# Patient Record
Sex: FEMALE | Race: WHITE | Employment: OTHER | URBAN - METROPOLITAN AREA
[De-identification: names, ages, dates, MRNs, and addresses within clinical notes are randomized per-mention and may not be internally consistent; named-entity substitution may affect disease eponyms.]

---

## 2018-04-25 ENCOUNTER — OFFICE VISIT (OUTPATIENT)
Dept: ORTHOPEDIC SURGERY | Age: 63
End: 2018-04-25

## 2018-04-25 VITALS
DIASTOLIC BLOOD PRESSURE: 74 MMHG | HEART RATE: 63 BPM | RESPIRATION RATE: 16 BRPM | BODY MASS INDEX: 35.13 KG/M2 | SYSTOLIC BLOOD PRESSURE: 134 MMHG | WEIGHT: 205.8 LBS | HEIGHT: 64 IN

## 2018-04-25 DIAGNOSIS — M47.817 LUMBOSACRAL SPONDYLOSIS WITHOUT MYELOPATHY: Primary | ICD-10-CM

## 2018-04-25 DIAGNOSIS — C50.919 MALIGNANT NEOPLASM OF FEMALE BREAST, UNSPECIFIED ESTROGEN RECEPTOR STATUS, UNSPECIFIED LATERALITY, UNSPECIFIED SITE OF BREAST (HCC): ICD-10-CM

## 2018-04-25 DIAGNOSIS — M51.36 DDD (DEGENERATIVE DISC DISEASE), LUMBAR: ICD-10-CM

## 2018-04-25 RX ORDER — LINAGLIPTIN 5 MG/1
TABLET, FILM COATED ORAL
Refills: 0 | COMMUNITY
Start: 2018-04-19 | End: 2021-09-27

## 2018-04-25 RX ORDER — PEN NEEDLE, DIABETIC 32 GX 1/4"
NEEDLE, DISPOSABLE MISCELLANEOUS
Refills: 0 | COMMUNITY
Start: 2018-01-26 | End: 2021-12-05

## 2018-04-25 RX ORDER — INSULIN LISPRO 100 [IU]/ML
INJECTION, SOLUTION INTRAVENOUS; SUBCUTANEOUS
Refills: 0 | COMMUNITY
Start: 2018-04-20 | End: 2018-08-16

## 2018-04-25 RX ORDER — VENLAFAXINE HYDROCHLORIDE 37.5 MG/1
37.5 CAPSULE, EXTENDED RELEASE ORAL DAILY
Refills: 0 | COMMUNITY
Start: 2018-04-06 | End: 2020-11-17 | Stop reason: ALTCHOICE

## 2018-04-25 RX ORDER — TRAMADOL HYDROCHLORIDE 50 MG/1
TABLET ORAL
Refills: 0 | COMMUNITY
Start: 2018-01-24 | End: 2018-08-16

## 2018-04-25 RX ORDER — GLIPIZIDE 10 MG/1
TABLET ORAL 2 TIMES DAILY
Refills: 0 | COMMUNITY
Start: 2018-04-20 | End: 2018-09-04

## 2018-04-25 RX ORDER — DICLOFENAC SODIUM 10 MG/G
GEL TOPICAL
Refills: 0 | COMMUNITY
Start: 2018-02-14 | End: 2018-08-16

## 2018-04-25 RX ORDER — GABAPENTIN 300 MG/1
CAPSULE ORAL
Refills: 0 | COMMUNITY
Start: 2018-01-23 | End: 2018-07-06 | Stop reason: SDUPTHER

## 2018-04-25 RX ORDER — ROSUVASTATIN CALCIUM 10 MG/1
TABLET, COATED ORAL
Refills: 0 | COMMUNITY
Start: 2018-04-20 | End: 2018-08-16

## 2018-04-25 RX ORDER — AMLODIPINE BESYLATE 10 MG/1
10 TABLET ORAL DAILY
Refills: 0 | COMMUNITY
Start: 2018-04-06

## 2018-04-25 RX ORDER — INSULIN GLARGINE 100 [IU]/ML
INJECTION, SOLUTION SUBCUTANEOUS
Refills: 0 | Status: ON HOLD | COMMUNITY
Start: 2018-04-19 | End: 2018-09-04

## 2018-04-25 RX ORDER — HYDROGEN PEROXIDE 3 %
20 SOLUTION, NON-ORAL MISCELLANEOUS DAILY
Refills: 0 | COMMUNITY
Start: 2018-04-06 | End: 2021-05-13

## 2018-04-25 RX ORDER — HYDROCHLOROTHIAZIDE 12.5 MG/1
12.5 CAPSULE ORAL DAILY
Refills: 0 | COMMUNITY
Start: 2018-04-06 | End: 2020-12-02

## 2018-04-25 RX ORDER — METOPROLOL TARTRATE 50 MG/1
50 TABLET ORAL DAILY
Refills: 0 | COMMUNITY
Start: 2018-04-06 | End: 2020-12-02

## 2018-04-25 RX ORDER — LISINOPRIL 20 MG/1
TABLET ORAL DAILY
Refills: 0 | COMMUNITY
Start: 2018-04-06 | End: 2018-09-04

## 2018-04-25 NOTE — MR AVS SNAPSHOT
[address] [facility] 
 
 
 [address] 200 [facility] 
[phone] Patient: Pooja Russ MRN: [id number] Visit Information Date & Time Provider Department Dept. Phone Encounter #  
 4/25/2018 10:00 AM Ascencion Varner MD [facility] [phone] [id number] Follow-up Instructions Return for following MRI. Upcoming Health Maintenance Date Due Hepatitis C Screening 1955 DTaP/Tdap/Td series (1 - Tdap) 2/19/1978 PAP AKA CERVICAL CYTOLOGY 2/19/1978 BREAST CANCER SCRN MAMMOGRAM 2/19/2007 FOBT Q 1 YEAR AGE 50-75 2/19/2007 ZOSTER VACCINE AGE 60> 12/19/2016 Influenza Age 5 to Adult 8/1/2017 Allergies as of 4/25/2018  Review Complete On: 4/25/2018 By: Ascencion Varner MD  
  
 Severity Noted Reaction Type Reactions Keflex [Cephalexin]  04/25/2018    Nausea and Vomiting Metformin  04/25/2018    Other (comments) GI DISTRESS Soliqua 100/33 [Insulin Glargine-lixisenatide]  04/25/2018    Nausea Only Current Immunizations  Never Reviewed No immunizations on file. Not reviewed this visit You Were Diagnosed With   
  
 Codes Comments Lumbosacral spondylosis without myelopathy    -  Primary ICD-10-CM: Y72.924 ICD-9-CM: 721.3 DDD (degenerative disc disease), lumbar     ICD-10-CM: M51.36 
ICD-9-CM: 722.52 Malignant neoplasm of female breast, unspecified estrogen receptor status, unspecified laterality, unspecified site of breast ([facility]     ICD-10-CM: C50.919 ICD-9-CM: 174.9 Vitals BP Pulse Resp Height(growth percentile) Weight(growth percentile) BMI  
 134/74 63 16 5' 3.5\" (1.613 m) 205 lb 12.8 oz (93.4 kg) 35.88 kg/m2 Smoking Status Never Smoker Vitals History BMI and BSA Data Body Mass Index Body Surface Area  
 35.88 kg/m 2 2.05 m 2 Preferred Pharmacy Pharmacy Name Phone 300 [facility] [address] [facility] [phone] Your Updated Medication List  
  
   
This list is accurate as of 4/25/18 10:37 AM.  Always use your most recent med list. amLODIPine 10 mg tablet Commonly known as:  NORVASC  
  
 diclofenac 1 % Gel Commonly known as:  VOLTAREN  
  
 esomeprazole 20 mg capsule Commonly known as:  NEXIUM  
  
 gabapentin 300 mg capsule Commonly known as:  NEURONTIN  
take 1 capsule by mouth twice a day  
  
 glipiZIDE 10 mg tablet Commonly known as:  Tamy Chu HumaLOG KwikPen Insulin 100 unit/mL kwikpen Generic drug:  insulin lispro  
  
 hydroCHLOROthiazide 12.5 mg capsule Commonly known as:  MICROZIDE  
  
 LANTUS SOLOSTAR U-100 INSULIN 100 unit/mL (3 mL) Inpn Generic drug:  insulin glargine INJECT 18 UNITS INTO SKIN ONCE AT BEDTIME FOR 15 DAYS  
  
 lisinopril 20 mg tablet Commonly known as:  PRINIVIL, ZESTRIL  
  
 metoprolol tartrate 50 mg tablet Commonly known as:  LOPRESSOR  
  
 NOVOFINE 32 32 gauge x 1/4\" Ndle Generic drug:  Insulin Needles (Disposable)  
  
 rosuvastatin 10 mg tablet Commonly known as:  CRESTOR  
  
 TRADJENTA 5 mg tablet Generic drug:  linagliptin TAKE 1 TABLET BY MOUTH ONCE A DAY FOR 15 DAYS  
  
 traMADol 50 mg tablet Commonly known as:  ULTRAM  
take 1 tablet by mouth every 6 hours if needed  
  
 venlafaxine-SR 37.5 mg capsule Commonly known as:  EFFEXOR-XR Follow-up Instructions Return for following MRI. To-Do List   
 05/02/2018 Imaging:  MRI LUMB SPINE WO CONT Introducing [facility]! [facility] introduces Cardoz patient portal. Now you can access parts of your medical record, email your doctor's office, and request medication refills online. 1. In your internet browser, go to [email or web address] 2. Click on the First Time User? Click Here link in the Sign In box.  You will see the New Member Sign Up page. 3. Enter your DataVote Access Code exactly as it appears below. You will not need to use this code after youve completed the sign-up process. If you do not sign up before the expiration date, you must request a new code. · DataVote Access Code: [email or web address] Expires: 7/24/2018  8:54 AM 
 
4. Enter the last four digits of your Social Security Number (xxxx) and Date of Birth (mm/dd/yyyy) as indicated and click Submit. You will be taken to the next sign-up page. 5. Create a ZIIBRAt ID. This will be your DataVote login ID and cannot be changed, so think of one that is secure and easy to remember. 6. Create a DataVote password. You can change your password at any time. 7. Enter your Password Reset Question and Answer. This can be used at a later time if you forget your password. 8. Enter your e-mail address. You will receive e-mail notification when new information is available in [address] 9. Click Sign Up. You can now view and download portions of your medical record. 10. Click the Download Summary menu link to download a portable copy of your medical information. If you have questions, please visit the Frequently Asked Questions section of the DataVote website. Remember, DataVote is NOT to be used for urgent needs. For medical emergencies, dial 911. Now available from your iPhone and Android! Please provide this summary of care documentation to your next provider. If you have any questions after today's visit, please call [phone].

## 2018-04-25 NOTE — PROGRESS NOTES
[facility]  [address], [address]  Phone: [phone]  Fax: [phone]        INITIAL CONSULTATION      HISTORY OF PRESENT ILLNESS:  Zora Barajas is a 66 y.o. female whom is referred from Ellie York NP secondary to low back pain without radicular symptoms ongoing since 12/6/17 after having a ground-level fall. She rates pain 7/10. Standing exacerbates her pain and sitting alleviates her pain. symptoms consistent for stenosis. ER note from Dr. Chani Heaton dated 12/6/18 indicating patient presented for acute midline low back pain with sciatica. At that time, she was treated with Prednisone and Hydrocodone with good relief. Noted, her blood sugars gilberto to 386 with the Prednisone. Note from Ellie York NP dated 4/12/18 indicating patient was seen with acute low back pain. Pt is currently receiving chemotherapy for breast cancer (diagnosed 1/2017) with h/o lumpectomy and radiation therapy. The patient has a history of DM and reports blood sugars are poorly controlled, exceeding 200 (211). She has tried Tylenol with slight relief. Noted, she is currently prescribed Neurontin 300 mg TID. Pt is not a candidate for Cymbalta as she is on Effexor. She received PT and has been completing her HEP daily. Pt denies h/o lumbar spinal surgery/blocks. Pt denies change in bowel or bladder habits. Pt denies fever, weight loss, or skin changes. Lumbar spine XR dated 12/6/2017 reviewed. Per report, no evidence of acute fracture or malalignment. Vertebral body heights are maintained. Degenerative disc changes notably at L5-S1 with loss of disc height and disc osteophyte formation. Although not specifically mentioned in the report, reviewing the films myself, there is a very slight anterolisthesis of L4 on L5 and mild dsic space narrowing at L4-5, L5-S1. The patient is RHD.  reviewed. Body mass index is 35.88 kg/(m^2). PCP: No primary care provider on file.     Past Medical History:   Diagnosis Date    Cancer ([facility]     Diabetes ([facility]     Hypertension    [facility]  Past Surgical History:   Procedure Laterality Date    HX BREAST BIOPSY Left 02/03/2017    BREAST CANCER    HX BREAST LUMPECTOMY Left    ANCER    HX BREAST LUMPECTOMY Left       Substance Use Topics    Smoking status: Never Smoker    Smokeless tobacco: Never Used    Alcohol use No     Work status: Not available. Marital status: The patient is . Allergies   Allergen Reactions    Keflex [Cephalexin] Nausea and Vomiting    Metformin Other (comments)     GI DISTRESS    Soliqua 100/33 [Insulin Glargine-Lixisenatide] Nausea Only          No family history on file. REVIEW OF SYSTEMS  Constitutional symptoms: Negative  Eyes: Negative  Ears, Nose, Throat, and Mouth: Negative  Cardiovascular: Negative  Respiratory: Negative  Genitourinary: Negative  Integumentary (Skin and/or breast): Negative  Musculoskeletal: Positive for low back pain, bilateral knee pain (R>L). Extremities: Negative for edema. Endocrine/Rheumatologic: Negative  Hematologic/Lymphatic: Negative  Allergic/Immunologic: Negative  Psychiatric: Negative       PHYSICAL EXAMINATION    Visit Vitals    /74    Pulse 63    Resp 16    Ht 5' 3.5\" (1.613 m)    Wt 93.4 kg (205 lb 12.8 oz)    BMI 35.88 kg/m2       CONSTITUTIONAL: NAD, A&O x 3  HEART: Regular rate and rhythm  ABDOMEN: Positive bowel sounds, soft, nontender, and nondistended  LUNGS: Clear to auscultation bilaterally. SKIN: Negative for rash. RANGE OF MOTION: The patient has full passive range of motion in all four extremities. SENSATION: Sensation is intact to light touch throughout. MOTOR:   Straight Leg Raise: Negative, bilateral  Mcconnell: Negative, bilateral  Deep tendon reflexes are 1+ at the brachioradialis and biceps, and 0 at the triceps. Deep tendon reflexes are 0 at the knees and ankles bilaterally.      Shoulder AB/Flex Elbow Flex Wrist Ext Elbow Ext Wrist Flex Hand Intrin Tone   Right +4/5 +4/5 +4/5 +4/5 +4/5 +4/5 +4/5   Left +4/5 +4/5 +4/5 +4/5 +4/5 +4/5 +4/5              Hip Flex Knee Ext Knee Flex Ankle DF GTE Ankle PF Tone   Right +4/5 +4/5 +4/5 +4/5 +4/5 +4/5 +4/5   Left +4/5 +4/5 +4/5 +4/5 +4/5 +4/5 +4/5       ASSESSMENT   Diagnoses and all orders for this visit:    1. Lumbosacral spondylosis without myelopathy  -     MRI LUMB SPINE WO CONT; Future    2. DDD (degenerative disc disease), lumbar  -     MRI LUMB SPINE WO CONT; Future    3. Malignant neoplasm of female breast, unspecified estrogen receptor status, unspecified laterality, unspecified site of breast ([facility]  -     MRI LUMB SPINE WO CONT; Future           IMPRESSIONS/RECOMMENDATIONS:  The patient presents for low back pain without radicular symptoms ongoing since 12/6/17 after having a ground-level fall. She is neurologically intact. Multiple treatment options were discussed. Given her history of breast cancer, I will set her up for a lumbar spine MRI to r/o metastatic disease. I advised patient to bring copies of films to next visit. I encourage patient to perform her HEP daily. I will see the patient back following MRI. Written by Justus Galicia, as dictated by Marcy Erwin MD  I examined the patient, reviewed and agree with the note.

## 2018-05-01 ENCOUNTER — DOCUMENTATION ONLY (OUTPATIENT)
Dept: ORTHOPEDIC SURGERY | Age: 63
End: 2018-05-01

## 2018-05-01 NOTE — PROGRESS NOTES
MRI Lumbar without is scheduled at [facility], [id number], on 05/08/18, arrive 3:45PM, test 4:15PM. [employer] pre-authorization [id number], effective 05/01/18-05/30/18

## 2018-05-16 ENCOUNTER — OFFICE VISIT (OUTPATIENT)
Dept: ORTHOPEDIC SURGERY | Age: 63
End: 2018-05-16

## 2018-05-16 VITALS
HEART RATE: 70 BPM | SYSTOLIC BLOOD PRESSURE: 160 MMHG | BODY MASS INDEX: 37.21 KG/M2 | HEIGHT: 63 IN | WEIGHT: 210 LBS | DIASTOLIC BLOOD PRESSURE: 80 MMHG | OXYGEN SATURATION: 96 %

## 2018-05-16 DIAGNOSIS — M51.36 DDD (DEGENERATIVE DISC DISEASE), LUMBAR: ICD-10-CM

## 2018-05-16 DIAGNOSIS — M48.061 SPINAL STENOSIS, LUMBAR REGION WITHOUT NEUROGENIC CLAUDICATION: Primary | ICD-10-CM

## 2018-05-16 DIAGNOSIS — M47.817 LUMBOSACRAL SPONDYLOSIS WITHOUT MYELOPATHY: ICD-10-CM

## 2018-05-16 NOTE — MR AVS SNAPSHOT
[address] [facility] 
 
 
 [address] 200 [facility] 
[phone] Patient: Nahum Alegre MRN: [id number] Visit Information Date & Time Provider Department Dept. Phone Encounter #  
 5/16/2018  1:10 PM Juanito Lockwood MD [facility], [address] [facility] [place] [id number] Follow-up Instructions Return in about 4 weeks (around 6/13/2018). Upcoming Health Maintenance Date Due Hepatitis C Screening 1955 Pneumococcal 19-64 Highest Risk (1 of 3 - PCV13) 2/19/1976 DTaP/Tdap/Td series (1 - Tdap) 2/19/1978 PAP AKA CERVICAL CYTOLOGY 2/19/1978 BREAST CANCER SCRN MAMMOGRAM 2/19/2007 FOBT Q 1 YEAR AGE 50-75 2/19/2007 ZOSTER VACCINE AGE 60> 12/19/2016 Influenza Age 5 to Adult 8/1/2018 Allergies as of 5/16/2018  Review Complete On: 5/16/2018 By: Juanito Lockwood MD  
  
 Severity Noted Reaction Type Reactions Keflex [Cephalexin]  04/25/2018    Nausea and Vomiting Metformin  04/25/2018    Other (comments) GI DISTRESS Soliqua 100/33 [Insulin Glargine-lixisenatide]  04/25/2018    Nausea Only Current Immunizations  Never Reviewed No immunizations on file. Not reviewed this visit You Were Diagnosed With   
  
 Codes Comments Spinal stenosis, lumbar region without neurogenic claudication    -  Primary ICD-10-CM: M48.061 
ICD-9-CM: 724.02 Lumbosacral spondylosis without myelopathy     ICD-10-CM: P78.148 ICD-9-CM: 721.3 DDD (degenerative disc disease), lumbar     ICD-10-CM: M51.36 
ICD-9-CM: 722.52 Vitals BP Pulse Height(growth percentile) Weight(growth percentile) SpO2 BMI  
 160/80 70 5' 3\" (1.6 m) 210 lb (95.3 kg) 96% 37.2 kg/m2 Smoking Status Never Smoker BMI and BSA Data Body Mass Index Body Surface Area  
 37.2 kg/m 2 2.06 m 2 Preferred Pharmacy Pharmacy Name Phone 300 [facility] [address] [facility] [phone] Your Updated Medication List  
  
   
This list is accurate as of 5/16/18  3:13 PM.  Always use your most recent med list. amLODIPine 10 mg tablet Commonly known as:  NORVASC  
  
 diclofenac 1 % Gel Commonly known as:  VOLTAREN  
  
 esomeprazole 20 mg capsule Commonly known as:  NEXIUM  
  
 gabapentin 300 mg capsule Commonly known as:  NEURONTIN  
take 1 capsule by mouth twice a day  
  
 glipiZIDE 10 mg tablet Commonly known as:  [place] HumaLOG KwikPen Insulin 100 unit/mL kwikpen Generic drug:  insulin lispro  
  
 hydroCHLOROthiazide 12.5 mg capsule Commonly known as:  MICROZIDE  
  
 LANTUS SOLOSTAR U-100 INSULIN 100 unit/mL (3 mL) Inpn Generic drug:  insulin glargine INJECT 18 UNITS INTO SKIN ONCE AT BEDTIME FOR 15 DAYS  
  
 lisinopril 20 mg tablet Commonly known as:  PRINIVIL, ZESTRIL  
  
 metoprolol tartrate 50 mg tablet Commonly known as:  LOPRESSOR  
  
 NOVOFINE 32 32 gauge x 1/4\" Ndle Generic drug:  Insulin Needles (Disposable)  
  
 rosuvastatin 10 mg tablet Commonly known as:  CRESTOR  
  
 TRADJENTA 5 mg tablet Generic drug:  linagliptin TAKE 1 TABLET BY MOUTH ONCE A DAY FOR 15 DAYS  
  
 traMADol 50 mg tablet Commonly known as:  ULTRAM  
take 1 tablet by mouth every 6 hours if needed  
  
 venlafaxine-SR 37.5 mg capsule Commonly known as:  EFFEXOR-XR Follow-up Instructions Return in about 4 weeks (around 6/13/2018). Introducing [facility]! [address] [facility] introduces TrueFacet patient portal. Now you can access parts of your medical record, email your doctor's office, and request medication refills online. 1. In your internet browser, go to [email or web address] 2. Click on the First Time User? Click Here link in the Sign In box. You will see the New Member Sign Up page. 3. Enter your Anacomp Access Code exactly as it appears below. You will not need to use this code after youve completed the sign-up process. If you do not sign up before the expiration date, you must request a new code. · Anacomp Access Code: [email or web address] Expires: 7/24/2018  8:54 AM 
 
4. Enter the last four digits of your Social Security Number (xxxx) and Date of Birth (mm/dd/yyyy) as indicated and click Submit. You will be taken to the next sign-up page. 5. Create a Anacomp ID. This will be your Anacomp login ID and cannot be changed, so think of one that is secure and easy to remember. 6. Create a Anacomp password. You can change your password at any time. 7. Enter your Password Reset Question and Answer. This can be used at a later time if you forget your password. 8. Enter your e-mail address. You will receive e-mail notification when new information is available in [address] 9. Click Sign Up. You can now view and download portions of your medical record. 10. Click the Download Summary menu link to download a portable copy of your medical information. If you have questions, please visit the Frequently Asked Questions section of the Anacomp website. Remember, Anacomp is NOT to be used for urgent needs. For medical emergencies, dial 911. Now available from your iPhone and Android! Please provide this summary of care documentation to your next provider. Your primary care clinician is listed as Samara Ramirez. If you have any questions after today's visit, please call [phone].

## 2018-05-16 NOTE — PROGRESS NOTES
[facility]  [address], [address]  Phone: [phone]  Fax: [phone]        PROGRESS NOTE      HISTORY OF PRESENT ILLNESS:  The patient is a 66 y.o. female and was seen today for follow up of low back pain without radicular symptoms ongoing since 12/6/17 after having a ground-level fall. Standing exacerbates her pain and sitting alleviates her pain. Symptoms consistent for stenosis. ER note from Dr. Jose Eduardo Caleor dated 12/6/18 indicating patient presented for acute midline low back pain with sciatica. At that time, she was treated with Prednisone and Hydrocodone with good relief. Noted, her blood sugars gilberto to 386 with the Prednisone. Note from Tain Wallis NP dated 4/12/18 indicating patient was seen with acute low back pain. Pt has received chemotherapy for breast cancer (diagnosed 1/2017) with h/o lumpectomy and radiation therapy. She has tried Tylenol with slight relief. Pt is not a candidate for Cymbalta as she is on Effexor. Pt denies h/o lumbar spinal surgery/blocks. Pt denies change in bowel or bladder habits. Pt denies fever, weight loss, or skin changes. Lumbar spine XR dated 12/6/2017 reviewed. Per report, no evidence of acute fracture or malalignment. Vertebral body heights are maintained. Degenerative disc changes notably at L5-S1 with loss of disc height and disc osteophyte formation. Although not specifically mentioned in the report, reviewing the films myself, there is a very slight anterolisthesis of L4 on L5 and mild disc space narrowing at L4-5, L5-S1. The patient is RHD. At her last clinical appointment, the patient presented for low back pain without radicular symptoms ongoing since 12/6/17 after having a ground-level fall. She was neurologically intact. Multiple treatment options were discussed. Given her history of breast cancer, I set her up for a lumbar spine MRI to r/o metastatic disease.  I encouraged patient to perform her HEP daily. The patient returns today with pain location and distribution remain unchanged. She continues to rate her pain pain 7/10. Noted, she is currently prescribed Neurontin 400 mg qam and 800 mg qhs for neuropathy. The patient has a history of DM and reports blood sugars are poorly controlled, exceeding 200 (300+). Lumbar spine MRI dated 5/8/2018 reviewed. Per report, severe central and foraminal stenosis at L3/4. Moderate to severe central stenosis L4/5. Severe right L5 lateral recess and right foraminal stenosis L4/5 and left S1 lateral recess and left foraminal stenosis L5/S1, deforming the respective nerve roots, and multilevel spondylosis. Likely degenerative marrow edema at L5/S1 deep to the endplates, which can be a pain generator. partially visualized right renal angiomyolipoma as seen on prior CT.  reviewed. Body mass index is 37.2 kg/(m^2). PCP: Silver Anton NP    Past Medical History:   Diagnosis Date    [facility] ([facility]     Diabetes ([facility]     Hypertension         Social History     Social History    Marital status:      Spouse name: N/A    Number of children: N/A    Years of education: N/A     Occupational History    Not on file.      Social History Main Topics    Smoking status: Never Smoker    Smokeless tobacco: Never Used    Alcohol use No    Drug use: No    Sexual activity: Not on file     Other Topics Concern    Not on file     Social History Narrative       Current Outpatient Prescriptions   Medication Sig Dispense Refill    amLODIPine (NORVASC) 10 mg tablet   0    diclofenac (VOLTAREN) 1 % gel   0    esomeprazole (NEXIUM) 20 mg capsule   0    gabapentin (NEURONTIN) 300 mg capsule take 1 capsule by mouth twice a day  0    glipiZIDE (GLUCOTROL) 10 mg tablet   0    hydroCHLOROthiazide (MICROZIDE) 12.5 mg capsule   0    LANTUS SOLOSTAR U-100 INSULIN 100 unit/mL (3 mL) inpn INJECT 18 UNITS INTO SKIN ONCE AT BEDTIME FOR 15 DAYS  0    HUMALOG KWIKPEN INSULIN 100 unit/mL kwikpen   0    TRADJENTA 5 mg tablet TAKE 1 TABLET BY MOUTH ONCE A DAY FOR 15 DAYS  0    lisinopril (PRINIVIL, ZESTRIL) 20 mg tablet   0    metoprolol tartrate (LOPRESSOR) 50 mg tablet   0    NOVOFINE 32 32 gauge x 1/4\" ndle   0    rosuvastatin (CRESTOR) 10 mg tablet   0    traMADol (ULTRAM) 50 mg tablet take 1 tablet by mouth every 6 hours if needed  0    venlafaxine-SR (EFFEXOR-XR) 37.5 mg capsule   0       Allergies   Allergen Reactions    Keflex [Cephalexin] Nausea and Vomiting    Metformin Other (comments)     GI DISTRESS    Soliqua 100/33 [Insulin Glargine-Lixisenatide] Nausea Only          PHYSICAL EXAMINATION    Visit Vitals    /80    Pulse 70    Ht 5' 3\" (1.6 m)    Wt 95.3 kg (210 lb)    SpO2 96%    BMI 37.2 kg/m2       CONSTITUTIONAL: NAD, A&O x 3  SENSATION: Intact to light touch throughout  RANGE OF MOTION: The patient has full passive range of motion in all four extremities. MOTOR:  Straight Leg Raise: Negative, bilateral               Hip Flex Knee Ext Knee Flex Ankle DF GTE Ankle PF Tone   Right +4/5 +4/5 +4/5 +4/5 +4/5 +4/5 +4/5   Left +4/5 +4/5 +4/5 +4/5 +4/5 +4/5 +4/5       ASSESSMENT   Diagnoses and all orders for this visit:    1. Spinal stenosis, lumbar region without neurogenic claudication    2. Lumbosacral spondylosis without myelopathy    3. DDD (degenerative disc disease), lumbar          IMPRESSION AND PLAN:  Her symptoms appear to be stenotic in nature. She is neurologically intact. Patient is not a good candidate for lumbar blocks due to her poorly controlled DM. I recommend she take Neurontin 400 mg TID. She declines physical therapy. I will see the patient back in 1 month's time or earlier if needed. Written by Faviola Manzo, as dictated by Nito Guzman MD  I examined the patient, reviewed and agree with the note.

## 2018-06-14 DIAGNOSIS — C50.919 MALIGNANT NEOPLASM OF FEMALE BREAST, UNSPECIFIED ESTROGEN RECEPTOR STATUS, UNSPECIFIED LATERALITY, UNSPECIFIED SITE OF BREAST (HCC): ICD-10-CM

## 2018-06-14 DIAGNOSIS — M47.817 LUMBOSACRAL SPONDYLOSIS WITHOUT MYELOPATHY: ICD-10-CM

## 2018-06-14 DIAGNOSIS — M51.36 DDD (DEGENERATIVE DISC DISEASE), LUMBAR: ICD-10-CM

## 2018-06-20 ENCOUNTER — OFFICE VISIT (OUTPATIENT)
Dept: ORTHOPEDIC SURGERY | Age: 63
End: 2018-06-20

## 2018-06-20 VITALS
BODY MASS INDEX: 37.03 KG/M2 | HEART RATE: 65 BPM | HEIGHT: 63 IN | SYSTOLIC BLOOD PRESSURE: 162 MMHG | WEIGHT: 209 LBS | DIASTOLIC BLOOD PRESSURE: 83 MMHG

## 2018-06-20 DIAGNOSIS — M48.061 SPINAL STENOSIS, LUMBAR REGION WITHOUT NEUROGENIC CLAUDICATION: Primary | ICD-10-CM

## 2018-06-20 DIAGNOSIS — M47.817 LUMBOSACRAL SPONDYLOSIS WITHOUT MYELOPATHY: ICD-10-CM

## 2018-06-20 DIAGNOSIS — M51.36 DDD (DEGENERATIVE DISC DISEASE), LUMBAR: ICD-10-CM

## 2018-06-20 PROBLEM — E66.01 SEVERE OBESITY (BMI 35.0-39.9): Status: ACTIVE | Noted: 2018-06-20

## 2018-06-20 RX ORDER — HYDROCODONE BITARTRATE AND ACETAMINOPHEN 5; 325 MG/1; MG/1
TABLET ORAL
COMMUNITY
Start: 2018-06-06 | End: 2018-08-16

## 2018-06-20 RX ORDER — FUROSEMIDE 20 MG/1
TABLET ORAL
Refills: 0 | COMMUNITY
Start: 2018-06-08 | End: 2020-12-02

## 2018-06-20 RX ORDER — GABAPENTIN 600 MG/1
600 TABLET ORAL 3 TIMES DAILY
Qty: 90 TAB | Refills: 1 | Status: SHIPPED | OUTPATIENT
Start: 2018-06-20 | End: 2018-08-22 | Stop reason: SDUPTHER

## 2018-06-20 NOTE — MR AVS SNAPSHOT
[address] [facility], operated by [facility] 
 
 
 [address] [facility] 
[phone] Patient: Everardo Gayle MRN: [id number] AFE:[id number] Visit Information Date & Time Provider Department Dept. Phone Encounter #  
 6/20/2018  1:45 PM Clayton Tejeda  [facility], [address] [facility] - SPECIALTY [facility] [phone] Your Appointments 7/6/2018  1:00 PM  
SURGERY CONSULT with Jigar Bhakta MD  
[address] [facility], [address] [facility] [place] [facility] Appt Note: Lumbar-patient aware of date/time/loc/and to bring MRI  
 [address] [facility] [address]  
[phone]  
  
   
 [address] [facility] [address] Upcoming Health Maintenance Date Due Hepatitis C Screening 1955 Pneumococcal 19-64 Highest Risk (1 of 3 - PCV13) 2/19/1976 DTaP/Tdap/Td series (1 - Tdap) 2/19/1978 PAP AKA CERVICAL CYTOLOGY 2/19/1978 BREAST CANCER SCRN MAMMOGRAM 2/19/2007 FOBT Q 1 YEAR AGE 50-75 2/19/2007 ZOSTER VACCINE AGE 60> 12/19/2016 Influenza Age 5 to Adult 8/1/2018 Allergies as of 6/20/2018  Review Complete On: 6/20/2018 By: Clayton Tejeda MD  
  
 Severity Noted Reaction Type Reactions Keflex [Cephalexin]  04/25/2018    Nausea and Vomiting Metformin  04/25/2018    Other (comments) GI DISTRESS Soliqua 100/33 [Insulin Glargine-lixisenatide]  04/25/2018    Nausea Only Current Immunizations  Never Reviewed No immunizations on file. Not reviewed this visit You Were Diagnosed With   
  
 Codes Comments Spinal stenosis, lumbar region without neurogenic claudication    -  Primary ICD-10-CM: M48.061 
ICD-9-CM: 724.02 Lumbosacral spondylosis without myelopathy     ICD-10-CM: M07.454 ICD-9-CM: 721.3 DDD (degenerative disc disease), lumbar     ICD-10-CM: M51.36 
ICD-9-CM: 722.52 Vitals BP Pulse Height(growth percentile) Weight(growth percentile) BMI Smoking Status 162/83 65 5' 3\" (1.6 m) 209 lb (94.8 kg) 37.02 kg/m2 Never Smoker Vitals History BMI and BSA Data Body Mass Index Body Surface Area 37.02 kg/m 2 2.05 m 2 Preferred Pharmacy Pharmacy Name Phone [address] [facility] [phone] Your Updated Medication List  
  
   
This list is accurate as of 6/20/18  2:53 PM.  Always use your most recent med list. amLODIPine 10 mg tablet Commonly known as:  NORVASC  
  
 diclofenac 1 % Gel Commonly known as:  VOLTAREN  
  
 esomeprazole 20 mg capsule Commonly known as:  NEXIUM  
  
 furosemide 20 mg tablet Commonly known as:  LASIX TAKE 1 TABLET BY MOUTH ONCE DAILY AS NEEDED  
  
 * gabapentin 300 mg capsule Commonly known as:  NEURONTIN  
take 1 capsule by mouth twice a day * gabapentin 600 mg tablet Commonly known as:  NEURONTIN Take 1 Tab by mouth three (3) times daily. glipiZIDE 10 mg tablet Commonly known as:  Donzell Schooner HumaLOG KwikPen Insulin 100 unit/mL kwikpen Generic drug:  insulin lispro  
  
 hydroCHLOROthiazide 12.5 mg capsule Commonly known as:  Obinna Dose HYDROcodone-acetaminophen 5-325 mg per tablet Commonly known as:  NORCO  
  
 LANTUS SOLOSTAR U-100 INSULIN 100 unit/mL (3 mL) Inpn Generic drug:  insulin glargine INJECT 18 UNITS INTO SKIN ONCE AT BEDTIME FOR 15 DAYS  
  
 lisinopril 20 mg tablet Commonly known as:  PRINIVIL, ZESTRIL  
  
 metoprolol tartrate 50 mg tablet Commonly known as:  LOPRESSOR  
  
 NOVOFINE 32 32 gauge x 1/4\" Ndle Generic drug:  Insulin Needles (Disposable)  
  
 rosuvastatin 10 mg tablet Commonly known as:  CRESTOR  
  
 TRADJENTA 5 mg tablet Generic drug:  linagliptin TAKE 1 TABLET BY MOUTH ONCE A DAY FOR 15 DAYS  
  
 traMADol 50 mg tablet Commonly known as:  Ximena Pat  
 take 1 tablet by mouth every 6 hours if needed  
  
 venlafaxine-SR 37.5 mg capsule Commonly known as:  EFFEXOR-XR * Notice: This list has 2 medication(s) that are the same as other medications prescribed for you. Read the directions carefully, and ask your doctor or other care provider to review them with you. Prescriptions Sent to Pharmacy Refills  
 gabapentin (NEURONTIN) 600 mg tablet 1 Sig: Take 1 Tab by mouth three (3) times daily. Class: Normal  
 Pharmacy: [id number] [address] [place] [address] #: [phone] Route: Oral  
  
Introducing [facility]! [employer] introduces [employer] patient portal. Now you can access parts of your medical record, email your doctor's office, and request medication refills online. 1. In your internet browser, go to [email or web address] 2. Click on the First Time User? Click Here link in the Sign In box. You will see the New Member Sign Up page. 3. Enter your [employer] Access Code exactly as it appears below. You will not need to use this code after youve completed the sign-up process. If you do not sign up before the expiration date, you must request a new code. · [employer] Access Code: [email or web address] Expires: 7/24/2018  8:54 AM 
 
4. Enter the last four digits of your Social Security Number (xxxx) and Date of Birth (mm/dd/yyyy) as indicated and click Submit. You will be taken to the next sign-up page. 5. Create a PlayFirstt ID. This will be your [employer] Expert login ID and cannot be changed, so think of one that is secure and easy to remember. 6. Create a [employer] password. You can change your password at any time. 7. Enter your Password Reset Question and Answer. This can be used at a later time if you forget your password. 8. Enter your e-mail address. You will receive e-mail notification when new information is available in [address] 9. Click Sign Up. You can now view and download portions of your medical record. 10. Click the Download Summary menu link to download a portable copy of your medical information. If you have questions, please visit the Frequently Asked Questions section of the [employer] website. Remember, [employer] is NOT to be used for urgent needs. For medical emergencies, dial 911. Now available from your iPhone and Android! Please provide this summary of care documentation to your next provider. Your primary care clinician is listed as Kurt Amado. If you have any questions after today's visit, please call [phone].

## 2018-06-20 NOTE — PROGRESS NOTES
[facility]  [address]  [address], [address] [place]   Phone: [phone]  Fax: [phone]        PROGRESS NOTE      HISTORY OF PRESENT ILLNESS:  The patient is a 66 y.o. female and was seen today for follow up of low back pain without radicular symptoms ongoing since 12/6/17 after having a ground-level fall. Standing exacerbates her pain and sitting alleviates her pain. Symptoms consistent for stenosis. ER note from Dr. Wale Castorena dated 12/6/18 indicating patient presented for acute midline low back pain with sciatica. At that time, she was treated with Prednisone and Hydrocodone with good relief. Noted, her blood sugars gilberto to 386 with the Prednisone. Note from Kadeem Eagle NP dated 4/12/18 indicating patient was seen with acute low back pain. Pt has received chemotherapy for breast cancer (diagnosed 1/2017) with h/o lumpectomy and radiation therapy. She has tried Tylenol with slight relief. Pt is not a candidate for Cymbalta as she is on Effexor. The patient has a history of DM and reports blood sugars are poorly controlled, exceeding 200 (300+). The patient has a history of DM and reports blood sugars are poorly controlled, exceeding 200 (300+). Pt denies h/o lumbar spinal surgery/blocks. Pt denies change in bowel or bladder habits. Pt denies fever, weight loss, or skin changes. Lumbar spine XR dated 12/6/2017 reviewed. Per report, no evidence of acute fracture or malalignment. Vertebral body heights are maintained. Degenerative disc changes notably at L5-S1 with loss of disc height and disc osteophyte formation. Although not specifically mentioned in the report, reviewing the films myself, there is a very slight anterolisthesis of L4 on L5 and mild disc space narrowing at L4-5, L5-S1. Lumbar spine MRI dated 5/8/2018 reviewed. Per report, severe central and foraminal stenosis at L3/4. Moderate to severe central stenosis L4/5.  Severe right L5 lateral recess and right foraminal stenosis L4/5 and left S1 lateral recess and left foraminal stenosis L5/S1, deforming the respective nerve roots, and multilevel spondylosis. Likely degenerative marrow edema at L5/S1 deep to the endplates, which can be a pain generator. partially visualized right renal angiomyolipoma as seen on prior CT. The patient is RHD. At her last clinical appointment, her symptoms appeared to be stenotic in nature. She was neurologically intact. Patient is not a good candidate for lumbar blocks due to her poorly controlled DM. I recommended she take Neurontin 400 mg TID. She declined physical therapy. The patient returns today with pain location and distribution remain unchanged. She rates her pain 7/10, consistent with her last visit (7/10). She reports having seen Dr. Alicia Morton, and he prescribed her Hydrocodone. Pt reports her blood sugars are still around 200. She is followed for her DM by Dr. Britni Barrios. She is tolerating Neurontin 400 mg TID without relief.  reviewed. Body mass index is 37.02 kg/(m^2). PCP: Kash Abrams NP      Past Medical History:   Diagnosis Date    Cancer ([facility] [address]     Diabetes ([facility] [address]     Hypertension         Social History     Social History    Marital status:      Spouse name: N/A    Number of children: N/A    Years of education: N/A     Occupational History    Not on file. Social History Main Topics    Smoking status: Never Smoker    Smokeless tobacco: Never Used    Alcohol use No    Drug use: No    Sexual activity: Not on file     Other Topics Concern    Not on file     Social History Narrative       Current Outpatient Prescriptions   Medication Sig Dispense Refill    furosemide (LASIX) 20 mg tablet TAKE 1 TABLET BY MOUTH ONCE DAILY AS NEEDED  0    HYDROcodone-acetaminophen (NORCO) 5-325 mg per tablet       gabapentin (NEURONTIN) 600 mg tablet Take 1 Tab by mouth three (3) times daily.  90 Tab 1    amLODIPine (NORVASC) 10 mg tablet   0    diclofenac (VOLTAREN) 1 % gel   0    esomeprazole (NEXIUM) 20 mg capsule   0    gabapentin (NEURONTIN) 300 mg capsule take 1 capsule by mouth twice a day  0    glipiZIDE (GLUCOTROL) 10 mg tablet   0    hydroCHLOROthiazide (MICROZIDE) 12.5 mg capsule   0    LANTUS SOLOSTAR U-100 INSULIN 100 unit/mL (3 mL) inpn INJECT 18 UNITS INTO SKIN ONCE AT BEDTIME FOR 15 DAYS  0    HUMALOG KWIKPEN INSULIN 100 unit/mL kwikpen   0    TRADJENTA 5 mg tablet TAKE 1 TABLET BY MOUTH ONCE A DAY FOR 15 DAYS  0    lisinopril (PRINIVIL, ZESTRIL) 20 mg tablet   0    metoprolol tartrate (LOPRESSOR) 50 mg tablet   0    NOVOFINE 32 32 gauge x 1/4\" ndle   0    rosuvastatin (CRESTOR) 10 mg tablet   0    venlafaxine-SR (EFFEXOR-XR) 37.5 mg capsule   0    traMADol (ULTRAM) 50 mg tablet take 1 tablet by mouth every 6 hours if needed  0       Allergies   Allergen Reactions    Keflex [Cephalexin] Nausea and Vomiting    Metformin Other (comments)     GI DISTRESS    Soliqua 100/33 [Insulin Glargine-Lixisenatide] Nausea Only        Mobilizes with rollator. PHYSICAL EXAMINATION    Visit Vitals    /83    Pulse 65    Ht 5' 3\" (1.6 m)    Wt 94.8 kg (209 lb)    BMI 37.02 kg/m2       CONSTITUTIONAL: NAD, A&O x 3  SENSATION: Decreased sensation to light touch 5-1 on the RUE. Sensation to light touch otherwise intact. RANGE OF MOTION: The patient has full passive range of motion in all four extremities. MOTOR:  Straight Leg Raise: Negative, bilateral               Hip Flex Knee Ext Knee Flex Ankle DF GTE Ankle PF Tone   Right +4/5 +4/5 +4/5 +4/5 +4/5 +4/5 +4/5   Left +4/5 +4/5 +4/5 +4/5 +4/5 +4/5 +4/5       ASSESSMENT   Diagnoses and all orders for this visit:    1. Spinal stenosis, lumbar region without neurogenic claudication    2. Lumbosacral spondylosis without myelopathy    3. DDD (degenerative disc disease), lumbar    Other orders  -     gabapentin (NEURONTIN) 600 mg tablet;  Take 1 Tab by mouth three (3) times daily.          IMPRESSION AND PLAN:  Patient is not interested in blocks or physical therapy at this time. She would like to proceed with surgical intervention. I will refer her to Dr. Steven Mcmillan for surgical evaluation. I will increase her Neurontin 600 mg TID. Patient advised to call the office if intolerant to higher dose. I will see the patient back prn. Written by Zeynep Castañeda, as dictated by Patsy Hays MD  I examined the patient, reviewed and agree with the note.

## 2018-07-06 ENCOUNTER — OFFICE VISIT (OUTPATIENT)
Dept: ORTHOPEDIC SURGERY | Age: 63
End: 2018-07-06

## 2018-07-06 VITALS
WEIGHT: 207 LBS | HEIGHT: 63 IN | DIASTOLIC BLOOD PRESSURE: 73 MMHG | HEART RATE: 67 BPM | SYSTOLIC BLOOD PRESSURE: 121 MMHG | BODY MASS INDEX: 36.68 KG/M2

## 2018-07-06 DIAGNOSIS — M47.817 LUMBOSACRAL SPONDYLOSIS WITHOUT MYELOPATHY: ICD-10-CM

## 2018-07-06 DIAGNOSIS — M48.061 SPINAL STENOSIS, LUMBAR REGION WITHOUT NEUROGENIC CLAUDICATION: ICD-10-CM

## 2018-07-06 DIAGNOSIS — M48.061 SPINAL STENOSIS, LUMBAR REGION WITHOUT NEUROGENIC CLAUDICATION: Primary | ICD-10-CM

## 2018-07-06 NOTE — PROGRESS NOTES
[address] [facility]   Pre-Surgical Worksheet    Patient: Ruth Jo                         MRN: [id number]     Age:  66 y.o.,      Sex: female    YOB: 1955           JAYNE: July 6, 2018  PCP: Brandon Courtney NP    Allergies   Allergen Reactions    Keflex [Cephalexin] Nausea and Vomiting    Metformin Other (comments)     GI DISTRESS    Soliqua 100/33 [Insulin Glargine-Lixisenatide] Nausea Only         ICD-10-CM ICD-9-CM    1. Spinal stenosis, lumbar region without neurogenic claudication M48.061 724.02 AMB POC XRAY, SPINE, LUMBOSACRAL; 4+      CBC W/O DIFF      EKG, 12 LEAD, INITIAL      METABOLIC PANEL, COMPREHENSIVE      TYPE & SCREEN      SCHEDULE SURGERY      XR CHEST PA LAT   2. Lumbosacral spondylosis without myelopathy M47.817 721.3 AMB POC XRAY, SPINE, LUMBOSACRAL; 4+      CBC W/O DIFF      EKG, 12 LEAD, INITIAL      METABOLIC PANEL, COMPREHENSIVE      TYPE & SCREEN      SCHEDULE SURGERY      XR CHEST PA LAT       Surgery: ALIF L5, XLIF L4/5, L3/4, Posterior fixation L3-S1.     Pain Assessment   Pain Assessment  7/6/2018   Location of Pain Back;Leg   Location Modifiers Left;Right   Severity of Pain 0   Quality of Pain Aching   Quality of Pain Comment numbness   Duration of Pain -   Frequency of Pain Intermittent   Aggravating Factors Standing;Walking   Aggravating Factors Comment -   Limiting Behavior Yes   Relieving Factors Rest   Relieving Factors Comment -   Result of Injury No       Visit Vitals    /73    Pulse 67    Ht 5' 3\" (1.6 m)    Wt 207 lb (93.9 kg)    BMI 36.67 kg/m2       ADL Limits: Bathing, Dressing and Cane, patient utilizes a walker     Spine Surgery?: No:     Spinal Injections?: No:     Physical Therapy?: No:     NSAID's?: No:     Pain Medications?: No:     In Pain Management: NO    Current Outpatient Prescriptions   Medication Sig    furosemide (LASIX) 20 mg tablet TAKE 1 TABLET BY MOUTH ONCE DAILY AS NEEDED    gabapentin (NEURONTIN) 600 mg tablet Take 1 Tab by mouth three (3) times daily.  amLODIPine (NORVASC) 10 mg tablet     esomeprazole (NEXIUM) 20 mg capsule     glipiZIDE (GLUCOTROL) 10 mg tablet     hydroCHLOROthiazide (MICROZIDE) 12.5 mg capsule     LANTUS SOLOSTAR U-100 INSULIN 100 unit/mL (3 mL) inpn INJECT 18 UNITS INTO SKIN ONCE AT BEDTIME FOR 15 DAYS    HUMALOG KWIKPEN INSULIN 100 unit/mL kwikpen     TRADJENTA 5 mg tablet TAKE 1 TABLET BY MOUTH ONCE A DAY FOR 15 DAYS    lisinopril (PRINIVIL, ZESTRIL) 20 mg tablet     metoprolol tartrate (LOPRESSOR) 50 mg tablet     NOVOFINE 32 32 gauge x 1/4\" ndle     rosuvastatin (CRESTOR) 10 mg tablet     venlafaxine-SR (EFFEXOR-XR) 37.5 mg capsule     HYDROcodone-acetaminophen (NORCO) 5-325 mg per tablet     diclofenac (VOLTAREN) 1 % gel     traMADol (ULTRAM) 50 mg tablet take 1 tablet by mouth every 6 hours if needed     No current facility-administered medications for this visit.         Past Medical History:   Diagnosis Date    Cancer ([facility] [address]     Diabetes ([facility] [address]     Hypertension        Past Surgical History:   Procedure Laterality Date    HX BREAST BIOPSY Left 02/03/2017    BREAST CANCER    HX BREAST LUMPECTOMY Left     HX HEART CATHETERIZATION         Social History     Social History    Marital status:      Spouse name: N/A    Number of children: N/A    Years of education: N/A     Social History Main Topics    Smoking status: Never Smoker    Smokeless tobacco: Never Used    Alcohol use No    Drug use: No    Sexual activity: Not Asked     Other Topics Concern    None     Social History Narrative

## 2018-07-06 NOTE — PROGRESS NOTES
[address]  [address], [address]  [place], [address] [place] [address] Street  Phone: [phone]  Fax: [phone]  INITIAL CONSULTATION  Patient: Ruth Jo                MRN: [id number]       SSN: [id number]  YOB: 1955        AGE: 66 y.o. SEX: female  Body mass index is 36.67 kg/(m^2). PCP: Brandon Courtney NP  07/06/18    Chief Complaint   Patient presents with    Back Pain     lumbar BLE pain/numbness terminating at knees. referred by Dr. Teodoro Alpers, RADIOGRAPHS, and PLAN:         HISTORY OF PRESENT ILLNESS:  Ms. Brendan Phalen is seen today at the request of nurse practitioner, Hank Sow, and Dr. Dee Kelsey. The patient is a 51-year-old female with a history of insulin-dependent diabetes, which she is achieving better control over, and hypertension. She is a breast cancer survivor. She has had a year or 2 of progressive back pain, buttock pain and leg pain, stenotic symptoms. Has been unresponsive to conservative treatment. Her diabetes is such that we did not wish to do epidural steroid injections for her. She has occlusive stenosis at L3-L4 and L4-L5, moderate to severe stenosis at L5-S1. She finds she can only walk short distances. 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 She wishes to undergo an intervention to try to improve her functionality. Denies bowel or bladder dysfunction, fever, chills, night sweats, weight loss or weight gain. PHYSICAL EXAMINATION:  Physical exam demonstrates diminished range of motion of the lumbar spine. Lumbosacral back pain. Good strength with EHL, tibialis anterior, hamstrings, and quads. Antalgic gait. She needs to use a rolling walker. She is hunched over. She cannot stand erect. RADIOGRAPHS:  Radiographs demonstrate severe occlusive stenosis at L3-L4 and L4-L5. Disc space collapse at L5-S1.   X-rays demonstrate degenerative scoliosis with disc space collapse, dramatic at L5-S1, asymmetric at L4-L5, and mild at L3-L4. Reasonable sized pedicles with a relatively flat lumbar spine. Overall, poor sagittal balance. ASSESSMENT/PLAN:  I discussed the matter at length with her. I discussed with her 2 different approaches, a classic posterior lumbar decompression and fusion, L3 to the sacrum versus MIS decompression and fusion with an ALIF at L5-S1, XLIF at L3-L4 and L4-L5 and posterior fixation. My concern with the posterior alone approach would be trying to create sagittal balance. I fear her being relatively flat at the end of the procedure. With the MIS approach, especially with an ALIF at L5-S1, I think we would be able to more likely achieve reasonable sagittal balance, and given her diabetes, I think we will have a low risk of infection and be kinder to her muscle envelope. Discussed the risks, benefits, complications and alternatives to surgery and the positives and negatives of each approach. The patient wishes to proceed with the ALIF at L5-S1, the XLIF at L3-L4 and L4-L5 with posterior and percutaneous screws. We will proceed with surgery once the appropriate approvals and clearances take place. cc:  Ozella Koyanagi, NP             Past Medical History:   Diagnosis Date    [facility] ([facility] [facility]     Diabetes ([facility]     Hypertension        History reviewed. No pertinent family history. Current Outpatient Prescriptions   Medication Sig Dispense Refill    furosemide (LASIX) 20 mg tablet TAKE 1 TABLET BY MOUTH ONCE DAILY AS NEEDED  0    gabapentin (NEURONTIN) 600 mg tablet Take 1 Tab by mouth three (3) times daily.  90 Tab 1    amLODIPine (NORVASC) 10 mg tablet   0    esomeprazole (NEXIUM) 20 mg capsule   0    glipiZIDE (GLUCOTROL) 10 mg tablet   0    hydroCHLOROthiazide (MICROZIDE) 12.5 mg capsule   0    LANTUS SOLOSTAR U-100 INSULIN 100 unit/mL (3 mL) inpn INJECT 18 UNITS INTO SKIN ONCE AT BEDTIME FOR 15 DAYS 0    HUMALOG KWIKPEN INSULIN 100 unit/mL kwikpen   0    TRADJENTA 5 mg tablet TAKE 1 TABLET BY MOUTH ONCE A DAY FOR 15 DAYS  0    lisinopril (PRINIVIL, ZESTRIL) 20 mg tablet   0    metoprolol tartrate (LOPRESSOR) 50 mg tablet   0    NOVOFINE 32 32 gauge x 1/4\" ndle   0    rosuvastatin (CRESTOR) 10 mg tablet   0    venlafaxine-SR (EFFEXOR-XR) 37.5 mg capsule   0    HYDROcodone-acetaminophen (NORCO) 5-325 mg per tablet       diclofenac (VOLTAREN) 1 % gel   0    traMADol (ULTRAM) 50 mg tablet take 1 tablet by mouth every 6 hours if needed  0       Allergies   Allergen Reactions    Keflex [Cephalexin] Nausea and Vomiting    Metformin Other (comments)     GI DISTRESS    Soliqua 100/33 [Insulin Glargine-Lixisenatide] Nausea Only       Past Surgical History:   Procedure Laterality Date    HX BREAST BIOPSY Left 02/03/2017    BREAST CANCER    HX BREAST LUMPECTOMY Left     HX HEART CATHETERIZATION         Past Medical History:   Diagnosis Date    [facility]     Diabetes ([facility]     Hypertension        Social History     Social History    Marital status:      Spouse name: N/A    Number of children: N/A    Years of education: N/A     Occupational History    Not on file. Social History Main Topics    Smoking status: Never Smoker    Smokeless tobacco: Never Used    Alcohol use No    Drug use: No    Sexual activity: Not on file     Other Topics Concern    Not on file     Social History Narrative           REVIEW OF SYSTEMS:   CONSTITUTIONAL SYMPTOMS:  Negative. EYES:  Negative. EARS, NOSE, THROAT AND MOUTH:  Negative. CARDIOVASCULAR:  Negative. RESPIRATORY:  Negative. GENITOURINARY: Per HPI. GASTROINTESTINAL:  Per HPI. INTEGUMENTARY (SKIN AND/OR BREAST):  Negative. MUSCULOSKELETAL: Per HPI.   ENDOCRINE/RHEUMATOLOGIC:  Negative. NEUROLOGICAL:  Per HPI. HEMATOLOGIC/LYMPHATIC:  Negative. ALLERGIC/IMMUNOLOGIC:  Negative. PSYCHIATRIC:  Negative.     PHYSICAL EXAMINATION: Visit Vitals    /73    Pulse 67    Ht 5' 3\" (1.6 m)    Wt 207 lb (93.9 kg)    BMI 36.67 kg/m2    PAIN SCALE: 0 - No pain/10    CONSTITUTIONAL: The patient is in no apparent distress and is alert and oriented x 3. HEENT: Normocephalic. Hearing grossly intact. NECK: Supple and symmetric. no tenderness, or masses were felt. RESPIRATORY: No labored breathing. CARDIOVASCULAR: The carotid pulses were normal. Peripheral pulses were 2+. CHEST: Normal AP diameter and normal contour without any kyphoscoliosis. LYMPHATIC: No lymphadenopathy was appreciated in the neck, axillae or groin. SKIN:  Negative for scars, rashes, lesions, or ulcers on the right upper, right lower, left upper, left lower and trunk. NEUROLOGICAL: Alert and oriented x 3. Ambulation with walker. FWB. EXTREMITIES:  See musculoskeletal.  MUSCULOSKELETAL:   Head and Neck:  Negative for misalignment, asymmetry, crepitation, defects, tenderness masses or effusions.  Left Upper Extremity: Inspection, percussion and palpation preformed. Mcconnells sign is negative.  Right Upper Extremity: Inspection, percussion and palpation preformed. Mcconnells sign is negative.  Spine, Ribs and Pelvis: Back pain radiating into hips and BLE. Inspection, percussion and palpation preformed. Negative for misalignment, asymmetry, crepitation, defects, tenderness masses or effusions. + shopping cart sign.  Left Lower Extremity: Pain. Inspection, percussion and palpation preformed. Negative straight leg raise.  Right Lower Extremity: Pain. Inspection, percussion and palpation preformed. Negative straight leg raise. SPINE EXAM:    Lumbar spine: No rash, ecchymosis, or gross obliquity. No focal atrophy is noted. ASSESSMENT    ICD-10-CM ICD-9-CM    1. Spinal stenosis, lumbar region without neurogenic claudication M48.061 724.02 AMB POC XRAY, SPINE, LUMBOSACRAL; 4+   2.  Lumbosacral spondylosis without myelopathy M47.817 721.3 AMB POC XRAY, SPINE, LUMBOSACRAL; 4+       Written by Angelia Mancia, as dictated by Tasia Gutiérrez MD.    I, Dr. Tasia Gutiérrez MD, confirm that all documentation is accurate.

## 2018-08-16 ENCOUNTER — HOSPITAL ENCOUNTER (OUTPATIENT)
Dept: PREADMISSION TESTING | Age: 63
Discharge: HOME | End: 2018-08-16
Payer: COMMERCIAL

## 2018-08-16 LAB
ALBUMIN SERPL-MCNC: 3.7 G/DL (ref 3.4–5)
ALBUMIN/GLOB SERPL: 0.9 {RATIO} (ref 0.8–1.7)
ALP SERPL-CCNC: 66 U/L (ref 45–117)
ALT SERPL-CCNC: 21 U/L (ref 13–56)
ANION GAP SERPL CALC-SCNC: 8 MMOL/L (ref 3–18)
AST SERPL-CCNC: 17 U/L (ref 15–37)
BILIRUB SERPL-MCNC: 0.2 MG/DL (ref 0.2–1)
BUN SERPL-MCNC: 25 MG/DL (ref 7–18)
BUN/CREAT SERPL: 16 (ref 12–20)
CALCIUM SERPL-MCNC: 8.9 MG/DL (ref 8.5–10.1)
CHLORIDE SERPL-SCNC: 102 MMOL/L (ref 100–108)
CO2 SERPL-SCNC: 31 MMOL/L (ref 21–32)
CREAT SERPL-MCNC: 1.58 MG/DL (ref 0.6–1.3)
ERYTHROCYTE [DISTWIDTH] IN BLOOD BY AUTOMATED COUNT: 15 % (ref 11.6–14.5)
GLOBULIN SER CALC-MCNC: 3.9 G/DL (ref 2–4)
GLUCOSE SERPL-MCNC: 281 MG/DL (ref 74–99)
HCT VFR BLD AUTO: 39.5 % (ref 35–45)
HGB BLD-MCNC: 12.6 G/DL (ref 12–16)
MCH RBC QN AUTO: 25.6 PG (ref 24–34)
MCHC RBC AUTO-ENTMCNC: 31.9 G/DL (ref 31–37)
MCV RBC AUTO: 80.3 FL (ref 74–97)
PLATELET # BLD AUTO: 365 K/UL (ref 135–420)
PMV BLD AUTO: 9.6 FL (ref 9.2–11.8)
POTASSIUM SERPL-SCNC: 4.2 MMOL/L (ref 3.5–5.5)
PROT SERPL-MCNC: 7.6 G/DL (ref 6.4–8.2)
RBC # BLD AUTO: 4.92 M/UL (ref 4.2–5.3)
SODIUM SERPL-SCNC: 141 MMOL/L (ref 136–145)
WBC # BLD AUTO: 13 K/UL (ref 4.6–13.2)

## 2018-08-16 PROCEDURE — 36415 COLL VENOUS BLD VENIPUNCTURE: CPT | Performed by: ORTHOPAEDIC SURGERY

## 2018-08-16 PROCEDURE — 80053 COMPREHEN METABOLIC PANEL: CPT | Performed by: ORTHOPAEDIC SURGERY

## 2018-08-16 PROCEDURE — 85027 COMPLETE CBC AUTOMATED: CPT | Performed by: ORTHOPAEDIC SURGERY

## 2018-08-16 RX ORDER — ROSUVASTATIN CALCIUM 10 MG/1
10 TABLET, COATED ORAL DAILY
COMMUNITY
End: 2020-10-04

## 2018-08-16 RX ORDER — INSULIN LISPRO 100 [IU]/ML
INJECTION, SOLUTION INTRAVENOUS; SUBCUTANEOUS
COMMUNITY
End: 2020-12-23

## 2018-08-16 RX ORDER — ATORVASTATIN CALCIUM 40 MG/1
40 TABLET, FILM COATED ORAL
COMMUNITY

## 2018-08-16 NOTE — PERIOP NOTES
Colin Hernandez was here today for her PAT appointment. Health assessment was completed and instructions given regarding NPO status, medications, Hibiclens washes, and removal of all jewelry and/or body piercing. Expressed to  patient the importance to bring list of medications along with correct dosages on morning of procedure. Instructed patient to bring walker to hospital on the morning of surgery. Opportunity was given to ask questions and all questions were answered. Understanding of instructions was verbalized.

## 2018-08-16 NOTE — PROGRESS NOTES
Ms Pooja Quiroz and Friend attended the Cervical/Spine Surgery Mandatory Pre-Operative class on  8/16/2018. Topics discussed included surgery preparation, what to expect the day of surgery, medications, physical and occupational therapy, and discharge planning. It was discussed that this is considered an elective surgery and that prior to the surgery she needs to make decisions such as arranging for help at home once she is discharged. Ms Pooja Quiroz was given a Cervical Spine mandatory patient education notebook to take home. Opportunity was given to ask questions and phone number of the Orthopaedic   was given for any questions or concerns that may arise later. She identified Deepa Wall as their .

## 2018-08-22 RX ORDER — GABAPENTIN 600 MG/1
600 TABLET ORAL 3 TIMES DAILY
Qty: 90 TAB | Refills: 1 | Status: SHIPPED | OUTPATIENT
Start: 2018-08-22 | End: 2018-11-01 | Stop reason: SDUPTHER

## 2018-08-22 NOTE — TELEPHONE ENCOUNTER
Last Visit: 07/06/2018 with MD Franca Garcia  Date of Upcoming Surgery: 08/30/2018 ALIF L-5   Next Appointment: 09/12/2018 with NP Buttery   Previous Refill Encounters: 06/20/2018 per MD Sabrina Viramontes #[phone] with 1 refill     Requested Prescriptions     Pending Prescriptions Disp Refills    gabapentin (NEURONTIN) 600 mg tablet 90 Tab 1     Sig: Take 1 Tab by mouth three (3) times daily.

## 2018-08-23 NOTE — H&P
Pre-Admission History and Physical    Patient: Hoa Killian   MRN: [id number]   SSN: [id number]   YOB: 1955   Age: 66 y.o. Sex: female     Patient scheduled for:  #1ALIF L5  #2 XLIF L4/5 L3/4  #3 posterior fixation L3-S1  Date of surgery: 8/30/18. Location of surgery: [facility]. Surgeon: Ismael King MD    HPI:  Hoa Killian is a 66 y.o. female with a history of insulin-dependent diabetes, which she is achieving better control over, and hypertension. She is a breast cancer survivor. She has had a year or 2 of progressive back pain, buttock pain and leg pain, stenotic symptoms. Has been unresponsive to conservative treatment. Her diabetes is such that we did not wish to do epidural steroid injections for her. She has occlusive stenosis at L3-L4 and L4-L5, moderate to severe stenosis at L5-S1. She finds she can only walk short distances. 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 She wishes to undergo an intervention to try to improve her functionality. She reports a pain level of 7/10. Radiographs demonstrate severe occlusive stenosis at L3-L4 and L4-L5. Disc space collapse at L5-S1. X-rays demonstrate degenerative scoliosis with disc space collapse, dramatic at L5-S1, asymmetric at L4-L5, and mild at L3-L4. Reasonable sized pedicles with a relatively flat lumbar spine. Overall, poor sagittal balance.   This patient has failed the presurgical conservative treatments  including physical therapy, spinal block injections and medications. Pain has impacted the patient's functional ability to bath, dress and perform her daily activity. She is being admitted for surgical intervention.          Past Medical History:   Diagnosis Date    Arthritis     spinal stenosis    Breast [facility] ([facility] [address] [facility] 2017    Left Breast (chemo/radiation)    Diabetes ([facility] [facility]     Type 2    Edema     legs and ankles    GERD (gastroesophageal reflux disease)  High cholesterol     Hypertension     Ill-defined condition     patient states hemorrhage behind Left eye-following Dr. Leonides Momin MI (myocardial infarction) ([facility] 06/2018    per patient    Neuropathy     Restless leg      Social History     Social History    Marital status:      Spouse name: N/A    Number of children: N/A    Years of education: N/A     Social History Main Topics    Smoking status: Never Smoker    Smokeless tobacco: Never Used    Alcohol use No    Drug use: No    Sexual activity: Not Asked     Other Topics Concern    None     Social History Narrative     Past Surgical History:   Procedure Laterality Date    HX BREAST BIOPSY Left 02/03/2017    BREAST CANCER    HX BREAST LUMPECTOMY Left     HX CATARACT REMOVAL Right     HX HEART CATHETERIZATION  07/2018    no stents     History reviewed. No pertinent family history. Allergies   Allergen Reactions    Keflex [Cephalexin] Nausea and Vomiting    Metformin Other (comments)     GI DISTRESS    Soliqua 100/33 [Insulin Glargine-Lixisenatide] Nausea Only     Current Outpatient Prescriptions   Medication Sig Dispense Refill    insulin lispro (HUMALOG) 100 unit/mL kwikpen by SubCUTAneous route.  atorvastatin (LIPITOR) 40 mg tablet Take  by mouth nightly.  rosuvastatin (CRESTOR) 10 mg tablet Take 10 mg by mouth daily.  furosemide (LASIX) 20 mg tablet TAKE 1 TABLET BY MOUTH ONCE DAILY AS NEEDED  0    amLODIPine (NORVASC) 10 mg tablet daily. Indications: hypertension  0    esomeprazole (NEXIUM) 20 mg capsule daily. 0    glipiZIDE (GLUCOTROL) 10 mg tablet two (2) times a day. Indications: type 2 diabetes mellitus  0    hydroCHLOROthiazide (MICROZIDE) 12.5 mg capsule   0    LANTUS SOLOSTAR U-100 INSULIN 100 unit/mL (3 mL) inpn 20 units at bedtime  0    TRADJENTA 5 mg tablet TAKE 1 TABLET BY MOUTH ONCE A DAY  0    lisinopril (PRINIVIL, ZESTRIL) 20 mg tablet daily.   0    metoprolol tartrate (LOPRESSOR) 50 mg tablet daily. 0    NOVOFINE 32 32 gauge x 1/4\" ndle   0    venlafaxine-SR (EFFEXOR-XR) 37.5 mg capsule daily. 0    gabapentin (NEURONTIN) 600 mg tablet Take 1 Tab by mouth three (3) times daily. 90 Tab 1       ROS:  Denies chills, fever,night sweats,  bowel or bladder dysfunction, unexplained weight loss/weight gain, chest pain, sob or anxiety. Physical Examination    Gen: Well developed, well nourished 66 y.o. female   Visit Vitals    /73    Pulse 67    Ht 5' 3\" (1.6 m)    Wt 207 lb (93.9 kg)    BMI 36.67 kg/m2    PAIN SCALE: 0 - No pain/10     CONSTITUTIONAL: The patient is in no apparent distress and is alert and oriented x 3. HEENT: Normocephalic. Hearing grossly intact. NECK: Supple and symmetric. no tenderness, or masses were felt. RESPIRATORY: No labored breathing. CARDIOVASCULAR: The carotid pulses were normal. Peripheral pulses were 2+. CHEST: Normal AP diameter and normal contour without any kyphoscoliosis. LYMPHATIC: No lymphadenopathy was appreciated in the neck, axillae or groin. SKIN:  Negative for scars, rashes, lesions, or ulcers on the right upper, right lower, left upper, left lower and trunk. NEUROLOGICAL: Alert and oriented x 3. Ambulation with walker. FWB. EXTREMITIES:  See musculoskeletal.  MUSCULOSKELETAL:  · Head and Neck:  Negative for misalignment, asymmetry, crepitation, defects, tenderness masses or effusions. · Left Upper Extremity: Inspection, percussion and palpation preformed. Mcconnells sign is negative. · Right Upper Extremity: Inspection, percussion and palpation preformed. Mcconnells sign is negative. · Spine, Ribs and Pelvis: Back pain radiating into hips and BLE. Inspection, percussion and palpation preformed. Negative for misalignment, asymmetry, crepitation, defects, tenderness masses or effusions. + shopping cart sign. · Left Lower Extremity: Pain. Inspection, percussion and palpation preformed. Negative straight leg raise.   · Right Lower Extremity: Pain. Inspection, percussion and palpation preformed. Negative straight leg raise.        SPINE EXAM:     Lumbar spine: No rash, ecchymosis, or gross obliquity. No focal atrophy is noted. Assessment and Plan    Due to the pt's persistent symptoms unrelieved by conservative measure [facility]. The post-operative plan of care consists of physical therapy, home health and a 2 week f/u office visit. We are pending medical clearance by Dr. Tanesha Rivas and Cardiac clearance by Dr. Anabela Salcido. The risks, benefits, complications and alternatives to surgery have been discussed in detail with the patient. The patient understands and agrees to proceed.      Pineda Matson NP-C for Glenn Emmanuel MD

## 2018-08-27 DIAGNOSIS — Z01.818 PRE-OP TESTING: ICD-10-CM

## 2018-08-27 DIAGNOSIS — M48.061 SPINAL STENOSIS, LUMBAR REGION WITHOUT NEUROGENIC CLAUDICATION: Primary | ICD-10-CM

## 2018-08-29 ENCOUNTER — ANESTHESIA EVENT (OUTPATIENT)
Dept: SURGERY | Age: 63
DRG: 454 | End: 2018-08-29
Payer: COMMERCIAL

## 2018-08-30 ENCOUNTER — APPOINTMENT (OUTPATIENT)
Dept: GENERAL RADIOLOGY | Age: 63
DRG: 454 | End: 2018-08-30
Attending: ORTHOPAEDIC SURGERY
Payer: COMMERCIAL

## 2018-08-30 ENCOUNTER — DOCUMENTATION ONLY (OUTPATIENT)
Dept: ORTHOPEDIC SURGERY | Age: 63
End: 2018-08-30

## 2018-08-30 ENCOUNTER — ANESTHESIA (OUTPATIENT)
Dept: SURGERY | Age: 63
DRG: 454 | End: 2018-08-30
Payer: COMMERCIAL

## 2018-08-30 ENCOUNTER — HOSPITAL ENCOUNTER (INPATIENT)
Age: 63
LOS: 5 days | Discharge: HOME HEALTH | DRG: 454 | End: 2018-09-04
Attending: ORTHOPAEDIC SURGERY | Admitting: ORTHOPAEDIC SURGERY
Payer: COMMERCIAL

## 2018-08-30 DIAGNOSIS — M48.061 SPINAL STENOSIS OF LUMBAR REGION AT MULTIPLE LEVELS: Primary | ICD-10-CM

## 2018-08-30 PROBLEM — M41.9 SCOLIOSIS: Status: ACTIVE | Noted: 2018-08-30

## 2018-08-30 PROBLEM — M48.00 SPINAL STENOSIS: Status: ACTIVE | Noted: 2018-08-30

## 2018-08-30 LAB
ABO + RH BLD: NORMAL
BLOOD GROUP ANTIBODIES SERPL: NORMAL
GLUCOSE BLD STRIP.AUTO-MCNC: 185 MG/DL (ref 70–110)
GLUCOSE BLD STRIP.AUTO-MCNC: 200 MG/DL (ref 70–110)
GLUCOSE BLD STRIP.AUTO-MCNC: 251 MG/DL (ref 70–110)
GLUCOSE BLD STRIP.AUTO-MCNC: 286 MG/DL (ref 70–110)
SPECIMEN EXP DATE BLD: NORMAL

## 2018-08-30 PROCEDURE — 77030008467 HC STPLR SKN COVD -B: Performed by: ORTHOPAEDIC SURGERY

## 2018-08-30 PROCEDURE — 77030008683 HC TU ET CUF COVD -A: Performed by: ANESTHESIOLOGY

## 2018-08-30 PROCEDURE — 74011636637 HC RX REV CODE- 636/637: Performed by: NURSE PRACTITIONER

## 2018-08-30 PROCEDURE — 82962 GLUCOSE BLOOD TEST: CPT

## 2018-08-30 PROCEDURE — 74011000250 HC RX REV CODE- 250: Performed by: ORTHOPAEDIC SURGERY

## 2018-08-30 PROCEDURE — 74011250636 HC RX REV CODE- 250/636

## 2018-08-30 PROCEDURE — 0ST20ZZ RESECTION OF LUMBAR VERTEBRAL DISC, OPEN APPROACH: ICD-10-PCS | Performed by: ORTHOPAEDIC SURGERY

## 2018-08-30 PROCEDURE — C1758 CATHETER, URETERAL: HCPCS | Performed by: ORTHOPAEDIC SURGERY

## 2018-08-30 PROCEDURE — 74011250637 HC RX REV CODE- 250/637: Performed by: NURSE ANESTHETIST, CERTIFIED REGISTERED

## 2018-08-30 PROCEDURE — 74011000250 HC RX REV CODE- 250

## 2018-08-30 PROCEDURE — 74011250637 HC RX REV CODE- 250/637: Performed by: ORTHOPAEDIC SURGERY

## 2018-08-30 PROCEDURE — 77030030163 HC BN WAX J&J -A: Performed by: ORTHOPAEDIC SURGERY

## 2018-08-30 PROCEDURE — C1713 ANCHOR/SCREW BN/BN,TIS/BN: HCPCS | Performed by: ORTHOPAEDIC SURGERY

## 2018-08-30 PROCEDURE — 86900 BLOOD TYPING SEROLOGIC ABO: CPT | Performed by: ORTHOPAEDIC SURGERY

## 2018-08-30 PROCEDURE — 74011636637 HC RX REV CODE- 636/637: Performed by: NURSE ANESTHETIST, CERTIFIED REGISTERED

## 2018-08-30 PROCEDURE — 76060000044 HC ANESTHESIA 6.5 TO 7 HR: Performed by: ORTHOPAEDIC SURGERY

## 2018-08-30 PROCEDURE — 77030010512 HC APPL CLP LIG J&J -C: Performed by: ORTHOPAEDIC SURGERY

## 2018-08-30 PROCEDURE — 77030027703 HC MAXCESS 4 KT DISP NUVA -H: Performed by: ORTHOPAEDIC SURGERY

## 2018-08-30 PROCEDURE — 74011000272 HC RX REV CODE- 272: Performed by: ORTHOPAEDIC SURGERY

## 2018-08-30 PROCEDURE — 77030016510 HC BIT DRL3 STRY -B: Performed by: ORTHOPAEDIC SURGERY

## 2018-08-30 PROCEDURE — 74011250636 HC RX REV CODE- 250/636: Performed by: ORTHOPAEDIC SURGERY

## 2018-08-30 PROCEDURE — 77030003195 HC GRFT RECOMB BN MEDT -J: Performed by: ORTHOPAEDIC SURGERY

## 2018-08-30 PROCEDURE — 77030012406 HC DRN WND PENRS BARD -A: Performed by: ORTHOPAEDIC SURGERY

## 2018-08-30 PROCEDURE — 77030039266 HC ADH SKN EXOFIN S2SG -A: Performed by: ORTHOPAEDIC SURGERY

## 2018-08-30 PROCEDURE — 77030012059: Performed by: ORTHOPAEDIC SURGERY

## 2018-08-30 PROCEDURE — 77030013567 HC DRN WND RESERV BARD -A: Performed by: ORTHOPAEDIC SURGERY

## 2018-08-30 PROCEDURE — 77030030409 HC DIL SPN KT M5 DISP NUVA -G: Performed by: ORTHOPAEDIC SURGERY

## 2018-08-30 PROCEDURE — 74011250636 HC RX REV CODE- 250/636: Performed by: NURSE ANESTHETIST, CERTIFIED REGISTERED

## 2018-08-30 PROCEDURE — 65270000029 HC RM PRIVATE

## 2018-08-30 PROCEDURE — 77030035236 HC SUT PDS STRATFX BARB J&J -B: Performed by: ORTHOPAEDIC SURGERY

## 2018-08-30 PROCEDURE — 0WJG0ZZ INSPECTION OF PERITONEAL CAVITY, OPEN APPROACH: ICD-10-PCS | Performed by: SURGERY

## 2018-08-30 PROCEDURE — 77030031898 HC WRE K DISP NUVA -B: Performed by: ORTHOPAEDIC SURGERY

## 2018-08-30 PROCEDURE — 77030002933 HC SUT MCRYL J&J -A: Performed by: ORTHOPAEDIC SURGERY

## 2018-08-30 PROCEDURE — 77030032490 HC SLV COMPR SCD KNE COVD -B: Performed by: ORTHOPAEDIC SURGERY

## 2018-08-30 PROCEDURE — 77030019908 HC STETH ESOPH SIMS -A: Performed by: ANESTHESIOLOGY

## 2018-08-30 PROCEDURE — 77030018836 HC SOL IRR NACL ICUM -A: Performed by: ORTHOPAEDIC SURGERY

## 2018-08-30 PROCEDURE — 77030029372 HC ADH SKN CLSR PRINEO J&J -C: Performed by: ORTHOPAEDIC SURGERY

## 2018-08-30 PROCEDURE — 77030027138 HC INCENT SPIROMETER -A

## 2018-08-30 PROCEDURE — 77030007115 HC CGE SPN PEK CRNT NUVA -I3: Performed by: ORTHOPAEDIC SURGERY

## 2018-08-30 PROCEDURE — 77030010514 HC APPL CLP LIG COVD -B: Performed by: ORTHOPAEDIC SURGERY

## 2018-08-30 PROCEDURE — 0SG10A0 FUSION OF 2 OR MORE LUMBAR VERTEBRAL JOINTS WITH INTERBODY FUSION DEVICE, ANTERIOR APPROACH, ANTERIOR COLUMN, OPEN APPROACH: ICD-10-PCS | Performed by: ORTHOPAEDIC SURGERY

## 2018-08-30 PROCEDURE — 77030020788: Performed by: ORTHOPAEDIC SURGERY

## 2018-08-30 PROCEDURE — 76010000180 HC OR TIME 6.5 TO 7 HR INTENSV-TIER 1: Performed by: ORTHOPAEDIC SURGERY

## 2018-08-30 PROCEDURE — 77030031878 HC NDL SPN ACC SYS I-PAS NUVA -D: Performed by: ORTHOPAEDIC SURGERY

## 2018-08-30 PROCEDURE — 77030003029 HC SUT VCRL J&J -B: Performed by: ORTHOPAEDIC SURGERY

## 2018-08-30 PROCEDURE — 77030002986 HC SUT PROL J&J -A: Performed by: ORTHOPAEDIC SURGERY

## 2018-08-30 PROCEDURE — 77030018673: Performed by: ORTHOPAEDIC SURGERY

## 2018-08-30 PROCEDURE — 77030014112: Performed by: ORTHOPAEDIC SURGERY

## 2018-08-30 PROCEDURE — 0SG10K1 FUSION OF 2 OR MORE LUMBAR VERTEBRAL JOINTS WITH NONAUTOLOGOUS TISSUE SUBSTITUTE, POSTERIOR APPROACH, POSTERIOR COLUMN, OPEN APPROACH: ICD-10-PCS | Performed by: ORTHOPAEDIC SURGERY

## 2018-08-30 PROCEDURE — 77030011265 HC ELECTRD BLD HEX COVD -A: Performed by: ORTHOPAEDIC SURGERY

## 2018-08-30 PROCEDURE — 0SG30K1 FUSION OF LUMBOSACRAL JOINT WITH NONAUTOLOGOUS TISSUE SUBSTITUTE, POSTERIOR APPROACH, POSTERIOR COLUMN, OPEN APPROACH: ICD-10-PCS | Performed by: ORTHOPAEDIC SURGERY

## 2018-08-30 PROCEDURE — 77030012893: Performed by: ORTHOPAEDIC SURGERY

## 2018-08-30 PROCEDURE — 77030020782 HC GWN BAIR PAWS FLX 3M -B: Performed by: ORTHOPAEDIC SURGERY

## 2018-08-30 PROCEDURE — 76210000001 HC OR PH I REC 2.5 TO 3 HR: Performed by: ORTHOPAEDIC SURGERY

## 2018-08-30 PROCEDURE — 0SG30A0 FUSION OF LUMBOSACRAL JOINT WITH INTERBODY FUSION DEVICE, ANTERIOR APPROACH, ANTERIOR COLUMN, OPEN APPROACH: ICD-10-PCS | Performed by: ORTHOPAEDIC SURGERY

## 2018-08-30 PROCEDURE — 77030014005 HC SPNG HEMSTAT GEL CARD -B: Performed by: ORTHOPAEDIC SURGERY

## 2018-08-30 PROCEDURE — 77030034475 HC MISC IMPL SPN: Performed by: ORTHOPAEDIC SURGERY

## 2018-08-30 PROCEDURE — 77030003194 HC GRFT RECOMB BN MEDT -I1: Performed by: ORTHOPAEDIC SURGERY

## 2018-08-30 PROCEDURE — 77030004402 HC BUR NEUR STRY -C: Performed by: ORTHOPAEDIC SURGERY

## 2018-08-30 PROCEDURE — 77030034850: Performed by: ORTHOPAEDIC SURGERY

## 2018-08-30 PROCEDURE — 77030002946 HC SUT NRLN J&J -B: Performed by: ORTHOPAEDIC SURGERY

## 2018-08-30 PROCEDURE — 77030034888 HC SUT PROL 2 J&J -B: Performed by: ORTHOPAEDIC SURGERY

## 2018-08-30 DEVICE — IMPLANTABLE DEVICE: Type: IMPLANTABLE DEVICE | Site: SPINE LUMBAR | Status: FUNCTIONAL

## 2018-08-30 DEVICE — BONE GRAFT KIT 7510400 INFUSE MEDIUM
Type: IMPLANTABLE DEVICE | Site: SPINE LUMBAR | Status: FUNCTIONAL
Brand: INFUSE® BONE GRAFT

## 2018-08-30 DEVICE — BONE GRAFT KIT 7510200 INFUSE SMALL
Type: IMPLANTABLE DEVICE | Site: SPINE LUMBAR | Status: FUNCTIONAL
Brand: INFUSE® BONE GRAFT

## 2018-08-30 DEVICE — ROD SPNL L75MM DIA5.5MM POST THORACOLUMBOSACRAL TI LORD: Type: IMPLANTABLE DEVICE | Site: SPINE LUMBAR | Status: FUNCTIONAL

## 2018-08-30 DEVICE — SCREW SPNL DIA5.5MM OPN TULIP LOK RELINE: Type: IMPLANTABLE DEVICE | Site: SPINE LUMBAR | Status: FUNCTIONAL

## 2018-08-30 RX ORDER — OXYCODONE HYDROCHLORIDE 5 MG/1
5-10 TABLET ORAL
Status: DISCONTINUED | OUTPATIENT
Start: 2018-08-30 | End: 2018-09-01

## 2018-08-30 RX ORDER — LIDOCAINE HYDROCHLORIDE 20 MG/ML
INJECTION, SOLUTION EPIDURAL; INFILTRATION; INTRACAUDAL; PERINEURAL AS NEEDED
Status: DISCONTINUED | OUTPATIENT
Start: 2018-08-30 | End: 2018-08-30 | Stop reason: HOSPADM

## 2018-08-30 RX ORDER — GLIPIZIDE 5 MG/1
10 TABLET ORAL
Status: DISCONTINUED | OUTPATIENT
Start: 2018-08-31 | End: 2018-09-01

## 2018-08-30 RX ORDER — SODIUM CHLORIDE 0.9 % (FLUSH) 0.9 %
5-10 SYRINGE (ML) INJECTION EVERY 8 HOURS
Status: DISCONTINUED | OUTPATIENT
Start: 2018-08-30 | End: 2018-09-04 | Stop reason: SDUPTHER

## 2018-08-30 RX ORDER — LORAZEPAM 0.5 MG/1
0.5 TABLET ORAL AS NEEDED
COMMUNITY
End: 2021-12-05

## 2018-08-30 RX ORDER — ATORVASTATIN CALCIUM 40 MG/1
40 TABLET, FILM COATED ORAL
Status: DISCONTINUED | OUTPATIENT
Start: 2018-08-30 | End: 2018-09-04 | Stop reason: HOSPADM

## 2018-08-30 RX ORDER — BUPIVACAINE HYDROCHLORIDE AND EPINEPHRINE 5; 5 MG/ML; UG/ML
INJECTION, SOLUTION EPIDURAL; INTRACAUDAL; PERINEURAL AS NEEDED
Status: DISCONTINUED | OUTPATIENT
Start: 2018-08-30 | End: 2018-08-30 | Stop reason: HOSPADM

## 2018-08-30 RX ORDER — DEXAMETHASONE SODIUM PHOSPHATE 4 MG/ML
INJECTION, SOLUTION INTRA-ARTICULAR; INTRALESIONAL; INTRAMUSCULAR; INTRAVENOUS; SOFT TISSUE AS NEEDED
Status: DISCONTINUED | OUTPATIENT
Start: 2018-08-30 | End: 2018-08-30 | Stop reason: HOSPADM

## 2018-08-30 RX ORDER — NEOSTIGMINE METHYLSULFATE 5 MG/5 ML
SYRINGE (ML) INTRAVENOUS AS NEEDED
Status: DISCONTINUED | OUTPATIENT
Start: 2018-08-30 | End: 2018-08-30 | Stop reason: HOSPADM

## 2018-08-30 RX ORDER — SUCCINYLCHOLINE CHLORIDE 20 MG/ML
INJECTION INTRAMUSCULAR; INTRAVENOUS AS NEEDED
Status: DISCONTINUED | OUTPATIENT
Start: 2018-08-30 | End: 2018-08-30 | Stop reason: HOSPADM

## 2018-08-30 RX ORDER — GLYCOPYRROLATE 0.2 MG/ML
INJECTION INTRAMUSCULAR; INTRAVENOUS AS NEEDED
Status: DISCONTINUED | OUTPATIENT
Start: 2018-08-30 | End: 2018-08-30 | Stop reason: HOSPADM

## 2018-08-30 RX ORDER — GABAPENTIN 300 MG/1
300 CAPSULE ORAL 3 TIMES DAILY
Status: DISCONTINUED | OUTPATIENT
Start: 2018-08-30 | End: 2018-09-01

## 2018-08-30 RX ORDER — SODIUM CHLORIDE 9 MG/ML
INJECTION, SOLUTION INTRAVENOUS
Status: DISCONTINUED | OUTPATIENT
Start: 2018-08-30 | End: 2018-08-30 | Stop reason: HOSPADM

## 2018-08-30 RX ORDER — BISACODYL 5 MG
10 TABLET, DELAYED RELEASE (ENTERIC COATED) ORAL DAILY
Status: DISCONTINUED | OUTPATIENT
Start: 2018-08-31 | End: 2018-09-04 | Stop reason: HOSPADM

## 2018-08-30 RX ORDER — MAGNESIUM SULFATE 100 %
4 CRYSTALS MISCELLANEOUS AS NEEDED
Status: DISCONTINUED | OUTPATIENT
Start: 2018-08-30 | End: 2018-08-30 | Stop reason: HOSPADM

## 2018-08-30 RX ORDER — SODIUM CHLORIDE 0.9 % (FLUSH) 0.9 %
5-10 SYRINGE (ML) INJECTION AS NEEDED
Status: DISCONTINUED | OUTPATIENT
Start: 2018-08-30 | End: 2018-09-04 | Stop reason: HOSPADM

## 2018-08-30 RX ORDER — FENTANYL CITRATE 50 UG/ML
INJECTION, SOLUTION INTRAMUSCULAR; INTRAVENOUS AS NEEDED
Status: DISCONTINUED | OUTPATIENT
Start: 2018-08-30 | End: 2018-08-30 | Stop reason: HOSPADM

## 2018-08-30 RX ORDER — SODIUM CHLORIDE 9 MG/ML
100 INJECTION, SOLUTION INTRAVENOUS CONTINUOUS
Status: DISCONTINUED | OUTPATIENT
Start: 2018-08-30 | End: 2018-09-02

## 2018-08-30 RX ORDER — SODIUM CHLORIDE 0.9 % (FLUSH) 0.9 %
5-10 SYRINGE (ML) INJECTION EVERY 8 HOURS
Status: DISCONTINUED | OUTPATIENT
Start: 2018-08-30 | End: 2018-09-04 | Stop reason: HOSPADM

## 2018-08-30 RX ORDER — NALOXONE HYDROCHLORIDE 0.4 MG/ML
0.4 INJECTION, SOLUTION INTRAMUSCULAR; INTRAVENOUS; SUBCUTANEOUS AS NEEDED
Status: DISCONTINUED | OUTPATIENT
Start: 2018-08-30 | End: 2018-09-04 | Stop reason: HOSPADM

## 2018-08-30 RX ORDER — SODIUM CHLORIDE 0.9 % (FLUSH) 0.9 %
5-10 SYRINGE (ML) INJECTION EVERY 8 HOURS
Status: DISCONTINUED | OUTPATIENT
Start: 2018-08-30 | End: 2018-08-30 | Stop reason: HOSPADM

## 2018-08-30 RX ORDER — DEXTROSE 50 % IN WATER (D50W) INTRAVENOUS SYRINGE
25-50 AS NEEDED
Status: DISCONTINUED | OUTPATIENT
Start: 2018-08-30 | End: 2018-08-30 | Stop reason: HOSPADM

## 2018-08-30 RX ORDER — VENLAFAXINE HYDROCHLORIDE 37.5 MG/1
37.5 CAPSULE, EXTENDED RELEASE ORAL DAILY
Status: DISCONTINUED | OUTPATIENT
Start: 2018-08-31 | End: 2018-09-04 | Stop reason: HOSPADM

## 2018-08-30 RX ORDER — ONDANSETRON 2 MG/ML
4 INJECTION INTRAMUSCULAR; INTRAVENOUS
Status: DISCONTINUED | OUTPATIENT
Start: 2018-08-30 | End: 2018-09-04 | Stop reason: HOSPADM

## 2018-08-30 RX ORDER — MAGNESIUM SULFATE 100 %
4 CRYSTALS MISCELLANEOUS AS NEEDED
Status: DISCONTINUED | OUTPATIENT
Start: 2018-08-30 | End: 2018-08-30

## 2018-08-30 RX ORDER — ONDANSETRON 2 MG/ML
4 INJECTION INTRAMUSCULAR; INTRAVENOUS AS NEEDED
Status: DISCONTINUED | OUTPATIENT
Start: 2018-08-30 | End: 2018-08-30

## 2018-08-30 RX ORDER — PROPOFOL 10 MG/ML
INJECTION, EMULSION INTRAVENOUS AS NEEDED
Status: DISCONTINUED | OUTPATIENT
Start: 2018-08-30 | End: 2018-08-30 | Stop reason: HOSPADM

## 2018-08-30 RX ORDER — FAMOTIDINE 20 MG/1
20 TABLET, FILM COATED ORAL ONCE
Status: COMPLETED | OUTPATIENT
Start: 2018-08-30 | End: 2018-08-30

## 2018-08-30 RX ORDER — METOPROLOL TARTRATE 50 MG/1
50 TABLET ORAL DAILY
Status: DISCONTINUED | OUTPATIENT
Start: 2018-08-31 | End: 2018-09-04 | Stop reason: HOSPADM

## 2018-08-30 RX ORDER — MAGNESIUM SULFATE 100 %
4 CRYSTALS MISCELLANEOUS AS NEEDED
Status: DISCONTINUED | OUTPATIENT
Start: 2018-08-30 | End: 2018-09-04 | Stop reason: HOSPADM

## 2018-08-30 RX ORDER — SODIUM CHLORIDE 0.9 % (FLUSH) 0.9 %
5-10 SYRINGE (ML) INJECTION AS NEEDED
Status: DISCONTINUED | OUTPATIENT
Start: 2018-08-30 | End: 2018-09-04 | Stop reason: SDUPTHER

## 2018-08-30 RX ORDER — MIDAZOLAM HYDROCHLORIDE 1 MG/ML
INJECTION, SOLUTION INTRAMUSCULAR; INTRAVENOUS AS NEEDED
Status: DISCONTINUED | OUTPATIENT
Start: 2018-08-30 | End: 2018-08-30 | Stop reason: HOSPADM

## 2018-08-30 RX ORDER — SODIUM CHLORIDE 0.9 % (FLUSH) 0.9 %
5-10 SYRINGE (ML) INJECTION EVERY 8 HOURS
Status: DISCONTINUED | OUTPATIENT
Start: 2018-08-30 | End: 2018-08-30 | Stop reason: SDUPTHER

## 2018-08-30 RX ORDER — DIPHENHYDRAMINE HYDROCHLORIDE 50 MG/ML
12.5 INJECTION, SOLUTION INTRAMUSCULAR; INTRAVENOUS
Status: DISCONTINUED | OUTPATIENT
Start: 2018-08-30 | End: 2018-08-31 | Stop reason: SDUPTHER

## 2018-08-30 RX ORDER — ACETAMINOPHEN 500 MG
1000 TABLET ORAL EVERY 6 HOURS
Status: DISCONTINUED | OUTPATIENT
Start: 2018-08-30 | End: 2018-09-01

## 2018-08-30 RX ORDER — ROCURONIUM BROMIDE 10 MG/ML
INJECTION, SOLUTION INTRAVENOUS AS NEEDED
Status: DISCONTINUED | OUTPATIENT
Start: 2018-08-30 | End: 2018-08-30 | Stop reason: HOSPADM

## 2018-08-30 RX ORDER — ROSUVASTATIN CALCIUM 10 MG/1
10 TABLET, COATED ORAL DAILY
Status: DISCONTINUED | OUTPATIENT
Start: 2018-08-31 | End: 2018-08-30 | Stop reason: SDUPTHER

## 2018-08-30 RX ORDER — DEXTROSE 50 % IN WATER (D50W) INTRAVENOUS SYRINGE
25-50 AS NEEDED
Status: DISCONTINUED | OUTPATIENT
Start: 2018-08-30 | End: 2018-09-04 | Stop reason: HOSPADM

## 2018-08-30 RX ORDER — CEFAZOLIN SODIUM 2 G/50ML
2 SOLUTION INTRAVENOUS ONCE
Status: DISCONTINUED | OUTPATIENT
Start: 2018-08-30 | End: 2018-08-30

## 2018-08-30 RX ORDER — SODIUM CHLORIDE 0.9 % (FLUSH) 0.9 %
5-10 SYRINGE (ML) INJECTION AS NEEDED
Status: DISCONTINUED | OUTPATIENT
Start: 2018-08-30 | End: 2018-08-30 | Stop reason: SDUPTHER

## 2018-08-30 RX ORDER — NALOXONE HYDROCHLORIDE 0.4 MG/ML
0.1 INJECTION, SOLUTION INTRAMUSCULAR; INTRAVENOUS; SUBCUTANEOUS AS NEEDED
Status: DISCONTINUED | OUTPATIENT
Start: 2018-08-30 | End: 2018-09-04 | Stop reason: SDUPTHER

## 2018-08-30 RX ORDER — DIPHENHYDRAMINE HYDROCHLORIDE 50 MG/ML
12.5 INJECTION, SOLUTION INTRAMUSCULAR; INTRAVENOUS
Status: DISCONTINUED | OUTPATIENT
Start: 2018-08-30 | End: 2018-09-02

## 2018-08-30 RX ORDER — VANCOMYCIN HYDROCHLORIDE 1 G/20ML
INJECTION, POWDER, LYOPHILIZED, FOR SOLUTION INTRAVENOUS AS NEEDED
Status: DISCONTINUED | OUTPATIENT
Start: 2018-08-30 | End: 2018-08-30 | Stop reason: HOSPADM

## 2018-08-30 RX ORDER — ONDANSETRON 2 MG/ML
INJECTION INTRAMUSCULAR; INTRAVENOUS AS NEEDED
Status: DISCONTINUED | OUTPATIENT
Start: 2018-08-30 | End: 2018-08-30 | Stop reason: HOSPADM

## 2018-08-30 RX ORDER — LORAZEPAM 2 MG/ML
1 INJECTION INTRAMUSCULAR
Status: DISCONTINUED | OUTPATIENT
Start: 2018-08-30 | End: 2018-09-04 | Stop reason: HOSPADM

## 2018-08-30 RX ORDER — DEXTROSE 50 % IN WATER (D50W) INTRAVENOUS SYRINGE
25-50 AS NEEDED
Status: DISCONTINUED | OUTPATIENT
Start: 2018-08-30 | End: 2018-09-04 | Stop reason: SDUPTHER

## 2018-08-30 RX ORDER — SODIUM CHLORIDE 0.9 % (FLUSH) 0.9 %
5-10 SYRINGE (ML) INJECTION AS NEEDED
Status: DISCONTINUED | OUTPATIENT
Start: 2018-08-30 | End: 2018-08-30 | Stop reason: HOSPADM

## 2018-08-30 RX ORDER — SODIUM CHLORIDE, SODIUM LACTATE, POTASSIUM CHLORIDE, CALCIUM CHLORIDE 600; 310; 30; 20 MG/100ML; MG/100ML; MG/100ML; MG/100ML
75 INJECTION, SOLUTION INTRAVENOUS CONTINUOUS
Status: DISCONTINUED | OUTPATIENT
Start: 2018-08-30 | End: 2018-08-30 | Stop reason: HOSPADM

## 2018-08-30 RX ORDER — INSULIN LISPRO 100 [IU]/ML
INJECTION, SOLUTION INTRAVENOUS; SUBCUTANEOUS
Status: DISCONTINUED | OUTPATIENT
Start: 2018-08-30 | End: 2018-09-04

## 2018-08-30 RX ORDER — HYDROMORPHONE HYDROCHLORIDE 2 MG/ML
0.5 INJECTION, SOLUTION INTRAMUSCULAR; INTRAVENOUS; SUBCUTANEOUS
Status: DISCONTINUED | OUTPATIENT
Start: 2018-08-30 | End: 2018-08-30 | Stop reason: HOSPADM

## 2018-08-30 RX ORDER — ONDANSETRON 2 MG/ML
4 INJECTION INTRAMUSCULAR; INTRAVENOUS AS NEEDED
Status: DISCONTINUED | OUTPATIENT
Start: 2018-08-30 | End: 2018-08-30 | Stop reason: HOSPADM

## 2018-08-30 RX ORDER — HYDROMORPHONE HYDROCHLORIDE 2 MG/ML
INJECTION, SOLUTION INTRAMUSCULAR; INTRAVENOUS; SUBCUTANEOUS AS NEEDED
Status: DISCONTINUED | OUTPATIENT
Start: 2018-08-30 | End: 2018-08-30 | Stop reason: HOSPADM

## 2018-08-30 RX ORDER — LISINOPRIL 20 MG/1
20 TABLET ORAL DAILY
Status: DISCONTINUED | OUTPATIENT
Start: 2018-08-31 | End: 2018-09-03

## 2018-08-30 RX ORDER — INSULIN LISPRO 100 [IU]/ML
INJECTION, SOLUTION INTRAVENOUS; SUBCUTANEOUS ONCE
Status: COMPLETED | OUTPATIENT
Start: 2018-08-30 | End: 2018-08-30

## 2018-08-30 RX ORDER — INSULIN LISPRO 100 [IU]/ML
INJECTION, SOLUTION INTRAVENOUS; SUBCUTANEOUS EVERY 6 HOURS
Status: DISCONTINUED | OUTPATIENT
Start: 2018-08-30 | End: 2018-08-30

## 2018-08-30 RX ORDER — DIAZEPAM 5 MG/1
5 TABLET ORAL
Status: DISCONTINUED | OUTPATIENT
Start: 2018-08-30 | End: 2018-09-02

## 2018-08-30 RX ORDER — MORPHINE SULFATE 5 MG/ML
INJECTION, SOLUTION INTRAVENOUS
Status: DISCONTINUED | OUTPATIENT
Start: 2018-08-30 | End: 2018-08-31

## 2018-08-30 RX ORDER — AMLODIPINE BESYLATE 10 MG/1
10 TABLET ORAL DAILY
Status: DISCONTINUED | OUTPATIENT
Start: 2018-08-31 | End: 2018-09-04 | Stop reason: HOSPADM

## 2018-08-30 RX ADMIN — MORPHINE SULFATE: 5 INJECTION, SOLUTION INTRAVENOUS at 15:25

## 2018-08-30 RX ADMIN — ATORVASTATIN CALCIUM 40 MG: 40 TABLET, FILM COATED ORAL at 22:01

## 2018-08-30 RX ADMIN — Medication 10 ML: at 22:02

## 2018-08-30 RX ADMIN — FENTANYL CITRATE 50 MCG: 50 INJECTION, SOLUTION INTRAMUSCULAR; INTRAVENOUS at 11:25

## 2018-08-30 RX ADMIN — SUCCINYLCHOLINE CHLORIDE 160 MG: 20 INJECTION INTRAMUSCULAR; INTRAVENOUS at 08:12

## 2018-08-30 RX ADMIN — MIDAZOLAM HYDROCHLORIDE 2 MG: 1 INJECTION, SOLUTION INTRAMUSCULAR; INTRAVENOUS at 08:00

## 2018-08-30 RX ADMIN — ONDANSETRON 4 MG: 2 INJECTION INTRAMUSCULAR; INTRAVENOUS at 18:53

## 2018-08-30 RX ADMIN — FAMOTIDINE 20 MG: 20 TABLET ORAL at 06:38

## 2018-08-30 RX ADMIN — DEXAMETHASONE SODIUM PHOSPHATE 8 MG: 4 INJECTION, SOLUTION INTRA-ARTICULAR; INTRALESIONAL; INTRAMUSCULAR; INTRAVENOUS; SOFT TISSUE at 08:18

## 2018-08-30 RX ADMIN — ONDANSETRON 4 MG: 2 INJECTION INTRAMUSCULAR; INTRAVENOUS at 14:09

## 2018-08-30 RX ADMIN — FENTANYL CITRATE 100 MCG: 50 INJECTION, SOLUTION INTRAMUSCULAR; INTRAVENOUS at 08:12

## 2018-08-30 RX ADMIN — SODIUM CHLORIDE, SODIUM LACTATE, POTASSIUM CHLORIDE, AND CALCIUM CHLORIDE: 600; 310; 30; 20 INJECTION, SOLUTION INTRAVENOUS at 14:20

## 2018-08-30 RX ADMIN — GABAPENTIN 300 MG: 300 CAPSULE ORAL at 22:01

## 2018-08-30 RX ADMIN — FENTANYL CITRATE 50 MCG: 50 INJECTION, SOLUTION INTRAMUSCULAR; INTRAVENOUS at 10:45

## 2018-08-30 RX ADMIN — SODIUM CHLORIDE, SODIUM LACTATE, POTASSIUM CHLORIDE, AND CALCIUM CHLORIDE 75 ML/HR: 600; 310; 30; 20 INJECTION, SOLUTION INTRAVENOUS at 06:35

## 2018-08-30 RX ADMIN — PROPOFOL 150 MG: 10 INJECTION, EMULSION INTRAVENOUS at 08:12

## 2018-08-30 RX ADMIN — LIDOCAINE HYDROCHLORIDE 60 MG: 20 INJECTION, SOLUTION EPIDURAL; INFILTRATION; INTRACAUDAL; PERINEURAL at 08:12

## 2018-08-30 RX ADMIN — ONDANSETRON 4 MG: 2 INJECTION INTRAMUSCULAR; INTRAVENOUS at 22:15

## 2018-08-30 RX ADMIN — INSULIN LISPRO 9 UNITS: 100 INJECTION, SOLUTION INTRAVENOUS; SUBCUTANEOUS at 16:18

## 2018-08-30 RX ADMIN — SODIUM CHLORIDE 1000 MG: 900 INJECTION, SOLUTION INTRAVENOUS at 19:49

## 2018-08-30 RX ADMIN — INSULIN LISPRO 3 UNITS: 100 INJECTION, SOLUTION INTRAVENOUS; SUBCUTANEOUS at 06:52

## 2018-08-30 RX ADMIN — SODIUM CHLORIDE 1000 MG: 900 INJECTION, SOLUTION INTRAVENOUS at 06:53

## 2018-08-30 RX ADMIN — GLYCOPYRROLATE 0.6 MG: 0.2 INJECTION INTRAMUSCULAR; INTRAVENOUS at 10:15

## 2018-08-30 RX ADMIN — SODIUM CHLORIDE: 9 INJECTION, SOLUTION INTRAVENOUS at 08:15

## 2018-08-30 RX ADMIN — ROCURONIUM BROMIDE 5 MG: 10 INJECTION, SOLUTION INTRAVENOUS at 08:12

## 2018-08-30 RX ADMIN — Medication 3 MG: at 10:15

## 2018-08-30 RX ADMIN — INSULIN LISPRO 6 UNITS: 100 INJECTION, SOLUTION INTRAVENOUS; SUBCUTANEOUS at 22:01

## 2018-08-30 RX ADMIN — FENTANYL CITRATE 50 MCG: 50 INJECTION, SOLUTION INTRAMUSCULAR; INTRAVENOUS at 08:36

## 2018-08-30 RX ADMIN — SODIUM CHLORIDE 100 ML/HR: 900 INJECTION, SOLUTION INTRAVENOUS at 18:33

## 2018-08-30 RX ADMIN — HYDROMORPHONE HYDROCHLORIDE 1 MG: 2 INJECTION, SOLUTION INTRAMUSCULAR; INTRAVENOUS; SUBCUTANEOUS at 12:18

## 2018-08-30 RX ADMIN — ROCURONIUM BROMIDE 25 MG: 10 INJECTION, SOLUTION INTRAVENOUS at 08:30

## 2018-08-30 NOTE — BRIEF OP NOTE
BRIEF OPERATIVE NOTE    Date of Procedure: 8/30/2018   Preoperative Diagnosis: Spinal stenosis, lumbar region, without neurogenic claudication [M48.061]  Postoperative Diagnosis: Spinal stenosis, lumbar region, without neurogenic claudication [M48.061]    Procedure(s):  1) ANTERIOR LUMBAR INTERBODY FUSION (ALIF) L5; 3)POSTERIOR FIXATION L3-S1; C-ARM/DEPUY-SYNTHES  2) EXTREME LATERAL INTERBODY FUSION L3/4 L4/5; NUVASIVE  Surgeon(s) and Role:     * Adrain Burkitt, MD - Primary     * Karlos Bobby MD - Co-Surgeon         Surgical Assistant: 0    Surgical Staff:  Circ-1: Benard Opitz, RN  Circ-2: Mortimer Mylar, RN  Radiology Technician: Pavithra Gifford  Scrub Tech-1: Paramjit Smith  Surg Asst-1: Sheila Rizvi  Event Time In   Incision Start 1840   Incision Close      Anesthesia: General   Estimated Blood Loss: #1-25, #2-25, #3-25  Specimens: * No specimens in log *   Findings: instability   Complications: 0  Implants:   Implant Name Type Inv.  Item Serial No.  Lot No. LRB No. Used Action   GRAFT BNE RECOMB HUM INFUS SM --  - SN/A  GRAFT BNE RECOMB HUM INFUS SM --  N/A [employer] [id number] N/A 1 Implanted   synfix evolution fine tip screw 25mm   N/A DEPUY SPINE [id number] N/A 2 Implanted   synfix evolution spacer small 13.5mm    N/A DEPUY SPINE N/A N/A 1 Implanted   GRAFT BNE RECOMB HUM INFUS MED --  - SN/A  GRAFT BNE RECOMB HUM INFUS MED --  N/A [employer] [facility] [id number] N/A 1 Implanted   SPACER PEEK XL L [id number] -- COROENT - [id number]  SPACER PEEK XL L [id number] -- COROENT  NUVASIVE N/A N/A 1 Implanted

## 2018-08-30 NOTE — INTERVAL H&P NOTE
H&P Update:  Franky Nicole was seen and examined. History and physical has been reviewed. The patient has been examined.  There have been no significant clinical changes since the completion of the originally dated History and Physical.    Signed By: Keon Murguia MD     August 30, 2018 6:57 AM

## 2018-08-30 NOTE — OP NOTES
[facility] REPORT    Name:Jovita PADRON  MR#: [id number]  : 1955  ACCOUNT #: [de-identified]   DATE OF SERVICE: 2018    PREOPERATIVE DIAGNOSIS:  Spondylolisthesis of the spine. POSTOPERATIVE DIAGNOSIS:  Spondylolisthesis of the spine. PROCEDURE PERFORMED:  Anterior approach exposure to the L5-S1 disk space. CO-SURGEON:  DO Merced     CO-SURGEON:  Hernán Ocampo MD     COMPLICATIONS:  Zero. ESTIMATED BLOOD LOSS:  Zero. CONDITION:  The patient is stable. SPECIMEN REMOVED:  Disk. IMPLANTS:  See Dr. Luis Benites dictation. ANESTHESIA:  General.    DETAILS OF PROCEDURE:  The patient is a 68-year-old under the expert management of Dr. Jame Pérez for spondylolisthesis of the spine, and here today for assistance with exposure to the anterior disk space. She had her preop antibiotics. She was transferred back to the room where she had general anesthesia, Herrera catheter, shaved, prepped and draped in the usual standard fashion, followed by [facility] combined guidelines for aseptic technique. I made a low Pfannenstiel incision in left lower quadrant, carried down through the skin and soft tissue, down to the fascia, incised the fascia in a left paramedian fashion. The rectus muscle was visualized and divided. I went through the midline and followed the preperitoneal space down to the retroperitoneum. I placed a self-retaining retractor, identified the L5-S1 disk space, placed a pin to confirm with fluoroscopy. I gently retracted the artery and vein structures off the disk space. There were 2 descending vessels, doubly clipped and divided. This gave excellent exposure of the L5-S1 disk space. Please refer to Dr. Luis Beniets dictation for the diskectomy and implant. At the completion, I reinspected the disk space. There was no bleeding. We placed the antibiotic powder. I reinspected the retroperitoneum.   There was no violation and it was set back in its normal position. I approximated the fascia with interrupted #1 Nurolon sutures. I irrigated, closed the Jeanne with 2-0 interrupted, 3-0 Vicryl subcu, 4-0 Monocryl and Dermabond glue for skin. She was transferred to recovery in stable condition.       Morse Skiff, DO CMM / ARBEN  D: 08/30/2018 12:57     T: 08/30/2018 13:47  JOB #: [id number]

## 2018-08-30 NOTE — OP NOTES
[facility] REPORT    Name:Meg PADRON  MR#: [id number]  : 1955  ACCOUNT #: [de-identified]   DATE OF SERVICE: 2018    PREOPERATIVE DIAGNOSES:  Spinal stenosis, degenerative spondylolisthesis, degenerative scoliosis. POSTOPERATIVE DIAGNOSES:  Spinal stenosis, degenerative spondylolisthesis, degenerative scoliosis. PROCEDURE PERFORMED:  Anterior lumbar interbody. SURGEON:  Senthil Mcneal MD    ASSISTANT:  Dr. Moe George. PROCEDURES PERFORMED:  Anterior lumbar interbody fusion L5-S1 PEEK interbody cage SYNFIX type, anterior plate fixation [id number] with SYNFIX plate and screws, Infuse bone graft. Extreme lateral interbody fusion L3-4 and L4-L5, NuVasive PEEK interbody cage L3-4 and L4-5, Infuse bone graft. NuVasive intraoperative neuro monitoring, NeuroVision type. Posterior segmental fixation L3, L4, L5, S1.  NuVasive pedicle screw system with screws and rods. Segmental spinal instrumentation. FINDINGS:  We were able to restore disk space height at each level, restore segmental lordosis and foraminal height, resolve the spondylolisthesis at L4-5 and degenerative collapse at L5-S1, stabilized the spine throughout. Bone quality was good, it was felt that on  the instrument the pedicle of the sacrum on the right, the patient had continuing irritability of the S1 nerve root when everyone tried to place a transpedicle guidewire, a Jamshidi needle in the S1 pedicle given we had fixation anteriorly on the left at S1, I felt it would be best simply to avoid instrumenting the right S1 pedicle given the situation. Fixation otherwise was rigid and good. The patient has been previously informed of the possibility of needing a posterior decompressive procedure, but most likely the indirect decompression was felt to be adequate here. OPERATION:  Following induction of endotracheal anesthesia, the patient turned and placed in a supine position on radiolucent table.   Dr. Moe George and I performed an anterior lumbar interbody fusion at L5-S1 through a left-sided Pfannenstiel incision. The great vessels were mobilized. The procedure went through the bifurcation. Middle sacral was taken. The great vessel was protected. I performed an annulotomy and a radical diskectomy. I removed extruded nuclear material in the left lateral recess, debrided the uncinate and the annulus. We were able to distract the disk space from preoperative 7 mm to 13.5. Resolved the kyphosis of the segment, lordosis segment which should dramatically improve her overall sagittal balance, debrided the endplates, did various trials. Ultimately, we placed a 13.5 mm lordotic implant and use of the anterior fixation plate. It was filled with Infuse bone graft, tamped firmly into place with excellent bony apposition. Following the decompression and local, mobilization of the disk space, dramatically improving segmental lordosis resolving the left lateral recess stenosis as evident on preoperative studies and providing segmental fixation. Fixation appeared to be good. There was no evidence of any bleeding. The only bleeding encountered was some skin edges. The wound was then closed in layers and the skin closed with subcuticular suture and Dermabond. The patient was stable and blood loss was less than 25 mL throughout the procedure. The patient was identified turned in a lateral decubitus position, left side up with monitoring, NeuroVision attached with clean neural runs. We then addressed the degenerative segments at 3-4 and 4-5 with degenerative spondylolisthesis and severe spinal stenosis evidence. Incisions were made and orthogonal x-ray studies were obtained including neural monitoring. After neuro monitoring, I went transpsoas at each level, first percutaneous access to the retroperitoneum.   I made an open incision, dissected down into retroperitoneal space of the psoas, palpated the psoas, palpated the disk space, placed dilators against the psoas, mapped out the neural structures. I had a clean pathway, placed a guidewire transpsoas into the disk spaces first at 4-5 then at 3-4. I placed the MaXcess retractor at 4-5. Visualized the disk space under direct visualization, dissected and exposed the annulus, docked to the disk space with direct visualization, confirmation fluoroscopically. Used the nerve probe to probe the area to make sure it was free of any neurologic elements. Upon annulotomy radical diskectomy endplate preparation, various fatou and dilators were utilized to dilate at the disk space, resolve the spondylolisthesis, do a traumatic cord decompression at that disk. Various fatou and dilators were utilized taking a disk that was listhesed and somewhat collapsed and debriding it and creating a 12 mm disk space height with an 18 mm implant filled with Infuse bone graft, tamped firmly into place with excellent bony apposition resolving, improving foraminal height, resolving listhesis, felt indirectly improving the canal stenosis and fighting anterior column support and fusion mass. There is no evidence of bleeding with clean neural runs. Procedure was then repeated at 3-4. Again, a separate incision was made. Palpation and direct visualization of the disk space was obtained. MaXcess retractor was placed. Mapping of the neural elements was done, dividing the psoas and visualizing the disk space, confirming no neural elements in the plane was done, an annulotomy was done. Radical diskectomy and endplate preparation, various dilators and fatou used with trials. Another 12 mm implant was felt to best restore normal disk space height and provide posterior distraction as well as restoration of segmental lordosis. It too was filled with Infuse bone graft and tamped firmly into place with lordosing of the segment obtained. At this point, again,   there was only skin edge bleeding.   No evidence of bleeding or other issue took place. We have had clean neural runs other than in our neuro probes we found the neural elements were all posterior to where our psoas musculature had been retracted. The patient was hemodynamically stable having lost essentially no blood. The patient was then turned in prone position for spinal fixation. The patient was prepped and draped. Utilizing a percutaneous technique, incisions were made at the transverse process facet junction. After fluoroscopically determining orthogonal x-rays at each segment and the Jamshidi needles were placed transpedicular into the vertebral body, again, both fluoroscopic and EMG guidance, transpedicularly 3, then 4 then 5 and then the sacrum. The only time we had any issue was on the right in the sacrum where I had multiple planes so the appearance that the Jamshidi needle was in an excellent position radiographically was present. We continued to get relatively high impedances off our Jamshidi needle, which was just markedly unusual.  There was some radiographic anomalies present and I was not certain if we were missing something in terms of the patient's neuro anatomy in that local area. Given we already had 4 screws anteriorly providing fixation at L5-S1 and a screw on the contralateral side providing a tension band, I felt it was probably the better aspect of the patient given that I could not feel totally certain about the neurologic significance of having irritability screw placement in the sacrum, I felt it was best simply to leave this 1 screw out of the construct. I then placed lordosis rods through the screw heads on the left and on the right with final tightening and torquing providing a rigid fixation from the L3 through the sacrum on the left and L3 through L5 on the right. Final tightening and torquing was done and confirmation was done with AP and lateral fluoroscopic images.   Clean neural runs were evident throughout the case. The wound was copiously irrigated. Vancomycin powder instilled as it had been instilled in the other incisions made today. Skin closed in layers and with a subcuticular suture and Dermabond. A stroke dressing placed upon the wound. All counts correct. ESTIMATED BLOOD LOSS:  Less than 25 mL. COMPLICATIONS:  None. SPECIMENS REMOVED:  None. IMPLANTS:  NuVasive pedicle screw system. In summary, overall blood loss was 50-75 mL for the entire case. IMPLANTS:  Kathrine Plmian and Josep SYNFIX interbody cage with SYNFIX anterior plate fixation. In the anterior procedure, there was a NuVasive lateral lumbar cage placements x2. For bone graft, it was Infuse bone graft by [employer] as well as NuVasive pedicle screw system from L3-S1 fixation posteriorly, and again, the [employer] Infuse bone graft throughout.       William Maynard MD       [address] [facility] / [facility]  D: 08/30/2018 14:28     T: 08/30/2018 15:53  JOB #: [id number]

## 2018-08-30 NOTE — IP AVS SNAPSHOT
Aj Bautista 
 
 
 [address] [facility] [address] Patient: Omaira Buckley MRN: [id number] DUE:8/09/[birth year]88 About your hospitalization You were admitted on:  August 30, 2018 You last received care in the:  [address] [place] [address] You were discharged on:  September 4, 2018 Why you were hospitalized Your primary diagnosis was:  Shortness Of Breath Your diagnoses also included:  Spinal Stenosis Of Lumbar Region At Multiple Levels, Scoliosis, Spinal Stenosis, Cap (Community Acquired Pneumonia), Fever And Chills Follow-up Information Follow up With Details Comments Contact Info Selene Olivier NP On 9/12/2018 Appointment at 1:00 pm [address] 200 [facility] 
[phone] Erlin Brandt NP On 9/20/2018 F/u @ 1:50pm [address] [employer] [facility] [address] [place] [address] 
[phone] Your Scheduled Appointments Wednesday September 12, 2018  1:00 PM EDT  
POST OP with Selene Olivier NP  
[facility] ([address] [place] [address]) [address] [facility] [address]  
[phone] Tuesday October 16, 2018  1:45 PM EDT  
POST OP with Melly Torres MD  
914 [facility] [address] [facility] [address]) [address] [facility] [address]  
[phone] Discharge Orders Procedure Order Date Status Priority Quantity Spec Type Associated Dx DIET DIABETIC CONSISTENT CARB Regular; AHA-LOW-CHOL FAT 09/04/18 1007 Normal Routine 1 Questions: Texture:  Regular Cardiac:  AHA-LOW-CHOL FAT  
        
 CBC WITH AUTOMATED DIFF 09/04/18 1007 Future Routine 1 Blood Comments:  CBC on 9/7/18 MORNING Call report to PCP Diagnosis: leukocytosis, atelectasis A check navneet indicates which time of day the medication should be taken. My Medications START taking these medications Instructions Each Dose to Equal  
 Morning Noon Evening Bedtime  
 albuterol 90 mcg/actuation inhaler Commonly known as:  PROVENTIL HFA, VENTOLIN HFA, PROAIR HFA Your last dose was: Your next dose is:    
   
   
 2 puffs three to four times a day for 5 days then every 6 hours as needed for shortness of breath  
     
   
   
   
  
 azithromycin 500 mg Tab Commonly known as:  Con Chant Start taking on:  9/5/2018 Your last dose was: Your next dose is: Take 1 Tab by mouth daily for 3 days. 500 mg  
    
   
   
   
  
 cefpodoxime 200 mg tablet Commonly known as:  Rey Peak Your last dose was: Your next dose is: Take 1 Tab by mouth every twelve (12) hours for 5 days. 200 mg HYDROcodone-acetaminophen  mg tablet Commonly known as:  Lynnetta Briscoe Your last dose was: Your next dose is: Take 1 Tab by mouth every six (6) hours as needed for Pain. Max Daily Amount: 4 Tabs. Indications: Pain, post op pain 1 Tab  
    
   
   
   
  
 naloxone 4 mg/actuation nasal spray Commonly known as:  [employer] Your last dose was: Your next dose is:    
   
   
 Use 1 spray intranasally, then discard. Repeat with new spray every 2 min as needed for opioid overdose symptoms, alternating nostrils. promethazine 25 mg tablet Commonly known as:  PHENERGAN Your last dose was: Your next dose is: Take 0.5 Tabs by mouth every eight (8) hours as needed for Nausea. Indications: POST-OPERATIVE NAUSEA AND VOMITING, PREVENTION OF POST-OPERATIVE NAUSEA AND VOMITING  
 12.5 mg  
    
   
   
   
  
 tamsulosin 0.4 mg capsule Commonly known as:  FLOMAX Your last dose was: Your next dose is: Take 1 Cap by mouth daily. 0.4 mg  
    
   
   
   
  
  
CHANGE how you take these medications  Instructions Each Dose to Equal  
 Morning Noon Evening Bedtime LANTUS SOLOSTAR U-100 INSULIN 100 unit/mL (3 mL) Inpn Generic drug:  insulin glargine What changed:  See the new instructions. Your last dose was: Your next dose is:    
   
   
 30 units subcutaneously in morning. Can take an additional dose of 10 units subcutaneously in the evening if blood sugar is more than 200. CONTINUE taking these medications Instructions Each Dose to Equal  
 Morning Noon Evening Bedtime  
 amLODIPine 10 mg tablet Commonly known as:  Abram Alstrom Your last dose was: Your next dose is:    
   
   
 daily. Indications: hypertension ATIVAN 0.5 mg tablet Generic drug:  LORazepam  
   
Your last dose was: Your next dose is: Take 0.5 mg by mouth as needed for Anxiety. 0.5 mg  
    
   
   
   
  
 atorvastatin 40 mg tablet Commonly known as:  LIPITOR Your last dose was: Your next dose is: Take  by mouth nightly. CRESTOR 10 mg tablet Generic drug:  rosuvastatin Your last dose was: Your next dose is: Take 10 mg by mouth daily. 10 mg  
    
   
   
   
  
 esomeprazole 20 mg capsule Commonly known as:  Arabella Piper Your last dose was: Your next dose is:    
   
   
 daily. furosemide 20 mg tablet Commonly known as:  LASIX Your last dose was: Your next dose is: TAKE 1 TABLET BY MOUTH ONCE DAILY AS NEEDED  
     
   
   
   
  
 gabapentin 600 mg tablet Commonly known as:  NEURONTIN Your last dose was: Your next dose is: Take 1 Tab by mouth three (3) times daily. 600 mg  
    
   
   
   
  
 hydroCHLOROthiazide 12.5 mg capsule Commonly known as:  Kleber López Your last dose was: Your next dose is:    
   
   
      
   
   
   
  
 insulin lispro 100 unit/mL kwikpen Commonly known as:  HUMALOG Your last dose was: Your next dose is:    
   
   
 by SubCUTAneous route. metoprolol tartrate 50 mg tablet Commonly known as:  LOPRESSOR Your last dose was: Your next dose is:    
   
   
 daily. NOVOFINE 32 32 gauge x 1/4\" Ndle Generic drug:  Insulin Needles (Disposable) Your last dose was: Your next dose is:    
   
   
      
   
   
   
  
 TRADJENTA 5 mg tablet Generic drug:  linagliptin Your last dose was: Your next dose is: TAKE 1 TABLET BY MOUTH ONCE A DAY  
     
   
   
   
  
 venlafaxine-SR 37.5 mg capsule Commonly known as:  EFFEXOR-XR Your last dose was: Your next dose is:    
   
   
 daily. STOP taking these medications   
 glipiZIDE 10 mg tablet Commonly known as:  GLUCOTROL  
   
  
 lisinopril 20 mg tablet Commonly known as:  Aakash Shutters Where to Get Your Medications These medications were sent to [address] [place] [address] - [address]  [address] [address] Phone:  [phone]  
  naloxone 4 mg/actuation nasal spray  
 tamsulosin 0.4 mg capsule Information on where to get these meds will be given to you by the nurse or doctor. ! Ask your nurse or doctor about these medications  
  albuterol 90 mcg/actuation inhaler  
 azithromycin 500 mg Tab  
 cefpodoxime 200 mg tablet HYDROcodone-acetaminophen  mg tablet LANTUS SOLOSTAR U-100 INSULIN 100 unit/mL (3 mL) Inpn  
 promethazine 25 mg tablet Opioid Education Prescription Opioids: What You Need to Know: 
 
Prescription opioids can be used to help relieve moderate-to-severe pain and are often prescribed following a surgery or injury, or for certain health conditions.   These medications can be an important part of treatment but also come with serious risks. Opioids are strong pain medicines. Examples include hydrocodone, oxycodone, fentanyl, and morphine. Heroin is an example of an illegal opioid. It is important to work with your health care provider to make sure you are getting the safest, most effective care. WHAT ARE THE RISKS AND SIDE EFFECTS OF OPIOID USE? Prescription opioids carry serious risks of addiction and overdose, especially with prolonged use. An opioid overdose, often marked by slow breathing, can cause sudden death. The use of prescription opioids can have a number of side effects as well, even when taken as directed. · Tolerance-meaning you might need to take more of a medication for the same pain relief · Physical dependence-meaning you have symptoms of withdrawal when the medication is stopped. Withdrawal symptoms can include nausea, sweating, chills, diarrhea, stomach cramps, and muscle aches. Withdrawal can last up to several weeks, depending on which drug you took and how long you took it. · Increased sensitivity to pain · Constipation · Nausea, vomiting, and dry mouth · Sleepiness and dizziness · Confusion · Depression · Low levels of testosterone that can result in lower sex drive, energy, and strength · Itching and sweating RISKS ARE GREATER WITH:      
· History of drug misuse, substance use disorder, or overdose · Mental health conditions (such as depression or anxiety) · Sleep apnea · Older age (72 years or older) · Pregnancy Avoid alcohol while taking prescription opioids. Also, unless specifically advised by your health care provider, medications to avoid include: · Benzodiazepines (such as Xanax or Valium) · Muscle relaxants (such as Soma or Flexeril) · Hypnotics (such as Ambien or Lunesta) · Other prescription opioids KNOW YOUR OPTIONS Talk to your health care provider about ways to manage your pain that don't involve prescription opioids. Some of these options may actually work better and have fewer risks and side effects. Options may include: 
· Pain relievers such as acetaminophen, ibuprofen, and naproxen · Some medications that are also used for depression or seizures · Physical therapy and exercise · Counseling to help patients learn how to cope better with triggers of pain and stress. · Application of heat or cold compress · Massage therapy · Relaxation techniques Be Informed Make sure you know the name of your medication, how much and how often to take it, and its potential risks & side effects. IF YOU ARE PRESCRIBED OPIOIDS FOR PAIN: 
· Never take opioids in greater amounts or more often than prescribed. Remember the goal is not to be pain-free but to manage your pain at a tolerable level. · Follow up with your primary care provider to: · Work together to create a plan on how to manage your pain. · Talk about ways to help manage your pain that don't involve prescription opioids. · Talk about any and all concerns and side effects. · Help prevent misuse and abuse. · Never sell or share prescription opioids · Help prevent misuse and abuse. · Store prescription opioids in a secure place and out of reach of others (this may include visitors, children, friends, and family). · Safely dispose of unused/unwanted prescription opioids: Find your community drug take-back program or your pharmacy mail-back program, or flush them down the toilet, following guidance from the Food and Drug Administration (www.fda.gov/Drugs/ResourcesForYou). · Visit www.cdc.gov/drugoverdose to learn about the risks of opioid abuse and overdose. · If you believe you may be struggling with addiction, tell your health care provider and ask for guidance or call [address] [place], [place] [address] at [phone]. Discharge Instructions Lumbar Spinal Stenosis: Care Instructions Your Care Instructions Stenosis in the spine is a narrowing of the canal that is around the spinal cord and nerve roots in your back. It can happen as part of aging. Sometimes bone and other tissue grow into this canal and press on the nerves that branch out from the spinal cord. This can cause pain, numbness, and weakness. When it happens in the lower part of your back, it is called lumbar spinal stenosis. It can cause problems in the legs, feet, and rear end (buttocks). You may be able to get relief from the symptoms of spinal stenosis by taking pain medicine. Your doctor may suggest physical therapy and exercises to keep your spine strong and flexible. Some people try steroid shots to reduce swelling. If pain and numbness in your legs are still so bad that you cannot do your normal activities, you may need surgery. Follow-up care is a key part of your treatment and safety. Be sure to make and go to all appointments, and call your doctor if you are having problems. It's also a good idea to know your test results and keep a list of the medicines you take. How can you care for yourself at home? · Take an over-the-counter pain medicine. Nonsteroidal anti-inflammatory drugs (NSAIDs) such as ibuprofen or naproxen seem to work best. But if you can't take NSAIDs, you can try acetaminophen. Be safe with medicines. Read and follow all instructions on the label. · Do not take two or more pain medicines at the same time unless the doctor told you to. Many pain medicines have acetaminophen, which is Tylenol. Too much acetaminophen (Tylenol) can be harmful. · Stay at a healthy weight. Being overweight puts extra strain on your spine. · Change positions often when you sit or stand. This can ease pain. It may also reduce pressure on the spinal cord and its nerves. · Avoid doing things that make your symptoms worse.  Walking downhill and standing for a long time may cause pain. · Stretch and strengthen your back muscles as your doctor or physical therapist recommends. If your doctor says it is okay to do them, these exercises may help. ¨ Lie on your back with your knees bent. Gently pull one bent knee to your chest. Put that foot back on the floor, and then pull the other knee to your chest. 
¨ Do pelvic tilts. Lie on your back with your knees bent. Tighten your stomach muscles. Pull your belly button (navel) in and up toward your ribs. You should feel like your back is pressing to the floor and your hips and pelvis are slightly lifting off the floor. Hold for 6 seconds while breathing smoothly. ¨ Stand with your back flat against a wall. Slowly slide down until your knees are slightly bent. Hold for 10 seconds, then slide back up the wall. · Remove or change anything in your house that may cause you to fall. Keep walkways clear of clutter, electrical cords, and throw rugs. When should you call for help? Call 911 anytime you think you may need emergency care. For example, call if: 
  · You are unable to move a leg at all.  
[facility] your doctor now or seek immediate medical care if: 
  · You have new or worse symptoms in your legs, belly, or buttocks. Symptoms may include: ¨ Numbness or tingling. ¨ Weakness. ¨ Pain.  
  · You lose bladder or bowel control.  
 Watch closely for changes in your health, and be sure to contact your doctor if: 
  · You have a fever, lose weight, or don't feel well.  
  · You are not getting better as expected. Where can you learn more? Go to http://abraham-mary anne.info/. Raquel Owens in the search box to learn more about \"Lumbar Spinal Stenosis: Care Instructions. \" Current as of: November 29, 2017 Content Version: 11.7 © 4450-2645 [employer]oyo.  Care instructions adapted under license by [employer] (which disclaims liability or warranty for this information). If you have questions about a medical condition or this instruction, always ask your healthcare professional. [address] any warranty or liability for your use of this information. IndianStage Activation Thank you for requesting access to [employer]Stage. Please follow the instructions below to securely access and download your online medical record. IndianStage allows you to send messages to your doctor, view your test results, renew your prescriptions, schedule appointments, and more. How Do I Sign Up? 1. In your internet browser, go to [email or web address] 
2. Click on the First Time User? Click Here link in the Sign In box. You will be redirect to the New Member Sign Up page. 3. Enter your [employer] Access Code exactly as it appears below. You will not need to use this code after youve completed the sign-up process. If you do not sign up before the expiration date, you must request a new code. [employer] Access Code: -[email or web address] Expires: 2018  6:59 AM (This is the date your [employer]Stage access code will ) 4. Enter the last four digits of your Social Security Number (xxxx) and Date of Birth (mm/dd/yyyy) as indicated and click Submit. You will be taken to the next sign-up page. 5. Create a IndianStage ID. This will be your IndianStage login ID and cannot be changed, so think of one that is secure and easy to remember. 6. Create a [employer]Stage password. You can change your password at any time. 7. Enter your Password Reset Question and Answer. This can be used at a later time if you forget your password. 8. Enter your e-mail address. You will receive e-mail notification when new information is available in [address] 9. Click Sign Up. You can now view and download portions of your medical record. 10. Click the Download Summary menu link to download a portable copy of your medical information. Additional Information If you have questions, please visit the Frequently Asked Questions section of the [employer] website at [email or web address] [employer]/Green[employer]/. Remember, [employer] is NOT to be used for urgent needs. For medical emergencies, dial 911. Patient armband removed and shreddedq PATIENT DISCHARGE INSTRUCTIONS PATIENT DISCHARGE INSTRUCTIONS Olga Swift / [id number] : 1955 Admitted 2018 Discharged: 2018 · It is important that you take the medication exactly as they are prescribed. · Keep your medication in the bottles provided by the pharmacist and keep a list of the medication names, dosages, and times to be taken in your wallet. · Do not take other medications without consulting your doctor. What to do at [facility] Recommended Diet: Diabetic Diet Recommended Activity: No lifting, Driving, or Strenuous exercise for 2 weeks If you experience any of the following symptoms  fever greater than 101.5, increased pain or wound drainage,, please follow up with Spine Center Provider. . 
 
 
 
Signed By: Rosy Francisco NP 2018 [employer] Announcement We are excited to announce that we are making your provider's discharge notes available to you in [employer]. You will see these notes when they are completed and signed by the physician that discharged you from your recent hospital stay. If you have any questions or concerns about any information you see in [employer], please call the Health Information Department where you were seen or reach out to your Primary Care Provider for more information about your plan of care. Introducing [facility]! [employer] introduces [employer] patient portal. Now you can access parts of your medical record, email your doctor's office, and request medication refills online. 1. In your internet browser, go to [email or web address] [email or web address]pt 2. Click on the First Time User? Click Here link in the Sign In box. You will see the New Member Sign Up page. 3. Enter your [employer] Access Code exactly as it appears below. You will not need to use this code after youve completed the sign-up process. If you do not sign up before the expiration date, you must request a new code. · [employer] Access Code: -[email or web address] Expires: 11/14/2018  6:59 AM 
 
4. Enter the last four digits of your Social Security Number (xxxx) and Date of Birth (mm/dd/yyyy) as indicated and click Submit. You will be taken to the next sign-up page. 5. Create a [employer] ID. This will be your [employer] login ID and cannot be changed, so think of one that is secure and easy to remember. 6. Create a [employer] password. You can change your password at any time. 7. Enter your Password Reset Question and Answer. This can be used at a later time if you forget your password. 8. Enter your e-mail address. You will receive e-mail notification when new information is available in [address] 9. Click Sign Up. You can now view and download portions of your medical record. 10. Click the Download Summary menu link to download a portable copy of your medical information. If you have questions, please visit the Frequently Asked Questions section of the [employer] website. Remember, [employer] is NOT to be used for urgent needs. For medical emergencies, dial 911. Now available from your iPhone and Android! Introducing Law [employer]rinivasan As a [facility] patient, I wanted to make you aware of our electronic visit tool called Law Srinivasan. [facility] 24/7 allows you to connect within minutes with a medical provider 24 hours a day, seven days a week via a mobile device or tablet or logging into a secure website from your computer. You can access Law Srinivasan from anywhere in the [place].  
 
A virtual visit might be right for you when you have a simple condition and feel like you just dont want to get out of bed, or cant get away from work for an appointment, when your regular [facility] provider is not available (evenings, weekends or holidays), or when youre out of town and need minor care. Electronic visits cost only $49 and if the [employer] 24/7 provider determines a prescription is needed to treat your condition, one can be electronically transmitted to a nearby pharmacy*. Please take a moment to enroll today if you have not already done so. The enrollment process is free and takes just a few minutes. To enroll, please download the [employer] jeremy to your tablet or phone, or visit [email or web address] to enroll on your computer. And, as an [address] [place] [address] patient with a [employer] account, the results of your visits will be scanned into your electronic medical record and your primary care provider will be able to view the scanned results. We urge you to continue to see your regular [facility] provider for your ongoing medical care. And while your primary care provider may not be the one available when you seek a [employer] virtual visit, the peace of mind you get from getting a real diagnosis real time can be priceless. For more information on [employer], view our Frequently Asked Questions (FAQs) at [email or web address]. Sincerely, 
 
Cee Montilla MD 
Chief Medical Officer [employer] *:  certain medications cannot be prescribed via [employer] Unresulted Labs-Please follow up with your PCP about these lab tests Order Current Status NC XR TECHNOLOGIST SERVICE In process CULTURE, BLOOD Preliminary result CULTURE, BLOOD Preliminary result CULTURE, URINE Preliminary result Providers Seen During Your Hospitalization Provider Specialty Primary office phone Eugene Aldrich MD Orthopedic Surgery [phone] Your Primary Care Physician (PCP) Primary Care Physician Office Phone Office Fax Dain Sunshine [phone] You are allergic to the following Allergen Reactions Keflex (Cephalexin) Nausea and Vomiting Metformin Other (comments) GI DISTRESS Soliqua 100/33 (Insulin Glargine-Lixisenatide) Nausea Only Recent Documentation Height Weight Breastfeeding? BMI OB Status Smoking Status 1.6 m 96.1 kg No 37.54 kg/m2 Postmenopausal Never Smoker Emergency Contacts Name Discharge Info Relation Home Work Mobile [address] CAREGIVER [3] Sister [23] [phone] Patient Belongings The following personal items are in your possession at time of discharge: 
  Dental Appliances: None  Visual Aid: None      Home Medications: None   Jewelry: Ring (5 rings)  Clothing: Undergarments, Footwear, Pants, Shirt    Other Valuables: Eyeglasses Please provide this summary of care documentation to your next provider. Signatures-by signing, you are acknowledging that this After Visit Summary has been reviewed with you and you have received a copy. Patient Signature:  ____________________________________________________________ Date:  ____________________________________________________________  
  
Tiff Guadarramaer Provider Signature:  ____________________________________________________________ Date:  ____________________________________________________________

## 2018-08-30 NOTE — IP AVS SNAPSHOT
[address] Patient: Flavia Vital MRN: [id number] NSW:[id number]/[id number] A check navneet indicates which time of day the medication should be taken. My Medications START taking these medications Instructions Each Dose to Equal  
 Morning Noon Evening Bedtime  
 albuterol 90 mcg/actuation inhaler Commonly known as:  PROVENTIL HFA, VENTOLIN HFA, PROAIR HFA Your last dose was: Your next dose is:    
   
   
 2 puffs three to four times a day for 5 days then every 6 hours as needed for shortness of breath  
     
   
   
   
  
 azithromycin 500 mg Tab Commonly known as:  Fabiola Collar Start taking on:  9/5/2018 Your last dose was: Your next dose is: Take 1 Tab by mouth daily for 3 days. 500 mg  
    
   
   
   
  
 cefpodoxime 200 mg tablet Commonly known as:  Kelly Duane Your last dose was: Your next dose is: Take 1 Tab by mouth every twelve (12) hours for 5 days. 200 mg HYDROcodone-acetaminophen  mg tablet Commonly known as:  Lenon Gauze Your last dose was: Your next dose is: Take 1 Tab by mouth every six (6) hours as needed for Pain. Max Daily Amount: 4 Tabs. Indications: Pain, post op pain 1 Tab  
    
   
   
   
  
 naloxone 4 mg/actuation nasal spray Commonly known as:  [employer] Your last dose was: Your next dose is:    
   
   
 Use 1 spray intranasally, then discard. Repeat with new spray every 2 min as needed for opioid overdose symptoms, alternating nostrils. promethazine 25 mg tablet Commonly known as:  PHENERGAN Your last dose was: Your next dose is: Take 0.5 Tabs by mouth every eight (8) hours as needed for Nausea.  Indications: POST-OPERATIVE NAUSEA AND VOMITING, PREVENTION OF POST-OPERATIVE NAUSEA AND VOMITING  
 12.5 mg  
    
   
   
   
  
 tamsulosin 0.4 mg capsule Commonly known as:  FLOMAX Your last dose was: Your next dose is: Take 1 Cap by mouth daily. 0.4 mg  
    
   
   
   
  
  
CHANGE how you take these medications Instructions Each Dose to Equal  
 Morning Noon Evening Bedtime LANTUS SOLOSTAR U-100 INSULIN 100 unit/mL (3 mL) Inpn Generic drug:  insulin glargine What changed:  See the new instructions. Your last dose was: Your next dose is:    
   
   
 30 units subcutaneously in morning. Can take an additional dose of 10 units subcutaneously in the evening if blood sugar is more than 200. CONTINUE taking these medications Instructions Each Dose to Equal  
 Morning Noon Evening Bedtime  
 amLODIPine 10 mg tablet Commonly known as:  [place] Your last dose was: Your next dose is:    
   
   
 daily. Indications: hypertension ATIVAN 0.5 mg tablet Generic drug:  LORazepam  
   
Your last dose was: Your next dose is: Take 0.5 mg by mouth as needed for Anxiety. 0.5 mg  
    
   
   
   
  
 atorvastatin 40 mg tablet Commonly known as:  LIPITOR Your last dose was: Your next dose is: Take  by mouth nightly. CRESTOR 10 mg tablet Generic drug:  rosuvastatin Your last dose was: Your next dose is: Take 10 mg by mouth daily. 10 mg  
    
   
   
   
  
 esomeprazole 20 mg capsule Commonly known as:  Jordrojas Villareal Your last dose was: Your next dose is:    
   
   
 daily. furosemide 20 mg tablet Commonly known as:  LASIX Your last dose was: Your next dose is: TAKE 1 TABLET BY MOUTH ONCE DAILY AS NEEDED  
     
   
   
   
  
 gabapentin 600 mg tablet Commonly known as:  NEURONTIN Your last dose was: Your next dose is: Take 1 Tab by mouth three (3) times daily. 600 mg  
    
   
   
   
  
 hydroCHLOROthiazide 12.5 mg capsule Commonly known as:  Silvia Certain Your last dose was: Your next dose is:    
   
   
      
   
   
   
  
 insulin lispro 100 unit/mL kwikpen Commonly known as:  HUMALOG Your last dose was: Your next dose is:    
   
   
 by SubCUTAneous route. metoprolol tartrate 50 mg tablet Commonly known as:  LOPRESSOR Your last dose was: Your next dose is:    
   
   
 daily. NOVOFINE 32 32 gauge x 1/4\" Ndle Generic drug:  Insulin Needles (Disposable) Your last dose was: Your next dose is:    
   
   
      
   
   
   
  
 TRADJENTA 5 mg tablet Generic drug:  linagliptin Your last dose was: Your next dose is: TAKE 1 TABLET BY MOUTH ONCE A DAY  
     
   
   
   
  
 venlafaxine-SR 37.5 mg capsule Commonly known as:  EFFEXOR-XR Your last dose was: Your next dose is:    
   
   
 daily. STOP taking these medications   
 glipiZIDE 10 mg tablet Commonly known as:  GLUCOTROL  
   
  
 lisinopril 20 mg tablet Commonly known as:  Annamaria Lutz Where to Get Your Medications These medications were sent to [address] [place] [address] - [address]  [address], [id number] Phone:  [phone]  
  naloxone 4 mg/actuation nasal spray  
 tamsulosin 0.4 mg capsule Information on where to get these meds will be given to you by the nurse or doctor. ! Ask your nurse or doctor about these medications  
  albuterol 90 mcg/actuation inhaler  
 azithromycin 500 mg Tab  
 cefpodoxime 200 mg tablet HYDROcodone-acetaminophen  mg tablet LANTUS SOLOSTAR U-100 INSULIN 100 unit/mL (3 mL) Inpn  
 promethazine 25 mg tablet

## 2018-08-30 NOTE — PROGRESS NOTES
Date of Surgery:OR today  Surgery:  ALIFT L5, posterior fixation L3-S1, XLIF L3/4 L4/5  VSS  Wound: Dressing clean, dry and intact, abdominal binder in place. PCA: to be set up in PACU  Herrera: draining clear, yellow urine, 280 mL  Neuro: patient remains very drowsy in PACU. She is just now responding to commands. She is able to move all extremities on command. She was given 0.5 mg of Narcan by Anesthesia who remains at bedside. Pre op pain: a year or 2 of progressive back pain, buttock pain and leg pain, stenotic symptoms. Post op pain: unable to assess at this time.      Mario Tan, NP

## 2018-08-30 NOTE — ROUTINE PROCESS
TRANSFER - OUT REPORT:    Verbal report given to Kenya Pollard  being transferred to room 503 for routine progression of care       Report consisted of patients Situation, Background, Assessment and   Recommendations(SBAR). Information from the following report(s) SBAR, OR Summary, Intake/Output, MAR and Recent Results was reviewed with the receiving nurse. Opportunity for questions and clarification was provided.       Patient transported with:   O2 @ 3 liters  Registered Nurse

## 2018-08-31 ENCOUNTER — TELEPHONE (OUTPATIENT)
Dept: ORTHOPEDIC SURGERY | Age: 63
End: 2018-08-31

## 2018-08-31 LAB
ERYTHROCYTE [DISTWIDTH] IN BLOOD BY AUTOMATED COUNT: 15 % (ref 11.6–14.5)
EST. AVERAGE GLUCOSE BLD GHB EST-MCNC: 203 MG/DL
GLUCOSE BLD STRIP.AUTO-MCNC: 198 MG/DL (ref 70–110)
GLUCOSE BLD STRIP.AUTO-MCNC: 201 MG/DL (ref 70–110)
GLUCOSE BLD STRIP.AUTO-MCNC: 221 MG/DL (ref 70–110)
GLUCOSE BLD STRIP.AUTO-MCNC: 238 MG/DL (ref 70–110)
HBA1C MFR BLD: 8.7 % (ref 4.2–5.6)
HCT VFR BLD AUTO: 35.3 % (ref 35–45)
HGB BLD-MCNC: 11.2 G/DL (ref 12–16)
MCH RBC QN AUTO: 25.8 PG (ref 24–34)
MCHC RBC AUTO-ENTMCNC: 31.7 G/DL (ref 31–37)
MCV RBC AUTO: 81.3 FL (ref 74–97)
PLATELET # BLD AUTO: 347 K/UL (ref 135–420)
PMV BLD AUTO: 9.4 FL (ref 9.2–11.8)
RBC # BLD AUTO: 4.34 M/UL (ref 4.2–5.3)
WBC # BLD AUTO: 14.9 K/UL (ref 4.6–13.2)

## 2018-08-31 PROCEDURE — 74011636637 HC RX REV CODE- 636/637: Performed by: NURSE PRACTITIONER

## 2018-08-31 PROCEDURE — 97535 SELF CARE MNGMENT TRAINING: CPT

## 2018-08-31 PROCEDURE — 74011250637 HC RX REV CODE- 250/637: Performed by: NURSE PRACTITIONER

## 2018-08-31 PROCEDURE — 85027 COMPLETE CBC AUTOMATED: CPT | Performed by: ORTHOPAEDIC SURGERY

## 2018-08-31 PROCEDURE — 74011636637 HC RX REV CODE- 636/637: Performed by: ORTHOPAEDIC SURGERY

## 2018-08-31 PROCEDURE — 82962 GLUCOSE BLOOD TEST: CPT

## 2018-08-31 PROCEDURE — 65270000029 HC RM PRIVATE

## 2018-08-31 PROCEDURE — 77010033678 HC OXYGEN DAILY

## 2018-08-31 PROCEDURE — 83036 HEMOGLOBIN GLYCOSYLATED A1C: CPT | Performed by: NURSE PRACTITIONER

## 2018-08-31 PROCEDURE — 74011250637 HC RX REV CODE- 250/637: Performed by: ORTHOPAEDIC SURGERY

## 2018-08-31 PROCEDURE — 97165 OT EVAL LOW COMPLEX 30 MIN: CPT

## 2018-08-31 PROCEDURE — 97116 GAIT TRAINING THERAPY: CPT

## 2018-08-31 PROCEDURE — 74011250636 HC RX REV CODE- 250/636: Performed by: ORTHOPAEDIC SURGERY

## 2018-08-31 PROCEDURE — 97161 PT EVAL LOW COMPLEX 20 MIN: CPT

## 2018-08-31 PROCEDURE — 36415 COLL VENOUS BLD VENIPUNCTURE: CPT | Performed by: ORTHOPAEDIC SURGERY

## 2018-08-31 RX ORDER — PROMETHAZINE HYDROCHLORIDE 25 MG/ML
12.5 INJECTION, SOLUTION INTRAMUSCULAR; INTRAVENOUS ONCE
Status: COMPLETED | OUTPATIENT
Start: 2018-08-31 | End: 2018-08-31

## 2018-08-31 RX ORDER — PROMETHAZINE HYDROCHLORIDE 12.5 MG/1
12.5 TABLET ORAL
Status: DISCONTINUED | OUTPATIENT
Start: 2018-08-31 | End: 2018-09-02

## 2018-08-31 RX ORDER — HYDROCODONE BITARTRATE AND ACETAMINOPHEN 10; 325 MG/1; MG/1
1 TABLET ORAL
Qty: 20 TAB | Refills: 0 | Status: SHIPPED | OUTPATIENT
Start: 2018-08-31 | End: 2018-09-19 | Stop reason: DRUGHIGH

## 2018-08-31 RX ORDER — PROMETHAZINE HYDROCHLORIDE 25 MG/1
12.5 TABLET ORAL
Qty: 10 TAB | Refills: 0 | Status: SHIPPED | OUTPATIENT
Start: 2018-08-31 | End: 2018-12-19 | Stop reason: ALTCHOICE

## 2018-08-31 RX ORDER — NALOXONE HYDROCHLORIDE 4 MG/.1ML
SPRAY NASAL
Qty: 1 EACH | Refills: 0 | Status: SHIPPED | OUTPATIENT
Start: 2018-08-31 | End: 2021-08-11

## 2018-08-31 RX ORDER — INSULIN GLARGINE 100 [IU]/ML
10 INJECTION, SOLUTION SUBCUTANEOUS EVERY 12 HOURS
Status: DISCONTINUED | OUTPATIENT
Start: 2018-08-31 | End: 2018-09-01

## 2018-08-31 RX ADMIN — Medication 10 ML: at 05:54

## 2018-08-31 RX ADMIN — PROMETHAZINE HYDROCHLORIDE 12.5 MG: 25 TABLET ORAL at 16:33

## 2018-08-31 RX ADMIN — INSULIN GLARGINE 10 UNITS: 100 INJECTION, SOLUTION SUBCUTANEOUS at 22:45

## 2018-08-31 RX ADMIN — GLIPIZIDE 10 MG: 5 TABLET ORAL at 08:54

## 2018-08-31 RX ADMIN — SODIUM CHLORIDE 100 ML/HR: 900 INJECTION, SOLUTION INTRAVENOUS at 07:31

## 2018-08-31 RX ADMIN — INSULIN LISPRO 3 UNITS: 100 INJECTION, SOLUTION INTRAVENOUS; SUBCUTANEOUS at 16:33

## 2018-08-31 RX ADMIN — INSULIN LISPRO 6 UNITS: 100 INJECTION, SOLUTION INTRAVENOUS; SUBCUTANEOUS at 11:33

## 2018-08-31 RX ADMIN — ACETAMINOPHEN 1000 MG: 500 TABLET, FILM COATED ORAL at 18:06

## 2018-08-31 RX ADMIN — BISACODYL 10 MG: 5 TABLET, COATED ORAL at 09:12

## 2018-08-31 RX ADMIN — SODIUM CHLORIDE 100 ML/HR: 900 INJECTION, SOLUTION INTRAVENOUS at 18:09

## 2018-08-31 RX ADMIN — Medication 10 ML: at 22:46

## 2018-08-31 RX ADMIN — Medication 10 ML: at 15:44

## 2018-08-31 RX ADMIN — METOPROLOL TARTRATE 50 MG: 50 TABLET ORAL at 09:11

## 2018-08-31 RX ADMIN — ACETAMINOPHEN 1000 MG: 500 TABLET, FILM COATED ORAL at 05:54

## 2018-08-31 RX ADMIN — ACETAMINOPHEN 1000 MG: 500 TABLET, FILM COATED ORAL at 00:01

## 2018-08-31 RX ADMIN — GABAPENTIN 300 MG: 300 CAPSULE ORAL at 16:33

## 2018-08-31 RX ADMIN — INSULIN LISPRO 6 UNITS: 100 INJECTION, SOLUTION INTRAVENOUS; SUBCUTANEOUS at 08:55

## 2018-08-31 RX ADMIN — PROMETHAZINE HYDROCHLORIDE 12.5 MG: 25 INJECTION INTRAMUSCULAR; INTRAVENOUS at 12:22

## 2018-08-31 RX ADMIN — ONDANSETRON 4 MG: 2 INJECTION INTRAMUSCULAR; INTRAVENOUS at 10:10

## 2018-08-31 RX ADMIN — INSULIN GLARGINE 10 UNITS: 100 INJECTION, SOLUTION SUBCUTANEOUS at 10:11

## 2018-08-31 RX ADMIN — INSULIN LISPRO 6 UNITS: 100 INJECTION, SOLUTION INTRAVENOUS; SUBCUTANEOUS at 22:45

## 2018-08-31 RX ADMIN — AMLODIPINE BESYLATE 10 MG: 10 TABLET ORAL at 09:12

## 2018-08-31 RX ADMIN — ACETAMINOPHEN 1000 MG: 500 TABLET, FILM COATED ORAL at 11:33

## 2018-08-31 RX ADMIN — GABAPENTIN 300 MG: 300 CAPSULE ORAL at 22:44

## 2018-08-31 RX ADMIN — Medication 10 ML: at 15:43

## 2018-08-31 RX ADMIN — ATORVASTATIN CALCIUM 40 MG: 40 TABLET, FILM COATED ORAL at 22:44

## 2018-08-31 RX ADMIN — ONDANSETRON 4 MG: 2 INJECTION INTRAMUSCULAR; INTRAVENOUS at 05:54

## 2018-08-31 RX ADMIN — VENLAFAXINE HYDROCHLORIDE 37.5 MG: 37.5 CAPSULE, EXTENDED RELEASE ORAL at 09:12

## 2018-08-31 RX ADMIN — LISINOPRIL 20 MG: 20 TABLET ORAL at 09:13

## 2018-08-31 RX ADMIN — LINAGLIPTIN 5 MG: 5 TABLET, FILM COATED ORAL at 08:54

## 2018-08-31 RX ADMIN — GABAPENTIN 300 MG: 300 CAPSULE ORAL at 08:54

## 2018-08-31 NOTE — DIABETES MGMT
Diabetes Patient/Family Education Record    Factors That  May Influence Patients Ability  to Learn or  Comply with Recommendations   []   Language barrier    []   Cultural needs   []   Motivation    []   Cognitive limitation    []   Physical   [x]   Education    []   Physiological factors   []   Hearing/vision/speaking impairment   []   Mormonism beliefs    []   Financial factors   []  Other:   []  No factors identified at this time. Person Instructed:   [x]   Patient   []   Family   []  Other     Preference for Learning:   [x]   Verbal   [x]   Written   []  Demonstration     Level of Comprehension & Competence:    [x]  Good                                      [] Fair                                     []  Poor                             []  Needs Reinforcement   [x]  Teachback completed    Education Component:   [x]  Medication management, including how to administer insulin (if appropriate) and potential medication interactions: Patient reported the following home diabetes meds:  Lantus (SoloStar, pen) 20 units daily at bedtime. Humalog insulin (pen) 10 units 3x daily before meals (breakfast, lunch, dinner). Glipizide 5 mg twice daily. Patient reported that she was no longer on Tradjenta 5 mg daily at home. It was discontinued by her medical provider after she was placed on Glipizide. [x]  Nutritional management: Educated patient and encouraged the recommended plate method and serving size/portion control of carbs (starches, fruits, dairy) and limiting intake of concentrated sweets. Patient was on regular clear liquid diet this morning and stated that she will not take the apple juice or regular jello. She noted that she received diet ginger ale and chicken broth and she will take them instead. Patient is concern that her blood sugar this morning was already 238.    [x]  Exercise   [x]  Signs, symptoms, and treatment of hyperglycemia and hypoglycemia: Patient verbalized understanding. [x] Prevention, recognition and treatment of hyperglycemia and hypoglycemia: Patient verbalized understanding. [x]  Importance of blood glucose monitoring and how to obtain a blood glucose meter: Patient reported that she has BG meter and testing supplies at home. She is checking her blood sugar  3x daily before meals because she is on humalog insulin before meals (breakfast, lunch, dinner). []  Instruction on use of the blood glucose meter   [x]  Discuss the importance of HbA1C monitoring: Her current A1c level is 8.7% (8/31/2018) which is equivalent to estimated average blood glucose of 203 mg/dL during the past 2-3 months. Patients verbalized understanding that her diabetes is not controlled at this time. Encouraged patient to cont to follow her diabetes treatment plan: meds, diet, and recommended regular exercise by her medical provider to help achieve and maintain the recommended A1c level of <7%. [x]  Sick day guidelines: Patient verbalized understanding that blood sugar will be running high when sick/ill therefore it is important to cont to take diabetes meds as prescribed. Patient also knows to get emergency medical help when blood sugar readings are elevated to 300's and symptoms including nausea, vomiting, abd pain, confusion, fruity breath odor, shortness of breath and chest pain.    []  Proper use and disposal of lancets, needles, syringes or insulin pens (if appropriate)   []  Potential long-term complications (retinopathy, kidney disease, neuropathy, foot care)   [x] Information about whom to contact in case of emergency or for more information    [x]  Goal:  Patient/family will demonstrate understanding of Diabetes Self Management Skills by: 9/03/2018  Plan for post-discharge education or self-management support:    [x] Outpatient class schedule provided            [] Patient Declined    [] Scheduled for outpatient classes (date) _______     Mahendra Flannery RN  pgr [phone]

## 2018-08-31 NOTE — PROGRESS NOTES
Assumed care of patient. Report received from [address] including SBAR, [facility], and [facility]ard[facility]. Vitals within normal limits. Patient complains of nausea, no complaints of pain. No apparent distress.

## 2018-08-31 NOTE — ROUTINE PROCESS
Mobility Intervention:       [] Pt dangled at edge of bed    [] Pt assisted OOB to bedside commode    [] Pt assisted OOB to chair    [x] Pt ambulated to bathroom    [x] Patient was ambulated in room/hallway    Assistive Device Utilized:       [x] Rolling walker   [] Crutches   [] Straight Cane   [] Knee immobilizer   [] IV pole    After Mobilization:     [x] Patient left in no apparent distress sitting up in chair  [] Patient left in no apparent distress in bed  [x] Call bell left within reach  [x] SCDs on & machine turned on  [] Ice applied  [] RN notified  [] Caregiver present  [] Bed alarm activated    Reason patient not mobilized:      [] Patient refused   [] Nausea/vomiting   [] Low blood pressure   [] Drowsy/lethargic    Pain Rating:     [x] 0  [] 1  Assistive Device:        [] 2  [] 3  [] 4  [] 5  [] 6  Assistive Device:        [] 7  [] 8  [] 9  [] 10    Comments:

## 2018-08-31 NOTE — DIABETES MGMT
NUTRITIONAL ASSESSMENT GLYCEMIC CONTROL/ PLAN OF CARE     Colin Hernandez           66 y.o.           8/30/2018               No diagnosis found. INTERVENTIONS/PLAN:   Continue inpatient monitoring and intervention  ASSESSMENT:   Pt is a 66year old female with a past medical history significant for diabetes and hypertension. Blood glucose elevated above targets, noted the addition of basal insulin coverage. Pt is on a clear liquid no concentrates sweets diet. Pt having nausea. Diabetes Management:   Recent blood glucose:    8/30/2018 15:46 8/30/2018 21:14 8/31/2018 05:39 8/31/2018 11:20   286 (H) 251 (H) 238 (H) 221 (H)    Within target range (non-ICU: <140; ICU<180): [] Yes   [x]  No    Current Insulin regimen:  Basal lantus insulin 10 units every 12 hours, first dose ordered 8/31/2018 at 10 AM/  Correctional lispro insulin ACHS. Very resistant dose. Home medication/insulin regimen:   Lantus (SoloStar, pen) insulin 20 units daily at bedtime. Humalog (pen) insulin 10 units 3x daily before meals (breakfast, lunch, dinner). Glipizide 5 mg twice daily. Current A1C: 8.7% (8/31/2018) which is equivalent to estimated average blood glucose of 203 mg/dL during the past 2-3 months. Adequate glycemic control PTA:  [] Yes  [x] No     SUBJECTIVE/OBJECTIVE:     Diet: Clear liquid no concentrated sweets     Medications: [x] Reviewed     Most Recent POC Glucose: No results for input(s): GLU in the last 72 hours.       Labs:   Lab Results   Component Value Date/Time    Hemoglobin A1c 8.7 (H) 08/31/2018 04:03 AM     Lab Results   Component Value Date/Time    Sodium 141 08/16/2018 07:11 AM    Potassium 4.2 08/16/2018 07:11 AM    Chloride 102 08/16/2018 07:11 AM    CO2 31 08/16/2018 07:11 AM    Anion gap 8 08/16/2018 07:11 AM    Glucose 281 (H) 08/16/2018 07:11 AM    BUN 25 (H) 08/16/2018 07:11 AM    Creatinine 1.58 (H) 08/16/2018 07:11 AM    Calcium 8.9 08/16/2018 07:11 AM    Albumin 3.7 08/16/2018 07:11 AM Anthropometrics: BMI (calculated): 36.9  Wt Readings from Last 1 Encounters:   08/30/18 94.3 kg (208 lb)      Ht Readings from Last 1 Encounters:   08/30/18 5' 3\" (1.6 m)     Estimated Nutrition Needs:  0530-2241 Kcal/day, 75-94 grams protein/day    Based on:   [x] Actual BW    [] IBW   [] Adjusted BW      Nutrition Diagnoses:    Altered nutrition related lab value related to diabetes as evidenced by Hemoglobin A1c of 8.7%   Nutrition Interventions: coordination of care  Goal: Blood glucose will be within target range of  mg/dL by 9/03/18    Nutrition Monitoring and Evaluation    []     Monitor po intake on meal rounds  [x]     Continue inpatient monitoring and intervention  []     Other:     Zac Souza RD, CDE  pgr [phone]

## 2018-08-31 NOTE — PROGRESS NOTES
Problem: Self Care Deficits Care Plan (Adult)  Goal: *Acute Goals and Plan of Care (Insert Text)  Outcome: Resolved/Met Date Met: 08/31/18  Occupational Therapy EVALUATION/discharge    Patient: Pilar Quiroz (29 y.o. female)  Date: 8/31/2018  Primary Diagnosis: Spinal stenosis, lumbar region, without neurogenic claudication [M48.061]  Procedure(s) (LRB):  1) ANTERIOR LUMBAR INTERBODY FUSION (ALIF) L5; 3)POSTERIOR FIXATION L3-S1; C-ARM/DEPUY-SYNTHES (N/A)  2) EXTREME LATERAL INTERBODY FUSION L3/4 L4/5; NUVASIVE (N/A) 1 Day Post-Op   Precautions:  (spinal precautions)    ASSESSMENT AND RECOMMENDATIONS:  Mrs. Yoel Egan [facility] is a pleasant 66 yr old female admitted to the hospital 8-30-18 for spinal surgery. During the OT eval today, she required supervision for supine to sit, toileting, and lower body dressing. All needed OT intervention & education was provided during the initial eval today, and she presented with good carryover & understanding. As such, she does not require further OT services in the hospital setting. As such, OT will sign off & recommend she returns home with home health OT services. Skilled occupational therapy is not indicated at this time. Discharge Recommendations: Home Health  Further Equipment Recommendations for Discharge: Patient already has needed equipment. Barriers to Learning/Limitations: None  Compensate with: visual, verbal, tactile, kinesthetic cues/model     COMPLEXITY     Eval Complexity: History: LOW Complexity : Brief history review ; Examination: LOW Complexity : 1-3 performance deficits relating to physical, cognitive , or psychosocial skils that result in activity limitations and / or participation restrictions ; Decision Making:LOW Complexity : No comorbidities that affect functional and no verbal or physical assistance needed to complete eval tasks  Assessment: Low Complexity        G-CODES:     Self Care  Current  CJ= 20-39%   Goal  CJ= 20-39%   D/C  CJ= 20-39%.   The severity rating is based on the Level of Assistance required for Functional Mobility and ADLs. SUBJECTIVE:   Patient stated I'm doing much better now.     OBJECTIVE DATA SUMMARY:     Past Medical History:   Diagnosis Date    Arthritis     spinal stenosis    Breast CA ([facility] [address] 2017    Left Breast (chemo/radiation)    Diabetes ([facility] [address]     Type 2    Edema     legs and ankles    GERD (gastroesophageal reflux disease)     High cholesterol     Hypertension     Ill-defined condition     patient states hemorrhage behind Left eye-following Dr. Antonieta Mejias MI (myocardial infarction) ([facility] [address] 06/2018    per patient    Neuropathy     Restless leg      Past Surgical History:   Procedure Laterality Date    HX BREAST BIOPSY Left 02/03/2017    BREAST CANCER    HX BREAST LUMPECTOMY Left     HX CATARACT REMOVAL Right     HX HEART CATHETERIZATION  07/2018    no stents     Prior Level of Function/Home Situation: Patient was independent with ADLs, cooking, and driving. She used a cane vs RW to ambulate household & community distances. She does not work, and she has hired assist 1x per month for household cleaning. Home Situation  Home Environment: Private residence (house)  # Steps to Enter: 0  Wheelchair Ramp: Yes  One/Two Story Residence: One story  Living Alone: No (Lives with her spouse.)  Support Systems: Family member(s)  Patient Expects to be Discharged to[de-identified] Private residence  Current DME Used/Available at Home: Cane, straight, Commode, bedside, Walker, rolling, Wheelchair (tub stool, reacher, shoe horn)  Tub or Shower Type: Tub/Shower combination     []     Right hand dominant   []     Left hand dominant  Cognitive/Behavioral Status:  Neurologic State: Alert  Orientation Level: Oriented X4  Cognition: Appropriate decision making; Appropriate for age attention/concentration; Appropriate safety awareness; Follows commands  Safety/Judgement: Good awareness of safety precautions    Skin: Visible skin appeared intact    Edema: None noted in BUE and BLE    Vision/Perceptual:       WFL        Coordination:  Coordination: Within functional limits (BLE)  Fine Motor Skills-Upper:  (WFL)    Gross Motor Skills-Upper:  [facility])    Balance:  Sitting:  (static sitting=good. dynamic sitting=fair+)  Standing: Impaired; With support  Standing - Static:  (good with RW)  Standing - Dynamic :  (fair+ with RW)    Strength:    Strength:  (BUE strength WFL)         Tone & Sensation:    Tone: Normal (BUE and BLE)  Sensation: Intact (BLE)        Range of Motion:    AROM:  (BUE & BLE AROM WFL)        Functional Mobility and Transfers for ADLs:  Bed Mobility:     Supine to Sit: Supervision (log roll)  Sit to Supine: Moderate assistance (BLE management )  Scooting: Supervision     Transfers:  Sit to Stand: Supervision       Toilet Transfer : Supervision      ADL Assessment:  Feeding: Independent    Oral Facial Hygiene/Grooming: Supervision (standing at sink level)    Bathing: Setup    Upper Body Dressing: Supervision;Setup    Lower Body Dressing: Supervision;Setup    Toileting: Supervision        ADL Intervention:     OT educated pt on log roll technique for bed mobility, as well as reinforced all of her spinal precautions. Pt required supervision for toileting at raised toile level, with OT educating the pt on safe transfer technique, as well as adherence to spinal precautions during toileting. OT also provided verbal instruction on the use of a reacher for donning lower body clothing articles, such as pants, for adherence to spinal precautions; education on donning & doffing socks was deferred, as the pt reported she does not wear socks. Lastly, OT educated the pt on adherence to spinal precautions during household IADLs, emphasizing the use of a reacher to assist with chores as needed.         Cognitive Retraining  Safety/Judgement: Good awareness of safety precautions      Pain:  Pt reports 4/10 pain or discomfort prior to treatment.    Pt reports 7/10 pain or discomfort post treatment. Activity Tolerance:   Fair+    Please refer to the flowsheet for vital signs taken during this treatment. After treatment:   []  Patient left in no apparent distress sitting up in chair  [x]  Patient left in no apparent distress in bed  [x]  Call bell left within reach  []  Nursing notified  []  Caregiver present  []  Bed alarm activated    COMMUNICATION/EDUCATION:   Communication/Collaboration:  [x]      Home safety education was provided and the patient/caregiver indicated understanding. [x]      Patient/family have participated as able and agree with findings and recommendations. []      Patient is unable to participate in plan of care at this time.     Shereen Hamman, OT  Time Calculation: 23 mins

## 2018-08-31 NOTE — PROGRESS NOTES
Pt declined to participate in the OT session, stating \"not today. \" Per the pt's RN, the pt has been presenting with significant nausea and dizziness today. Will follow-up later today, as able.

## 2018-08-31 NOTE — PROGRESS NOTES
Problem: Falls - Risk of  Goal: *Absence of Falls  Document Karin Fall Risk and appropriate interventions in the flowsheet. Fall Risk Interventions:  Mobility Interventions: Assess mobility with egress test, PT Consult for mobility concerns         Medication Interventions: Evaluate medications/consider consulting pharmacy, Patient to call before getting OOB, Teach patient to arise slowly    Elimination Interventions: Bed/chair exit alarm, Patient to call for help with toileting needs, Toilet paper/wipes in reach, Call light in reach, Toileting schedule/hourly rounds    History of Falls Interventions: Bed/chair exit alarm, Consult care management for discharge planning, Door open when patient unattended    Patient will remain  Free of fall during this admission.

## 2018-08-31 NOTE — PROGRESS NOTES
Date of Surgery:PO day #1  Surgery: ALIFT L5, posterior fixation L3-S1, XLIF L3/4 L4/5  VSS  Wound: Dressing dry and intact, posterior dressing has a about a 2 inch by 2 inch amount of drainage. Abdominal binder in place. Ice to back. / GI:  Patient is voiding and has had a BM. Nausea resolving. PT: 25 ft w/ RW. OT has cleared patient for discharge. Neuro intact. Moving all extremities. 4/5 strength to BLE. Pre op pain: a year or 2 of progressive back pain, buttock pain and leg pain, stenotic symptoms. Post op pain: patient states she is having incisional pain. The leg pain has resolved. Plan: Patient is feeling much better this afternoon. Nausea has improved with IM Phenergan. She is sleepy from the medication. She has been up to the chair and has ambulated to the bathroom several times today. She feels she is doing much better. She is tolerating clear liquids. Advance diet as tolerate. Likely DC home tomorrow morning. PT DC Recommendation: Home Health with supervision (pending progress). Further Equipment Recommendations for Discharge: transfer bench and rolling walker    RX on chart for Norco and Phenergan. Patient states she does not tolerate Percocet.        Rachel Ludwig NP

## 2018-08-31 NOTE — DIABETES MGMT
Glycemic Control Plan of Care    T2DM with current A1c of 8.7% (8/31/2018). See separate notes, 8/31/2018, for assessment of home diabetes management and education. Home diabetes meds: Lantus (SoloStar, pen) insulin 20 units daily at bedtime, humalog 10 units 3x dailly before meals (breakfast, lunch, dinner), and glipizide 5 mg twice daily. POC BG range on 8/30/2018: 185-286 mg/dL. POC BG report on 8/31/2018 at time of review: 238 mg/dL. Seen patient this morning and stated that she's on 20 units of lantus daily at home (bedtime) and she didn't get lantus last night. Noted that she received regular clear liquid diet and decided not to drink the apple juice and jello. She will take the diet ginger ale and chicken broth. She was feeling some nausea. Recommendation(s):  1.) Called Brianna Pete, NP with Dr. Melita Nyhan, and obtained lantus insulin. Decision was made for 10 units every 12 hours with first dose at 10 AM on 8/31/2018 because patient is on 20 units of lantus at home at bedtime. Patient can take additional dose of 10 units at bedtime if discharge to home later today and then resume 20 units daily at home the following day. 2.) Modified correctional lispro insulin to very resistant dose. 3.) Modified clear liquid diet by including no concentrated sweets. Assessment:  Patient is 66year old with past medical history including type 2 diabetes mellitus, hypertension, neuropathy, obesity, arthritis, breast cancer s/p chemo RT, GERD, high cholesterol, MI, and restless leg syndrome - was admitted on 8/30/2018 for elective surgery. Noted:  Spinal stenosis, lumbar region, without neurogenic claudication. Status post anterior lumbar interbody fusion. T2DM with current A1c of 8.7% (8/31/2018). Most recent blood glucose values:    Results for Opal Cramp (MRN [id number]) as of 8/31/2018 09:41   Ref.  Range 8/30/2018 06:27 8/30/2018 06:41 8/30/2018 15:46 8/30/2018 21:14   GLUCOSE,FAST - POC Latest Ref Range: 70 - 110 mg/dL 200 (H) 185 (H) 286 (H) 251 (H)     Results for Tahir Ireland (MRN [id number]) as of 8/31/2018 09:41   Ref. Range 8/31/2018 05:39   GLUCOSE,FAST - POC Latest Ref Range: 70 - 110 mg/dL 238 (H)     Current A1C: 8.7% (8/31/2018) which is equivalent to estimated average blood glucose of 203 mg/dL during the past 2-3 months. Current hospital diabetes medications:  Basal lantus insulin 10 units every 12 hours, first dose ordered 8/31/2018 at 10 AM/  Correctional lispro insulin ACHS. Very resistant dose. Total daily dose insulin requirement previous day: 8/30/2018  Lispro: 18 units (correctional). Home diabetes medications: Patient reported on 8/31/2018:  Lantus (SoloStar, pen) insulin 20 units daily at bedtime. Humalog (pen) insulin 10 units 3x daily before meals (breakfast, lunch, dinner). Glipizide 5 mg twice daily. Diet: Clear liquid; no concentrated sweets.     Goals:  Blood glucose will be within target range of  mg/dL by 9/03/2018    Education:  _X__  Refer to Diabetes Education Record: 8/31/2018             ___  Education not indicated at this time    Ted Gtz RN [facility]  Pager: [phone]

## 2018-08-31 NOTE — PROGRESS NOTES
Entered room to find patient asleep with nasal cannula off. No apparent distress, however O2 saturation was 78%. Patient was instructed to breathe deeply, sats increased to 79%. Placed back on 4L O2 via nasal cannula, will reassess oxygen in 15 minutes. Will monitor.

## 2018-08-31 NOTE — ANESTHESIA POSTPROCEDURE EVALUATION
Post-Anesthesia Evaluation and Assessment    Patient: Karie Funk MRN: [id number]  SSN: [id number]    YOB: 1955  Age: 66 y.o. Sex: female       Cardiovascular Function/Vital Signs  Visit Vitals    /72 (BP 1 Location: Right arm, BP Patient Position: At rest)    Pulse 86    Temp 37.5 °C (99.5 °F)    Resp 18    Ht 5' 3\" (1.6 m)    Wt 94.3 kg (208 lb)    SpO2 94%    Breastfeeding No    BMI 36.85 kg/m2       Patient is status post general anesthesia for Procedure(s):  1) ANTERIOR LUMBAR INTERBODY FUSION (ALIF) L5; 3)POSTERIOR FIXATION L3-S1; C-ARM/DEPUY-SYNTHES  2) EXTREME LATERAL INTERBODY FUSION L3/4 L4/5; NUVASIVE. Nausea/Vomiting: None    Postoperative hydration reviewed and adequate. Pain:  Pain Scale 1: Visual (08/31/18 0100)  Pain Intensity 1: 0 (08/31/18 0100)   Managed    Neurological Status:   Neuro (WDL): Within Defined Limits (08/30/18 1448)  Neuro  Neurologic State: Alert (08/31/18 1443)  Orientation Level: Oriented X4 (08/31/18 1443)  Cognition: Appropriate decision making; Appropriate for age attention/concentration; Appropriate safety awareness; Follows commands (08/31/18 1443)  LUE Motor Response: Purposeful (08/31/18 0732)  LLE Motor Response: Spontaneous ; Purposeful (08/31/18 0732)  RUE Motor Response: Purposeful;Spontaneous  (08/31/18 0732)  RLE Motor Response: Spontaneous ; Rigid; Purposeful (08/31/18 0732)   At baseline    Mental Status and Level of Consciousness: Arousable    Pulmonary Status:   O2 Device: Nasal cannula (08/30/18 2000)   Adequate oxygenation and airway patent    Complications related to anesthesia: None    Post-anesthesia assessment completed.  No concerns    Signed By: Evon Palma MD     August 31, 2018

## 2018-08-31 NOTE — PROGRESS NOTES
Problem: Mobility Impaired (Adult and Pediatric)  Goal: *Acute Goals and Plan of Care (Insert Text)  Physical Therapy Goals  Initiated 8/31/2018 and to be accomplished within 7 day(s)  1. Patient will move from supine to sit and sit to supine, scoot up and down, and roll side to side in bed with modified independence. 2.  Patient will perform sit to stand with modified independence. 3.  Patient will ambulate with modified independence for >100 feet with the least restrictive device. 4.  Patient will ascend/descend 8 stairs with 1 handrail(s) with supervision/set-up. physical Therapy EVALUATION    Patient: Ruthie Strauss (58 y.o. female)  Date: 8/31/2018  Primary Diagnosis: Spinal stenosis, lumbar region, without neurogenic claudication [M48.061]  Procedure(s) (LRB):  1) ANTERIOR LUMBAR INTERBODY FUSION (ALIF) L5; 3)POSTERIOR FIXATION L3-S1; C-ARM/DEPUY-SYNTHES (N/A)  2) EXTREME LATERAL INTERBODY FUSION L3/4 L4/5; NUVASIVE (N/A) 1 Day Post-Op   Precautions: Spinal precautions/abdominal binder   ASSESSMENT :  Patient is 65 yo F admitted to hospital for ALIF L5, XLIF L3-5, and posterior fixation L3-S1. Patient presents today alert and agreeable to therapy, right sidelying upon arrival with NC O2 doffed and nursing in room. Patient was educated on spinal precautions and demonstrated good compliance with precautions throughout session. Paatient performed sidelying to sitting with Supervision and cues for hand placement. Patient was given demo with instruction on sit <> stand transfer and gait training and transferred to standing with SB assist and instructions for upright standing technique. Patient ambulated 15ft and c/o dizziness that was worsening and chair placed behind patient for transfer to sitting. SaO2 at 84%, HR at 105bpm with c/o \"feels like I'm falling backwards. \" Patient given 3 mins sitting with deep breathing instructions with SaO2 AT 89% and BP at 161/77mmHg.  Nursing notified and reports patient to be placed back on NC O2. Patient stood from chair and was able to ambulate 5 ft to locked recliner, was wheeled into room and on 4L NC SaO2 improved to 97%. Patient reports nausea remained constant throughout session, and that while dizziness remains, it has improved since sitting. At conclusion of session patient in recliner with call bell by the side. Patient instructed to call for assistance if they needed to get up for any reason and denied need for further assistance. Patient demonstrates decreased strength, mobility, and endurance and will benefit from skilled intervention to address the above impairments. Patients rehabilitation potential is considered to be Good  Factors which may influence rehabilitation potential include:   []         None noted  []         Mental ability/status  [x]         Medical condition  [x]         Home/family situation and support systems  [x]         Safety awareness  [x]         Pain tolerance/management  []         Other:      PLAN :  Recommendations and Planned Interventions:  [x]           Bed Mobility Training             [x]    Neuromuscular Re-Education  [x]           Transfer Training                   []    Orthotic/Prosthetic Training  [x]           Gait Training                          []    Modalities  [x]           Therapeutic Exercises          []    Edema Management/Control  [x]           Therapeutic Activities            [x]    Patient and Family Training/Education  []           Other (comment):    Frequency/Duration: Patient will be followed by physical therapy 1-2 times per day/4-7 days per week to address goals. Discharge Recommendations: Home Health with supervision (pending progress)  Further Equipment Recommendations for Discharge: transfer bench and rolling walker     G-CODES     Mobility  Current  CI= 1-19%   Goal  CI= 1-19%.   The severity rating is based on the Level of Assistance required for Functional Mobility and ADLs.        G-CODES Eval Complexity: History: MEDIUM  Complexity : 1-2 comorbidities / personal factors will impact the outcome/ POC Exam:LOW Complexity : 1-2 Standardized tests and measures addressing body structure, function, activity limitation and / or participation in recreation  Presentation: LOW Complexity : Stable, uncomplicated  Clinical Decision Making:Low Complexity   Overall Complexity:LOW     SUBJECTIVE:   Patient stated I just haven't been able to eat and my legs feels like they're shaky.     OBJECTIVE DATA SUMMARY:     Past Medical History:   Diagnosis Date    Arthritis     spinal stenosis    [facility] ([facility] 2017    Left Breast (chemo/radiation)    Diabetes ([facility]     Type 2    Edema     legs and ankles    GERD (gastroesophageal reflux disease)     High cholesterol     Hypertension     Ill-defined condition     patient states hemorrhage behind Left eye-following Dr. 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 She also reports she has a neighbor that is able to assist at times. She resides in a 1 story home with 8STE with HR and was ambulating with RW PTA. Patient has SC, grab bars, 2 canes, and WC.   Home Situation  Home Environment: Private residence  # Steps to Enter: 4  One/Two Story Residence: One story  Living Alone: No  Support Systems: Family member(s), Spouse/Significant Other/Partner  Patient Expects to be Discharged to[de-identified] Private residence  Current DME Used/Available at Home: [employer] Spr[employer], straight, Walker, rolling  Critical Behavior:  Neurologic State: Alert; Appropriate for age  Orientation Level: Appropriate for age;Oriented X4  Cognition: Appropriate for age attention/concentration; Appropriate decision making; Appropriate safety awareness; Follows commands   Strength:    Strength: Within functional limits (BLE)   Tone & Sensation:   Tone: Normal (BLE)    Sensation: Intact (BLE)     Range Of Motion:  AROM: Within functional limits (BLE); within spinal precautions. Functional Mobility:  Bed Mobility:   Supine to Sit: Supervision (patient sidelying upon arrival)   Transfers:  Sit to Stand: Stand-by assistance (x2 trasnfers)  Stand to Sit: Stand-by assistance;Contact guard assistance (x2 transfers)   Balance:   Sitting: Intact  Standing: Impaired; With support  Standing - Static: Fair  Standing - Dynamic : Fair  Ambulation/Gait Training:  Distance (ft): 20 Feet (ft)  Assistive Device: Walker, rolling  Ambulation - Level of Assistance: Contact guard assistance   Speed/Patricia: Shuffled; Slow  Interventions: Verbal cues; Visual/Demos          Pain:  Pt reports 3/10 pain or discomfort prior to treatment.    Pt reports 3/10 pain or discomfort post treatment. Activity Tolerance:   Patient demonstrated poor tolerance to therapy this session with desaturation into 84% s/p ambulation requiring nursing to replace NC with 4L. SaO2 improved to 97%. Patient also c/o dizziness and feeling like she was falling backwards during ambulation and in sitting for several mins after; BP was 161/77mmHg sitting apprx 4mins after ambulating. Please refer to the flowsheet for vital signs taken during this treatment.   After treatment:   [x]         Patient left in no apparent distress sitting up in chair  []         Patient left in no apparent distress in bed  [x]         Call bell left within reach  [x]         Nursing notified  []         Caregiver present  []         Bed alarm activated  []         SCDs in place to B LE     COMMUNICATION/EDUCATION:   [x]         Fall prevention education was provided and the patient/caregiver indicated understanding. [x]         Patient/family have participated as able in goal setting and plan of care. [x]         Patient/family agree to work toward stated goals and plan of care. []         Patient understands intent and goals of therapy, but is neutral about his/her participation. []         Patient is unable to participate in goal setting and plan of care.     Thank you for this referral.  Margaret Bang, PT   Time Calculation: 26 mins

## 2018-08-31 NOTE — ROUTINE PROCESS
Report given to oncoming nurse Rosalee Vieira RN, including SBAR, MAR, and Kardex. Patient in bed sleeping, but arouseable and oriented x4. O2 at 3L via nasal cannula. Vitals within normal limits, no complaints of pain. No apparent distress.

## 2018-08-31 NOTE — PROGRESS NOTES
Problem: Pressure Injury - Risk of  Goal: *Prevention of pressure injury  Document Michele Scale and appropriate interventions in the flowsheet.    Outcome: Progressing Towards Goal  Pressure Injury Interventions:  Sensory Interventions: Keep linens dry and wrinkle-free, Assess changes in LOC         Activity Interventions: Pressure redistribution bed/mattress(bed type), Assess need for specialty bed    Mobility Interventions: Assess need for specialty bed, HOB 30 degrees or less, Pressure redistribution bed/mattress (bed type)    Nutrition Interventions: Document food/fluid/supplement intake    Friction and Shear Interventions: Apply protective barrier, creams and emollients, HOB 30 degrees or less

## 2018-08-31 NOTE — ROUTINE PROCESS
Bedside and Verbal shift change report given to Bella Saez RN (oncoming nurse) by Amy Gamez RN (offgoing nurse). Report included the following information SBAR, Kardex, Procedure Summary, Intake/Output, MAR, Recent Results and Med Rec Status.

## 2018-08-31 NOTE — TELEPHONE ENCOUNTER
Lorenza Klein from [facility] called in regards to the In patient at [facility]. Lorenza Klein said that the patient has been complaining about nausea , and would like to know what medication she can give the patient. AIDEN Kat. [phone].

## 2018-08-31 NOTE — PROGRESS NOTES
Reason for Admission:   Spinal Stenosis, lumbar region without neurogenic claudication. RRAT Score:  5                Do you (patient/family) have any concerns for transition/discharge? Sister will provide transportation at time of discharge. Plan for utilizing home health:     Patient has requested [facility]. Likelihood of readmission? Low            Transition of Care Plan:      Patient reports that her sister Miguel Angel Shore) will assist and help her and she will receive home health services. Care Management Interventions  PCP Verified by CM: Yes (Harish Ballesteros in [place], [place])  Mode of Transport at Discharge: Other (see comment) (Sister Miguel Angel Shore) will provide the transportation at discharge.)  Transition of Care Consult (CM Consult): [address]: No  Reason Outside [place]: Patient already serviced by other home care/hospice agency (Patient wants to use [facility], who is already in her home working with her .)  Physical Therapy Consult: Yes  Occupational Therapy Consult: Yes  Current Support Network: Lives with Spouse, Family Lives Nearby  Confirm Follow Up Transport: Family  Plan discussed with Pt/Family/Caregiver: Yes  Freedom of Choice Offered: Yes  Discharge Location  Discharge Placement: Home     This writer met with patient today to assess and discuss discharge planning. Patient lives in [place], [place] with her . Her  is bed-bound, but she does get help and assistance from her sister Miguel Angel Shore). Patient is insured through [employer]. Patient is alert and oriented. Her PCP in the community is Harish Ballesteros) based out of [place], [place]. Patient reports that she will likely discharge over the weekend.  Patient has elected to have [facility] to provide home health services; due to the fact that agency is already servicing her , in home. Patient's sister will provide the transportation at the time of discharge.

## 2018-08-31 NOTE — PROGRESS NOTES
Pt c/o nausea, states Zofran isn't helping. [facility] COTA regarding a new order for an antiemetic, awaiting return call.

## 2018-08-31 NOTE — PROGRESS NOTES
Patient is not available to be assessed at this time.       [facility] Res[facility]   [phone]

## 2018-09-01 ENCOUNTER — APPOINTMENT (OUTPATIENT)
Dept: GENERAL RADIOLOGY | Age: 63
DRG: 454 | End: 2018-09-01
Attending: ORTHOPAEDIC SURGERY
Payer: COMMERCIAL

## 2018-09-01 ENCOUNTER — APPOINTMENT (OUTPATIENT)
Dept: GENERAL RADIOLOGY | Age: 63
DRG: 454 | End: 2018-09-01
Attending: STUDENT IN AN ORGANIZED HEALTH CARE EDUCATION/TRAINING PROGRAM
Payer: COMMERCIAL

## 2018-09-01 ENCOUNTER — APPOINTMENT (OUTPATIENT)
Dept: CT IMAGING | Age: 63
DRG: 454 | End: 2018-09-01
Attending: ORTHOPAEDIC SURGERY
Payer: COMMERCIAL

## 2018-09-01 PROBLEM — R06.02 SHORTNESS OF BREATH: Status: ACTIVE | Noted: 2018-09-01

## 2018-09-01 PROBLEM — J18.9 CAP (COMMUNITY ACQUIRED PNEUMONIA): Status: ACTIVE | Noted: 2018-09-01

## 2018-09-01 PROBLEM — R50.9 FEVER AND CHILLS: Status: ACTIVE | Noted: 2018-09-01

## 2018-09-01 LAB
ALBUMIN SERPL-MCNC: 2.4 G/DL (ref 3.4–5)
ALBUMIN/GLOB SERPL: 0.7 {RATIO} (ref 0.8–1.7)
ALP SERPL-CCNC: 65 U/L (ref 45–117)
ALT SERPL-CCNC: 24 U/L (ref 13–56)
ANION GAP SERPL CALC-SCNC: 8 MMOL/L (ref 3–18)
ARTERIAL PATENCY WRIST A: YES
AST SERPL-CCNC: 44 U/L (ref 15–37)
BASE EXCESS BLD CALC-SCNC: 3 MMOL/L
BASOPHILS # BLD: 0 K/UL (ref 0–0.06)
BASOPHILS NFR BLD: 0 % (ref 0–3)
BDY SITE: ABNORMAL
BILIRUB SERPL-MCNC: 0.4 MG/DL (ref 0.2–1)
BUN SERPL-MCNC: 18 MG/DL (ref 7–18)
BUN/CREAT SERPL: 16 (ref 12–20)
CALCIUM SERPL-MCNC: 7.1 MG/DL (ref 8.5–10.1)
CHLORIDE SERPL-SCNC: 105 MMOL/L (ref 100–108)
CO2 SERPL-SCNC: 27 MMOL/L (ref 21–32)
CREAT SERPL-MCNC: 1.16 MG/DL (ref 0.6–1.3)
DIFFERENTIAL METHOD BLD: ABNORMAL
EOSINOPHIL # BLD: 0.3 K/UL (ref 0–0.4)
EOSINOPHIL NFR BLD: 2 % (ref 0–5)
ERYTHROCYTE [DISTWIDTH] IN BLOOD BY AUTOMATED COUNT: 15.5 % (ref 11.6–14.5)
ERYTHROCYTE [DISTWIDTH] IN BLOOD BY AUTOMATED COUNT: 15.6 % (ref 11.6–14.5)
GAS FLOW.O2 O2 DELIVERY SYS: ABNORMAL L/MIN
GAS FLOW.O2 SETTING OXYMISER: 6 L/M
GLOBULIN SER CALC-MCNC: 3.3 G/DL (ref 2–4)
GLUCOSE BLD STRIP.AUTO-MCNC: 146 MG/DL (ref 70–110)
GLUCOSE BLD STRIP.AUTO-MCNC: 171 MG/DL (ref 70–110)
GLUCOSE BLD STRIP.AUTO-MCNC: 177 MG/DL (ref 70–110)
GLUCOSE BLD STRIP.AUTO-MCNC: 216 MG/DL (ref 70–110)
GLUCOSE SERPL-MCNC: 207 MG/DL (ref 74–99)
HCO3 BLD-SCNC: 26.5 MMOL/L (ref 22–26)
HCT VFR BLD AUTO: 28.7 % (ref 35–45)
HCT VFR BLD AUTO: 32.4 % (ref 35–45)
HGB BLD-MCNC: 10.3 G/DL (ref 12–16)
HGB BLD-MCNC: 8.9 G/DL (ref 12–16)
LACTATE SERPL-SCNC: 2 MMOL/L (ref 0.4–2)
LYMPHOCYTES # BLD: 1.1 K/UL (ref 0.8–3.5)
LYMPHOCYTES NFR BLD: 8 % (ref 20–51)
MCH RBC QN AUTO: 25.9 PG (ref 24–34)
MCH RBC QN AUTO: 26.3 PG (ref 24–34)
MCHC RBC AUTO-ENTMCNC: 31 G/DL (ref 31–37)
MCHC RBC AUTO-ENTMCNC: 31.8 G/DL (ref 31–37)
MCV RBC AUTO: 82.9 FL (ref 74–97)
MCV RBC AUTO: 83.4 FL (ref 74–97)
MONOCYTES # BLD: 0.4 K/UL (ref 0–1)
MONOCYTES NFR BLD: 3 % (ref 2–9)
NEUTS BAND NFR BLD MANUAL: 1 % (ref 0–5)
NEUTS SEG # BLD: 12.1 K/UL (ref 1.8–8)
NEUTS SEG NFR BLD: 86 % (ref 42–75)
O2/TOTAL GAS SETTING VFR VENT: 44 %
PCO2 BLD: 38.1 MMHG (ref 35–45)
PH BLD: 7.45 [PH] (ref 7.35–7.45)
PLATELET # BLD AUTO: 290 K/UL (ref 135–420)
PLATELET # BLD AUTO: 341 K/UL (ref 135–420)
PLATELET COMMENTS,PCOM: ABNORMAL
PMV BLD AUTO: 9.3 FL (ref 9.2–11.8)
PMV BLD AUTO: 9.5 FL (ref 9.2–11.8)
PO2 BLD: 72 MMHG (ref 80–100)
POTASSIUM SERPL-SCNC: 3.8 MMOL/L (ref 3.5–5.5)
PROT SERPL-MCNC: 5.7 G/DL (ref 6.4–8.2)
RBC # BLD AUTO: 3.44 M/UL (ref 4.2–5.3)
RBC # BLD AUTO: 3.91 M/UL (ref 4.2–5.3)
RBC MORPH BLD: ABNORMAL
RBC MORPH BLD: ABNORMAL
SAO2 % BLD: 95 % (ref 92–97)
SERVICE CMNT-IMP: ABNORMAL
SODIUM SERPL-SCNC: 140 MMOL/L (ref 136–145)
SPECIMEN TYPE: ABNORMAL
TOTAL RESP. RATE, ITRR: 18
WBC # BLD AUTO: 13.9 K/UL (ref 4.6–13.2)
WBC # BLD AUTO: 18.2 K/UL (ref 4.6–13.2)

## 2018-09-01 PROCEDURE — 74011636637 HC RX REV CODE- 636/637: Performed by: NURSE PRACTITIONER

## 2018-09-01 PROCEDURE — 87040 BLOOD CULTURE FOR BACTERIA: CPT | Performed by: INTERNAL MEDICINE

## 2018-09-01 PROCEDURE — 74011636320 HC RX REV CODE- 636/320: Performed by: ORTHOPAEDIC SURGERY

## 2018-09-01 PROCEDURE — 74011250636 HC RX REV CODE- 250/636: Performed by: ORTHOPAEDIC SURGERY

## 2018-09-01 PROCEDURE — 80053 COMPREHEN METABOLIC PANEL: CPT | Performed by: INTERNAL MEDICINE

## 2018-09-01 PROCEDURE — 74011636637 HC RX REV CODE- 636/637: Performed by: ORTHOPAEDIC SURGERY

## 2018-09-01 PROCEDURE — 85027 COMPLETE CBC AUTOMATED: CPT | Performed by: ORTHOPAEDIC SURGERY

## 2018-09-01 PROCEDURE — 82962 GLUCOSE BLOOD TEST: CPT

## 2018-09-01 PROCEDURE — 83605 ASSAY OF LACTIC ACID: CPT

## 2018-09-01 PROCEDURE — 97530 THERAPEUTIC ACTIVITIES: CPT

## 2018-09-01 PROCEDURE — 74011250637 HC RX REV CODE- 250/637: Performed by: ORTHOPAEDIC SURGERY

## 2018-09-01 PROCEDURE — 65660000000 HC RM CCU STEPDOWN

## 2018-09-01 PROCEDURE — 71046 X-RAY EXAM CHEST 2 VIEWS: CPT

## 2018-09-01 PROCEDURE — 85025 COMPLETE CBC W/AUTO DIFF WBC: CPT

## 2018-09-01 PROCEDURE — 82803 BLOOD GASES ANY COMBINATION: CPT

## 2018-09-01 PROCEDURE — 71275 CT ANGIOGRAPHY CHEST: CPT

## 2018-09-01 PROCEDURE — 36600 WITHDRAWAL OF ARTERIAL BLOOD: CPT

## 2018-09-01 PROCEDURE — 71045 X-RAY EXAM CHEST 1 VIEW: CPT

## 2018-09-01 PROCEDURE — 74011250637 HC RX REV CODE- 250/637: Performed by: INTERNAL MEDICINE

## 2018-09-01 PROCEDURE — 36415 COLL VENOUS BLD VENIPUNCTURE: CPT | Performed by: ORTHOPAEDIC SURGERY

## 2018-09-01 RX ORDER — OXYCODONE HYDROCHLORIDE 5 MG/1
5 TABLET ORAL
Status: DISCONTINUED | OUTPATIENT
Start: 2018-09-01 | End: 2018-09-04 | Stop reason: HOSPADM

## 2018-09-01 RX ORDER — GABAPENTIN 100 MG/1
100 CAPSULE ORAL 3 TIMES DAILY
Status: DISCONTINUED | OUTPATIENT
Start: 2018-09-01 | End: 2018-09-04 | Stop reason: HOSPADM

## 2018-09-01 RX ORDER — ACETAMINOPHEN 325 MG/1
650 TABLET ORAL
Status: DISCONTINUED | OUTPATIENT
Start: 2018-09-01 | End: 2018-09-04 | Stop reason: HOSPADM

## 2018-09-01 RX ORDER — INSULIN GLARGINE 100 [IU]/ML
15 INJECTION, SOLUTION SUBCUTANEOUS EVERY 12 HOURS
Status: DISCONTINUED | OUTPATIENT
Start: 2018-09-02 | End: 2018-09-03

## 2018-09-01 RX ADMIN — LISINOPRIL 20 MG: 20 TABLET ORAL at 08:37

## 2018-09-01 RX ADMIN — VENLAFAXINE HYDROCHLORIDE 37.5 MG: 37.5 CAPSULE, EXTENDED RELEASE ORAL at 08:37

## 2018-09-01 RX ADMIN — Medication 10 ML: at 06:31

## 2018-09-01 RX ADMIN — ACETAMINOPHEN 1000 MG: 500 TABLET, FILM COATED ORAL at 00:31

## 2018-09-01 RX ADMIN — OXYCODONE HYDROCHLORIDE 10 MG: 5 TABLET ORAL at 14:36

## 2018-09-01 RX ADMIN — ONDANSETRON 4 MG: 2 INJECTION INTRAMUSCULAR; INTRAVENOUS at 14:36

## 2018-09-01 RX ADMIN — GABAPENTIN 300 MG: 300 CAPSULE ORAL at 17:29

## 2018-09-01 RX ADMIN — INSULIN GLARGINE 10 UNITS: 100 INJECTION, SOLUTION SUBCUTANEOUS at 08:38

## 2018-09-01 RX ADMIN — ACETAMINOPHEN 1000 MG: 500 TABLET, FILM COATED ORAL at 06:32

## 2018-09-01 RX ADMIN — INSULIN LISPRO 3 UNITS: 100 INJECTION, SOLUTION INTRAVENOUS; SUBCUTANEOUS at 06:32

## 2018-09-01 RX ADMIN — GABAPENTIN 300 MG: 300 CAPSULE ORAL at 08:38

## 2018-09-01 RX ADMIN — INSULIN LISPRO 2 UNITS: 100 INJECTION, SOLUTION INTRAVENOUS; SUBCUTANEOUS at 12:44

## 2018-09-01 RX ADMIN — METOPROLOL TARTRATE 50 MG: 50 TABLET ORAL at 08:37

## 2018-09-01 RX ADMIN — OXYCODONE HYDROCHLORIDE 5 MG: 5 TABLET ORAL at 22:47

## 2018-09-01 RX ADMIN — SODIUM CHLORIDE 100 ML/HR: 900 INJECTION, SOLUTION INTRAVENOUS at 06:21

## 2018-09-01 RX ADMIN — IOPAMIDOL 50 ML: 755 INJECTION, SOLUTION INTRAVENOUS at 23:00

## 2018-09-01 RX ADMIN — AMLODIPINE BESYLATE 10 MG: 10 TABLET ORAL at 08:37

## 2018-09-01 RX ADMIN — INSULIN LISPRO 6 UNITS: 100 INJECTION, SOLUTION INTRAVENOUS; SUBCUTANEOUS at 22:47

## 2018-09-01 RX ADMIN — GLIPIZIDE 10 MG: 5 TABLET ORAL at 06:32

## 2018-09-01 RX ADMIN — ACETAMINOPHEN 1000 MG: 500 TABLET, FILM COATED ORAL at 12:44

## 2018-09-01 RX ADMIN — BISACODYL 10 MG: 5 TABLET, COATED ORAL at 08:37

## 2018-09-01 NOTE — PROGRESS NOTES
[facility]    Progress Note      Patient: Franky Nicoel               Sex: female          DOA: 8/30/2018         YOB: 1955      Age:  66 y.o.        LOS:  LOS: 3 days         S/P  Procedure(s):  1) ANTERIOR LUMBAR INTERBODY FUSION (ALIF) L5; 3)POSTERIOR FIXATION L3-S1; C-ARM/DEPUY-SYNTHES  2) EXTREME LATERAL INTERBODY FUSION L3/4 L4/5; NUVASIVE               Subjective:     No complaints Tolerating diet Ambulating Voiding q shift. She is doing well this morning. She reports being able to eat this morning. Her pain is managed on current medications. She does not have questions or concerns today. She has verbalized readiness to go home. Denies cp, sob, abd pain   Objective:      Blood pressure 131/65, pulse 74, temperature 99.3 °F (37.4 °C), resp. rate 18, height 5' 3\" (1.6 m), weight 214 lb 6.4 oz (97.3 kg), SpO2 91 %, not currently breastfeeding.    Well developed, Well Nourished alert, cooperative, no distress  Incision clean, dry, no drainage, dressing in place  Neg swelling and tenderness noted in low back  Sensory is intact   Motor is intact  nv intact  2+distal pulses  Neg calf tenderness  Neg for facial asymmetry     Labs:  CBC  @  CBC:   Lab Results   Component Value Date/Time    WBC 14.9 (H) 09/02/2018 04:47 AM    RBC 3.50 (L) 09/02/2018 04:47 AM    HGB 9.1 (L) 09/02/2018 04:47 AM    HCT 29.3 (L) 09/02/2018 04:47 AM    PLATELET 676 88/99/6553 04:47 AM     BMP:   Lab Results   Component Value Date/Time    Glucose 207 (H) 09/01/2018 08:25 PM    Sodium 140 09/01/2018 08:25 PM    Potassium 3.8 09/01/2018 08:25 PM    Chloride 105 09/01/2018 08:25 PM    CO2 27 09/01/2018 08:25 PM    BUN 18 09/01/2018 08:25 PM    Creatinine 1.16 09/01/2018 08:25 PM    Calcium 7.1 (L) 09/01/2018 08:25 PM   @  Coagulation  No results found for: INR, APTT   Basic Metabolic Profile  Lab Results   Component Value Date     09/01/2018    CO2 27 09/01/2018    BUN 18 09/01/2018 Medications Reviewed      Assessment/Plan     Principal Problem:    Shortness of breath (9/1/2018)    Active Problems:    Spinal stenosis of lumbar region at multiple levels (8/30/2018)      Scoliosis (8/30/2018)      Spinal stenosis (8/30/2018)      CAP (community acquired pneumonia) (9/1/2018)      Fever and chills (9/1/2018)        Procedure(s):  1) ANTERIOR LUMBAR INTERBODY FUSION (ALIF) L5; 3)POSTERIOR FIXATION L3-S1; C-ARM/DEPUY-SYNTHES  2) EXTREME LATERAL INTERBODY FUSION L3/4 L4/5; NUVASIVE :     1. PT and/or OT Consults: YES continue PT/OT: oob to chair, post surgical                                                 2. Incision Care:  Routine Incision Care and Dressing Changes as necessary: dressing changes POD#2 and as needed  3. Pain control in chart for discharge  4.  DVT Prophylaxis - SCD in place    DISCHARGE PLANNING     Intervention : Home with H/H when ok by [address] [facility],[address], [place] [phone]  [phone]

## 2018-09-01 NOTE — ROUTINE PROCESS
Bedside and Verbal shift change report given to [address] [facility] (oncoming nurse) by Carla Gaming RN (offgoing nurse). Report included the following information SBAR, Kardex, MAR and Recent Results.     SITUATION:    Code Status: Prior   Reason for Admission: Spinal stenosis, lumbar region, without neurogenic claudication [address] day: 2   Problem List:       Hospital Problems  Date Reviewed: 7/6/2018          Codes Class Noted POA    Spinal stenosis of lumbar region at multiple levels ICD-10-CM: M48.061  ICD-9-CM: 724.02  8/30/2018 Unknown        S[place]iosis ICD-10-CM: M41.9  ICD-9-CM: 737.30  8/30/2018 Unknown        Spinal stenosis ICD-10-CM: M48.00  ICD-9-CM: 724.00  8/30/2018 Unknown              BACKGROUND:    Past Medical History:   Past Medical History:   Diagnosis Date    Arthritis     spinal stenosis    Breast CA ([facility] 2017    Left Breast (chemo/radiation)    Diabetes ([facility]     Type 2    Edema     legs and ankles    GERD (gastroesophageal reflux disease)     High cholesterol     Hypertension     Ill-defined condition     patient states hemorrhage behind Left eye-following Dr. Racheal ARAGON (myocardial infarction) ([facility] [address] [facility] 06/2018    per patient    Neuropathy     Restless leg          Patient taking anticoagulants no     ASSESSMENT:    Changes in Assessment Throughout Shift: no     Patient has Central Line: no Reasons if yes: no   Patient has Herrera Cath: no Reasons if yes: no      Last Vitals:     Vitals:    08/31/18 1510 08/31/18 1939 09/01/18 0020 09/01/18 0235   BP: 154/72 145/79 122/62 149/85   Pulse: 86 86 90 86   Resp: 18 18 18 18   Temp: 99.5 °F (37.5 °C) 98.6 °F (37 °C) (!) 101.1 °F (38.4 °C) 100.2 °F (37.9 °C)   SpO2: 94% 91% 90% 94%   Weight:       Height:            IV and DRAINS (will only show if present)   Peripheral IV 08/30/18 Right Hand-Site Assessment: Clean, dry, & intact  Peripheral IV 08/30/18 Right Wrist-Site Assessment: Clean, dry, & intact     WOUND (if present)   Wound Type:  none   Dressing present Dressing Present : No   Wound Concerns/Notes:  none     PAIN    Pain Assessment    Pain Intensity 1: 0 (08/31/18 2249)    Pain Location 1: Back    Pain Intervention(s) 1: Encouraged PCA    Patient Stated Pain Goal: 0  o Interventions for Pain:  none  o Intervention effective: no  o Time of last intervention: 0700   o Reassessment Completed: no      Last 3 Weights:  Last 3 Recorded Weights in this Encounter    08/16/18 0725 08/30/18 0614   Weight: 93.4 kg (206 lb) 94.3 kg (208 lb)     Weight change:      INTAKE/OUPUT    Current Shift: 08/31 1901 - 09/01 0700  In: 0   Out: 300 [Urine:300]    Last three shifts: 08/30 0701 - 08/31 1900  In: 2924 [I.V.:3665]  Out: 3505 [Urine:3430]     LAB RESULTS     Recent Labs      08/31/18   0403   WBC  14.9*   HGB  11.2*   HCT  35.3   PLT  347      No results for input(s): NA, K, GLU, BUN, CREA, CA, MG, INR in the last 72 hours. No lab exists for component: PT, PTT, INREXT    RECOMMENDATIONS AND DISCHARGE PLANNING     1. Pending tests/procedures/ Plan of Care or Other Needs: TBD    2. Discharge plan for patient and Needs/Barriers: TBd    3. Estimated Discharge Date: TBD Posted on Whiteboard in Patients Room: no      4. The patient's care plan was reviewed with the oncoming nurse. \"HEALS\" SAFETY CHECK      Fall Risk    Total Score: 4    Safety Measures: Safety Measures: Bed/Chair alarm on, Bed in low position, Bed/Chair-Wheels locked, Call light within reach, Fall prevention (comment), Side rails X 3    A safety check occurred in the patient's room between off going nurse and oncoming nurse listed above.     The safety check included the below items  Area Items   H  High Alert Medications - Verify all high alert medication drips (heparin, PCA, etc.)   E  Equipment - Suction is set up for ALL patients (with yanker)  - Red plugs utilized for all equipment (IV pumps, etc.)  - WOWs wiped down at end of shift.  - Room stocked with oxygen, suction, and other unit-specific supplies   A  Alarms - Bed alarm is set for fall risk patients  - Ensure chair alarm is in place and activated if patient is up in a chair   L  Lines - Check IV for any infiltration  - Herrera bag is empty if patient has a Herrera   - Tubing and IV bags are labeled   S  Safety   - Room is clean, patient is clean, and equipment is clean. - Hallways are clear from equipment besides carts. - Fall bracelet on for fall risk patients  - Ensure room is clear and free of clutter  - Suction is set up for ALL patients (with yanker)  - Hallways are clear from equipment besides carts.    - Isolation precautions followed, supplies available outside room, sign posted     Alicia Edouard RN

## 2018-09-01 NOTE — PROGRESS NOTES
vss  Temp max 101.1  Felt sick last night with chest congestion  sats would drop without nc  Temp this am 100.2  No back or leg pain  Happy with surgery  AP  Concerned with fever wbc 14k yesterday and chest congestion. Will recheck wbc, and cxr today. Observe.  Hope to [employer] tomorrow if OK

## 2018-09-01 NOTE — PROGRESS NOTES
Problem: Mobility Impaired (Adult and Pediatric)  Goal: *Acute Goals and Plan of Care (Insert Text)  Physical Therapy Goals  Initiated 8/31/2018 and to be accomplished within 7 day(s)  1. Patient will move from supine to sit and sit to supine, scoot up and down, and roll side to side in bed with modified independence. 2.  Patient will perform sit to stand with modified independence. 3.  Patient will ambulate with modified independence for >100 feet with the least restrictive device. 4.  Patient will ascend/descend 8 stairs with 1 handrail(s) with supervision/set-up. Outcome: Progressing Towards Goal  physical Therapy TREATMENT    Patient: Ruthie Strauss (89 y.o. female)  Date: 9/1/2018  Diagnosis: Spinal stenosis, lumbar region, without neurogenic claudication [M48.061] <principal problem not specified>  Procedure(s) (LRB):  1) ANTERIOR LUMBAR INTERBODY FUSION (ALIF) L5; 3)POSTERIOR FIXATION L3-S1; C-ARM/DEPUY-SYNTHES (N/A)  2) EXTREME LATERAL INTERBODY FUSION L3/4 L4/5; NUVASIVE (N/A) 2 Days Post-Op  Precautions:  (spinal precautions)   Chart, physical therapy assessment, plan of care and goals were reviewed. OBJECTIVE/ ASSESSMENT:  Patient found right side lying in bed willing to work with PT. Pt performed logroll to sit EOB. Pt then stood and ambulated into hallway with green emesis bag nearby due to nausea. Pt is able to increase distance this tx and returned to b/s chair. Pt reports feeling sore. Educated pt on icing and provided pt with ne ice packs while sitting in b/s chair. Pt is progressing well toward STG #3 and will benefit from stair trial next tx. Education: transfers, gait, ice.   Progression toward goals:  [x]      Improving appropriately and progressing toward goals  []      Improving slowly and progressing toward goals  []      Not making progress toward goals and plan of care will be adjusted     PLAN:  Patient continues to benefit from skilled intervention to address the above impairments. Continue treatment per established plan of care. Stairs. Discharge Recommendations:  Home Health  Further Equipment Recommendations for Discharge: pt has rollator and RW     SUBJECTIVE:   Patient stated I think I'm going to do good.     OBJECTIVE DATA SUMMARY:   Critical Behavior:  Neurologic State: Alert  Orientation Level: Oriented X4  Cognition: Appropriate safety awareness  Safety/Judgement: Good awareness of safety precautions  Functional Mobility Training:  Bed Mobility:  Supine to Sit: Supervision  Transfers:  Sit to Stand: Supervision  Stand to Sit: Supervision  Balance:  Sitting: Intact  Standing: Impaired; With support  Standing - Static: Good  Standing - Dynamic : Fair  Ambulation/Gait Training:  Distance (ft): 80 Feet (ft)  Assistive Device: Walker, rolling  Ambulation - Level of Assistance: Stand-by assistance  Gait Abnormalities: Decreased step clearance  Speed/Patricia: Slow;Pace decreased (<100 feet/min)  Step Length: Left shortened;Right shortened  Interventions: Verbal cues      Pain:  Pre tx pain: 3  Post tx pain: 3  Intervention: position change. Activity Tolerance:   Good  Please refer to the flowsheet for vital signs taken during this treatment. After treatment:   [x] Patient left in no apparent distress sitting up in chair  [] Patient left in no apparent distress in bed  [x] Call bell left within reach  [] Nursing notified  [] Caregiver present  [] Bed alarm activated  [] SCDs applied  [] Ice applied      Ricco Roman PTA   Time Calculation: 15 mins    Mobility [id number] Current  CI= 1-19%. The severity rating is based on the Level of Assistance required for Functional Mobility and ADLs. Mobility   Goal  CI= 1-19%. The severity rating is based on the Level of Assistance required for Functional Mobility and ADLs.

## 2018-09-01 NOTE — ROUTINE PROCESS
8849: Pt sleeping. Oxygen level fluctuates every now and then. Pt placed on 4 L of oxygen via  nasal cannula . No pain or nausea / vomiting  at the moment. Will continue to monitor the pt.

## 2018-09-01 NOTE — ROUTINE PROCESS
No neurovascular deficit noted. Patient continues to have low O2 SAT's on room air;77%. O2 3LNC reapplied and SAT+ 97%.

## 2018-09-01 NOTE — ROUTINE PROCESS
Mobility Intervention:       [x] Pt dangled at edge of bed    [] Pt assisted OOB to bedside commode    [] Pt assisted OOB to chair    [x] Pt ambulated to bathroom    [x] Patient was ambulated in room/hallway    Assistive Device Utilized:       [] Rolling walker   [] Crutches   [] Straight Cane   [] Knee immobilizer   [] IV pole    After Mobilization:     [x] Patient left in no apparent distress sitting up in chair  [x] Patient left in no apparent distress in bed  [x] Call bell left within reach  [x] SCDs on & machine turned on  [] Ice applied  [] RN notified  [] Caregiver present  [] Bed alarm activated    Reason patient not mobilized:      [] Patient refused   [] Nausea/vomiting   [] Low blood pressure   [] Drowsy/lethargic    Pain Rating:     [x] 0  [] 1  Assistive Device:        [] 2  [] 3  [] 4  [] 5  [] 6  Assistive Device:        [] 7  [] 8  [] 9  [] 10    Comments:

## 2018-09-01 NOTE — PROGRESS NOTES
Problem: Mobility Impaired (Adult and Pediatric)  Goal: *Acute Goals and Plan of Care (Insert Text)  Physical Therapy Goals  Initiated 8/31/2018 and to be accomplished within 7 day(s)  1. Patient will move from supine to sit and sit to supine, scoot up and down, and roll side to side in bed with modified independence. 2.  Patient will perform sit to stand with modified independence. 3.  Patient will ambulate with modified independence for >100 feet with the least restrictive device. 4.  Patient will ascend/descend 8 stairs with 1 handrail(s) with supervision/set-up. 56 - Pt currently in left sideling in bed and reports 8/10 pain. Nurse Dave Held notified. Will f/u later in day.

## 2018-09-02 LAB
BASOPHILS # BLD: 0 K/UL (ref 0–0.1)
BASOPHILS NFR BLD: 0 % (ref 0–2)
DIFFERENTIAL METHOD BLD: ABNORMAL
EOSINOPHIL # BLD: 0.3 K/UL (ref 0–0.4)
EOSINOPHIL NFR BLD: 2 % (ref 0–5)
ERYTHROCYTE [DISTWIDTH] IN BLOOD BY AUTOMATED COUNT: 15.6 % (ref 11.6–14.5)
GLUCOSE BLD STRIP.AUTO-MCNC: 118 MG/DL (ref 70–110)
GLUCOSE BLD STRIP.AUTO-MCNC: 151 MG/DL (ref 70–110)
GLUCOSE BLD STRIP.AUTO-MCNC: 356 MG/DL (ref 70–110)
GLUCOSE BLD STRIP.AUTO-MCNC: 385 MG/DL (ref 70–110)
GLUCOSE BLD STRIP.AUTO-MCNC: 394 MG/DL (ref 70–110)
HCT VFR BLD AUTO: 29.3 % (ref 35–45)
HCT VFR BLD AUTO: 30.9 % (ref 35–45)
HGB BLD-MCNC: 9.1 G/DL (ref 12–16)
HGB BLD-MCNC: 9.6 G/DL (ref 12–16)
LACTATE SERPL-SCNC: 1 MMOL/L (ref 0.4–2)
LYMPHOCYTES # BLD: 1.8 K/UL (ref 0.9–3.6)
LYMPHOCYTES NFR BLD: 12 % (ref 21–52)
MCH RBC QN AUTO: 26 PG (ref 24–34)
MCHC RBC AUTO-ENTMCNC: 31.1 G/DL (ref 31–37)
MCV RBC AUTO: 83.7 FL (ref 74–97)
MONOCYTES # BLD: 1.2 K/UL (ref 0.05–1.2)
MONOCYTES NFR BLD: 8 % (ref 3–10)
NEUTS SEG # BLD: 11.6 K/UL (ref 1.8–8)
NEUTS SEG NFR BLD: 78 % (ref 40–73)
PLATELET # BLD AUTO: 262 K/UL (ref 135–420)
PMV BLD AUTO: 9.2 FL (ref 9.2–11.8)
RBC # BLD AUTO: 3.5 M/UL (ref 4.2–5.3)
WBC # BLD AUTO: 14.9 K/UL (ref 4.6–13.2)

## 2018-09-02 PROCEDURE — 74011000250 HC RX REV CODE- 250: Performed by: INTERNAL MEDICINE

## 2018-09-02 PROCEDURE — 74011250637 HC RX REV CODE- 250/637: Performed by: INTERNAL MEDICINE

## 2018-09-02 PROCEDURE — 74011636637 HC RX REV CODE- 636/637: Performed by: INTERNAL MEDICINE

## 2018-09-02 PROCEDURE — 85025 COMPLETE CBC W/AUTO DIFF WBC: CPT | Performed by: INTERNAL MEDICINE

## 2018-09-02 PROCEDURE — 82962 GLUCOSE BLOOD TEST: CPT

## 2018-09-02 PROCEDURE — 74011250636 HC RX REV CODE- 250/636: Performed by: ORTHOPAEDIC SURGERY

## 2018-09-02 PROCEDURE — 65660000000 HC RM CCU STEPDOWN

## 2018-09-02 PROCEDURE — 83605 ASSAY OF LACTIC ACID: CPT | Performed by: INTERNAL MEDICINE

## 2018-09-02 PROCEDURE — 94640 AIRWAY INHALATION TREATMENT: CPT

## 2018-09-02 PROCEDURE — 97530 THERAPEUTIC ACTIVITIES: CPT

## 2018-09-02 PROCEDURE — 36415 COLL VENOUS BLD VENIPUNCTURE: CPT | Performed by: INTERNAL MEDICINE

## 2018-09-02 PROCEDURE — 85018 HEMOGLOBIN: CPT | Performed by: INTERNAL MEDICINE

## 2018-09-02 PROCEDURE — 74011250637 HC RX REV CODE- 250/637: Performed by: ORTHOPAEDIC SURGERY

## 2018-09-02 PROCEDURE — 74011636637 HC RX REV CODE- 636/637: Performed by: ORTHOPAEDIC SURGERY

## 2018-09-02 PROCEDURE — 74011250636 HC RX REV CODE- 250/636: Performed by: INTERNAL MEDICINE

## 2018-09-02 PROCEDURE — 97116 GAIT TRAINING THERAPY: CPT

## 2018-09-02 RX ORDER — LORAZEPAM 0.5 MG/1
0.5 TABLET ORAL
Status: DISCONTINUED | OUTPATIENT
Start: 2018-09-02 | End: 2018-09-04 | Stop reason: HOSPADM

## 2018-09-02 RX ORDER — IPRATROPIUM BROMIDE AND ALBUTEROL SULFATE 2.5; .5 MG/3ML; MG/3ML
3 SOLUTION RESPIRATORY (INHALATION)
Status: DISCONTINUED | OUTPATIENT
Start: 2018-09-02 | End: 2018-09-03

## 2018-09-02 RX ORDER — PANTOPRAZOLE SODIUM 40 MG/1
40 TABLET, DELAYED RELEASE ORAL
Status: DISCONTINUED | OUTPATIENT
Start: 2018-09-03 | End: 2018-09-04 | Stop reason: HOSPADM

## 2018-09-02 RX ADMIN — ATORVASTATIN CALCIUM 40 MG: 40 TABLET, FILM COATED ORAL at 00:09

## 2018-09-02 RX ADMIN — IPRATROPIUM BROMIDE AND ALBUTEROL SULFATE 3 ML: .5; 3 SOLUTION RESPIRATORY (INHALATION) at 20:30

## 2018-09-02 RX ADMIN — GABAPENTIN 100 MG: 100 CAPSULE ORAL at 10:46

## 2018-09-02 RX ADMIN — INSULIN GLARGINE 15 UNITS: 100 INJECTION, SOLUTION SUBCUTANEOUS at 10:48

## 2018-09-02 RX ADMIN — METOPROLOL TARTRATE 50 MG: 50 TABLET ORAL at 10:46

## 2018-09-02 RX ADMIN — CEFEPIME HYDROCHLORIDE 2 G: 2 INJECTION, POWDER, FOR SOLUTION INTRAVENOUS at 10:45

## 2018-09-02 RX ADMIN — GABAPENTIN 100 MG: 100 CAPSULE ORAL at 17:18

## 2018-09-02 RX ADMIN — Medication 10 ML: at 05:08

## 2018-09-02 RX ADMIN — SODIUM CHLORIDE 100 ML/HR: 900 INJECTION, SOLUTION INTRAVENOUS at 00:04

## 2018-09-02 RX ADMIN — INSULIN LISPRO 15 UNITS: 100 INJECTION, SOLUTION INTRAVENOUS; SUBCUTANEOUS at 12:18

## 2018-09-02 RX ADMIN — CEFEPIME HYDROCHLORIDE 2 G: 2 INJECTION, POWDER, FOR SOLUTION INTRAVENOUS at 21:35

## 2018-09-02 RX ADMIN — ATORVASTATIN CALCIUM 40 MG: 40 TABLET, FILM COATED ORAL at 21:35

## 2018-09-02 RX ADMIN — BISACODYL 10 MG: 5 TABLET, COATED ORAL at 10:45

## 2018-09-02 RX ADMIN — VENLAFAXINE HYDROCHLORIDE 37.5 MG: 37.5 CAPSULE, EXTENDED RELEASE ORAL at 10:46

## 2018-09-02 RX ADMIN — METHYLPREDNISOLONE SODIUM SUCCINATE 80 MG: 125 INJECTION, POWDER, FOR SOLUTION INTRAMUSCULAR; INTRAVENOUS at 06:11

## 2018-09-02 RX ADMIN — INSULIN LISPRO 15 UNITS: 100 INJECTION, SOLUTION INTRAVENOUS; SUBCUTANEOUS at 17:18

## 2018-09-02 RX ADMIN — INSULIN LISPRO 15 UNITS: 100 INJECTION, SOLUTION INTRAVENOUS; SUBCUTANEOUS at 21:34

## 2018-09-02 RX ADMIN — GABAPENTIN 100 MG: 100 CAPSULE ORAL at 21:35

## 2018-09-02 RX ADMIN — CEFEPIME HYDROCHLORIDE 2 G: 2 INJECTION, POWDER, FOR SOLUTION INTRAVENOUS at 00:06

## 2018-09-02 RX ADMIN — Medication 10 ML: at 21:36

## 2018-09-02 RX ADMIN — Medication 10 ML: at 00:10

## 2018-09-02 RX ADMIN — OXYCODONE HYDROCHLORIDE 5 MG: 5 TABLET ORAL at 23:01

## 2018-09-02 RX ADMIN — INSULIN GLARGINE 15 UNITS: 100 INJECTION, SOLUTION SUBCUTANEOUS at 21:35

## 2018-09-02 RX ADMIN — Medication 10 ML: at 21:39

## 2018-09-02 RX ADMIN — GABAPENTIN 100 MG: 100 CAPSULE ORAL at 00:09

## 2018-09-02 RX ADMIN — AZITHROMYCIN MONOHYDRATE 500 MG: 500 INJECTION, POWDER, LYOPHILIZED, FOR SOLUTION INTRAVENOUS at 22:58

## 2018-09-02 RX ADMIN — Medication 10 ML: at 14:00

## 2018-09-02 RX ADMIN — AMLODIPINE BESYLATE 10 MG: 10 TABLET ORAL at 10:46

## 2018-09-02 RX ADMIN — LISINOPRIL 20 MG: 20 TABLET ORAL at 10:46

## 2018-09-02 RX ADMIN — AZITHROMYCIN MONOHYDRATE 500 MG: 500 INJECTION, POWDER, LYOPHILIZED, FOR SOLUTION INTRAVENOUS at 00:05

## 2018-09-02 RX ADMIN — OXYCODONE HYDROCHLORIDE 5 MG: 5 TABLET ORAL at 04:58

## 2018-09-02 NOTE — ROUTINE PROCESS
Bedside and Verbal shift change report given to Larry Romero (oncoming nurse) by Génesis Wheat (offgoing nurse). Report included the following information SBAR, Kardex and Cardiac Rhythm NSR.

## 2018-09-02 NOTE — PROGRESS NOTES
SUBJECTIVE:    Feeling much better. SOB better. No chest pain. No cough currently. No nausea or vomiting. Little back pain. Had a BM. OBJECTIVE:    /66 (BP 1 Location: Right arm, BP Patient Position: At rest)  Pulse 75  Temp 98.8 °F (37.1 °C)  Resp 20  Ht 5' 3\" (1.6 m)  Wt 97.3 kg (214 lb 6.4 oz)  SpO2 91%  Breastfeeding? No  BMI 37.98 kg/m2    CVS: RRR  RS: Diminished in bases, no wheezes  GI: NT, BS +  Extremities: no pedal edema  CNS: normal motor strength in all extremities while in bed  General: NAD, Awake    ASSESSMENT:    1. SOB due to right atelectasis/infiltrates  2. LGF  3. DM  4. HTN  5.  Recent lumbar surgery    PLAN:    Duonebs and IS added  Cont antibiotic and monitor  D/c IVF and wean her off oxygen  Cont current management for other co-morbidities      CMP:   Lab Results   Component Value Date/Time     09/01/2018 08:25 PM    K 3.8 09/01/2018 08:25 PM     09/01/2018 08:25 PM    CO2 27 09/01/2018 08:25 PM    AGAP 8 09/01/2018 08:25 PM     (H) 09/01/2018 08:25 PM    BUN 18 09/01/2018 08:25 PM    CREA 1.16 09/01/2018 08:25 PM    GFRAA 58 (L) 09/01/2018 08:25 PM    GFRNA 47 (L) 09/01/2018 08:25 PM    CA 7.1 (L) 09/01/2018 08:25 PM    ALB 2.4 (L) 09/01/2018 08:25 PM    TP 5.7 (L) 09/01/2018 08:25 PM    GLOB 3.3 09/01/2018 08:25 PM    AGRAT 0.7 (L) 09/01/2018 08:25 PM    SGOT 44 (H) 09/01/2018 08:25 PM    ALT 24 09/01/2018 08:25 PM     CBC:   Lab Results   Component Value Date/Time    WBC 14.9 (H) 09/02/2018 04:47 AM    HGB 9.1 (L) 09/02/2018 04:47 AM    HCT 29.3 (L) 09/02/2018 04:47 AM     09/02/2018 04:47 AM

## 2018-09-02 NOTE — PROGRESS NOTES
Problem: Mobility Impaired (Adult and Pediatric)  Goal: *Acute Goals and Plan of Care (Insert Text)  Physical Therapy Goals  Initiated 8/31/2018 and to be accomplished within 7 day(s)  1. Patient will move from supine to sit and sit to supine, scoot up and down, and roll side to side in bed with modified independence. 2.  Patient will perform sit to stand with modified independence. 3.  Patient will ambulate with modified independence for >100 feet with the least restrictive device. 4.  Patient will ascend/descend 8 stairs with 1 handrail(s) with supervision/set-up. physical Therapy TREATMENT    Patient: Sheri Hoskins (25 y.o. female)  Date: 9/2/2018  Diagnosis: Spinal stenosis, lumbar region, without neurogenic claudication [M48.061] Shortness of breath  Procedure(s) (LRB):  1) ANTERIOR LUMBAR INTERBODY FUSION (ALIF) L5; 3)POSTERIOR FIXATION L3-S1; C-ARM/DEPUY-SYNTHES (N/A)  2) EXTREME LATERAL INTERBODY FUSION L3/4 L4/5; NUVASIVE (N/A) 3 Days Post-Op  Precautions:  (spinal precautions)  Chart, physical therapy assessment, plan of care and goals were reviewed. ASSESSMENT:  Pt eager to participate with PT. Unable to verbalize spinal precautions, pt re-educated regarding no bending/lifting/twisting. Demonstrates good ability with bed mobility, transfers and gait. Ambulates in hallway w/ RW, SBA, and O2. Returned to EOB. Reviewed log-roll technique to return to supine. Needs left within reach, nursing notified. Progression toward goals:  [x]      Improving appropriately and progressing toward goals  []      Improving slowly and progressing toward goals  []      Not making progress toward goals and plan of care will be adjusted     PLAN:  Patient continues to benefit from skilled intervention to address the above impairments. Continue treatment per established plan of care.   Discharge Recommendations:  Home Health  Further Equipment Recommendations for Discharge:  rolling walker and N/A SUBJECTIVE:   Patient stated I'm feeling pretty good today.   Mobility [id number] Current  CI= 1-19%   Goal  CI= 1-19%. The severity rating is based on the Other level of assistance required for functional mobility and ADLs. OBJECTIVE DATA SUMMARY:   Critical Behavior:  Neurologic State: Alert  Orientation Level: Oriented X4  Cognition: Follows commands  Safety/Judgement: Good awareness of safety precautions  Functional Mobility Training:  Bed Mobility:     Supine to Sit: Supervision  Sit to Supine: Stand-by assistance;Contact guard assistance            Transfers:  Sit to Stand: Supervision  Stand to Sit: Supervision     Balance:  Sitting: Intact  Standing: Impaired; With support  Standing - Static: Good  Standing - Dynamic : Fair  Ambulation/Gait Training:  Distance (ft): 140 Feet (ft)  Assistive Device: Walker, rolling  Ambulation - Level of Assistance: Stand-by assistance        Gait Abnormalities: Decreased step clearance        Base of Support: Widened     Speed/Patricia: Slow;Shuffled  Step Length: Right shortened;Left shortened     Therapeutic Exercises:   Seated LAQ, marches x10 ea. Pain:  Pt pain was reported as  4 pre-treatment. Pt pain was reported as 4 post-treatment. Activity Tolerance:   Good  Please refer to the flowsheet for vital signs taken during this treatment.   After treatment:   [] Patient left in no apparent distress sitting up in chair  [x] Patient left in no apparent distress in bed  [x] Call bell left within reach  [x] Nursing notified  [] Caregiver present  [] Bed alarm activated      Filomena Plummer PTA   Time Calculation: 40 mins

## 2018-09-02 NOTE — PROGRESS NOTES
Just spoke to Housestaff who felt patient is stable. [place] patients lungs were clear. Repeat wbc 13.9k    Hospitalist consulted. Gi findngs will order spiral CT- no evidence of dvt, no CP, no dyspnea, but she does have  AA gradient without history of lung disease.  Will R/O PE.

## 2018-09-02 NOTE — ROUTINE PROCESS
TRANSFER - OUT REPORT:    Verbal report given to Sarahi RN(name) on Nicho Lan  being transferred to [address] [place] [address](unit) for routine progression of care       Report consisted of patients Situation, Background, Assessment and   Recommendations(SBAR). Information from the following report(s) Kardex was reviewed with the receiving nurse. Lines:   Peripheral IV 08/30/18 Right Hand (Active)   Site Assessment Clean, dry, & intact 8/31/2018  7:30 AM   Phlebitis Assessment 0 8/31/2018  7:30 AM   Infiltration Assessment 0 8/31/2018  7:30 AM   Dressing Status Clean, dry, & intact 8/31/2018  7:30 AM   Dressing Type Tape;Transparent 9/1/2018  7:12 AM   Hub Color/Line Status Pink 9/1/2018  7:12 AM   Action Taken Open ports on tubing capped 8/31/2018  7:30 AM   Alcohol Cap Used Yes 8/31/2018  7:30 AM       Peripheral IV 08/30/18 Right Wrist (Active)   Site Assessment Clean, dry, & intact 8/31/2018  7:30 AM   Phlebitis Assessment 0 8/31/2018  7:30 AM   Infiltration Assessment 0 8/31/2018  7:30 AM   Dressing Status Clean, dry, & intact 8/31/2018  7:30 AM   Dressing Type Tape;Transparent 9/1/2018  7:12 AM   Hub Color/Line Status Pink 9/1/2018  7:12 AM   Action Taken Open ports on tubing capped 8/31/2018  7:30 AM   Alcohol Cap Used Yes 8/31/2018  7:30 AM       Peripheral IV 09/01/18 Right Antecubital (Active)        Opportunity for questions and clarification was provided.       Patient transported with:   Registered Nurse

## 2018-09-02 NOTE — ROUTINE PROCESS
2258- Received from [address] via bed. Vital signs taken. Oriented to room. O2 at 5 lpm with humidification. Call bell in reach. 2300- Assessment completed. Awake and oriented x 4. Patient denies pain. Telemetry NSR. Lungs clear bilaterally. Respirations even and unlabored. Abdomen soft and non tender. Bowel sounds positive. Incision to lower abdomen dry and intact. Left flank incision is dry and intact. Midline lower back incision has old blood. Dressing changed. Abdominal binder applied. Ice pack placed to lower back. 2345- Patient assisted to bathroom and back. Patient became confused and did not know where she was during return to bed. Patient diaphoretic and complaining of being hot. Oxygen at 5 lpm reapplied to nares. SPO2 up to 96 % within 5 minutes. 0004- Normal saline at 100 ml/hr via IV pump. 46- Dr. Melchor Krabbe in to see patient. accucheck 151. Dr. Adina Stanton to hold lantus tonight. Sliding scale insulin given earlier. 0115-Patient found sitting on bedside commode. Oxygen off. Gown off. Abdominal binder off. Telemetry monitor off. Patient had formed BM. Patient confused. Patient asking staff. \"Why are you in my house? \" \" Are we going to have eggs yet\" patient reoriented to [facility]. Telemetry reapplied. Abdominal binder placed back on patient. New gown place back on patient. 0130- Patient alert and oriented x 4. Patient denies pain at present time. 7449- Roxicodone 5 mg given PO for pain to lower back. See pain flow sheet. 8110- Pain reassessment completed. No complaints voiced.

## 2018-09-02 NOTE — ROUTINE PROCESS
Mobility Intervention:       [] Pt dangled at edge of bed    [x] Pt assisted OOB to bedside commode    [] Pt assisted OOB to chair    [] Pt ambulated to bathroom    [] Patient was ambulated in room/hallway    Assistive Device Utilized:       [x] Rolling walker   [] Crutches   [] Straight Cane   [] Knee immobilizer   [] IV pole    After Mobilization:     [] Patient left in no apparent distress sitting up in chair  [x] Patient left in no apparent distress in bed  [x] Call bell left within reach  [x] SCDs on & machine turned on  [x] Ice applied  [] RN notified  [] Caregiver present  [] Bed alarm activated    Reason patient not mobilized:      [] Patient refused   [] Nausea/vomiting   [] Low blood pressure   [] Drowsy/lethargic    Pain Rating:     [x] 0  [] 1  Assistive Device:        [] 2  [] 3  [] 4  [] 5  [] 6  Assistive Device:        [] 7  [] 8  [] 9  [] 10    Comments:

## 2018-09-02 NOTE — PROGRESS NOTES
Patient with low grade temp   sats in low 90's with O2  cxr this am with possibe atypical pneumonia  Wb 18k  Will consult hospitalist for evaluation and treatment

## 2018-09-02 NOTE — ROUTINE PROCESS
Bedside shift change report given to [address] [place], [place] [address]eveline (oncoming nurse) by Stephanie Prasad RN (offgoing nurse). Report included the following information SBAR, OR Summary, Procedure Summary, Intake/Output, MAR, Recent Results, Med Rec Status and Alarm Parameters .

## 2018-09-02 NOTE — ROUTINE PROCESS
Mobility Intervention:       [] Pt dangled at edge of bed    [] Pt assisted OOB to bedside commode    [] Pt assisted OOB to chair    [x] Pt ambulated to bathroom    [] Patient was ambulated in room/hallway    Assistive Device Utilized:       [x] Rolling walker   [] Crutches   [] Straight Cane   [] Knee immobilizer   [] IV pole    After Mobilization:     [] Patient left in no apparent distress sitting up in chair  [x] Patient left in no apparent distress in bed  [x] Call bell left within reach  [x] SCDs on & machine turned on  [x] Ice applied  [] RN notified  [] Caregiver present  [x] Bed alarm activated    Reason patient not mobilized:      [] Patient refused   [] Nausea/vomiting   [] Low blood pressure   [] Drowsy/lethargic    Pain Rating:     [] 0  [] 1  Assistive Device:        [x] 2  [] 3  [] 4  [] 5  [] 6  Assistive Device:        [] 7  [] 8  [] 9  [] 10    Comments:

## 2018-09-02 NOTE — CONSULTS
Consult Note    Patient: Aleta Billy MRN: [id number]  CSN: [id number]    YOB: 1955  Age: 66 y.o. Sex: female    DOA: 8/30/2018 LOS:  LOS: 3 days        Requesting Physician: Ambreen Orellana MD    Reason for Consultation: SOB                HPI:     lAeta Billy is a 66 y.o.  female who has been seen for SOB   Pt is s/p Lumbar spinal fusion and developed SOB, leukocytosis and a fever   Was asked to see Pt as she was noticed to be repeatedly hypoxic and RRT was called. Pt is seen and examined. Was tachy and SOB and felt better after placement on NC. C/o of productive cough that's getting progressively worse since day of admission. Pt also showed a drop of H/H mostly 2 ry to surgery   CTA was done STAT and PE is ruled out but showed Rt lung ground glass appearance and opacity. It also showed Lt Hepatic lobe chronic mass unchanged from last CT and MRI in 2017  Pt is seen again after CTA and feels much better and saturating well on 5 L NC      Past Medical History:   Diagnosis Date    Arthritis     spinal stenosis    Breast CA ([facility] [address] 2017    Left Breast (chemo/radiation)    Diabetes (HCC)     Type 2    Edema     legs and ankles    GERD (gastroesophageal reflux disease)     High cholesterol     Hypertension     Ill-defined condition     patient states hemorrhage behind Left eye-following Dr. Julita Acosta MI (myocardial infarction) ([facility] [address] 06/2018    per patient    Neuropathy     Restless leg        Past Surgical History:   Procedure Laterality Date    HX BREAST BIOPSY Left 02/03/2017    BREAST CANCER    HX BREAST LUMPECTOMY Left     HX CATARACT REMOVAL Right     HX HEART CATHETERIZATION  07/2018    no stents       History reviewed. No pertinent family history.     Social History     Social History    Marital status:      Spouse name: N/A    Number of children: N/A    Years of education: N/A     Social History Main Topics    Smoking status: Never Smoker    Smokeless tobacco: Never Used    Alcohol use No    Drug use: No    Sexual activity: Not Asked     Other Topics Concern    None     Social History Narrative       Prior to Admission medications    Medication Sig Start Date End Date Taking? Authorizing Provider   HYDROcodone-acetaminophen (NORCO)  mg tablet Take 1 Tab by mouth every six (6) hours as needed for Pain. Max Daily Amount: 4 Tabs. Indications: Pain, post op pain 8/31/18  Yes Funmilayo Hirsch NP   promethazine (PHENERGAN) 25 mg tablet Take 0.5 Tabs by mouth every eight (8) hours as needed for Nausea. Indications: POST-OPERATIVE NAUSEA AND VOMITING, PREVENTION OF POST-OPERATIVE NAUSEA AND VOMITING 8/31/18  Yes Funmilayo Hirsch NP   naloxone (NARCAN) 4 mg/actuation nasal spray Use 1 spray intranasally, then discard. Repeat with new spray every 2 min as needed for opioid overdose symptoms, alternating nostrils. 8/31/18  Yes eYsica Pulido NP   LORazepam (ATIVAN) 0.5 mg tablet Take 0.5 mg by mouth as needed for Anxiety. Yes Historical Provider   gabapentin (NEURONTIN) 600 mg tablet Take 1 Tab by mouth three (3) times daily. 8/22/18  Yes Yesica Pulido NP   insulin lispro (HUMALOG) 100 unit/mL kwikpen by SubCUTAneous route. Yes Historical Provider   atorvastatin (LIPITOR) 40 mg tablet Take  by mouth nightly. Yes Historical Provider   rosuvastatin (CRESTOR) 10 mg tablet Take 10 mg by mouth daily. Yes Historical Provider   furosemide (LASIX) 20 mg tablet TAKE 1 TABLET BY MOUTH ONCE DAILY AS NEEDED 6/8/18  Yes Historical Provider   amLODIPine (NORVASC) 10 mg tablet daily. Indications: hypertension 4/6/18  Yes Historical Provider   esomeprazole (NEXIUM) 20 mg capsule daily. 4/6/18  Yes Historical Provider   glipiZIDE (GLUCOTROL) 10 mg tablet two (2) times a day.  Indications: type 2 diabetes mellitus 4/20/18  Yes Historical Provider   hydroCHLOROthiazide (MICROZIDE) 12.5 mg capsule  4/6/18  Yes Historical Provider   LANTUS SOLOSTAR U-100 INSULIN 100 unit/mL (3 mL) inpn 20 units at bedtime 4/19/18  Yes Historical Provider   TRADJENTA 5 mg tablet TAKE 1 TABLET BY MOUTH ONCE A DAY 4/19/18  Yes Historical Provider   lisinopril (PRINIVIL, ZESTRIL) 20 mg tablet daily. 4/6/18  Yes Historical Provider   metoprolol tartrate (LOPRESSOR) 50 mg tablet daily. 4/6/18  Yes Historical Provider   NOVOFINE 32 32 gauge x 1/4\" ndle  1/26/18  Yes Historical Provider   venlafaxine-SR (EFFEXOR-XR) 37.5 mg capsule daily. 4/6/18  Yes Historical Provider       Allergies   Allergen Reactions    Keflex [Cephalexin] Nausea and Vomiting    Metformin Other (comments)     GI DISTRESS    Soliqua 100/33 [Insulin Glargine-Lixisenatide] Nausea Only       Review of Systems  GENERAL: Patient alert, awake and oriented times 3, able to communicate full sentences but was in distress   HEENT: No change in vision, no earache, tinnitus, sore throat or sinus congestion. NECK: No pain or stiffness. CARDIOVASCULAR: No chest pain or pressure. No palpitations. PULMONARY: +ve shortness of breath, cough No wheeze. GASTROINTESTINAL: No abdominal pain, nausea, vomiting or diarrhea, melena or       bright red blood per rectum. GENITOURINARY: No urinary frequency, urgency, hesitancy or dysuria. MUSCULOSKELETAL: No joint or muscle pain,+ve back pain, no recent trauma. DERMATOLOGIC: No rash, no itching, no lesions. ENDOCRINE: No polyuria, polydipsia, no heat or cold intolerance. No recent change in   weight. HEMATOLOGICAL: +ve anemia No easy bruising or bleeding. NEUROLOGIC: No headache, seizures, numbness, tingling or weakness. PSYCHIATRIC: No depression, anxiety, mood disorder, no loss of interest in normal       activity or change in sleep pattern.       Physical Exam:      Visit Vitals    /74    Pulse 90    Temp 99.2 °F (37.3 °C)    Resp 20    Ht 5' 3\" (1.6 m)    Wt 94.3 kg (208 lb)    SpO2 92%    Breastfeeding No    BMI 36.85 kg/m2    O2 Flow Rate (L/min): 6 l/min O2 Device: Humidifier, Nasal cannula    Temp (24hrs), Av.8 °F (37.7 °C), Min:99.1 °F (37.3 °C), Max:100.2 °F (37.9 °C)    1901 -  0700  In: -   Out: 324 [KCIBX:518]   701 - 1900  In: 0810 [I.V.:1490]  Out: 400 [Urine:400]     General:  Alert, cooperative,was in acute distress earlier and currently stable   HEENT:  NC, Atraumatic. PERRLA, anicteric sclerae. Pulmonary: decrease BS B/L with +ve rhonchi  Cardiovascular:  Regular  rhythm,  No murmur, No Rubs, No Gallops  GI:  Soft, Non distended, Non tender.  +Bowel sounds, no HSM. Has a mesh of her Lateral abdominal side and on her back   Extremities:  No edema, cyanosis, clubbing. No calf tenderness. Psych:  Good insight. Not anxious or agitated. Neurologic:  CN 2-12 grossly intact, Alert and oriented X 3. No acute neuro deficits. Labs Reviewed:  Lab results reviewed. For significant abnormal values and values requiring intervention, see assessment and plan.     Procedures/imaging: see electronic medical records for all procedures/Xrays and details which were not copied into this note but were reviewed prior to creation of Plan        Assessment/Plan     Principal Problem:    Shortness of breath (2018)    Active Problems:    Spinal stenosis of lumbar region at multiple levels (2018)      Scoliosis (2018)      Spinal stenosis (2018)      CAP (community acquired pneumonia) (2018)      Fever and chills (2018)      Pt is s/p Lumbar spinal fusion for hx of radiculopathy and spinal stenosis that failed OP Rx    Pt Developed SOB, productive Cough and a fever of 102 and now with mild elevation of lactic acid, tachycardia   Has -ve CTA for PE but shows ground glass opacities in Rt upper and lower lobes  Pt states that fever on DOA with worsening Leukocytosis   Pt is on Pain meds, gabapentin   DM with hyperglycemia   Anemia due to blood Loss    Will treat for CAP with Cefepime and Zithromax to cover for Pseudomonas considering Hx of DM and downgrade to Levaquin when more stable   Solumedrol x 1  Blood Cx  Will trend lactic acid  Will decrease Oxycodone to 5 mg Q 6 PRN and Gabapentin to 100 mg TID   Discontinue Diazepam PRN and start ativan 0.5 mg PRN anxiety     DM with Hyperglycemia >> DC PO meds and increase Lantus to 15 units BID instead of 10 units with SSI and adjust as needed     HTN >> controlled mostly >> continue current Home meds     Anemia due to blood Loss >> will continue Monitoring H/H and transfuse if Symptomatic >> currently feels better on Abx     Hx of 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 Comparison is made with prior body CT of 9/6/2017 and 2/25/2016, as  well as an MRI of February 2017. With regards to the above described liver  lesion, it has been chronically present, and prior imaging has confirmed that  this is a partially exophytic hemangioma likely with some adjacent scarring. The  caliber of the lesion is grossly unchanged. No further follow-up warranted with  regards to this lesion.      Thank you Dr Katelyn Haley for allowing to participate in the care of this Patient     Pieter Potts MD  9/2/2018 9:30 PM

## 2018-09-02 NOTE — PROGRESS NOTES
Rapid Response Note  [facility]    Patient: Erin Daniels 66 y.o. female  [id number]  1955      Admit Date: 8/30/2018   Admission Diagnosis: Spinal stenosis, lumbar region, without neurogenic claudication [M48.061]    RAPID RESPONSE     Rapid response called for hypoxemia. This has been ongoing issue. The patient has been desaturating on her SpO2 and her lips were noted to turn blue whenever the patient takes the oxygen off. The patient states she is uncomfortable with the supplemental O2. Patient recently had spinal fusion done 2 days ago and has been initially progressing well but start to have hypoxemia and needed to be on supplemental O2 and has not been able to be titrated down. Patient denies using O2 at home and denies h/o COPD, or lung issues. Denies needing to use CPAP at home either. The nursing staff states the patient was initially confused and AAOx2 but during the RRT, she was noted to be AAOx4. Vitals were stable during the RRT and she was placed on NRB initially but was switched to NC @ 6L. BP, HR were WNL and temp was 100.1F. Medications Reviewed    ROS Denies CP, dyspnea, abd pain, vision issues, numbness/tingling. Admits to mild headache and some nausea. OBJECTIVE     Visit Vitals    /68 (BP 1 Location: Left arm, BP Patient Position: At rest)    Pulse 97    Temp 99.1 °F (37.3 °C)    Resp 24    Ht 5' 3\" (1.6 m)    Wt 94.3 kg (208 lb)    SpO2 95%    Breastfeeding No    BMI 36.85 kg/m2       Physical Exam   Constitutional: She is oriented to person, place, and time. She appears well-developed and well-nourished. No distress. HENT:   Head: Normocephalic and atraumatic. Eyes: EOM are normal. No scleral icterus. Cardiovascular: Normal rate and regular rhythm. Pulmonary/Chest: Effort normal and breath sounds normal. No respiratory distress. Abdominal: Soft. She exhibits no distension.    Neurological: She is alert and oriented to person, place, and time. No cranial nerve deficit. Skin: She is not diaphoretic. Psychiatric: She has a normal mood and affect. Medications administered: None    EKG: Not performed    Labs: CBC, ABG    ASSESSMENT, PLAN & DISPOSITION   Ruthie Strauss is a 66y.o. year old female admitted for Spinal stenosis, lumbar region, without neurogenic claudication [M48.061]. Rapid response called for hypoxemia but not in respiratory distress. Patient condition currently: stable. Unclear etiology of hypoxemia. Patient's O2 saturation is normal with supplemental O2. Other than mild headache and slight nausea, patient feels OK. ABG was unremarkable. Will order CBC looking for anemia. CXR for possible infiltrates or atelectasis with noted chest congestion last night and increasing WBC. Will also order lactic acid  Possible undiagnosed h/o TANIA  May consider CTA if patient continues to require O2 and unable to titrate down. However, she is not in respiratory distress, not tachypneic. Frequent checks by the staff and encourage patient to continue supplemental O2 usage    Disposition: Patient's room    Attending Dr. Pretty Canales notified of rapid response. In agreement with plan. Primary team resuming care. Eber Sharma  PGY-2 [address] [facility]  09/01/18 8:25 PM

## 2018-09-03 LAB
ALBUMIN SERPL-MCNC: 2.7 G/DL (ref 3.4–5)
ALBUMIN/GLOB SERPL: 0.6 {RATIO} (ref 0.8–1.7)
ALP SERPL-CCNC: 89 U/L (ref 45–117)
ALT SERPL-CCNC: 35 U/L (ref 13–56)
ANION GAP SERPL CALC-SCNC: 9 MMOL/L (ref 3–18)
APPEARANCE UR: CLEAR
AST SERPL-CCNC: 36 U/L (ref 15–37)
BACTERIA URNS QL MICRO: ABNORMAL /HPF
BASOPHILS # BLD: 0 K/UL (ref 0–0.1)
BASOPHILS NFR BLD: 0 % (ref 0–2)
BILIRUB SERPL-MCNC: 0.5 MG/DL (ref 0.2–1)
BILIRUB UR QL: NEGATIVE
BUN SERPL-MCNC: 28 MG/DL (ref 7–18)
BUN/CREAT SERPL: 21 (ref 12–20)
CALCIUM SERPL-MCNC: 9.1 MG/DL (ref 8.5–10.1)
CHLORIDE SERPL-SCNC: 103 MMOL/L (ref 100–108)
CO2 SERPL-SCNC: 27 MMOL/L (ref 21–32)
COLOR UR: YELLOW
CREAT SERPL-MCNC: 1.36 MG/DL (ref 0.6–1.3)
DIFFERENTIAL METHOD BLD: ABNORMAL
EOSINOPHIL # BLD: 0 K/UL (ref 0–0.4)
EOSINOPHIL NFR BLD: 0 % (ref 0–5)
EPITH CASTS URNS QL MICRO: ABNORMAL /LPF (ref 0–5)
ERYTHROCYTE [DISTWIDTH] IN BLOOD BY AUTOMATED COUNT: 15.5 % (ref 11.6–14.5)
GLOBULIN SER CALC-MCNC: 4.9 G/DL (ref 2–4)
GLUCOSE BLD STRIP.AUTO-MCNC: 304 MG/DL (ref 70–110)
GLUCOSE BLD STRIP.AUTO-MCNC: 316 MG/DL (ref 70–110)
GLUCOSE BLD STRIP.AUTO-MCNC: 348 MG/DL (ref 70–110)
GLUCOSE BLD STRIP.AUTO-MCNC: 369 MG/DL (ref 70–110)
GLUCOSE SERPL-MCNC: 291 MG/DL (ref 74–99)
GLUCOSE UR STRIP.AUTO-MCNC: >1000 MG/DL
HCT VFR BLD AUTO: 30.9 % (ref 35–45)
HGB BLD-MCNC: 9.6 G/DL (ref 12–16)
HGB UR QL STRIP: NEGATIVE
KETONES UR QL STRIP.AUTO: NEGATIVE MG/DL
LEUKOCYTE ESTERASE UR QL STRIP.AUTO: NEGATIVE
LYMPHOCYTES # BLD: 0.7 K/UL (ref 0.9–3.6)
LYMPHOCYTES NFR BLD: 4 % (ref 21–52)
MCH RBC QN AUTO: 25.6 PG (ref 24–34)
MCHC RBC AUTO-ENTMCNC: 31.1 G/DL (ref 31–37)
MCV RBC AUTO: 82.4 FL (ref 74–97)
MONOCYTES # BLD: 0.9 K/UL (ref 0.05–1.2)
MONOCYTES NFR BLD: 6 % (ref 3–10)
NEUTS SEG # BLD: 14.8 K/UL (ref 1.8–8)
NEUTS SEG NFR BLD: 90 % (ref 40–73)
NITRITE UR QL STRIP.AUTO: NEGATIVE
PH UR STRIP: 5 [PH] (ref 5–8)
PLATELET # BLD AUTO: 350 K/UL (ref 135–420)
PMV BLD AUTO: 9.6 FL (ref 9.2–11.8)
POTASSIUM SERPL-SCNC: 4.3 MMOL/L (ref 3.5–5.5)
PROT SERPL-MCNC: 7.6 G/DL (ref 6.4–8.2)
PROT UR STRIP-MCNC: NEGATIVE MG/DL
RBC # BLD AUTO: 3.75 M/UL (ref 4.2–5.3)
RBC #/AREA URNS HPF: ABNORMAL /HPF (ref 0–5)
SODIUM SERPL-SCNC: 139 MMOL/L (ref 136–145)
SP GR UR REFRACTOMETRY: 1.02 (ref 1–1.03)
UROBILINOGEN UR QL STRIP.AUTO: 0.2 EU/DL (ref 0.2–1)
WBC # BLD AUTO: 16.4 K/UL (ref 4.6–13.2)
WBC URNS QL MICRO: ABNORMAL /HPF (ref 0–4)

## 2018-09-03 PROCEDURE — 74011636637 HC RX REV CODE- 636/637: Performed by: INTERNAL MEDICINE

## 2018-09-03 PROCEDURE — 74011000250 HC RX REV CODE- 250: Performed by: INTERNAL MEDICINE

## 2018-09-03 PROCEDURE — 74011636637 HC RX REV CODE- 636/637: Performed by: ORTHOPAEDIC SURGERY

## 2018-09-03 PROCEDURE — 77010033678 HC OXYGEN DAILY

## 2018-09-03 PROCEDURE — 65660000000 HC RM CCU STEPDOWN

## 2018-09-03 PROCEDURE — 87086 URINE CULTURE/COLONY COUNT: CPT | Performed by: INTERNAL MEDICINE

## 2018-09-03 PROCEDURE — 81001 URINALYSIS AUTO W/SCOPE: CPT | Performed by: ORTHOPAEDIC SURGERY

## 2018-09-03 PROCEDURE — 97116 GAIT TRAINING THERAPY: CPT

## 2018-09-03 PROCEDURE — 74011250637 HC RX REV CODE- 250/637: Performed by: INTERNAL MEDICINE

## 2018-09-03 PROCEDURE — 74011250637 HC RX REV CODE- 250/637: Performed by: ORTHOPAEDIC SURGERY

## 2018-09-03 PROCEDURE — 94640 AIRWAY INHALATION TREATMENT: CPT

## 2018-09-03 PROCEDURE — 82962 GLUCOSE BLOOD TEST: CPT

## 2018-09-03 PROCEDURE — 80053 COMPREHEN METABOLIC PANEL: CPT | Performed by: INTERNAL MEDICINE

## 2018-09-03 PROCEDURE — 36415 COLL VENOUS BLD VENIPUNCTURE: CPT | Performed by: INTERNAL MEDICINE

## 2018-09-03 PROCEDURE — 85025 COMPLETE CBC W/AUTO DIFF WBC: CPT | Performed by: INTERNAL MEDICINE

## 2018-09-03 PROCEDURE — 74011250636 HC RX REV CODE- 250/636: Performed by: INTERNAL MEDICINE

## 2018-09-03 RX ORDER — IPRATROPIUM BROMIDE AND ALBUTEROL SULFATE 2.5; .5 MG/3ML; MG/3ML
3 SOLUTION RESPIRATORY (INHALATION)
Status: DISCONTINUED | OUTPATIENT
Start: 2018-09-03 | End: 2018-09-04 | Stop reason: HOSPADM

## 2018-09-03 RX ORDER — TAMSULOSIN HYDROCHLORIDE 0.4 MG/1
0.4 CAPSULE ORAL DAILY
Status: DISCONTINUED | OUTPATIENT
Start: 2018-09-04 | End: 2018-09-04 | Stop reason: HOSPADM

## 2018-09-03 RX ORDER — INSULIN GLARGINE 100 [IU]/ML
20 INJECTION, SOLUTION SUBCUTANEOUS EVERY 12 HOURS
Status: DISCONTINUED | OUTPATIENT
Start: 2018-09-03 | End: 2018-09-04

## 2018-09-03 RX ADMIN — GABAPENTIN 100 MG: 100 CAPSULE ORAL at 17:10

## 2018-09-03 RX ADMIN — IPRATROPIUM BROMIDE AND ALBUTEROL SULFATE 3 ML: .5; 3 SOLUTION RESPIRATORY (INHALATION) at 19:54

## 2018-09-03 RX ADMIN — Medication 10 ML: at 15:39

## 2018-09-03 RX ADMIN — OXYCODONE HYDROCHLORIDE 5 MG: 5 TABLET ORAL at 05:10

## 2018-09-03 RX ADMIN — INSULIN GLARGINE 20 UNITS: 100 INJECTION, SOLUTION SUBCUTANEOUS at 23:05

## 2018-09-03 RX ADMIN — CEFEPIME HYDROCHLORIDE 2 G: 2 INJECTION, POWDER, FOR SOLUTION INTRAVENOUS at 11:02

## 2018-09-03 RX ADMIN — BISACODYL 10 MG: 5 TABLET, COATED ORAL at 08:34

## 2018-09-03 RX ADMIN — IPRATROPIUM BROMIDE AND ALBUTEROL SULFATE 3 ML: .5; 3 SOLUTION RESPIRATORY (INHALATION) at 00:45

## 2018-09-03 RX ADMIN — LISINOPRIL 20 MG: 20 TABLET ORAL at 08:35

## 2018-09-03 RX ADMIN — INSULIN LISPRO 12 UNITS: 100 INJECTION, SOLUTION INTRAVENOUS; SUBCUTANEOUS at 17:09

## 2018-09-03 RX ADMIN — GABAPENTIN 100 MG: 100 CAPSULE ORAL at 22:07

## 2018-09-03 RX ADMIN — VENLAFAXINE HYDROCHLORIDE 37.5 MG: 37.5 CAPSULE, EXTENDED RELEASE ORAL at 08:35

## 2018-09-03 RX ADMIN — IPRATROPIUM BROMIDE AND ALBUTEROL SULFATE 3 ML: .5; 3 SOLUTION RESPIRATORY (INHALATION) at 08:34

## 2018-09-03 RX ADMIN — AMLODIPINE BESYLATE 10 MG: 10 TABLET ORAL at 08:35

## 2018-09-03 RX ADMIN — Medication 10 ML: at 05:11

## 2018-09-03 RX ADMIN — METOPROLOL TARTRATE 50 MG: 50 TABLET ORAL at 08:35

## 2018-09-03 RX ADMIN — CEFEPIME HYDROCHLORIDE 2 G: 2 INJECTION, POWDER, FOR SOLUTION INTRAVENOUS at 22:08

## 2018-09-03 RX ADMIN — GABAPENTIN 100 MG: 100 CAPSULE ORAL at 08:34

## 2018-09-03 RX ADMIN — Medication 10 ML: at 15:37

## 2018-09-03 RX ADMIN — INSULIN GLARGINE 15 UNITS: 100 INJECTION, SOLUTION SUBCUTANEOUS at 08:30

## 2018-09-03 RX ADMIN — AZITHROMYCIN MONOHYDRATE 500 MG: 500 INJECTION, POWDER, LYOPHILIZED, FOR SOLUTION INTRAVENOUS at 22:51

## 2018-09-03 RX ADMIN — INSULIN LISPRO 15 UNITS: 100 INJECTION, SOLUTION INTRAVENOUS; SUBCUTANEOUS at 12:03

## 2018-09-03 RX ADMIN — INSULIN LISPRO 12 UNITS: 100 INJECTION, SOLUTION INTRAVENOUS; SUBCUTANEOUS at 22:05

## 2018-09-03 RX ADMIN — INSULIN LISPRO 12 UNITS: 100 INJECTION, SOLUTION INTRAVENOUS; SUBCUTANEOUS at 08:27

## 2018-09-03 RX ADMIN — ATORVASTATIN CALCIUM 40 MG: 40 TABLET, FILM COATED ORAL at 22:07

## 2018-09-03 RX ADMIN — IPRATROPIUM BROMIDE AND ALBUTEROL SULFATE 3 ML: .5; 3 SOLUTION RESPIRATORY (INHALATION) at 14:43

## 2018-09-03 RX ADMIN — IPRATROPIUM BROMIDE AND ALBUTEROL SULFATE 3 ML: .5; 3 SOLUTION RESPIRATORY (INHALATION) at 04:10

## 2018-09-03 RX ADMIN — PANTOPRAZOLE SODIUM 40 MG: 40 TABLET, DELAYED RELEASE ORAL at 08:35

## 2018-09-03 NOTE — PROGRESS NOTES
SUBJECTIVE:    Feeling much better. SOB better. Wants to go home. No chest pain. No cough currently. No nausea or vomiting or diarrhea. Little back pain. No dysuria. No dizziness. OBJECTIVE:    /71 (BP 1 Location: Right arm, BP Patient Position: At rest)  Pulse 71  Temp 97.8 °F (36.6 °C)  Resp 20  Ht 5' 3\" (1.6 m)  Wt 95.8 kg (211 lb 4.8 oz)  SpO2 95%  Breastfeeding? No  BMI 37.43 kg/m2    CVS: RRR  RS: Diminished in bases, no wheezes  GI: NT, BS +  Extremities: no pedal edema  CNS: normal motor strength in all extremities while in bed  General: NAD, Awake    ASSESSMENT:    1. SOB due to right atelectasis/infiltrates, better  2. Low Grade Fever, no recurrence   3. DM with A1c of 8.7  4. HTN  5. Recent lumbar surgery  6.  Leukocytosis     PLAN:    Cont current management  askled nursing to check spo2 on RA and document it  ABG on RA in am  Check bladder scan, UA and Urine c/s for # 6  Increase lantus   D/c lisinopril until she is here for renal insufficiency   PT/OT to follow    CMP:   Lab Results   Component Value Date/Time     09/03/2018 03:42 AM    K 4.3 09/03/2018 03:42 AM     09/03/2018 03:42 AM    CO2 27 09/03/2018 03:42 AM    AGAP 9 09/03/2018 03:42 AM     (H) 09/03/2018 03:42 AM    BUN 28 (H) 09/03/2018 03:42 AM    CREA 1.36 (H) 09/03/2018 03:42 AM    GFRAA 48 (L) 09/03/2018 03:42 AM    GFRNA 40 (L) 09/03/2018 03:42 AM    CA 9.1 09/03/2018 03:42 AM    ALB 2.7 (L) 09/03/2018 03:42 AM    TP 7.6 09/03/2018 03:42 AM    GLOB 4.9 (H) 09/03/2018 03:42 AM    AGRAT 0.6 (L) 09/03/2018 03:42 AM    SGOT 36 09/03/2018 03:42 AM    ALT 35 09/03/2018 03:42 AM     CBC:   Lab Results   Component Value Date/Time    WBC 16.4 (H) 09/03/2018 03:42 AM    HGB 9.6 (L) 09/03/2018 03:42 AM    HCT 30.9 (L) 09/03/2018 03:42 AM     09/03/2018 03:42 AM

## 2018-09-03 NOTE — PROGRESS NOTES
Problem: Mobility Impaired (Adult and Pediatric)  Goal: *Acute Goals and Plan of Care (Insert Text)  Physical Therapy Goals  Initiated 8/31/2018 and to be accomplished within 7 day(s)  1. Patient will move from supine to sit and sit to supine, scoot up and down, and roll side to side in bed with modified independence. 2.  Patient will perform sit to stand with modified independence. 3.  Patient will ambulate with modified independence for >100 feet with the least restrictive device. 4.  Patient will ascend/descend 8 stairs with 1 handrail(s) with supervision/set-up. physical Therapy TREATMENT    Patient: Ruthie Strauss (58 y.o. female)  Date: 9/3/2018  Diagnosis: Spinal stenosis, lumbar region, without neurogenic claudication [M48.061] Shortness of breath  Procedure(s) (LRB):  1) ANTERIOR LUMBAR INTERBODY FUSION (ALIF) L5; 3)POSTERIOR FIXATION L3-S1; C-ARM/DEPUY-SYNTHES (N/A)  2) EXTREME LATERAL INTERBODY FUSION L3/4 L4/5; NUVASIVE (N/A) 4 Days Post-Op  Precautions:  (spinal precautions)  Chart, physical therapy assessment, plan of care and goals were reviewed. ASSESSMENT:  Pt eager to participate with PT. States she is not having as much pain today and wants to walk further than she did yesterday. Demonstrates good bed mobility. Able to stand without AD and toilets @ bedside commode. Ambulation in hallway w/ RW, portable O2. Pt ambulates 2x around unit. Returned to room and pt requested to sit in bedside chair. Needs left within reach. Progression toward goals:  [x]      Improving appropriately and progressing toward goals  []      Improving slowly and progressing toward goals  []      Not making progress toward goals and plan of care will be adjusted     PLAN:  Patient continues to benefit from skilled intervention to address the above impairments. Continue treatment per established plan of care.   Discharge Recommendations:  Home Health  Further Equipment Recommendations for Discharge:  rolling walker and N/A     SUBJECTIVE:   Patient stated I feel a lot better today.   Mobility [id number] Current  CI= 1-19%   Goal  CI= 1-19%. The severity rating is based on the Other level of assistance required for functional mobility and ADLs. OBJECTIVE DATA SUMMARY:   Critical Behavior:  Neurologic State: Alert  Orientation Level: Oriented X4  Cognition: Follows commands  Safety/Judgement: Good awareness of safety precautions  Functional Mobility Training:  Bed Mobility:     Supine to Sit: Supervision  Sit to Supine: Stand-by assistance;Contact guard assistance            Transfers:  Sit to Stand: Supervision  Stand to Sit: Supervision        Balance:  Sitting: Intact  Standing: Impaired; With support  Standing - Static: Good  Standing - Dynamic : Fair  Ambulation/Gait Training:  Distance (ft): 450 Feet (ft)  Assistive Device: Walker, rolling  Ambulation - Level of Assistance: Stand-by assistance        Gait Abnormalities: Decreased step clearance     Speed/Patricia: Slow  Step Length: Right shortened;Left shortened     Pain:  Pt pain was reported as  Na  pre-treatment. Pt pain was reported as na  post-treatment. Activity Tolerance:   Good  Please refer to the flowsheet for vital signs taken during this treatment.   After treatment:   [x] Patient left in no apparent distress sitting up in chair  [] Patient left in no apparent distress in bed  [x] Call bell left within reach  [x] Nursing notified  [] Caregiver present  [] Bed alarm activated      Juan Moe PTA   Time Calculation: 29 mins

## 2018-09-03 NOTE — PROGRESS NOTES
[facility]   ADULT PROTOCOL: JET AEROSOL ASSESSMENT    Patient  Erin Daniels     66 y.o.   female     9/3/2018  4:30 PM    Breath Sounds Pre Procedure:  Breath Sounds Bilateral: Diminished                                            Breath Sounds Post Procedure: Breath Sounds Bilateral: Diminished                                               Breathing pattern: Pre procedure  Breathing Pattern: Regular          Post procedure  Breathing Pattern: Regular    Cough: Pre procedure  Cough: Non-productive               Post procedure Cough: Non-productive    Heart Rate: Pre procedure Pulse: 78           Post procedure Pulse: 78    Resp Rate: Pre procedure  Respirations: 16           Post procedure          Nebulizer Therapy: Current medications Aerosolized Medications: DuoNeb       Problem List:   Patient Active Problem List   Diagnosis Code    Lumbosacral spondylosis without myelopathy M47.817    DDD (degenerative disc disease), lumbar M51.36    Malignant neoplasm of female breast ([facility] [address] C50.919    Spinal stenosis, lumbar region without neurogenic claudication M48.061    Severe [facility] (BMI 35.0-39.9) [facility] E66.01    Pre-op testing Z01.818    Spinal stenosis of lumbar region at multiple levels M48.061    Scoliosis M41.9    Spinal stenosis M48.00    CAP (community acquired pneumonia) J18.9    Shortness of breath R06.02    Fever and chills R50.9       Patient alert and cooperative to use MDI: Yes    Home Respiratory Therapy Regimen/Frequency:  NO  Medication   Device  Frequency     SEVERITY INDEX:    ITEM 0 1 2 3 4 Score   Respiratory Pattern and or Rate Regular  10-19 Regular  20-24   24-30    30-34 Severe SOB or   Greater than 35 0   Breath Sounds Clear Occasional Wheeze Mild Wheezing Moderate Wheezing  wheezing/Absent breath sounds 0   Shortness of Breath None Dyspnea on Exertion Dyspnea at Rest Moderate Shortness of Breath at Rest Severe Shortness of Breath - Limited Speech 1       Total Score:  1    * Scoring Guidelines  0-4 pts:  PRN-BID   5-7 pts:  BID, TID, QID  8-9 pts:  TID, QID, Q6  10-12 pts:  Q4-Q6  * - Guidelines used with clinical judgement. PRN Treatments can be ordered to supplement scheduled treatments. Regardless of score, frequency should not be less than normal home regimen.     Recommended Order/Frequency:  Q6W/A  Duoneb     Comments:          Respiratory Therapist: Malia Grigsby, RT

## 2018-09-03 NOTE — PROGRESS NOTES
Bedside shift change report given to [address] [place], [place] [address]way [address] (oncoming nurse) by Kurt Post (offgoing nurse). Report included the following information SBAR.

## 2018-09-03 NOTE — PROGRESS NOTES
[facility]    Progress Note      Patient: Eddie Melo               Sex: female          DOA: 8/30/2018         YOB: 1955      Age:  66 y.o.        LOS:  LOS: 4 days         S/P  Procedure(s):  1) ANTERIOR LUMBAR INTERBODY FUSION (ALIF) L5; 3)POSTERIOR FIXATION L3-S1; C-ARM/DEPUY-SYNTHES  2) EXTREME LATERAL INTERBODY FUSION L3/4 L4/5; NUVASIVE               Subjective:     No complaints Tolerating diet Ambulating Voiding q shift. She is doing well this morning. She has verbalized readiness to go home. She did not have questions today. Denies cp, sob, abd pain   Objective:      Blood pressure 145/67, pulse 82, temperature 97.5 °F (36.4 °C), resp. rate 20, height 5' 3\" (1.6 m), weight 211 lb 4.8 oz (95.8 kg), SpO2 95 %, not currently breastfeeding.    Well developed, Well Nourished alert, cooperative, no distress  Incision clean, dry, no drainage, dressing in place  Neg swelling and tenderness noted in low back  Sensory is intact   Motor is intact  nv intact  2+distal pulses  Neg calf tenderness  Neg for facial asymmetry     Labs:  CBC  @  CBC:   Lab Results   Component Value Date/Time    WBC 16.4 (H) 09/03/2018 03:42 AM    RBC 3.75 (L) 09/03/2018 03:42 AM    HGB 9.6 (L) 09/03/2018 03:42 AM    HCT 30.9 (L) 09/03/2018 03:42 AM    PLATELET 962 49/03/1362 03:42 AM     BMP:   Lab Results   Component Value Date/Time    Glucose 291 (H) 09/03/2018 03:42 AM    Sodium 139 09/03/2018 03:42 AM    Potassium 4.3 09/03/2018 03:42 AM    Chloride 103 09/03/2018 03:42 AM    CO2 27 09/03/2018 03:42 AM    BUN 28 (H) 09/03/2018 03:42 AM    Creatinine 1.36 (H) 09/03/2018 03:42 AM    Calcium 9.1 09/03/2018 03:42 AM   @  Coagulation  No results found for: INR, APTT   Basic Metabolic Profile  Lab Results   Component Value Date     09/03/2018    CO2 27 09/03/2018    BUN 28 (H) 09/03/2018       Medications Reviewed      Assessment/Plan     Principal Problem:    Shortness of breath (9/1/2018)    Active Problems:    Spinal stenosis of lumbar region at multiple levels (8/30/2018)      Scoliosis (8/30/2018)      Spinal stenosis (8/30/2018)      CAP (community acquired pneumonia) (9/1/2018)      Fever and chills (9/1/2018)        Procedure(s):  1) ANTERIOR LUMBAR INTERBODY FUSION (ALIF) L5; 3)POSTERIOR FIXATION L3-S1; C-ARM/DEPUY-SYNTHES  2) EXTREME LATERAL INTERBODY FUSION L3/4 L4/5; NUVASIVE :      1. PT and/or OT Consults: YES continue PT/OT: oob to chair, post surgical                                                 2. Incision Care:  Routine Incision Care and Dressing Changes as necessary:  as needed  3. Pain control in chart for discharge  4.  DVT Prophylaxis - SCD in place     DISCHARGE PLANNING      Intervention : Home with H/H when ok by medicine          [facility]  [phone]

## 2018-09-04 VITALS
DIASTOLIC BLOOD PRESSURE: 83 MMHG | WEIGHT: 211.9 LBS | TEMPERATURE: 97.9 F | BODY MASS INDEX: 37.55 KG/M2 | HEART RATE: 74 BPM | OXYGEN SATURATION: 91 % | HEIGHT: 63 IN | SYSTOLIC BLOOD PRESSURE: 162 MMHG | RESPIRATION RATE: 18 BRPM

## 2018-09-04 LAB
ANION GAP SERPL CALC-SCNC: 7 MMOL/L (ref 3–18)
ARTERIAL PATENCY WRIST A: YES
BASE EXCESS BLD CALC-SCNC: 6 MMOL/L
BASOPHILS # BLD: 0 K/UL (ref 0–0.06)
BASOPHILS NFR BLD: 0 % (ref 0–3)
BDY SITE: ABNORMAL
BODY TEMPERATURE: 37
BUN SERPL-MCNC: 26 MG/DL (ref 7–18)
BUN/CREAT SERPL: 24 (ref 12–20)
CALCIUM SERPL-MCNC: 8.4 MG/DL (ref 8.5–10.1)
CHLORIDE SERPL-SCNC: 106 MMOL/L (ref 100–108)
CO2 SERPL-SCNC: 28 MMOL/L (ref 21–32)
CREAT SERPL-MCNC: 1.09 MG/DL (ref 0.6–1.3)
DIFFERENTIAL METHOD BLD: ABNORMAL
EOSINOPHIL # BLD: 0 K/UL (ref 0–0.4)
EOSINOPHIL NFR BLD: 0 % (ref 0–5)
ERYTHROCYTE [DISTWIDTH] IN BLOOD BY AUTOMATED COUNT: 15.9 % (ref 11.6–14.5)
GAS FLOW.O2 O2 DELIVERY SYS: ABNORMAL L/MIN
GLUCOSE BLD STRIP.AUTO-MCNC: 190 MG/DL (ref 70–110)
GLUCOSE BLD STRIP.AUTO-MCNC: 234 MG/DL (ref 70–110)
GLUCOSE BLD STRIP.AUTO-MCNC: 265 MG/DL (ref 70–110)
GLUCOSE SERPL-MCNC: 186 MG/DL (ref 74–99)
HCO3 BLD-SCNC: 29.7 MMOL/L (ref 22–26)
HCT VFR BLD AUTO: 27.4 % (ref 35–45)
HGB BLD-MCNC: 8.5 G/DL (ref 12–16)
LYMPHOCYTES # BLD: 1.5 K/UL (ref 0.8–3.5)
LYMPHOCYTES NFR BLD: 10 % (ref 20–51)
MCH RBC QN AUTO: 25.5 PG (ref 24–34)
MCHC RBC AUTO-ENTMCNC: 31 G/DL (ref 31–37)
MCV RBC AUTO: 82.3 FL (ref 74–97)
MONOCYTES # BLD: 0.9 K/UL (ref 0–1)
MONOCYTES NFR BLD: 6 % (ref 2–9)
NEUTS BAND NFR BLD MANUAL: 9 % (ref 0–5)
NEUTS SEG # BLD: 12.1 K/UL (ref 1.8–8)
NEUTS SEG NFR BLD: 75 % (ref 42–75)
O2/TOTAL GAS SETTING VFR VENT: 0.21 %
PCO2 BLD: 43.7 MMHG (ref 35–45)
PH BLD: 7.44 [PH] (ref 7.35–7.45)
PLATELET # BLD AUTO: 360 K/UL (ref 135–420)
PLATELET COMMENTS,PCOM: ABNORMAL
PMV BLD AUTO: 9.3 FL (ref 9.2–11.8)
PO2 BLD: 68 MMHG (ref 80–100)
POTASSIUM SERPL-SCNC: 4.2 MMOL/L (ref 3.5–5.5)
RBC # BLD AUTO: 3.33 M/UL (ref 4.2–5.3)
RBC MORPH BLD: ABNORMAL
SAO2 % BLD: 94 % (ref 92–97)
SERVICE CMNT-IMP: ABNORMAL
SODIUM SERPL-SCNC: 141 MMOL/L (ref 136–145)
SPECIMEN TYPE: ABNORMAL
TOTAL RESP. RATE, ITRR: 20
WBC # BLD AUTO: 14.5 K/UL (ref 4.6–13.2)

## 2018-09-04 PROCEDURE — 74011000250 HC RX REV CODE- 250: Performed by: INTERNAL MEDICINE

## 2018-09-04 PROCEDURE — 97116 GAIT TRAINING THERAPY: CPT

## 2018-09-04 PROCEDURE — 82803 BLOOD GASES ANY COMBINATION: CPT

## 2018-09-04 PROCEDURE — 74011636637 HC RX REV CODE- 636/637: Performed by: ORTHOPAEDIC SURGERY

## 2018-09-04 PROCEDURE — 36415 COLL VENOUS BLD VENIPUNCTURE: CPT | Performed by: INTERNAL MEDICINE

## 2018-09-04 PROCEDURE — 51798 US URINE CAPACITY MEASURE: CPT

## 2018-09-04 PROCEDURE — 82962 GLUCOSE BLOOD TEST: CPT

## 2018-09-04 PROCEDURE — 85025 COMPLETE CBC W/AUTO DIFF WBC: CPT | Performed by: INTERNAL MEDICINE

## 2018-09-04 PROCEDURE — 74011250637 HC RX REV CODE- 250/637: Performed by: INTERNAL MEDICINE

## 2018-09-04 PROCEDURE — 94640 AIRWAY INHALATION TREATMENT: CPT

## 2018-09-04 PROCEDURE — 74011250637 HC RX REV CODE- 250/637: Performed by: ORTHOPAEDIC SURGERY

## 2018-09-04 PROCEDURE — 36600 WITHDRAWAL OF ARTERIAL BLOOD: CPT

## 2018-09-04 PROCEDURE — 80048 BASIC METABOLIC PNL TOTAL CA: CPT | Performed by: INTERNAL MEDICINE

## 2018-09-04 PROCEDURE — 74011636637 HC RX REV CODE- 636/637: Performed by: INTERNAL MEDICINE

## 2018-09-04 RX ORDER — INSULIN LISPRO 100 [IU]/ML
10 INJECTION, SOLUTION INTRAVENOUS; SUBCUTANEOUS
Status: DISCONTINUED | OUTPATIENT
Start: 2018-09-04 | End: 2018-09-04 | Stop reason: HOSPADM

## 2018-09-04 RX ORDER — CEFPODOXIME PROXETIL 200 MG/1
200 TABLET, FILM COATED ORAL EVERY 12 HOURS
Qty: 10 TAB | Refills: 0 | Status: SHIPPED | OUTPATIENT
Start: 2018-09-04 | End: 2018-09-09

## 2018-09-04 RX ORDER — INSULIN GLARGINE 100 [IU]/ML
30 INJECTION, SOLUTION SUBCUTANEOUS DAILY
Status: DISCONTINUED | OUTPATIENT
Start: 2018-09-05 | End: 2018-09-04 | Stop reason: HOSPADM

## 2018-09-04 RX ORDER — ALBUTEROL SULFATE 90 UG/1
AEROSOL, METERED RESPIRATORY (INHALATION)
Qty: 1 INHALER | Refills: 0 | Status: SHIPPED | OUTPATIENT
Start: 2018-09-04

## 2018-09-04 RX ORDER — TAMSULOSIN HYDROCHLORIDE 0.4 MG/1
0.4 CAPSULE ORAL DAILY
Qty: 30 CAP | Refills: 3 | Status: SHIPPED | OUTPATIENT
Start: 2018-09-04 | End: 2018-12-28 | Stop reason: SDUPTHER

## 2018-09-04 RX ORDER — AZITHROMYCIN 250 MG/1
500 TABLET, FILM COATED ORAL DAILY
Status: DISCONTINUED | OUTPATIENT
Start: 2018-09-04 | End: 2018-09-04 | Stop reason: HOSPADM

## 2018-09-04 RX ORDER — AZITHROMYCIN 500 MG/1
500 TABLET, FILM COATED ORAL DAILY
Qty: 3 TAB | Refills: 0 | Status: SHIPPED | OUTPATIENT
Start: 2018-09-05 | End: 2018-09-08

## 2018-09-04 RX ORDER — INSULIN GLARGINE 100 [IU]/ML
INJECTION, SOLUTION SUBCUTANEOUS
Qty: 10 ML | Refills: 0 | Status: SHIPPED
Start: 2018-09-04 | End: 2021-09-27

## 2018-09-04 RX ORDER — CEFPODOXIME PROXETIL 200 MG/1
200 TABLET, FILM COATED ORAL EVERY 12 HOURS
Status: DISCONTINUED | OUTPATIENT
Start: 2018-09-04 | End: 2018-09-04 | Stop reason: HOSPADM

## 2018-09-04 RX ADMIN — Medication 5 ML: at 06:00

## 2018-09-04 RX ADMIN — GABAPENTIN 100 MG: 100 CAPSULE ORAL at 17:23

## 2018-09-04 RX ADMIN — BISACODYL 10 MG: 5 TABLET, COATED ORAL at 09:12

## 2018-09-04 RX ADMIN — Medication 10 ML: at 01:15

## 2018-09-04 RX ADMIN — Medication 10 ML: at 14:41

## 2018-09-04 RX ADMIN — VENLAFAXINE HYDROCHLORIDE 37.5 MG: 37.5 CAPSULE, EXTENDED RELEASE ORAL at 09:12

## 2018-09-04 RX ADMIN — METOPROLOL TARTRATE 50 MG: 50 TABLET ORAL at 09:13

## 2018-09-04 RX ADMIN — OXYCODONE HYDROCHLORIDE 5 MG: 5 TABLET ORAL at 14:39

## 2018-09-04 RX ADMIN — OXYCODONE HYDROCHLORIDE 5 MG: 5 TABLET ORAL at 01:14

## 2018-09-04 RX ADMIN — INSULIN GLARGINE 20 UNITS: 100 INJECTION, SOLUTION SUBCUTANEOUS at 09:14

## 2018-09-04 RX ADMIN — INSULIN LISPRO 10 UNITS: 100 INJECTION, SOLUTION INTRAVENOUS; SUBCUTANEOUS at 11:30

## 2018-09-04 RX ADMIN — PANTOPRAZOLE SODIUM 40 MG: 40 TABLET, DELAYED RELEASE ORAL at 09:13

## 2018-09-04 RX ADMIN — GABAPENTIN 100 MG: 100 CAPSULE ORAL at 09:13

## 2018-09-04 RX ADMIN — AZITHROMYCIN 500 MG: 250 TABLET, FILM COATED ORAL at 14:39

## 2018-09-04 RX ADMIN — Medication 10 ML: at 14:42

## 2018-09-04 RX ADMIN — AMLODIPINE BESYLATE 10 MG: 10 TABLET ORAL at 09:12

## 2018-09-04 RX ADMIN — INSULIN LISPRO 10 UNITS: 100 INJECTION, SOLUTION INTRAVENOUS; SUBCUTANEOUS at 17:23

## 2018-09-04 RX ADMIN — TAMSULOSIN HYDROCHLORIDE 0.4 MG: 0.4 CAPSULE ORAL at 09:13

## 2018-09-04 RX ADMIN — CEFPODOXIME PROXETIL 200 MG: 200 TABLET, FILM COATED ORAL at 10:00

## 2018-09-04 RX ADMIN — IPRATROPIUM BROMIDE AND ALBUTEROL SULFATE 3 ML: .5; 3 SOLUTION RESPIRATORY (INHALATION) at 07:18

## 2018-09-04 RX ADMIN — INSULIN LISPRO 3 UNITS: 100 INJECTION, SOLUTION INTRAVENOUS; SUBCUTANEOUS at 09:13

## 2018-09-04 NOTE — CDMP QUERY
The diagnosis of pneumonia is unclear. Please clarify if this patient is being treated/managed for: 
 
=> Pneumonia (please clarify POA status or post-operative) 
or 
=> Atelectasis (no pneumonia) (please clarify POA status or post-operative) 
=> Other Explanation of clinical findings 
=> Unable to Determine (no explanation of clinical findings) The medical record reflects the following: 
Risk: surgery done on 8/31, fever with chills on 9/1 Clinical Indicators: CXR 9/1: Septal thickening without vascular congestion could represent atypical pneumonia. Edema less likely. Might be fibrosis. Chest CT 9/1: Groundglass densities and slight consolidative change right lung as above. Please clinically clinically for atelectasis or infiltrate Treatment:  hospitalist was consulted, given Zithromax IV, now on Zithromax PO, rapid response was called on 9/1 d/t hypoxemia Please clarify and document your clinical opinion in the progress notes and discharge summary including the definitive and/or presumptive diagnosis, (suspected or probable), related to the above clinical findings. Please include clinical findings supporting your diagnosis. If you DECLINE this query or would like to communicate with [facility], please utilize the \"General Compression message box\" at the TOP of the Progress Note on the right. Thank you, [id number] [facility], [facility]

## 2018-09-04 NOTE — DISCHARGE INSTRUCTIONS
Lumbar Spinal Stenosis: Care Instructions  Your Care Instructions    Stenosis in the spine is a narrowing of the canal that is around the spinal cord and nerve roots in your back. It can happen as part of aging. Sometimes bone and other tissue grow into this canal and press on the nerves that branch out from the spinal cord. This can cause pain, numbness, and weakness. When it happens in the lower part of your back, it is called lumbar spinal stenosis. It can cause problems in the legs, feet, and rear end (buttocks). You may be able to get relief from the symptoms of spinal stenosis by taking pain medicine. Your doctor may suggest physical therapy and exercises to keep your spine strong and flexible. Some people try steroid shots to reduce swelling. If pain and numbness in your legs are still so bad that you cannot do your normal activities, you may need surgery. Follow-up care is a key part of your treatment and safety. Be sure to make and go to all appointments, and call your doctor if you are having problems. It's also a good idea to know your test results and keep a list of the medicines you take. How can you care for yourself at home? · Take an over-the-counter pain medicine. Nonsteroidal anti-inflammatory drugs (NSAIDs) such as ibuprofen or naproxen seem to work best. But if you can't take NSAIDs, you can try acetaminophen. Be safe with medicines. Read and follow all instructions on the label. · Do not take two or more pain medicines at the same time unless the doctor told you to. Many pain medicines have acetaminophen, which is Tylenol. Too much acetaminophen (Tylenol) can be harmful. · Stay at a healthy weight. Being overweight puts extra strain on your spine. · Change positions often when you sit or stand. This can ease pain. It may also reduce pressure on the spinal cord and its nerves. · Avoid doing things that make your symptoms worse.  Walking downhill and standing for a long time may cause pain. · Stretch and strengthen your back muscles as your doctor or physical therapist recommends. If your doctor says it is okay to do them, these exercises may help. ¨ Lie on your back with your knees bent. Gently pull one bent knee to your chest. Put that foot back on the floor, and then pull the other knee to your chest.  ¨ Do pelvic tilts. Lie on your back with your knees bent. Tighten your stomach muscles. Pull your belly button (navel) in and up toward your ribs. You should feel like your back is pressing to the floor and your hips and pelvis are slightly lifting off the floor. Hold for 6 seconds while breathing smoothly. ¨ Stand with your back flat against a wall. Slowly slide down until your knees are slightly bent. Hold for 10 seconds, then slide back up the wall. · Remove or change anything in your house that may cause you to fall. Keep walkways clear of clutter, electrical cords, and throw rugs. When should you call for help? Call 911 anytime you think you may need emergency care. For example, call if:    · You are unable to move a leg at all.   [facility] your doctor now or seek immediate medical care if:    · You have new or worse symptoms in your legs, belly, or buttocks. Symptoms may include:  ¨ Numbness or tingling. ¨ Weakness. ¨ Pain.     · You lose bladder or bowel control.    Watch closely for changes in your health, and be sure to contact your doctor if:    · You have a fever, lose weight, or don't feel well.     · You are not getting better as expected. Where can you learn more? Go to http://abraham-mary anne.info/. Yuri Culp in the search box to learn more about \"Lumbar Spinal Stenosis: Care Instructions. \"  Current as of: November 29, 2017  Content Version: 11.7  © 8055-2012 [employer]. Care instructions adapted under license by [employer] (which disclaims liability or warranty for this information).  If you have questions about a medical condition or this instruction, always ask your healthcare professional. [address] any warranty or liability for your use of this information. Mbite Activation    Thank you for requesting access to Mbite. Please follow the instructions below to securely access and download your online medical record. Mbite allows you to send messages to your doctor, view your test results, renew your prescriptions, schedule appointments, and more. How Do I Sign Up? 1. In your internet browser, go to [email or web address]  2. Click on the First Time User? Click Here link in the Sign In box. You will be redirect to the New Member Sign Up page. 3. Enter your Mbite Access Code exactly as it appears below. You will not need to use this code after youve completed the sign-up process. If you do not sign up before the expiration date, you must request a new code. Mbite Access Code: [email or web address]  Expires: 2018  6:59 AM (This is the date your Mbite access code will )    4. Enter the last four digits of your Social Security Number (xxxx) and Date of Birth (mm/dd/yyyy) as indicated and click Submit. You will be taken to the next sign-up page. 5. Create a Mbite ID. This will be your Mbite login ID and cannot be changed, so think of one that is secure and easy to remember. 6. Create a Mbite password. You can change your password at any time. 7. Enter your Password Reset Question and Answer. This can be used at a later time if you forget your password. 8. Enter your e-mail address. You will receive e-mail notification when new information is available in [address] 9. Click Sign Up. You can now view and download portions of your medical record. 10. Click the Download Summary menu link to download a portable copy of your medical information.     Additional Information    If you have questions, please visit the Frequently Asked Questions section of the Mbite website at [email or web address] Remember, MyChart is NOT to be used for urgent needs. For medical emergencies, dial 911. Patient armband removed and shreddedq    PATIENT DISCHARGE INSTRUCTIONS      PATIENT DISCHARGE INSTRUCTIONS    Ambar Bunn / [id number] : 1955    Admitted 2018 Discharged: 2018       · It is important that you take the medication exactly as they are prescribed. · Keep your medication in the bottles provided by the pharmacist and keep a list of the medication names, dosages, and times to be taken in your wallet. · Do not take other medications without consulting your doctor. What to do at Home    Recommended Diet: Diabetic Diet    Recommended Activity: No lifting, Driving, or Strenuous exercise for 2 weeks    If you experience any of the following symptoms  fever greater than 101.5, increased pain or wound drainage,, please follow up with [facility] Provider.   .        Signed By: John Salguero NP     2018

## 2018-09-04 NOTE — DISCHARGE SUMMARY
Discharge  Summary     Patient: Mike Carlisle MRN: [id number]  SSN: [id number]    YOB: 1955  Age: 66 y.o. Sex: female       Admit Date: 8/30/2018    Discharge Date: 9/4/2018      Admission Diagnoses: Spinal stenosis, lumbar region, without neurogenic claudication [M48.061]    Discharge Diagnoses:   Problem List as of 9/4/2018  Date Reviewed: 7/6/2018          Codes Class Noted - Resolved    CAP (community acquired pneumonia) ICD-10-CM: J18.9  ICD-9-CM: 486  9/1/2018 - Present        * (Principal)Shortness of breath ICD-10-CM: R06.02  ICD-9-CM: 786.05  9/1/2018 - Present        Fever and chills ICD-10-CM: R50.9  ICD-9-CM: 780.60  9/1/2018 - Present        Spinal stenosis of lumbar region at multiple levels ICD-10-CM: M48.061  ICD-9-CM: 724.02  8/30/2018 - Present        Scoliosis ICD-10-CM: M41.9  ICD-9-CM: 737.30  8/30/2018 - Present        Spinal stenosis ICD-10-CM: M48.00  ICD-9-CM: 724.00  8/30/2018 - Present        Pre-op testing ICD-10-CM: Z01.818  ICD-9-CM: V72.84  8/27/2018 - Present        Severe obesity (BMI 35.0-39.9) ([facility] ICD-10-CM: E66.01  ICD-9-CM: 278.01  6/20/2018 - Present        Spinal stenosis, lumbar region without neurogenic claudication ICD-10-CM: M48.061  ICD-9-CM: 724.02  5/16/2018 - Present        Lumbosacral spondylosis without myelopathy ICD-10-CM: M47.817  ICD-9-CM: 721.3  4/25/2018 - Present        DDD (degenerative disc disease), lumbar ICD-10-CM: M51.36  ICD-9-CM: 722.52  4/25/2018 - Present        Malignant neoplasm of female breast [facility]) ICD-10-CM: C50.919  ICD-9-CM: 174.9  4/25/2018 - Present               Discharge Condition: Good    Procedure: Anterior lumbar interbody fusion L5-S1 PEEK interbody cage SYNFIX type, anterior plate fixation Q7-V1 with SYNFIX plate and screws, Infuse bone graft. Extreme lateral interbody fusion L3-4 and L4-L5, NuVasive PEEK interbody cage L3-4 and L4-5, Infuse bone graft.   NuVasive intraoperative neuro monitoring, NeuroVision type.  Posterior segmental fixation L3, L4, L5, S1.  [employer] pedicle screw system with screws and rods. Segmental spinal instrumentation. Hospital Course: Tolerated surgical intervention well. On POD # 2 pt developed SOB, leukocytosis (18.2), productive cough and a fever of 102. Pt as she was noticed to be repeatedly hypoxic and RRT was called and CTA was done STAT and PE was ruled out but showed Rt lung ground glass appearance and opacity. Hospitalist team was consulted for medical management including her diabetes and hypertension. She was treated with antibiotics (Cefepime and Zithromax) and solumedrol x 1. Urine and blood cultures were negative. CXR showed no focal pneumonia or CHF. Her symptoms were thought to be from atelectasis. She was on 5 L O2 vis NC and has been weaned off by day of discharge. She has done well with physical therapy and ambulated 350 feet on day on discharge. Her back and leg pain improved with surgery and remain controlled with po pain meds and neurontin. She will complete zithromax and vantin at home. Disposition: home    Discharge Medications:   Current Discharge Medication List      START taking these medications    Details   tamsulosin (FLOMAX) 0.4 mg capsule Take 1 Cap by mouth daily. Qty: 30 Cap, Refills: 3      azithromycin (ZITHROMAX) 500 mg tab Take 1 Tab by mouth daily for 3 days. Qty: 3 Tab, Refills: 0      cefpodoxime (VANTIN) 200 mg tablet Take 1 Tab by mouth every twelve (12) hours for 5 days. Qty: 10 Tab, Refills: 0      albuterol (PROVENTIL HFA, VENTOLIN HFA, PROAIR HFA) 90 mcg/actuation inhaler 2 puffs three to four times a day for 5 days then every 6 hours as needed for shortness of breath  Qty: 1 Inhaler, Refills: 0      HYDROcodone-acetaminophen (NORCO)  mg tablet Take 1 Tab by mouth every six (6) hours as needed for Pain. Max Daily Amount: 4 Tabs.  Indications: Pain, post op pain  Qty: 20 Tab, Refills: 0    Associated Diagnoses: Spinal stenosis of lumbar region at multiple levels      promethazine (PHENERGAN) 25 mg tablet Take 0.5 Tabs by mouth every eight (8) hours as needed for Nausea. Indications: POST-OPERATIVE NAUSEA AND VOMITING, PREVENTION OF POST-OPERATIVE NAUSEA AND VOMITING  Qty: 10 Tab, Refills: 0    Associated Diagnoses: Spinal stenosis of lumbar region at multiple levels      naloxone (NARCAN) 4 mg/actuation nasal spray Use 1 spray intranasally, then discard. Repeat with new spray every 2 min as needed for opioid overdose symptoms, alternating nostrils. Qty: 1 Each, Refills: 0    Associated Diagnoses: Spinal stenosis of lumbar region at multiple levels         CONTINUE these medications which have CHANGED    Details   LANTUS SOLOSTAR U-100 INSULIN 100 unit/mL (3 mL) inpn 30 units subcutaneously in morning. Can take an additional dose of 10 units subcutaneously in the evening if blood sugar is more than 200. Qty: 10 mL, Refills: 0         CONTINUE these medications which have NOT CHANGED    Details   LORazepam (ATIVAN) 0.5 mg tablet Take 0.5 mg by mouth as needed for Anxiety. gabapentin (NEURONTIN) 600 mg tablet Take 1 Tab by mouth three (3) times daily. Qty: 90 Tab, Refills: 1      insulin lispro (HUMALOG) 100 unit/mL kwikpen by SubCUTAneous route. atorvastatin (LIPITOR) 40 mg tablet Take  by mouth nightly. rosuvastatin (CRESTOR) 10 mg tablet Take 10 mg by mouth daily. furosemide (LASIX) 20 mg tablet TAKE 1 TABLET BY MOUTH ONCE DAILY AS NEEDED  Refills: 0      amLODIPine (NORVASC) 10 mg tablet daily. Indications: hypertension  Refills: 0      esomeprazole (NEXIUM) 20 mg capsule daily. Refills: 0      hydroCHLOROthiazide (MICROZIDE) 12.5 mg capsule Refills: 0      TRADJENTA 5 mg tablet TAKE 1 TABLET BY MOUTH ONCE A DAY  Refills: 0      metoprolol tartrate (LOPRESSOR) 50 mg tablet daily.   Refills: 0      NOVOFINE 32 32 gauge x 1/4\" ndle Refills: 0      venlafaxine-SR (EFFEXOR-XR) 37.5 mg capsule daily. Refills: 0         STOP taking these medications       glipiZIDE (GLUCOTROL) 10 mg tablet Comments:   Reason for Stopping:         lisinopril (PRINIVIL, ZESTRIL) 20 mg tablet Comments:   Reason for Stopping: Follow-up Appointments   Procedures    FOLLOW UP VISIT Appointment in: Two Weeks     Standing Status:   Standing     Number of Occurrences:   1     Order Specific Question:   Appointment in     Answer:    Two Weeks       Signed By: Celso Anthony NP     September 4, 2018

## 2018-09-04 NOTE — PROGRESS NOTES
Pt signed [facility]. [address] and spoke with Jeffy Matamoros. Faxed documents to [employer] 1330:  Nahid Houser and they state they are working on pt's orders. Pt signed IMFM. Original filed in pt's blue chart and copy given to pt.

## 2018-09-04 NOTE — PROGRESS NOTES
PT treatment completed, patient cleared for discharge home from functional mobility standpoint. Recommendation for Home Health and rolling walker. Full note to follow.     Thank you, Annelise Mares PT, DPT

## 2018-09-04 NOTE — PROGRESS NOTES
Problem: Falls - Risk of  Goal: *Absence of Falls  Document Karin Fall Risk and appropriate interventions in the flowsheet. Outcome: Progressing Towards Goal  Fall Risk Interventions:  Mobility Interventions: Patient to call before getting OOB         Medication Interventions: Patient to call before getting OOB    Elimination Interventions: Call light in reach    History of Falls Interventions: Door open when patient unattended        Problem: Pressure Injury - Risk of  Goal: *Prevention of pressure injury  Document Michele Scale and appropriate interventions in the flowsheet.    Outcome: Progressing Towards Goal  Pressure Injury Interventions:  Sensory Interventions: Assess changes in LOC, Assess need for specialty bed    Moisture Interventions: Absorbent underpads    Activity Interventions: Increase time out of bed    Mobility Interventions: HOB 30 degrees or less, Assess need for specialty bed    Nutrition Interventions: Document food/fluid/supplement intake    Friction and Shear Interventions: HOB 30 degrees or less

## 2018-09-04 NOTE — ROUTINE PROCESS
Bedside and Verbal shift change report given to Hailey Crawford RNs (oncoming nurse) by America Silveira RN (offgoing nurse). Report included the following information SBAR, Kardex, MAR and Recent Results. Patient awake and quiet, call light in reach. Dr Margie Fonseca in.

## 2018-09-04 NOTE — PROGRESS NOTES
SUBJECTIVE:    Patient is walking in room. States she is ready to go home. SOB is gone. Has been off of oxygen since this morning and spo2 is 98% on RA. No cough. No chest or abdominal pain. Minimal back pain. No nausea or vomiting. No headaches. States she takes Lantus 20 q hs, Humalog 10 TID, Glipizide XL 5 mg and Tradjenta at home for DM. She takes HCTZ, Norvasc and Toprol for HTN. OBJECTIVE:    /77 (BP 1 Location: Right leg, BP Patient Position: At rest)  Pulse 86  Temp 97.7 °F (36.5 °C)  Resp 18  Ht 5' 3\" (1.6 m)  Wt 96.1 kg (211 lb 14.4 oz)  SpO2 98% Comment: Re-check per Burnetta Goltz, MD  Breastfeeding? No  BMI 37.54 kg/m2    CVS: RRR  RS: Diminished in bases, no wheezes  GI: NT, BS +  Extremities: no pedal edema  Back: surgical site without erythema but superficial hematoma, no tenderness. CNS: normal motor strength in all extremities while in bed  General: NAD, Awake    ASSESSMENT:    1. SOB due to right atelectasis/infiltrates, resolved  2. Low Grade Fever, no recurrence   3. DM with A1c of 8.7  4. HTN  5. Recent lumbar surgery  6. Leukocytosis, improving   7.  Atelectasis (no pneumonia)    PLAN:    Cont current management  She knows s/s of hypoglycemia   Discharge patient home on the following medications  Dr. Da Muro already discharge the patient but patient states her rid won't be here until 5 or 6 pm.     CMP:   Lab Results   Component Value Date/Time     09/04/2018 03:58 AM    K 4.2 09/04/2018 03:58 AM     09/04/2018 03:58 AM    CO2 28 09/04/2018 03:58 AM    AGAP 7 09/04/2018 03:58 AM     (H) 09/04/2018 03:58 AM    BUN 26 (H) 09/04/2018 03:58 AM    CREA 1.09 09/04/2018 03:58 AM    GFRAA >60 09/04/2018 03:58 AM    GFRNA 51 (L) 09/04/2018 03:58 AM    CA 8.4 (L) 09/04/2018 03:58 AM     CBC:   Lab Results   Component Value Date/Time    WBC 14.5 (H) 09/04/2018 03:58 AM    HGB 8.5 (L) 09/04/2018 03:58 AM    HCT 27.4 (L) 09/04/2018 03:58 AM     09/04/2018 03:58 AM       Current Discharge Medication List      START taking these medications    Details   tamsulosin (FLOMAX) 0.4 mg capsule Take 1 Cap by mouth daily. Qty: 30 Cap, Refills: 3      azithromycin (ZITHROMAX) 500 mg tab Take 1 Tab by mouth daily for 3 days. Qty: 3 Tab, Refills: 0      cefpodoxime (VANTIN) 200 mg tablet Take 1 Tab by mouth every twelve (12) hours for 5 days. Qty: 10 Tab, Refills: 0      albuterol (PROVENTIL HFA, VENTOLIN HFA, PROAIR HFA) 90 mcg/actuation inhaler 2 puffs three to four times a day for 5 days then every 6 hours as needed for shortness of breath  Qty: 1 Inhaler, Refills: 0      HYDROcodone-acetaminophen (NORCO)  mg tablet Take 1 Tab by mouth every six (6) hours as needed for Pain. Max Daily Amount: 4 Tabs. Indications: Pain, post op pain  Qty: 20 Tab, Refills: 0    Associated Diagnoses: Spinal stenosis of lumbar region at multiple levels      promethazine (PHENERGAN) 25 mg tablet Take 0.5 Tabs by mouth every eight (8) hours as needed for Nausea. Indications: POST-OPERATIVE NAUSEA AND VOMITING, PREVENTION OF POST-OPERATIVE NAUSEA AND VOMITING  Qty: 10 Tab, Refills: 0    Associated Diagnoses: Spinal stenosis of lumbar region at multiple levels      naloxone (NARCAN) 4 mg/actuation nasal spray Use 1 spray intranasally, then discard. Repeat with new spray every 2 min as needed for opioid overdose symptoms, alternating nostrils. Qty: 1 Each, Refills: 0    Associated Diagnoses: Spinal stenosis of lumbar region at multiple levels         CONTINUE these medications which have CHANGED    Details   LANTUS SOLOSTAR U-100 INSULIN 100 unit/mL (3 mL) inpn 30 units subcutaneously in morning. Can take an additional dose of 10 units subcutaneously in the evening if blood sugar is more than 200. Qty: 10 mL, Refills: 0         CONTINUE these medications which have NOT CHANGED    Details   LORazepam (ATIVAN) 0.5 mg tablet Take 0.5 mg by mouth as needed for Anxiety.       insulin lispro (HUMALOG) 100 unit/mL kwikpen by SubCUTAneous route. atorvastatin (LIPITOR) 40 mg tablet Take  by mouth nightly. rosuvastatin (CRESTOR) 10 mg tablet Take 10 mg by mouth daily. furosemide (LASIX) 20 mg tablet TAKE 1 TABLET BY MOUTH ONCE DAILY AS NEEDED  Refills: 0      amLODIPine (NORVASC) 10 mg tablet daily. Indications: hypertension  Refills: 0      esomeprazole (NEXIUM) 20 mg capsule daily. Refills: 0      hydroCHLOROthiazide (MICROZIDE) 12.5 mg capsule Refills: 0      TRADJENTA 5 mg tablet TAKE 1 TABLET BY MOUTH ONCE A DAY  Refills: 0      metoprolol tartrate (LOPRESSOR) 50 mg tablet daily. Refills: 0      NOVOFINE 32 32 gauge x 1/4\" ndle Refills: 0      venlafaxine-SR (EFFEXOR-XR) 37.5 mg capsule daily.   Refills: 0         STOP taking these medications       glipiZIDE (GLUCOTROL) 10 mg tablet Comments:   Reason for Stopping:         lisinopril (PRINIVIL, ZESTRIL) 20 mg tablet Comments:   Reason for Stopping:

## 2018-09-04 NOTE — PROGRESS NOTES
Problem: Mobility Impaired (Adult and Pediatric)  Goal: *Acute Goals and Plan of Care (Insert Text)  Physical Therapy Goals  Initiated 8/31/2018 and to be accomplished within 7 day(s)  1. Patient will move from supine to sit and sit to supine, scoot up and down, and roll side to side in bed with modified independence. 2.  Patient will perform sit to stand with modified independence. 3.  Patient will ambulate with modified independence for >100 feet with the least restrictive device. 4.  Patient will ascend/descend 8 stairs with 1 handrail(s) with supervision/set-up. Outcome: Resolved/Met Date Met: 09/04/18  physical Therapy TREATMENT/DISCHARGE    Patient: Omaira Buckley (78 y.o. female)  Date: 9/4/2018  Diagnosis: Spinal stenosis, lumbar region, without neurogenic claudication [M48.061] Shortness of breath  Procedure(s) (LRB):  1) ANTERIOR LUMBAR INTERBODY FUSION (ALIF) L5; 3)POSTERIOR FIXATION L3-S1; C-ARM/DEPUY-SYNTHES (N/A)  2) EXTREME LATERAL INTERBODY FUSION L3/4 L4/5; NUVASIVE (N/A) 5 Days Post-Op  Precautions:  (spinal precautions)  Chart, physical therapy assessment, plan of care and goals were reviewed. OBJECTIVE/ASSESSMENT:  Patient presented today semi-reclined in bed, alert and agreeable to PT treatment, Patient able to verbalize 3/3 back precautions, demonstrated good compliance throughout session. She stood from bed with Miguel Angel using RW, completed independent transfer on/off [facility]. [facility]/descended full flight of stairs using B HR with supervision for safety and VCs for correct technique. Patient completed additional 250 ft ambulation with RW and Miguel Angel, no LOB or increase in pain noted. At conclusion of session, patient left sitting up in recliner with call bell in reach, needs met, and care manager in room. Patient cleared from functional mobility standpoint, will be D/C from caseload at this time.   Education: bed mobility, transfers, ambulation, stairs, assistive device management, body mechanics   Progression toward goals:  [x]      Goals met  []      Improving appropriately and progressing toward goals  []      Improving slowly and progressing toward goals  []      Not making progress toward goals and plan of care will be adjusted     PLAN:  Patient will be discharged from physical therapy at this time. Rationale for discharge:  [x] Goals Achieved  [] [address]  [] Patient not participating in therapy  [] Other:  Discharge Recommendations:  Home Health  Further Equipment Recommendations for Discharge:  rolling walker     SUBJECTIVE:   Patient stated This things helps a lot, indicating abdominal splint. OBJECTIVE DATA SUMMARY:   Critical Behavior:  Neurologic State: Alert  Orientation Level: Oriented X4  Cognition: Follows commands  Safety/Judgement: Good awareness of safety precautions  Functional Mobility Training:  Bed Mobility:  Supine to Sit: Modified independent  Scooting: Modified independent  Transfers:  Sit to Stand: Modified independent  Stand to Sit: Modified independent  Balance:  Sitting: Intact  Standing: Intact; With support  Standing - Static: Good  Standing - Dynamic : Good  Ambulation/Gait Training:  Distance (ft): 350 Feet (ft)  Assistive Device: Walker, rolling  Ambulation - Level of Assistance: Modified independent  Gait Abnormalities: Decreased step clearance  Speed/Patricia: Pace decreased (<100 feet/min)  Step Length: Left shortened;Right shortened  Stairs:  Number of Stairs Trained: 10  Stairs - Level of Assistance: Supervision   Rail Use: Both  Pain:  Pre session: 0/10  Post session: 0/10  Activity Tolerance:   Good  Please refer to the flowsheet for vital signs taken during this treatment.   After treatment:   [x] Patient left in no apparent distress sitting up in chair  [] Patient left in no apparent distress in bed  [x] Call bell left within reach  [x] Nursing notified  [x] Caregiver present  [] Bed alarm dreic Thomas   Time Calculation: 16 mins    Mobility [id number] Current  CI= 1-19%   Goal  CI= 1-19%  D/C  CI= 1-19%. The severity rating is based on the Level of Assistance required for Functional Mobility and ADLs.

## 2018-09-04 NOTE — PROGRESS NOTES
POD # 5  VSS, afebrile  Feels well, denies SOB or any respiratory difficulty  States pain is controlled with po pain meds and neurontin  Ambulated 350 feet with PT  Dressings to left flank incision x 2 and LLQ abdomen dry and intact  Waiting on ride to go home  States she is pleased with the outcome of the surgery  F/u on 9/12/18 with AIDEN Santos at 1 pm    Alonso Rubio NP

## 2018-09-04 NOTE — PROGRESS NOTES
vss afeb  patient feeling well, no pain or SOB or cough  Uncertain what pulmonary decompensation was secondary from, not clear pneumonia, atelectasis, baseline COPD, etc.  WBC appears to be resolving 14.5 k today  UA negative though spilling sugar  Cr improving 1.09  AP  Wishes to go home  Will set up for DC from ortho standpoint pending evaluation by hospitalist on best medication for discharge.(abx/ dm regimen/pulmonary regimen)  FU 2 weeks

## 2018-09-05 ENCOUNTER — TELEPHONE (OUTPATIENT)
Dept: ORTHOPEDIC SURGERY | Age: 63
End: 2018-09-05

## 2018-09-05 LAB
BACTERIA SPEC CULT: NORMAL
SERVICE CMNT-IMP: NORMAL

## 2018-09-05 NOTE — TELEPHONE ENCOUNTER
Please contact the patient's plan to initiate a prior authorization for Narcan Nasal Marion at  or contact the pharmacy to change the medication.  Patient ID # is [id number]     BIN: [id number]  Group: Abby Otero

## 2018-09-06 ENCOUNTER — TELEPHONE (OUTPATIENT)
Dept: ORTHOPEDIC SURGERY | Age: 63
End: 2018-09-06

## 2018-09-06 NOTE — TELEPHONE ENCOUNTER
Instructions for medications should be on DC paperwork given to pt. She should be on BLT precautions and the dressing is due to be changed today. Keep incision dry, do not get it wet.

## 2018-09-06 NOTE — TELEPHONE ENCOUNTER
-called Sweta at [address] and left a message on their system stating that VO from AIDEN Mcduffie are as follows:    \"Instructions for medications should be on DC paperwork given to pt. She should be on BLT precautions and the dressing is due to be changed today. Keep incision dry, do not get it wet. \"    -also added was for them to call us at [phone] if another set of orders needed to be put in for the above Reema from [place].

## 2018-09-06 NOTE — TELEPHONE ENCOUNTER
Sweta from [facility] called for Erasmo Josue and said that the Hospital had contacted them for home care for the patient , but that she needs Discharge Instructions on how to care for the patient wounds, as well as the patient medications. Sweta tel. [phone]. Fax# [phone].

## 2018-09-07 LAB
BACTERIA SPEC CULT: NORMAL
BACTERIA SPEC CULT: NORMAL
SERVICE CMNT-IMP: NORMAL
SERVICE CMNT-IMP: NORMAL

## 2018-09-10 NOTE — TELEPHONE ENCOUNTER
Prior authorization initiated for patient. Per covermymeds. com, the medication is available through the insurance without a prior authorization.

## 2018-09-19 ENCOUNTER — OFFICE VISIT (OUTPATIENT)
Dept: ORTHOPEDIC SURGERY | Age: 63
End: 2018-09-19

## 2018-09-19 ENCOUNTER — TELEPHONE (OUTPATIENT)
Dept: ORTHOPEDIC SURGERY | Age: 63
End: 2018-09-19

## 2018-09-19 VITALS
SYSTOLIC BLOOD PRESSURE: 136 MMHG | DIASTOLIC BLOOD PRESSURE: 78 MMHG | OXYGEN SATURATION: 98 % | HEART RATE: 73 BPM | RESPIRATION RATE: 18 BRPM | TEMPERATURE: 97.4 F

## 2018-09-19 DIAGNOSIS — Z98.1 S/P LUMBAR FUSION: ICD-10-CM

## 2018-09-19 DIAGNOSIS — M48.061 SPINAL STENOSIS OF LUMBAR REGION AT MULTIPLE LEVELS: Primary | ICD-10-CM

## 2018-09-19 DIAGNOSIS — M48.061 SPINAL STENOSIS OF LUMBAR REGION AT MULTIPLE LEVELS: ICD-10-CM

## 2018-09-19 RX ORDER — HYDROCODONE BITARTRATE AND ACETAMINOPHEN 7.5; 325 MG/1; MG/1
1 TABLET ORAL
Qty: 30 TAB | Refills: 0 | Status: SHIPPED | OUTPATIENT
Start: 2018-09-19 | End: 2018-09-21 | Stop reason: SDUPTHER

## 2018-09-19 NOTE — TELEPHONE ENCOUNTER
PA started for Norco 7.5/325 through covermymeds. Key #[id number]. May take up to 5 business days for decision. Patient has been informed.

## 2018-09-19 NOTE — MR AVS SNAPSHOT
[facility] 
 
 
 [address] [facility] [address] 
[phone] Patient: Olga Swift MRN: [id number] HCI:1/71/2170 Visit Information Date & Time Provider Department Dept. Phone Encounter #  
 9/19/2018 11:10 AM Jonnathan Taylor NP [facility] [phone] [id number] Follow-up Instructions Return for as aj. Follow-up and Disposition History Your Appointments 10/16/2018  1:45 PM  
POST OP with Gabi Castellanos MD  
[facility] ([address]) Appt Note: 6 wk fu sx 8-30; 6 wk fu sx 8-30* Patient was rescheduled due to the provider being out of the office on 10/12/18  
 [address] [facility] [address]  
[phone]  
  
   
 [address] [facility] [facility] [address] Upcoming Health Maintenance Date Due Hepatitis C Screening 1955 Pneumococcal 19-64 Highest Risk (1 of 3 - PCV13) 2/19/1976 DTaP/Tdap/Td series (1 - Tdap) 2/19/1978 PAP AKA CERVICAL CYTOLOGY 2/19/1978 BREAST CANCER SCRN MAMMOGRAM 2/19/2007 FOBT Q 1 YEAR AGE 50-75 2/19/2007 ZOSTER VACCINE AGE 60> 12/19/2016 Influenza Age 5 to Adult 8/1/2018 Allergies as of 9/19/2018  Review Complete On: 9/19/2018 By: Pato Conde RN Severity Noted Reaction Type Reactions Keflex [Cephalexin]  04/25/2018    Nausea and Vomiting Metformin  04/25/2018    Other (comments) GI DISTRESS Soliqua 100/33 [Insulin Glargine-lixisenatide]  04/25/2018    Nausea Only Current Immunizations  Never Reviewed No immunizations on file. Not reviewed this visit You Were Diagnosed With   
  
 Codes Comments Spinal stenosis of lumbar region at multiple levels    -  Primary ICD-10-CM: M48.061 
ICD-9-CM: 724.02 S/P lumbar fusion     ICD-10-CM: Z98.1 ICD-9-CM: V45.4 Vitals BP Pulse Temp Resp SpO2 OB Status 136/78 (BP 1 Location: Right arm, BP Patient Position: Sitting) 73 97.4 °F (36.3 °C) (Tympanic) 18 98% Postmenopausal  
 Smoking Status Never Smoker Preferred Pharmacy Pharmacy Name Phone [address] [facility] [phone] Your Updated Medication List  
  
   
This list is accurate as of 9/19/18 11:19 AM.  Always use your most recent med list.  
  
  
  
  
 albuterol 90 mcg/actuation inhaler Commonly known as:  PROVENTIL HFA, VENTOLIN HFA, PROAIR HFA  
2 puffs three to four times a day for 5 days then every 6 hours as needed for shortness of breath  
  
 amLODIPine 10 mg tablet Commonly known as:  NORVASC  
daily. Indications: hypertension ATIVAN 0.5 mg tablet Generic drug:  LORazepam  
Take 0.5 mg by mouth as needed for Anxiety. atorvastatin 40 mg tablet Commonly known as:  LIPITOR Take  by mouth nightly. CRESTOR 10 mg tablet Generic drug:  rosuvastatin Take 10 mg by mouth daily. esomeprazole 20 mg capsule Commonly known as:  NEXIUM  
daily. furosemide 20 mg tablet Commonly known as:  LASIX TAKE 1 TABLET BY MOUTH ONCE DAILY AS NEEDED  
  
 gabapentin 600 mg tablet Commonly known as:  NEURONTIN Take 1 Tab by mouth three (3) times daily. hydroCHLOROthiazide 12.5 mg capsule Commonly known as:  [employer] HYDROcodone-acetaminophen 7.5-325 mg per tablet Commonly known as:  Rai Songster Take 1 Tab by mouth nightly as needed for Pain. Max Daily Amount: 1 Tab. Indications: post op pain  
  
 insulin lispro 100 unit/mL kwikpen Commonly known as:  HUMALOG  
by SubCUTAneous route. LANTUS SOLOSTAR U-100 INSULIN 100 unit/mL (3 mL) Inpn Generic drug:  insulin glargine 30 units subcutaneously in morning. Can take an additional dose of 10 units subcutaneously in the evening if blood sugar is more than 200. metoprolol tartrate 50 mg tablet Commonly known as:  LOPRESSOR  
daily. naloxone 4 mg/actuation nasal spray Commonly known as:  [employer] Use 1 spray intranasally, then discard. Repeat with new spray every 2 min as needed for opioid overdose symptoms, alternating nostrils. NOVOFINE 32 32 gauge x 1/4\" Ndle Generic drug:  Insulin Needles (Disposable)  
  
 promethazine 25 mg tablet Commonly known as:  PHENERGAN Take 0.5 Tabs by mouth every eight (8) hours as needed for Nausea. Indications: POST-OPERATIVE NAUSEA AND VOMITING, PREVENTION OF POST-OPERATIVE NAUSEA AND VOMITING  
  
 tamsulosin 0.4 mg capsule Commonly known as:  FLOMAX Take 1 Cap by mouth daily. TRADJENTA 5 mg tablet Generic drug:  linagliptin TAKE 1 TABLET BY MOUTH ONCE A DAY  
  
 venlafaxine-SR 37.5 mg capsule Commonly known as:  EFFEXOR-XR  
daily. Prescriptions Printed Refills HYDROcodone-acetaminophen (NORCO) 7.5-325 mg per tablet 0 Sig: Take 1 Tab by mouth nightly as needed for Pain. Max Daily Amount: 1 Tab. Indications: post op pain  
 Class: Print Route: Oral  
  
Follow-up Instructions Return for as [facility]. Patient Instructions Lumbar Spinal Fusion: What to Expect at Home Your Recovery After surgery, you can expect your back to feel stiff and sore. You may have trouble sitting or standing in one position for very long and may need pain medicine in the weeks after your surgery. It may take 4 to 6 weeks to get back to doing simple activities, such as light housework. It may take 6 months to a year for your back to get better completely. You may need to wear a back brace while your back heals. And your doctor may have you go to physical therapy. If your job doesn't require physical labor, you will probably be able to go back to work after 1 or 2 months. If your job involves light physical labor, it may take 3 to 6 months.  Most people whose jobs involved heavy labor can never return to those jobs. This care sheet gives you a general idea about how long it will take for you to recover. But each person recovers at a different pace. Follow the steps below to get better as quickly as possible. How can you care for yourself at home? Activity 
  · Rest when you feel tired. Getting enough sleep will help you recover.  
  · Try to walk each day. Start by walking a little more than you did the day before. Bit by bit, increase the amount you walk. Walking boosts blood flow and helps prevent pneumonia and constipation. Walking may also decrease your muscle soreness after surgery.  
  · If advised by your doctor, you may need to avoid lifting anything that would cause excessive strain on your back. This may include a child, heavy grocery bags and milk containers, a heavy briefcase or backpack, cat litter or dog food bags, or a vacuum .  
  · Avoid strenuous activities, such as bicycle riding, jogging, weight lifting, or aerobic exercise, until your doctor says it is okay.  
  · Do not drive for 2 to 4 weeks after your surgery or until your doctor says it is okay.  
  · Avoid riding in a car for more than 30 minutes at a time for 2 to 4 weeks after surgery. If you must ride in a car for a longer distance, stop often to walk and stretch your legs.  
  · Try to change your position about every 30 minutes while sitting or standing. This will help decrease your back pain while you are healing.  
  · You will probably need to take at least 4 to 6 weeks off from work. It depends on the type of work you do and how you feel.  
  · You may have sex as soon as you feel able, but avoid positions that put stress on your back or cause pain. Diet 
  · You can eat your normal diet. If your stomach is upset, try bland, low-fat foods like plain rice, broiled chicken, toast, and yogurt.  
  · Drink plenty of fluids (unless your doctor tells you not to).   · You may notice that your bowel movements are not regular right after your surgery. This is common. Try to avoid constipation and straining with bowel movements. You may want to take a fiber supplement every day. If you have not had a bowel movement after a couple of days, ask your doctor about taking a mild laxative. Medicines 
  · Be safe with medicines. Take pain medicines exactly as directed. ¨ If the doctor gave you a prescription medicine for pain, take it as prescribed. ¨ If you are not taking a prescription pain medicine, ask your doctor if you can take an over-the-counter medicine.  
  · If your doctor prescribed antibiotics, take them as directed. Do not stop taking them just because you feel better. You need to take the full course of antibiotics.  
  · If you think your pain medicine is making you sick to your stomach: 
¨ Take your medicine after meals (unless your doctor has told you not to). ¨ Ask your doctor for a different pain medicine. Incision care 
  · You will be given specific instructions about how to care for the cuts (incisions) the doctor made. The instructions will depend on the type of materials used to close the cut. Exercise 
  · Do back exercises as instructed by your doctor.  
  · Your doctor may advise you to work with a physical therapist to improve the strength and flexibility of your back. Other instructions 
  · To reduce stiffness and help sore muscles, use a warm water bottle, a heating pad set on low, or a warm cloth on your back. Do not put heat right over the incision. Do not go to sleep with a heating pad on your skin. Follow-up care is a key part of your treatment and safety. Be sure to make and go to all appointments, and call your doctor if you are having problems. It's also a good idea to know your test results and keep a list of the medicines you take. When should you call for help? Call 911 anytime you think you may need emergency care. For example, call if: 
  · You passed out (lost consciousness).  
  · You have sudden chest pain and shortness of breath, or you cough up blood.  
  · You are unable to move a leg at all.  
[facility] your doctor now or seek immediate medical care if: 
  · You have pain that does not get better after you take pain pills.  
  · You have new or worse symptoms in your legs or buttocks. Symptoms may include: ¨ Numbness or tingling. ¨ Weakness. ¨ Pain.  
  · You lose bladder or bowel control.  
  · You have loose stitches, or your incision comes open.  
  · You have blood or fluid draining from the incision.  
  · You have signs of infection, such as: 
¨ Increased pain, swelling, warmth, or redness. ¨ Pus draining from the incision. ¨ A fever.  
 Watch closely for any changes in your health, and be sure to contact your doctor if: 
  · You do not have a bowel movement after taking a laxative.  
  · You are not getting better as expected. Where can you learn more? Go to http://abraham-mary anne.info/. Enter I772 in the search box to learn more about \"Lumbar Spinal Fusion: What to Expect at Home. \" Current as of: November 29, 2017 Content Version: 11.7 © 4433-7341 [employer]. Care instructions adapted under license by [employer] (which disclaims liability or warranty for this information). If you have questions about a medical condition or this instruction, always ask your healthcare professional. [place] [address] any warranty or liability for your use of this information. Introducing [facility]! [employer] introduces indeni patient portal. Now you can access parts of your medical record, email your doctor's office, and request medication refills online. 1. In your internet browser, go to [email or web address] 2. Click on the First Time User? Click Here link in the Sign In box. You will see the New Member Sign Up page. 3. Enter your Uversity Access Code exactly as it appears below. You will not need to use this code after youve completed the sign-up process. If you do not sign up before the expiration date, you must request a new code. · Uversity Access Code: [email or web address] Expires: 11/14/2018  6:59 AM 
 
4. Enter the last four digits of your Social Security Number (xxxx) and Date of Birth (mm/dd/yyyy) as indicated and click Submit. You will be taken to the next sign-up page. 5. Create a Uversity ID. This will be your Uversity login ID and cannot be changed, so think of one that is secure and easy to remember. 6. Create a Uversity password. You can change your password at any time. 7. Enter your Password Reset Question and Answer. This can be used at a later time if you forget your password. 8. Enter your e-mail address. You will receive e-mail notification when new information is available in [address] 9. Click Sign Up. You can now view and download portions of your medical record. 10. Click the Download Summary menu link to download a portable copy of your medical information. If you have questions, please visit the Frequently Asked Questions section of the Uversity website. Remember, Uversity is NOT to be used for urgent needs. For medical emergencies, dial 911. Now available from your iPhone and Android! Please provide this summary of care documentation to your next provider. Your primary care clinician is listed as Arabella Delarosa. If you have any questions after today's visit, please call [phone].

## 2018-09-19 NOTE — PROGRESS NOTES
Robert Segovia [facility], [place] [address].  [address], [address]  [facility], [address]  Phone: [phone]  Fax: [phone]    Spine Post-Op Office Visit Note    Patient: Mike Carlisle   MRN: [id number]     Age:  66 y.o.,      Sex: female    YOB: 1955     PCP: Arabella Delarosa NP    HISTORY OF PRESENT ILLNESS:  Chief Complaint   Patient presents with    Back Pain     Post op 2.5 week    Hip Pain     Mike Carlisle is a 66 y.o.  female with history of an ALIF and XLIF L3/4/5 2 weeks ago. Prior to surgery, she had 2 years of progressive back pain, buttock pain and leg pain, stenotic symptoms. Today, she is doing really well. The severe pain has resolved. She is having post op pain. She has some anterior incision tenderness. She has a great attitude and is optimistic about the surgery. She feels it has already helped. Patient denies any bladder/bowel dysfunction, new onset weakness, or other neurological deficits. She has tapered down to one [place] nightly. Opioid Assessment    This is a patient with post op pain that contributes to her pain. Pain is described as aching and is worse with sleeping and affects sleep and standing, and her ability to perform ADLs. Pain is better with relaxation, pain medication and ice. She has tried; PT-No,  Non-opioid medications Yes, and spinal injections No.      Least pain over the last week has been 4/10. Worst pain over the last week has been 7/10. Opioid Risk Tool Reviewed: YES, Score 1, depression. Hx of breast ca. Aberrant behaviors: None. Urine Drug Screen: acute post pain. Controlled substance agreement on file: NO: post op pain.  reviewed:yes  Pill count is consistent with her prescription: yes and n/a  Concomitant use of a benzodiazepine: no  MME 8  Naloxone prescription is warranted. It has been provided or is already on file.    Also,  abstinence syndrome was reviewed and discussed with her today N/A    Reports that pain would be a 7/10 w/out medication and that it goes down to a 4/10 after medication. This enables the pt to be functional, achieve ADLs and engage in social activities. Risks and benefits of  opiate therapy have been reviewed with the patient. No pain behaviors. Denies thoughts of harming self or others. Pt has a good risk to benefit ratio which allows the pt to function in a home environment without side effects      This is a acute post op painthat is significantly improved. Per review of available records and patients , there are not sign of overuse, misuse, diversion, or concerning side effects. Today we reviewed: the risk of overdose, addiction, and dependency proper storage and disposal of medications tapering to a lower dose taper and discontinue narcotic therapy  The following changes were made to the patients current treatment plan: medications adjusted, see orders. ASSESSMENT   Diagnoses and all orders for this visit:    1. Spinal stenosis of lumbar region at multiple levels  -     HYDROcodone-acetaminophen (NORCO) 7.5-325 mg per tablet; Take 1 Tab by mouth nightly as needed for Pain. Max Daily Amount: 1 Tab. Indications: post op pain    2. S/P lumbar fusion  -     HYDROcodone-acetaminophen (NORCO) 7.5-325 mg per tablet; Take 1 Tab by mouth nightly as needed for Pain. Max Daily Amount: 1 Tab. Indications: post op pain          IMPRESSION AND PLAN   1) Pt was given information on lumbar fusion. 2) She has tapered herself to 1 norco QHS. She will continue this and taper to Tylenol nightly. 3) Wound care and activity level reviewed. 4) Ms. Moose Palacio has a reminder for a \"due or due soon\" health maintenance. I have asked that she contact her primary care provider, Gianna Bryan NP, for follow-up on this health maintenance.   5) We have informed the patient to notify us for immediate appointment if he has any worsening neurogical symptoms or if an emergency situation presents, then call 911  6)  demonstrated consistency with prescribing. 7) Pt will follow-up in as scheduled. SUBJECTIVE    Pain Scale: 4/10    Pain Assessment  9/19/2018   Location of Pain Back; Hip   Location Modifiers Left;Right; Inferior   Severity of Pain 4   Quality of Pain Aching   Quality of Pain Comment -   Duration of Pain A few minutes   Frequency of Pain Intermittent   Aggravating Factors Standing;Walking   Aggravating Factors Comment -   Limiting Behavior -   Relieving Factors Other (Comment)   Relieving Factors Comment Medication helps   Result of Injury No     Reports symptoms have significantly improved since surgery. Denies numbness or tingling to the legs as prior to surgery. Reports no fever. Review of systems  Constitutional: Negative for fever, chills, or weight change. Respiratory: Negative for cough or shortness of breath. Cardiovascular: Negative for chest pain or palpitations. Gastrointestinal: Negative for acid reflux, change in bowel habits, or constipation. Genitourinary: Negative for dysuria and flank pain. Musculoskeletal: Positive for lumbar pain. Skin: Negative for rash. Neurological: Negative for headaches, dizziness, or numbness. Endo/Heme/Allergies: Negative for increased bruising. Psychiatric/Behavioral: Negative for difficulty with sleep.            Past Medical History:   Diagnosis Date    Arthritis     spinal stenosis    Breast CA ([facility] [address] 2017    Left Breast (chemo/radiation)    Diabetes (HCC)     Type 2    Edema     legs and ankles    GERD (gastroesophageal reflux disease)     High cholesterol     Hypertension     Ill-defined condition     patient states hemorrhage behind Left eye-following Dr. José Luis Dodd MI (myocardial infarction) ([facility] [address] 06/2018    per patient    Neuropathy     Restless leg        Past Surgical History:   Procedure Laterality Date    HX BREAST BIOPSY Left 02/03/2017    BREAST CANCER    HX BREAST LUMPECTOMY Left     HX CATARACT REMOVAL Right     HX HEART CATHETERIZATION  07/2018    no stents       Current Outpatient Prescriptions   Medication Sig Dispense Refill    HYDROcodone-acetaminophen (NORCO) 7.5-325 mg per tablet Take 1 Tab by mouth nightly as needed for Pain. Max Daily Amount: 1 Tab. Indications: post op pain 30 Tab 0    tamsulosin (FLOMAX) 0.4 mg capsule Take 1 Cap by mouth daily. 30 Cap 3    LANTUS SOLOSTAR U-100 INSULIN 100 unit/mL (3 mL) inpn 30 units subcutaneously in morning. Can take an additional dose of 10 units subcutaneously in the evening if blood sugar is more than 200. 10 mL 0    albuterol (PROVENTIL HFA, VENTOLIN HFA, PROAIR HFA) 90 mcg/actuation inhaler 2 puffs three to four times a day for 5 days then every 6 hours as needed for shortness of breath 1 Inhaler 0    promethazine (PHENERGAN) 25 mg tablet Take 0.5 Tabs by mouth every eight (8) hours as needed for Nausea. Indications: POST-OPERATIVE NAUSEA AND VOMITING, PREVENTION OF POST-OPERATIVE NAUSEA AND VOMITING 10 Tab 0    LORazepam (ATIVAN) 0.5 mg tablet Take 0.5 mg by mouth as needed for Anxiety.  gabapentin (NEURONTIN) 600 mg tablet Take 1 Tab by mouth three (3) times daily. 90 Tab 1    insulin lispro (HUMALOG) 100 unit/mL kwikpen by SubCUTAneous route.  atorvastatin (LIPITOR) 40 mg tablet Take  by mouth nightly.  rosuvastatin (CRESTOR) 10 mg tablet Take 10 mg by mouth daily.  furosemide (LASIX) 20 mg tablet TAKE 1 TABLET BY MOUTH ONCE DAILY AS NEEDED  0    amLODIPine (NORVASC) 10 mg tablet daily. Indications: hypertension  0    esomeprazole (NEXIUM) 20 mg capsule daily. 0    hydroCHLOROthiazide (MICROZIDE) 12.5 mg capsule   0    TRADJENTA 5 mg tablet TAKE 1 TABLET BY MOUTH ONCE A DAY  0    metoprolol tartrate (LOPRESSOR) 50 mg tablet daily. 0    NOVOFINE 32 32 gauge x 1/4\" ndle   0    venlafaxine-SR (EFFEXOR-XR) 37.5 mg capsule daily.   0    naloxone (NARCAN) 4 mg/actuation nasal spray Use 1 spray intranasally, then discard. Repeat with new spray every 2 min as needed for opioid overdose symptoms, alternating nostrils. 1 Each 0       Allergies   Allergen Reactions    Keflex [Cephalexin] Nausea and Vomiting    Metformin Other (comments)     GI DISTRESS    Soliqua 100/33 [Insulin Glargine-Lixisenatide] Nausea Only            OBJECTIVE    Vitals:    09/19/18 1053   BP: 136/78   Pulse: 73   Resp: 18   Temp: 97.4 °F (36.3 °C)   TempSrc: Tympanic   SpO2: 98%   PainSc:   4   PainLoc: Back       Physical Exam:  General: alert, cooperative, no distress, appears stated age  Constitutional:  Well developed, well nourished, in no acute distress. Psychiatric: Affect and mood are appropriate. Integumentary: No rashes or abrasions noted on exposed areas. Wound: Incisions are healing well, has well approximated edges, no erythema, warmth, drainage or signs of infection. Anterior/ exterior/ posterior. Cardiovascular/Peripheral Vascular: No peripheral edema is noted. Lymphatic:  No evidence of lymphedema. No cervical lymphadenopathy. MOTOR     Hip Flex  Quads Hamstrings Ankle DF EHL Ankle PF   Right +4/5 +4/5 +4/5 +4/5 +4/5 +4/5   Left +4/5 +4/5 +4/5 +4/5 +4/5 +4/5       Straight Leg raise negative bilaterally. normal gait and station      Ambulation without assistive device. full weight bearing, non-antalgic gait. Accompanied by self.       Selene Olivier NP  September 19, 2018  11:02 AM

## 2018-09-19 NOTE — TELEPHONE ENCOUNTER
[facility]( on [facility]) called and said that the patient saw AIDEN Santos today and that the patient was prescribed Hydrocodone medication. Viral Irby said that the pharmacy is requesting a Prior Auth for the medication. [facility] in [place] [phone]. [facility] Kanner tel. [phone].

## 2018-09-19 NOTE — PATIENT INSTRUCTIONS
Lumbar Spinal Fusion: What to Expect at Home  Your Recovery    After surgery, you can expect your back to feel stiff and sore. You may have trouble sitting or standing in one position for very long and may need pain medicine in the weeks after your surgery. It may take 4 to 6 weeks to get back to doing simple activities, such as light housework. It may take 6 months to a year for your back to get better completely. You may need to wear a back brace while your back heals. And your doctor may have you go to physical therapy. If your job doesn't require physical labor, you will probably be able to go back to work after 1 or 2 months. If your job involves light physical labor, it may take 3 to 6 months. Most people whose jobs involved heavy labor can never return to those jobs. This care sheet gives you a general idea about how long it will take for you to recover. But each person recovers at a different pace. Follow the steps below to get better as quickly as possible. How can you care for yourself at home? Activity    · Rest when you feel tired. Getting enough sleep will help you recover.     · Try to walk each day. Start by walking a little more than you did the day before. Bit by bit, increase the amount you walk. Walking boosts blood flow and helps prevent pneumonia and constipation. Walking may also decrease your muscle soreness after surgery.     · If advised by your doctor, you may need to avoid lifting anything that would cause excessive strain on your back.  This may include a child, heavy grocery bags and milk containers, a heavy briefcase or backpack, cat litter or dog food bags, or a vacuum .     · Avoid strenuous activities, such as bicycle riding, jogging, weight lifting, or aerobic exercise, until your doctor says it is okay.     · Do not drive for 2 to 4 weeks after your surgery or until your doctor says it is okay.     · Avoid riding in a car for more than 30 minutes at a time for 2 to 4 weeks after surgery. If you must ride in a car for a longer distance, stop often to walk and stretch your legs.     · Try to change your position about every 30 minutes while sitting or standing. This will help decrease your back pain while you are healing.     · You will probably need to take at least 4 to 6 weeks off from work. It depends on the type of work you do and how you feel.     · You may have sex as soon as you feel able, but avoid positions that put stress on your back or cause pain. Diet    · You can eat your normal diet. If your stomach is upset, try bland, low-fat foods like plain rice, broiled chicken, toast, and yogurt.     · Drink plenty of fluids (unless your doctor tells you not to).     · You may notice that your bowel movements are not regular right after your surgery. This is common. Try to avoid constipation and straining with bowel movements. You may want to take a fiber supplement every day. If you have not had a bowel movement after a couple of days, ask your doctor about taking a mild laxative. Medicines    · Be safe with medicines. Take pain medicines exactly as directed. ¨ If the doctor gave you a prescription medicine for pain, take it as prescribed. ¨ If you are not taking a prescription pain medicine, ask your doctor if you can take an over-the-counter medicine.     · If your doctor prescribed antibiotics, take them as directed. Do not stop taking them just because you feel better. You need to take the full course of antibiotics.     · If you think your pain medicine is making you sick to your stomach:  ¨ Take your medicine after meals (unless your doctor has told you not to). ¨ Ask your doctor for a different pain medicine. Incision care    · You will be given specific instructions about how to care for the cuts (incisions) the doctor made. The instructions will depend on the type of materials used to close the cut.    Exercise    · Do back exercises as instructed by your doctor.     · Your doctor may advise you to work with a physical therapist to improve the strength and flexibility of your back. Other instructions    · To reduce stiffness and help sore muscles, use a warm water bottle, a heating pad set on low, or a warm cloth on your back. Do not put heat right over the incision. Do not go to sleep with a heating pad on your skin. Follow-up care is a key part of your treatment and safety. Be sure to make and go to all appointments, and call your doctor if you are having problems. It's also a good idea to know your test results and keep a list of the medicines you take. When should you call for help? Call 911 anytime you think you may need emergency care. For example, call if:    · You passed out (lost consciousness).     · You have sudden chest pain and shortness of breath, or you cough up blood.     · You are unable to move a leg at all.   [facility] your doctor now or seek immediate medical care if:    · You have pain that does not get better after you take pain pills.     · You have new or worse symptoms in your legs or buttocks. Symptoms may include:  ¨ Numbness or tingling. ¨ Weakness. ¨ Pain.     · You lose bladder or bowel control.     · You have loose stitches, or your incision comes open.     · You have blood or fluid draining from the incision.     · You have signs of infection, such as:  ¨ Increased pain, swelling, warmth, or redness. ¨ Pus draining from the incision. ¨ A fever.    Watch closely for any changes in your health, and be sure to contact your doctor if:    · You do not have a bowel movement after taking a laxative.     · You are not getting better as expected. Where can you learn more? Go to http://abraham-mary anne.info/. Enter V245 in the search box to learn more about \"Lumbar Spinal Fusion: What to Expect at Home. \"  Current as of: November 29, 2017  Content Version: 11.7  © 4765-7439 [employer].  Care instructions adapted under license by [employer] (which disclaims liability or warranty for this information). If you have questions about a medical condition or this instruction, always ask your healthcare professional. [address] any warranty or liability for your use of this information.

## 2018-09-21 ENCOUNTER — TELEPHONE (OUTPATIENT)
Dept: ORTHOPEDIC SURGERY | Age: 63
End: 2018-09-21

## 2018-09-21 RX ORDER — HYDROCODONE BITARTRATE AND ACETAMINOPHEN 7.5; 325 MG/1; MG/1
1 TABLET ORAL
Qty: 10 TAB | Refills: 0 | Status: SHIPPED | OUTPATIENT
Start: 2018-09-21 | End: 2018-09-21

## 2018-09-21 RX ORDER — HYDROCODONE BITARTRATE AND ACETAMINOPHEN 7.5; 325 MG/1; MG/1
TABLET ORAL
Qty: 24 TAB | Refills: 0 | Status: SHIPPED | OUTPATIENT
Start: 2018-09-21 | End: 2018-10-16 | Stop reason: ALTCHOICE

## 2018-09-21 NOTE — TELEPHONE ENCOUNTER
Please verify how many tabs she received and repend the rx. The best I can offer is I can drive the script to our [place] office but I can not call this is. It has to be picked up.

## 2018-09-21 NOTE — TELEPHONE ENCOUNTER
Spoke with pharmacist, informed that patient was given 20 tabs.  Pended Norco 7.5-325mg, for the remaining 10 per your approval.

## 2018-09-21 NOTE — TELEPHONE ENCOUNTER
Pt is calling to return call to [facility]. Please advise pt at [phone].

## 2018-09-21 NOTE — TELEPHONE ENCOUNTER
Spoke with patient, informed that NP Tati Harding will be taking the Rx to [facility] office for her to .  also verified patient received 6 tabs of Payette from the Pharmacy. Pended Payette 7.5/325mg 24 tabs per your approval. Patient is aware that Rx will be at [facility] later this afternoon.

## 2018-09-24 NOTE — TELEPHONE ENCOUNTER
Received a fax from [employer] stating the prior auth request for [place] has been approved effective 09/20/2018 through 09/20/2019.

## 2018-10-16 ENCOUNTER — OFFICE VISIT (OUTPATIENT)
Dept: ORTHOPEDIC SURGERY | Age: 63
End: 2018-10-16

## 2018-10-16 VITALS
HEART RATE: 72 BPM | TEMPERATURE: 98.4 F | BODY MASS INDEX: 36.79 KG/M2 | SYSTOLIC BLOOD PRESSURE: 130 MMHG | RESPIRATION RATE: 16 BRPM | HEIGHT: 63 IN | WEIGHT: 207.6 LBS | DIASTOLIC BLOOD PRESSURE: 72 MMHG | OXYGEN SATURATION: 93 %

## 2018-10-16 DIAGNOSIS — Z98.1 S/P LUMBAR FUSION: ICD-10-CM

## 2018-10-16 DIAGNOSIS — M48.061 SPINAL STENOSIS OF LUMBAR REGION AT MULTIPLE LEVELS: Primary | ICD-10-CM

## 2018-10-16 RX ORDER — HYDROCODONE BITARTRATE AND ACETAMINOPHEN 5; 325 MG/1; MG/1
1 TABLET ORAL
Qty: 15 TAB | Refills: 0 | Status: SHIPPED | OUTPATIENT
Start: 2018-10-16 | End: 2020-08-06 | Stop reason: SDUPTHER

## 2018-10-16 NOTE — PROGRESS NOTES
Robert Segovia [facility] [address].  [address], [address]  [place], [facility]  Phone: [phone]  Fax: [phone]  PROGRESS NOTE  Patient: Gilson Christopher                MRN: [id number]       SSN: [id number]  YOB: 1955        AGE: 66 y.o. SEX: female  Body mass index is 36.77 kg/(m^2). PCP: Terrell Best NP  10/16/18    Chief Complaint   Patient presents with    Back Pain     LOW BACK PAIN    Hip Pain     LEFT HIP PAIN        HISTORY OF PRESENT ILLNESS, RADIOGRAPHS, and PLAN:     HISTORY OF PRESENT ILLNESS:  Progress note. Ms. Carlos Eduardo Camarena returns today. She is weeks out from her anterior lumbar interbody fusion L5-S1 with EXOS at L3-5. She is doing well. She has had resolution of her bilateral lower extremity symptoms, as well as some lateral thigh numbness. She overall feels dramatically improved. We are getting her involved in physical therapy and tapering off her pain medication. She saw my nurse practitioner and felt her symptoms were no more than a 4/10. She is quite pleased. ASSESSMENT/PLAN:  We will see her back again in 6 weeks' time. cc:  Jose Ramon Pavon NP        Past Medical History:   Diagnosis Date    Arthritis     spinal stenosis    [facility] 2017    Left Breast (chemo/radiation)    Diabetes ([facility]     Type 2    Edema     legs and ankles    GERD (gastroesophageal reflux disease)     High cholesterol     Hypertension     Ill-defined condition     patient states hemorrhage behind Left eye-following Dr. Roland Paniagua MI (myocardial infarction) ([facility] 06/2018    per patient    Neuropathy     Restless leg        No family history on file. Current Outpatient Prescriptions   Medication Sig Dispense Refill    HYDROcodone-acetaminophen (NORCO) 5-325 mg per tablet Take 1 Tab by mouth every twelve (12) hours as needed for Pain. Max Daily Amount: 2 Tabs. 15 Tab 0    tamsulosin (FLOMAX) 0.4 mg capsule Take 1 Cap by mouth daily.  27 Cap 3    LANTUS SOLOSTAR U-100 INSULIN 100 unit/mL (3 mL) inpn 30 units subcutaneously in morning. Can take an additional dose of 10 units subcutaneously in the evening if blood sugar is more than 200. 10 mL 0    albuterol (PROVENTIL HFA, VENTOLIN HFA, PROAIR HFA) 90 mcg/actuation inhaler 2 puffs three to four times a day for 5 days then every 6 hours as needed for shortness of breath 1 Inhaler 0    promethazine (PHENERGAN) 25 mg tablet Take 0.5 Tabs by mouth every eight (8) hours as needed for Nausea. Indications: POST-OPERATIVE NAUSEA AND VOMITING, PREVENTION OF POST-OPERATIVE NAUSEA AND VOMITING 10 Tab 0    naloxone (NARCAN) 4 mg/actuation nasal spray Use 1 spray intranasally, then discard. Repeat with new spray every 2 min as needed for opioid overdose symptoms, alternating nostrils. 1 Each 0    LORazepam (ATIVAN) 0.5 mg tablet Take 0.5 mg by mouth as needed for Anxiety.  gabapentin (NEURONTIN) 600 mg tablet Take 1 Tab by mouth three (3) times daily. 90 Tab 1    insulin lispro (HUMALOG) 100 unit/mL kwikpen by SubCUTAneous route.  atorvastatin (LIPITOR) 40 mg tablet Take  by mouth nightly.  rosuvastatin (CRESTOR) 10 mg tablet Take 10 mg by mouth daily.  furosemide (LASIX) 20 mg tablet TAKE 1 TABLET BY MOUTH ONCE DAILY AS NEEDED  0    amLODIPine (NORVASC) 10 mg tablet daily. Indications: hypertension  0    esomeprazole (NEXIUM) 20 mg capsule daily. 0    hydroCHLOROthiazide (MICROZIDE) 12.5 mg capsule   0    TRADJENTA 5 mg tablet TAKE 1 TABLET BY MOUTH ONCE A DAY  0    metoprolol tartrate (LOPRESSOR) 50 mg tablet daily. 0    NOVOFINE 32 32 gauge x 1/4\" ndle   0    venlafaxine-SR (EFFEXOR-XR) 37.5 mg capsule daily.   0       Allergies   Allergen Reactions    Keflex [Cephalexin] Nausea and Vomiting    Metformin Other (comments)     GI DISTRESS    Soliqua 100/33 [Insulin Glargine-Lixisenatide] Nausea Only       Past Surgical History:   Procedure Laterality Date    HX BACK SURGERY 08/30/2018    HX BREAST BIOPSY Left 02/03/2017    BREAST CANCER    HX BREAST LUMPECTOMY Left     HX CATARACT REMOVAL Right     HX HEART CATHETERIZATION  07/2018    no stents       Past Medical History:   Diagnosis Date    Arthritis     spinal stenosis    [facility] ([facility] 2017    Left Breast (chemo/radiation)    Diabetes (HCC)     Type 2    Edema     legs and ankles    GERD (gastroesophageal reflux disease)     High cholesterol     Hypertension     Ill-defined condition     patient states hemorrhage behind Left eye-following Dr. Juani Mcgregor MI (myocardial infarction) ([facility] 06/2018    per patient    Neuropathy     Restless leg        Social History     Social History    Marital status:      Spouse name: N/A    Number of children: N/A    Years of education: N/A     Occupational History    Not on file. Social History Main Topics    Smoking status: Never Smoker    Smokeless tobacco: Never Used    Alcohol use No    Drug use: No    Sexual activity: Not on file     Other Topics Concern    Not on file     Social History Narrative         REVIEW OF SYSTEMS:   CONSTITUTIONAL SYMPTOMS:  Negative. EYES:  Negative. EARS, NOSE, THROAT AND MOUTH:  Negative. CARDIOVASCULAR:  Negative. RESPIRATORY:  Negative. GENITOURINARY: Per HPI. GASTROINTESTINAL:  Per HPI. INTEGUMENTARY (SKIN AND/OR BREAST):  Negative. MUSCULOSKELETAL: Per HPI.   ENDOCRINE/RHEUMATOLOGIC:  Negative. NEUROLOGICAL:  Per HPI. HEMATOLOGIC/LYMPHATIC:  Negative. ALLERGIC/IMMUNOLOGIC:  Negative. PSYCHIATRIC:  Negative. PHYSICAL EXAMINATION:   Visit Vitals    /72    Pulse 72    Temp 98.4 °F (36.9 °C) (Oral)    Resp 16    Ht 5' 3\" (1.6 m)    Wt 207 lb 9.6 oz (94.2 kg)    SpO2 93%    BMI 36.77 kg/m2    PAIN SCALE: 4/10    CONSTITUTIONAL: The patient is in no apparent distress and is alert and oriented x 3. HEENT: Normocephalic. Hearing grossly intact. NECK: Supple and symmetric.  no tenderness, or masses were felt. RESPIRATORY: No labored breathing. CARDIOVASCULAR: The carotid pulses were normal. Peripheral pulses were 2+. CHEST: Normal AP diameter and normal contour without any kyphoscoliosis. LYMPHATIC: No lymphadenopathy was appreciated in the neck, axillae or groin. SKIN:  Incision healing well, no drainage, no erythema, no hernia, no seroma, no swelling, no dehiscence, incision well approximated. Negative for scars, rashes, lesions, or ulcers on the right upper, right lower, left upper, left lower and trunk. NEUROLOGICAL: Alert and oriented x 3. EXTREMITIES: See musculoskeletal.  MUSCULOSKELETAL:   Head and Neck:  Negative for misalignment, asymmetry, crepitation, defects, tenderness masses or effusions.  Left Upper Extremity: Inspection, percussion and palpation performed. Mcconnells sign is negative.  Right Upper Extremity: Inspection, percussion and palpation performed. Mcconnells sign is negative.  Spine, Ribs and Pelvis: Low back pain. L hip pain. Inspection, percussion and palpation performed. Negative for misalignment, asymmetry, crepitation, defects, tenderness masses or effusions.  Left Lower Extremity: Inspection, percussion and palpation performed. Negative straight leg raise.  Right Lower Extremity: Inspection, percussion and palpation performed. Negative straight leg raise. SPINE EXAM:     Lumbar spine: No rash, ecchymosis, or gross obliquity. No focal atrophy is noted. ASSESSMENT    ICD-10-CM ICD-9-CM    1. Spinal stenosis of lumbar region at multiple levels M48.061 724.02 AMB POC XRAY, SPINE, LUMBOSACRAL; 2 O      REFERRAL TO PHYSICAL THERAPY   2.  S/P lumbar fusion Z98.1 V45.4 AMB POC XRAY, SPINE, LUMBOSACRAL; 2 O      REFERRAL TO PHYSICAL THERAPY      HYDROcodone-acetaminophen (NORCO) 5-325 mg per tablet       Written by Vandana Caldwell, as dictated by Lora Cotton MD.    I, Dr. Lora Cotton MD, confirm that all documentation is accurate.

## 2018-10-16 NOTE — PROGRESS NOTES
I saw pt today. She is doing well after her multi-level fusion with resolution of her back and leg pain. She had some numbness in her LLE after surgery from the lateral approach, that is much better now. She has some back pain and left hip pain. It is getting better over-time. She occasionally needs a Norco at night for sleep. She knows that she needs to taper off that. I gave her a small amount of a reduced dose. She understands that should be her last Rx. Her exam was benign with good 4/5 BLE strength throughout and negative straight leg raise. She chronically uses a walker.     We are going to get her involved in some PT.

## 2018-10-16 NOTE — MR AVS SNAPSHOT
[address] [place] [address] [address] [place] [address] 
[phone] Patient: Mónica Reyes MRN: [id number] Visit Information Date & Time Provider Department Dept. Phone Encounter #  
 10/16/2018  1:45 PM Enola Collet, MD [facility] [phone] [id number] Follow-up Instructions Return in about 6 weeks (around 11/27/2018) for with NP. Upcoming Health Maintenance Date Due Hepatitis C Screening 1955 DTaP/Tdap/Td series (1 - Tdap) 2/19/1978 PAP AKA CERVICAL CYTOLOGY 2/19/1978 Shingrix Vaccine Age 50> (1 of 2) 2/19/2007 BREAST CANCER SCRN MAMMOGRAM 2/19/2007 FOBT Q 1 YEAR AGE 50-75 2/19/2007 Pneumococcal 19-64 Highest Risk (2 of 3 - PCV13) 12/19/2017 Influenza Age 5 to Adult 8/1/2018 Allergies as of 10/16/2018  Review Complete On: 10/16/2018 By: Nika Most Severity Noted Reaction Type Reactions Keflex [Cephalexin]  04/25/2018    Nausea and Vomiting Metformin  04/25/2018    Other (comments) GI DISTRESS Soliqua 100/33 [Insulin Glargine-lixisenatide]  04/25/2018    Nausea Only Current Immunizations  Never Reviewed No immunizations on file. Not reviewed this visit You Were Diagnosed With   
  
 Codes Comments Spinal stenosis of lumbar region at multiple levels    -  Primary ICD-10-CM: M48.061 
ICD-9-CM: 724.02 S/P lumbar fusion     ICD-10-CM: Z98.1 ICD-9-CM: V45.4 Vitals BP Pulse Temp Resp Height(growth percentile) Weight(growth percentile) 130/72 72 98.4 °F (36.9 °C) (Oral) 16 5' 3\" (1.6 m) 207 lb 9.6 oz (94.2 kg) SpO2 BMI OB Status Smoking Status 93% 36.77 kg/m2 Postmenopausal Never Smoker BMI and BSA Data Body Mass Index Body Surface Area  
 36.77 kg/m 2 2.05 m 2 Preferred Pharmacy Pharmacy Name Phone  [phone] [phone] Your Updated Medication List  
  
   
This list is accurate as of 10/16/18  2:40 PM.  Always use your most recent med list.  
  
  
  
  
 albuterol 90 mcg/actuation inhaler Commonly known as:  PROVENTIL HFA, VENTOLIN HFA, PROAIR HFA  
2 puffs three to four times a day for 5 days then every 6 hours as needed for shortness of breath  
  
 amLODIPine 10 mg tablet Commonly known as:  NORVASC  
daily. Indications: hypertension ATIVAN 0.5 mg tablet Generic drug:  LORazepam  
Take 0.5 mg by mouth as needed for Anxiety. atorvastatin 40 mg tablet Commonly known as:  LIPITOR Take  by mouth nightly. CRESTOR 10 mg tablet Generic drug:  rosuvastatin Take 10 mg by mouth daily. esomeprazole 20 mg capsule Commonly known as:  NEXIUM  
daily. furosemide 20 mg tablet Commonly known as:  LASIX TAKE 1 TABLET BY MOUTH ONCE DAILY AS NEEDED  
  
 gabapentin 600 mg tablet Commonly known as:  NEURONTIN Take 1 Tab by mouth three (3) times daily. hydroCHLOROthiazide 12.5 mg capsule Commonly known as:  Nayeli Lull HYDROcodone-acetaminophen 5-325 mg per tablet Commonly known as:  Juan Diego Lewis Take 1 Tab by mouth every twelve (12) hours as needed for Pain. Max Daily Amount: 2 Tabs. insulin lispro 100 unit/mL kwikpen Commonly known as:  HUMALOG  
by SubCUTAneous route. LANTUS SOLOSTAR U-100 INSULIN 100 unit/mL (3 mL) Inpn Generic drug:  insulin glargine 30 units subcutaneously in morning. Can take an additional dose of 10 units subcutaneously in the evening if blood sugar is more than 200. metoprolol tartrate 50 mg tablet Commonly known as:  LOPRESSOR  
daily. naloxone 4 mg/actuation nasal spray Commonly known as:  [employer] Use 1 spray intranasally, then discard. Repeat with new spray every 2 min as needed for opioid overdose symptoms, alternating nostrils. NOVOFINE 32 32 gauge x 1/4\" Ndle Generic drug:  Insulin Needles (Disposable)  
  
 promethazine 25 mg tablet Commonly known as:  PHENERGAN Take 0.5 Tabs by mouth every eight (8) hours as needed for Nausea. Indications: POST-OPERATIVE NAUSEA AND VOMITING, PREVENTION OF POST-OPERATIVE NAUSEA AND VOMITING  
  
 tamsulosin 0.4 mg capsule Commonly known as:  FLOMAX Take 1 Cap by mouth daily. TRADJENTA 5 mg tablet Generic drug:  linagliptin TAKE 1 TABLET BY MOUTH ONCE A DAY  
  
 venlafaxine-SR 37.5 mg capsule Commonly known as:  EFFEXOR-XR  
daily. Prescriptions Printed Refills HYDROcodone-acetaminophen (NORCO) 5-325 mg per tablet 0 Sig: Take 1 Tab by mouth every twelve (12) hours as needed for Pain. Max Daily Amount: 2 Tabs. Class: Print Route: Oral  
  
We Performed the Following AMB POC XRAY, SPINE, LUMBOSACRAL; 2 O [28621 CPT(R)] REFERRAL TO PHYSICAL THERAPY [TYD64 Custom] Comments:  
 Eval and treat for low back and left hip pain. 6 wk s/p lumbar fusion. Pt wants to go to [place], [place] Follow-up Instructions Return in about 6 weeks (around 11/27/2018) for with NP. Referral Information Referral ID Referred By Referred To  
  
 [id number] Eddye Fear Not Available Visits Status Start Date End Date 1 New Request 10/16/18 10/16/19 If your referral has a status of pending review or denied, additional information will be sent to support the outcome of this decision. Patient Instructions Back Stretches: Exercises Your Care Instructions Here are some examples of exercises for stretching your back. Start each exercise slowly. Ease off the exercise if you start to have pain. Your doctor or physical therapist will tell you when you can start these exercises and which ones will work best for you. How to do the exercises Overhead stretch 1. Stand comfortably with your feet shoulder-width apart. 2. Looking straight ahead, raise both arms over your head and reach toward the ceiling. Do not allow your head to tilt back. 3. Hold for 15 to 30 seconds, then lower your arms to your sides. 4. Repeat 2 to 4 times. Side stretch 1. Stand comfortably with your feet shoulder-width apart. 2. Raise one arm over your head, and then lean to the other side. 3. Slide your hand down your leg as you let the weight of your arm gently stretch your side muscles. Hold for 15 to 30 seconds. 4. Repeat 2 to 4 times on each side. Press-up 1. Lie on your stomach, supporting your body with your forearms. 2. Press your elbows down into the floor to raise your upper back. As you do this, relax your stomach muscles and allow your back to arch without using your back muscles. As your press up, do not let your hips or pelvis come off the floor. 3. Hold for 15 to 30 seconds, then relax. 4. Repeat 2 to 4 times. Relax and rest 
 
1. Lie on your back with a rolled towel under your neck and a pillow under your knees. Extend your arms comfortably to your sides. 2. Relax and breathe normally. 3. Remain in this position for about 10 minutes. 4. If you can, do this 2 or 3 times each day. Follow-up care is a key part of your treatment and safety. Be sure to make and go to all appointments, and call your doctor if you are having problems. It's also a good idea to know your test results and keep a list of the medicines you take. Where can you learn more? Go to http://abraham-mary anne.info/. Enter B217 in the search box to learn more about \"Back Stretches: Exercises. \" Current as of: November 29, 2017 Content Version: 11.8 © 6666-2410 Healthwise, Incorporated. Care instructions adapted under license by [employer] (which disclaims liability or warranty for this information).  If you have questions about a medical condition or this instruction, always ask your healthcare professional. [employer] disclaims any warranty or liability for your use of this information. Introducing [facility]! [address] introduces [employer] Le[employer] patient portal. Now you can access parts of your medical record, email your doctor's office, and request medication refills online. 1. In your internet browser, go to [email or web address] 2. Click on the First Time User? Click Here link in the Sign In box. You will see the New Member Sign Up page. 3. Enter your Orange Leap Access Code exactly as it appears below. You will not need to use this code after youve completed the sign-up process. If you do not sign up before the expiration date, you must request a new code. · Orange Leap Access Code: [email or web address] Expires: 11/14/2018  6:59 AM 
 
4. Enter the last four digits of your Social Security Number (xxxx) and Date of Birth (mm/dd/yyyy) as indicated and click Submit. You will be taken to the next sign-up page. 5. Create a Orange Leap ID. This will be your Orange Leap login ID and cannot be changed, so think of one that is secure and easy to remember. 6. Create a [employer] Leap password. You can change your password at any time. 7. Enter your Password Reset Question and Answer. This can be used at a later time if you forget your password. 8. Enter your e-mail address. You will receive e-mail notification when new information is available in [address] 9. Click Sign Up. You can now view and download portions of your medical record. 10. Click the Download Summary menu link to download a portable copy of your medical information. If you have questions, please visit the Frequently Asked Questions section of the Orange Leap website. Remember, [employer]ap is NOT to be used for urgent needs. For medical emergencies, dial 911. Now available from your iPhone and Android! Please provide this summary of care documentation to your next provider. Your primary care clinician is listed as Eli Yrn. If you have any questions after today's visit, please call [phone].

## 2018-10-16 NOTE — PATIENT INSTRUCTIONS
Back Stretches: Exercises  Your Care Instructions  Here are some examples of exercises for stretching your back. Start each exercise slowly. Ease off the exercise if you start to have pain. Your doctor or physical therapist will tell you when you can start these exercises and which ones will work best for you. How to do the exercises  Overhead stretch    1. Stand comfortably with your feet shoulder-width apart. 2. Looking straight ahead, raise both arms over your head and reach toward the ceiling. Do not allow your head to tilt back. 3. Hold for 15 to 30 seconds, then lower your arms to your sides. 4. Repeat 2 to 4 times. Side stretch    1. Stand comfortably with your feet shoulder-width apart. 2. Raise one arm over your head, and then lean to the other side. 3. Slide your hand down your leg as you let the weight of your arm gently stretch your side muscles. Hold for 15 to 30 seconds. 4. Repeat 2 to 4 times on each side. Press-up    1. Lie on your stomach, supporting your body with your forearms. 2. Press your elbows down into the floor to raise your upper back. As you do this, relax your stomach muscles and allow your back to arch without using your back muscles. As your press up, do not let your hips or pelvis come off the floor. 3. Hold for 15 to 30 seconds, then relax. 4. Repeat 2 to 4 times. Relax and rest    1. Lie on your back with a rolled towel under your neck and a pillow under your knees. Extend your arms comfortably to your sides. 2. Relax and breathe normally. 3. Remain in this position for about 10 minutes. 4. If you can, do this 2 or 3 times each day. Follow-up care is a key part of your treatment and safety. Be sure to make and go to all appointments, and call your doctor if you are having problems. It's also a good idea to know your test results and keep a list of the medicines you take. Where can you learn more? Go to http://abraham-mary anne.info/.   Enter F963 in the search box to learn more about \"Back Stretches: Exercises. \"  Current as of: November 29, 2017  Content Version: 11.8  © 8745-6188 Healthwise, Incorporated. Care instructions adapted under license by [employer] (which disclaims liability or warranty for this information). If you have questions about a medical condition or this instruction, always ask your healthcare professional. [place] [address] any warranty or liability for your use of this information.

## 2018-10-18 ENCOUNTER — TELEPHONE (OUTPATIENT)
Dept: ORTHOPEDIC SURGERY | Age: 63
End: 2018-10-18

## 2018-10-18 ENCOUNTER — DOCUMENTATION ONLY (OUTPATIENT)
Dept: ORTHOPEDIC SURGERY | Age: 63
End: 2018-10-18

## 2018-10-18 NOTE — PROGRESS NOTES
Called pt and LVM letting the patient know I am waiting for the record she had requested to be dictated and I will send to pt as soon as the info is complete.

## 2018-10-19 NOTE — TELEPHONE ENCOUNTER
PA for Norco 5/325mg started on covermymeds. Key #[id number]. May take up to 5 business days for decision. Patient has been informed.

## 2018-10-22 ENCOUNTER — TELEPHONE (OUTPATIENT)
Dept: ORTHOPEDIC SURGERY | Age: 63
End: 2018-10-22

## 2018-10-22 NOTE — TELEPHONE ENCOUNTER
The prior auth request for Yadi Hernandezconnor has been approved effective 10/19/2018 through 10/19/2019.

## 2018-11-01 RX ORDER — GABAPENTIN 600 MG/1
600 TABLET ORAL 3 TIMES DAILY
Qty: 90 TAB | Refills: 0 | Status: SHIPPED | OUTPATIENT
Start: 2018-11-01 | End: 2019-01-11 | Stop reason: DRUGHIGH

## 2018-11-01 NOTE — TELEPHONE ENCOUNTER
Last Visit: 10/16/2018 with MD Doug Elizondo   Date of Surgery: 08/30/2018 anterior lumbar interbody fusion L5-S1 with EXOS at L3-5  Next Appointment: 11/21/2018 with AIDEN Mcduffie   Previous Refill Encounters: 08/22/2018 per NP [facility] #[phone] with 1 refill     Requested Prescriptions     Pending Prescriptions Disp Refills    gabapentin (NEURONTIN) 600 mg tablet 90 Tab 0     Sig: Take 1 Tab by mouth three (3) times daily.

## 2018-11-06 ENCOUNTER — OFFICE VISIT (OUTPATIENT)
Dept: ORTHOPEDIC SURGERY | Age: 63
End: 2018-11-06

## 2018-11-06 VITALS
SYSTOLIC BLOOD PRESSURE: 134 MMHG | TEMPERATURE: 98.1 F | DIASTOLIC BLOOD PRESSURE: 68 MMHG | RESPIRATION RATE: 16 BRPM | HEART RATE: 87 BPM

## 2018-11-06 DIAGNOSIS — M51.36 DDD (DEGENERATIVE DISC DISEASE), LUMBAR: Primary | ICD-10-CM

## 2018-11-06 DIAGNOSIS — M48.061 SPINAL STENOSIS OF LUMBAR REGION AT MULTIPLE LEVELS: ICD-10-CM

## 2018-11-06 DIAGNOSIS — Z98.1 S/P LUMBAR FUSION: ICD-10-CM

## 2018-11-06 NOTE — PATIENT INSTRUCTIONS
Learning About Low Back Pain  What is low back pain? Low back pain is pain that can occur anywhere below the ribs and above the legs. It is very common. Almost everyone has it at one time or another. Low back pain can be:  Acute. This is new pain that can last a few days to a few weeks--at the most a few months. Chronic. This pain can last for more than a few months. Sometimes it can last for years. What are some myths about low back pain? Here are some common myths about low back pain--and the facts:  Myth: \"I need to rest my back when I have back pain. \"  Fact: Staying active won't hurt you. It may help you get better faster. Myth: \"I need prescription pain medicine. \"  Fact: It's best to try to let time and being active heal your back. Opioid pain medicines--such as hydrocodone or oxycodone--usually don't work any better than over-the-counter medicines like ibuprofen or naproxen. And opioids can cause serious problems like addiction or overdose. Myth: \"I need a test like an X-ray or an MRI to diagnose my low back pain. \"  Fact: Getting a test right away won't help you get better faster. And it could lead you down a treatment path you may not need, since most people get better on their own. What causes low back pain? In most cases, there isn't a clear cause. This can be frustrating, because your back hurts and there's no obvious reason. Your back pain can be caused by:  Overuse or muscle strain. This can happen from playing sports, lifting heavy things, or not being physically fit. A herniated disc. This is a problem with the cushion between the bones in your back. Arthritis. With age, you may have changes in your bones that can narrow the space around your nerves. Other causes. In rare cases, the cause is a serious illness like an infection or cancer. But there are usually other symptoms too. What are the symptoms? Your symptoms depend on your body and the cause of your back pain.  You may feel:  · Pain that's sharp or dull. It may be in one small area or over a broad area. But even bad pain doesn't mean that it's caused by something serious. · Leg pain, numbness, or tingling. When a nerve gets squeezed--such as from a disc problem or arthritis--you may have symptoms in your leg or foot. You can even have leg symptoms from a back problem without having any pain in your back. How is low back pain diagnosed? A physical exam is the main way to diagnose low back pain. Your doctor may examine your back, check your nerves by testing your reflexes, and make sure that your muscles are strong. He or she also will ask questions about your back and overall health. Most people don't need any tests right away. Tests often don't show the reason for your pain. If your pain lasts more than 6 weeks or you have symptoms that your doctor is more concerned about, he or she may order tests. These may include an X-ray, a CT scan, or an MRI. In some cases, tests can help your doctor find a cause for your pain, especially for pain in one or both legs. How is low back pain treated? Most acute low back pain gets better on its own within a few weeks, no matter what the cause. Time and doing usual activities are all that most people need to feel better. Using heat or ice and taking over-the-counter pain medicine also can help while your body heals. If you aren't getting better on your own or your pain is very bad, your doctor may recommend:  · Physical therapy. · Spinal manipulation, such as by a chiropractor. · Acupuncture. · Massage. · Injections of steroid medicine in your back (especially for pain that involves your legs). If you have chronic low back pain, treatment will help you understand and manage your pain. Treatment may include:  · Staying active. This may include walking or doing back exercises. · Physical therapy. · Medicines.  Some of these medicines are also used for other problems, like depression. · Pain management. Your doctor may have you see a pain specialist.  · Counseling. Having chronic pain can be hard. It may help to talk to someone who can help you cope with your pain. Surgery isn't needed for most people. But it may help some types of low back pain. Follow-up care is a key part of your treatment and safety. Be sure to make and go to all appointments, and call your doctor if you are having problems. It's also a good idea to know your test results and keep a list of the medicines you take. When should you call for help? Call 911 anytime you think you may need emergency care. For example, call if:  · You can't move a leg at all. Call your doctor now or seek immediate medical care if:  · You have new or worse symptoms in your legs, belly, or buttocks. Symptoms may include:  ? Numbness or tingling. ? Weakness. ? Pain. · You lose bladder or bowel control. Watch closely for changes in your health, and be sure to contact your doctor if:  · Along with the back pain, you have a fever, lose weight, or don't feel well. · You do not get better as expected. Where can you learn more? Go to http://abraham-mary anne.info/. Enter A007 in the search box to learn more about \"Learning About Low Back Pain. \"  Current as of: November 29, 2017  Content Version: 11.8  © 5156-6520 [employer]. Care instructions adapted under license by [employer] (which disclaims liability or warranty for this information). If you have questions about a medical condition or this instruction, always ask your healthcare professional. [place] [address] any warranty or liability for your use of this information.
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
## 2018-12-03 ENCOUNTER — DOCUMENTATION ONLY (OUTPATIENT)
Dept: ORTHOPEDIC SURGERY | Age: 63
End: 2018-12-03

## 2018-12-03 NOTE — PROGRESS NOTES
Disability form was received from [facility] , instructed patient that we will call when completed, may take 7-10 business days.

## 2018-12-05 ENCOUNTER — DOCUMENTATION ONLY (OUTPATIENT)
Dept: ORTHOPEDIC SURGERY | Age: 63
End: 2018-12-05

## 2018-12-10 NOTE — PROGRESS NOTES
Spoke with pt mailed forms to her home and she will mail to [employer].  DID NOT FAX sent a copy to scan

## 2018-12-19 ENCOUNTER — OFFICE VISIT (OUTPATIENT)
Dept: ORTHOPEDIC SURGERY | Age: 63
End: 2018-12-19

## 2018-12-19 VITALS
HEART RATE: 75 BPM | TEMPERATURE: 98.2 F | SYSTOLIC BLOOD PRESSURE: 141 MMHG | RESPIRATION RATE: 16 BRPM | DIASTOLIC BLOOD PRESSURE: 71 MMHG | BODY MASS INDEX: 39.64 KG/M2 | HEIGHT: 62 IN | WEIGHT: 215.4 LBS | OXYGEN SATURATION: 95 %

## 2018-12-19 DIAGNOSIS — M54.50 ACUTE RIGHT-SIDED LOW BACK PAIN WITHOUT SCIATICA: Primary | ICD-10-CM

## 2018-12-19 NOTE — PATIENT INSTRUCTIONS
Back Pain: Care Instructions  Your Care Instructions    Back pain has many possible causes. It is often related to problems with muscles and ligaments of the back. It may also be related to problems with the nerves, discs, or bones of the back. Moving, lifting, standing, sitting, or sleeping in an awkward way can strain the back. Sometimes you don't notice the injury until later. Arthritis is another common cause of back pain. Although it may hurt a lot, back pain usually improves on its own within several weeks. Most people recover in 12 weeks or less. Using good home treatment and being careful not to stress your back can help you feel better sooner. Follow-up care is a key part of your treatment and safety. Be sure to make and go to all appointments, and call your doctor if you are having problems. It's also a good idea to know your test results and keep a list of the medicines you take. How can you care for yourself at home? · Sit or lie in positions that are most comfortable and reduce your pain. Try one of these positions when you lie down:  ? Lie on your back with your knees bent and supported by large pillows. ? Lie on the floor with your legs on the seat of a sofa or chair. ? Lie on your side with your knees and hips bent and a pillow between your legs. ? Lie on your stomach if it does not make pain worse. · Do not sit up in bed, and avoid soft couches and twisted positions. Bed rest can help relieve pain at first, but it delays healing. Avoid bed rest after the first day of back pain. · Change positions every 30 minutes. If you must sit for long periods of time, take breaks from sitting. Get up and walk around, or lie in a comfortable position. · Try using a heating pad on a low or medium setting for 15 to 20 minutes every 2 or 3 hours. Try a warm shower in place of one session with the heating pad. · You can also try an ice pack for 10 to 15 minutes every 2 to 3 hours.  Put a thin cloth between the ice pack and your skin. · Take pain medicines exactly as directed. ? If the doctor gave you a prescription medicine for pain, take it as prescribed. ? If you are not taking a prescription pain medicine, ask your doctor if you can take an over-the-counter medicine. · Take short walks several times a day. You can start with 5 to 10 minutes, 3 or 4 times a day, and work up to longer walks. Walk on level surfaces and avoid hills and stairs until your back is better. · Return to work and other activities as soon as you can. Continued rest without activity is usually not good for your back. · To prevent future back pain, do exercises to stretch and strengthen your back and stomach. Learn how to use good posture, safe lifting techniques, and proper body mechanics. When should you call for help? Call your doctor now or seek immediate medical care if:    · You have new or worsening numbness in your legs.     · You have new or worsening weakness in your legs. (This could make it hard to stand up.)     · You lose control of your bladder or bowels.    Watch closely for changes in your health, and be sure to contact your doctor if:    · You have a fever, lose weight, or don't feel well.     · You do not get better as expected. Where can you learn more? Go to http://abraham-mary anne.info/. Enter U593 in the search box to learn more about \"Back Pain: Care Instructions. \"  Current as of: November 29, 2017  Content Version: 11.8  © 8368-8112 [employer]. Care instructions adapted under license by [employer] (which disclaims liability or warranty for this information). If you have questions about a medical condition or this instruction, always ask your healthcare professional. [place] [address] any warranty or liability for your use of this information.

## 2018-12-28 RX ORDER — TAMSULOSIN HYDROCHLORIDE 0.4 MG/1
CAPSULE ORAL
Qty: 30 CAP | Refills: 3 | Status: SHIPPED | OUTPATIENT
Start: 2018-12-28

## 2018-12-28 NOTE — TELEPHONE ENCOUNTER
rx sent to pharmacy.  Please let her know her PCP should start prescribing this if she will continue to need it

## 2018-12-28 NOTE — TELEPHONE ENCOUNTER
Attempted to call patient and recording stated \"call cannot be completed as dial, please check the number and dial again\". If patient calls please let her know Rx sent to pharmacy and PCP will need to refill from now on.

## 2019-01-10 ENCOUNTER — TELEPHONE (OUTPATIENT)
Dept: ORTHOPEDIC SURGERY | Age: 64
End: 2019-01-10

## 2019-01-10 NOTE — TELEPHONE ENCOUNTER
Patient called to request a letter from Dr. Bell Belle stating that she is unable to return to work. She says she needs to forward this information to a supplemental insurance company. Patient was last seen 12/19 after a fall and has a scheduled follow up on 2/4. Please review to determine if we can provide this type of letter and advise patient at [phone].

## 2019-01-11 RX ORDER — GABAPENTIN 600 MG/1
300 TABLET ORAL 3 TIMES DAILY
Qty: 90 TAB | Refills: 0 | OUTPATIENT
Start: 2019-01-11

## 2019-01-11 RX ORDER — GABAPENTIN 300 MG/1
300 CAPSULE ORAL 3 TIMES DAILY
Qty: 90 CAP | Refills: 1 | Status: SHIPPED | OUTPATIENT
Start: 2019-01-11 | End: 2020-08-06 | Stop reason: SDUPTHER

## 2019-01-11 NOTE — TELEPHONE ENCOUNTER
Last Visit: 12/19-CHARLI Ureña  Next Appt: 2/4SYDNEY Santos  Previous Refill Encounter: 11/1-90+0    Requested Prescriptions     Pending Prescriptions Disp Refills    gabapentin (NEURONTIN) 600 mg tablet 90 Tab 0     Sig: Take 1 Tab by mouth three (3) times daily.

## 2019-01-15 NOTE — TELEPHONE ENCOUNTER
Attempted to reach patient but number is not valid. Recording states \"your number cannot be completed at this time please check the number and dial again. If patient calls back please get a working phone number she can be reached at.

## 2019-02-06 ENCOUNTER — OFFICE VISIT (OUTPATIENT)
Dept: ORTHOPEDIC SURGERY | Age: 64
End: 2019-02-06

## 2019-02-06 VITALS
BODY MASS INDEX: 37.54 KG/M2 | OXYGEN SATURATION: 95 % | SYSTOLIC BLOOD PRESSURE: 94 MMHG | HEIGHT: 62 IN | DIASTOLIC BLOOD PRESSURE: 52 MMHG | HEART RATE: 60 BPM | WEIGHT: 204 LBS | RESPIRATION RATE: 14 BRPM

## 2019-02-06 DIAGNOSIS — Z98.1 S/P LUMBAR FUSION: Primary | ICD-10-CM

## 2019-02-06 DIAGNOSIS — M54.2 NECK PAIN: ICD-10-CM

## 2019-02-06 DIAGNOSIS — R26.9 GAIT DISTURBANCE: ICD-10-CM

## 2019-02-06 NOTE — PROGRESS NOTES
Chief complaint/History of Present Illness:  Chief Complaint   Patient presents with    Back Pain     Follow up and med refill     HPI  Jeet Edmonds is a  66 y.o.  female      HISTORY OF PRESENT ILLNESS:  The patient comes in today almost 5 months out from her anterior lumbar interbody fusion at L5-S1, posterior lumbar fusion at L3 to S1 and L3-4, L4-5 extreme lateral interbody fusion. She states she is better since the surgery. She is not having the leg pain any longer which was on the right. However, she still has issues with her back. If she walks greater than 5 minutes or stands greater than 10 minutes, she has incapacitating pain that makes her have to sit or lay down. Any lifting greater than 5 pounds causes her back to hurt. 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 She has applied for Social Security Disability. Has been denied. Is planning on getting [employer] Anna Handing to represent her to get Social Security Disability. She is diabetic. She states her blood sugars are running okay. She was recently hospitalized at [facility] for 8 days 01/19/2019 for the flu and double pneumonia. She states she is better from that now, but because she was in bed so long, she did get a sore on her tailbone which is improving. She continues to take gabapentin 300 t.i.d. We had decreased her from 600 t.i.d. due to dizziness. It is helping and she is no longer dizzy with that. She does complain of some neck pain without radiating arm pain. She is not dropping things, but her balance is off and she is having some dexterity issues. She is right-handed. She is a nonsmoker. She denies fever or bowel or bladder dysfunction. PHYSICAL EXAMINATION:  Ms. Rylee Hdez is a 62-year-old female. She is alert and oriented with normal mood and affect. She has a full weightbearing gait that is slightly off balance utilizing a rolling walker.   She has a poor tandem gait.    She has 4/5 strength bilateral upper and lower extremities. Negative straight leg raise. Negative Andrew. ASSESSMENT/PLAN:  This is a patient 5 months out from her extensive fusion of her spine. Her leg pain has resolved. However, she is still having pretty bad back pain. She rates it as a 5/10, but it causes her to be less functional.  I am concerned about her balance issues and her neck pain. We did a cervical AP and lateral x-ray. These will be read by Dr. Syd Esparza. I am going to put her in some PT for her neck and for her gait disorder. We will try to get that done in the home since it is a taxing effort for her to leave the home. In addition, her  is bedbound. We are going to see her back in 3 months. If she is not improved with her balance, we may need to get an MRI of her neck.           Review of systems:    Past Medical History:   Diagnosis Date    Arthritis     spinal stenosis    [facility] 2017    Left Breast (chemo/radiation)    Diabetes (HCC)     Type 2    Edema     legs and ankles    GERD (gastroesophageal reflux disease)     High cholesterol     Hypertension     Ill-defined condition     patient states hemorrhage behind Left eye-following Dr. Darshan Lee MI (myocardial infarction) ([facility] 06/2018    per patient    Neuropathy     Restless leg      Past Surgical History:   Procedure Laterality Date    HX BACK SURGERY  08/30/2018    HX BREAST BIOPSY Left 02/03/2017    BREAST CANCER    HX BREAST LUMPECTOMY Left     HX CATARACT REMOVAL Right     HX HEART CATHETERIZATION  07/2018    no stents     Social History     Socioeconomic History    Marital status:      Spouse name: Not on file    Number of children: Not on file    Years of education: Not on file    Highest education level: Not on file   Social Needs    Financial resource strain: Not on file    Food insecurity - worry: Not on file    Food insecurity - inability: Not on file   [address] [place] [address] [employer] [employer] needs - medical: Not on file   [place] needs - non-medical: Not on file   Occupational History    Not on file   Tobacco Use    Smoking status: Never Smoker    Smokeless tobacco: Never Used   Substance and Sexual Activity    Alcohol use: No    Drug use: No    Sexual activity: Not on file   Other Topics Concern     Service Not Asked    Blood Transfusions Not Asked    Caffeine Concern Not Asked    Occupational Exposure Not Asked    Hobby Hazards Not Asked    Sleep Concern Not Asked    Stress Concern Not Asked    Weight Concern Not Asked    Special Diet Not Asked    Back Care Not Asked    Exercise Not Asked    Bike Helmet Not Asked   [address] Not Asked    Self-Exams Not Asked   Social History Narrative    Not on file     Family History   Problem Relation Age of Onset    No Known Problems Mother     No Known Problems Father        Physical Exam:  Visit Vitals  BP 94/52   Pulse 60   Resp 14   Ht 5' 2\" (1.575 m)   Wt 204 lb (92.5 kg)   SpO2 95%   BMI 37.31 kg/m²     Pain Scale: 5/10     has been . reviewed and is appropriate          Diagnoses and all orders for this visit:    1. S/P lumbar fusion    2. Neck pain  -     AMB POC XRAY, SPINE, CERVICAL; 2 OR 3  -     REFERRAL TO HOME HEALTH    3. Gait disturbance  -     REFERRAL TO HOME HEALTH            Follow-up Disposition:  Return in about 3 months (around 5/6/2019) for with NP.         We have informed Zora Barajas to notify us for immediate appointment if she has any worsening neurogical symptoms or if an emergency situation presents, then call 911

## 2019-05-23 ENCOUNTER — DOCUMENTATION ONLY (OUTPATIENT)
Dept: ORTHOPEDIC SURGERY | Age: 64
End: 2019-05-23

## 2019-06-05 ENCOUNTER — OFFICE VISIT (OUTPATIENT)
Dept: ORTHOPEDIC SURGERY | Age: 64
End: 2019-06-05

## 2019-06-05 VITALS
OXYGEN SATURATION: 95 % | HEIGHT: 62 IN | HEART RATE: 90 BPM | WEIGHT: 213 LBS | SYSTOLIC BLOOD PRESSURE: 141 MMHG | BODY MASS INDEX: 39.2 KG/M2 | TEMPERATURE: 98.4 F | DIASTOLIC BLOOD PRESSURE: 74 MMHG

## 2019-06-05 DIAGNOSIS — M54.2 NECK PAIN: ICD-10-CM

## 2019-06-05 DIAGNOSIS — M47.817 LUMBOSACRAL SPONDYLOSIS WITHOUT MYELOPATHY: ICD-10-CM

## 2019-06-05 DIAGNOSIS — Z98.1 S/P LUMBAR FUSION: Primary | ICD-10-CM

## 2019-06-05 NOTE — PROGRESS NOTES
Chief complaint/History of Present Illness:  No chief complaint on file. SULMA Billy is a  60 y.o.  female      HISTORY OF PRESENT ILLNESS:  The patient comes in today almost 10 months out form her ALIF at L5-S1, her posterior lumbar fusion L3 to S1, and her XLIF at L3-4 and L4-5. Last visit, she was having some neck issues. We put her in physical therapy. Fortunately, that was never set up. She did not get a call, but her [address] [place] [address] people gave her exercises to do, and her neck pain did get better. So, she is happy about that. She is also doing a home exercise program for her low back, and her back is better unless she does a lot of activity, then she will get in a lot of pain and has to lie down and rest, but just doing very minimal stuff, she states her back is doing okay. She continues on Gabapentin 300 mg three times a day. She is now getting that through her PCP. She is diabetic. Her blood sugar ran about 219 when she last checked it. A new medical issue is COPD, and she is on Advair, which she feels is increasing her blood sugar. She last worked three years ago. 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 She was unable to maintain that with her back issues, so she has not worked in the last three years. Likely, she will not be able to return back to any kind of productive work environment with her extensive back issues. She denies fever and bowel or bladder dysfunction. PHYSICAL EXAM:  Ms. Toro Neil is a 61-year-old female. She is alert and oriented. She has a normal mood and affect. She has a full weightbearing, non-antalgic gait utilizing no assistive device. She has 4/5 strength of the bilateral upper and lower extremities, negative straight leg raise, and negative Mcconnells. She does have pain with hyperextension of the lumbar spine. ASSESSMENT/PLAN:  This is a patient who is nearly 10 months out from her multilevel lumbar fusion.   She is doing better but still gets back pain with activity, which will likely be the case. She will continue her Gabapentin through her PCP. She will continue her home exercise program.  We are going to see her back in three months when she is one year out and get one final x-ray.         Review of systems:    Past Medical History:   Diagnosis Date    Arthritis     spinal stenosis    Breast CA ([facility] 2017    Left Breast (chemo/radiation)    Diabetes (HCC)     Type 2    Edema     legs and ankles    GERD (gastroesophageal reflux disease)     High cholesterol     Hypertension     Ill-defined condition     patient states hemorrhage behind Left eye-following Dr. Krystal Smith MI (myocardial infarction) ([facility] 06/2018    per patient    Neuropathy     Restless leg      Past Surgical History:   Procedure Laterality Date    HX BACK SURGERY  08/30/2018    HX BREAST BIOPSY Left 02/03/2017    BREAST CANCER    HX BREAST LUMPECTOMY Left     HX CATARACT REMOVAL Right     HX HEART CATHETERIZATION  07/2018    no stents     Social History     Socioeconomic History    Marital status:      Spouse name: Not on file    Number of children: Not on file    Years of education: Not on file    Highest education level: Not on file   Occupational History    Not on file   Social Needs    Financial resource strain: Not on file    Food insecurity:     Worry: Not on file     Inability: Not on file    Transportation needs:     Medical: Not on file     Non-medical: Not on file   Tobacco Use    Smoking status: Never Smoker    Smokeless tobacco: Never Used   Substance and Sexual Activity    Alcohol use: No    Drug use: No    Sexual activity: Not on file   Lifestyle    Physical activity:     Days per week: Not on file     Minutes per session: Not on file    Stress: Not on file   Relationships    Social connections:     Talks on phone: Not on file     Gets together: Not on file     Attends Mormonism service: Not on file     Active member of club or organization: Not on file     Attends meetings of clubs or organizations: Not on file     Relationship status: Not on file    Intimate partner violence:     Fear of current or ex partner: Not on file     Emotionally abused: Not on file     Physically abused: Not on file     Forced sexual activity: Not on file   Other Topics Concern     Service Not Asked    Blood Transfusions Not Asked    Caffeine Concern Not Asked    Occupational Exposure Not Asked    Hobby Hazards Not Asked    Sleep Concern Not Asked    Stress Concern Not Asked    Weight Concern Not Asked    Special Diet Not Asked    Back Care Not Asked    Exercise Not Asked    Bike Helmet Not Asked   [address] Not Asked    Self-Exams Not Asked   Social History Narrative    Not on file     Family History   Problem Relation Age of Onset    No Known Problems Mother     No Known Problems Father        Physical Exam:  Visit Vitals  /74 (BP 1 Location: Right arm, BP Patient Position: Sitting)   Pulse 90   Temp 98.4 °F (36.9 °C) (Oral)   Ht 5' 2\" (1.575 m)   Wt 213 lb (96.6 kg)   SpO2 95%   BMI 38.96 kg/m²     Pain Scale: 4/10       has been . reviewed and is appropriate          Diagnoses and all orders for this visit:    1. S/P lumbar fusion    2. Lumbosacral spondylosis without myelopathy    3. Neck pain            Follow-up and Dispositions    · Return in about 3 months (around 9/5/2019) for with NP.              We have informed Eddie Melo to notify us for immediate appointment if she has any worsening neurogical symptoms or if an emergency situation presents, then call 911

## 2019-09-04 ENCOUNTER — OFFICE VISIT (OUTPATIENT)
Dept: ORTHOPEDIC SURGERY | Age: 64
End: 2019-09-04

## 2019-09-04 VITALS
WEIGHT: 213 LBS | DIASTOLIC BLOOD PRESSURE: 88 MMHG | HEIGHT: 62 IN | HEART RATE: 93 BPM | SYSTOLIC BLOOD PRESSURE: 170 MMHG | TEMPERATURE: 98 F | OXYGEN SATURATION: 97 % | BODY MASS INDEX: 39.2 KG/M2

## 2019-09-04 DIAGNOSIS — M54.50 CHRONIC BILATERAL LOW BACK PAIN WITHOUT SCIATICA: ICD-10-CM

## 2019-09-04 DIAGNOSIS — Z98.1 S/P LUMBAR FUSION: Primary | ICD-10-CM

## 2019-09-04 DIAGNOSIS — G89.29 CHRONIC BILATERAL LOW BACK PAIN WITHOUT SCIATICA: ICD-10-CM

## 2019-09-04 DIAGNOSIS — M25.561 CHRONIC PAIN OF RIGHT KNEE: ICD-10-CM

## 2019-09-04 DIAGNOSIS — G89.29 CHRONIC PAIN OF RIGHT KNEE: ICD-10-CM

## 2019-09-04 NOTE — PROGRESS NOTES
Chief complaint/History of Present Illness:  No chief complaint on file. SULMA Garrett is a  60 y.o.  female      HISTORY OF PRESENT ILLNESS:  The patient comes in today one year out from her ALIF at L5-S1, PLIF L3 to S1, and XLIF L3 to L5 that was performed by Dr. Chani Castrejon on August 30, 2019. She is better than she was prior to surgery, but she is still having some confining symptoms. She states the right side of her back will become very painful, up to a 7/10 if she stands or walks more than 15 minutes at a time. The only think that relieves it is to just go and sit down and rest, and then she can get up and do activities until it starts hurting again. She is on Neurontin, 300 mg three times a day through her PCP. That is for her diabetic peripheral neuropathy. She is diabetic. Her blood sugar runs about 185° when she checks it. She is doing a home exercise program.  So, she feels like the surgery did help, but she is still having this pain that does affect her activities of daily living and her productivity. She is a nonsmoker. She is not working. She was unable to go back to work. She had to sell her [employer] because she could no longer work. She also complains of some right knee pain for about three years and would like to see a specialist for that. She denies fever and bowel and bladder dysfunction. PHYSICAL EXAM:  Ms. Alea Saenz is a 61-year-old female. She is alert and oriented. She has a normal mood and affect. She has a full weightbearing, non-antalgic gait. She has 4/5 strength of the bilateral lower extremities and negative straight leg raise. She does have pain with hyperextension of the lumbar spine. ASSESSMENT/PLAN:  This is a patient who is one year out from her multilevel fusion. We did lumbar AP and lateral x-rays. These will be read by Chani Castrejon. She will continue her Neurontin though her PCP.   Ae are going to go ahead and refer her to Dr. Maryann Castellon for her right knee pain. I find it unlikely she would be able to return back to work after her extensive fusion and not being able to stand or walk more than 15 minutes at a time without pain. We will see her back in three months or sooner if needed.         Review of systems:    Past Medical History:   Diagnosis Date    Arthritis     spinal stenosis    Breast CA ([facility] 2017    Left Breast (chemo/radiation)    Diabetes (HCC)     Type 2    Edema     legs and ankles    GERD (gastroesophageal reflux disease)     High cholesterol     Hypertension     Ill-defined condition     patient states hemorrhage behind Left eye-following Dr. Andreea Kumar MI (myocardial infarction) ([facility] 06/2018    per patient    Neuropathy     Restless leg      Past Surgical History:   Procedure Laterality Date    HX BACK SURGERY  08/30/2018    HX BREAST BIOPSY Left 02/03/2017    BREAST CANCER    HX BREAST LUMPECTOMY Left     HX CATARACT REMOVAL Right     HX HEART CATHETERIZATION  07/2018    no stents     Social History     Socioeconomic History    Marital status:      Spouse name: Not on file    Number of children: Not on file    Years of education: Not on file    Highest education level: Not on file   Occupational History    Not on file   Social Needs    Financial resource strain: Not on file    Food insecurity:     Worry: Not on file     Inability: Not on file    Transportation needs:     Medical: Not on file     Non-medical: Not on file   Tobacco Use    Smoking status: Never Smoker    Smokeless tobacco: Never Used   Substance and Sexual Activity    Alcohol use: No    Drug use: No    Sexual activity: Not on file   Lifestyle    Physical activity:     Days per week: Not on file     Minutes per session: Not on file    Stress: Not on file   Relationships    Social connections:     Talks on phone: Not on file     Gets together: Not on file     Attends Confucianism service: Not on file     Active member of club or organization: Not on file     Attends meetings of clubs or organizations: Not on file     Relationship status: Not on file    Intimate partner violence:     Fear of current or ex partner: Not on file     Emotionally abused: Not on file     Physically abused: Not on file     Forced sexual activity: Not on file   Other Topics Concern     Service Not Asked    Blood Transfusions Not Asked    Caffeine Concern Not Asked    Occupational Exposure Not Asked    Hobby Hazards Not Asked    Sleep Concern Not Asked    Stress Concern Not Asked    Weight Concern Not Asked    Special Diet Not Asked    Back Care Not Asked    Exercise Not Asked    Bike Helmet Not Asked   [address] Not Asked    Self-Exams Not Asked   Social History Narrative    Not on file     Family History   Problem Relation Age of Onset    No Known Problems Mother     No Known Problems Father        Physical Exam:  Visit Vitals  /88 (BP 1 Location: Right arm, BP Patient Position: Sitting)   Pulse 93   Temp 98 °F (36.7 °C) (Oral)   Ht 5' 2\" (1.575 m)   Wt 213 lb (96.6 kg)   SpO2 97%   BMI 38.96 kg/m²     Pain Scale: 7/10       has been . reviewed and is appropriate          Diagnoses and all orders for this visit:    1. S/P lumbar fusion  -     AMB POC XRAY, SPINE, LUMBOSACRAL; 2 O    2. Chronic bilateral low back pain without sciatica  -     AMB POC XRAY, SPINE, LUMBOSACRAL; 2 O    3. Chronic pain of right knee  -     REFERRAL TO ORTHOPEDICS            Follow-up and Dispositions    · Return in about 3 months (around 12/4/2019), or with NP.              We have informed Sea Jacobo to notify us for immediate appointment if she has any worsening neurogical symptoms or if an emergency situation presents, then call 911

## 2019-09-11 ENCOUNTER — OFFICE VISIT (OUTPATIENT)
Dept: ORTHOPEDIC SURGERY | Age: 64
End: 2019-09-11

## 2019-09-11 VITALS
BODY MASS INDEX: 38.96 KG/M2 | RESPIRATION RATE: 18 BRPM | DIASTOLIC BLOOD PRESSURE: 80 MMHG | OXYGEN SATURATION: 97 % | SYSTOLIC BLOOD PRESSURE: 161 MMHG | HEIGHT: 62 IN | HEART RATE: 79 BPM

## 2019-09-11 DIAGNOSIS — M17.2 POST-TRAUMATIC OSTEOARTHRITIS OF KNEES, BILATERAL: Primary | ICD-10-CM

## 2019-09-11 DIAGNOSIS — M25.562 CHRONIC PAIN OF LEFT KNEE: ICD-10-CM

## 2019-09-11 DIAGNOSIS — G89.29 CHRONIC PAIN OF RIGHT KNEE: ICD-10-CM

## 2019-09-11 DIAGNOSIS — G89.29 CHRONIC PAIN OF LEFT KNEE: ICD-10-CM

## 2019-09-11 DIAGNOSIS — M25.561 CHRONIC PAIN OF RIGHT KNEE: ICD-10-CM

## 2019-09-11 RX ORDER — TRIAMCINOLONE ACETONIDE 40 MG/ML
40 INJECTION, SUSPENSION INTRA-ARTICULAR; INTRAMUSCULAR ONCE
Qty: 1 VIAL | Refills: 0
Start: 2019-09-11 | End: 2019-09-11

## 2019-09-11 NOTE — PROGRESS NOTES
Patient: Rey Singer                MRN: [id number]       SSN: [id number]  YOB: 1955        AGE: 60 y.o. SEX: female  Body mass index is 38.96 kg/m². PCP: Taryn Esparza NP  09/11/19    Chief Complaint: Bilateral knee pain    HISTORY OF PRESENT ILLNESS:  Zuleyka Valadez is a 60year-old female who presents to the office today with bilateral knee pain. The right is worse than the left. She fell about a year ago and has been having pain ever since. She uses a walker to help with the pain. She rates it as 7/10 bilaterally. She has also tried creams and over the counter antiinflammatories, which have not been much help. Aleve occasionally does help. She has not had injections. She describes the pain as a stabbing pain that is worse with walking and worse when getting up from a chair. Past Medical History:   Diagnosis Date    Arthritis     spinal stenosis    Breast CA ([facility] [address] 2017    Left Breast (chemo/radiation)    Diabetes (HCC)     Type 2    Edema     legs and ankles    GERD (gastroesophageal reflux disease)     High cholesterol     Hypertension     Ill-defined condition     patient states hemorrhage behind Left eye-following Dr. Alice Mendiola MI (myocardial infarction) ([facility] [address] 06/2018    per patient    Neuropathy     Restless leg        Family History   Problem Relation Age of Onset    No Known Problems Mother     No Known Problems Father        Current Outpatient Medications   Medication Sig Dispense Refill    triamcinolone acetonide (KENALOG-40) 40 mg/mL injection 1 mL by Intra artICUlar route once for 1 dose. 1 Vial 0    gabapentin (NEURONTIN) 300 mg capsule Take 1 Cap by mouth three (3) times daily. 90 Cap 1    tamsulosin (FLOMAX) 0.4 mg capsule take 1 capsule by mouth once daily 30 Cap 3    HYDROcodone-acetaminophen (NORCO) 5-325 mg per tablet Take 1 Tab by mouth every twelve (12) hours as needed for Pain. Max Daily Amount: 2 Tabs.  15 Tab 0    LANTUS SOLOSTAR U-100 INSULIN 100 unit/mL (3 mL) inpn 30 units subcutaneously in morning. Can take an additional dose of 10 units subcutaneously in the evening if blood sugar is more than 200. 10 mL 0    albuterol (PROVENTIL HFA, VENTOLIN HFA, PROAIR HFA) 90 mcg/actuation inhaler 2 puffs three to four times a day for 5 days then every 6 hours as needed for shortness of breath 1 Inhaler 0    naloxone (NARCAN) 4 mg/actuation nasal spray Use 1 spray intranasally, then discard. Repeat with new spray every 2 min as needed for opioid overdose symptoms, alternating nostrils. 1 Each 0    LORazepam (ATIVAN) 0.5 mg tablet Take 0.5 mg by mouth as needed for Anxiety.  insulin lispro (HUMALOG) 100 unit/mL kwikpen by SubCUTAneous route.  atorvastatin (LIPITOR) 40 mg tablet Take  by mouth nightly.  rosuvastatin (CRESTOR) 10 mg tablet Take 10 mg by mouth daily.  furosemide (LASIX) 20 mg tablet TAKE 1 TABLET BY MOUTH ONCE DAILY AS NEEDED  0    amLODIPine (NORVASC) 10 mg tablet Take 10 mg by mouth daily. 0    esomeprazole (NEXIUM) 20 mg capsule Take 20 mg by mouth daily. 0    hydroCHLOROthiazide (MICROZIDE) 12.5 mg capsule Take 12.5 mg by mouth daily. 0    TRADJENTA 5 mg tablet TAKE 1 TABLET BY MOUTH ONCE A DAY  0    metoprolol tartrate (LOPRESSOR) 50 mg tablet Take 50 mg by mouth daily. 0    NOVOFINE 32 32 gauge x 1/4\" ndle   0    venlafaxine-SR (EFFEXOR-XR) 37.5 mg capsule Take 37.5 mg by mouth daily.   0       Allergies   Allergen Reactions    Keflex [Cephalexin] Nausea and Vomiting    Metformin Other (comments)     GI DISTRESS    Soliqua 100/33 [Insulin Glargine-Lixisenatide] Nausea Only       Past Surgical History:   Procedure Laterality Date    HX BACK SURGERY  08/30/2018    HX BREAST BIOPSY Left 02/03/2017    BREAST CANCER    HX BREAST LUMPECTOMY Left     HX CATARACT REMOVAL Right     HX HEART CATHETERIZATION  07/2018    no stents       Social History     Socioeconomic History    Marital status:      Spouse name: Not on file    Number of children: Not on file    Years of education: Not on file    Highest education level: Not on file   Occupational History    Not on file   Social Needs    Financial resource strain: Not on file    Food insecurity:     Worry: Not on file     Inability: Not on file    Transportation needs:     Medical: Not on file     Non-medical: Not on file   Tobacco Use    Smoking status: Never Smoker    Smokeless tobacco: Never Used   Substance and Sexual Activity    Alcohol use: No    Drug use: No    Sexual activity: Not on file   Lifestyle    Physical activity:     Days per week: Not on file     Minutes per session: Not on file    Stress: Not on file   Relationships    Social connections:     Talks on phone: Not on file     Gets together: Not on file     Attends Sabianism service: Not on file     Active member of club or organization: Not on file     Attends meetings of clubs or organizations: Not on file     Relationship status: Not on file    Intimate partner violence:     Fear of current or ex partner: Not on file     Emotionally abused: Not on file     Physically abused: Not on file     Forced sexual activity: Not on file   Other Topics Concern     Service Not Asked    Blood Transfusions Not Asked    Caffeine Concern Not Asked    Occupational Exposure Not Asked   Dois Louise Hazards Not Asked    Sleep Concern Not Asked    Stress Concern Not Asked    Weight Concern Not Asked    Special Diet Not Asked    Back Care Not Asked    Exercise Not Asked    Bike Helmet Not Asked   [address] Not Asked    Self-Exams Not Asked   Social History Narrative    Not on file       REVIEW OF SYSTEMS:      CON: negative for recent weight loss/gain, fever, or chills  EYE: negative for double or blurry vision  ENT: negative for hoarseness  RS:   negative for cough, URI, SOB  CV:  negative for chest pain, palpitations  GI:    negative for blood in stool, nausea/vomiting  :  negative for blood in urine  MS: As per HPI  Other systems reviewed and noted below. PHYSICAL EXAMINATION:  Visit Vitals  /80 (BP 1 Location: Right arm, BP Patient Position: Sitting)   Pulse 79   Resp 18   Ht 5' 2\" (1.575 m)   SpO2 97%   BMI 38.96 kg/m²     Body mass index is 38.96 kg/m². GENERAL: Alert and oriented x3, in no acute distress, well-developed, well-nourished. HEENT: Normocephalic, atraumatic. RESP: Non labored breathing with equal chest rise on inspiration. CV: Well perfused extremities. No cyanosis or clubbing noted. ABDOMEN: Soft, non-tender, non-distended. PHYSICAL EXAM:  Physical exam of both knees with full knee range of motion. She has crepitus and tenderness to palpation over the medial joint line with flexion and extension. No instability. No pain or obvious deformity with varus or valgus stress. She has no effusion today. No warmth or erythema. Neurovascularly intact distally. IMAGING:  X-rays of both knees were taken in the office today. These show right knee arthritis with complete loss of the medial joint space and varus deformity. She has osteophyte formation. The left knee also has medial arthrosis, but there is some preservation of joint space on the left knee with arthritic changes also noted. ASSESSMENT AND PLAN:   Ruthie Weiss is a 60year-old female with bilateral knee arthritis. She has not had cortisone shots. We will try that today. We also discussed hyaluronic acid injections should this fail or the possible need for total knee replacement surgery in the future, but she would like to hold off on that. Both knees were injected under sterile conditions without complication. Follow up as needed.        [facility] - [facility]  OFFICE PROCEDURE PROGRESS NOTE        Chart reviewed for the following:   Aidan Bernal MD, have reviewed the History, Physical and updated the Allergic reactions for [address] performed immediately prior to start of procedure:   IWayne MD, have performed the following reviews on Aidan Casas prior to the start of the procedure:            * Patient was identified by name and date of birth   * Agreement on procedure being performed was verified  * Risks and Benefits explained to the patient  * Procedure site verified and marked as necessary  * Patient was positioned for comfort  * Consent was signed and verified    Time: 1110      Date of procedure: 9/11/2019    Procedure performed by:  Wayne Tovar MD    Provider assisted by: Jc Cruz LPN    Patient assisted by: self    How tolerated by patient: tolerated the procedure well with no complications    Post Procedural Pain Scale: 0 - No Hurt    Comments: none                  Electronically signed by: Wayne Tovar MD

## 2019-11-20 ENCOUNTER — OFFICE VISIT (OUTPATIENT)
Dept: ORTHOPEDIC SURGERY | Age: 64
End: 2019-11-20

## 2019-11-20 VITALS
BODY MASS INDEX: 40.56 KG/M2 | DIASTOLIC BLOOD PRESSURE: 83 MMHG | TEMPERATURE: 98.4 F | SYSTOLIC BLOOD PRESSURE: 143 MMHG | RESPIRATION RATE: 16 BRPM | OXYGEN SATURATION: 98 % | HEART RATE: 91 BPM | WEIGHT: 220.4 LBS | HEIGHT: 62 IN

## 2019-11-20 DIAGNOSIS — G89.29 CHRONIC BILATERAL LOW BACK PAIN WITHOUT SCIATICA: Primary | ICD-10-CM

## 2019-11-20 DIAGNOSIS — M54.50 CHRONIC BILATERAL LOW BACK PAIN WITHOUT SCIATICA: Primary | ICD-10-CM

## 2019-11-20 DIAGNOSIS — R10.30 LOWER ABDOMINAL PAIN: ICD-10-CM

## 2019-11-20 NOTE — PROGRESS NOTES
Chief complaint/History of Present Illness:  Chief Complaint   Patient presents with    Back Pain     lower, Post op lower back sx     HPI  Ceferino Dave is a  60 y.o.  female      HISTORY OF PRESENT ILLNESS:  The patient comes in today over a year out from her ALIF at L5-S1 and PLIF L3 to S1 and XLIF at L3 to L5 that was done by Dr. Collin Rosado on August 30, 2018. She states she is doing okay. However, she comes in today complaining of some lower abdominal issues that she states she feels something that is three inches long rounded on the top and bottom, and it moves around in between her intestines. She says it happens on both sides of her abdomen. It does not really hurt, but she can feel it, but when that thing raises to the surface, it will cause her to have a rash with a lot of itching. She states this has been going on since her surgery, but I do not remember her mentioning it before. PHYSICAL EXAM:  Ms. Nadya Eckert is a 57-year-old female. She is alert and oriented. She has a normal mood and affect. She has a full weightbearing, non-antalgic gait utilizing a rolling walker. I tried to feel her abdomen, and what I feel feels more like either scar tissue or a ligament. It is not very tender to palpation. It disappears once her pannus is left down. ASSESSMENT/PLAN:  This is a patient who is having some abdominal issues. I cannot really explain by her surgery, but I will get an abdominal x-ray. I will also get a lumbar x-ray to check on her instrumentation. She will have this done at Health system. I will call her with the results. If it does not show anything, then I explained to her she will need to see her PCP for this, as it is likely not related to her prior surgery.         Review of systems:    Past Medical History:   Diagnosis Date    Arthritis     spinal stenosis    Breast CA ([address] 2017    Left Breast (chemo/radiation)    Diabetes (HCC)     Type 2    Edema     legs and ankles    GERD (gastroesophageal reflux disease)     High cholesterol     Hypertension     Ill-defined condition     patient states hemorrhage behind Left eye-following Dr. Eulas Olszewski MI (myocardial infarction) ([facility] 06/2018    per patient    Neuropathy     Restless leg      Past Surgical History:   Procedure Laterality Date    HX BACK SURGERY  08/30/2018    HX BREAST BIOPSY Left 02/03/2017    BREAST CANCER    HX BREAST LUMPECTOMY Left     HX CATARACT REMOVAL Right     HX HEART CATHETERIZATION  07/2018    no stents     Social History     Socioeconomic History    Marital status:      Spouse name: Not on file    Number of children: Not on file    Years of education: Not on file    Highest education level: Not on file   Occupational History    Not on file   Social Needs    Financial resource strain: Not on file    Food insecurity:     Worry: Not on file     Inability: Not on file    Transportation needs:     Medical: Not on file     Non-medical: Not on file   Tobacco Use    Smoking status: Never Smoker    Smokeless tobacco: Never Used   Substance and Sexual Activity    Alcohol use: No    Drug use: No    Sexual activity: Not on file   Lifestyle    Physical activity:     Days per week: Not on file     Minutes per session: Not on file    Stress: Not on file   Relationships    Social connections:     Talks on phone: Not on file     Gets together: Not on file     Attends Denominational service: Not on file     Active member of club or organization: Not on file     Attends meetings of clubs or organizations: Not on file     Relationship status: Not on file    Intimate partner violence:     Fear of current or ex partner: Not on file     Emotionally abused: Not on file     Physically abused: Not on file     Forced sexual activity: Not on file   Other Topics Concern     Service Not Asked    Blood Transfusions Not Asked    Caffeine Concern Not Asked    Occupational Exposure Not Asked   Elise Avalos Hazards Not Asked    Sleep Concern Not Asked    Stress Concern Not Asked    Weight Concern Not Asked    Special Diet Not Asked    Back Care Not Asked    Exercise Not Asked    Bike Helmet Not Asked   [address] Not Asked    Self-Exams Not Asked   Social History Narrative    Not on file     Family History   Problem Relation Age of Onset    No Known Problems Mother     No Known Problems Father        Physical Exam:  Visit Vitals  /83 (BP 1 Location: Right arm, BP Patient Position: Sitting)   Pulse 91   Temp 98.4 °F (36.9 °C) (Oral)   Resp 16   Ht 5' 2\" (1.575 m)   Wt 220 lb 6.4 oz (100 kg)   SpO2 98%   BMI 40.31 kg/m²     Pain Scale: 7/10       has been . reviewed and is appropriate          Diagnoses and all orders for this visit:    1. Chronic bilateral low back pain without sciatica  -     XR SPINE LUMB 2 OR 3 V; Future    2. Lower abdominal pain  -     XR ABD FLAT/ ERECT; Future            Follow-up and Dispositions    · Return for will call pt with x-ray results.              We have informed Alison Zapata to notify us for immediate appointment if she has any worsening neurogical symptoms or if an emergency situation presents, then call [phone]

## 2019-12-03 NOTE — PROGRESS NOTES
Chief complaint/History of Present Illness:  Chief Complaint   Patient presents with    Back Pain     lower    Follow-up     increased pain s/p fall. SULMA Dueñas is a  66 y.o.  female      HISTORY OF PRESENT ILLNESS:  The patient comes in today stating that at 2 a.m. this morning she was getting up to go use the bathroom. She was holding on to something, but somehow fell backwards; landed on the floor onto her buttocks. She states it took her 2-1/2 hours to get up. She is having pain on the left posterior spine. She states initially she did have some pain in the right leg down to her foot, but that resolved and has since had pain in the right hip. She thinks she may have gotten dizzy from taking Neurontin 600 mg t.i.d.. She states that does sometimes make her dizzy. She denies any bowel or bladder dysfunction. She has not tried anything for it. PHYSICAL EXAMINATION:  Ms. Enmanuel Marrero is a 42-year-old female. She is alert and oriented. She has a full weightbearing nonantalgic gait. It is slow utilizing a walker. She has 4/5 strength bilateral lower extremities. Negative straight leg raise. Pain with hyperextension of her spine. She has no pain with internal or external rotation of her bilateral hips. ASSESSMENT/PLAN:  This is a patient who underwent ALIF L5-S1, posterior L3 to S1 fusion and XLIF L3-4, L4-5 on 08/30/2018. She had a fall onto her buttocks this morning. She is having some back pain on the left. I did do lumbar x-rays. I do not see any fractures of her instrumentation or of her bone. She is just going to try using some ice and, after24 hours, she can try using heat intermittently. She wants to take Tylenol for pain. She can do that. We are going to decrease her gabapentin from 600 mg t.i.d. to 300 mg t.i.d. She will take a half tablet which she has at home. Hopefully this will decrease any dizziness. She has a follow-up appointment in 02/2019.     However, if in a week or two if her symptoms remain, then we will see her back sooner.            Review of systems:    Past Medical History:   Diagnosis Date    Arthritis     spinal stenosis    Breast CA ([facility] [address] 2017    Left Breast (chemo/radiation)    Diabetes (HCC)     Type 2    Edema     legs and ankles    GERD (gastroesophageal reflux disease)     High cholesterol     Hypertension     Ill-defined condition     patient states hemorrhage behind Left eye-following Dr. Kristine Bustos MI (myocardial infarction) ([facility] [address] 06/2018    per patient    Neuropathy     Restless leg      Past Surgical History:   Procedure Laterality Date    HX BACK SURGERY  08/30/2018    HX BREAST BIOPSY Left 02/03/2017    BREAST CANCER    HX BREAST LUMPECTOMY Left     HX CATARACT REMOVAL Right     HX HEART CATHETERIZATION  07/2018    no stents     Social History     Socioeconomic History    Marital status:      Spouse name: Not on file    Number of children: Not on file    Years of education: Not on file    Highest education level: Not on file   Social Needs    Financial resource strain: Not on file    Food insecurity - worry: Not on file    Food insecurity - inability: Not on file   [employer] needs - medical: Not on file   [employer] needs - non-medical: Not on file   Occupational History    Not on file   Tobacco Use    Smoking status: Never Smoker    Smokeless tobacco: Never Used   Substance and Sexual Activity    Alcohol use: No    Drug use: No    Sexual activity: Not on file   Other Topics Concern     Service Not Asked    Blood Transfusions Not Asked    Caffeine Concern Not Asked    Occupational Exposure Not Asked    Hobby Hazards Not Asked    Sleep Concern Not Asked    Stress Concern Not Asked    Weight Concern Not Asked    Special Diet Not Asked    Back Care Not Asked    Exercise Not Asked    Bike Helmet Not Asked    Seat Belt Not Asked    Self-Exams Not Asked   Social History Narrative    Not on file     Family History   Problem Relation Age of Onset    No Known Problems Mother     No Known Problems Father        Physical Exam:  Visit Vitals  /71   Pulse 75   Temp 98.2 °F (36.8 °C) (Oral)   Resp 16   Ht 5' 2\" (1.575 m)   Wt 215 lb 6.4 oz (97.7 kg)   SpO2 95%   BMI 39.40 kg/m²     Pain Scale: 7/10       has been . reviewed and is appropriate          Diagnoses and all orders for this visit:    1. Acute right-sided low back pain without sciatica  -     AMB POC X-RAY LUMBAR SPINE 6+ VW            Follow-up Disposition:  Return for as scheduled in Feb or sooner if symptoms do not resolved.         We have informed Santos Polanco to notify us for immediate appointment if she has any worsening neurogical symptoms or if an emergency situation presents, then call [phone] CMP/CBC

## 2019-12-04 ENCOUNTER — TELEPHONE (OUTPATIENT)
Dept: ORTHOPEDIC SURGERY | Age: 64
End: 2019-12-04

## 2019-12-04 ENCOUNTER — OFFICE VISIT (OUTPATIENT)
Dept: ORTHOPEDIC SURGERY | Age: 64
End: 2019-12-04

## 2019-12-04 VITALS
BODY MASS INDEX: 40.3 KG/M2 | SYSTOLIC BLOOD PRESSURE: 162 MMHG | DIASTOLIC BLOOD PRESSURE: 87 MMHG | HEART RATE: 88 BPM | HEIGHT: 62 IN | WEIGHT: 219 LBS | RESPIRATION RATE: 16 BRPM | OXYGEN SATURATION: 95 %

## 2019-12-04 DIAGNOSIS — M54.50 CHRONIC BILATERAL LOW BACK PAIN WITHOUT SCIATICA: Primary | ICD-10-CM

## 2019-12-04 DIAGNOSIS — G89.29 CHRONIC BILATERAL LOW BACK PAIN WITHOUT SCIATICA: Primary | ICD-10-CM

## 2019-12-04 RX ORDER — MELOXICAM 7.5 MG/1
7.5 TABLET ORAL DAILY
Qty: 60 TAB | Refills: 2 | Status: SHIPPED | OUTPATIENT
Start: 2019-12-04 | End: 2020-12-02

## 2019-12-04 NOTE — PROGRESS NOTES
Chief complaint/History of Present Illness:  Chief Complaint   Patient presents with    Back Pain     back pain     HPI  Elizabeth Reyes is a  60 y.o.  female      HISTORY OF PRESENT ILLNESS:  The patient comes in today for followup of her chronic back pain. She is status post an ALIF at L5-S1 and PLIF L3 to S1 August 30, 2018. When we saw her last time, she felt like things were moving her abdomen. We sent her out for x-rays, abdominal flat and upright and a lumbar spine. Everything was fine. She does do a home exercise program.  She states she does well unless she has to walk a long distance. She is on Gabapentin 300 mg three times a day through her PCP. She denies fever and bowel and bladder dysfunction. PHYSICAL EXAM:  Ms. Nicolasa Cruz is a 43-year-old female. She is alert and oriented. She has a normal mood and affect. She has a full weightbearing, mildly antalgic gait using no assistive device. She has 4/5 strength of the bilateral lower extremities and negative straight leg raise. She has pain with hyperextension of the lumbar spine. ASSESSMENT/PLAN:  This is a patient with chronic low back pain. She just got some new medication for constipation. She is hoping that will decrease that feeling in her abdomen. She will continue her Gabapentin through her PCP and her home exercise program.  We will see her back as needed.        Review of systems:    Past Medical History:   Diagnosis Date    Arthritis     spinal stenosis    Breast CA ([facility] [address] 2017    Left Breast (chemo/radiation)    Diabetes ([facility] [address]     Type 2    Edema     legs and ankles    GERD (gastroesophageal reflux disease)     High cholesterol     Hypertension     Ill-defined condition     patient states hemorrhage behind Left eye-following Dr. Osiel Rodrigues MI (myocardial infarction) ([facility] [address] 06/2018    per patient    Neuropathy     Restless leg      Past Surgical History:   Procedure Laterality Date    HX BACK SURGERY  08/30/2018  HX BREAST BIOPSY Left 02/03/2017    BREAST CANCER    HX BREAST LUMPECTOMY Left     HX CATARACT REMOVAL Right     HX HEART CATHETERIZATION  07/2018    no stents     Social History     Socioeconomic History    Marital status:      Spouse name: Not on file    Number of children: Not on file    Years of education: Not on file    Highest education level: Not on file   Occupational History    Not on file   Social Needs    Financial resource strain: Not on file    Food insecurity:     Worry: Not on file     Inability: Not on file    Transportation needs:     Medical: Not on file     Non-medical: Not on file   Tobacco Use    Smoking status: Never Smoker    Smokeless tobacco: Never Used   Substance and Sexual Activity    Alcohol use: No    Drug use: No    Sexual activity: Not on file   Lifestyle    Physical activity:     Days per week: Not on file     Minutes per session: Not on file    Stress: Not on file   Relationships    Social connections:     Talks on phone: Not on file     Gets together: Not on file     Attends Hoahaoism service: Not on file     Active member of club or organization: Not on file     Attends meetings of clubs or organizations: Not on file     Relationship status: Not on file    Intimate partner violence:     Fear of current or ex partner: Not on file     Emotionally abused: Not on file     Physically abused: Not on file     Forced sexual activity: Not on file   Other Topics Concern     Service Not Asked    Blood Transfusions Not Asked    Caffeine Concern Not Asked    Occupational Exposure Not Asked   Venessa Marek Hazards Not Asked    Sleep Concern Not Asked    Stress Concern Not Asked    Weight Concern Not Asked    Special Diet Not Asked    Back Care Not Asked    Exercise Not Asked    Bike Helmet Not Asked    Seat Belt Not Asked    Self-Exams Not Asked   Social History Narrative    Not on file     Family History   Problem Relation Age of Onset    No Known Problems Mother     No Known Problems Father        Physical Exam:  Visit Vitals  /87   Pulse 88   Resp 16   Ht 5' 2\" (1.575 m)   Wt 219 lb (99.3 kg)   SpO2 95%   BMI 40.06 kg/m²     Pain Scale: 7/10       has been . reviewed and is appropriate          Diagnoses and all orders for this visit:    1. Chronic bilateral low back pain without sciatica            Follow-up and Dispositions    · Return if symptoms worsen or fail to improve.              We have informed Ct Merlinalison to notify us for immediate appointment if she has any worsening neurogical symptoms or if an emergency situation presents, then call [phone]

## 2019-12-04 NOTE — TELEPHONE ENCOUNTER
12-   Patient would like medicine for knee pain, she has tried everything over the counter. She has an appointment 12- but would like to have relief before then. Please call into [facility]  [phone]. Call patient with reply!
Rx for mobic sent to [email or web address]
Chronic atrial fibrillation

## 2019-12-11 ENCOUNTER — OFFICE VISIT (OUTPATIENT)
Dept: ORTHOPEDIC SURGERY | Age: 64
End: 2019-12-11

## 2019-12-11 VITALS
HEART RATE: 85 BPM | HEIGHT: 62 IN | RESPIRATION RATE: 16 BRPM | BODY MASS INDEX: 41.22 KG/M2 | WEIGHT: 224 LBS | OXYGEN SATURATION: 94 % | SYSTOLIC BLOOD PRESSURE: 157 MMHG | DIASTOLIC BLOOD PRESSURE: 78 MMHG

## 2019-12-11 DIAGNOSIS — G89.29 CHRONIC BILATERAL LOW BACK PAIN WITHOUT SCIATICA: ICD-10-CM

## 2019-12-11 DIAGNOSIS — R10.30 LOWER ABDOMINAL PAIN: ICD-10-CM

## 2019-12-11 DIAGNOSIS — M17.2 POST-TRAUMATIC OSTEOARTHRITIS OF KNEES, BILATERAL: Primary | ICD-10-CM

## 2019-12-11 DIAGNOSIS — M54.50 CHRONIC BILATERAL LOW BACK PAIN WITHOUT SCIATICA: ICD-10-CM

## 2019-12-11 NOTE — PROGRESS NOTES
Patient: Tiffanie Scott                MRN: [id number]       SSN: [id number]  YOB: 1955        AGE: 60 y.o. SEX: female  Body mass index is 40.97 kg/m². PCP: Melania Bradley MD  12/11/19    Chief Complaint: Bilateral knee pain    HISTORY OF PRESENT ILLNESS:  Addis Lafleur returns to the office today for both of her knees. She continues to have bilateral knee pain. She rates the pain as 8/10. She got a cortisone shot in both knees at her last visit, which gave her several weeks, but not sustained relief. She has pain with walking. She also notes catching and clicking at times. Heat is helpful with the pain. Past Medical History:   Diagnosis Date    Arthritis     spinal stenosis    Breast CA ([facility] 2017    Left Breast (chemo/radiation)    Diabetes (HCC)     Type 2    Edema     legs and ankles    GERD (gastroesophageal reflux disease)     High cholesterol     Hypertension     Ill-defined condition     patient states hemorrhage behind Left eye-following Dr. Josh Arizmendi MI (myocardial infarction) ([facility] 06/2018    per patient    Neuropathy     Restless leg        Family History   Problem Relation Age of Onset    No Known Problems Mother     No Known Problems Father        Current Outpatient Medications   Medication Sig Dispense Refill    meloxicam (MOBIC) 7.5 mg tablet Take 1 Tab by mouth daily. 60 Tab 2    gabapentin (NEURONTIN) 300 mg capsule Take 1 Cap by mouth three (3) times daily. 90 Cap 1    tamsulosin (FLOMAX) 0.4 mg capsule take 1 capsule by mouth once daily 30 Cap 3    HYDROcodone-acetaminophen (NORCO) 5-325 mg per tablet Take 1 Tab by mouth every twelve (12) hours as needed for Pain. Max Daily Amount: 2 Tabs. 15 Tab 0    LANTUS SOLOSTAR U-100 INSULIN 100 unit/mL (3 mL) inpn 30 units subcutaneously in morning.   Can take an additional dose of 10 units subcutaneously in the evening if blood sugar is more than 200. 10 mL 0    albuterol (PROVENTIL HFA, VENTOLIN HFA, PROAIR HFA) 90 mcg/actuation inhaler 2 puffs three to four times a day for 5 days then every 6 hours as needed for shortness of breath 1 Inhaler 0    naloxone (NARCAN) 4 mg/actuation nasal spray Use 1 spray intranasally, then discard. Repeat with new spray every 2 min as needed for opioid overdose symptoms, alternating nostrils. 1 Each 0    LORazepam (ATIVAN) 0.5 mg tablet Take 0.5 mg by mouth as needed for Anxiety.  insulin lispro (HUMALOG) 100 unit/mL kwikpen by SubCUTAneous route.  atorvastatin (LIPITOR) 40 mg tablet Take  by mouth nightly.  rosuvastatin (CRESTOR) 10 mg tablet Take 10 mg by mouth daily.  furosemide (LASIX) 20 mg tablet TAKE 1 TABLET BY MOUTH ONCE DAILY AS NEEDED  0    amLODIPine (NORVASC) 10 mg tablet Take 10 mg by mouth daily. 0    esomeprazole (NEXIUM) 20 mg capsule Take 20 mg by mouth daily. 0    hydroCHLOROthiazide (MICROZIDE) 12.5 mg capsule Take 12.5 mg by mouth daily. 0    TRADJENTA 5 mg tablet TAKE 1 TABLET BY MOUTH ONCE A DAY  0    metoprolol tartrate (LOPRESSOR) 50 mg tablet Take 50 mg by mouth daily. 0    NOVOFINE 32 32 gauge x 1/4\" ndle   0    venlafaxine-SR (EFFEXOR-XR) 37.5 mg capsule Take 37.5 mg by mouth daily.   0       Allergies   Allergen Reactions    Ciprofloxacin Hives    Keflex [Cephalexin] Nausea and Vomiting    Metformin Other (comments)     GI DISTRESS    Soliqua 100/33 [Insulin Glargine-Lixisenatide] Nausea Only       Past Surgical History:   Procedure Laterality Date    HX BACK SURGERY  08/30/2018    HX BREAST BIOPSY Left 02/03/2017    BREAST CANCER    HX BREAST LUMPECTOMY Left     HX CATARACT REMOVAL Right     HX HEART CATHETERIZATION  07/2018    no stents       Social History     Socioeconomic History    Marital status:      Spouse name: Not on file    Number of children: Not on file    Years of education: Not on file    Highest education level: Not on file   Occupational History    Not on file Social Needs    Financial resource strain: Not on file    Food insecurity:     Worry: Not on file     Inability: Not on file    Transportation needs:     Medical: Not on file     Non-medical: Not on file   Tobacco Use    Smoking status: Never Smoker    Smokeless tobacco: Never Used   Substance and Sexual Activity    Alcohol use: No    Drug use: No    Sexual activity: Not on file   Lifestyle    Physical activity:     Days per week: Not on file     Minutes per session: Not on file    Stress: Not on file   Relationships    Social connections:     Talks on phone: Not on file     Gets together: Not on file     Attends Caodaism service: Not on file     Active member of club or organization: Not on file     Attends meetings of clubs or organizations: Not on file     Relationship status: Not on file    Intimate partner violence:     Fear of current or ex partner: Not on file     Emotionally abused: Not on file     Physically abused: Not on file     Forced sexual activity: Not on file   Other Topics Concern     Service Not Asked    Blood Transfusions Not Asked    Caffeine Concern Not Asked    Occupational Exposure Not Asked   [place] Hazards Not Asked    Sleep Concern Not Asked    Stress Concern Not Asked    Weight Concern Not Asked    Special Diet Not Asked    Back Care Not Asked    Exercise Not Asked    Bike Helmet Not Asked    Seat Belt Not Asked    Self-Exams Not Asked   Social History Narrative    Not on file       REVIEW OF SYSTEMS:      No changes from previous review of systems unless noted. PHYSICAL EXAMINATION:  Visit Vitals  /78   Pulse 85   Resp 16   Ht 5' 2\" (1.575 m)   Wt 224 lb (101.6 kg)   SpO2 94%   BMI 40.97 kg/m²     Body mass index is 40.97 kg/m². GENERAL: Alert and oriented x3, in no acute distress. HEENT: Normocephalic, atraumatic. RESP: Non labored breathing. SKIN: No rashes or lesions noted.    PHYSICAL EXAM:  Physical exam of both knees with mild crepitus. She is tender to palpation along the medial joint line. She lacks full extension due to pain. She does not have an effusion, warmth or erythema today. She is neurovascularly intact. She ambulates with the use of a rolling walker. ASSESSMENT AND PLAN:   Avni Matos continues to have bilateral knee osteoarthritis that is symptomatic. We will put in for hyaluronic acid authorization to see if her insurance company will approve it. If approved, we will have her back in the office for that in the near future.              Electronically signed by: Paras Paredes MD

## 2020-02-12 ENCOUNTER — OFFICE VISIT (OUTPATIENT)
Dept: ORTHOPEDIC SURGERY | Age: 65
End: 2020-02-12

## 2020-02-12 VITALS
DIASTOLIC BLOOD PRESSURE: 74 MMHG | SYSTOLIC BLOOD PRESSURE: 151 MMHG | WEIGHT: 222 LBS | HEART RATE: 91 BPM | BODY MASS INDEX: 40.85 KG/M2 | OXYGEN SATURATION: 96 % | HEIGHT: 62 IN

## 2020-02-12 DIAGNOSIS — M17.2 POST-TRAUMATIC OSTEOARTHRITIS OF KNEES, BILATERAL: Primary | ICD-10-CM

## 2020-02-12 RX ORDER — TRIAMCINOLONE ACETONIDE 40 MG/ML
40 INJECTION, SUSPENSION INTRA-ARTICULAR; INTRAMUSCULAR ONCE
Qty: 1 VIAL | Refills: 0
Start: 2020-02-12 | End: 2020-02-12

## 2020-02-12 NOTE — PROGRESS NOTES
Patient: Johnny Jeffers                MRN: [id number]       SSN: [id number]  YOB: 1955        AGE: 60 y.o. SEX: female  Body mass index is 40.6 kg/m². PCP: Aniya Hay MD  02/12/20    Chief Complaint: Bilateral knee pain    HISTORY OF PRESENT ILLNESS:  Cory Quiles returns to the office today with her bilateral knee pain. The left is worse than the right today. At her previous visit, we put in for hyaluronic acid injections, but her insurance has changed, so those have not been approved. It has been about five months since she had an injection in either knee. She is here today to discuss further treatment options. Past Medical History:   Diagnosis Date    Arthritis     spinal stenosis    [facility] ([facility] 2017    Left Breast (chemo/radiation)    Diabetes (HCC)     Type 2    Edema     legs and ankles    GERD (gastroesophageal reflux disease)     High cholesterol     Hypertension     Ill-defined condition     patient states hemorrhage behind Left eye-following Dr. Drake Shoemaker MI (myocardial infarction) ([facility] 06/2018    per patient    Neuropathy     Restless leg        Family History   Problem Relation Age of Onset    No Known Problems Mother     No Known Problems Father        Current Outpatient Medications   Medication Sig Dispense Refill    triamcinolone acetonide (KENALOG-40) 40 mg/mL injection 1 mL by Intra artICUlar route once for 1 dose. 1 Vial 0    meloxicam (MOBIC) 7.5 mg tablet Take 1 Tab by mouth daily. 60 Tab 2    gabapentin (NEURONTIN) 300 mg capsule Take 1 Cap by mouth three (3) times daily. 90 Cap 1    tamsulosin (FLOMAX) 0.4 mg capsule take 1 capsule by mouth once daily 30 Cap 3    HYDROcodone-acetaminophen (NORCO) 5-325 mg per tablet Take 1 Tab by mouth every twelve (12) hours as needed for Pain. Max Daily Amount: 2 Tabs. 15 Tab 0    LANTUS SOLOSTAR U-100 INSULIN 100 unit/mL (3 mL) inpn 30 units subcutaneously in morning.   Can take an additional dose of 10 units subcutaneously in the evening if blood sugar is more than 200. 10 mL 0    albuterol (PROVENTIL HFA, VENTOLIN HFA, PROAIR HFA) 90 mcg/actuation inhaler 2 puffs three to four times a day for 5 days then every 6 hours as needed for shortness of breath 1 Inhaler 0    naloxone (NARCAN) 4 mg/actuation nasal spray Use 1 spray intranasally, then discard. Repeat with new spray every 2 min as needed for opioid overdose symptoms, alternating nostrils. 1 Each 0    LORazepam (ATIVAN) 0.5 mg tablet Take 0.5 mg by mouth as needed for Anxiety.  insulin lispro (HUMALOG) 100 unit/mL kwikpen by SubCUTAneous route.  atorvastatin (LIPITOR) 40 mg tablet Take  by mouth nightly.  rosuvastatin (CRESTOR) 10 mg tablet Take 10 mg by mouth daily.  furosemide (LASIX) 20 mg tablet TAKE 1 TABLET BY MOUTH ONCE DAILY AS NEEDED  0    amLODIPine (NORVASC) 10 mg tablet Take 10 mg by mouth daily. 0    hydroCHLOROthiazide (MICROZIDE) 12.5 mg capsule Take 12.5 mg by mouth daily. 0    TRADJENTA 5 mg tablet TAKE 1 TABLET BY MOUTH ONCE A DAY  0    metoprolol tartrate (LOPRESSOR) 50 mg tablet Take 50 mg by mouth daily. 0    NOVOFINE 32 32 gauge x 1/4\" ndle   0    esomeprazole (NEXIUM) 20 mg capsule Take 20 mg by mouth daily. 0    venlafaxine-SR (EFFEXOR-XR) 37.5 mg capsule Take 37.5 mg by mouth daily.   0       Allergies   Allergen Reactions    Ciprofloxacin Hives    Keflex [Cephalexin] Nausea and Vomiting    Metformin Other (comments)     GI DISTRESS    Soliqua 100/33 [Insulin Glargine-Lixisenatide] Nausea Only       Past Surgical History:   Procedure Laterality Date    HX BACK SURGERY  08/30/2018    HX BREAST BIOPSY Left 02/03/2017    BREAST CANCER    HX BREAST LUMPECTOMY Left     HX CATARACT REMOVAL Right     HX HEART CATHETERIZATION  07/2018    no stents       Social History     Socioeconomic History    Marital status:      Spouse name: Not on file    Number of children: Not on file    Years of education: Not on file    Highest education level: Not on file   Occupational History    Not on file   Social Needs    Financial resource strain: Not on file    Food insecurity:     Worry: Not on file     Inability: Not on file    Transportation needs:     Medical: Not on file     Non-medical: Not on file   Tobacco Use    Smoking status: Never Smoker    Smokeless tobacco: Never Used   Substance and Sexual Activity    Alcohol use: No    Drug use: No    Sexual activity: Not on file   Lifestyle    Physical activity:     Days per week: Not on file     Minutes per session: Not on file    Stress: Not on file   Relationships    Social connections:     Talks on phone: Not on file     Gets together: Not on file     Attends Nondenominational service: Not on file     Active member of club or organization: Not on file     Attends meetings of clubs or organizations: Not on file     Relationship status: Not on file    Intimate partner violence:     Fear of current or ex partner: Not on file     Emotionally abused: Not on file     Physically abused: Not on file     Forced sexual activity: Not on file   Other Topics Concern     Service Not Asked    Blood Transfusions Not Asked    Caffeine Concern Not Asked    Occupational Exposure Not Asked   [place] Hazards Not Asked    Sleep Concern Not Asked    Stress Concern Not Asked    Weight Concern Not Asked    Special Diet Not Asked    Back Care Not Asked    Exercise Not Asked    Bike Helmet Not Asked   [address] Not Asked    Self-Exams Not Asked   Social History Narrative    Not on file       REVIEW OF SYSTEMS:      No changes from previous review of systems unless noted.     PHYSICAL EXAMINATION:  Visit Vitals  /74 (BP 1 Location: Right arm, BP Patient Position: Sitting) Comment (BP 1 Location): patient preference - breast cancer   Pulse 91   Ht 5' 2\" (1.575 m)   Wt 222 lb (100.7 kg)   SpO2 96%   BMI 40.60 kg/m²     Body mass index is 40.6 kg/m². GENERAL: Alert and oriented x3, in no acute distress. HEENT: Normocephalic, atraumatic. RESP: Non labored breathing. SKIN: No rashes or lesions noted. PHYSICAL EXAM:  Physical exam of both knees has some crepitus and decreased knee range of motion. Tender to palpation over the medial joint line bilaterally. She lacks terminal extension and has flexion to about 100 degrees. No effusion is appreciated today. ASSESSMENT AND PLAN:   Isael Bull continues to have pain from her bilateral knee severe tricompartmental osteoarthritis. We will put in for approval for hyaluronic acid injection through her insurance today. To try to give her some relief today, we discussed repeat cortisone injections, which she was agreeable to. These were given without complication. Follow up when the hyaluronic acid injections are approved.       [facility] - [facility]  OFFICE PROCEDURE PROGRESS NOTE        Chart reviewed for the following:   William Adhikari MD, have reviewed the History, Physical and updated the Allergic reactions for Krissy Figueroa performed immediately prior to start of procedure:   William Adhikari MD, have performed the following reviews on Brodie Lindquist prior to the start of the procedure:            * Patient was identified by name and date of birth   * Agreement on procedure being performed was verified  * Risks and Benefits explained to the patient  * Procedure site verified and marked as necessary  * Patient was positioned for comfort  * Consent was signed and verified    Time: 1315      Date of procedure: 2/12/2020    Procedure performed by:  Vee Traore MD    Provider assisted by: Ari Lira LPN    Patient assisted by: self    How tolerated by patient: tolerated the procedure well with no complications    Post Procedural Pain Scale: 0 - No Hurt    Comments: none                  Electronically signed by: Cornelius Aldrich Lakesha Dial MD

## 2020-02-21 ENCOUNTER — DOCUMENTATION ONLY (OUTPATIENT)
Dept: ORTHOPEDIC SURGERY | Age: 65
End: 2020-02-21

## 2020-03-11 ENCOUNTER — OFFICE VISIT (OUTPATIENT)
Dept: ORTHOPEDIC SURGERY | Age: 65
End: 2020-03-11

## 2020-03-11 VITALS
WEIGHT: 220 LBS | HEIGHT: 62 IN | BODY MASS INDEX: 40.48 KG/M2 | SYSTOLIC BLOOD PRESSURE: 176 MMHG | HEART RATE: 76 BPM | OXYGEN SATURATION: 100 % | DIASTOLIC BLOOD PRESSURE: 88 MMHG | RESPIRATION RATE: 18 BRPM | TEMPERATURE: 97.4 F

## 2020-03-11 DIAGNOSIS — M17.2 POST-TRAUMATIC OSTEOARTHRITIS OF KNEES, BILATERAL: Primary | ICD-10-CM

## 2020-03-11 RX ORDER — HYALURONATE SODIUM 10 MG/ML
2 SYRINGE (ML) INTRAARTICULAR ONCE
Qty: 2 ML | Refills: 0
Start: 2020-03-11 | End: 2020-03-11

## 2020-03-11 NOTE — PROGRESS NOTES
Patient: Asim Wadsworth                MRN: [id number]       SSN: [id number]  YOB: 1955        AGE: 63 y.o. SEX: female  Body mass index is 40.24 kg/m². PCP: Sydney Weir MD  03/11/20    Chief Complaint: Bilateral knee pain 7/10    HISTORY OF PRESENT ILLNESS:  Denise Aponte returns today for both knees. Her knees continue to give her arthritic type pain. There is some swelling that she notes as well. She rates the pain as 7/10. She has had cortisone shots, which have been unsuccessful in alleviating her pain. Past Medical History:   Diagnosis Date    Arthritis     spinal stenosis    Breast CA ([facility] 2017    Left Breast (chemo/radiation)    Diabetes (HCC)     Type 2    Edema     legs and ankles    GERD (gastroesophageal reflux disease)     High cholesterol     Hypertension     Ill-defined condition     patient states hemorrhage behind Left eye-following Dr. Ross Eiesnberg MI (myocardial infarction) ([facility] 06/2018    per patient    Neuropathy     Restless leg        Family History   Problem Relation Age of Onset    No Known Problems Mother     No Known Problems Father        Current Outpatient Medications   Medication Sig Dispense Refill    sodium hyaluronate (SUPARTZ FX/HYALGAN/GENIVSC) 10 mg/mL syrg injection 2 mL by Intra artICUlar route once for 1 dose. 2 mL 0    meloxicam (MOBIC) 7.5 mg tablet Take 1 Tab by mouth daily. 60 Tab 2    gabapentin (NEURONTIN) 300 mg capsule Take 1 Cap by mouth three (3) times daily. 90 Cap 1    tamsulosin (FLOMAX) 0.4 mg capsule take 1 capsule by mouth once daily 30 Cap 3    LANTUS SOLOSTAR U-100 INSULIN 100 unit/mL (3 mL) inpn 30 units subcutaneously in morning.   Can take an additional dose of 10 units subcutaneously in the evening if blood sugar is more than 200. 10 mL 0    albuterol (PROVENTIL HFA, VENTOLIN HFA, PROAIR HFA) 90 mcg/actuation inhaler 2 puffs three to four times a day for 5 days then every 6 hours as needed for shortness of breath 1 Inhaler 0    LORazepam (ATIVAN) 0.5 mg tablet Take 0.5 mg by mouth as needed for Anxiety.  insulin lispro (HUMALOG) 100 unit/mL kwikpen by SubCUTAneous route.  atorvastatin (LIPITOR) 40 mg tablet Take  by mouth nightly.  rosuvastatin (CRESTOR) 10 mg tablet Take 10 mg by mouth daily.  furosemide (LASIX) 20 mg tablet TAKE 1 TABLET BY MOUTH ONCE DAILY AS NEEDED  0    amLODIPine (NORVASC) 10 mg tablet Take 10 mg by mouth daily. 0    hydroCHLOROthiazide (MICROZIDE) 12.5 mg capsule Take 12.5 mg by mouth daily. 0    TRADJENTA 5 mg tablet TAKE 1 TABLET BY MOUTH ONCE A DAY  0    metoprolol tartrate (LOPRESSOR) 50 mg tablet Take 50 mg by mouth daily. 0    venlafaxine-SR (EFFEXOR-XR) 37.5 mg capsule Take 37.5 mg by mouth daily. 0    HYDROcodone-acetaminophen (NORCO) 5-325 mg per tablet Take 1 Tab by mouth every twelve (12) hours as needed for Pain. Max Daily Amount: 2 Tabs. 15 Tab 0    naloxone (NARCAN) 4 mg/actuation nasal spray Use 1 spray intranasally, then discard. Repeat with new spray every 2 min as needed for opioid overdose symptoms, alternating nostrils. 1 Each 0    esomeprazole (NEXIUM) 20 mg capsule Take 20 mg by mouth daily.   0    NOVOFINE 32 32 gauge x 1/4\" ndle   0       Allergies   Allergen Reactions    Ciprofloxacin Hives    Keflex [Cephalexin] Nausea and Vomiting    Metformin Other (comments)     GI DISTRESS    Soliqua 100/33 [Insulin Glargine-Lixisenatide] Nausea Only       Past Surgical History:   Procedure Laterality Date    HX BACK SURGERY  08/30/2018    HX BREAST BIOPSY Left 02/03/2017    BREAST CANCER    HX BREAST LUMPECTOMY Left     HX CATARACT REMOVAL Right     HX HEART CATHETERIZATION  07/2018    no stents       Social History     Socioeconomic History    Marital status:      Spouse name: Not on file    Number of children: Not on file    Years of education: Not on file    Highest education level: Not on file   Occupational History    Not on file   Social Needs    Financial resource strain: Not on file    Food insecurity     Worry: Not on file     Inability: Not on file    Transportation needs     Medical: Not on file     Non-medical: Not on file   Tobacco Use    Smoking status: Never Smoker    Smokeless tobacco: Never Used   Substance and Sexual Activity    Alcohol use: No    Drug use: No    Sexual activity: Not on file   Lifestyle    Physical activity     Days per week: Not on file     Minutes per session: Not on file    Stress: Not on file   Relationships    Social connections     Talks on phone: Not on file     Gets together: Not on file     Attends Quaker service: Not on file     Active member of club or organization: Not on file     Attends meetings of clubs or organizations: Not on file     Relationship status: Not on file    Intimate partner violence     Fear of current or ex partner: Not on file     Emotionally abused: Not on file     Physically abused: Not on file     Forced sexual activity: Not on file   Other Topics Concern     Service Not Asked    Blood Transfusions Not Asked    Caffeine Concern Not Asked    Occupational Exposure Not Asked   [place] Hazards Not Asked    Sleep Concern Not Asked    Stress Concern Not Asked    Weight Concern Not Asked    Special Diet Not Asked    Back Care Not Asked    Exercise Not Asked    Bike Helmet Not Asked   [address] Not Asked    Self-Exams Not Asked   Social History Narrative    Not on file       REVIEW OF SYSTEMS:      No changes from previous review of systems unless noted. PHYSICAL EXAMINATION:  Visit Vitals  /88 (BP 1 Location: Left arm)   Pulse 76   Temp 97.4 °F (36.3 °C)   Resp 18   Ht 5' 2\" (1.575 m)   Wt 220 lb (99.8 kg)   SpO2 100%   BMI 40.24 kg/m²     Body mass index is 40.24 kg/m². GENERAL: Alert and oriented x3, in no acute distress. HEENT: Normocephalic, atraumatic. RESP: Non labored breathing.   SKIN: No rashes or lesions noted. PHYSICAL EXAM:  Physical exam of both knees with mild crepitus. Mild tenderness to palpation along the medial joint line. No effusion, warmth or erythema. Full knee range of motion. Neurovascularly intact distally. ASSESSMENT AND PLAN:   Deana Álvarez continues to have pain from her bilateral knee osteoarthritis. She has been approved for hyaluronic acid injections. She was agreeable to this today after discussing several treatment options. The first injection of Euflexxa was given into each knee. She will follow up in a week for #2.         [facility]  OFFICE PROCEDURE PROGRESS NOTE        Chart reviewed for the following:   Lars Salmeron MD, have reviewed the History, Physical and updated the Allergic reactions for [address] performed immediately prior to start of procedure:   Lars Salmeron MD, have performed the following reviews on [facility] prior to the start of the procedure:            * Patient was identified by name and date of birth   * Agreement on procedure being performed was verified  * Risks and Benefits explained to the patient  * Procedure site verified and marked as necessary  * Patient was positioned for comfort  * Consent was signed and verified    Time: 1320  Bilateral knee  Euflexxa #1      Date of procedure: 3/11/2020    Procedure performed by:  Santos Manning MD    Provider assisted by: Chaim Ford LPN    Patient assisted by: self    How tolerated by patient: tolerated the procedure well with no complications    Post Procedural Pain Scale: 0 - No Hurt    Comments: none                  Electronically signed by: Santos Manning MD

## 2020-03-18 ENCOUNTER — OFFICE VISIT (OUTPATIENT)
Dept: ORTHOPEDIC SURGERY | Age: 65
End: 2020-03-18

## 2020-03-18 VITALS
HEIGHT: 62 IN | RESPIRATION RATE: 16 BRPM | OXYGEN SATURATION: 99 % | BODY MASS INDEX: 40.7 KG/M2 | DIASTOLIC BLOOD PRESSURE: 92 MMHG | SYSTOLIC BLOOD PRESSURE: 177 MMHG | HEART RATE: 80 BPM | TEMPERATURE: 98.5 F | WEIGHT: 221.2 LBS

## 2020-03-18 DIAGNOSIS — M17.2 POST-TRAUMATIC OSTEOARTHRITIS OF KNEES, BILATERAL: Primary | ICD-10-CM

## 2020-03-18 RX ORDER — HYALURONATE SODIUM 10 MG/ML
2 SYRINGE (ML) INTRAARTICULAR ONCE
Qty: 2 ML | Refills: 0
Start: 2020-03-18 | End: 2020-03-18

## 2020-03-18 NOTE — PROGRESS NOTES
This note has been dictated below. Patient: Jhonny Live                MRN: [id number]       SSN: [id number]  YOB: 1955        AGE: 63 y.o. SEX: female  Body mass index is 40.46 kg/m².     PCP: Federico Arizmendi MD  03/18/20    Past Medical History:   Diagnosis Date    Arthritis     spinal stenosis    Breast CA ([facility] 2017    Left Breast (chemo/radiation)    Diabetes (HCC)     Type 2    Edema     legs and ankles    GERD (gastroesophageal reflux disease)     High cholesterol     Hypertension     Ill-defined condition     patient states hemorrhage behind Left eye-following Dr. Maxwell Aguilar MI (myocardial infarction) ([facility] 06/2018    per patient    Neuropathy     Restless leg        Past Surgical History:   Procedure Laterality Date    HX BACK SURGERY  08/30/2018    HX BREAST BIOPSY Left 02/03/2017    BREAST CANCER    HX BREAST LUMPECTOMY Left     HX CATARACT REMOVAL Right     HX HEART CATHETERIZATION  07/2018    no stents       Family History   Problem Relation Age of Onset    No Known Problems Mother     No Known Problems Father        Social History     Socioeconomic History    Marital status:      Spouse name: Not on file    Number of children: Not on file    Years of education: Not on file    Highest education level: Not on file   Occupational History    Not on file   Social Needs    Financial resource strain: Not on file    Food insecurity     Worry: Not on file     Inability: Not on file    Transportation needs     Medical: Not on file     Non-medical: Not on file   Tobacco Use    Smoking status: Never Smoker    Smokeless tobacco: Never Used   Substance and Sexual Activity    Alcohol use: No    Drug use: No    Sexual activity: Not on file   Lifestyle    Physical activity     Days per week: Not on file     Minutes per session: Not on file    Stress: Not on file   Relationships    Social connections     Talks on phone: Not on file     Gets together: Not on file     Attends Restorationism service: Not on file     Active member of club or organization: Not on file     Attends meetings of clubs or organizations: Not on file     Relationship status: Not on file    Intimate partner violence     Fear of current or ex partner: Not on file     Emotionally abused: Not on file     Physically abused: Not on file     Forced sexual activity: Not on file   Other Topics Concern     Service Not Asked    Blood Transfusions Not Asked    Caffeine Concern Not Asked    Occupational Exposure Not Asked   [place] Hazards Not Asked    Sleep Concern Not Asked    Stress Concern Not Asked    Weight Concern Not Asked    Special Diet Not Asked    Back Care Not Asked    Exercise Not Asked    Bike Helmet Not Asked   [address] [facility] Not Asked    Self-Exams Not Asked   Social History Narrative    Not on file       Current Outpatient Medications   Medication Sig Dispense Refill    sodium hyaluronate (SUPARTZ FX/HYALGAN/GENIVSC) 10 mg/mL syrg injection 2 mL by Intra artICUlar route once for 1 dose. 2 mL 0    meloxicam (MOBIC) 7.5 mg tablet Take 1 Tab by mouth daily. 60 Tab 2    gabapentin (NEURONTIN) 300 mg capsule Take 1 Cap by mouth three (3) times daily. 90 Cap 1    tamsulosin (FLOMAX) 0.4 mg capsule take 1 capsule by mouth once daily 30 Cap 3    HYDROcodone-acetaminophen (NORCO) 5-325 mg per tablet Take 1 Tab by mouth every twelve (12) hours as needed for Pain. Max Daily Amount: 2 Tabs. 15 Tab 0    LANTUS SOLOSTAR U-100 INSULIN 100 unit/mL (3 mL) inpn 30 units subcutaneously in morning.   Can take an additional dose of 10 units subcutaneously in the evening if blood sugar is more than 200. 10 mL 0    albuterol (PROVENTIL HFA, VENTOLIN HFA, PROAIR HFA) 90 mcg/actuation inhaler 2 puffs three to four times a day for 5 days then every 6 hours as needed for shortness of breath 1 Inhaler 0    naloxone (NARCAN) 4 mg/actuation nasal spray Use 1 spray intranasally, then discard. Repeat with new spray every 2 min as needed for opioid overdose symptoms, alternating nostrils. 1 Each 0    LORazepam (ATIVAN) 0.5 mg tablet Take 0.5 mg by mouth as needed for Anxiety.  insulin lispro (HUMALOG) 100 unit/mL kwikpen by SubCUTAneous route.  atorvastatin (LIPITOR) 40 mg tablet Take  by mouth nightly.  rosuvastatin (CRESTOR) 10 mg tablet Take 10 mg by mouth daily.  furosemide (LASIX) 20 mg tablet TAKE 1 TABLET BY MOUTH ONCE DAILY AS NEEDED  0    amLODIPine (NORVASC) 10 mg tablet Take 10 mg by mouth daily. 0    hydroCHLOROthiazide (MICROZIDE) 12.5 mg capsule Take 12.5 mg by mouth daily. 0    TRADJENTA 5 mg tablet TAKE 1 TABLET BY MOUTH ONCE A DAY  0    metoprolol tartrate (LOPRESSOR) 50 mg tablet Take 50 mg by mouth daily. 0    NOVOFINE 32 32 gauge x 1/4\" ndle   0    esomeprazole (NEXIUM) 20 mg capsule Take 20 mg by mouth daily. 0    venlafaxine-SR (EFFEXOR-XR) 37.5 mg capsule Take 37.5 mg by mouth daily. 0       Allergies   Allergen Reactions    Ciprofloxacin Hives    Keflex [Cephalexin] Nausea and Vomiting    Metformin Other (comments)     GI DISTRESS    Soliqua 100/33 [Insulin Glargine-Lixisenatide] Nausea Only         REVIEW OF SYSTEMS:      Review of systems unchanged from previous visit except as noted below. PHYSICAL EXAMINATION:  Visit Vitals  BP (!) 177/92 (BP 1 Location: Left arm, BP Patient Position: Sitting)   Pulse 80   Temp 98.5 °F (36.9 °C) (Oral)   Resp 16   Ht 5' 2\" (1.575 m)   Wt 221 lb 3.2 oz (100.3 kg)   SpO2 99%   BMI 40.46 kg/m²     Body mass index is 40.46 kg/m². HISTORY OF PRESENT ILLNESS:  Marine García returns today for both knees. She is here today for Euflexxa injection #2 into both knees. She tolerated the first well and says her pain is improved. PHYSICAL EXAM:  Exam of both knees with some crepitus with knee range of motion. No effusion, warmth or erythema. She is neurovascularly intact otherwise.       ASSESSMENT AND PLAN:   Raissa Scales underwent bilateral Euflexxa 1% injections of 2 cc into both knees today. She tolerated it well. Follow up in a week.       [facility] - Han Aguilar  OFFICE PROCEDURE PROGRESS NOTE        Chart reviewed for the following:   Dameon Honeycutt MD, have reviewed the History, Physical and updated the Allergic reactions for [address] performed immediately prior to start of procedure:   Dameon Honeycutt MD, have performed the following reviews on Chris Lopez prior to the start of the procedure:            * Patient was identified by name and date of birth   * Agreement on procedure being performed was verified  * Risks and Benefits explained to the patient  * Procedure site verified and marked as necessary  * Patient was positioned for comfort  * Consent was signed and verified    Time: 1120  Bilateral knee  Euflexxa injection #2  1% sodium hyaluronate  2cc      Date of procedure: 3/18/2020    Procedure performed by:  Monique Dandy, MD    Provider assisted by: Henny Gupta LPN    Patient assisted by: self    How tolerated by patient: tolerated the procedure well with no complications    Post Procedural Pain Scale: 0 - No Hurt    Comments: none                     Electronically signed by: Monique Dandy, MD

## 2020-03-24 ENCOUNTER — TELEPHONE (OUTPATIENT)
Dept: ORTHOPEDIC SURGERY | Age: 65
End: 2020-03-24

## 2020-03-25 ENCOUNTER — OFFICE VISIT (OUTPATIENT)
Dept: ORTHOPEDIC SURGERY | Age: 65
End: 2020-03-25

## 2020-03-25 VITALS
OXYGEN SATURATION: 96 % | SYSTOLIC BLOOD PRESSURE: 174 MMHG | HEART RATE: 81 BPM | HEIGHT: 62 IN | WEIGHT: 227.6 LBS | BODY MASS INDEX: 41.88 KG/M2 | DIASTOLIC BLOOD PRESSURE: 87 MMHG

## 2020-03-25 DIAGNOSIS — M17.2 POST-TRAUMATIC OSTEOARTHRITIS OF KNEES, BILATERAL: Primary | ICD-10-CM

## 2020-03-25 RX ORDER — HYALURONATE SODIUM 10 MG/ML
2 SYRINGE (ML) INTRAARTICULAR ONCE
Qty: 2 ML | Refills: 0
Start: 2020-03-25 | End: 2020-03-25

## 2020-04-20 ENCOUNTER — TELEPHONE (OUTPATIENT)
Dept: ORTHOPEDIC SURGERY | Age: 65
End: 2020-04-20

## 2020-04-20 NOTE — TELEPHONE ENCOUNTER
Patient called for . Patient said both of her knees have gotten worse. That she fell a week ago. That the left knee is swollen. That the knees have been hurting before she fell. That she did not go to the ER. That if Nirmal Bloom does not do something about her knee she will see a different provider. Patient is asking to speak to Dr. Malcom Bernheim or a nurse. Patient tel. [phone]. Note : patient last seen on 03/25/20. Next appt on 06/24/2020.

## 2020-04-21 NOTE — TELEPHONE ENCOUNTER
Spoke with patient and relayed Faith's recommendations to her. Patient expressed she would like to see Dr. Karolyn Malave in the future when we are seeing patients again to set up a knee replacement.

## 2020-04-21 NOTE — TELEPHONE ENCOUNTER
She has severe OA of both knees from Dr. Lauryn Negro note on 2/12/20. He has injected cortisone and HA into both knees. If this is not providing relief the next step is to talk with one of the total joint surgeons about knee replacements. She needs to understand with COVID this is elective surgery and will not be done for a few months. It looks like her last cortisone injection was 2/12/20 so if she is in severe pain we can try to get her into the office for cortisone injections but that will be all we can do at this time. We can also see about her getting her on the schedule to see Dr. Patrizia Ludwig or Dr. Lisseth Ferguson when we are doing elective surgery again.

## 2020-05-28 ENCOUNTER — TELEPHONE (OUTPATIENT)
Dept: ORTHOPEDIC SURGERY | Age: 65
End: 2020-05-28

## 2020-05-28 NOTE — TELEPHONE ENCOUNTER
Spoke with pt, informed of the Provider message below. Pt stated understanding, no further actions needed at this time.

## 2020-05-28 NOTE — TELEPHONE ENCOUNTER
Patient called for Sanjuanita Ramirez. Patient said that she can no longer work 9 to 5 any more. That she can hardly walk due to her back. That she will be having Knee surgery done on her knees. Patient said she can no longer work. Patient said that she will be having her insurance company send over a form to Sanjuanita Ramirez. Patient is asking that the form be completed as soon as possible once received at the [facility] one location. Patient tel. [phone].

## 2020-05-28 NOTE — TELEPHONE ENCOUNTER
We have not seen her since 12/2019. We cannot just take her OOW or fill out a form saying she cannot work due to her back pain. It could be her knee pain that is the main problem and keeping her being able to walk long distances. I would suggest she get her knee surgery and have them take her out of work for the surgery.

## 2020-06-23 LAB — CREATININE, EXTERNAL: 1.5

## 2020-07-14 VITALS — BODY MASS INDEX: 40.48 KG/M2 | WEIGHT: 220 LBS | HEIGHT: 62 IN

## 2020-07-14 RX ORDER — POLYETHYLENE GLYCOL 3350 17 G/17G
17 POWDER, FOR SOLUTION ORAL DAILY
COMMUNITY
End: 2021-12-05

## 2020-07-14 RX ORDER — NEOMYCIN SULFATE, POLYMYXIN B SULFATE AND DEXAMETHASONE 3.5; 10000; 1 MG/ML; [USP'U]/ML; MG/ML
1 SUSPENSION/ DROPS OPHTHALMIC 2 TIMES DAILY
COMMUNITY
End: 2021-05-13

## 2020-07-14 RX ORDER — TIMOLOL MALEATE 6.8 MG/ML
1 SOLUTION/ DROPS OPHTHALMIC DAILY
COMMUNITY
End: 2021-05-13

## 2020-07-22 DIAGNOSIS — G89.18 POSTOPERATIVE PAIN: Primary | ICD-10-CM

## 2020-07-22 RX ORDER — OXYCODONE AND ACETAMINOPHEN 5; 325 MG/1; MG/1
1 TABLET ORAL
Qty: 30 TAB | Refills: 0 | Status: SHIPPED | OUTPATIENT
Start: 2020-07-22 | End: 2020-07-27

## 2020-07-22 RX ORDER — CLINDAMYCIN HYDROCHLORIDE 300 MG/1
300 CAPSULE ORAL 4 TIMES DAILY
Qty: 4 CAP | Refills: 0 | Status: SHIPPED | OUTPATIENT
Start: 2020-07-22 | End: 2020-10-04

## 2020-07-24 DIAGNOSIS — M17.2 POST-TRAUMATIC OSTEOARTHRITIS OF KNEES, BILATERAL: Primary | ICD-10-CM

## 2020-07-31 ENCOUNTER — OFFICE VISIT (OUTPATIENT)
Dept: ORTHOPEDIC SURGERY | Age: 65
End: 2020-07-31
Payer: COMMERCIAL

## 2020-07-31 VITALS — HEIGHT: 62 IN | BODY MASS INDEX: 40.48 KG/M2 | WEIGHT: 220 LBS

## 2020-07-31 DIAGNOSIS — Z96.652 H/O TOTAL KNEE REPLACEMENT, LEFT: Primary | ICD-10-CM

## 2020-07-31 PROCEDURE — 99024 POSTOP FOLLOW-UP VISIT: CPT | Performed by: PHYSICIAN ASSISTANT

## 2020-07-31 RX ORDER — CEPHALEXIN 500 MG/1
500 CAPSULE ORAL 4 TIMES DAILY
Qty: 28 CAP | Refills: 0 | Status: SHIPPED | OUTPATIENT
Start: 2020-07-31 | End: 2020-08-07

## 2020-07-31 NOTE — PROGRESS NOTES
Encounter reason    Chief Complaint   Patient presents with    Surgical Follow-up     Left TKR        ROS  Patient is a pleasant appearing individual, appropriately dressed, well hydrated, well nourished, who is alert, appropriately oriented for age, and in no acute distress with a normal gait and normal affect who does not appear to be in any significant pain. ICD-10-CM ICD-9-CM    1. H/O total knee replacement, left  Z96.652 V43.65 cephALEXin (KEFLEX) 500 mg capsule       Patient is status post left total knee replacement from the week of 7/23/2020. Doing very well little bit sore stiffness walking with a cane take her aspirin wearing her thigh-high compression stocking. Feels much improved since surgery. Physical examination of the left knee shows a well-healed incision little bit of erythema on the lateral portion but no drainage grossly neurovascularly intact with good cap refill no instability    Assessment of the patient well-healing left knee little bit erythema    Plan for patient she may get the incision wet we will send her in an antibiotic for conservative coverage continue physical therapy with thigh-high compression stocking and continue the aspirin for 2 more weeks work on getting off all assistive walking devices  She may drive when she is not taking any narcotic pain medication. Patient will follow-up with HERMILO Ratliff in 1 week with a virtual visit just for wound check as long as everything looks good she should have no restrictions continue physical therapy until reaches full range of motion go from there if any change or gets worse the meantime can call office as needed        HERMILO Lyon    Follow-up and Dispositions    · Return in about 1 week (around 8/7/2020) for HERMILO EPREZ  [facility].

## 2020-08-06 ENCOUNTER — OFFICE VISIT (OUTPATIENT)
Dept: ORTHOPEDIC SURGERY | Age: 65
End: 2020-08-06
Payer: COMMERCIAL

## 2020-08-06 DIAGNOSIS — Z96.652 H/O TOTAL KNEE REPLACEMENT, LEFT: Primary | ICD-10-CM

## 2020-08-06 PROCEDURE — 99024 POSTOP FOLLOW-UP VISIT: CPT | Performed by: PHYSICIAN ASSISTANT

## 2020-08-06 RX ORDER — OXYCODONE AND ACETAMINOPHEN 5; 325 MG/1; MG/1
1 TABLET ORAL
Qty: 30 TAB | Refills: 0 | Status: SHIPPED | OUTPATIENT
Start: 2020-08-06 | End: 2020-08-13

## 2020-08-06 NOTE — PATIENT INSTRUCTIONS
--During the patient's visit today, the patient was instructed to no longer wear the compression stocking on the unaffected leg. They were instructed that the compression stocking on the affected leg should be continued for a total of 3 weeks postoperatively. They were also reminded to continue their blood thinning medication (Aspirin) for a total of 3 weeks postoperatively as instructed at the time of discharge. Their dressing was taken down and will not need to be replaced. --The patient was instructed that they may now shower and the incision may get wet. The patient was asked not to [facility]" their wound. They are reminded that although they may shower they should avoid baths, hot tubs, lotions, pools, lakes, and springs. Patient was instructed to make sure that when the wound does get wet to dry it very well. They were also reminded to not yet use any creams, lotions, ointments, salves, antibiotic creams, or AAA. -Patient was told to leave the surgical tape that is covering the incision intact for another week. At that point it can be removed. It may be slightly easier if a very small thin layer of Vaseline is utilized to break up the glue holding it in place. Once the glue comes off it is imperative they remove the Vaseline.    -Patient may drive once they are in no pain and on no pain medication. --Patient will have a follow-up appointment in the next several weeks to assess progression in physical therapy and to receive further instruction.    -Patient was cautioned to be on the look out for increased swelling of the surgical knee, spreading of redness or warmth that persists around the incision site. They were reminded that some swelling or redness following physical therapy is not abnormal.  They were also instructed to be on the look out for drainage and/or pus coming from the incision site.   Again they were warned about a fever of 101.5 that does not respond to Tylenol and and inability to bear weight on affected leg. They were also cautioned about an acute increase in pain that persists. If any of these issues should arise they will not hesitate to contact our office for further evaluation and care.    -Additional pain management was reviewed with the patient and if additional prescription pain medication is required it has been sent to the patient's pharmacy. Questions were solicited and answered to the patient's satisfaction and the patient will follow-up as discussed.

## 2020-08-06 NOTE — PROGRESS NOTES
[facility]  Total Knee Replacement Follow up    Subjective:    Jada Johnston is a 63 y.o. female presents for postop care  status post TKA 23 July 2020. Pt was seen on 31 July 2020 and had some erythema around the wound. She was given keflex and returns today for wound check. Pain is generally well controlled. Dressing and stockings are in place. Pt has been taking blood thinning medication as recommended. Ambulating with cane. No problems with wound. Otherwise without complaint. ROS  Patient is a pleasant appearing individual, appropriately dressed, well hydrated, well nourished, who is alert, appropriately oriented for age, and in no acute distress with a cane based gait and normal affect who does not appear to be in any significant pain. Objective:   VSS AFEB      Right knee - Neurovascularly intact with good cap refill, full range of motion and full strength, well healed incision noted, no swelling, no erythema, no instability. Left knee - Decreased range of motion with flexion, no crepitation, Grossly neurovascularly intact, Good cap refill, wound clean, dry, and intact without evidence of drainage, induration, or fluctuance, Moderate swelling, No gross instability, Some quadriceps weakness. Some ecchymosis without erythema, warmth, or signs of infection. Erythema resolved      Assessment:     H/O total knee replacement, left [Z96.652]     Doing well post operatively. Ms. [facility] has a reminder for a \"due or due soon\" health maintenance. I have asked that she contact her primary care provider for follow-up on this health maintenance. Oxycodone refilled for pt at her request.     Plan:     --During the patient's visit today, the patient was instructed to no longer wear the compression stocking on the unaffected leg. They were instructed that the compression stocking on the affected leg should be continued for a total of 3 weeks postoperatively.   They were also reminded to continue their blood thinning medication for a total of 3 weeks postoperatively as instructed at the time of discharge. Their dressing was taken down and will not need to be replaced. --The patient was instructed that they may now shower and the incision may get wet. The patient was asked not to [facility]" their wound. They are reminded that although they may shower they should avoid baths, hot tubs, lotions, pools, lakes, and springs. Patient was instructed to make sure that when the wound does get wet to dry it very well. They were also reminded to not yet use any creams, lotions, ointments, salves, antibiotic creams, or AAA. -Patient was told to leave the surgical tape that is covering the incision intact for another week. At that point it can be removed. It may be slightly easier if a very small thin layer of Vaseline is utilized to break up the glue holding it in place. Once the glue comes off it is imperative they remove the Vaseline.    -Patient may drive once they are in no pain and on no pain medication. --Patient will have a follow-up appointment in the next several weeks to assess progression in physical therapy and to receive further instruction.    -Patient was cautioned to be on the look out for increased swelling of the surgical knee, spreading of redness or warmth that persists around the incision site. They were reminded that some swelling or redness following physical therapy is not abnormal.  They were also instructed to be on the look out for drainage and/or pus coming from the incision site. Again they were warned about a fever of 101.5 that does not respond to Tylenol and and inability to bear weight on affected leg. They were also cautioned about an acute increase in pain that persists.   If any of these issues should arise they will not hesitate to contact our office for further evaluation and care.    -Additional pain management was reviewed with the patient and if additional prescription pain medication is required it has been sent to the patient's pharmacy. At pt's request, will f/u 4 weeks for close f/u and to insure wound continues to progress as expected. Questions were solicited and answered to the patient's satisfaction and the patient will follow-up as discussed.       Signed By: Elza De Jesus PA-C     August 6, 2020

## 2020-09-02 ENCOUNTER — OFFICE VISIT (OUTPATIENT)
Dept: ORTHOPEDIC SURGERY | Age: 65
End: 2020-09-02
Payer: COMMERCIAL

## 2020-09-02 DIAGNOSIS — Z96.652 H/O TOTAL KNEE REPLACEMENT, LEFT: Primary | ICD-10-CM

## 2020-09-02 DIAGNOSIS — G89.29 CHRONIC PAIN OF RIGHT KNEE: ICD-10-CM

## 2020-09-02 DIAGNOSIS — M25.561 CHRONIC PAIN OF RIGHT KNEE: ICD-10-CM

## 2020-09-02 DIAGNOSIS — G89.29 CHRONIC PAIN OF LEFT KNEE: ICD-10-CM

## 2020-09-02 DIAGNOSIS — M25.562 CHRONIC PAIN OF LEFT KNEE: ICD-10-CM

## 2020-09-02 PROCEDURE — 99024 POSTOP FOLLOW-UP VISIT: CPT | Performed by: PHYSICIAN ASSISTANT

## 2020-09-30 ENCOUNTER — APPOINTMENT (OUTPATIENT)
Dept: GENERAL RADIOLOGY | Age: 65
DRG: 139 | End: 2020-09-30
Attending: EMERGENCY MEDICINE
Payer: MEDICAID

## 2020-09-30 ENCOUNTER — HOSPITAL ENCOUNTER (INPATIENT)
Age: 65
LOS: 4 days | Discharge: HOME | DRG: 139 | End: 2020-10-04
Attending: EMERGENCY MEDICINE | Admitting: INTERNAL MEDICINE
Payer: MEDICAID

## 2020-09-30 DIAGNOSIS — J18.9 COMMUNITY ACQUIRED PNEUMONIA OF RIGHT LOWER LOBE OF LUNG: Primary | ICD-10-CM

## 2020-09-30 DIAGNOSIS — R09.02 HYPOXIA: ICD-10-CM

## 2020-09-30 PROBLEM — N17.9 ACUTE RENAL FAILURE (ARF) (HCC): Status: ACTIVE | Noted: 2020-09-30

## 2020-09-30 PROBLEM — Z79.4 TYPE 2 DIABETES MELLITUS WITH HYPERGLYCEMIA, WITH LONG-TERM CURRENT USE OF INSULIN (HCC): Status: ACTIVE | Noted: 2020-09-30

## 2020-09-30 PROBLEM — E11.65 TYPE 2 DIABETES MELLITUS WITH HYPERGLYCEMIA, WITH LONG-TERM CURRENT USE OF INSULIN (HCC): Status: ACTIVE | Noted: 2020-09-30

## 2020-09-30 LAB
ALBUMIN SERPL-MCNC: 3.4 G/DL (ref 3.5–4.7)
ALBUMIN/GLOB SERPL: 0.8 {RATIO}
ALP SERPL-CCNC: 66 U/L (ref 38–126)
ALT SERPL-CCNC: 14 U/L (ref 3–52)
ANION GAP SERPL CALC-SCNC: 9 MMOL/L
APTT PPP: 25.4 SEC (ref 20.4–35.6)
AST SERPL W P-5'-P-CCNC: 17 U/L (ref 14–74)
BASOPHILS # BLD: 0 K/UL (ref 0–0.1)
BASOPHILS NFR BLD: 0 % (ref 0–2)
BILIRUB SERPL-MCNC: 0.3 MG/DL (ref 0.2–1)
BNP SERPL-MCNC: 56 PG/ML (ref 0–100)
BUN SERPL-MCNC: 17 MG/DL (ref 9–21)
BUN/CREAT SERPL: 10
CA-I BLD-MCNC: 9.1 MG/DL (ref 8.5–10.5)
CHLORIDE SERPL-SCNC: 101 MMOL/L (ref 94–111)
CO2 SERPL-SCNC: 29 MMOL/L (ref 21–33)
CREAT SERPL-MCNC: 1.7 MG/DL (ref 0.7–1.2)
D DIMER PPP FEU-MCNC: 3.69 UG/ML(FEU)
EOSINOPHIL # BLD: 0.4 K/UL (ref 0–0.4)
EOSINOPHIL NFR BLD: 3 % (ref 0–5)
ERYTHROCYTE [DISTWIDTH] IN BLOOD BY AUTOMATED COUNT: 16.8 % (ref 11.6–14.5)
FLUAV AG NPH QL IA: NEGATIVE
FLUBV AG NOSE QL IA: NEGATIVE
GLOBULIN SER CALC-MCNC: 4.5 G/DL
GLUCOSE SERPL-MCNC: 178 MG/DL (ref 70–110)
HCT VFR BLD AUTO: 33.1 % (ref 35–45)
HGB BLD-MCNC: 9.7 % (ref 12–16)
IMM GRANULOCYTES # BLD AUTO: 0.1 K/UL
IMM GRANULOCYTES NFR BLD AUTO: 1 %
INR PPP: 1
LACTATE SERPL-SCNC: 1.3 MMOL/L (ref 0.5–2)
LDH SERPL L TO P-CCNC: 193 U/L (ref 98–192)
LYMPHOCYTES # BLD: 1.1 K/UL (ref 0.9–3.6)
LYMPHOCYTES NFR BLD: 7 % (ref 21–52)
MAGNESIUM SERPL-MCNC: 2.1 MG/DL (ref 1.7–2.8)
MCH RBC QN AUTO: 23.7 PG (ref 24–34)
MCHC RBC AUTO-ENTMCNC: 29.3 G/DL (ref 31–37)
MCV RBC AUTO: 80.9 FL (ref 74–97)
MONOCYTES # BLD: 0.9 K/UL (ref 0.05–1.2)
MONOCYTES NFR BLD: 6 % (ref 3–10)
NEUTS SEG # BLD: 12.8 K/UL (ref 1.8–8)
NEUTS SEG NFR BLD: 83 % (ref 40–73)
PHOSPHATE SERPL-MCNC: 3.3 MG/DL (ref 2.5–4.5)
PLATELET # BLD AUTO: 475 K/UL (ref 135–420)
PMV BLD AUTO: 9.3 FL
POTASSIUM SERPL-SCNC: 4 MMOL/L (ref 3.2–5.1)
PROT SERPL-MCNC: 7.9 G/DL (ref 6.1–8.4)
PROTHROMBIN TIME: 12.3 SEC (ref 11.5–13.9)
RBC # BLD AUTO: 4.09 M/UL (ref 4.2–5.3)
SARS-COV-2, COV2: NORMAL
SODIUM SERPL-SCNC: 139 MMOL/L (ref 135–145)
THERAPEUTIC RANGE,PTTT: NORMAL SEC (ref 68–109)
TROPONIN I SERPL-MCNC: 0.02 NG/ML (ref 0.02–0.05)
WBC # BLD AUTO: 15.4 K/UL (ref 4.6–13.2)

## 2020-09-30 PROCEDURE — 85025 COMPLETE CBC W/AUTO DIFF WBC: CPT

## 2020-09-30 PROCEDURE — 74011000250 HC RX REV CODE- 250: Performed by: EMERGENCY MEDICINE

## 2020-09-30 PROCEDURE — 83615 LACTATE (LD) (LDH) ENZYME: CPT

## 2020-09-30 PROCEDURE — 96374 THER/PROPH/DIAG INJ IV PUSH: CPT

## 2020-09-30 PROCEDURE — 87804 INFLUENZA ASSAY W/OPTIC: CPT

## 2020-09-30 PROCEDURE — 84484 ASSAY OF TROPONIN QUANT: CPT

## 2020-09-30 PROCEDURE — 80053 COMPREHEN METABOLIC PANEL: CPT

## 2020-09-30 PROCEDURE — 96375 TX/PRO/DX INJ NEW DRUG ADDON: CPT

## 2020-09-30 PROCEDURE — 83880 ASSAY OF NATRIURETIC PEPTIDE: CPT

## 2020-09-30 PROCEDURE — 94760 N-INVAS EAR/PLS OXIMETRY 1: CPT

## 2020-09-30 PROCEDURE — 85379 FIBRIN DEGRADATION QUANT: CPT

## 2020-09-30 PROCEDURE — 99285 EMERGENCY DEPT VISIT HI MDM: CPT

## 2020-09-30 PROCEDURE — 94664 DEMO&/EVAL PT USE INHALER: CPT

## 2020-09-30 PROCEDURE — 65270000029 HC RM PRIVATE

## 2020-09-30 PROCEDURE — 82728 ASSAY OF FERRITIN: CPT

## 2020-09-30 PROCEDURE — 74011000258 HC RX REV CODE- 258: Performed by: EMERGENCY MEDICINE

## 2020-09-30 PROCEDURE — 74011250636 HC RX REV CODE- 250/636: Performed by: EMERGENCY MEDICINE

## 2020-09-30 PROCEDURE — 77010033678 HC OXYGEN DAILY

## 2020-09-30 PROCEDURE — 94640 AIRWAY INHALATION TREATMENT: CPT

## 2020-09-30 PROCEDURE — 93005 ELECTROCARDIOGRAM TRACING: CPT

## 2020-09-30 PROCEDURE — 87635 SARS-COV-2 COVID-19 AMP PRB: CPT

## 2020-09-30 PROCEDURE — 85610 PROTHROMBIN TIME: CPT

## 2020-09-30 PROCEDURE — 74011250636 HC RX REV CODE- 250/636: Performed by: NURSE PRACTITIONER

## 2020-09-30 PROCEDURE — 85730 THROMBOPLASTIN TIME PARTIAL: CPT

## 2020-09-30 PROCEDURE — 71045 X-RAY EXAM CHEST 1 VIEW: CPT

## 2020-09-30 PROCEDURE — 84100 ASSAY OF PHOSPHORUS: CPT

## 2020-09-30 PROCEDURE — 36415 COLL VENOUS BLD VENIPUNCTURE: CPT

## 2020-09-30 PROCEDURE — 86900 BLOOD TYPING SEROLOGIC ABO: CPT

## 2020-09-30 PROCEDURE — 83605 ASSAY OF LACTIC ACID: CPT

## 2020-09-30 PROCEDURE — 83735 ASSAY OF MAGNESIUM: CPT

## 2020-09-30 RX ORDER — ACETAMINOPHEN 650 MG/1
650 SUPPOSITORY RECTAL
Status: DISCONTINUED | OUTPATIENT
Start: 2020-09-30 | End: 2020-10-04 | Stop reason: HOSPADM

## 2020-09-30 RX ORDER — INSULIN LISPRO 100 [IU]/ML
INJECTION, SOLUTION INTRAVENOUS; SUBCUTANEOUS
Status: DISCONTINUED | OUTPATIENT
Start: 2020-09-30 | End: 2020-10-04 | Stop reason: HOSPADM

## 2020-09-30 RX ORDER — ALBUTEROL SULFATE 90 UG/1
2 AEROSOL, METERED RESPIRATORY (INHALATION)
Status: DISCONTINUED | OUTPATIENT
Start: 2020-10-01 | End: 2020-10-04 | Stop reason: HOSPADM

## 2020-09-30 RX ORDER — MAGNESIUM SULFATE HEPTAHYDRATE 500 MG/ML
2 INJECTION, SOLUTION INTRAMUSCULAR; INTRAVENOUS
Status: COMPLETED | OUTPATIENT
Start: 2020-09-30 | End: 2020-09-30

## 2020-09-30 RX ORDER — ACETAMINOPHEN 325 MG/1
650 TABLET ORAL
Status: DISCONTINUED | OUTPATIENT
Start: 2020-09-30 | End: 2020-10-01

## 2020-09-30 RX ORDER — SODIUM CHLORIDE 0.9 % (FLUSH) 0.9 %
5-40 SYRINGE (ML) INJECTION AS NEEDED
Status: DISCONTINUED | OUTPATIENT
Start: 2020-09-30 | End: 2020-10-04 | Stop reason: HOSPADM

## 2020-09-30 RX ORDER — SODIUM CHLORIDE 9 MG/ML
100 INJECTION, SOLUTION INTRAVENOUS CONTINUOUS
Status: DISCONTINUED | OUTPATIENT
Start: 2020-09-30 | End: 2020-10-03

## 2020-09-30 RX ORDER — SODIUM CHLORIDE 0.9 % (FLUSH) 0.9 %
5-40 SYRINGE (ML) INJECTION EVERY 8 HOURS
Status: DISCONTINUED | OUTPATIENT
Start: 2020-09-30 | End: 2020-10-04 | Stop reason: HOSPADM

## 2020-09-30 RX ORDER — ALBUTEROL SULFATE 90 UG/1
2 AEROSOL, METERED RESPIRATORY (INHALATION)
Status: DISCONTINUED | OUTPATIENT
Start: 2020-09-30 | End: 2020-10-04 | Stop reason: HOSPADM

## 2020-09-30 RX ORDER — HEPARIN SODIUM 5000 [USP'U]/ML
5000 INJECTION, SOLUTION INTRAVENOUS; SUBCUTANEOUS EVERY 8 HOURS
Status: DISCONTINUED | OUTPATIENT
Start: 2020-09-30 | End: 2020-10-04 | Stop reason: HOSPADM

## 2020-09-30 RX ORDER — IPRATROPIUM BROMIDE AND ALBUTEROL SULFATE 2.5; .5 MG/3ML; MG/3ML
3 SOLUTION RESPIRATORY (INHALATION) ONCE
Status: COMPLETED | OUTPATIENT
Start: 2020-09-30 | End: 2020-09-30

## 2020-09-30 RX ORDER — DEXAMETHASONE 4 MG/1
6 TABLET ORAL DAILY
Status: DISCONTINUED | OUTPATIENT
Start: 2020-09-30 | End: 2020-10-04 | Stop reason: HOSPADM

## 2020-09-30 RX ADMIN — DEXAMETHASONE 6 MG: 4 TABLET ORAL at 03:00

## 2020-09-30 RX ADMIN — SODIUM CHLORIDE 1000 ML: 9 INJECTION, SOLUTION INTRAVENOUS at 22:21

## 2020-09-30 RX ADMIN — IPRATROPIUM BROMIDE AND ALBUTEROL SULFATE 3 ML: 2.5; .5 SOLUTION RESPIRATORY (INHALATION) at 19:49

## 2020-09-30 RX ADMIN — Medication 5 ML: at 01:00

## 2020-09-30 RX ADMIN — SODIUM CHLORIDE 100 ML/HR: 9 INJECTION, SOLUTION INTRAVENOUS at 01:15

## 2020-09-30 RX ADMIN — PIPERACILLIN AND TAZOBACTAM 3.38 G: 3; .375 INJECTION, POWDER, FOR SOLUTION INTRAVENOUS at 22:37

## 2020-09-30 RX ADMIN — METHYLPREDNISOLONE SODIUM SUCCINATE 125 MG: 125 INJECTION, POWDER, FOR SOLUTION INTRAMUSCULAR; INTRAVENOUS at 20:02

## 2020-09-30 RX ADMIN — MAGNESIUM SULFATE HEPTAHYDRATE 2 G: 500 INJECTION, SOLUTION INTRAMUSCULAR; INTRAVENOUS at 20:14

## 2020-09-30 RX ADMIN — VANCOMYCIN HYDROCHLORIDE 1000 MG: 1 INJECTION, POWDER, LYOPHILIZED, FOR SOLUTION INTRAVENOUS at 23:28

## 2020-09-30 NOTE — ED TRIAGE NOTES
Pt to ED via EMS with complaints of SOB that started three days ago and worsened yesterday. Pt also reports pain in bilateral shoulders. Hx of COPD, does report possible COVID exposure at MDs office 2-3 days ago.

## 2020-09-30 NOTE — ED PROVIDER NOTES
62 y/o female presents to ED w/ c/o shortness of breath. Patient states that she went to her doctors office 2 days ago and was recently called and notified that 3 of the nurses had tested positive for COVID. Patient began to experience her SOB later that day. She does also note a recent diagnosis of COPD from 2nd hand smoke. Also states that she had a \"bad\" vomiting episode last night as well as diarrhea. Since her  wasn't using his O2 properly, she decided to see if that would help her symptoms, which she states it did. Took 2 breathing treatments today as well in the morning and at lunchtime. Denies fever or chills. The history is provided by the patient. Shortness of Breath   Associated symptoms include vomiting. Pertinent negatives include no fever, no headaches, no rhinorrhea, no sore throat, no ear pain, no cough, no chest pain, no abdominal pain, no rash and no leg swelling. Shoulder Pain    Pertinent negatives include no numbness.         Past Medical History:   Diagnosis Date    Arthritis     spinal stenosis    Breast CA ([facility] [address] 2017    Left Breast (chemo/radiation)    Diabetes (HCC)     Type 2    Edema     legs and ankles    GERD (gastroesophageal reflux disease)     High cholesterol     Hypertension     Ill-defined condition     patient states hemorrhage behind Left eye-following Dr. Heidy Manzanares MI (myocardial infarction) ([facility] [address] 06/2018    per patient    Neuropathy     Restless leg        Past Surgical History:   Procedure Laterality Date    HX BACK SURGERY  08/30/2018    HX BREAST BIOPSY Left 02/03/2017    BREAST CANCER    HX BREAST LUMPECTOMY Left     HX CATARACT REMOVAL Right     HX HEART CATHETERIZATION  07/2018    no stents    HX KNEE REPLACEMENT           Family History:   Problem Relation Age of Onset    No Known Problems Mother     No Known Problems Father     Heart Disease Sister        Social History     Socioeconomic History    Marital status:      Spouse name: Not on file    Number of children: Not on file    Years of education: Not on file    Highest education level: Not on file   Occupational History    Not on file   Social Needs    Financial resource strain: Not on file    Food insecurity     Worry: Not on file     Inability: Not on file    Transportation needs     Medical: Not on file     Non-medical: Not on file   Tobacco Use    Smoking status: Never Smoker    Smokeless tobacco: Never Used   Substance and Sexual Activity    Alcohol use: No    Drug use: No    Sexual activity: Not on file   Lifestyle    Physical activity     Days per week: Not on file     Minutes per session: Not on file    Stress: Not on file   Relationships    Social connections     Talks on phone: Not on file     Gets together: Not on file     Attends Orthodox service: Not on file     Active member of club or organization: Not on file     Attends meetings of clubs or organizations: Not on file     Relationship status: Not on file    Intimate partner violence     Fear of current or ex partner: Not on file     Emotionally abused: Not on file     Physically abused: Not on file     Forced sexual activity: Not on file   Other Topics Concern     Service Not Asked    Blood Transfusions Not Asked    Caffeine Concern Not Asked    Occupational Exposure Not Asked   [place] Even Hazards Not Asked    Sleep Concern Not Asked    Stress Concern Not Asked    Weight Concern Not Asked    Special Diet Not Asked    Back Care Not Asked    Exercise Not Asked    Bike Helmet Not Asked   [address] Not Asked    Self-Exams Not Asked   Social History Narrative    Not on file         ALLERGIES: Ciprofloxacin; Keflex [cephalexin]; Metformin; and Soliqua 100/33 [insulin glargine-lixisenatide]    Review of Systems   Constitutional: Negative for diaphoresis, fatigue and fever. HENT: Negative for ear pain, rhinorrhea and sore throat. Eyes: Negative for photophobia, pain and redness. Respiratory: Positive for shortness of breath. Negative for cough and chest tightness. Cardiovascular: Negative for chest pain, palpitations and leg swelling. Gastrointestinal: Positive for diarrhea, nausea and vomiting. Negative for abdominal pain. Endocrine: Negative for polydipsia, polyphagia and polyuria. Genitourinary: Negative for frequency, hematuria and pelvic pain. Musculoskeletal: Negative for arthralgias, back pain and joint swelling. Skin: Negative for color change, pallor, rash and wound. Allergic/Immunologic: Negative for environmental allergies, food allergies and immunocompromised state. Neurological: Negative for dizziness, seizures, numbness and headaches. Hematological: Negative for adenopathy. Does not bruise/bleed easily. Psychiatric/Behavioral: Negative for confusion and self-injury. The patient is not nervous/anxious. Vitals:    09/30/20 1934   BP: 118/87   Pulse: (!) 103   Resp: 22   Temp: 98.5 °F (36.9 °C)   SpO2: 95%   Weight: 104.3 kg (230 lb)   Height: 5' 3\" (1.6 m)            Physical Exam  Vitals signs and nursing note reviewed. Constitutional:       General: She is in acute distress (mild). Appearance: Normal appearance. She is obese. She is not diaphoretic. HENT:      Head: Normocephalic and atraumatic. Right Ear: Tympanic membrane, ear canal and external ear normal.      Left Ear: Tympanic membrane, ear canal and external ear normal.      Nose: Nose normal.      Mouth/Throat:      Mouth: Mucous membranes are moist.      Pharynx: Oropharynx is clear. No oropharyngeal exudate or posterior oropharyngeal erythema. Eyes:      Extraocular Movements: Extraocular movements intact. Conjunctiva/sclera: Conjunctivae normal.      Pupils: Pupils are equal, round, and reactive to light. Neck:      Musculoskeletal: Normal range of motion and neck supple. No neck rigidity or muscular tenderness.    Cardiovascular:      Rate and Rhythm: Normal rate and regular rhythm. Pulses: Normal pulses. Heart sounds: Normal heart sounds. No murmur. No friction rub. No gallop. Pulmonary:      Effort: Tachypnea and respiratory distress (mild) present. Breath sounds: Rhonchi (right lung base) present. No wheezing. Comments: Brought in on 2L of O2  Chest:      Chest wall: No mass or tenderness. Abdominal:      General: Bowel sounds are normal.      Palpations: Abdomen is soft. Tenderness: There is no abdominal tenderness. Musculoskeletal: Normal range of motion. General: No swelling or tenderness. Skin:     General: Skin is warm. Findings: No bruising or erythema. Neurological:      Mental Status: She is alert and oriented to person, place, and time. Motor: No weakness. Psychiatric:         Mood and Affect: Mood normal.         Behavior: Behavior normal.         Thought Content: Thought content normal.         Judgment: Judgment normal.          MDM  Number of Diagnoses or Management Options  Community acquired pneumonia of right lower lobe of lung:   Hypoxia:   Diagnosis management comments: Differential diagnosis includes: COPD exacerbation, pneumonia, viral illness, congestive heart failure.        Amount and/or Complexity of Data Reviewed  Clinical lab tests: ordered and reviewed  Tests in the radiology section of CPT®: ordered and reviewed  Tests in the medicine section of CPT®: ordered and reviewed  Discussion of test results with the performing providers: yes  Decide to obtain previous medical records or to obtain history from someone other than the patient: yes  Review and summarize past medical records: yes  Discuss the patient with other providers: yes  Independent visualization of images, tracings, or specimens: yes    Risk of Complications, Morbidity, and/or Mortality  Presenting problems: high  Diagnostic procedures: high  Management options: high           Procedures    ED EKG interpretation:  Rhythm: Sinus tachycardia. Rate (approx.): 102; Axis: LAD; ; QRS interval: widened 128 ms ; ST/T wave: Nonspecific interventricular conduction delay; Other findings: LVH. This EKG was interpreted by Killian Bravo DO,ED Provider. Recent Results (from the past 12 hour(s))   CBC WITH AUTOMATED DIFF    Collection Time: 09/30/20  7:25 PM   Result Value Ref Range    WBC 15.4 (H) 4.6 - 13.2 K/uL    RBC 4.09 (L) 4.20 - 5.30 M/uL    HGB 9.7 (L) 12.0 - 16.0 %    HCT 33.1 (L) 35.0 - 45.0 %    MCV 80.9 74.0 - 97.0 FL    MCH 23.7 (L) 24.0 - 34.0 PG    MCHC 29.3 (L) 31.0 - 37.0 g/dL    RDW 16.8 (H) 11.6 - 14.5 %    PLATELET 030 (H) 078 - 420 K/uL    MPV 9.3 FL    NEUTROPHILS 83 (H) 40 - 73 %    LYMPHOCYTES 7 (L) 21 - 52 %    MONOCYTES 6 3 - 10 %    EOSINOPHILS 3 0 - 5 %    BASOPHILS 0 0 - 2 %    IMMATURE GRANULOCYTES 1 %    ABS. NEUTROPHILS 12.8 (H) 1.8 - 8.0 K/UL    ABS. LYMPHOCYTES 1.1 0.9 - 3.6 K/UL    ABS. MONOCYTES 0.9 0.05 - 1.2 K/UL    ABS. EOSINOPHILS 0.4 0.0 - 0.4 K/UL    ABS. BASOPHILS 0.0 0.0 - 0.1 K/UL    ABS. IMM. GRANS. 0.1 K/UL   TROPONIN I    Collection Time: 09/30/20  7:25 PM   Result Value Ref Range    Troponin-I, Qt. 0.02 0.02 - 0.05 ng/mL   MAGNESIUM    Collection Time: 09/30/20  7:25 PM   Result Value Ref Range    Magnesium 2.1 1.7 - 2.8 mg/dL   METABOLIC PANEL, COMPREHENSIVE    Collection Time: 09/30/20  7:25 PM   Result Value Ref Range    Sodium 139 135 - 145 mmol/L    Potassium 4.0 3.2 - 5.1 mmol/L    Chloride 101 94 - 111 mmol/L    CO2 29 21 - 33 mmol/L    Anion gap 9 mmol/L    Glucose 178 (H) 70 - 110 mg/dL    BUN 17 9 - 21 mg/dL    Creatinine 1.70 (H) 0.70 - 1.20 mg/dL    BUN/Creatinine ratio 10      GFR est AA 37 ml/min/1.73m2    GFR est non-AA 30 ml/min/1.73m2    Calcium 9.1 8.5 - 10.5 mg/dL    Bilirubin, total 0.3 0.2 - 1.0 mg/dL    AST (SGOT) 17 14 - 74 U/L    ALT (SGPT) 14 3 - 52 U/L    Alk.  phosphatase 66 38 - 126 U/L    Protein, total 7.9 6.1 - 8.4 g/dL    Albumin 3.4 (L) 3.5 - 4.7 g/dL    Globulin 4.5 g/dL    A-G Ratio 0.8     BNP    Collection Time: 09/30/20  7:25 PM   Result Value Ref Range    BNP 56 0 - 100 pg/mL   PTT    Collection Time: 09/30/20  7:25 PM   Result Value Ref Range    aPTT 25.4 20.4 - 35.6 sec    aPTT, therapeutic range   68 - 109 sec   PROTHROMBIN TIME + INR    Collection Time: 09/30/20  7:25 PM   Result Value Ref Range    Prothrombin time 12.3 11.5 - 13.9 sec    INR 1.0     SARS-COV-2    Collection Time: 09/30/20  9:45 PM   Result Value Ref Range    SARS-CoV-2 Please find results under separate order       XR CHEST PORT    preliminary interpretation by Dr. Alfonso Senior -left lower lobe opacity noted     9:35pm  Dr. Maria Isabel Kincaid. Alfonso Senior discussed care with Wess Osler NP. Standard discussion; including history of patients chief complaint, available diagnostic results, and treatment course. Diagnosis:   1. Community acquired pneumonia of right lower lobe of lung    2. Hypoxia          Disposition: Admitted    Follow-up Information    None         Patient's Medications   Start Taking    No medications on file   Continue Taking    ALBUTEROL (PROVENTIL HFA, VENTOLIN HFA, PROAIR HFA) 90 MCG/ACTUATION INHALER    2 puffs three to four times a day for 5 days then every 6 hours as needed for shortness of breath    AMLODIPINE (NORVASC) 10 MG TABLET    Take 10 mg by mouth daily. ATORVASTATIN (LIPITOR) 40 MG TABLET    Take  by mouth nightly. CLINDAMYCIN (CLEOCIN) 300 MG CAPSULE    Take 1 Cap by mouth four (4) times daily. ESOMEPRAZOLE (NEXIUM) 20 MG CAPSULE    Take 20 mg by mouth daily. FUROSEMIDE (LASIX) 20 MG TABLET    TAKE 1 TABLET BY MOUTH ONCE DAILY AS NEEDED    GLUCOSE BLOOD VI TEST STRIPS (ONETOUCH ULTRA BLUE TEST STRIP) STRIP    by Does Not Apply route See Admin Instructions. HYDROCHLOROTHIAZIDE (MICROZIDE) 12.5 MG CAPSULE    Take 12.5 mg by mouth daily. INSULIN LISPRO (HUMALOG) 100 UNIT/ML KWIKPEN    by SubCUTAneous route.     LANTUS SOLOSTAR U-100 INSULIN 100 UNIT/ML (3 ML) INPN    30 units subcutaneously in morning. Can take an additional dose of 10 units subcutaneously in the evening if blood sugar is more than 200. LORAZEPAM (ATIVAN) 0.5 MG TABLET    Take 0.5 mg by mouth as needed for Anxiety. MELOXICAM (MOBIC) 7.5 MG TABLET    Take 1 Tab by mouth daily. METOPROLOL TARTRATE (LOPRESSOR) 50 MG TABLET    Take 50 mg by mouth daily. NALOXONE (NARCAN) 4 MG/ACTUATION NASAL SPRAY    Use 1 spray intranasally, then discard. Repeat with new spray every 2 min as needed for opioid overdose symptoms, alternating nostrils. NEOMYCIN-POLYMYXIN-DEXAMETHASONE (MAXITROL) OPHTHALMIC SUSPENSION    Administer 1 Drop to both eyes two (2) times a day. NOVOFINE 32 32 GAUGE X 1/4\" NDLE        POLYETHYLENE GLYCOL (CLEARLAX) 17 GRAM/DOSE POWDER    Take 17 g by mouth daily. ROSUVASTATIN (CRESTOR) 10 MG TABLET    Take 10 mg by mouth daily. TAMSULOSIN (FLOMAX) 0.4 MG CAPSULE    take 1 capsule by mouth once daily    TIMOLOL MALEATE 0.5 % DRPD OPHTHALMIC SOLUTION    Administer 1 Drop to both eyes daily. TRADJENTA 5 MG TABLET    TAKE 1 TABLET BY MOUTH ONCE A DAY    VENLAFAXINE-SR (EFFEXOR-XR) 37.5 MG CAPSULE    Take 37.5 mg by mouth daily. These Medications have changed    No medications on file   Stop Taking    No medications on file         By signing my name below, I, Kizzy Delarosa, attest that this documentation has been prepared under the direction and in presence of Dr Nika Jolley on 09/30/2020. Electronically signed: Han Leiva, 09/30/2020 1958      Provider Attestation:  I personally performed the services described in the documentation, reviewed the documentation, as recorded by the scribe in my presence, and it accurately and completely records my words and actions. Dr. Stepan Louis.  Ritchie MATIAS 10:11 PM

## 2020-10-01 ENCOUNTER — APPOINTMENT (OUTPATIENT)
Dept: NON INVASIVE DIAGNOSTICS | Age: 65
DRG: 139 | End: 2020-10-01
Attending: NURSE PRACTITIONER
Payer: MEDICAID

## 2020-10-01 PROBLEM — E11.40 DIABETIC NEUROPATHY (HCC): Status: ACTIVE | Noted: 2020-10-01

## 2020-10-01 PROBLEM — Z29.9 DVT PROPHYLAXIS: Status: ACTIVE | Noted: 2020-10-01

## 2020-10-01 PROBLEM — J96.01 ACUTE RESPIRATORY FAILURE WITH HYPOXIA (HCC): Status: ACTIVE | Noted: 2020-09-30

## 2020-10-01 PROBLEM — U07.1 COVID-19: Status: ACTIVE | Noted: 2020-10-01

## 2020-10-01 PROBLEM — I10 HTN (HYPERTENSION): Status: ACTIVE | Noted: 2020-10-01

## 2020-10-01 LAB
ABO + RH BLD: NORMAL
ANION GAP SERPL CALC-SCNC: 9 MMOL/L
ATRIAL RATE: 103 BPM
BASOPHILS # BLD: 0 K/UL
BASOPHILS NFR BLD: 0 %
BLOOD GROUP ANTIBODIES SERPL: NEGATIVE
BUN SERPL-MCNC: 21 MG/DL (ref 9–21)
BUN/CREAT SERPL: 11
CA-I BLD-MCNC: 8.7 MG/DL (ref 8.5–10.5)
CALCULATED P AXIS, ECG09: 73 DEGREES
CALCULATED R AXIS, ECG10: -40 DEGREES
CALCULATED T AXIS, ECG11: 108 DEGREES
CHLORIDE SERPL-SCNC: 102 MMOL/L (ref 94–111)
CO2 SERPL-SCNC: 25 MMOL/L (ref 21–33)
CREAT SERPL-MCNC: 1.9 MG/DL (ref 0.7–1.2)
D DIMER PPP FEU-MCNC: <0.27 UG/ML(FEU)
DATE LAST DOSE: NORMAL
DIAGNOSIS, 93000: NORMAL
DIFFERENTIAL METHOD BLD: ABNORMAL
EOSINOPHIL # BLD: 0 K/UL
EOSINOPHIL NFR BLD: 0 %
ERYTHROCYTE [DISTWIDTH] IN BLOOD BY AUTOMATED COUNT: 16.8 % (ref 11.6–14.5)
FERRITIN SERPL-MCNC: 39 NG/ML (ref 8–252)
GLUCOSE BLD STRIP.AUTO-MCNC: 311 MG/DL (ref 70–110)
GLUCOSE BLD STRIP.AUTO-MCNC: 367 MG/DL (ref 70–110)
GLUCOSE BLD STRIP.AUTO-MCNC: 480 MG/DL (ref 70–110)
GLUCOSE BLD STRIP.AUTO-MCNC: 499 MG/DL (ref 70–110)
GLUCOSE BLD STRIP.AUTO-MCNC: 500 MG/DL (ref 70–110)
GLUCOSE SERPL-MCNC: 389 MG/DL (ref 70–110)
HCT VFR BLD AUTO: 31.6 % (ref 35–45)
HGB BLD-MCNC: 9.5 % (ref 12–16)
IMM GRANULOCYTES # BLD AUTO: 0 K/UL
IMM GRANULOCYTES NFR BLD AUTO: 0 %
LYMPHOCYTES # BLD: 0.5 K/UL
LYMPHOCYTES NFR BLD: 3 %
MAGNESIUM SERPL-MCNC: 2.5 MG/DL (ref 1.7–2.8)
MCH RBC QN AUTO: 24.2 PG (ref 24–34)
MCHC RBC AUTO-ENTMCNC: 30.1 G/DL (ref 31–37)
MCV RBC AUTO: 80.6 FL (ref 74–97)
MONOCYTES # BLD: 0.2 K/UL
MONOCYTES NFR BLD: 1 %
NEUTS BAND NFR BLD MANUAL: 1 %
NEUTS SEG # BLD: 14.8 K/UL
NEUTS SEG NFR BLD: 95 %
P-R INTERVAL, ECG05: 154 MS
PERFORMED BY, TECHID: ABNORMAL
PLATELET # BLD AUTO: 437 K/UL (ref 135–420)
PMV BLD AUTO: 9.5 FL
POTASSIUM SERPL-SCNC: 4.7 MMOL/L (ref 3.2–5.1)
PROCALCITONIN SERPL-MCNC: 0.06 NG/ML (ref 0.5–2)
Q-T INTERVAL, ECG07: 376 MS
QRS DURATION, ECG06: 128 MS
QTC CALCULATION (BEZET), ECG08: 490 MS
RBC # BLD AUTO: 3.92 M/UL (ref 4.2–5.3)
RBC MORPH BLD: ABNORMAL
REPORTED DOSE,DOSE: NORMAL UNITS
SODIUM SERPL-SCNC: 136 MMOL/L (ref 135–145)
SPECIMEN EXP DATE BLD: NORMAL
VANCOMYCIN TROUGH SERPL-MCNC: 18.7 UG/ML
VENTRICULAR RATE, ECG03: 102 BPM
WBC # BLD AUTO: 15.5 K/UL (ref 4.6–13.2)

## 2020-10-01 PROCEDURE — 65270000029 HC RM PRIVATE

## 2020-10-01 PROCEDURE — 74011250637 HC RX REV CODE- 250/637: Performed by: INTERNAL MEDICINE

## 2020-10-01 PROCEDURE — 82962 GLUCOSE BLOOD TEST: CPT

## 2020-10-01 PROCEDURE — 93308 TTE F-UP OR LMTD: CPT

## 2020-10-01 PROCEDURE — 85025 COMPLETE CBC W/AUTO DIFF WBC: CPT

## 2020-10-01 PROCEDURE — 80202 ASSAY OF VANCOMYCIN: CPT

## 2020-10-01 PROCEDURE — 94640 AIRWAY INHALATION TREATMENT: CPT

## 2020-10-01 PROCEDURE — 84145 PROCALCITONIN (PCT): CPT

## 2020-10-01 PROCEDURE — 85379 FIBRIN DEGRADATION QUANT: CPT

## 2020-10-01 PROCEDURE — 74011000258 HC RX REV CODE- 258: Performed by: EMERGENCY MEDICINE

## 2020-10-01 PROCEDURE — 74011250637 HC RX REV CODE- 250/637: Performed by: NURSE PRACTITIONER

## 2020-10-01 PROCEDURE — 74011250636 HC RX REV CODE- 250/636: Performed by: EMERGENCY MEDICINE

## 2020-10-01 PROCEDURE — 83735 ASSAY OF MAGNESIUM: CPT

## 2020-10-01 PROCEDURE — 74011636637 HC RX REV CODE- 636/637: Performed by: INTERNAL MEDICINE

## 2020-10-01 PROCEDURE — 94760 N-INVAS EAR/PLS OXIMETRY 1: CPT

## 2020-10-01 PROCEDURE — 80048 BASIC METABOLIC PNL TOTAL CA: CPT

## 2020-10-01 PROCEDURE — 36415 COLL VENOUS BLD VENIPUNCTURE: CPT

## 2020-10-01 PROCEDURE — 65270000034 HC RM STERILE ISOLATION

## 2020-10-01 PROCEDURE — 74011636637 HC RX REV CODE- 636/637: Performed by: NURSE PRACTITIONER

## 2020-10-01 PROCEDURE — 74011250636 HC RX REV CODE- 250/636: Performed by: NURSE PRACTITIONER

## 2020-10-01 PROCEDURE — 83036 HEMOGLOBIN GLYCOSYLATED A1C: CPT

## 2020-10-01 RX ORDER — GUAIFENESIN 600 MG/1
600 TABLET, EXTENDED RELEASE ORAL EVERY 12 HOURS
Status: DISCONTINUED | OUTPATIENT
Start: 2020-10-01 | End: 2020-10-04 | Stop reason: HOSPADM

## 2020-10-01 RX ORDER — TIMOLOL MALEATE 5 MG/ML
1 SOLUTION/ DROPS OPHTHALMIC DAILY
Status: DISCONTINUED | OUTPATIENT
Start: 2020-10-02 | End: 2020-10-04 | Stop reason: HOSPADM

## 2020-10-01 RX ORDER — LORAZEPAM 0.5 MG/1
0.5 TABLET ORAL
Status: DISCONTINUED | OUTPATIENT
Start: 2020-10-01 | End: 2020-10-04 | Stop reason: HOSPADM

## 2020-10-01 RX ORDER — AMLODIPINE BESYLATE 5 MG/1
10 TABLET ORAL DAILY
Status: DISCONTINUED | OUTPATIENT
Start: 2020-10-01 | End: 2020-10-04 | Stop reason: HOSPADM

## 2020-10-01 RX ORDER — GABAPENTIN 300 MG/1
300 CAPSULE ORAL 2 TIMES DAILY
COMMUNITY
End: 2021-12-06

## 2020-10-01 RX ORDER — VENLAFAXINE HYDROCHLORIDE 37.5 MG/1
37.5 CAPSULE, EXTENDED RELEASE ORAL DAILY
Status: DISCONTINUED | OUTPATIENT
Start: 2020-10-01 | End: 2020-10-01

## 2020-10-01 RX ORDER — TAMSULOSIN HYDROCHLORIDE 0.4 MG/1
0.4 CAPSULE ORAL DAILY
Status: DISCONTINUED | OUTPATIENT
Start: 2020-10-01 | End: 2020-10-04 | Stop reason: HOSPADM

## 2020-10-01 RX ORDER — ATORVASTATIN CALCIUM 40 MG/1
40 TABLET, FILM COATED ORAL
Status: DISCONTINUED | OUTPATIENT
Start: 2020-10-01 | End: 2020-10-04 | Stop reason: HOSPADM

## 2020-10-01 RX ORDER — INSULIN LISPRO 100 [IU]/ML
15 INJECTION, SOLUTION INTRAVENOUS; SUBCUTANEOUS
Status: DISCONTINUED | OUTPATIENT
Start: 2020-10-01 | End: 2020-10-02

## 2020-10-01 RX ORDER — METOPROLOL TARTRATE 50 MG/1
50 TABLET ORAL DAILY
Status: DISCONTINUED | OUTPATIENT
Start: 2020-10-01 | End: 2020-10-04 | Stop reason: HOSPADM

## 2020-10-01 RX ORDER — BUDESONIDE AND FORMOTEROL FUMARATE DIHYDRATE 160; 4.5 UG/1; UG/1
2 AEROSOL RESPIRATORY (INHALATION)
Status: DISCONTINUED | OUTPATIENT
Start: 2020-10-01 | End: 2020-10-04 | Stop reason: HOSPADM

## 2020-10-01 RX ORDER — OMEPRAZOLE 20 MG/1
20 CAPSULE, DELAYED RELEASE ORAL 2 TIMES DAILY
Status: DISCONTINUED | OUTPATIENT
Start: 2020-10-01 | End: 2020-10-01

## 2020-10-01 RX ORDER — INSULIN GLARGINE 100 [IU]/ML
15 INJECTION, SOLUTION SUBCUTANEOUS
Status: COMPLETED | OUTPATIENT
Start: 2020-10-01 | End: 2020-10-01

## 2020-10-01 RX ORDER — INSULIN GLARGINE 100 [IU]/ML
20 INJECTION, SOLUTION SUBCUTANEOUS
Status: DISCONTINUED | OUTPATIENT
Start: 2020-10-01 | End: 2020-10-02

## 2020-10-01 RX ORDER — ONDANSETRON 4 MG/1
4 TABLET, ORALLY DISINTEGRATING ORAL
Status: DISCONTINUED | OUTPATIENT
Start: 2020-10-01 | End: 2020-10-04 | Stop reason: HOSPADM

## 2020-10-01 RX ORDER — INSULIN GLARGINE 100 [IU]/ML
30 INJECTION, SOLUTION SUBCUTANEOUS DAILY
Status: DISCONTINUED | OUTPATIENT
Start: 2020-10-01 | End: 2020-10-01

## 2020-10-01 RX ORDER — PANTOPRAZOLE SODIUM 40 MG/1
40 TABLET, DELAYED RELEASE ORAL
Status: DISCONTINUED | OUTPATIENT
Start: 2020-10-02 | End: 2020-10-04 | Stop reason: HOSPADM

## 2020-10-01 RX ORDER — CALCIUM CARBONATE 200(500)MG
200 TABLET,CHEWABLE ORAL
Status: DISCONTINUED | OUTPATIENT
Start: 2020-10-01 | End: 2020-10-04 | Stop reason: HOSPADM

## 2020-10-01 RX ORDER — VENLAFAXINE 37.5 MG/1
37.5 TABLET ORAL 2 TIMES DAILY WITH MEALS
Status: DISCONTINUED | OUTPATIENT
Start: 2020-10-01 | End: 2020-10-04 | Stop reason: HOSPADM

## 2020-10-01 RX ORDER — ACETAMINOPHEN 325 MG/1
650 TABLET ORAL
Status: DISCONTINUED | OUTPATIENT
Start: 2020-10-01 | End: 2020-10-04 | Stop reason: HOSPADM

## 2020-10-01 RX ORDER — GABAPENTIN 300 MG/1
600 CAPSULE ORAL 2 TIMES DAILY
Status: DISCONTINUED | OUTPATIENT
Start: 2020-10-01 | End: 2020-10-03

## 2020-10-01 RX ORDER — LORAZEPAM 0.5 MG/1
0.5 TABLET ORAL AS NEEDED
Status: DISCONTINUED | OUTPATIENT
Start: 2020-10-01 | End: 2020-10-01

## 2020-10-01 RX ORDER — INSULIN GLARGINE 100 [IU]/ML
45 INJECTION, SOLUTION SUBCUTANEOUS DAILY
Status: DISCONTINUED | OUTPATIENT
Start: 2020-10-02 | End: 2020-10-03

## 2020-10-01 RX ORDER — INSULIN GLARGINE 100 [IU]/ML
10 INJECTION, SOLUTION SUBCUTANEOUS DAILY PRN
Status: DISCONTINUED | OUTPATIENT
Start: 2020-10-01 | End: 2020-10-02

## 2020-10-01 RX ADMIN — HEPARIN SODIUM 5000 UNITS: 5000 INJECTION INTRAVENOUS; SUBCUTANEOUS at 22:17

## 2020-10-01 RX ADMIN — ALBUTEROL SULFATE 2 PUFF: 108 INHALANT RESPIRATORY (INHALATION) at 12:00

## 2020-10-01 RX ADMIN — Medication 5 ML: at 06:00

## 2020-10-01 RX ADMIN — INSULIN GLARGINE 15 UNITS: 100 INJECTION, SOLUTION SUBCUTANEOUS at 12:01

## 2020-10-01 RX ADMIN — INSULIN LISPRO 8 UNITS: 100 INJECTION, SOLUTION INTRAVENOUS; SUBCUTANEOUS at 22:18

## 2020-10-01 RX ADMIN — SODIUM CHLORIDE 1000 ML: 9 INJECTION, SOLUTION INTRAVENOUS at 00:20

## 2020-10-01 RX ADMIN — ACETAMINOPHEN 650 MG: 325 TABLET ORAL at 11:59

## 2020-10-01 RX ADMIN — ATORVASTATIN CALCIUM 40 MG: 40 TABLET, FILM COATED ORAL at 22:17

## 2020-10-01 RX ADMIN — INSULIN LISPRO 10 UNITS: 100 INJECTION, SOLUTION INTRAVENOUS; SUBCUTANEOUS at 08:16

## 2020-10-01 RX ADMIN — VENLAFAXINE 37.5 MG: 37.5 TABLET ORAL at 11:59

## 2020-10-01 RX ADMIN — SODIUM CHLORIDE 100 ML/HR: 9 INJECTION, SOLUTION INTRAVENOUS at 11:59

## 2020-10-01 RX ADMIN — GUAIFENESIN 600 MG: 600 TABLET, EXTENDED RELEASE ORAL at 08:16

## 2020-10-01 RX ADMIN — ANTACID TABLETS 200 MG: 500 TABLET, CHEWABLE ORAL at 22:17

## 2020-10-01 RX ADMIN — INSULIN LISPRO 10 UNITS: 100 INJECTION, SOLUTION INTRAVENOUS; SUBCUTANEOUS at 12:01

## 2020-10-01 RX ADMIN — INSULIN GLARGINE 20 UNITS: 100 INJECTION, SOLUTION SUBCUTANEOUS at 22:17

## 2020-10-01 RX ADMIN — INSULIN LISPRO 10 UNITS: 100 INJECTION, SOLUTION INTRAVENOUS; SUBCUTANEOUS at 17:03

## 2020-10-01 RX ADMIN — GUAIFENESIN 600 MG: 600 TABLET, EXTENDED RELEASE ORAL at 22:20

## 2020-10-01 RX ADMIN — BUDESONIDE AND FORMOTEROL FUMARATE DIHYDRATE 2 PUFF: 160; 4.5 AEROSOL RESPIRATORY (INHALATION) at 20:00

## 2020-10-01 RX ADMIN — INSULIN LISPRO 15 UNITS: 100 INJECTION, SOLUTION INTRAVENOUS; SUBCUTANEOUS at 12:55

## 2020-10-01 RX ADMIN — BUDESONIDE AND FORMOTEROL FUMARATE DIHYDRATE 2 PUFF: 160; 4.5 AEROSOL RESPIRATORY (INHALATION) at 08:00

## 2020-10-01 RX ADMIN — GABAPENTIN 600 MG: 300 CAPSULE ORAL at 03:48

## 2020-10-01 RX ADMIN — SODIUM CHLORIDE 100 ML/HR: 9 INJECTION, SOLUTION INTRAVENOUS at 01:15

## 2020-10-01 RX ADMIN — METOPROLOL TARTRATE 50 MG: 50 TABLET, FILM COATED ORAL at 11:59

## 2020-10-01 RX ADMIN — ALBUTEROL SULFATE 2 PUFF: 108 INHALANT RESPIRATORY (INHALATION) at 19:46

## 2020-10-01 RX ADMIN — HEPARIN SODIUM 5000 UNITS: 5000 INJECTION INTRAVENOUS; SUBCUTANEOUS at 15:52

## 2020-10-01 RX ADMIN — PIPERACILLIN AND TAZOBACTAM 3.38 G: 3; .375 INJECTION, POWDER, FOR SOLUTION INTRAVENOUS at 22:00

## 2020-10-01 RX ADMIN — TAMSULOSIN HYDROCHLORIDE 0.4 MG: 0.4 CAPSULE ORAL at 10:43

## 2020-10-01 RX ADMIN — PIPERACILLIN AND TAZOBACTAM 3.38 G: 3; .375 INJECTION, POWDER, FOR SOLUTION INTRAVENOUS at 15:51

## 2020-10-01 RX ADMIN — HEPARIN SODIUM 5000 UNITS: 5000 INJECTION INTRAVENOUS; SUBCUTANEOUS at 03:00

## 2020-10-01 RX ADMIN — SODIUM CHLORIDE 500 MG: 9 INJECTION, SOLUTION INTRAVENOUS at 03:00

## 2020-10-01 RX ADMIN — Medication 10 ML: at 22:20

## 2020-10-01 RX ADMIN — INSULIN GLARGINE 30 UNITS: 100 INJECTION, SOLUTION SUBCUTANEOUS at 10:43

## 2020-10-01 RX ADMIN — AMLODIPINE BESYLATE 10 MG: 5 TABLET ORAL at 10:43

## 2020-10-01 RX ADMIN — INSULIN LISPRO 15 UNITS: 100 INJECTION, SOLUTION INTRAVENOUS; SUBCUTANEOUS at 17:03

## 2020-10-01 RX ADMIN — VENLAFAXINE 37.5 MG: 37.5 TABLET ORAL at 17:03

## 2020-10-01 RX ADMIN — ALBUTEROL SULFATE 2 PUFF: 108 INHALANT RESPIRATORY (INHALATION) at 08:53

## 2020-10-01 RX ADMIN — Medication 6 ML: at 14:00

## 2020-10-01 RX ADMIN — SODIUM CHLORIDE 500 MG: 9 INJECTION, SOLUTION INTRAVENOUS at 22:18

## 2020-10-01 RX ADMIN — GABAPENTIN 600 MG: 300 CAPSULE ORAL at 08:16

## 2020-10-01 RX ADMIN — PIPERACILLIN AND TAZOBACTAM 3.38 G: 3; .375 INJECTION, POWDER, FOR SOLUTION INTRAVENOUS at 07:18

## 2020-10-01 NOTE — PROGRESS NOTES
Requested Gabapentin, 300 mg x2 tabs now, for Nerve pain, telephone call to Ze May NP. Obtained order for scheduled med.

## 2020-10-01 NOTE — ASSESSMENT & PLAN NOTE
-Presented with SOB, tachycardia, leukocytosis and shift  -CXR reviewed by me. Official read pending.  -Likely COPD exacerbation superimposed on PNA  -COVID PUI  -RT consulted  -O2 to maintain sats greater than 88-90%  -Was given vanc and zosyn in the ED  -Continue Zosyn and start Azithromycin for inflammatory properties and atypicals  -Mucinex twice daily  -Decadron 6 mg daily initiated.    -Start Symbicort 2 puffs BID

## 2020-10-01 NOTE — PROGRESS NOTES
HOSPITALIST PROGRESS NOTE  Cecelia Hernandez MD, [address]         Daily Progress Note: 10/1/2020  COVID-19 pending  Appropriate PPE donned and doffed. Subjective:   Patient is alert and oriented x4. States that her shortness of breath and difficulty breathing has improved significantly since hospitalization overnight. Continues to depend on 2 LNC O2. Continues to have shortness of breath, nausea. No overnight fever/chills, chest pain, abdominal pain, Diarrhea. Medications reviewed  Current Facility-Administered Medications   Medication Dose Route Frequency    amLODIPine (NORVASC) tablet 10 mg  10 mg Oral DAILY    atorvastatin (LIPITOR) tablet 40 mg  40 mg Oral QHS    metoprolol tartrate (LOPRESSOR) tablet 50 mg  50 mg Oral DAILY    tamsulosin (FLOMAX) capsule 0.4 mg  0.4 mg Oral DAILY    . PHARMACY TO SUBSTITUTE PER PROTOCOL (Reordered from: timoloL maleate 0.5 % drpd ophthalmic solution)    Per Protocol    acetaminophen (TYLENOL) tablet 650 mg  650 mg Oral Q6H PRN    calcium carbonate (TUMS) chewable tablet 200 mg [elemental]  200 mg Oral TID PRN    ondansetron (ZOFRAN ODT) tablet 4 mg  4 mg Oral Q8H PRN    gabapentin (NEURONTIN) capsule 600 mg  600 mg Oral BID    guaiFENesin ER (MUCINEX) tablet 600 mg  600 mg Oral Q12H    budesonide-formoteroL (SYMBICORT) 160-4.5 mcg/actuation HFA inhaler 2 Puff  2 Puff Inhalation BID RT    [START ON 10/2/2020] pantoprazole (PROTONIX) tablet 40 mg  40 mg Oral ACB    insulin glargine (LANTUS) injection 10 Units  10 Units SubCUTAneous DAILY PRN    LORazepam (ATIVAN) tablet 0.5 mg  0.5 mg Oral Q8H PRN    venlafaxine (EFFEXOR) tablet 37.5 mg  37.5 mg Oral BID WITH MEALS    [START ON 10/2/2020] insulin glargine (LANTUS) injection 45 Units  45 Units SubCUTAneous DAILY    insulin lispro (HUMALOG) injection 15 Units  15 Units SubCUTAneous TID WITH MEALS    insulin glargine (LANTUS) injection 20 Units  20 Units SubCUTAneous QHS    piperacillin-tazobactam (ZOSYN) 3.375 g in 0.9% sodium chloride (MBP/ADV) 100 mL MBP  3.375 g IntraVENous Q8H    sodium chloride (NS) flush 5-40 mL  5-40 mL IntraVENous Q8H    sodium chloride (NS) flush 5-40 mL  5-40 mL IntraVENous PRN    acetaminophen (TYLENOL) suppository 650 mg  650 mg Rectal Q6H PRN    heparin (porcine) injection 5,000 Units  5,000 Units SubCUTAneous Q8H    dexAMETHasone (DECADRON) tablet 6 mg  6 mg Oral DAILY    azithromycin (ZITHROMAX) 500 mg in 0.9% sodium chloride 250 mL (VIAL-MATE)  500 mg IntraVENous Q24H    insulin lispro (HUMALOG) injection   SubCUTAneous AC&HS    0.9% sodium chloride infusion  50 mL/hr IntraVENous CONTINUOUS    albuterol (PROVENTIL HFA, VENTOLIN HFA, PROAIR HFA) inhaler 2 Puff  2 Puff Inhalation QID RT    albuterol (PROVENTIL HFA, VENTOLIN HFA, PROAIR HFA) inhaler 2 Puff  2 Puff Inhalation Q4H PRN       Review of Systems:   A comprehensive review of systems was negative except for that written in the HPI. Objective:   Physical Exam:     Visit Vitals  BP (!) 160/58   Pulse 99   Temp 97 °F (36.1 °C)   Resp 16   Ht 5' 3\" (1.6 m)   Wt 104.3 kg (230 lb)   SpO2 93%   Breastfeeding No   BMI 40.74 kg/m²    O2 Flow Rate (L/min): 2 l/min O2 Device: Nasal cannula(2)    Temp (24hrs), Av.3 °F (36.8 °C), Min:97 °F (36.1 °C), Max:98.9 °F (37.2 °C)    No intake/output data recorded.  1901 - 10/01 0700  In: 900 [I.V.:900]  Out: -     General:  Alert, cooperative, no distress, appears stated age. Lungs:   Clear to auscultation bilaterally. Chest wall:  No tenderness or deformity. Heart:  Regular rate and rhythm, S1, S2 normal, no murmur, click, rub or gallop. Abdomen:   Soft, non-tender. Bowel sounds normal. No masses,  No organomegaly. Extremities: Extremities normal, atraumatic, no cyanosis or edema. Pulses: 2+ and symmetric all extremities.    Skin: Skin color, texture, turgor normal. No rashes or lesions   Neurologic: CNII-XII intact. No gross sensory or motor deficits     Data Review:       Recent Days:  Recent Labs     10/01/20  0230 09/30/20  1925   WBC 15.5* 15.4*   HGB 9.5* 9.7*   HCT 31.6* 33.1*   * 475*     Recent Labs     10/01/20  0230 09/30/20  1925    139   K 4.7 4.0    101   CO2 25 29   * 178*   BUN 21 17   CREA 1.90* 1.70*   CA 8.7 9.1   MG 2.5 2.1   PHOS  --  3.3   ALB  --  3.4*   TBILI  --  0.3   ALT  --  14   INR  --  1.0     No results for input(s): PH, PCO2, PO2, HCO3, FIO2 in the last 72 hours. 24 Hour Results:  Recent Results (from the past 24 hour(s))   CBC WITH AUTOMATED DIFF    Collection Time: 09/30/20  7:25 PM   Result Value Ref Range    WBC 15.4 (H) 4.6 - 13.2 K/uL    RBC 4.09 (L) 4.20 - 5.30 M/uL    HGB 9.7 (L) 12.0 - 16.0 %    HCT 33.1 (L) 35.0 - 45.0 %    MCV 80.9 74.0 - 97.0 FL    MCH 23.7 (L) 24.0 - 34.0 PG    MCHC 29.3 (L) 31.0 - 37.0 g/dL    RDW 16.8 (H) 11.6 - 14.5 %    PLATELET 221 (H) 567 - 420 K/uL    MPV 9.3 FL    NEUTROPHILS 83 (H) 40 - 73 %    LYMPHOCYTES 7 (L) 21 - 52 %    MONOCYTES 6 3 - 10 %    EOSINOPHILS 3 0 - 5 %    BASOPHILS 0 0 - 2 %    IMMATURE GRANULOCYTES 1 %    ABS. NEUTROPHILS 12.8 (H) 1.8 - 8.0 K/UL    ABS. LYMPHOCYTES 1.1 0.9 - 3.6 K/UL    ABS. MONOCYTES 0.9 0.05 - 1.2 K/UL    ABS. EOSINOPHILS 0.4 0.0 - 0.4 K/UL    ABS. BASOPHILS 0.0 0.0 - 0.1 K/UL    ABS. IMM.  GRANS. 0.1 K/UL   TROPONIN I    Collection Time: 09/30/20  7:25 PM   Result Value Ref Range    Troponin-I, Qt. 0.02 0.02 - 0.05 ng/mL   MAGNESIUM    Collection Time: 09/30/20  7:25 PM   Result Value Ref Range    Magnesium 2.1 1.7 - 2.8 mg/dL   METABOLIC PANEL, COMPREHENSIVE    Collection Time: 09/30/20  7:25 PM   Result Value Ref Range    Sodium 139 135 - 145 mmol/L    Potassium 4.0 3.2 - 5.1 mmol/L    Chloride 101 94 - 111 mmol/L    CO2 29 21 - 33 mmol/L    Anion gap 9 mmol/L    Glucose 178 (H) 70 - 110 mg/dL    BUN 17 9 - 21 mg/dL Creatinine 1.70 (H) 0.70 - 1.20 mg/dL    BUN/Creatinine ratio 10      GFR est AA 37 ml/min/1.73m2    GFR est non-AA 30 ml/min/1.73m2    Calcium 9.1 8.5 - 10.5 mg/dL    Bilirubin, total 0.3 0.2 - 1.0 mg/dL    AST (SGOT) 17 14 - 74 U/L    ALT (SGPT) 14 3 - 52 U/L    Alk.  phosphatase 66 38 - 126 U/L    Protein, total 7.9 6.1 - 8.4 g/dL    Albumin 3.4 (L) 3.5 - 4.7 g/dL    Globulin 4.5 g/dL    A-G Ratio 0.8     BNP    Collection Time: 09/30/20  7:25 PM   Result Value Ref Range    BNP 56 0 - 100 pg/mL   INFLUENZA A & B AG (RAPID TEST)    Collection Time: 09/30/20  7:25 PM   Result Value Ref Range    Influenza A Antigen Negative Negative      Influenza B Antigen Negative Negative     PTT    Collection Time: 09/30/20  7:25 PM   Result Value Ref Range    aPTT 25.4 20.4 - 35.6 sec    aPTT, therapeutic range   68 - 109 sec   PROTHROMBIN TIME + INR    Collection Time: 09/30/20  7:25 PM   Result Value Ref Range    Prothrombin time 12.3 11.5 - 13.9 sec    INR 1.0     PHOSPHORUS    Collection Time: 09/30/20  7:25 PM   Result Value Ref Range    Phosphorus 3.3 2.5 - 4.5 mg/dL   LD    Collection Time: 09/30/20  7:25 PM   Result Value Ref Range     (H) 98 - 192 U/L   D DIMER    Collection Time: 09/30/20  7:25 PM   Result Value Ref Range    D DIMER 3.69 (H) <0.46 ug/ml(FEU)   LACTIC ACID    Collection Time: 09/30/20  7:25 PM   Result Value Ref Range    Lactic acid 1.3 0.5 - 2.0 mmol/L   EKG, 12 LEAD, INITIAL    Collection Time: 09/30/20  7:30 PM   Result Value Ref Range    Ventricular Rate 102 BPM    Atrial Rate 103 BPM    P-R Interval 154 ms    QRS Duration 128 ms    Q-T Interval 376 ms    QTC Calculation (Bezet) 490 ms    Calculated P Axis 73 degrees    Calculated R Axis -40 degrees    Calculated T Axis 108 degrees    Diagnosis       Sinus tachycardia  Nonspecific IVCD with LAD  LVH with secondary repolarization abnormality  Anterior Q waves, possibly due to LVH    Confirmed by NEO CLEMENS ([id number]) on 10/1/2020 7:25:55 AM SARS-COV-2    Collection Time: 09/30/20  9:45 PM   Result Value Ref Range    SARS-CoV-2 Please find results under separate order     TYPE & SCREEN    Collection Time: 09/30/20 10:00 PM   Result Value Ref Range    Crossmatch Expiration 10/03/2020,2359     ABO/Rh(D) A Positive     Antibody screen Negative    VANCOMYCIN, TROUGH    Collection Time: 10/01/20  2:30 AM   Result Value Ref Range    Vancomycin,trough 18.7 ug/mL    Reported dose date Random      Reported dose: Random Units   D DIMER    Collection Time: 10/01/20  2:30 AM   Result Value Ref Range    D DIMER <0.27 <0.46 ug/ml(FEU)   CBC WITH AUTOMATED DIFF    Collection Time: 10/01/20  2:30 AM   Result Value Ref Range    WBC 15.5 (H) 4.6 - 13.2 K/uL    RBC 3.92 (L) 4.20 - 5.30 M/uL    HGB 9.5 (L) 12.0 - 16.0 %    HCT 31.6 (L) 35.0 - 45.0 %    MCV 80.6 74.0 - 97.0 FL    MCH 24.2 24.0 - 34.0 PG    MCHC 30.1 (L) 31.0 - 37.0 g/dL    RDW 16.8 (H) 11.6 - 14.5 %    PLATELET 431 (H) 859 - 420 K/uL    MPV 9.5 FL    NEUTROPHILS 95 %    BAND NEUTROPHILS 1 %    LYMPHOCYTES 3 %    MONOCYTES 1 %    EOSINOPHILS 0 %    BASOPHILS 0 %    IMMATURE GRANULOCYTES 0 %    ABS. NEUTROPHILS 14.8 K/UL    ABS. LYMPHOCYTES 0.5 K/UL    ABS. MONOCYTES 0.2 K/UL    ABS. EOSINOPHILS 0.0 K/UL    ABS. BASOPHILS 0.0 K/UL    ABS. IMM.  GRANS. 0.0 K/UL    DF Manual      RBC COMMENTS Normocytic, Normochromic     MAGNESIUM    Collection Time: 10/01/20  2:30 AM   Result Value Ref Range    Magnesium 2.5 1.7 - 2.8 mg/dL   METABOLIC PANEL, BASIC    Collection Time: 10/01/20  2:30 AM   Result Value Ref Range    Sodium 136 135 - 145 mmol/L    Potassium 4.7 3.2 - 5.1 mmol/L    Chloride 102 94 - 111 mmol/L    CO2 25 21 - 33 mmol/L    Anion gap 9 mmol/L    Glucose 389 (H) 70 - 110 mg/dL    BUN 21 9 - 21 mg/dL    Creatinine 1.90 (H) 0.70 - 1.20 mg/dL    BUN/Creatinine ratio 11      GFR est AA 32 ml/min/1.73m2    GFR est non-AA 27 ml/min/1.73m2    Calcium 8.7 8.5 - 10.5 mg/dL   PROCALCITONIN    Collection Time: 10/01/20  2:30 AM   Result Value Ref Range    Procalcitonin 0.06 (L) 0.5 - 2.0 ng/mL   GLUCOSE, POC    Collection Time: 10/01/20  7:43 AM   Result Value Ref Range    Glucose (POC) 499 (HH) 70 - 110 mg/dL    Performed by Sammy Easton, POC    Collection Time: 10/01/20  7:44 AM   Result Value Ref Range    Glucose (POC) 480 (HH) 70 - 110 mg/dL    Performed by Sammy Easton, POC    Collection Time: 10/01/20 11:08 AM   Result Value Ref Range    Glucose (POC) 500 (HH) 70 - 110 mg/dL    Performed by Richie Castro            Assessment/Plan:     Problem List:  Acute respiratory failure with hypoxia ([address]  In the ED, oxygen saturations dropped to the mid 80s when not on O2. Currently requiring 2 L NC O2 to keep O2 saturation above 88%. RT following  Echo pending  Secondary to likely COVID-19 active infection. COVID-19  Currently pending. Continue droplet plus precautions. Patient with significant exposure to doctors office within the last 1 week where Nursing staff unknowingly were COVID-19 positive. Patient was informed to 2 to 3 days after her doctor's visit. Continue azithromycin, Decadron and vitamins. Continue IV Zosyn to cover superimposed bacterial pneumonia as well. Type 2 diabetes mellitus with hyperglycemia, with long-term current use of insulin (HCC)  Secondary to Decadron use and acute infection. Significantly uncontrolled with blood sugars in the 400s noted. Accuchecks before meals and at bedtime  Continue Lantus and increase to 45 units every morning  Add Lantus 20 units every afternoon as well as insulin lispro 15 units with meals. Pending Hg A1c    Acute renal failure (ARF) (HCC)  On chronic renal failure stage 2  baseline 1.3-1.5  Decrease IV fluids to 50 cc an hour given patient's potential COVID-19 status. Check labs in a.m. Consider nephrology consultation if patient has not improved. HTN (hypertension)  Continue metoprolol.     Diabetic neuropathy ([facility] [address]  Continue gabapentin. DVT prophylaxis  Heparin SC 3 times daily. Care Plan discussed with: Patient/Family, Nursing, Case Management    Total time spent with patient: 45 minutes. With greater than 50% spent in coordination of care and counseling.     Jeannette Milian MD

## 2020-10-01 NOTE — ASSESSMENT & PLAN NOTE
Secondary to Decadron use and acute infection. Significantly uncontrolled with blood sugars in the 400s noted. Accuchecks before meals and at bedtime  Continue Lantus and increase to 45 units every morning  Add Lantus 20 units every afternoon as well as insulin lispro 15 units with meals.   Pending Hg A1c

## 2020-10-01 NOTE — ASSESSMENT & PLAN NOTE
-Accuchecks before meals and at bedtime  -Cover with Humalog per sliding scale  -Continue home Lantus  -Hg A1c  -Cardiac/diabetic diet

## 2020-10-01 NOTE — ASSESSMENT & PLAN NOTE
-Oxygen saturations dropped to the mid 80s when not on O2.  -Continue oxygen saturation of 95 to 98% on 2 L  -ABG PRN  -Supplemental oxygen to maintain saturations greater than 88-90%  -RT following  -Echo pending

## 2020-10-01 NOTE — PROGRESS NOTES
TRANSFER - IN REPORT:    Verbal report received from [address] in ED(name) on Maira Orantes  being received from ED (unit) for routine progression of care      Report consisted of patients Situation, Background, Assessment and   Recommendations(SBAR). Information from the following report(s) SBAR, Kardex, ED Summary, Intake/Output, MAR, Recent Results, Med Rec Status, Cardiac Rhythm sinus tachycardia, Alarm Parameters , Quality Measures and Dual Neuro Assessment was reviewed with the receiving nurse. Opportunity for questions and clarification was provided. Assessment completed upon patients arrival to unit and care assumed.

## 2020-10-01 NOTE — PROGRESS NOTES
Comprehensive Nutrition Assessment    Type and Reason for Visit: Initial    Nutrition Recommendations/Plan: continue Diabetic diet provide glucerna daily to allow pt to drink if eating < 50% of meal    Nutrition Assessment:  62 yo female PMH: Breast CA, DM, GERD, HTN, HLD. Pt admitted due to SOB past several days with possible COVID-19 exposure   Glucose elevated but is receiving decadron     Recent Results (from the past 12 hour(s))   SARS-COV-2    Collection Time: 09/30/20  9:45 PM   Result Value Ref Range    SARS-CoV-2 Please find results under separate order     TYPE & SCREEN    Collection Time: 09/30/20 10:00 PM   Result Value Ref Range    Crossmatch Expiration 10/03/2020,2359     ABO/Rh(D) A Positive     Antibody screen Negative    VANCOMYCIN, TROUGH    Collection Time: 10/01/20  2:30 AM   Result Value Ref Range    Vancomycin,trough 18.7 ug/mL    Reported dose date Random      Reported dose: Random Units   D DIMER    Collection Time: 10/01/20  2:30 AM   Result Value Ref Range    D DIMER <0.27 <0.46 ug/ml(FEU)   CBC WITH AUTOMATED DIFF    Collection Time: 10/01/20  2:30 AM   Result Value Ref Range    WBC 15.5 (H) 4.6 - 13.2 K/uL    RBC 3.92 (L) 4.20 - 5.30 M/uL    HGB 9.5 (L) 12.0 - 16.0 %    HCT 31.6 (L) 35.0 - 45.0 %    MCV 80.6 74.0 - 97.0 FL    MCH 24.2 24.0 - 34.0 PG    MCHC 30.1 (L) 31.0 - 37.0 g/dL    RDW 16.8 (H) 11.6 - 14.5 %    PLATELET 355 (H) 920 - 420 K/uL    MPV 9.5 FL    NEUTROPHILS 95 %    BAND NEUTROPHILS 1 %    LYMPHOCYTES 3 %    MONOCYTES 1 %    EOSINOPHILS 0 %    BASOPHILS 0 %    IMMATURE GRANULOCYTES 0 %    ABS. NEUTROPHILS 14.8 K/UL    ABS. LYMPHOCYTES 0.5 K/UL    ABS. MONOCYTES 0.2 K/UL    ABS. EOSINOPHILS 0.0 K/UL    ABS. BASOPHILS 0.0 K/UL    ABS. IMM.  GRANS. 0.0 K/UL    DF Manual      RBC COMMENTS Normocytic, Normochromic     MAGNESIUM    Collection Time: 10/01/20  2:30 AM   Result Value Ref Range    Magnesium 2.5 1.7 - 2.8 mg/dL   METABOLIC PANEL, BASIC    Collection Time: 10/01/20 2:30 AM   Result Value Ref Range    Sodium 136 135 - 145 mmol/L    Potassium 4.7 3.2 - 5.1 mmol/L    Chloride 102 94 - 111 mmol/L    CO2 25 21 - 33 mmol/L    Anion gap 9 mmol/L    Glucose 389 (H) 70 - 110 mg/dL    BUN 21 9 - 21 mg/dL    Creatinine 1.90 (H) 0.70 - 1.20 mg/dL    BUN/Creatinine ratio 11      GFR est AA 32 ml/min/1.73m2    GFR est non-AA 27 ml/min/1.73m2    Calcium 8.7 8.5 - 10.5 mg/dL   PROCALCITONIN    Collection Time: 10/01/20  2:30 AM   Result Value Ref Range    Procalcitonin 0.06 (L) 0.5 - 2.0 ng/mL       Malnutrition Assessment:  Malnutrition Status: At risk for malnutrition (specify)(COVID-19 may reduce pt po intake)    Context:  Acute illness     Findings of the 6 clinical characteristics of malnutrition:   Energy Intake:  Mild decrease in energy intake (specify)(SOB ~50% of meals)  Weight Loss:  No significant weight loss     Body Fat Loss:  No significant body fat loss,     Muscle Mass Loss:  No significant muscle mass loss,    Fluid Accumulation:  No significant fluid accumulation,     Strength:  Not performed         Estimated Daily Nutrient Needs: using ABW of 65 kg  Energy (kcal):  6558-3049 kcal/day  Protein (g):  65-78 g/day       Fluid (ml/day):  1950 mL/day    Nutrition Related Findings:  SOB related to pnuemonia and pt reports possible COVID-19 exposure. Pt COVID-19 R/O glucose is elevated but is being treated with decadron. no hgb A1c yet.       Wounds:    None       Current Nutrition Therapies:  DIET DIABETIC CONSISTENT CARB Regular    Anthropometric Measures:  · Height:  5' 3\" (160 cm)  · Current Body Wt:  104.3 kg (230 lb)   · Admission Body Wt:  230 lb    · Usual Body Wt:  230 lb     · Ideal Body Wt:  115 lbs:  200 %   · Adjusted Body Weight:   ; Weight Adjustment for: (143 lbs or 65 kg)   · Adjusted BMI:       · BMI Category:  Obese class 3 (BMI 40.0 or greater)       Nutrition Diagnosis:   · Inadequate oral intake related to impaired respiratory function as evidenced by other (specify), intake 26-50%(SOB related to possible COVID-19)      Nutrition Interventions:   Food and/or Nutrient Delivery: Start oral nutrition supplement, Continue current diet(Diabetic diet provide glucerna daily if pt eating < 50% of meals)  Nutrition Education and Counseling: No recommendations at this time(COVID-19 pending)  Coordination of Nutrition Care: Continued inpatient monitoring    Goals:  Pt will eat > 75% of meals, BM q 1-3 days, glucose , Hgb A1c < 7       Nutrition Monitoring and Evaluation:   Food/Nutrient Intake Outcomes: Food and nutrient intake, Supplement intake  Physical Signs/Symptoms Outcomes: Biochemical data, Meal time behavior, Nutrition focused physical findings     F/U: 10/4/2020    Discharge Planning:     Too soon to determine(pending COVID-19 test results)     Electronically signed by Princess Arenas on 10/1/2020 at 7:37 AM    Contact: LACEY [phone]

## 2020-10-01 NOTE — ASSESSMENT & PLAN NOTE
Currently pending. Continue droplet plus precautions. Patient with significant exposure to doctors office within the last 1 week where Nursing staff unknowingly were COVID-19 positive. Patient was informed to 2 to 3 days after her doctor's visit. Continue azithromycin, Decadron and vitamins. Continue IV Zosyn to cover superimposed bacterial pneumonia as well.

## 2020-10-01 NOTE — PROGRESS NOTES
Verbal shift change report given to Jose Luis Alegria (oncoming nurse) by pierre KENDRICK (offgoing nurse). Report included the following information SBAR.

## 2020-10-01 NOTE — PROGRESS NOTES
Bedside and Verbal shift change report given to TEVIN Friend  @ 0700  (oncoming nurse) by Luis Arias RN  (offgoing nurse). Report included the following information SBAR, Kardex, ED Summary, Procedure Summary, Intake/Output, MAR, Recent Results, Med Rec Status, Cardiac Rhythm sinus tachycardia, Alarm Parameters , Quality Measures and Dual Neuro Assessment.

## 2020-10-01 NOTE — ASSESSMENT & PLAN NOTE
On chronic renal failure stage 2  baseline 1.3-1.5  Decrease IV fluids to 50 cc an hour given patient's potential COVID-19 status. Check labs in a.m. Consider nephrology consultation if patient has not improved.

## 2020-10-01 NOTE — ASSESSMENT & PLAN NOTE
In the ED, oxygen saturations dropped to the mid 80s when not on O2. Currently requiring 2 L NC O2 to keep O2 saturation above 88%. RT following  Echo pending  Secondary to likely COVID-19 active infection.

## 2020-10-01 NOTE — PROGRESS NOTES
Am labs drawn, as per orde @ 2910.   Removed IV left AC, PMH Breast CA, no blood draws/BP/Sticks per patient request.

## 2020-10-01 NOTE — PROGRESS NOTES
Arrived pm imot 0025, assisted t bathroom to void on arrival. Placed on Tele, continuous monitoring Sinus Tach  , O2 3l/min nasal prong in use, SOB on exertion and when attempting to talk. Initial assessment completed.

## 2020-10-01 NOTE — PROGRESS NOTES
Problem: Diabetes Self-Management  Goal: *Disease process and treatment process  Description: Define diabetes and identify own type of diabetes; list 3 options for treating diabetes. Outcome: Progressing Towards Goal  Goal: *Incorporating nutritional management into lifestyle  Description: Describe effect of type, amount and timing of food on blood glucose; list 3 methods for planning meals. Outcome: Progressing Towards Goal  Goal: *Incorporating physical activity into lifestyle  Description: State effect of exercise on blood glucose levels. Outcome: Progressing Towards Goal  Goal: *Developing strategies to promote health/change behavior  Description: Define the ABC's of diabetes; identify appropriate screenings, schedule and personal plan for screenings. Outcome: Progressing Towards Goal  Goal: *Using medications safely  Description: State effect of diabetes medications on diabetes; name diabetes medication taking, action and side effects. Outcome: Progressing Towards Goal  Goal: *Monitoring blood glucose, interpreting and using results  Description: Identify recommended blood glucose targets  and personal targets. Outcome: Progressing Towards Goal  Goal: *Prevention, detection, treatment of acute complications  Description: List symptoms of hyper- and hypoglycemia; describe how to treat low blood sugar and actions for lowering  high blood glucose level. Outcome: Progressing Towards Goal  Goal: *Prevention, detection and treatment of chronic complications  Description: Define the natural course of diabetes and describe the relationship of blood glucose levels to long term complications of diabetes.   Outcome: Progressing Towards Goal  Goal: *Developing strategies to address psychosocial issues  Description: Describe feelings about living with diabetes; identify support needed and support network  Outcome: Progressing Towards Goal  Goal: *Insulin pump training  Outcome: Progressing Towards Goal  Goal: *Sick day guidelines  Outcome: Progressing Towards Goal  Goal: *Patient Specific Goal (EDIT GOAL, INSERT TEXT)  Outcome: Progressing Towards Goal     Problem: Patient Education: Go to Patient Education Activity  Goal: Patient/Family Education  Outcome: Progressing Towards Goal     Problem: General Medical Care Plan  Goal: *Vital signs within specified parameters  Outcome: Progressing Towards Goal  Goal: *Labs within defined limits  Outcome: Progressing Towards Goal  Goal: *Absence of infection signs and symptoms  Outcome: Progressing Towards Goal  Goal: *Optimal pain control at patient's stated goal  Outcome: Progressing Towards Goal  Goal: *Skin integrity maintained  Outcome: Progressing Towards Goal  Goal: *Fluid volume balance  Outcome: Progressing Towards Goal  Goal: *Optimize nutritional status  Outcome: Progressing Towards Goal  Goal: *Anxiety reduced or absent  Outcome: Progressing Towards Goal  Goal: *Progressive mobility and function (eg: ADL's)  Outcome: Progressing Towards Goal     Problem: Patient Education: Go to Patient Education Activity  Goal: Patient/Family Education  Outcome: Progressing Towards Goal

## 2020-10-01 NOTE — H&P
History and Physical    Subjective:     Jolly Mathews is a 63 y.o.  female who presented to the ED with complaints of SOB for the past several days. Pmhx includes Breast Cancer, Diabetes, GERD, HTN, HL, Neuropathy and RLS. According to ED documentation, patient reported feeling SOB for several days and using her husbands home O2, She states that she became concerned when she received a phone call from her doctors office notifying her that she had been exposed to a nurse who had tested positive 2 days ago. She denied fevers, chills, headache, sore throat, body aches or fatigue. While in  The ED, labs indicated a WBC count of 15 with a shift and tachycardia. Patient was not hypotensive. Her sats on 2L/NC in the ED were 95%, and off O@, she dropped into the mid [de-identified]. A CXR was completed and appears to indicated chronic lung markings and suspected RLL opacity on wet read. Based on the patients sx, pmhx and presentation, will bring in for the evaluation and management of hypoxia, COPD exacerbation, pneumonia, COVID PUI and acute renal failure. The expected length of stay is approximately 3 midnights. Patient is seen and examined by me lying in bed, mildly tachypneic. She denies fever, chills, cough, body aches, chest pain, headache. She does endorse SOB, but states that her sx have not worsened since arrival. She States that her symptoms began prior to seeing the doctor and being exposed to the nursing staff with [facility]. She states that she was diagnosed with COPD 1 year ago here after she presented with bilateral pneumonia and flu and was referred for further testing at discharge. States that her  and many of her associates are heavy smokers and that she has been exposed to heavy cigarette smoke for the last 40-50 years. She is currently tolerating 2 L of oxygen sats of 97 to 98%.   Past Medical History:   Diagnosis Date    Arthritis     spinal stenosis    Breast CA ([facility] [address] 2017    Left Breast (chemo/radiation)    Diabetes (HCC)     Type 2    Edema     legs and ankles    GERD (gastroesophageal reflux disease)     High cholesterol     Hypertension     Ill-defined condition     patient states hemorrhage behind Left eye-following Dr. Isabella Blackwell [facility]    per patient    Neuropathy     Restless leg       Past Surgical History:   Procedure Laterality Date    HX BACK SURGERY  08/30/2018    HX BREAST BIOPSY Left 02/03/2017    BREAST CANCER    HX BREAST LUMPECTOMY Left     HX CATARACT REMOVAL Right     HX HEART CATHETERIZATION  07/2018    no stents    HX KNEE REPLACEMENT       Family History   Problem Relation Age of Onset    No Known Problems Mother     No Known Problems Father     Heart Disease Sister       Social History     Tobacco Use    Smoking status: Never Smoker    Smokeless tobacco: Never Used   Substance Use Topics    Alcohol use: No       Prior to Admission medications    Medication Sig Start Date End Date Taking? Authorizing Provider   gabapentin (NEURONTIN) 300 mg capsule Take 300 mg by mouth two (2) times a day. Yes Provider, Historical   glucose blood VI test strips (OneTouch Ultra Blue Test Strip) strip by Does Not Apply route See Admin Instructions. Yes Provider, Historical   albuterol (PROVENTIL HFA, VENTOLIN HFA, PROAIR HFA) 90 mcg/actuation inhaler 2 puffs three to four times a day for 5 days then every 6 hours as needed for shortness of breath 9/4/18  Yes Seema Baum MD   atorvastatin (LIPITOR) 40 mg tablet Take  by mouth nightly. Yes Provider, Historical   amLODIPine (NORVASC) 10 mg tablet Take 10 mg by mouth daily. 4/6/18  Yes Provider, Historical   esomeprazole (NEXIUM) 20 mg capsule Take 20 mg by mouth daily. 4/6/18  Yes Provider, Historical   hydroCHLOROthiazide (MICROZIDE) 12.5 mg capsule Take 12.5 mg by mouth daily.  4/6/18  Yes Provider, Historical   clindamycin (CLEOCIN) 300 mg capsule Take 1 Cap by mouth four (4) times daily. 7/22/20   HERMILO Wilson   polyethylene glycol (ClearLax) 17 gram/dose powder Take 17 g by mouth daily. Provider, Historical   neomycin-polymyxin-dexamethasone (MAXITROL) ophthalmic suspension Administer 1 Drop to both eyes two (2) times a day. Provider, Historical   timoloL maleate 0.5 % drpd ophthalmic solution Administer 1 Drop to both eyes daily. Provider, Historical   meloxicam (MOBIC) 7.5 mg tablet Take 1 Tab by mouth daily. 12/4/19   HERMILO Chester   tamsulosin (FLOMAX) 0.4 mg capsule take 1 capsule by mouth once daily 12/28/18   Alli Santos NP   LANTUS SOLOSTAR U-100 INSULIN 100 unit/mL (3 mL) inpn 30 units subcutaneously in morning. Can take an additional dose of 10 units subcutaneously in the evening if blood sugar is more than 200. 9/4/18   Beulah Hashimoto, MD   naloxone [facility]) 4 mg/actuation nasal spray Use 1 spray intranasally, then discard. Repeat with new spray every 2 min as needed for opioid overdose symptoms, alternating nostrils. 8/31/18   Mika LESLIE NP   LORazepam (ATIVAN) 0.5 mg tablet Take 0.5 mg by mouth as needed for Anxiety. Provider, Historical   insulin lispro (HUMALOG) 100 unit/mL kwikpen by SubCUTAneous route. Provider, Historical   rosuvastatin (CRESTOR) 10 mg tablet Take 10 mg by mouth daily. Provider, Historical   furosemide (LASIX) 20 mg tablet TAKE 1 TABLET BY MOUTH ONCE DAILY AS NEEDED 6/8/18   Provider, Historical   TRADJENTA 5 mg tablet TAKE 1 TABLET BY MOUTH ONCE A DAY 4/19/18   Provider, Historical   metoprolol tartrate (LOPRESSOR) 50 mg tablet Take 50 mg by mouth daily. 4/6/18   Provider, Historical   NOVOFINE 32 32 gauge x 1/4\" ndle  1/26/18   Provider, Historical   venlafaxine-SR (EFFEXOR-XR) 37.5 mg capsule Take 37.5 mg by mouth daily.  4/6/18   Provider, Historical     Allergies   Allergen Reactions    Ciprofloxacin Hives    Keflex [Cephalexin] Nausea and Vomiting    Metformin Other (comments)     GI DISTRESS    Soliqua 100/33 [Insulin Glargine-Lixisenatide] Nausea Only        Review of Systems:  A comprehensive review of systems was negative except for that written in the History of Present Illness. Objective: Intake and Output:    09/30 1901 - 10/01 0700  In: 900 [I.V.:900]  Out: -   No intake/output data recorded. Physical Exam:   Visit Vitals  BP (!) 160/58   Pulse 100   Temp 98.6 °F (37 °C)   Resp 22   Ht 5' 3\" (1.6 m)   Wt 104.3 kg (230 lb)   SpO2 98%   Breastfeeding No   BMI 40.74 kg/m²     General appearance: alert, cooperative, mild distress, appears stated age, moderately obese  Head: Normocephalic, without obvious abnormality, atraumatic  Eyes: negative, conjunctivae/corneas clear. PERRL, EOM's intact. Neck: supple, symmetrical, trachea midline, no adenopathy, thyroid: not enlarged, symmetric, no tenderness/mass/nodules, no carotid bruit and no JVD  Lungs: wheezes throughout, diminished breath sounds throughout. Mild bibasilar rales. Heart: S1, S2 normal, tachycardia. Abdomen: soft, non-tender. Bowel sounds normal. No masses,  no organomegaly  Extremities: extremities normal, atraumatic, no cyanosis or edema  Pulses: 2+ and symmetric  Skin: Skin color, texture, turgor normal. No rashes or lesions  Neurologic: Grossly normal    ECG: NONE     Data Review:   Recent Results (from the past 24 hour(s))   CBC WITH AUTOMATED DIFF    Collection Time: 09/30/20  7:25 PM   Result Value Ref Range    WBC 15.4 (H) 4.6 - 13.2 K/uL    RBC 4.09 (L) 4.20 - 5.30 M/uL    HGB 9.7 (L) 12.0 - 16.0 %    HCT 33.1 (L) 35.0 - 45.0 %    MCV 80.9 74.0 - 97.0 FL    MCH 23.7 (L) 24.0 - 34.0 PG    MCHC 29.3 (L) 31.0 - 37.0 g/dL    RDW 16.8 (H) 11.6 - 14.5 %    PLATELET 136 (H) 868 - 420 K/uL    MPV 9.3 FL    NEUTROPHILS 83 (H) 40 - 73 %    LYMPHOCYTES 7 (L) 21 - 52 %    MONOCYTES 6 3 - 10 %    EOSINOPHILS 3 0 - 5 %    BASOPHILS 0 0 - 2 %    IMMATURE GRANULOCYTES 1 %    ABS.  NEUTROPHILS 12.8 (H) 1.8 - 8.0 K/UL ABS. LYMPHOCYTES 1.1 0.9 - 3.6 K/UL    ABS. MONOCYTES 0.9 0.05 - 1.2 K/UL    ABS. EOSINOPHILS 0.4 0.0 - 0.4 K/UL    ABS. BASOPHILS 0.0 0.0 - 0.1 K/UL    ABS. IMM. GRANS. 0.1 K/UL   TROPONIN I    Collection Time: 09/30/20  7:25 PM   Result Value Ref Range    Troponin-I, Qt. 0.02 0.02 - 0.05 ng/mL   MAGNESIUM    Collection Time: 09/30/20  7:25 PM   Result Value Ref Range    Magnesium 2.1 1.7 - 2.8 mg/dL   METABOLIC PANEL, COMPREHENSIVE    Collection Time: 09/30/20  7:25 PM   Result Value Ref Range    Sodium 139 135 - 145 mmol/L    Potassium 4.0 3.2 - 5.1 mmol/L    Chloride 101 94 - 111 mmol/L    CO2 29 21 - 33 mmol/L    Anion gap 9 mmol/L    Glucose 178 (H) 70 - 110 mg/dL    BUN 17 9 - 21 mg/dL    Creatinine 1.70 (H) 0.70 - 1.20 mg/dL    BUN/Creatinine ratio 10      GFR est AA 37 ml/min/1.73m2    GFR est non-AA 30 ml/min/1.73m2    Calcium 9.1 8.5 - 10.5 mg/dL    Bilirubin, total 0.3 0.2 - 1.0 mg/dL    AST (SGOT) 17 14 - 74 U/L    ALT (SGPT) 14 3 - 52 U/L    Alk.  phosphatase 66 38 - 126 U/L    Protein, total 7.9 6.1 - 8.4 g/dL    Albumin 3.4 (L) 3.5 - 4.7 g/dL    Globulin 4.5 g/dL    A-G Ratio 0.8     BNP    Collection Time: 09/30/20  7:25 PM   Result Value Ref Range    BNP 56 0 - 100 pg/mL   INFLUENZA A & B AG (RAPID TEST)    Collection Time: 09/30/20  7:25 PM   Result Value Ref Range    Influenza A Antigen Negative Negative      Influenza B Antigen Negative Negative     PTT    Collection Time: 09/30/20  7:25 PM   Result Value Ref Range    aPTT 25.4 20.4 - 35.6 sec    aPTT, therapeutic range   68 - 109 sec   PROTHROMBIN TIME + INR    Collection Time: 09/30/20  7:25 PM   Result Value Ref Range    Prothrombin time 12.3 11.5 - 13.9 sec    INR 1.0     PHOSPHORUS    Collection Time: 09/30/20  7:25 PM   Result Value Ref Range    Phosphorus 3.3 2.5 - 4.5 mg/dL   LD    Collection Time: 09/30/20  7:25 PM   Result Value Ref Range     (H) 98 - 192 U/L   D DIMER    Collection Time: 09/30/20  7:25 PM   Result Value Ref Range D DIMER 3.69 (H) <0.46 ug/ml(FEU)   LACTIC ACID    Collection Time: 09/30/20  7:25 PM   Result Value Ref Range    Lactic acid 1.3 0.5 - 2.0 mmol/L   EKG, 12 LEAD, INITIAL    Collection Time: 09/30/20  7:30 PM   Result Value Ref Range    Ventricular Rate 102 BPM    Atrial Rate 103 BPM    P-R Interval 154 ms    QRS Duration 128 ms    Q-T Interval 376 ms    QTC Calculation (Bezet) 490 ms    Calculated P Axis 73 degrees    Calculated R Axis -40 degrees    Calculated T Axis 108 degrees    Diagnosis       Sinus tachycardia  Nonspecific IVCD with LAD  LVH with secondary repolarization abnormality  Anterior Q waves, possibly due to LVH     SARS-COV-2    Collection Time: 09/30/20  9:45 PM   Result Value Ref Range    SARS-CoV-2 Please find results under separate order     TYPE & SCREEN    Collection Time: 09/30/20 10:00 PM   Result Value Ref Range    Crossmatch Expiration 10/03/2020,2359     ABO/Rh(D) A Positive     Antibody screen Negative    VANCOMYCIN, TROUGH    Collection Time: 10/01/20  2:30 AM   Result Value Ref Range    Vancomycin,trough 18.7 ug/mL    Reported dose date Random      Reported dose: Random Units   D DIMER    Collection Time: 10/01/20  2:30 AM   Result Value Ref Range    D DIMER <0.27 <0.46 ug/ml(FEU)   CBC WITH AUTOMATED DIFF    Collection Time: 10/01/20  2:30 AM   Result Value Ref Range    WBC 15.5 (H) 4.6 - 13.2 K/uL    RBC 3.92 (L) 4.20 - 5.30 M/uL    HGB 9.5 (L) 12.0 - 16.0 %    HCT 31.6 (L) 35.0 - 45.0 %    MCV 80.6 74.0 - 97.0 FL    MCH 24.2 24.0 - 34.0 PG    MCHC 30.1 (L) 31.0 - 37.0 g/dL    RDW 16.8 (H) 11.6 - 14.5 %    PLATELET 140 (H) 644 - 420 K/uL    MPV 9.5 FL    NEUTROPHILS 95 %    BAND NEUTROPHILS 1 %    LYMPHOCYTES 3 %    MONOCYTES 1 %    EOSINOPHILS 0 %    BASOPHILS 0 %    IMMATURE GRANULOCYTES 0 %    ABS. NEUTROPHILS 14.8 K/UL    ABS. LYMPHOCYTES 0.5 K/UL    ABS. MONOCYTES 0.2 K/UL    ABS. EOSINOPHILS 0.0 K/UL    ABS. BASOPHILS 0.0 K/UL    ABS. IMM.  GRANS. 0.0 K/UL    DF Manual RBC COMMENTS Normocytic, Normochromic     MAGNESIUM    Collection Time: 10/01/20  2:30 AM   Result Value Ref Range    Magnesium 2.5 1.7 - 2.8 mg/dL   METABOLIC PANEL, BASIC    Collection Time: 10/01/20  2:30 AM   Result Value Ref Range    Sodium 136 135 - 145 mmol/L    Potassium 4.7 3.2 - 5.1 mmol/L    Chloride 102 94 - 111 mmol/L    CO2 25 21 - 33 mmol/L    Anion gap 9 mmol/L    Glucose 389 (H) 70 - 110 mg/dL    BUN 21 9 - 21 mg/dL    Creatinine 1.90 (H) 0.70 - 1.20 mg/dL    BUN/Creatinine ratio 11      GFR est AA 32 ml/min/1.73m2    GFR est non-AA 27 ml/min/1.73m2    Calcium 8.7 8.5 - 10.5 mg/dL   PROCALCITONIN    Collection Time: 10/01/20  2:30 AM   Result Value Ref Range    Procalcitonin 0.06 (L) 0.5 - 2.0 ng/mL       Chest x-ray was positive for infiltrate in RLL lobe. Assessment/Plan:     Principal Problem:    CAP (community acquired pneumonia) (9/1/2018)    Active Problems:    Hypoxia (9/30/2020)      Acute renal failure (ARF) ([facility] [address] (9/30/2020)      Type 2 diabetes mellitus with hyperglycemia, with long-term current use of insulin ([facility] [address] (9/30/2020)    Type 2 diabetes mellitus with hyperglycemia, with long-term current use of insulin ([facility] [address]  -Accuchecks before meals and at bedtime  -Cover with Humalog per sliding scale  -Continue home Lantus  -Hg A1c  -Cardiac/diabetic diet    Acute renal failure (ARF) ([facility])  -Baseline 1.3-1.5  -Presented with creatinine of 1.7  -Patient was given 2 L IV fluid bolus in the ED  -Continue IV fluid 100 mL's per hour  -Repeat labs in the a.m.  -Hold additional nephrotoxic agents      Hypoxia  -Oxygen saturations dropped to the mid 80s when not on O2.  -Continue oxygen saturation of 95 to 98% on 2 L  -ABG PRN  -Supplemental oxygen to maintain saturations greater than 88-90%  -RT following  -Echo pending    * CAP (community acquired pneumonia)  -Presented with SOB, tachycardia, leukocytosis and shift  -CXR reviewed by me.  Official read pending.  -Likely COPD exacerbation superimposed on PNA  -COVID PUI  -RT consulted  -O2 to maintain sats greater than 88-90%  -Was given vanc and zosyn in the ED  -Continue Zosyn and start Azithromycin for inflammatory properties and atypicals  -Mucinex twice daily  -Decadron 6 mg daily initiated. -Start Symbicort 2 puffs BID      Droplet precautions  Full CODE STATUS  Diabetic diet  DVT prophylaxis-Heparin    Time spent with patient, 45 minutes, with greater than 50% in consultation and coordination of care.

## 2020-10-01 NOTE — PROGRESS NOTES
Assisted up oob to bathroom to void and stool, on room air. Returned to bed on own., O2 continuous returned, remains on tele, sinus sinus tach 102 weaned 2L/min nasal prong. Pain 3/10 to mid/lower back. Dyspnea improved.

## 2020-10-01 NOTE — ED NOTES
TRANSFER - OUT REPORT:    Verbal report given to Luis Arias RN (name) on Bryson Love  being transferred to room 255 for routine progression of care       Report consisted of patients Situation, Background, Assessment and   Recommendations(SBAR). Information from the following report(s) SBAR, ED Summary, MAR, Recent Results and Cardiac Rhythm ST was reviewed with the receiving nurse. Lines:   Peripheral IV Left Antecubital (Active)        Opportunity for questions and clarification was provided.       Patient transported with:   Monitor  O2 @ 3 liters  Registered Nurse

## 2020-10-02 LAB
ANION GAP SERPL CALC-SCNC: 8 MMOL/L
BASOPHILS # BLD: 0 K/UL
BASOPHILS NFR BLD: 0 %
BUN SERPL-MCNC: 41 MG/DL (ref 9–21)
BUN/CREAT SERPL: 20
CA-I BLD-MCNC: 8.2 MG/DL (ref 8.5–10.5)
CHLORIDE SERPL-SCNC: 105 MMOL/L (ref 94–111)
CO2 SERPL-SCNC: 25 MMOL/L (ref 21–33)
CREAT SERPL-MCNC: 2.1 MG/DL (ref 0.7–1.2)
D DIMER PPP FEU-MCNC: 2.07 UG/ML(FEU)
DIFFERENTIAL METHOD BLD: ABNORMAL
ECHO LV INTERNAL DIMENSION DIASTOLIC: 5.1 CM (ref 3.9–5.3)
ECHO LV INTERNAL DIMENSION SYSTOLIC: 3.4 CM
ECHO LV IVSD: 1.1 CM (ref 0.6–0.9)
ECHO LV MASS 2D: 213.9 G (ref 67–162)
ECHO LV MASS INDEX 2D: 104.2 G/M2 (ref 43–95)
ECHO LV POSTERIOR WALL DIASTOLIC: 1.1 CM (ref 0.6–0.9)
EOSINOPHIL # BLD: 0 K/UL
EOSINOPHIL NFR BLD: 0 %
ERYTHROCYTE [DISTWIDTH] IN BLOOD BY AUTOMATED COUNT: 17.2 % (ref 11.6–14.5)
EST. AVERAGE GLUCOSE BLD GHB EST-MCNC: 200 MG/DL
GLUCOSE BLD STRIP.AUTO-MCNC: 320 MG/DL (ref 70–110)
GLUCOSE BLD STRIP.AUTO-MCNC: 398 MG/DL (ref 70–110)
GLUCOSE BLD STRIP.AUTO-MCNC: 412 MG/DL (ref 70–110)
GLUCOSE BLD STRIP.AUTO-MCNC: 429 MG/DL (ref 70–110)
GLUCOSE BLD STRIP.AUTO-MCNC: 432 MG/DL (ref 70–110)
GLUCOSE SERPL-MCNC: 308 MG/DL (ref 70–110)
HBA1C MFR BLD: 8.6 % (ref 4–5.6)
HCT VFR BLD AUTO: 30.4 % (ref 35–45)
HGB BLD-MCNC: 8.9 G/DL (ref 12–16)
IMM GRANULOCYTES # BLD AUTO: 0 K/UL
IMM GRANULOCYTES NFR BLD AUTO: 0 %
LYMPHOCYTES # BLD: 1 K/UL
LYMPHOCYTES NFR BLD: 5 %
MAGNESIUM SERPL-MCNC: 2.5 MG/DL (ref 1.7–2.8)
MCH RBC QN AUTO: 23.7 PG (ref 24–34)
MCHC RBC AUTO-ENTMCNC: 29.3 G/DL (ref 31–37)
MCV RBC AUTO: 81.1 FL (ref 74–97)
MONOCYTES # BLD: 0.2 K/UL
MONOCYTES NFR BLD: 1 %
NEUTS BAND NFR BLD MANUAL: 4 %
NEUTS SEG # BLD: 18.9 K/UL
NEUTS SEG NFR BLD: 90 %
PERFORMED BY, TECHID: ABNORMAL
PLATELET # BLD AUTO: 480 K/UL (ref 135–420)
PMV BLD AUTO: 9.2 FL (ref 9.2–11.8)
POTASSIUM SERPL-SCNC: 4.7 MMOL/L (ref 3.2–5.1)
PROCALCITONIN SERPL-MCNC: 0.08 NG/ML (ref 0.5–2)
RBC # BLD AUTO: 3.75 M/UL (ref 4.2–5.3)
RBC MORPH BLD: ABNORMAL
SODIUM SERPL-SCNC: 138 MMOL/L (ref 135–145)
WBC # BLD AUTO: 20.1 K/UL (ref 4.6–13.2)

## 2020-10-02 PROCEDURE — 74011250637 HC RX REV CODE- 250/637: Performed by: NURSE PRACTITIONER

## 2020-10-02 PROCEDURE — 74011250636 HC RX REV CODE- 250/636: Performed by: INTERNAL MEDICINE

## 2020-10-02 PROCEDURE — 83735 ASSAY OF MAGNESIUM: CPT

## 2020-10-02 PROCEDURE — 74011636637 HC RX REV CODE- 636/637: Performed by: INTERNAL MEDICINE

## 2020-10-02 PROCEDURE — 80048 BASIC METABOLIC PNL TOTAL CA: CPT

## 2020-10-02 PROCEDURE — 94760 N-INVAS EAR/PLS OXIMETRY 1: CPT

## 2020-10-02 PROCEDURE — 74011250636 HC RX REV CODE- 250/636: Performed by: EMERGENCY MEDICINE

## 2020-10-02 PROCEDURE — 97161 PT EVAL LOW COMPLEX 20 MIN: CPT

## 2020-10-02 PROCEDURE — 85025 COMPLETE CBC W/AUTO DIFF WBC: CPT

## 2020-10-02 PROCEDURE — 84145 PROCALCITONIN (PCT): CPT

## 2020-10-02 PROCEDURE — 84300 ASSAY OF URINE SODIUM: CPT

## 2020-10-02 PROCEDURE — 36415 COLL VENOUS BLD VENIPUNCTURE: CPT

## 2020-10-02 PROCEDURE — 94640 AIRWAY INHALATION TREATMENT: CPT

## 2020-10-02 PROCEDURE — 74011250637 HC RX REV CODE- 250/637: Performed by: INTERNAL MEDICINE

## 2020-10-02 PROCEDURE — 74011000258 HC RX REV CODE- 258: Performed by: EMERGENCY MEDICINE

## 2020-10-02 PROCEDURE — 82962 GLUCOSE BLOOD TEST: CPT

## 2020-10-02 PROCEDURE — 74011636637 HC RX REV CODE- 636/637: Performed by: NURSE PRACTITIONER

## 2020-10-02 PROCEDURE — 65270000034 HC RM STERILE ISOLATION

## 2020-10-02 PROCEDURE — 85379 FIBRIN DEGRADATION QUANT: CPT

## 2020-10-02 PROCEDURE — 65270000029 HC RM PRIVATE

## 2020-10-02 PROCEDURE — 74011250636 HC RX REV CODE- 250/636: Performed by: NURSE PRACTITIONER

## 2020-10-02 RX ORDER — INSULIN LISPRO 100 [IU]/ML
30 INJECTION, SOLUTION INTRAVENOUS; SUBCUTANEOUS
Status: DISCONTINUED | OUTPATIENT
Start: 2020-10-02 | End: 2020-10-04 | Stop reason: HOSPADM

## 2020-10-02 RX ORDER — INSULIN GLARGINE 100 [IU]/ML
45 INJECTION, SOLUTION SUBCUTANEOUS
Status: DISCONTINUED | OUTPATIENT
Start: 2020-10-02 | End: 2020-10-03

## 2020-10-02 RX ORDER — AMOXICILLIN AND CLAVULANATE POTASSIUM 875; 125 MG/1; MG/1
1 TABLET, FILM COATED ORAL 2 TIMES DAILY WITH MEALS
Status: DISCONTINUED | OUTPATIENT
Start: 2020-10-02 | End: 2020-10-04 | Stop reason: HOSPADM

## 2020-10-02 RX ADMIN — GABAPENTIN 600 MG: 300 CAPSULE ORAL at 07:52

## 2020-10-02 RX ADMIN — VENLAFAXINE 37.5 MG: 37.5 TABLET ORAL at 15:51

## 2020-10-02 RX ADMIN — GABAPENTIN 600 MG: 300 CAPSULE ORAL at 18:00

## 2020-10-02 RX ADMIN — DEXAMETHASONE 6 MG: 4 TABLET ORAL at 07:51

## 2020-10-02 RX ADMIN — SODIUM CHLORIDE 100 ML/HR: 9 INJECTION, SOLUTION INTRAVENOUS at 14:13

## 2020-10-02 RX ADMIN — BUDESONIDE AND FORMOTEROL FUMARATE DIHYDRATE 2 PUFF: 160; 4.5 AEROSOL RESPIRATORY (INHALATION) at 08:00

## 2020-10-02 RX ADMIN — VENLAFAXINE 37.5 MG: 37.5 TABLET ORAL at 17:00

## 2020-10-02 RX ADMIN — ALBUTEROL SULFATE 2 PUFF: 108 INHALANT RESPIRATORY (INHALATION) at 15:40

## 2020-10-02 RX ADMIN — INSULIN LISPRO 30 UNITS: 100 INJECTION, SOLUTION INTRAVENOUS; SUBCUTANEOUS at 17:00

## 2020-10-02 RX ADMIN — VENLAFAXINE 37.5 MG: 37.5 TABLET ORAL at 07:52

## 2020-10-02 RX ADMIN — GUAIFENESIN 600 MG: 600 TABLET, EXTENDED RELEASE ORAL at 07:48

## 2020-10-02 RX ADMIN — INSULIN LISPRO 30 UNITS: 100 INJECTION, SOLUTION INTRAVENOUS; SUBCUTANEOUS at 15:52

## 2020-10-02 RX ADMIN — GABAPENTIN 600 MG: 300 CAPSULE ORAL at 22:30

## 2020-10-02 RX ADMIN — TAMSULOSIN HYDROCHLORIDE 0.4 MG: 0.4 CAPSULE ORAL at 07:51

## 2020-10-02 RX ADMIN — AMOXICILLIN AND CLAVULANATE POTASSIUM 1 TABLET: 875; 125 TABLET, FILM COATED ORAL at 17:00

## 2020-10-02 RX ADMIN — INSULIN LISPRO 15 UNITS: 100 INJECTION, SOLUTION INTRAVENOUS; SUBCUTANEOUS at 07:00

## 2020-10-02 RX ADMIN — INSULIN LISPRO 10 UNITS: 100 INJECTION, SOLUTION INTRAVENOUS; SUBCUTANEOUS at 22:30

## 2020-10-02 RX ADMIN — ALBUTEROL SULFATE 2 PUFF: 108 INHALANT RESPIRATORY (INHALATION) at 08:16

## 2020-10-02 RX ADMIN — HEPARIN SODIUM 5000 UNITS: 5000 INJECTION INTRAVENOUS; SUBCUTANEOUS at 05:10

## 2020-10-02 RX ADMIN — ATORVASTATIN CALCIUM 40 MG: 40 TABLET, FILM COATED ORAL at 22:30

## 2020-10-02 RX ADMIN — HEPARIN SODIUM 5000 UNITS: 5000 INJECTION INTRAVENOUS; SUBCUTANEOUS at 14:13

## 2020-10-02 RX ADMIN — TAMSULOSIN HYDROCHLORIDE 0.4 MG: 0.4 CAPSULE ORAL at 07:48

## 2020-10-02 RX ADMIN — ALBUTEROL SULFATE 2 PUFF: 108 INHALANT RESPIRATORY (INHALATION) at 12:00

## 2020-10-02 RX ADMIN — METOPROLOL TARTRATE 50 MG: 50 TABLET, FILM COATED ORAL at 07:52

## 2020-10-02 RX ADMIN — PANTOPRAZOLE SODIUM 40 MG: 40 TABLET, DELAYED RELEASE ORAL at 07:48

## 2020-10-02 RX ADMIN — INSULIN LISPRO 15 UNITS: 100 INJECTION, SOLUTION INTRAVENOUS; SUBCUTANEOUS at 07:54

## 2020-10-02 RX ADMIN — AMOXICILLIN AND CLAVULANATE POTASSIUM 1 TABLET: 875; 125 TABLET, FILM COATED ORAL at 15:52

## 2020-10-02 RX ADMIN — Medication 10 ML: at 06:37

## 2020-10-02 RX ADMIN — INSULIN LISPRO 10 UNITS: 100 INJECTION, SOLUTION INTRAVENOUS; SUBCUTANEOUS at 16:30

## 2020-10-02 RX ADMIN — ONDANSETRON 4 MG: 4 TABLET, ORALLY DISINTEGRATING ORAL at 07:53

## 2020-10-02 RX ADMIN — INSULIN LISPRO 15 UNITS: 100 INJECTION, SOLUTION INTRAVENOUS; SUBCUTANEOUS at 11:30

## 2020-10-02 RX ADMIN — AMLODIPINE BESYLATE 10 MG: 5 TABLET ORAL at 07:51

## 2020-10-02 RX ADMIN — Medication 10 ML: at 14:14

## 2020-10-02 RX ADMIN — ALBUTEROL SULFATE 2 PUFF: 108 INHALANT RESPIRATORY (INHALATION) at 20:56

## 2020-10-02 RX ADMIN — ACETAMINOPHEN 650 MG: 325 TABLET ORAL at 05:10

## 2020-10-02 RX ADMIN — INSULIN GLARGINE 45 UNITS: 100 INJECTION, SOLUTION SUBCUTANEOUS at 22:30

## 2020-10-02 RX ADMIN — INSULIN GLARGINE 45 UNITS: 100 INJECTION, SOLUTION SUBCUTANEOUS at 07:53

## 2020-10-02 RX ADMIN — HEPARIN SODIUM 5000 UNITS: 5000 INJECTION INTRAVENOUS; SUBCUTANEOUS at 22:30

## 2020-10-02 RX ADMIN — SODIUM CHLORIDE 500 MG: 9 INJECTION, SOLUTION INTRAVENOUS at 22:30

## 2020-10-02 RX ADMIN — BUDESONIDE AND FORMOTEROL FUMARATE DIHYDRATE 2 PUFF: 160; 4.5 AEROSOL RESPIRATORY (INHALATION) at 20:50

## 2020-10-02 RX ADMIN — GUAIFENESIN 600 MG: 600 TABLET, EXTENDED RELEASE ORAL at 22:30

## 2020-10-02 RX ADMIN — PIPERACILLIN AND TAZOBACTAM 3.38 G: 3; .375 INJECTION, POWDER, FOR SOLUTION INTRAVENOUS at 05:10

## 2020-10-02 NOTE — PROGRESS NOTES
Telephone call to Hospitalist, [place], NP covering  Discussed Insuin demands this admission, recommended Very resitant insulin sliding scale. On scheduled dose of Humalog and SSO covering with basal dose Lantus Reviewed chart with [place], NP, changes made today re: insulin needs A1C 8.6 this admission. Will continue to monitor.

## 2020-10-02 NOTE — PROGRESS NOTES
Problem: Mobility Impaired (Adult and Pediatric)  Goal: *Acute Goals and Plan of Care (Insert Text)  Description: PLOF: Community ambulator who did not use an AD and who was (I) With ADLs. Outcome: Resolved/Met   PHYSICAL THERAPY EVALUATION AND DISCHARGE    Patient: Kaden Montgomery (9 y.o. female)  Date: 10/2/2020  Primary Diagnosis: Pneumonia [J18.9]        Precautions: N/A       ASSESSMENT :  Based on the objective data described below, the patient presents with acute respiratory failure and previously Covid(+) and she has now been retested. She currently performs mobility well without an AD with complete (I). She is currently on 1L via [place] but she is able to walk a short distance on R. A. but c/o severe dyspnea. She does not require further P.T. and can return home once medically stable. Patient does not require further skilled intervention at this level of care. PLAN :  Recommendations and Planned Interventions:   No formal PT needs identified at this time. Discharge Recommendations: None  Further Equipment Recommendations for Discharge: Pt would like an upright walker. SUBJECTIVE:   Patient stated i'd like to have an upright walker.     OBJECTIVE DATA SUMMARY:     Past Medical History:   Diagnosis Date    Arthritis     spinal stenosis    Breast CA ([facility] [address] 2017    Left Breast (chemo/radiation)    Diabetes ([facility] [address]     Type 2    Edema     legs and ankles    GERD (gastroesophageal reflux disease)     High cholesterol     Hypertension     Ill-defined condition     patient states hemorrhage behind Left eye-following Dr. Ayse Cochran    MI (myocardial infarction) ([facility] [address] 06/2018    per patient    Neuropathy     Restless leg      Past Surgical History:   Procedure Laterality Date    HX BACK SURGERY  08/30/2018    HX BREAST BIOPSY Left 02/03/2017    BREAST CANCER    HX BREAST LUMPECTOMY Left     HX CATARACT REMOVAL Right     HX HEART CATHETERIZATION  07/2018    no stents    HX KNEE REPLACEMENT Barriers to Learning/Limitations: None  Compensate with: N/A  Home Situation:   Home Situation  Home Environment: Private residence  # Steps to Enter: 2  Rails to Enter: Yes  Hand Rails : Bilateral  Wheelchair Ramp: Yes  One/Two Story Residence: One story  Living Alone: No  Support Systems: Spouse/Significant Other/Partner  Patient Expects to be Discharged to[de-identified] Private residence  Current DME Used/Available at Home: [address] [facility] [place]  Critical Behavior:  Neurologic State: Alert  Orientation Level: Oriented X4        Strength:    Strength: Within functional limits     Tone & Sensation:     Sensation: Intact     Range Of Motion:   AROM: Within functional limits     Posture:  Posture (WDL): Within defined limits      Functional Mobility:  Bed Mobility:  Rolling: Independent  Supine to Sit: Independent  Sit to Supine: Independent  Scooting: Independent  Transfers:  Sit to Stand: Independent  Stand to Sit: Independent    Balance:   Sitting: Intact  Standing: Intact  Ambulation/Gait Training:  Distance (ft): 60 Feet (ft)     Ambulation - Level of Assistance: Independent     Gait Description (WDL): Exceptions to WDL             Pain:  Pain level pre-treatment: 6/10   Pain level post-treatment: 6/10  Location: Low back, which is chronic    Activity Tolerance:   Good  Please refer to the flowsheet for vital signs taken during this treatment. After treatment:   []         Patient left in no apparent distress sitting up in chair  [x]         Patient left in no apparent distress in bed  [x]         Call bell left within reach  []         Nursing notified  []         Caregiver present  []         Bed alarm activated  []         SCDs applied    COMMUNICATION/EDUCATION:   [x]         Role of Physical Therapy in the acute care setting. []         Fall prevention education was provided and the patient/caregiver indicated understanding. []         Patient/family have participated as able in goal setting and plan of care.   [] Patient/family agree to work toward stated goals and plan of care. []         Patient understands intent and goals of therapy, but is neutral about his/her participation. []         Patient is unable to participate in goal setting/plan of care: ongoing with therapy staff.  []         Other:     Thank you for this referral.  Micaela Khan, PT, DPT   Time Calculation: 18 mins

## 2020-10-02 NOTE — PROGRESS NOTES
Hospitalist Progress Note             Date of Service:  10/2/2020  NAME:  Nellie Aquino  :  1955  MRN:  [id number]      Admission Summary:   Admitted for hyoxia    Interval history / Subjective:    no complaints, no sob     Assessment & Plan:   Acute respiratory failure with hypoxia (HCC)  In the ED, oxygen saturations dropped to the mid 80s when not on O2. Currently requiring 2 L NC O2 to keep O2 saturation above 88%. RT following  Echo-p  Secondary to likely COVID-19 active infection. -cxr- Bilateral interstitial and parenchymal opacities. May represent atypical  infection.     COVID-19  Currently pending. Continue droplet plus precautions. Patient with significant exposure to doctors office within the last 1 week where Nursing staff unknowingly were COVID-19 positive. Patient was informed to 2 to 3 days after her doctor's visit. Continue azithromycin, Decadron and vitamins. Dc zosyn- cont augmentin     Type 2 diabetes mellitus with hyperglycemia, with long-term current use of insulin ([address]- terrible control today  Secondary to Decadron use and acute infection. - also unclear how home control is, awaiting a1c- 8.6 (moderate control, not at goal)  - increase to 45 units bid, increase lispro to 25 tid ac     Acute renal failure (ARF) (HCC)  On chronic renal failure stage 2  baseline 1.3-1.5  Check UA and urine Na  Increase fluids, recheck in am        HTN (hypertension)  Continue metoprolol.     Diabetic neuropathy (HCC)  Continue gabapentin.     DVT prophylaxis  Heparin SC 3 times daily.         Code status: full  DVT prophylaxis: hep sq    Hospital Problems  Date Reviewed: 2020          Codes Class Noted POA    COVID-19 ICD-10-CM: U07.1  ICD-9-CM: 079.89  10/1/2020 Unknown        HTN (hypertension) ICD-10-CM: I10  ICD-9-CM: 401.9  10/1/2020 Unknown        Diabetic neuropathy ([address] ICD-10-CM: E11.40  ICD-9-CM: 250.60, 357.2  10/1/2020 Unknown        DVT prophylaxis ICD-10-CM: Z29.9  ICD-9-CM: V07.9  10/1/2020 Unknown        Acute respiratory failure with hypoxia [facility]) ICD-10-CM: J96.01  ICD-9-CM: 518.81  9/30/2020         Acute renal failure (ARF) ([facility]) ICD-10-CM: N17.9  ICD-9-CM: 584.9  9/30/2020 Unknown        Type 2 diabetes mellitus with hyperglycemia, with long-term current use of insulin ([facility]) ICD-10-CM: E11.65, Z79.4  ICD-9-CM: 250.00, 790.29, V58.67  9/30/2020 Unknown        * (Principal) CAP (community acquired pneumonia) ICD-10-CM: J18.9  ICD-9-CM: 392  9/1/2018 Yes                Review of Systems:   A comprehensive review of systems was negative except for that written in the HPI. Vital Signs:    Last 24hrs VS reviewed since prior progress note. Most recent are:  Visit Vitals  BP (!) 144/75   Pulse 77   Temp 98.2 °F (36.8 °C)   Resp 22   Ht 5' 3\" (1.6 m)   Wt 104.3 kg (230 lb)   SpO2 98%   Breastfeeding No   BMI 40.74 kg/m²         Intake/Output Summary (Last 24 hours) at 10/2/2020 1134  Last data filed at 10/1/2020 1836  Gross per 24 hour   Intake 600 ml   Output --   Net 600 ml        Physical Examination:             General:          Alert, cooperative, no distress, appears stated age. HEENT:           Atraumatic, anicteric sclerae, pink conjunctivae                          No oral ulcers, mucosa moist, throat clear, dentition fair  Neck:               Supple, symmetrical  Lungs:             Clear to auscultation bilaterally. No Wheezing or Rhonchi. No rales. Chest wall:      No tenderness  No Accessory muscle use. Heart:              Regular  rhythm,  No  murmur   No edema  Abdomen:        Soft, non-tender. Not distended. Bowel sounds normal  Extremities:     No cyanosis. No clubbing,                            Skin turgor normal, Capillary refill normal  Skin:                Not pale. Not Jaundiced  No rashes   Psych:             Not anxious or agitated.   Neurologic: Alert, moves all extremities, answers questions appropriately and responds to commands        Data Review:    Review and/or order of clinical lab test  Review and/or order of tests in the radiology section of CPT  Review and/or order of tests in the medicine section of CPT      Labs:     Recent Labs     10/02/20  0500 10/01/20  0230   WBC 20.1* 15.5*   HGB 8.9* 9.5*   HCT 30.4* 31.6*   * 437*     Recent Labs     10/02/20  0500 10/01/20  0230 09/30/20 1925    136 139   K 4.7 4.7 4.0    102 101   CO2 25 25 29   BUN 41* 21 17   CREA 2.10* 1.90* 1.70*   * 389* 178*   CA 8.2* 8.7 9.1   MG 2.5 2.5 2.1   PHOS  --   --  3.3     Recent Labs     09/30/20 1925   ALT 14   AP 66   TBILI 0.3   TP 7.9   ALB 3.4*   GLOB 4.5     Recent Labs     09/30/20 1925   INR 1.0   PTP 12.3   APTT 25.4      Recent Labs     09/30/20 1925   FERR 39      No results found for: FOL, RBCF   No results for input(s): PH, PCO2, PO2 in the last 72 hours.   Recent Labs     09/30/20 1925   TROIQ 0.02     No results found for: CHOL, CHOLX, CHLST, CHOLV, HDL, HDLP, LDL, LDLC, DLDLP, TGLX, TRIGL, TRIGP, CHHD, CHHDX  Lab Results   Component Value Date/Time    Glucose (POC) 398 (H) 10/02/2020 11:25 AM    Glucose (POC) 320 (H) 10/02/2020 08:03 AM    Glucose (POC) 311 (H) 10/01/2020 08:54 PM    Glucose (POC) 367 (H) 10/01/2020 03:58 PM    Glucose (POC) 500 (HH) 10/01/2020 11:08 AM     Lab Results   Component Value Date/Time    Color YELLOW 09/03/2018 06:35 PM    Appearance CLEAR 09/03/2018 06:35 PM    Specific gravity 1.024 09/03/2018 06:35 PM    pH (UA) 5.0 09/03/2018 06:35 PM    Protein NEGATIVE  09/03/2018 06:35 PM    Glucose >1,000 (A) 09/03/2018 06:35 PM    Ketone NEGATIVE  09/03/2018 06:35 PM    Bilirubin NEGATIVE  09/03/2018 06:35 PM    Urobilinogen 0.2 09/03/2018 06:35 PM    Nitrites NEGATIVE  09/03/2018 06:35 PM    Leukocyte Esterase NEGATIVE  09/03/2018 06:35 PM    Epithelial cells 1+ 09/03/2018 06:35 PM    Bacteria 1+ (A) 09/03/2018 06:35 PM    WBC 0 to 2 09/03/2018 06:35 PM    RBC 0 to 2 09/03/2018 06:35 PM         Medications Reviewed:     Current Facility-Administered Medications   Medication Dose Route Frequency    amoxicillin-clavulanate (AUGMENTIN) 875-125 mg per tablet 1 Tab  1 Tab Oral BID WITH MEALS    amLODIPine (NORVASC) tablet 10 mg  10 mg Oral DAILY    atorvastatin (LIPITOR) tablet 40 mg  40 mg Oral QHS    metoprolol tartrate (LOPRESSOR) tablet 50 mg  50 mg Oral DAILY    tamsulosin (FLOMAX) capsule 0.4 mg  0.4 mg Oral DAILY    acetaminophen (TYLENOL) tablet 650 mg  650 mg Oral Q6H PRN    calcium carbonate (TUMS) chewable tablet 200 mg [elemental]  200 mg Oral TID PRN    ondansetron (ZOFRAN ODT) tablet 4 mg  4 mg Oral Q8H PRN    gabapentin (NEURONTIN) capsule 600 mg  600 mg Oral BID    guaiFENesin ER (MUCINEX) tablet 600 mg  600 mg Oral Q12H    budesonide-formoteroL (SYMBICORT) 160-4.5 mcg/actuation HFA inhaler 2 Puff  2 Puff Inhalation BID RT    pantoprazole (PROTONIX) tablet 40 mg  40 mg Oral ACB    insulin glargine (LANTUS) injection 10 Units  10 Units SubCUTAneous DAILY PRN    LORazepam (ATIVAN) tablet 0.5 mg  0.5 mg Oral Q8H PRN    venlafaxine (EFFEXOR) tablet 37.5 mg  37.5 mg Oral BID WITH MEALS    insulin glargine (LANTUS) injection 45 Units  45 Units SubCUTAneous DAILY    insulin lispro (HUMALOG) injection 15 Units  15 Units SubCUTAneous TID WITH MEALS    insulin glargine (LANTUS) injection 20 Units  20 Units SubCUTAneous QHS    timolol (TIMOPTIC) 0.5 % ophthalmic solution 1 Drop  1 Drop Both Eyes DAILY    sodium chloride (NS) flush 5-40 mL  5-40 mL IntraVENous Q8H    sodium chloride (NS) flush 5-40 mL  5-40 mL IntraVENous PRN    acetaminophen (TYLENOL) suppository 650 mg  650 mg Rectal Q6H PRN    heparin (porcine) injection 5,000 Units  5,000 Units SubCUTAneous Q8H    dexAMETHasone (DECADRON) tablet 6 mg  6 mg Oral DAILY    azithromycin (ZITHROMAX) 500 mg in 0.9% sodium chloride 250 mL (VIAL-MATE)  500 mg IntraVENous Q24H    insulin lispro (HUMALOG) injection   SubCUTAneous AC&HS    0.9% sodium chloride infusion  50 mL/hr IntraVENous CONTINUOUS    albuterol (PROVENTIL HFA, VENTOLIN HFA, PROAIR HFA) inhaler 2 Puff  2 Puff Inhalation QID RT    albuterol (PROVENTIL HFA, VENTOLIN HFA, PROAIR HFA) inhaler 2 Puff  2 Puff Inhalation Q4H PRN     ______________________________________________________________________  EXPECTED LENGTH OF STAY: - - -  ACTUAL LENGTH OF STAY:          2                 Mino Cameron MD

## 2020-10-02 NOTE — PROGRESS NOTES
Pt resting after having lunch. Continues on 2L 02 . Maintaining sats at 95%. Fluids infusing without difficulty. Pt ambulates to bathroom and continues to endorse SOB w/ exertion. No nausea or pain reported. PO fluids refreshed. Denies additional needs at this time. Bed locked and low. CBWR.

## 2020-10-02 NOTE — PROGRESS NOTES
Problem: Pain  Goal: *Control of Pain  Outcome: Progressing Towards Goal     Problem: Pneumonia: Day 1  Goal: Off Pathway (Use only if patient is Off Pathway)  Outcome: Progressing Towards Goal

## 2020-10-02 NOTE — PROGRESS NOTES
Patient called stating IV had been pulled out by accident. IV in the floor. New 22g placed in R wrist. CBWR.

## 2020-10-02 NOTE — PROGRESS NOTES
Rec'd care of pt. Pt rec'd resting on L lateral side awake. Alert and oriented and in NAD. VS and assessment completed. Pt stable on 2L 02 via NC satting at 96%. Azithromycin infusing with no s/s infiltration. Pt denies pain. Endorses nausea. Will medicate and reassess. Pt SOB w/ exertion. NSR on tele. Leads reinforced. PO fluids provided. No other needs or concerns voiced at this time. Bed locked and low. CBWR.

## 2020-10-02 NOTE — PROGRESS NOTES
Patient is  and uses a cane at home. She states she would like to have a rolling walker at discharge. She will need a PT consult, will let MD know. She plans to return back home at discharge. CM to follow.

## 2020-10-03 LAB
ANION GAP SERPL CALC-SCNC: 8 MMOL/L
APPEARANCE UR: CLEAR
BACTERIA URNS QL MICRO: ABNORMAL /HPF
BASOPHILS # BLD: 0 K/UL
BASOPHILS NFR BLD: 0 %
BILIRUB UR QL: NEGATIVE
BUN SERPL-MCNC: 52 MG/DL (ref 9–21)
BUN/CREAT SERPL: 24
CA-I BLD-MCNC: 8.8 MG/DL (ref 8.5–10.5)
CHLORIDE SERPL-SCNC: 107 MMOL/L (ref 94–111)
CO2 SERPL-SCNC: 22 MMOL/L (ref 21–33)
COLLECT DATE STL: NEGATIVE
COLOR UR: ABNORMAL
CREAT SERPL-MCNC: 2.2 MG/DL (ref 0.7–1.2)
D DIMER PPP FEU-MCNC: 1.71 UG/ML(FEU)
DIFFERENTIAL METHOD BLD: ABNORMAL
EOSINOPHIL # BLD: 0 K/UL
EOSINOPHIL NFR BLD: 0 %
EPITH CASTS URNS QL MICRO: ABNORMAL /LPF (ref 0–20)
ERYTHROCYTE [DISTWIDTH] IN BLOOD BY AUTOMATED COUNT: 17.4 % (ref 11.6–14.5)
GLUCOSE BLD STRIP.AUTO-MCNC: 103 MG/DL (ref 70–110)
GLUCOSE BLD STRIP.AUTO-MCNC: 105 MG/DL (ref 70–110)
GLUCOSE BLD STRIP.AUTO-MCNC: 170 MG/DL (ref 70–110)
GLUCOSE BLD STRIP.AUTO-MCNC: 263 MG/DL (ref 70–110)
GLUCOSE SERPL-MCNC: 334 MG/DL (ref 70–110)
GLUCOSE UR STRIP.AUTO-MCNC: NEGATIVE MG/DL
HCT VFR BLD AUTO: 30.9 % (ref 35–45)
HEMOCCULT SP1 STL QL: NEGATIVE
HGB BLD-MCNC: 8.9 G/DL (ref 12–16)
HGB UR QL STRIP: ABNORMAL
IMM GRANULOCYTES # BLD AUTO: 0 K/UL
IMM GRANULOCYTES NFR BLD AUTO: 0 %
KETONES UR QL STRIP.AUTO: NEGATIVE MG/DL
LEUKOCYTE ESTERASE UR QL STRIP.AUTO: NEGATIVE
LYMPHOCYTES # BLD: 1.9 K/UL
LYMPHOCYTES NFR BLD: 10 %
MAGNESIUM SERPL-MCNC: 2.7 MG/DL (ref 1.7–2.8)
MCH RBC QN AUTO: 23.3 PG (ref 24–34)
MCHC RBC AUTO-ENTMCNC: 28.8 G/DL (ref 31–37)
MCV RBC AUTO: 80.9 FL (ref 74–97)
MONOCYTES # BLD: 0 K/UL
MONOCYTES NFR BLD: 0 %
NEUTS SEG # BLD: 16.6 K/UL
NEUTS SEG NFR BLD: 90 %
NITRITE UR QL STRIP.AUTO: NEGATIVE
PERFORMED BY, TECHID: ABNORMAL
PERFORMED BY, TECHID: ABNORMAL
PERFORMED BY, TECHID: NORMAL
PERFORMED BY, TECHID: NORMAL
PH UR STRIP: 5 [PH] (ref 5–9)
PLATELET # BLD AUTO: 535 K/UL (ref 135–420)
PMV BLD AUTO: 9.2 FL (ref 9.2–11.8)
POTASSIUM SERPL-SCNC: 4.7 MMOL/L (ref 3.2–5.1)
PROCALCITONIN SERPL-MCNC: 0.06 NG/ML (ref 0.5–2)
PROT UR STRIP-MCNC: 30 MG/DL
RBC # BLD AUTO: 3.82 M/UL (ref 4.2–5.3)
RBC #/AREA URNS HPF: ABNORMAL /HPF (ref 0–2)
RBC MORPH BLD: ABNORMAL
RETICS # AUTO: 0.08 M/UL
RETICS/RBC NFR AUTO: 2.2 %
SODIUM SERPL-SCNC: 137 MMOL/L (ref 135–145)
SODIUM UR-SCNC: 113 MMOL/L (ref 20–287)
SP GR UR REFRACTOMETRY: 1.01 (ref 1–1.03)
UROBILINOGEN UR QL STRIP.AUTO: 0.2 EU/DL (ref 0.2–1)
WBC # BLD AUTO: 18.5 K/UL (ref 4.6–13.2)
WBC URNS QL MICRO: ABNORMAL /HPF (ref 0–4)

## 2020-10-03 PROCEDURE — 74011250636 HC RX REV CODE- 250/636: Performed by: NURSE PRACTITIONER

## 2020-10-03 PROCEDURE — 86060 ANTISTREPTOLYSIN O TITER: CPT

## 2020-10-03 PROCEDURE — 82272 OCCULT BLD FECES 1-3 TESTS: CPT

## 2020-10-03 PROCEDURE — 74011250637 HC RX REV CODE- 250/637: Performed by: INTERNAL MEDICINE

## 2020-10-03 PROCEDURE — 74011636637 HC RX REV CODE- 636/637: Performed by: NURSE PRACTITIONER

## 2020-10-03 PROCEDURE — 82962 GLUCOSE BLOOD TEST: CPT

## 2020-10-03 PROCEDURE — 84145 PROCALCITONIN (PCT): CPT

## 2020-10-03 PROCEDURE — 81001 URINALYSIS AUTO W/SCOPE: CPT

## 2020-10-03 PROCEDURE — 94761 N-INVAS EAR/PLS OXIMETRY MLT: CPT

## 2020-10-03 PROCEDURE — 74011636637 HC RX REV CODE- 636/637: Performed by: INTERNAL MEDICINE

## 2020-10-03 PROCEDURE — 74011250636 HC RX REV CODE- 250/636: Performed by: INTERNAL MEDICINE

## 2020-10-03 PROCEDURE — 83735 ASSAY OF MAGNESIUM: CPT

## 2020-10-03 PROCEDURE — 74011250637 HC RX REV CODE- 250/637: Performed by: NURSE PRACTITIONER

## 2020-10-03 PROCEDURE — 65270000029 HC RM PRIVATE

## 2020-10-03 PROCEDURE — 74011000250 HC RX REV CODE- 250: Performed by: NURSE PRACTITIONER

## 2020-10-03 PROCEDURE — 80048 BASIC METABOLIC PNL TOTAL CA: CPT

## 2020-10-03 PROCEDURE — 85379 FIBRIN DEGRADATION QUANT: CPT

## 2020-10-03 PROCEDURE — 94760 N-INVAS EAR/PLS OXIMETRY 1: CPT

## 2020-10-03 PROCEDURE — 94640 AIRWAY INHALATION TREATMENT: CPT

## 2020-10-03 PROCEDURE — 85045 AUTOMATED RETICULOCYTE COUNT: CPT

## 2020-10-03 PROCEDURE — 83540 ASSAY OF IRON: CPT

## 2020-10-03 PROCEDURE — 85025 COMPLETE CBC W/AUTO DIFF WBC: CPT

## 2020-10-03 PROCEDURE — 77010033678 HC OXYGEN DAILY

## 2020-10-03 RX ORDER — FUROSEMIDE 40 MG/1
40 TABLET ORAL ONCE
Status: COMPLETED | OUTPATIENT
Start: 2020-10-03 | End: 2020-10-03

## 2020-10-03 RX ORDER — INSULIN GLARGINE 100 [IU]/ML
50 INJECTION, SOLUTION SUBCUTANEOUS
Status: DISCONTINUED | OUTPATIENT
Start: 2020-10-03 | End: 2020-10-04 | Stop reason: HOSPADM

## 2020-10-03 RX ORDER — FUROSEMIDE 10 MG/ML
40 INJECTION INTRAMUSCULAR; INTRAVENOUS
Status: COMPLETED | OUTPATIENT
Start: 2020-10-03 | End: 2020-10-03

## 2020-10-03 RX ORDER — INSULIN GLARGINE 100 [IU]/ML
50 INJECTION, SOLUTION SUBCUTANEOUS DAILY
Status: DISCONTINUED | OUTPATIENT
Start: 2020-10-04 | End: 2020-10-04 | Stop reason: HOSPADM

## 2020-10-03 RX ORDER — GABAPENTIN 300 MG/1
600 CAPSULE ORAL EVERY 12 HOURS
Status: DISCONTINUED | OUTPATIENT
Start: 2020-10-03 | End: 2020-10-04 | Stop reason: HOSPADM

## 2020-10-03 RX ADMIN — INSULIN LISPRO 30 UNITS: 100 INJECTION, SOLUTION INTRAVENOUS; SUBCUTANEOUS at 11:51

## 2020-10-03 RX ADMIN — TIMOLOL MALEATE 1 DROP: 5 SOLUTION/ DROPS OPHTHALMIC at 09:37

## 2020-10-03 RX ADMIN — FUROSEMIDE 40 MG: 40 TABLET ORAL at 09:33

## 2020-10-03 RX ADMIN — ALBUTEROL SULFATE 2 PUFF: 108 INHALANT RESPIRATORY (INHALATION) at 15:31

## 2020-10-03 RX ADMIN — INSULIN LISPRO 30 UNITS: 100 INJECTION, SOLUTION INTRAVENOUS; SUBCUTANEOUS at 16:31

## 2020-10-03 RX ADMIN — HEPARIN SODIUM 5000 UNITS: 5000 INJECTION INTRAVENOUS; SUBCUTANEOUS at 13:01

## 2020-10-03 RX ADMIN — METOPROLOL TARTRATE 50 MG: 50 TABLET, FILM COATED ORAL at 09:33

## 2020-10-03 RX ADMIN — SODIUM CHLORIDE 100 ML/HR: 9 INJECTION, SOLUTION INTRAVENOUS at 01:36

## 2020-10-03 RX ADMIN — TAMSULOSIN HYDROCHLORIDE 0.4 MG: 0.4 CAPSULE ORAL at 09:34

## 2020-10-03 RX ADMIN — INSULIN LISPRO 30 UNITS: 100 INJECTION, SOLUTION INTRAVENOUS; SUBCUTANEOUS at 07:44

## 2020-10-03 RX ADMIN — ATORVASTATIN CALCIUM 40 MG: 40 TABLET, FILM COATED ORAL at 22:56

## 2020-10-03 RX ADMIN — ACETAMINOPHEN 650 MG: 325 TABLET ORAL at 13:01

## 2020-10-03 RX ADMIN — ALBUTEROL SULFATE 2 PUFF: 108 INHALANT RESPIRATORY (INHALATION) at 20:34

## 2020-10-03 RX ADMIN — SODIUM CHLORIDE: 9 INJECTION, SOLUTION INTRAVENOUS at 22:54

## 2020-10-03 RX ADMIN — ALBUTEROL SULFATE 2 PUFF: 108 INHALANT RESPIRATORY (INHALATION) at 08:22

## 2020-10-03 RX ADMIN — GUAIFENESIN 600 MG: 600 TABLET, EXTENDED RELEASE ORAL at 22:56

## 2020-10-03 RX ADMIN — HEPARIN SODIUM 5000 UNITS: 5000 INJECTION INTRAVENOUS; SUBCUTANEOUS at 22:55

## 2020-10-03 RX ADMIN — INSULIN LISPRO 6 UNITS: 100 INJECTION, SOLUTION INTRAVENOUS; SUBCUTANEOUS at 08:07

## 2020-10-03 RX ADMIN — PANTOPRAZOLE SODIUM 40 MG: 40 TABLET, DELAYED RELEASE ORAL at 07:43

## 2020-10-03 RX ADMIN — FUROSEMIDE 40 MG: 10 INJECTION, SOLUTION INTRAMUSCULAR; INTRAVENOUS at 06:15

## 2020-10-03 RX ADMIN — Medication 10 ML: at 14:46

## 2020-10-03 RX ADMIN — AMLODIPINE BESYLATE 10 MG: 5 TABLET ORAL at 09:33

## 2020-10-03 RX ADMIN — DEXAMETHASONE 6 MG: 4 TABLET ORAL at 09:34

## 2020-10-03 RX ADMIN — ALBUTEROL SULFATE 2 PUFF: 108 INHALANT RESPIRATORY (INHALATION) at 11:52

## 2020-10-03 RX ADMIN — GUAIFENESIN 600 MG: 600 TABLET, EXTENDED RELEASE ORAL at 09:34

## 2020-10-03 RX ADMIN — AMOXICILLIN AND CLAVULANATE POTASSIUM 1 TABLET: 875; 125 TABLET, FILM COATED ORAL at 08:08

## 2020-10-03 RX ADMIN — HEPARIN SODIUM 5000 UNITS: 5000 INJECTION INTRAVENOUS; SUBCUTANEOUS at 06:15

## 2020-10-03 RX ADMIN — INSULIN LISPRO 3 UNITS: 100 INJECTION, SOLUTION INTRAVENOUS; SUBCUTANEOUS at 13:00

## 2020-10-03 RX ADMIN — INSULIN GLARGINE 50 UNITS: 100 INJECTION, SOLUTION SUBCUTANEOUS at 22:56

## 2020-10-03 RX ADMIN — VENLAFAXINE 37.5 MG: 37.5 TABLET ORAL at 08:08

## 2020-10-03 RX ADMIN — VENLAFAXINE 37.5 MG: 37.5 TABLET ORAL at 16:31

## 2020-10-03 RX ADMIN — GABAPENTIN 600 MG: 300 CAPSULE ORAL at 22:55

## 2020-10-03 RX ADMIN — GABAPENTIN 600 MG: 300 CAPSULE ORAL at 09:34

## 2020-10-03 RX ADMIN — BUDESONIDE AND FORMOTEROL FUMARATE DIHYDRATE 2 PUFF: 160; 4.5 AEROSOL RESPIRATORY (INHALATION) at 08:00

## 2020-10-03 RX ADMIN — LORAZEPAM 0.5 MG: 0.5 TABLET ORAL at 09:45

## 2020-10-03 RX ADMIN — AMOXICILLIN AND CLAVULANATE POTASSIUM 1 TABLET: 875; 125 TABLET, FILM COATED ORAL at 16:31

## 2020-10-03 RX ADMIN — BUDESONIDE AND FORMOTEROL FUMARATE DIHYDRATE 2 PUFF: 160; 4.5 AEROSOL RESPIRATORY (INHALATION) at 20:34

## 2020-10-03 NOTE — PROGRESS NOTES
Alerted this RN, SOB walking in room bed to nbathroom and return, requested to increase O2, increased  To 2l/min nasal prong. No furth c/o Dyspnea  With increase of O2 and @ rest. Continuous monitoring NSR HR 70's. Plan : update medical team, may need to evaluate, Walking pulse oximeter determing O2 detmands on discharge to home. Easily gets winded with motility, however easily recovers.

## 2020-10-03 NOTE — PROGRESS NOTES
Late Entry: Bedside and Verbal shift change report given to Oscar Odonnell RN (oncoming nurse) by Olga Lidia Angel LPN @ 3099  (offgoing nurse). Report included the following information SBAR, Kardex, Intake/Output, MAR, Recent Results, Med Rec Status, Cardiac Rhythm sinus rhythm, Alarm Parameters , Quality Measures and Dual Neuro Assessment.

## 2020-10-03 NOTE — PROGRESS NOTES
Bedside and Verbal shift change report given to Trini Carl (oncoming nurse) by Pete Martinez RN (offgoing nurse). Report included the following information SBAR, Kardex, Intake/Output, MAR, Recent Results, Med Rec Status, Alarm Parameters , Quality Measures and Dual Neuro Assessment.

## 2020-10-03 NOTE — PROGRESS NOTES
Dr. Iliana Alcantar, updated current patient status, primary nurse recommendations re: Insulin, very resistent added, and wallking oximetry to determine  O2 needs prior to d/c home. See new orders.

## 2020-10-03 NOTE — PROGRESS NOTES
Discussion with Hospitalist,  Ze Republic, NP. regarding current patient status. Plan: stop IVF continuous, Lasix IVP now.

## 2020-10-03 NOTE — PROGRESS NOTES
Progress Note    Patient: Gabino Yuen MRN: [id number]  SSN: [id number]    YOB: 1955  Age: 63 y.o. Sex: female      Admit Date: 9/30/2020       Assessment and Plan:     1. Suspected covid / ? PNA  - continue dexamethazone  - continue augmentin for possible pna to complete 10 days  - check cbc in am  - add incentive spirometry  - wean off of oxygen    2. KANIKA  - I suspect this is chronic but will order workup. Her cr has not improved. Will recheck tomorrow. - check labs although some will be off due to lasix! !  - will need op renal f/u    3. Fluid overload  - will give one more dose of lasix  - check bmp  - clinically improving    4. Uncontrolled DM  - increase lantus to 50 bid  - change ssi to high dose    5. Anemia  - recheck cbc. ? Due to ckd???  - guaic stools  - check iron levels    Total time spent with this encounter was greater than 35 minutes more than 50% was spent in counseling and coordination of care        Subjective: The patient was complaining of worsening shortness of breath and worse leg edema last night received a 40 mg dose of Lasix. Within 5 minutes her symptoms had resolved and she now reports she is breathing better than she has since she is gotten here. Her IV fluids were also discontinued. She reports she would like to go home as soon as possible she has no chest pain no new shortness of breath no cough no nausea no vomiting no diarrhea. Her leg edema has also improved.         Current Facility-Administered Medications   Medication Dose Route Frequency    amoxicillin-clavulanate (AUGMENTIN) 875-125 mg per tablet 1 Tab  1 Tab Oral BID WITH MEALS    insulin glargine (LANTUS) injection 45 Units  45 Units SubCUTAneous QHS    insulin lispro (HUMALOG) injection 30 Units  30 Units SubCUTAneous TID WITH MEALS    amLODIPine (NORVASC) tablet 10 mg  10 mg Oral DAILY    atorvastatin (LIPITOR) tablet 40 mg  40 mg Oral QHS    metoprolol tartrate (LOPRESSOR) tablet 50 mg  50 mg Oral DAILY    tamsulosin (FLOMAX) capsule 0.4 mg  0.4 mg Oral DAILY    acetaminophen (TYLENOL) tablet 650 mg  650 mg Oral Q6H PRN    calcium carbonate (TUMS) chewable tablet 200 mg [elemental]  200 mg Oral TID PRN    ondansetron (ZOFRAN ODT) tablet 4 mg  4 mg Oral Q8H PRN    gabapentin (NEURONTIN) capsule 600 mg  600 mg Oral BID    guaiFENesin ER (MUCINEX) tablet 600 mg  600 mg Oral Q12H    budesonide-formoteroL (SYMBICORT) 160-4.5 mcg/actuation HFA inhaler 2 Puff  2 Puff Inhalation BID RT    pantoprazole (PROTONIX) tablet 40 mg  40 mg Oral ACB    LORazepam (ATIVAN) tablet 0.5 mg  0.5 mg Oral Q8H PRN    venlafaxine (EFFEXOR) tablet 37.5 mg  37.5 mg Oral BID WITH MEALS    insulin glargine (LANTUS) injection 45 Units  45 Units SubCUTAneous DAILY    timolol (TIMOPTIC) 0.5 % ophthalmic solution 1 Drop  1 Drop Both Eyes DAILY    sodium chloride (NS) flush 5-40 mL  5-40 mL IntraVENous Q8H    sodium chloride (NS) flush 5-40 mL  5-40 mL IntraVENous PRN    acetaminophen (TYLENOL) suppository 650 mg  650 mg Rectal Q6H PRN    heparin (porcine) injection 5,000 Units  5,000 Units SubCUTAneous Q8H    dexAMETHasone (DECADRON) tablet 6 mg  6 mg Oral DAILY    azithromycin (ZITHROMAX) 500 mg in 0.9% sodium chloride 250 mL (VIAL-MATE)  500 mg IntraVENous Q24H    insulin lispro (HUMALOG) injection   SubCUTAneous AC&HS    albuterol (PROVENTIL HFA, VENTOLIN HFA, PROAIR HFA) inhaler 2 Puff  2 Puff Inhalation QID RT    albuterol (PROVENTIL HFA, VENTOLIN HFA, PROAIR HFA) inhaler 2 Puff  2 Puff Inhalation Q4H PRN        Vitals:    10/03/20 0400 10/03/20 0750 10/03/20 0800 10/03/20 0824   BP: (!) 147/74 (!) 140/75     Pulse: 68 69 69    Resp: 24 20     Temp: 97.8 °F (36.6 °C) 97.5 °F (36.4 °C)     SpO2: 94%   96%   Weight:       Height:         Objective:   General: alert awake well-oriented, no acute distress.   HEENT: EOMI, no Icterus, no Pallor,  mucosa moist.  Neck: Neck is supple, No JVD  Lungs: breathsounds normal, no respiratory distress on inspection, no rhonchi, no rales,   CVS: heart sounds normal, regular rate and rhythm, no murmurs, no rubs. GI: soft, nontender, normal BS. Extremeties: no cyanosis, no edema,   Neuro: Alert, awake, oriented x3, No new focal deficits, moving all extremeties well. Skin: normal skin turgor, no skin rashes. Intake and Output:  Current Shift: No intake/output data recorded. Last three shifts: 10/01 1901 - 10/03 0700  In: 5748.3 [P.O.:720;  I.V.:5028.3]  Out: -       Lab/Data Review:  Recent Results (from the past 24 hour(s))   GLUCOSE, POC    Collection Time: 10/02/20 11:25 AM   Result Value Ref Range    Glucose (POC) 398 (H) 70 - 110 mg/dL    Performed by Stephanie Riggs, POC    Collection Time: 10/02/20  4:06 PM   Result Value Ref Range    Glucose (POC) 412 (HH) 70 - 110 mg/dL    Performed by Edgardo Barrios    GLUCOSE, POC    Collection Time: 10/02/20 10:36 PM   Result Value Ref Range    Glucose (POC) 432 (HH) 70 - 110 mg/dL    Performed by Jesus Manuel Liz, POC    Collection Time: 10/02/20 10:37 PM   Result Value Ref Range    Glucose (POC) 429 (HH) 70 - 110 mg/dL    Performed by Joaquim Pascal    D DIMER    Collection Time: 10/03/20  4:00 AM   Result Value Ref Range    D DIMER 1.71 (H) <0.46 ug/ml(FEU)   MAGNESIUM    Collection Time: 10/03/20  4:00 AM   Result Value Ref Range    Magnesium 2.7 1.7 - 2.8 mg/dL   PROCALCITONIN    Collection Time: 10/03/20  4:00 AM   Result Value Ref Range    Procalcitonin 0.06 (L) 0.5 - 2.0 ng/mL   CBC WITH AUTOMATED DIFF    Collection Time: 10/03/20  4:00 AM   Result Value Ref Range    WBC 18.5 (H) 4.6 - 13.2 K/uL    RBC 3.82 (L) 4.20 - 5.30 M/uL    HGB 8.9 (L) 12.0 - 16.0 g/dL    HCT 30.9 (L) 35.0 - 45.0 %    MCV 80.9 74.0 - 97.0 FL    MCH 23.3 (L) 24.0 - 34.0 PG    MCHC 28.8 (L) 31.0 - 37.0 g/dL    RDW 17.4 (H) 11.6 - 14.5 %    PLATELET 307 (H) 232 - 420 K/uL    MPV 9.2 9.2 - 11.8 FL    NEUTROPHILS 90 %    LYMPHOCYTES 10 %    MONOCYTES 0 %    EOSINOPHILS 0 %    BASOPHILS 0 %    IMMATURE GRANULOCYTES 0 %    ABS. NEUTROPHILS 16.6 K/UL    ABS. LYMPHOCYTES 1.9 K/UL    ABS. MONOCYTES 0.0 K/UL    ABS. EOSINOPHILS 0.0 K/UL    ABS. BASOPHILS 0.0 K/UL    ABS. IMM.  GRANS. 0.0 K/UL    DF Manual      RBC COMMENTS Hypochromia  1+       METABOLIC PANEL, BASIC    Collection Time: 10/03/20  4:00 AM   Result Value Ref Range    Sodium 137 135 - 145 mmol/L    Potassium 4.7 3.2 - 5.1 mmol/L    Chloride 107 94 - 111 mmol/L    CO2 22 21 - 33 mmol/L    Anion gap 8 mmol/L    Glucose 334 (H) 70 - 110 mg/dL    BUN 52 (H) 9 - 21 mg/dL    Creatinine 2.20 (H) 0.70 - 1.20 mg/dL    BUN/Creatinine ratio 24      GFR est AA 27 ml/min/1.73m2    GFR est non-AA 23 ml/min/1.73m2    Calcium 8.8 8.5 - 10.5 mg/dL   GLUCOSE, POC    Collection Time: 10/03/20  7:53 AM   Result Value Ref Range    Glucose (POC) 263 (H) 70 - 110 mg/dL    Performed by Erin Buckley           Signed By: Yash Ace MD     October 3, 2020

## 2020-10-03 NOTE — PROGRESS NOTES
In patient room for AM labs and VS with hourly rounding,  Audible wheezing, increased pitting isabell LE bilaterally, abd acessory muscles used, WARD when talking or with activity. Decrease IVF 50 ml/hour, nursing judgment. Plan: Update Hospitalist Ruth Marcello, NP covering current patient status and nurse interventions.

## 2020-10-03 NOTE — PROGRESS NOTES
Patient placed on room air to prepare for walking pulse ox, patient walked to bathroom on room air, after finishing on room air walking out of bathroom sats were 91% patient walked around in room, patient became very winded had some audilble wheezes and patient complained on her knee bothering her, sats remained 90-91% but patient was only able to walk two minutes, patient returned to bed and was left on room air

## 2020-10-04 VITALS
DIASTOLIC BLOOD PRESSURE: 59 MMHG | WEIGHT: 230 LBS | SYSTOLIC BLOOD PRESSURE: 131 MMHG | RESPIRATION RATE: 18 BRPM | BODY MASS INDEX: 40.75 KG/M2 | OXYGEN SATURATION: 94 % | HEIGHT: 63 IN | HEART RATE: 69 BPM | TEMPERATURE: 97.5 F

## 2020-10-04 LAB
ANION GAP SERPL CALC-SCNC: 9 MMOL/L
BASOPHILS # BLD: 0 K/UL
BASOPHILS NFR BLD: 0 %
BUN SERPL-MCNC: 58 MG/DL (ref 9–21)
BUN/CREAT SERPL: 31
CA-I BLD-MCNC: 8.8 MG/DL (ref 8.5–10.5)
CHLORIDE SERPL-SCNC: 107 MMOL/L (ref 94–111)
CO2 SERPL-SCNC: 24 MMOL/L (ref 21–33)
CREAT SERPL-MCNC: 1.9 MG/DL (ref 0.7–1.2)
D DIMER PPP FEU-MCNC: 1.66 UG/ML(FEU)
DIFFERENTIAL METHOD BLD: ABNORMAL
EOSINOPHIL # BLD: 0 K/UL
EOSINOPHIL NFR BLD: 0 %
ERYTHROCYTE [DISTWIDTH] IN BLOOD BY AUTOMATED COUNT: 17.4 % (ref 11.6–14.5)
GLUCOSE BLD STRIP.AUTO-MCNC: 137 MG/DL (ref 70–110)
GLUCOSE BLD STRIP.AUTO-MCNC: 191 MG/DL (ref 70–110)
GLUCOSE BLD STRIP.AUTO-MCNC: 96 MG/DL (ref 70–110)
GLUCOSE SERPL-MCNC: 88 MG/DL (ref 70–110)
HCT VFR BLD AUTO: 31.5 % (ref 35–45)
HGB BLD-MCNC: 9.4 G/DL (ref 12–16)
IMM GRANULOCYTES # BLD AUTO: 0 K/UL
IMM GRANULOCYTES NFR BLD AUTO: 0 %
IRON SATN MFR SERPL: 12 % (ref 20–50)
IRON SERPL-MCNC: 38 UG/DL (ref 35–150)
LYMPHOCYTES # BLD: 1.2 K/UL
LYMPHOCYTES NFR BLD: 7 %
MCH RBC QN AUTO: 23.7 PG (ref 24–34)
MCHC RBC AUTO-ENTMCNC: 29.8 G/DL (ref 31–37)
MCV RBC AUTO: 79.3 FL (ref 74–97)
MONOCYTES # BLD: 0.2 K/UL
MONOCYTES NFR BLD: 1 %
NEUTS BAND NFR BLD MANUAL: 4 %
NEUTS SEG # BLD: 15.2 K/UL
NEUTS SEG NFR BLD: 88 %
PERFORMED BY, TECHID: ABNORMAL
PERFORMED BY, TECHID: ABNORMAL
PERFORMED BY, TECHID: NORMAL
PLATELET # BLD AUTO: 593 K/UL (ref 135–420)
PMV BLD AUTO: 9.4 FL (ref 9.2–11.8)
POTASSIUM SERPL-SCNC: 4.5 MMOL/L (ref 3.2–5.1)
PROCALCITONIN SERPL-MCNC: <0.05 NG/ML (ref 0.5–2)
RBC # BLD AUTO: 3.97 M/UL (ref 4.2–5.3)
RBC MORPH BLD: ABNORMAL
SARS-COV-2, COV2NT: NOT DETECTED
SODIUM SERPL-SCNC: 140 MMOL/L (ref 135–145)
TIBC SERPL-MCNC: 324 UG/DL (ref 250–450)
WBC # BLD AUTO: 16.6 K/UL (ref 4.6–13.2)

## 2020-10-04 PROCEDURE — 84145 PROCALCITONIN (PCT): CPT

## 2020-10-04 PROCEDURE — 74011250637 HC RX REV CODE- 250/637: Performed by: NURSE PRACTITIONER

## 2020-10-04 PROCEDURE — 74011250637 HC RX REV CODE- 250/637: Performed by: INTERNAL MEDICINE

## 2020-10-04 PROCEDURE — 85025 COMPLETE CBC W/AUTO DIFF WBC: CPT

## 2020-10-04 PROCEDURE — 74011250636 HC RX REV CODE- 250/636: Performed by: NURSE PRACTITIONER

## 2020-10-04 PROCEDURE — 94640 AIRWAY INHALATION TREATMENT: CPT

## 2020-10-04 PROCEDURE — 80048 BASIC METABOLIC PNL TOTAL CA: CPT

## 2020-10-04 PROCEDURE — 74011636637 HC RX REV CODE- 636/637: Performed by: INTERNAL MEDICINE

## 2020-10-04 PROCEDURE — 82962 GLUCOSE BLOOD TEST: CPT

## 2020-10-04 PROCEDURE — 36415 COLL VENOUS BLD VENIPUNCTURE: CPT

## 2020-10-04 PROCEDURE — 94760 N-INVAS EAR/PLS OXIMETRY 1: CPT

## 2020-10-04 PROCEDURE — 85379 FIBRIN DEGRADATION QUANT: CPT

## 2020-10-04 RX ORDER — AMOXICILLIN AND CLAVULANATE POTASSIUM 875; 125 MG/1; MG/1
1 TABLET, FILM COATED ORAL 2 TIMES DAILY WITH MEALS
Qty: 6 TAB | Refills: 0 | Status: SHIPPED | OUTPATIENT
Start: 2020-10-04 | End: 2020-11-17 | Stop reason: ALTCHOICE

## 2020-10-04 RX ADMIN — AMLODIPINE BESYLATE 10 MG: 5 TABLET ORAL at 09:12

## 2020-10-04 RX ADMIN — ACETAMINOPHEN 650 MG: 325 TABLET ORAL at 04:47

## 2020-10-04 RX ADMIN — PANTOPRAZOLE SODIUM 40 MG: 40 TABLET, DELAYED RELEASE ORAL at 07:43

## 2020-10-04 RX ADMIN — METOPROLOL TARTRATE 50 MG: 50 TABLET, FILM COATED ORAL at 09:11

## 2020-10-04 RX ADMIN — VENLAFAXINE 37.5 MG: 37.5 TABLET ORAL at 09:14

## 2020-10-04 RX ADMIN — DEXAMETHASONE 6 MG: 4 TABLET ORAL at 09:12

## 2020-10-04 RX ADMIN — GUAIFENESIN 600 MG: 600 TABLET, EXTENDED RELEASE ORAL at 09:15

## 2020-10-04 RX ADMIN — HEPARIN SODIUM 5000 UNITS: 5000 INJECTION INTRAVENOUS; SUBCUTANEOUS at 05:44

## 2020-10-04 RX ADMIN — TIMOLOL MALEATE 1 DROP: 5 SOLUTION/ DROPS OPHTHALMIC at 09:16

## 2020-10-04 RX ADMIN — AMOXICILLIN AND CLAVULANATE POTASSIUM 1 TABLET: 875; 125 TABLET, FILM COATED ORAL at 09:11

## 2020-10-04 RX ADMIN — BUDESONIDE AND FORMOTEROL FUMARATE DIHYDRATE 2 PUFF: 160; 4.5 AEROSOL RESPIRATORY (INHALATION) at 08:21

## 2020-10-04 RX ADMIN — ALBUTEROL SULFATE 2 PUFF: 108 INHALANT RESPIRATORY (INHALATION) at 08:20

## 2020-10-04 RX ADMIN — GABAPENTIN 600 MG: 300 CAPSULE ORAL at 09:11

## 2020-10-04 RX ADMIN — TAMSULOSIN HYDROCHLORIDE 0.4 MG: 0.4 CAPSULE ORAL at 09:11

## 2020-10-04 RX ADMIN — INSULIN LISPRO 30 UNITS: 100 INJECTION, SOLUTION INTRAVENOUS; SUBCUTANEOUS at 08:00

## 2020-10-04 RX ADMIN — INSULIN GLARGINE 50 UNITS: 100 INJECTION, SOLUTION SUBCUTANEOUS at 09:10

## 2020-10-04 NOTE — DISCHARGE INSTRUCTIONS
Patient Education        Learning About ACE Inhibitors and ARBs for Diabetes  Introduction    ACE inhibitors and ARBs are medicines used to control blood pressure. They allow blood vessels to relax and open up. This lowers your blood pressure. When you have diabetes, taking an ACE inhibitor or ARB can help to:  · Treat high blood pressure. Your risk of problems from diabetes goes up when you have high blood pressure. · Prevent or slow kidney damage. Diabetes can damage the blood vessels in the kidneys. High blood pressure can damage the kidneys, too. · Lower the risks of stroke and heart attack. Your risks go up when you have high blood pressure, heart disease, or both. An ACE inhibitor or ARB is a good choice for people with diabetes. Unlike some medicines, these don't affect blood sugar levels. Examples  ACE inhibitors include:  · Benazepril. · Lisinopril. · Ramipril. ARBs include:  · Irbesartan. · Losartan. · Telmisartan. Possible side effects  All medicines can cause side effects. Some side effects of ACE inhibitors include:  · Low blood pressure. You may feel dizzy and weak. · A cough. · High potassium levels. · Swelling of your lips, tongue, or face. If the swelling is severe, you may need treatment right away. Severe swelling can make it hard to breathe, but this is rare. Some side effects of ARBs include:  · Low blood pressure. You may feel dizzy and weak. · High potassium levels. You may have other side effects or reactions not listed here. Check the information that comes with your medicine. What to know about taking this medicine  · Be safe with medicines. Take your medicines exactly as prescribed. Call your doctor if you think you are having a problem with your medicine. · Before starting an ACE inhibitor or ARB, tell your doctor if you:  ? Use a salt substitute. ? Take diuretics or potassium tablets. · These medicines are not safe for pregnancy.  If you are pregnant or planning to be, talk to your doctor about a safe blood pressure medicine. · ACE inhibitors can cause a dry cough. If the cough is bad, talk to your doctor. Switching to an ARB is likely to help. · Taking some medicines together can cause problems. Tell your doctor or pharmacist all the medicines you take. This includes over-the-counter medicines, vitamins, herbal products, and supplements. · You may need regular blood and urine tests. Where can you learn more? Go to http://www.andres.com/  Enter M316 in the search box to learn more about \"Learning About ACE Inhibitors and ARBs for Diabetes. \"  Current as of: December 20, 2019               Content Version: 12.6  © 2863-0075 [employer]. Care instructions adapted under license by [employer] (which disclaims liability or warranty for this information). If you have questions about a medical condition or this instruction, always ask your healthcare professional. [place] [address] any warranty or liability for your use of this information. Patient Education        Learning About ARBs  Introduction     ARBs (angiotensin II receptor blockers) block a hormone that makes blood vessels narrow. As a result, the blood vessels relax and widen. This lowers blood pressure. ARBs also put more water and salt into the urine. This also lowers blood pressure. ARBs can treat:  · High blood pressure. · Coronary artery disease. · Heart failure. They also may be used to help your kidneys when you have diabetes. Examples  · candesartan (Atacand)  · irbesartan (Avapro)  · losartan (Cozaar)  · olmesartan (Benicar)  · valsartan (Diovan)  This is not a complete list of all ARBs. Possible side effects  Side effects may include:  · Low blood pressure. You may feel dizzy and weak. · High potassium levels. You may have other side effects or reactions not listed here. Check the information that comes with your medicine.   What to know about taking this medicine  · ARBs may be used if you had a cough when you tried to take an ACE inhibitor. ARBs are less likely to cause a cough. · You may need regular blood tests. · Take your medicines exactly as prescribed. Call your doctor if you think you are having a problem with your medicine. · Tell your doctor or pharmacist all the medicines you take. This includes over-the-counter medicines, vitamins, herbal products, and supplements. Taking some medicines together can cause problems. · You should not take ARBs if you are pregnant or planning to become pregnant. Where can you learn more? Go to http://www.gray.com/  Enter K212 in the search box to learn more about \"Learning About ARBs. \"  Current as of: December 16, 2019               Content Version: 12.6  © 3936-9799 [employer]. Care instructions adapted under license by [employer] (which disclaims liability or warranty for this information). If you have questions about a medical condition or this instruction, always ask your healthcare professional. [place] [address] any warranty or liability for your use of this information. Patient Education        Learning About COPD and How to Prevent Lung Infections  How do lung infections affect COPD? Lung infections like pneumonia and acute bronchitis are common causes of COPD flare-ups. And people who have COPD are more likely to get these lung infections, especially if they smoke. When you have COPD, it is important to know the symptoms of pneumonia and acute bronchitis and call your doctor if you have them. Symptoms include:  · A cough that brings up more mucus than usual.  · Fever. · Shortness of breath. What can you do to prevent these infections? Stay healthy   · Get a flu shot every year. · Get a pneumococcal vaccine shot. If you have had one before, ask your doctor whether you need another dose.  Two different types of pneumococcal vaccines are recommended for people ages 72 and older. · If you must be around people with colds or the flu, wash your hands often. · Do not smoke. This is the most important step you can take to prevent more damage to your lungs. If you need help quitting, talk to your doctor about stop-smoking programs and medicines. These can increase your chances of quitting for good. · Avoid secondhand smoke, air pollution, and high altitudes. Also avoid cold, dry air and hot, humid air. Stay at home with your windows closed when air pollution is bad. Exercise and eat well   · If your doctor recommends it, get more exercise. Walking is a good choice. Bit by bit, increase the amount you walk every day. Try for at least 30 minutes on most days of the week. · Eat regular, well-balanced meals. Eating right keeps your energy levels up and helps your body fight infection. · Get plenty of rest and sleep. Follow-up care is a key part of your treatment and safety. Be sure to make and go to all appointments, and call your doctor if you are having problems. It's also a good idea to know your test results and keep a list of the medicines you take. Where can you learn more? Go to http://www.gray.com/  Enter G076 in the search box to learn more about \"Learning About COPD and How to Prevent Lung Infections. \"  Current as of: February 24, 2020               Content Version: 12.6  © 2647-0845 [employer]. Care instructions adapted under license by [employer] (which disclaims liability or warranty for this information). If you have questions about a medical condition or this instruction, always ask your healthcare professional. [place] [address] any warranty or liability for your use of this information. Patient Education        Learning About ACE Inhibitors  What are ACE inhibitors?     ACE (angiotensin-converting enzyme) inhibitors are medicines that lower blood pressure. They stop the release of an enzyme. This enzyme makes your blood vessels smaller. Without it, your blood vessels relax and get bigger. This lowers your blood pressure. These medicines also increase how much water and salt go into your urine. This also lowers blood pressure. You may take this kind of medicine if you have high blood pressure. Or you may take it if you have heart problems, kidney problems, or diabetes. If you have coronary artery disease, this medicine can help prevent heart attacks and strokes. Examples  · benazepril (Lotensin)  · enalapril (Vasotec)  · lisinopril (Prinivil, Zestril)  · ramipril (Altace)  This is not a complete list.  Possible side effects  Side effects may include:  · A cough. · Low blood pressure. This can make you feel dizzy or weak. · Too much potassium in your body. · Swelling of your lips, tongue, or face. If the swelling is severe, you may need treatment right away. Severe swelling can make it hard to breathe, but this is rare. You may have other side effects or reactions not listed here. Check the information that comes with your medicine. What to know about taking this medicine  · ACE inhibitors can cause a dry cough. Talk to your doctor if you have a dry cough. You may need a different medicine. · These medicines can cause an allergic reaction. This can cause a little swelling. Or it can cause red bumps on your skin that hurt. In rare cases, the swelling may make it hard for you to breathe. · Do not take this medicine if you are pregnant or plan to become pregnant. · Take your medicines exactly as prescribed. Call your doctor if you think you are having a problem with your medicine. · Check with your doctor or pharmacist before you use any other medicines. This includes over-the-counter medicines. Make sure your doctor knows all of the medicines, vitamins, herbal products, and supplements you take.  Taking some medicines together can cause problems. · You may need regular blood tests. Where can you learn more? Go to http://www.gray.com/  Enter P050 in the search box to learn more about \"Learning About ACE Inhibitors. \"  Current as of: December 16, 2019               Content Version: 12.6  © 3969-5965 [employer]. Care instructions adapted under license by [employer] (which disclaims liability or warranty for this information). If you have questions about a medical condition or this instruction, always ask your healthcare professional. [address] any warranty or liability for your use of this information. Patient Education        Home Blood Pressure Test: About This Test  What is it? A home blood pressure test allows you to keep track of your blood pressure at home. Blood pressure is a measure of the force of blood against the walls of your arteries. Blood pressure readings include two numbers, such as 130/80 (say \"130 over 80\"). The first number is the systolic pressure. The second number is the diastolic pressure. Why is this test done? You may do this test at home to:  · Find out if you have high blood pressure. · Track your blood pressure if you have high blood pressure. · Track how well medicine is working to reduce high blood pressure. · Check how lifestyle changes, such as weight loss and exercise, are affecting blood pressure. How do you prepare for the test?  For at least 30 minutes before you take your blood pressure, don't exercise or use caffeine, tobacco, or medicines that raise blood pressure. Take your blood pressure while you feel comfortable and relaxed. Sit quietly with both feet on the floor for at least 5 minutes before the test.  How is the test done? · Sit with your arm slightly bent and resting on a table so that your upper arm is at the same level as your heart.   · Roll up your sleeve or take off your shirt to expose your upper arm. · Wrap the blood pressure cuff around your upper arm so that the lower edge of the cuff is about 1 inch above the bend of your elbow. Proceed with the following steps depending on if you are using an automatic or manual pressure monitor. Automatic blood pressure monitors  · Press the on/off button on the automatic monitor and wait until the ready-to-measure \"heart\" symbol appears next to zero in the display window. · Press the start button. The cuff will inflate and deflate by itself. · Your blood pressure numbers will appear on the screen. · Write your numbers in your log book, along with the date and time. Manual blood pressure monitors  · Place the earpieces of a stethoscope in your ears, and place the bell of the stethoscope over the artery, just below the cuff. · Close the valve on the rubber inflating bulb. · Squeeze the bulb rapidly with your opposite hand to inflate the cuff until the dial or column of mercury reads about 30 mm Hg higher than your usual systolic pressure. If you do not know your usual pressure, inflate the cuff to 210 mm Hg or until the pulse at your wrist disappears. · Open the pressure valve just slightly by twisting or pressing the valve on the bulb. · As you watch the pressure slowly fall, note the level on the dial at which you first start to hear a pulsing or tapping sound through the stethoscope. This is your systolic blood pressure. · Continue letting the air out slowly. The sounds will become muffled and will finally disappear. Note the pressure when the sounds completely disappear. This is your diastolic blood pressure. Let out all the remaining air. · Write your numbers in your log book, along with the date and time. Follow-up care is a key part of your treatment and safety. Be sure to make and go to all appointments, and call your doctor if you are having problems. It's also a good idea to keep a list of the medicines you take.   Where can you learn more? Go to http://www.IntraStage.com/  Enter C427 in the search box to learn more about \"Home Blood Pressure Test: About This Test.\"  Current as of: December 16, 2019               Content Version: 12.6  © 4331-7068 Healthwise, Incorporated. Care instructions adapted under license by [employer] (which disclaims liability or warranty for this information). If you have questions about a medical condition or this instruction, always ask your healthcare professional. [address] any warranty or liability for your use of this information. Patient Education        COVID-19 Antibody Test: About This Test  What is it? An antibody test looks for antibodies in the blood. These are proteins that your immune system makes, usually after you're exposed to germs like viruses or bacteria or after you get a vaccine. Antibodies work to fight illness. A COVID-19 antibody test looks for antibodies to SARS-CoV-2, the virus that causes COVID-19. If you test positive for these antibodies, it could mean that you already had COVID-19. Why is it done? This test can be used to diagnose a past infection with the virus that causes COVID-19. Many people who get COVID-19 never have symptoms or have only mild ones. Without antibody testing, these people might never know that they already had the virus. Antibody testing is important because:  · It could show who has already had COVID-19. These people might be safe to go back to work or school or to travel. · It could show who hasn't had the infection. These people are still at risk. They need to keep taking steps to avoid the virus. · It helps experts who are tracking COVID-19 learn more about the virus and how it spreads. How do you prepare for the test?  You don't need to do anything to prepare for this test. But be sure to follow any instructions your health care provider gives you. How is it done?   This is a blood test. A health professional may prick your finger or use a needle to take a sample of blood from your arm. What do your results mean? The result is either positive or negative. A positive result means antibodies to SARS-CoV-2 were found. You probably already had COVID-19. But:  · You could get a \"false-positive\" result. The test might show that you have COVID-19 antibodies when you don't. The test may find antibodies that formed in response to another type of coronavirus. If this happens, you're still at risk for [id number].  · It's not certain that having these antibodies will protect you from getting COVID-19 again. And if it does, it's not clear how long the protection lasts. A negative result means that these antibodies were not found. You probably haven't had COVID-19. But:  · You could get a \"false-negative\" result. It takes a while after you're infected for your immune system to make antibodies. You could have a negative result but be infected now. You'd need a different test (viral test) to know if you have COVID-19 now. Current as of: July 10, 2020               Content Version: 12.6  © 2006-2020 [employer]. Care instructions adapted under license by [employer] (which disclaims liability or warranty for this information). If you have questions about a medical condition or this instruction, always ask your healthcare professional. [place] [address] any warranty or liability for your use of this information.

## 2020-10-04 NOTE — PROGRESS NOTES
Rec'd care of patient, blood sugar only 96 this morning. Held SS insulin. Pt ate 100% breakfast tray, rechecked blood sugar, results were 196- gave 30 units insulin per MAR and 50 units long acting insulin. Lung sounds are diminished bilat, pt still experiences SOB on exertion but she states it has improved since being admitted. Discharge orders received. Patient is aware to make an appt with her PCP as we are unable to do this for her because it is Sunday. She verbalized understanding and states she will call them tomorrow morning when they open. Pt aware to continue abx orally. She verbalized understanding. 1130- Pt sob after showering, o2 at 2l nc intact, pt reports relief. 1207- pt reports her ride is here to get her. She states she has oxygen at home to use as needed.

## 2020-10-04 NOTE — PROGRESS NOTES
Comprehensive Nutrition Assessment    Type and Reason for Visit: Reassessment    Nutrition Recommendations/Plan: continue Diabetic diet pt is being discharged    Nutrition Assessment:  64 yo female PMH: Breast CA, DM, GERD, HTN, HLD. Pt admitted due to SOB past several days with possible COVID-19 exposure   Glucose was elevated but is receiving decadron   Pt COVID-19 negative but did experience new renal failure pt to follow up with Dr. Ida Rojas after discharge. Pt was able to eat 100% of meals. Recent Results (from the past 12 hour(s))   GLUCOSE, POC    Collection Time: 10/03/20 11:07 PM   Result Value Ref Range    Glucose (POC) 103 70 - 110 mg/dL    Performed by Tramaine Plater    D DIMER    Collection Time: 10/04/20  6:20 AM   Result Value Ref Range    D DIMER 1.66 (H) <0.46 ug/ml(FEU)   PROCALCITONIN    Collection Time: 10/04/20  6:20 AM   Result Value Ref Range    Procalcitonin <0.05 (L) 0.5 - 2.0 ng/mL   CBC WITH AUTOMATED DIFF    Collection Time: 10/04/20  6:20 AM   Result Value Ref Range    WBC 16.6 (H) 4.6 - 13.2 K/uL    RBC 3.97 (L) 4.20 - 5.30 M/uL    HGB 9.4 (L) 12.0 - 16.0 g/dL    HCT 31.5 (L) 35.0 - 45.0 %    MCV 79.3 74.0 - 97.0 FL    MCH 23.7 (L) 24.0 - 34.0 PG    MCHC 29.8 (L) 31.0 - 37.0 g/dL    RDW 17.4 (H) 11.6 - 14.5 %    PLATELET 967 (H) 897 - 420 K/uL    MPV 9.4 9.2 - 11.8 FL    NEUTROPHILS 88 %    BAND NEUTROPHILS 4 %    LYMPHOCYTES 7 %    MONOCYTES 1 %    EOSINOPHILS 0 %    BASOPHILS 0 %    IMMATURE GRANULOCYTES 0 %    ABS. NEUTROPHILS 15.2 K/UL    ABS. LYMPHOCYTES 1.2 K/UL    ABS. MONOCYTES 0.2 K/UL    ABS. EOSINOPHILS 0.0 K/UL    ABS. BASOPHILS 0.0 K/UL    ABS. IMM.  GRANS. 0.0 K/UL    DF Manual      RBC COMMENTS Normocytic, Normochromic     METABOLIC PANEL, BASIC    Collection Time: 10/04/20  6:20 AM   Result Value Ref Range    Sodium 140 135 - 145 mmol/L    Potassium 4.5 3.2 - 5.1 mmol/L    Chloride 107 94 - 111 mmol/L    CO2 24 21 - 33 mmol/L    Anion gap 9 mmol/L    Glucose 88 70 - 110 mg/dL    BUN 58 (H) 9 - 21 mg/dL    Creatinine 1.90 (H) 0.70 - 1.20 mg/dL    BUN/Creatinine ratio 31      GFR est AA 32 ml/min/1.73m2    GFR est non-AA 27 ml/min/1.73m2    Calcium 8.8 8.5 - 10.5 mg/dL   GLUCOSE, POC    Collection Time: 10/04/20  7:30 AM   Result Value Ref Range    Glucose (POC) 96 70 - 110 mg/dL    Performed by Katheryn Goldsmith, POC    Collection Time: 10/04/20  9:24 AM   Result Value Ref Range    Glucose (POC) 191 (H) 70 - 110 mg/dL    Performed by Leonor Vieira        Malnutrition Assessment:  Malnutrition Status: At risk for malnutrition (specify)(COVID-19 may reduce pt po intake)    Context:  Acute illness     Findings of the 6 clinical characteristics of malnutrition:   Energy Intake:  Mild decrease in energy intake (specify)(SOB ~50% of meals)  Weight Loss:  No significant weight loss     Body Fat Loss:  No significant body fat loss,     Muscle Mass Loss:  No significant muscle mass loss,    Fluid Accumulation:  No significant fluid accumulation,     Strength:  Not performed         Estimated Daily Nutrient Needs: using ABW of 65 kg  Energy (kcal):  7151-3838 kcal/day  Protein (g):  65-78 g/day       Fluid (ml/day):  1950 mL/day    Nutrition Related Findings:  SOB related to pnuemonia and pt reports possible COVID-19 exposure. Pt COVID-19 R/O glucose is elevated but is being treated with decadron. no hgb A1c yet.     Pt will follow up in outpt setting for renal failure that pt experience while in hospital.     Wounds:    None       Current Nutrition Therapies:  DIET DIABETIC CONSISTENT CARB Regular  DIET NUTRITIONAL SUPPLEMENTS Breakfast; Glucerna Shake    Anthropometric Measures:  Height:  5' 3\" (160 cm)  Current Body Wt:  104.3 kg (230 lb)   Admission Body Wt:  230 lb    Usual Body Wt:  230 lb     Ideal Body Wt:  115 lbs:  200 %   Adjusted Body Weight:   ; Weight Adjustment for: (143 lbs or 65 kg)   Adjusted BMI:       BMI Category:  Obese class 3 (BMI 40.0 or greater) Nutrition Diagnosis:   Inadequate oral intake related to impaired respiratory function as evidenced by other (specify), intake 26-50%(SOB related to possible COVID-19)      Nutrition Interventions:   Food and/or Nutrient Delivery: Start oral nutrition supplement, Continue current diet(Diabetic diet provide glucerna daily if pt eating < 50% of meals)  Nutrition Education and Counseling: No recommendations at this time(COVID-19 pending)  Coordination of Nutrition Care: Continued inpatient monitoring    Goals:  Pt will eat > 75% of meals, BM q 1-3 days, glucose , Hgb A1c < 7       Nutrition Monitoring and Evaluation:   Food/Nutrient Intake Outcomes: Food and nutrient intake, Supplement intake  Physical Signs/Symptoms Outcomes: Biochemical data, Meal time behavior, Nutrition focused physical findings     F/U: 10/11/2020    Discharge Planning:    Continue Diabetic diet    Electronically signed by Adrienne Patton on 10/4/2020 at 7:37 AM    Contact: LACEY [phone]

## 2020-10-04 NOTE — DISCHARGE SUMMARY
9/30/2020 admission date  10/5/20 Discharge date    Encounter Diagnoses   Name Primary?  Community acquired pneumonia of right lower lobe of lung Yes    Hypoxia    Fluid overload  DM2 uncontrolled  CKD stage 3? Suspected pneumonia CAP present on admission    Current Discharge Medication List      START taking these medications    Details   amoxicillin-clavulanate (AUGMENTIN) 875-125 mg per tablet Take 1 Tab by mouth two (2) times daily (with meals). Qty: 6 Tab, Refills: 0         CONTINUE these medications which have NOT CHANGED    Details   gabapentin (NEURONTIN) 300 mg capsule Take 300 mg by mouth two (2) times a day. glucose blood VI test strips (OneTouch Ultra Blue Test Strip) strip by Does Not Apply route See Admin Instructions. albuterol (PROVENTIL HFA, VENTOLIN HFA, PROAIR HFA) 90 mcg/actuation inhaler 2 puffs three to four times a day for 5 days then every 6 hours as needed for shortness of breath  Qty: 1 Inhaler, Refills: 0      atorvastatin (LIPITOR) 40 mg tablet Take  by mouth nightly. amLODIPine (NORVASC) 10 mg tablet Take 10 mg by mouth daily. Refills: 0      esomeprazole (NEXIUM) 20 mg capsule Take 20 mg by mouth daily. Refills: 0      hydroCHLOROthiazide (MICROZIDE) 12.5 mg capsule Take 12.5 mg by mouth daily. Refills: 0      polyethylene glycol (ClearLax) 17 gram/dose powder Take 17 g by mouth daily. neomycin-polymyxin-dexamethasone (MAXITROL) ophthalmic suspension Administer 1 Drop to both eyes two (2) times a day. timoloL maleate 0.5 % drpd ophthalmic solution Administer 1 Drop to both eyes daily. meloxicam (MOBIC) 7.5 mg tablet Take 1 Tab by mouth daily. Qty: 60 Tab, Refills: 2      tamsulosin (FLOMAX) 0.4 mg capsule take 1 capsule by mouth once daily  Qty: 30 Cap, Refills: 3      LANTUS SOLOSTAR U-100 INSULIN 100 unit/mL (3 mL) inpn 30 units subcutaneously in morning.   Can take an additional dose of 10 units subcutaneously in the evening if blood sugar is more than 200. Qty: 10 mL, Refills: 0      naloxone (NARCAN) 4 mg/actuation nasal spray Use 1 spray intranasally, then discard. Repeat with new spray every 2 min as needed for opioid overdose symptoms, alternating nostrils. Qty: 1 Each, Refills: 0    Associated Diagnoses: Spinal stenosis of lumbar region at multiple levels      LORazepam (ATIVAN) 0.5 mg tablet Take 0.5 mg by mouth as needed for Anxiety. insulin lispro (HUMALOG) 100 unit/mL kwikpen by SubCUTAneous route. furosemide (LASIX) 20 mg tablet TAKE 1 TABLET BY MOUTH ONCE DAILY AS NEEDED  Refills: 0      TRADJENTA 5 mg tablet TAKE 1 TABLET BY MOUTH ONCE A DAY  Refills: 0      metoprolol tartrate (LOPRESSOR) 50 mg tablet Take 50 mg by mouth daily. Refills: 0      NOVOFINE 32 32 gauge x 1/4\" ndle Refills: 0      venlafaxine-SR (EFFEXOR-XR) 37.5 mg capsule Take 37.5 mg by mouth daily. Refills: 0         STOP taking these medications       clindamycin (CLEOCIN) 300 mg capsule Comments:   Reason for Stopping:         rosuvastatin (CRESTOR) 10 mg tablet Comments:   Reason for Stopping:             Diet: 2000 floyd ada    Condition Good    Dispo: home    Followup: Dr. Jena Mcgee next week Will need bmp checked in one week    Hospital Course  This is a delightful 33-year-old female with a history of type 2 diabetes diastolic heart failure who presented with shortness of breath cough Malays and low-grade temperatures. She was admitted to the hospital as a COVID P URI. She was also started on empiric antibiotics including Zosyn. She was also aggressively hydrated with IV fluids. She improved dramatically after 3 days but it should be noted that she was found to be in renal failure on presentation which was a new diagnosis. Over the following few days she continued to receive IV fluids and on post admission day 4 it was noted that she was still hypoxic and requiring oxygen.   The patient received an initial IV dose of Lasix which improved her symptoms dramatically. This was followed by another look dose of p.o. Lasix after which the patient was oxygen free. The patient's COVID result came back negative. Her blood sugars were elevated while here but she had been receiving Decadron. Her Lantus was increased while here and she received aggressive sliding scale insulin. Given that she is not being discharged on steroids I am going to resume her outpatient dose. The patient I had a long discussion with her regarding the need to follow-up on her renal failure. She expressed absolute certainty that she was going to be able to see Dr. Luis Edwards this week.   Her hospital course was otherwise unremarkable    Visit Vitals  BP (!) 131/59 (BP 1 Location: Right arm, BP Patient Position: At rest)   Pulse 69   Temp 97.5 °F (36.4 °C)   Resp 18   Ht 5' 3\" (1.6 m)   Wt 104.3 kg (230 lb)   SpO2 94%   Breastfeeding No   BMI 40.74 kg/m²     Gen: nad, pleasant  Heent: mmm  Cv: rrr  Resp; ctab  abd BS+    Recent Results (from the past 24 hour(s))   GLUCOSE, POC    Collection Time: 10/03/20 12:00 PM   Result Value Ref Range    Glucose (POC) 170 (H) 70 - 110 mg/dL    Performed by Radha Hanna 130 BLOOD, STOOL    Collection Time: 10/03/20  1:25 PM   Result Value Ref Range    Occult Blood,day 1 Negative      Day 1 date: Negative     GLUCOSE, POC    Collection Time: 10/03/20  4:35 PM   Result Value Ref Range    Glucose (POC) 105 70 - 110 mg/dL    Performed by Tony Madera, POC    Collection Time: 10/03/20 11:07 PM   Result Value Ref Range    Glucose (POC) 103 70 - 110 mg/dL    Performed by Mony Wilkerson    D DIMER    Collection Time: 10/04/20  6:20 AM   Result Value Ref Range    D DIMER 1.66 (H) <0.46 ug/ml(FEU)   PROCALCITONIN    Collection Time: 10/04/20  6:20 AM   Result Value Ref Range    Procalcitonin <0.05 (L) 0.5 - 2.0 ng/mL   CBC WITH AUTOMATED DIFF    Collection Time: 10/04/20  6:20 AM   Result Value Ref Range    WBC 16.6 (H) 4.6 - 13.2 K/uL    RBC 3.97 (L) 4.20 - 5.30 M/uL    HGB 9.4 (L) 12.0 - 16.0 g/dL    HCT 31.5 (L) 35.0 - 45.0 %    MCV 79.3 74.0 - 97.0 FL    MCH 23.7 (L) 24.0 - 34.0 PG    MCHC 29.8 (L) 31.0 - 37.0 g/dL    RDW 17.4 (H) 11.6 - 14.5 %    PLATELET 545 (H) 831 - 420 K/uL    MPV 9.4 9.2 - 11.8 FL    NEUTROPHILS 88 %    BAND NEUTROPHILS 4 %    LYMPHOCYTES 7 %    MONOCYTES 1 %    EOSINOPHILS 0 %    BASOPHILS 0 %    IMMATURE GRANULOCYTES 0 %    ABS. NEUTROPHILS 15.2 K/UL    ABS. LYMPHOCYTES 1.2 K/UL    ABS. MONOCYTES 0.2 K/UL    ABS. EOSINOPHILS 0.0 K/UL    ABS. BASOPHILS 0.0 K/UL    ABS. IMM.  GRANS. 0.0 K/UL    DF Manual      RBC COMMENTS Normocytic, Normochromic     METABOLIC PANEL, BASIC    Collection Time: 10/04/20  6:20 AM   Result Value Ref Range    Sodium 140 135 - 145 mmol/L    Potassium 4.5 3.2 - 5.1 mmol/L    Chloride 107 94 - 111 mmol/L    CO2 24 21 - 33 mmol/L    Anion gap 9 mmol/L    Glucose 88 70 - 110 mg/dL    BUN 58 (H) 9 - 21 mg/dL    Creatinine 1.90 (H) 0.70 - 1.20 mg/dL    BUN/Creatinine ratio 31      GFR est AA 32 ml/min/1.73m2    GFR est non-AA 27 ml/min/1.73m2    Calcium 8.8 8.5 - 10.5 mg/dL   GLUCOSE, POC    Collection Time: 10/04/20  7:30 AM   Result Value Ref Range    Glucose (POC) 96 70 - 110 mg/dL    Performed by Rell Duty    GLUCOSE, POC    Collection Time: 10/04/20  9:24 AM   Result Value Ref Range    Glucose (POC) 191 (H) 70 - 110 mg/dL    Performed by Rell Duty      Total time spent was greater than 30 minutes

## 2020-10-04 NOTE — PROGRESS NOTES
Tylenol 2 tablets given as requested for pain. VITAL SIGNS OBTAINED. NURSE ATTEMPTED TO DRAW BLOOD X2 AND WAS UNSUCCESFUL ,GUILHERME HOPPER RN CHARGE NURSE TO TRY.

## 2020-10-04 NOTE — PROGRESS NOTES
PATIENT AWAKE SITTING ON SIDE OF BED. FULL BODY ASSESSMENT COMPLETED. SKIN WARM AND DRY. RESPPIRATIONS EASY AND UNLABORED. PATIENT ALERT AND ORIENTED. PATIENT REMAIN ON TELEMENTRY AS ORDERED. CALL BELL IN REACH. SIDE RAILS UP X2 AND BED IN LOWEST POSITION. IV SITE WITHOUT SIGNS OF INFILTRATION.

## 2020-10-04 NOTE — PROGRESS NOTES
0600 medication given as ordered. patient awake sitting up in bed watching tv.call bell in reach. side rails up x2 and bed in lowest position.

## 2020-10-05 ENCOUNTER — HOSPITAL ENCOUNTER (OUTPATIENT)
Dept: ULTRASOUND IMAGING | Age: 65
Discharge: HOME | End: 2020-10-05
Attending: INTERNAL MEDICINE

## 2020-10-06 LAB — ASO AB SERPL-ACNC: 40 IU/ML

## 2020-11-17 ENCOUNTER — OFFICE VISIT (OUTPATIENT)
Dept: ORTHOPEDIC SURGERY | Age: 65
End: 2020-11-17
Payer: MEDICAID

## 2020-11-17 DIAGNOSIS — M17.11 OSTEOARTHRITIS OF RIGHT KNEE, UNSPECIFIED OSTEOARTHRITIS TYPE: Primary | ICD-10-CM

## 2020-11-17 DIAGNOSIS — M17.11 OSTEOARTHRITIS OF RIGHT KNEE, UNSPECIFIED OSTEOARTHRITIS TYPE: ICD-10-CM

## 2020-11-17 PROCEDURE — 99214 OFFICE O/P EST MOD 30 MIN: CPT | Performed by: ORTHOPAEDIC SURGERY

## 2020-11-17 RX ORDER — LANCETS
EACH MISCELLANEOUS
COMMUNITY
Start: 2020-11-13 | End: 2021-12-05

## 2020-11-17 RX ORDER — LISINOPRIL 20 MG/1
TABLET ORAL
COMMUNITY
Start: 2020-11-16 | End: 2020-12-02

## 2020-11-17 RX ORDER — VENLAFAXINE HYDROCHLORIDE 75 MG/1
75 CAPSULE, EXTENDED RELEASE ORAL DAILY
COMMUNITY
Start: 2020-11-16 | End: 2021-08-11

## 2020-11-17 RX ORDER — ROSUVASTATIN CALCIUM 10 MG/1
TABLET, COATED ORAL
COMMUNITY
Start: 2020-11-16 | End: 2021-09-27

## 2020-11-17 RX ORDER — VENLAFAXINE HYDROCHLORIDE 150 MG/1
CAPSULE, EXTENDED RELEASE ORAL
COMMUNITY
Start: 2020-10-30 | End: 2020-12-23 | Stop reason: SDUPTHER

## 2020-11-17 RX ORDER — INSULIN ASPART 100 [IU]/ML
INJECTION, SOLUTION INTRAVENOUS; SUBCUTANEOUS
COMMUNITY
End: 2020-12-02

## 2020-11-17 RX ORDER — FAMOTIDINE 20 MG/1
TABLET, FILM COATED ORAL
COMMUNITY
Start: 2020-11-02

## 2020-11-17 RX ORDER — EXENATIDE 2 MG/.65ML
INJECTION, SUSPENSION, EXTENDED RELEASE SUBCUTANEOUS
COMMUNITY
Start: 2020-09-23 | End: 2020-12-23

## 2020-11-17 NOTE — PROGRESS NOTES
Name: Duy Cedeno    : 1955     Service Dept: [address] [place] [address] and Sports Medicine    Patient's Pharmacies:    [facility] [address] [place] [address] - [address]  [address]  [place] [address]  Phone: [phone] Fax: [phone]       Chief Complaint   Patient presents with    Knee Pain        There were no vitals taken for this visit. Allergies   Allergen Reactions    Ciprofloxacin Hives    Keflex [Cephalexin] Nausea and Vomiting    Metformin Other (comments)     GI DISTRESS    Soliqua 100/33 [Insulin Glargine-Lixisenatide] Nausea Only        Current Outpatient Medications   Medication Sig Dispense Refill    Bydureon 2 mg/0.65 mL pnij inject 2 milligrams (1 PEN) subcutaneously ONCE weekly      famotidine (PEPCID) 20 mg tablet take 1 tablet by mouth twice a day      insulin aspart U-100 (NOVOLOG) 100 unit/mL injection by SubCUTAneous route.  OneTouch UltraSoft Lancets misc       lisinopriL (PRINIVIL, ZESTRIL) 20 mg tablet       rosuvastatin (CRESTOR) 10 mg tablet       venlafaxine-SR (EFFEXOR-XR) 150 mg capsule take 1 capsule by mouth once daily      venlafaxine-SR (EFFEXOR-XR) 75 mg capsule       gabapentin (NEURONTIN) 300 mg capsule Take 300 mg by mouth two (2) times a day.  polyethylene glycol (ClearLax) 17 gram/dose powder Take 17 g by mouth daily.  neomycin-polymyxin-dexamethasone (MAXITROL) ophthalmic suspension Administer 1 Drop to both eyes two (2) times a day.  glucose blood VI test strips (OneTouch Ultra Blue Test Strip) strip by Does Not Apply route See Admin Instructions.  timoloL maleate 0.5 % drpd ophthalmic solution Administer 1 Drop to both eyes daily.  meloxicam (MOBIC) 7.5 mg tablet Take 1 Tab by mouth daily.  60 Tab 2    tamsulosin (FLOMAX) 0.4 mg capsule take 1 capsule by mouth once daily 30 Cap 3    LANTUS SOLOSTAR U-100 INSULIN 100 unit/mL (3 mL) inpn 30 units subcutaneously in morning. Can take an additional dose of 10 units subcutaneously in the evening if blood sugar is more than 200. 10 mL 0    albuterol (PROVENTIL HFA, VENTOLIN HFA, PROAIR HFA) 90 mcg/actuation inhaler 2 puffs three to four times a day for 5 days then every 6 hours as needed for shortness of breath 1 Inhaler 0    naloxone (NARCAN) 4 mg/actuation nasal spray Use 1 spray intranasally, then discard. Repeat with new spray every 2 min as needed for opioid overdose symptoms, alternating nostrils. 1 Each 0    LORazepam (ATIVAN) 0.5 mg tablet Take 0.5 mg by mouth as needed for Anxiety.  insulin lispro (HUMALOG) 100 unit/mL kwikpen by SubCUTAneous route.  atorvastatin (LIPITOR) 40 mg tablet Take  by mouth nightly.  furosemide (LASIX) 20 mg tablet TAKE 1 TABLET BY MOUTH ONCE DAILY AS NEEDED  0    amLODIPine (NORVASC) 10 mg tablet Take 10 mg by mouth daily. 0    esomeprazole (NEXIUM) 20 mg capsule Take 20 mg by mouth daily. 0    hydroCHLOROthiazide (MICROZIDE) 12.5 mg capsule Take 12.5 mg by mouth daily. 0    TRADJENTA 5 mg tablet TAKE 1 TABLET BY MOUTH ONCE A DAY  0    metoprolol tartrate (LOPRESSOR) 50 mg tablet Take 50 mg by mouth daily. 0    NOVOFINE 32 32 gauge x 1/4\" ndle   0        Patient Active Problem List   Diagnosis Code    Lumbosacral spondylosis without myelopathy M47.817    DDD (degenerative disc disease), lumbar M51.36    Malignant neoplasm of female breast ([facility] [address] C50.919    Spinal stenosis, lumbar region without neurogenic claudication M48.061    Severe obesity (BMI 35.0-39. 9) E66.01    Pre-op testing Z01.818    Spinal stenosis of lumbar region at multiple levels M48.061    Scoliosis M41.9    Spinal stenosis M48.00    CAP (community acquired pneumonia) J18.9    Shortness of breath R06.02    Fever and chills R50.9    S/P lumbar fusion Z98.1    Pneumonia J18.9    Acute respiratory failure with hypoxia (HCC) J96.01    Acute renal failure (ARF) (HCC) N17.9    Type 2 diabetes mellitus with hyperglycemia, with long-term current use of insulin (HCC) E11.65, Z79.4    COVID-19 U07.1    HTN (hypertension) I10    Diabetic neuropathy (HCC) E11.40    DVT prophylaxis Z29.9        Family History   Problem Relation Age of Onset    No Known Problems Mother     No Known Problems Father     Heart Disease Sister         Social History     Socioeconomic History    Marital status:      Spouse name: Not on file    Number of children: Not on file    Years of education: Not on file    Highest education level: Not on file   Tobacco Use    Smoking status: Never Smoker    Smokeless tobacco: Never Used   Substance and Sexual Activity    Alcohol use: No    Drug use: No   Other Topics Concern        Past Surgical History:   Procedure Laterality Date    HX BACK SURGERY  08/30/2018    HX BREAST BIOPSY Left 02/03/2017    BREAST CANCER    HX BREAST LUMPECTOMY Left     HX CATARACT REMOVAL Right     HX HEART CATHETERIZATION  07/2018    no stents    HX KNEE REPLACEMENT          Past Medical History:   Diagnosis Date    Arthritis     spinal stenosis    Breast CA ([facility] 2017    Left Breast (chemo/radiation)    Diabetes (HCC)     Type 2    Edema     legs and ankles    GERD (gastroesophageal reflux disease)     High cholesterol     Hypertension     Ill-defined condition     patient states hemorrhage behind Left eye-following Dr. Junie Posada MI (myocardial infarction) ([facility] 06/2018    per patient    Neuropathy     Restless leg         I have reviewed and agree with [address] [place] [address] Nw and ROS and intake form in chart and the record. Review of Systems:   Patient is a pleasant appearing individual, appropriately dressed, well hydrated, well nourished, who is alert, appropriately oriented for age, and in no acute distress with a normal gait and normal affect who does not appear to be in any significant pain.      Physical Exam:  Right Knee -Decrease range of motion with flexion, Knee arc of greater than 50 degrees, Some crepitation, Grossly neurovascularly intact, Good cap refill, No skin lesion, Moderate swelling, No gross instability, Some quadriceps weakness, Kellgren and Delvis at least grade 3    Left Knee - Full Range of Motion, No crepitation, Grossly neurovascularly intact, Good cap refill, No skin lesion, No swelling, No gross instability, No quadriceps weakness       Encounter Diagnoses     ICD-10-CM ICD-9-CM   1. Osteoarthritis of right knee, unspecified osteoarthritis type  M17.11 715.96          HPI:  The patient is here with a chief complaint of right knee pain. Has been having it for a while. Status post left total knee replacement, doing well, but the right one is bothering her. Assessment/Plan:  Plan will be for right total knee replacement. Plan at this point, using the old clearances, we will try to get her knee done as soon as possible and go from there. Return to Office:           Scribed by Mercy Montenegro LPN as dictated by [facility] - [facility] [employer] [place] LOAN Vaughan MD.    Documentation True and Accepted [facility] LOAN Vaughan MD

## 2020-11-17 NOTE — PATIENT INSTRUCTIONS
Knee Pain or Injury: Care Instructions  Your Care Instructions     Injuries are a common cause of knee problems. Sudden (acute) injuries may be caused by a direct blow to the knee. They can also be caused by abnormal twisting, bending, or falling on the knee. Pain, bruising, or swelling may be severe, and may start within minutes of the injury. Overuse is another cause of knee pain. Other causes are climbing stairs, kneeling, and other activities that use the knee. Everyday wear and tear, especially as you get older, also can cause knee pain. Rest, along with home treatment, often relieves pain and allows your knee to heal. If you have a serious knee injury, you may need tests and treatment. Follow-up care is a key part of your treatment and safety. Be sure to make and go to all appointments, and call your doctor if you are having problems. It's also a good idea to know your test results and keep a list of the medicines you take. How can you care for yourself at home? · Be safe with medicines. Read and follow all instructions on the label. ? If the doctor gave you a prescription medicine for pain, take it as prescribed. ? If you are not taking a prescription pain medicine, ask your doctor if you can take an over-the-counter medicine. · Rest and protect your knee. Take a break from any activity that may cause pain. · Put ice or a cold pack on your knee for 10 to 20 minutes at a time. Put a thin cloth between the ice and your skin. · Prop up a sore knee on a pillow when you ice it or anytime you sit or lie down for the next 3 days. Try to keep it above the level of your heart. This will help reduce swelling. · If your knee is not swollen, you can put moist heat, a heating pad, or a warm cloth on your knee. · If your doctor recommends an elastic bandage, sleeve, or other type of support for your knee, wear it as directed.   · Follow your doctor's instructions about how much weight you can put on your leg. Use a cane, crutches, or a walker as instructed. · Follow your doctor's instructions about activity during your healing process. If you can do mild exercise, slowly increase your activity. · Reach and stay at a healthy weight. Extra weight can strain the joints, especially the knees and hips, and make the pain worse. Losing even a few pounds may help. When should you call for help? Call 911 anytime you think you may need emergency care. For example, call if:    · You have symptoms of a blood clot in your lung (called a pulmonary embolism). These may include:  ? Sudden chest pain. ? Trouble breathing. ? Coughing up blood. Call your doctor now or seek immediate medical care if:    · You have severe or increasing pain.     · Your leg or foot turns cold or changes color.     · You cannot stand or put weight on your knee.     · Your knee looks twisted or bent out of shape.     · You cannot move your knee.     · You have signs of infection, such as:  ? Increased pain, swelling, warmth, or redness. ? Red streaks leading from the knee. ? Pus draining from a place on your knee. ? A fever.     · You have signs of a blood clot in your leg (called a deep vein thrombosis), such as:  ? Pain in your calf, back of the knee, thigh, or groin. ? Redness and swelling in your leg or groin. Watch closely for changes in your health, and be sure to contact your doctor if:    · You have tingling, weakness, or numbness in your knee.     · You have any new symptoms, such as swelling.     · You have bruises from a knee injury that last longer than 2 weeks.     · You do not get better as expected. Where can you learn more? Go to http://www.gray.com/  Enter K195 in the search box to learn more about \"Knee Pain or Injury: Care Instructions. \"  Current as of: June 26, 2019               Content Version: 12.6  © 3441-1984 [employer].    Care instructions adapted under license by [employer] (which disclaims liability or warranty for this information). If you have questions about a medical condition or this instruction, always ask your healthcare professional. [address] any warranty or liability for your use of this information.

## 2020-11-29 ENCOUNTER — APPOINTMENT (OUTPATIENT)
Dept: CT IMAGING | Age: 65
DRG: 194 | End: 2020-11-29
Attending: NURSE PRACTITIONER
Payer: MEDICAID

## 2020-11-29 ENCOUNTER — HOSPITAL ENCOUNTER (INPATIENT)
Age: 65
LOS: 3 days | Discharge: HOME HEALTH | DRG: 194 | End: 2020-12-02
Attending: FAMILY MEDICINE | Admitting: INTERNAL MEDICINE
Payer: MEDICAID

## 2020-11-29 ENCOUNTER — APPOINTMENT (OUTPATIENT)
Dept: GENERAL RADIOLOGY | Age: 65
DRG: 194 | End: 2020-11-29
Attending: FAMILY MEDICINE
Payer: MEDICAID

## 2020-11-29 DIAGNOSIS — J18.9 PNEUMONIA OF BOTH LUNGS DUE TO INFECTIOUS ORGANISM, UNSPECIFIED PART OF LUNG: Primary | ICD-10-CM

## 2020-11-29 DIAGNOSIS — J44.1 ACUTE EXACERBATION OF CHRONIC OBSTRUCTIVE PULMONARY DISEASE (COPD) (HCC): ICD-10-CM

## 2020-11-29 DIAGNOSIS — Z20.822 PERSON UNDER INVESTIGATION FOR COVID-19: ICD-10-CM

## 2020-11-29 PROBLEM — J44.9 COPD (CHRONIC OBSTRUCTIVE PULMONARY DISEASE) (HCC): Status: ACTIVE | Noted: 2020-11-29

## 2020-11-29 PROBLEM — E11.9 DM (DIABETES MELLITUS) (HCC): Status: ACTIVE | Noted: 2020-11-29

## 2020-11-29 LAB
ANION GAP SERPL CALC-SCNC: 10 MMOL/L
BASOPHILS # BLD: 0 K/UL
BASOPHILS NFR BLD: 0 %
BNP SERPL-MCNC: 62 PG/ML (ref 0–100)
BUN SERPL-MCNC: 15 MG/DL (ref 9–21)
BUN/CREAT SERPL: 9
CA-I BLD-MCNC: 9 MG/DL (ref 8.5–10.5)
CHLORIDE SERPL-SCNC: 102 MMOL/L (ref 94–111)
CO2 SERPL-SCNC: 29 MMOL/L (ref 21–33)
CREAT SERPL-MCNC: 1.6 MG/DL (ref 0.7–1.2)
D DIMER PPP FEU-MCNC: 3.9 UG/ML(FEU)
EOSINOPHIL # BLD: 0.1 K/UL
EOSINOPHIL NFR BLD: 1 %
ERYTHROCYTE [DISTWIDTH] IN BLOOD BY AUTOMATED COUNT: 18.3 % (ref 11.6–14.5)
GLUCOSE BLD STRIP.AUTO-MCNC: 348 MG/DL (ref 70–110)
GLUCOSE BLD STRIP.AUTO-MCNC: 373 MG/DL (ref 70–110)
GLUCOSE SERPL-MCNC: 137 MG/DL (ref 70–110)
HCT VFR BLD AUTO: 35 % (ref 35–45)
HGB BLD-MCNC: 10.3 G/DL (ref 12–16)
IMM GRANULOCYTES # BLD AUTO: 0 K/UL
IMM GRANULOCYTES NFR BLD AUTO: 0 %
LACTATE SERPL-SCNC: 1 MMOL/L (ref 0.5–2)
LYMPHOCYTES # BLD: 0.6 K/UL
LYMPHOCYTES NFR BLD: 5 %
MAGNESIUM SERPL-MCNC: 2.2 MG/DL (ref 1.7–2.8)
MCH RBC QN AUTO: 24 PG (ref 24–34)
MCHC RBC AUTO-ENTMCNC: 29.4 G/DL (ref 31–37)
MCV RBC AUTO: 81.4 FL (ref 74–97)
MONOCYTES # BLD: 0.7 K/UL
MONOCYTES NFR BLD: 6 %
NEUTS BAND NFR BLD MANUAL: 1 %
NEUTS SEG # BLD: 9.8 K/UL
NEUTS SEG NFR BLD: 87 %
PERFORMED BY, TECHID: ABNORMAL
PERFORMED BY, TECHID: ABNORMAL
PLATELET # BLD AUTO: 484 K/UL (ref 135–420)
PMV BLD AUTO: 9.4 FL (ref 9.2–11.8)
POTASSIUM SERPL-SCNC: 4.1 MMOL/L (ref 3.2–5.1)
PROCALCITONIN SERPL-MCNC: <0.05 NG/ML (ref 0.5–2)
RBC # BLD AUTO: 4.3 M/UL (ref 4.2–5.3)
RBC MORPH BLD: ABNORMAL
SARS-COV-2, COV2: NORMAL
SODIUM SERPL-SCNC: 141 MMOL/L (ref 135–145)
TROPONIN I SERPL-MCNC: <0.02 NG/ML (ref 0.02–0.05)
WBC # BLD AUTO: 11.2 K/UL (ref 4.6–13.2)

## 2020-11-29 PROCEDURE — 96375 TX/PRO/DX INJ NEW DRUG ADDON: CPT

## 2020-11-29 PROCEDURE — 74011250637 HC RX REV CODE- 250/637: Performed by: FAMILY MEDICINE

## 2020-11-29 PROCEDURE — 94640 AIRWAY INHALATION TREATMENT: CPT

## 2020-11-29 PROCEDURE — 82962 GLUCOSE BLOOD TEST: CPT

## 2020-11-29 PROCEDURE — 80048 BASIC METABOLIC PNL TOTAL CA: CPT

## 2020-11-29 PROCEDURE — 74011250636 HC RX REV CODE- 250/636: Performed by: NURSE PRACTITIONER

## 2020-11-29 PROCEDURE — 74011000258 HC RX REV CODE- 258: Performed by: FAMILY MEDICINE

## 2020-11-29 PROCEDURE — 94664 DEMO&/EVAL PT USE INHALER: CPT

## 2020-11-29 PROCEDURE — 74011636637 HC RX REV CODE- 636/637: Performed by: NURSE PRACTITIONER

## 2020-11-29 PROCEDURE — 84145 PROCALCITONIN (PCT): CPT

## 2020-11-29 PROCEDURE — 87040 BLOOD CULTURE FOR BACTERIA: CPT

## 2020-11-29 PROCEDURE — 74011250637 HC RX REV CODE- 250/637: Performed by: NURSE PRACTITIONER

## 2020-11-29 PROCEDURE — 84484 ASSAY OF TROPONIN QUANT: CPT

## 2020-11-29 PROCEDURE — 85379 FIBRIN DEGRADATION QUANT: CPT

## 2020-11-29 PROCEDURE — 71045 X-RAY EXAM CHEST 1 VIEW: CPT

## 2020-11-29 PROCEDURE — 65270000029 HC RM PRIVATE

## 2020-11-29 PROCEDURE — 83735 ASSAY OF MAGNESIUM: CPT

## 2020-11-29 PROCEDURE — 85025 COMPLETE CBC W/AUTO DIFF WBC: CPT

## 2020-11-29 PROCEDURE — 93005 ELECTROCARDIOGRAM TRACING: CPT

## 2020-11-29 PROCEDURE — 94760 N-INVAS EAR/PLS OXIMETRY 1: CPT

## 2020-11-29 PROCEDURE — 96374 THER/PROPH/DIAG INJ IV PUSH: CPT

## 2020-11-29 PROCEDURE — 74011250636 HC RX REV CODE- 250/636: Performed by: FAMILY MEDICINE

## 2020-11-29 PROCEDURE — 87635 SARS-COV-2 COVID-19 AMP PRB: CPT

## 2020-11-29 PROCEDURE — 77010033678 HC OXYGEN DAILY

## 2020-11-29 PROCEDURE — 83880 ASSAY OF NATRIURETIC PEPTIDE: CPT

## 2020-11-29 PROCEDURE — 99284 EMERGENCY DEPT VISIT MOD MDM: CPT

## 2020-11-29 PROCEDURE — 71250 CT THORAX DX C-: CPT

## 2020-11-29 PROCEDURE — 83605 ASSAY OF LACTIC ACID: CPT

## 2020-11-29 RX ORDER — FUROSEMIDE 20 MG/1
20 TABLET ORAL 2 TIMES DAILY
Status: ON HOLD | COMMUNITY
End: 2020-12-02 | Stop reason: SDUPTHER

## 2020-11-29 RX ORDER — INSULIN LISPRO 100 [IU]/ML
INJECTION, SOLUTION INTRAVENOUS; SUBCUTANEOUS
Status: DISCONTINUED | OUTPATIENT
Start: 2020-11-29 | End: 2020-12-02 | Stop reason: HOSPADM

## 2020-11-29 RX ORDER — LANOLIN ALCOHOL/MO/W.PET/CERES
3 CREAM (GRAM) TOPICAL
Status: DISCONTINUED | OUTPATIENT
Start: 2020-11-29 | End: 2020-12-02 | Stop reason: HOSPADM

## 2020-11-29 RX ORDER — AZITHROMYCIN 250 MG/1
250 TABLET, FILM COATED ORAL DAILY
Status: DISCONTINUED | OUTPATIENT
Start: 2020-11-30 | End: 2020-12-02 | Stop reason: HOSPADM

## 2020-11-29 RX ORDER — ALBUTEROL SULFATE 90 UG/1
2 AEROSOL, METERED RESPIRATORY (INHALATION)
Status: DISCONTINUED | OUTPATIENT
Start: 2020-11-30 | End: 2020-11-30

## 2020-11-29 RX ORDER — INSULIN GLARGINE 100 [IU]/ML
30 INJECTION, SOLUTION SUBCUTANEOUS DAILY
Status: DISCONTINUED | OUTPATIENT
Start: 2020-11-30 | End: 2020-12-02 | Stop reason: HOSPADM

## 2020-11-29 RX ORDER — SODIUM CHLORIDE 0.9 % (FLUSH) 0.9 %
5-40 SYRINGE (ML) INJECTION AS NEEDED
Status: DISCONTINUED | OUTPATIENT
Start: 2020-11-29 | End: 2020-12-02 | Stop reason: HOSPADM

## 2020-11-29 RX ORDER — LANOLIN ALCOHOL/MO/W.PET/CERES
3 CREAM (GRAM) TOPICAL
COMMUNITY

## 2020-11-29 RX ORDER — SODIUM CHLORIDE 9 MG/ML
75 INJECTION, SOLUTION INTRAVENOUS ONCE
Status: COMPLETED | OUTPATIENT
Start: 2020-11-29 | End: 2020-11-30

## 2020-11-29 RX ORDER — FUROSEMIDE 10 MG/ML
40 INJECTION INTRAMUSCULAR; INTRAVENOUS ONCE
Status: COMPLETED | OUTPATIENT
Start: 2020-11-29 | End: 2020-11-29

## 2020-11-29 RX ORDER — ONDANSETRON 2 MG/ML
4 INJECTION INTRAMUSCULAR; INTRAVENOUS
Status: DISCONTINUED | OUTPATIENT
Start: 2020-11-29 | End: 2020-12-02 | Stop reason: HOSPADM

## 2020-11-29 RX ORDER — HYDROCHLOROTHIAZIDE 25 MG/1
12.5 TABLET ORAL DAILY
Status: DISCONTINUED | OUTPATIENT
Start: 2020-11-30 | End: 2020-11-30

## 2020-11-29 RX ORDER — LISINOPRIL 20 MG/1
20 TABLET ORAL DAILY
Status: DISCONTINUED | OUTPATIENT
Start: 2020-11-30 | End: 2020-11-30

## 2020-11-29 RX ORDER — FAMOTIDINE 20 MG/1
20 TABLET, FILM COATED ORAL DAILY
Status: DISCONTINUED | OUTPATIENT
Start: 2020-11-30 | End: 2020-12-02 | Stop reason: HOSPADM

## 2020-11-29 RX ORDER — ACETAMINOPHEN 500 MG
1000 TABLET ORAL
Status: COMPLETED | OUTPATIENT
Start: 2020-11-29 | End: 2020-11-29

## 2020-11-29 RX ORDER — AMLODIPINE BESYLATE 5 MG/1
10 TABLET ORAL DAILY
Status: DISCONTINUED | OUTPATIENT
Start: 2020-11-30 | End: 2020-12-02 | Stop reason: HOSPADM

## 2020-11-29 RX ORDER — VENLAFAXINE HYDROCHLORIDE 75 MG/1
150 CAPSULE, EXTENDED RELEASE ORAL
Status: DISCONTINUED | OUTPATIENT
Start: 2020-11-30 | End: 2020-11-30

## 2020-11-29 RX ORDER — ATORVASTATIN CALCIUM 10 MG/1
20 TABLET, FILM COATED ORAL DAILY
Status: DISCONTINUED | OUTPATIENT
Start: 2020-11-30 | End: 2020-12-02 | Stop reason: HOSPADM

## 2020-11-29 RX ORDER — GABAPENTIN 300 MG/1
300 CAPSULE ORAL 2 TIMES DAILY
Status: DISCONTINUED | OUTPATIENT
Start: 2020-11-29 | End: 2020-12-02 | Stop reason: HOSPADM

## 2020-11-29 RX ORDER — ALOGLIPTIN 12.5 MG/1
12.5 TABLET, FILM COATED ORAL DAILY
Status: DISCONTINUED | OUTPATIENT
Start: 2020-11-30 | End: 2020-11-30

## 2020-11-29 RX ORDER — AZITHROMYCIN 250 MG/1
500 TABLET, FILM COATED ORAL
Status: COMPLETED | OUTPATIENT
Start: 2020-11-29 | End: 2020-11-29

## 2020-11-29 RX ORDER — ACETAMINOPHEN 325 MG/1
650 TABLET ORAL
Status: DISCONTINUED | OUTPATIENT
Start: 2020-11-29 | End: 2020-12-02 | Stop reason: HOSPADM

## 2020-11-29 RX ORDER — ENOXAPARIN SODIUM 100 MG/ML
40 INJECTION SUBCUTANEOUS DAILY
Status: DISCONTINUED | OUTPATIENT
Start: 2020-11-30 | End: 2020-12-02 | Stop reason: HOSPADM

## 2020-11-29 RX ORDER — TIMOLOL MALEATE 5 MG/ML
1 SOLUTION/ DROPS OPHTHALMIC DAILY
Status: DISCONTINUED | OUTPATIENT
Start: 2020-11-30 | End: 2020-12-02 | Stop reason: HOSPADM

## 2020-11-29 RX ORDER — FUROSEMIDE 20 MG/1
20 TABLET ORAL 2 TIMES DAILY
Status: DISCONTINUED | OUTPATIENT
Start: 2020-11-29 | End: 2020-12-01

## 2020-11-29 RX ORDER — METOPROLOL TARTRATE 50 MG/1
50 TABLET ORAL DAILY
Status: DISCONTINUED | OUTPATIENT
Start: 2020-11-30 | End: 2020-11-30

## 2020-11-29 RX ORDER — VENLAFAXINE HYDROCHLORIDE 75 MG/1
75 CAPSULE, EXTENDED RELEASE ORAL
Status: DISCONTINUED | OUTPATIENT
Start: 2020-11-30 | End: 2020-12-02 | Stop reason: HOSPADM

## 2020-11-29 RX ORDER — POLYETHYLENE GLYCOL 3350 17 G/17G
17 POWDER, FOR SOLUTION ORAL DAILY PRN
Status: DISCONTINUED | OUTPATIENT
Start: 2020-11-29 | End: 2020-12-02 | Stop reason: HOSPADM

## 2020-11-29 RX ORDER — ACETAMINOPHEN 650 MG/1
650 SUPPOSITORY RECTAL
Status: DISCONTINUED | OUTPATIENT
Start: 2020-11-29 | End: 2020-12-02 | Stop reason: HOSPADM

## 2020-11-29 RX ORDER — ALBUTEROL SULFATE 90 UG/1
2 AEROSOL, METERED RESPIRATORY (INHALATION)
Status: DISCONTINUED | OUTPATIENT
Start: 2020-11-29 | End: 2020-11-29

## 2020-11-29 RX ADMIN — ACETAMINOPHEN 1000 MG: 500 TABLET, FILM COATED ORAL at 10:32

## 2020-11-29 RX ADMIN — CEFTRIAXONE SODIUM 1 G: 1 INJECTION, POWDER, FOR SOLUTION INTRAMUSCULAR; INTRAVENOUS at 11:07

## 2020-11-29 RX ADMIN — FUROSEMIDE 20 MG: 20 TABLET ORAL at 23:08

## 2020-11-29 RX ADMIN — ALBUTEROL SULFATE 2 PUFF: 108 INHALANT RESPIRATORY (INHALATION) at 20:17

## 2020-11-29 RX ADMIN — METHYLPREDNISOLONE SODIUM SUCCINATE 40 MG: 40 INJECTION, POWDER, FOR SOLUTION INTRAMUSCULAR; INTRAVENOUS at 15:33

## 2020-11-29 RX ADMIN — FUROSEMIDE 40 MG: 10 INJECTION, SOLUTION INTRAMUSCULAR; INTRAVENOUS at 09:51

## 2020-11-29 RX ADMIN — METHYLPREDNISOLONE SODIUM SUCCINATE 40 MG: 40 INJECTION, POWDER, FOR SOLUTION INTRAMUSCULAR; INTRAVENOUS at 21:16

## 2020-11-29 RX ADMIN — ACETAMINOPHEN 650 MG: 325 TABLET ORAL at 21:16

## 2020-11-29 RX ADMIN — INSULIN LISPRO 8 UNITS: 100 INJECTION, SOLUTION INTRAVENOUS; SUBCUTANEOUS at 16:05

## 2020-11-29 RX ADMIN — AZITHROMYCIN MONOHYDRATE 500 MG: 500 INJECTION, POWDER, LYOPHILIZED, FOR SOLUTION INTRAVENOUS at 11:24

## 2020-11-29 RX ADMIN — SODIUM CHLORIDE 75 ML/HR: 9 INJECTION, SOLUTION INTRAVENOUS at 15:33

## 2020-11-29 RX ADMIN — Medication 3 MG: at 23:09

## 2020-11-29 RX ADMIN — INSULIN LISPRO 10 UNITS: 100 INJECTION, SOLUTION INTRAVENOUS; SUBCUTANEOUS at 22:12

## 2020-11-29 RX ADMIN — AZITHROMYCIN MONOHYDRATE 500 MG: 250 TABLET ORAL at 15:33

## 2020-11-29 RX ADMIN — GABAPENTIN 300 MG: 300 CAPSULE ORAL at 23:09

## 2020-11-29 RX ADMIN — ALBUTEROL SULFATE 2 PUFF: 108 INHALANT RESPIRATORY (INHALATION) at 15:49

## 2020-11-29 NOTE — ASSESSMENT & PLAN NOTE
CXR completed and reviewed  Zithromycin 500mg X1  Zithro 250mg daily X4 days  2 L NC as needed  Maintain O2sat > 94%  Blood cultures X2 pending

## 2020-11-29 NOTE — PROGRESS NOTES
Received care of pt from ED via stretcher. Pt is awake and oriented. Pt is able to ambulate from stretcher to bed without issue. Pt denies pain and nausea. Pt is SOB from transfer. o2 at 3 L via NC. sats 94%. Pt states she can only stay here for 1 or 2 days because her  passed away this morning and she has some things she needs to tend to. Pt otiented to room and call bell. Pt states she is hungry. [place] provided. Pt denies other needs at this time.  Call bell is within reach

## 2020-11-29 NOTE — ASSESSMENT & PLAN NOTE
Albuterol inhalers 2 puff Q4 hours per RT  Resp consult  Solumedrol 40mg BID  CXR completed and reviewed  Zithromycin 500mg X1  Zithro 250mg daily X4 days  2 L NC as needed  Maintain O2sat > 94%

## 2020-11-29 NOTE — ASSESSMENT & PLAN NOTE
Amlodipine 10mg daily  Lasix 20mg BID  HCTZ 12.5mg daily  Lisinopril 20mg daily  Metoprolol 50 mg daily  Cardiac diet  Monitor BP per unit protocol

## 2020-11-29 NOTE — ED NOTES
TRANSFER - OUT REPORT:    [facility]  [facility] to icu(unit) for routine progression of care       Report consisted of patients Situation, Background, Assessment and   Recommendations(SBAR). Information from the following report(s) ED Summary was reviewed with the receiving nurse. Lines:   Peripheral IV 11/29/20 Right Wrist (Active)   Site Assessment Clean, dry, & intact 11/29/20 [id number]        Opportunity for questions and clarification was provided.       Patient transported with:   Registered Nurse

## 2020-11-29 NOTE — ED PROVIDER NOTES
EMERGENCY DEPARTMENT HISTORY AND PHYSICAL EXAM      Date: 11/29/2020  Patient Name: Bryson Love    History of Presenting Illness     Chief Complaint   Patient presents with    Shortness of Breath       History Provided By: Patient    HPI: Bryson Love, 63 y.o. female with PMHx of HTN, DM, breast CA, high cholesterol, MI, and GERD presents to the ED via EMS with complaints of increasing SOB x 3 days. SOB is unchanged after using Albuterol inhaler treatments but improves when she uses her 's home O2. Pt notes associated increased lower extremity swelling. She states that she was told she is \"in the beginning stages of heart failure\" and was prescribed Lasix 20 mg, which she has been taking as prescribed without improvement. Pt reports that her SOB worsened this morning after she woke up and found that her  had passed away during the night. She also endorses nausea and dizziness/lightheadedness beginning this morning. Denies chest pain, vomiting, diarrhea, fever, or chills. There are no other complaints, changes, or physical findings at this time. PCP: Albert Santacruz MD    Current Facility-Administered Medications   Medication Dose Route Frequency Provider Last Rate Last Dose    cefTRIAXone (ROCEPHIN) 1 g in 0.9% sodium chloride (MBP/ADV) 50 mL MBP  1 g IntraVENous Q24H Kofi Pineda  mL/hr at 11/29/20 1107 1 g at 11/29/20 1107     Current Outpatient Medications   Medication Sig Dispense Refill    Bydureon 2 mg/0.65 mL pnij inject 2 milligrams (1 PEN) subcutaneously ONCE weekly      famotidine (PEPCID) 20 mg tablet take 1 tablet by mouth twice a day      insulin aspart U-100 (NOVOLOG) 100 unit/mL injection by SubCUTAneous route.       OneTouch UltraSoft Lancets misc       lisinopriL (PRINIVIL, ZESTRIL) 20 mg tablet       rosuvastatin (CRESTOR) 10 mg tablet       venlafaxine-SR (EFFEXOR-XR) 150 mg capsule take 1 capsule by mouth once daily      venlafaxine-SR (EFFEXOR-XR) 75 mg capsule       gabapentin (NEURONTIN) 300 mg capsule Take 300 mg by mouth two (2) times a day.  polyethylene glycol (ClearLax) 17 gram/dose powder Take 17 g by mouth daily.  neomycin-polymyxin-dexamethasone (MAXITROL) ophthalmic suspension Administer 1 Drop to both eyes two (2) times a day.  glucose blood VI test strips (OneTouch Ultra Blue Test Strip) strip by Does Not Apply route See Admin Instructions.  timoloL maleate 0.5 % drpd ophthalmic solution Administer 1 Drop to both eyes daily.  meloxicam (MOBIC) 7.5 mg tablet Take 1 Tab by mouth daily. 60 Tab 2    tamsulosin (FLOMAX) 0.4 mg capsule take 1 capsule by mouth once daily 30 Cap 3    LANTUS SOLOSTAR U-100 INSULIN 100 unit/mL (3 mL) inpn 30 units subcutaneously in morning. Can take an additional dose of 10 units subcutaneously in the evening if blood sugar is more than 200. 10 mL 0    albuterol (PROVENTIL HFA, VENTOLIN HFA, PROAIR HFA) 90 mcg/actuation inhaler 2 puffs three to four times a day for 5 days then every 6 hours as needed for shortness of breath 1 Inhaler 0    naloxone (NARCAN) 4 mg/actuation nasal spray Use 1 spray intranasally, then discard. Repeat with new spray every 2 min as needed for opioid overdose symptoms, alternating nostrils. 1 Each 0    LORazepam (ATIVAN) 0.5 mg tablet Take 0.5 mg by mouth as needed for Anxiety.  insulin lispro (HUMALOG) 100 unit/mL kwikpen by SubCUTAneous route.  atorvastatin (LIPITOR) 40 mg tablet Take  by mouth nightly.  furosemide (LASIX) 20 mg tablet TAKE 1 TABLET BY MOUTH ONCE DAILY AS NEEDED  0    amLODIPine (NORVASC) 10 mg tablet Take 10 mg by mouth daily. 0    esomeprazole (NEXIUM) 20 mg capsule Take 20 mg by mouth daily. 0    hydroCHLOROthiazide (MICROZIDE) 12.5 mg capsule Take 12.5 mg by mouth daily. 0    TRADJENTA 5 mg tablet TAKE 1 TABLET BY MOUTH ONCE A DAY  0    metoprolol tartrate (LOPRESSOR) 50 mg tablet Take 50 mg by mouth daily. 0    NOVOFINE 26 35 gauge x 1/4\" ndle   0       Past History     Past Medical History:  Past Medical History:   Diagnosis Date    Arthritis     spinal stenosis    Breast CA ([facility] [address] 2017    Left Breast (chemo/radiation)    Diabetes ([facility] [address]     Type 2    Edema     legs and ankles    GERD (gastroesophageal reflux disease)     High cholesterol     Hypertension     Ill-defined condition     patient states hemorrhage behind Left eye-following Dr. Mlaorie Dunn MI (myocardial infarction) ([facility] [address] 06/2018    per patient    Neuropathy     Restless leg        Past Surgical History:  Past Surgical History:   Procedure Laterality Date    HX BACK SURGERY  08/30/2018    HX BREAST BIOPSY Left 02/03/2017    BREAST CANCER    HX BREAST LUMPECTOMY Left     HX CATARACT REMOVAL Right     HX HEART CATHETERIZATION  07/2018    no stents    HX KNEE REPLACEMENT         Family History:  Family History   Problem Relation Age of Onset    No Known Problems Mother     No Known Problems Father     Heart Disease Sister        Social History:  Social History     Tobacco Use    Smoking status: Never Smoker    Smokeless tobacco: Never Used   Substance Use Topics    Alcohol use: No    Drug use: No       Allergies: Allergies   Allergen Reactions    Ciprofloxacin Hives    Keflex [Cephalexin] Nausea and Vomiting    Metformin Other (comments)     GI DISTRESS    Soliqua 100/33 [Insulin Glargine-Lixisenatide] Nausea Only         Review of Systems   Review of Systems   Constitutional: Negative for chills and fever. Respiratory: Positive for shortness of breath. Cardiovascular: Positive for leg swelling. Negative for chest pain. Gastrointestinal: Positive for nausea. Negative for diarrhea and vomiting. Neurological: Positive for dizziness and light-headedness. Physical Exam   Physical Exam  Vitals signs and nursing note reviewed. Constitutional:       General: She is awake. She is not in acute distress.      Appearance: She is obese. She is not diaphoretic. Comments: Middle aged. HENT:      Head: Normocephalic and atraumatic. Right Ear: External ear normal.      Left Ear: External ear normal.      Mouth/Throat:      Mouth: Mucous membranes are moist.   Eyes:      General: No scleral icterus. Conjunctiva/sclera: Conjunctivae normal.      Pupils: Pupils are equal, round, and reactive to light. Neck:      Musculoskeletal: Normal range of motion and neck supple. Thyroid: No thyromegaly. Vascular: No JVD. Trachea: No tracheal deviation. Cardiovascular:      Rate and Rhythm: Normal rate and regular rhythm. Heart sounds: Normal heart sounds. No murmur. No friction rub. No gallop. Pulmonary:      Effort: Pulmonary effort is normal. No respiratory distress. Breath sounds: No stridor. Decreased breath sounds (bilaterally) present. No rhonchi or rales. Musculoskeletal: Normal range of motion. General: No tenderness. Right lower le+ Edema present. Left lower le+ Edema present. Lymphadenopathy:      Cervical: No cervical adenopathy. Skin:     General: Skin is warm and dry. Findings: No erythema or rash. Neurological:      Mental Status: She is alert and oriented to person, place, and time. Cranial Nerves: No cranial nerve deficit. Psychiatric:         Behavior: Behavior normal.         Thought Content:  Thought content normal.         Judgment: Judgment normal.         Diagnostic Study Results     Labs -     Recent Results (from the past 12 hour(s))   CBC WITH AUTOMATED DIFF    Collection Time: 20  9:05 AM   Result Value Ref Range    WBC 11.2 4.6 - 13.2 K/uL    RBC 4.30 4.20 - 5.30 M/uL    HGB 10.3 (L) 12.0 - 16.0 g/dL    HCT 35.0 35.0 - 45.0 %    MCV 81.4 74.0 - 97.0 FL    MCH 24.0 24.0 - 34.0 PG    MCHC 29.4 (L) 31.0 - 37.0 g/dL    RDW 18.3 (H) 11.6 - 14.5 %    PLATELET 513 (H) 401 - 420 K/uL    MPV 9.4 9.2 - 11.8 FL    NEUTROPHILS 87 %    BAND NEUTROPHILS 1 %    LYMPHOCYTES 5 %    MONOCYTES 6 %    EOSINOPHILS 1 %    BASOPHILS 0 %    IMMATURE GRANULOCYTES 0 %    ABS. NEUTROPHILS 9.8 K/UL    ABS. LYMPHOCYTES 0.6 K/UL    ABS. MONOCYTES 0.7 K/UL    ABS. EOSINOPHILS 0.1 K/UL    ABS. BASOPHILS 0.0 K/UL    ABS. IMM.  GRANS. 0.0 K/UL    RBC COMMENTS Normocytic, Normochromic     METABOLIC PANEL, BASIC    Collection Time: 11/29/20  9:05 AM   Result Value Ref Range    Sodium 141 135 - 145 mmol/L    Potassium 4.1 3.2 - 5.1 mmol/L    Chloride 102 94 - 111 mmol/L    CO2 29 21 - 33 mmol/L    Anion gap 10 mmol/L    Glucose 137 (H) 70 - 110 mg/dL    BUN 15 9 - 21 mg/dL    Creatinine 1.60 (H) 0.70 - 1.20 mg/dL    BUN/Creatinine ratio 9      GFR est AA 39 ml/min/1.73m2    GFR est non-AA 33 ml/min/1.73m2    Calcium 9.0 8.5 - 10.5 mg/dL   TROPONIN I    Collection Time: 11/29/20  9:05 AM   Result Value Ref Range    Troponin-I, Qt. <0.02 (L) 0.02 - 0.05 ng/mL   MAGNESIUM    Collection Time: 11/29/20  9:05 AM   Result Value Ref Range    Magnesium 2.2 1.7 - 2.8 mg/dL   BNP    Collection Time: 11/29/20  9:05 AM   Result Value Ref Range    BNP 62 0 - 100 pg/mL   LACTIC ACID    Collection Time: 11/29/20  9:05 AM   Result Value Ref Range    Lactic acid 1.0 0.5 - 2.0 mmol/L   EKG, 12 LEAD, INITIAL    Collection Time: 11/29/20  9:11 AM   Result Value Ref Range    Ventricular Rate 87 BPM    Atrial Rate 88 BPM    P-R Interval 162 ms    QRS Duration 127 ms    Q-T Interval 396 ms    QTC Calculation (Bezet) 477 ms    Calculated R Axis 88 degrees    Calculated T Axis -53 degrees    Diagnosis       Sinus rhythm  Anterior infarct, old  Nonspecific T abnormalities, inferior leads     SARS-COV-2    Collection Time: 11/29/20 11:00 AM   Result Value Ref Range    SARS-CoV-2 Please find results under separate order         Radiologic Studies -   XR CHEST PORT   Final Result   IMPRESSION:      Small bilateral pleural effusions with bibasilar interstitial opacities   representing either interstitial edema or atypical infection. CT Results  (Last 48 hours)    None        CXR Results  (Last 48 hours)               11/29/20 0928  XR CHEST PORT Final result    Impression:  IMPRESSION:       Small bilateral pleural effusions with bibasilar interstitial opacities   representing either interstitial edema or atypical infection. Narrative:  EXAM: One view chest x-ray       CLINICAL INDICATION/HISTORY: Dyspnea. COMPARISON: 09/30/2020. TECHNIQUE: Single AP view of the chest was obtained.       _______________       FINDINGS:       HEART, VESSELS, MEDIASTINUM: Heart size is normal. No vascular congestion. LUNGS, PLEURAL SPACES: There are small bilateral pleural effusions with   bibasilar interstitial opacities at the bilateral lung bases. No pneumothorax. BONY THORAX, SOFT TISSUES: Unremarkable.       _______________                 Medical Decision Making and ED Course   I am the first provider for this patient. I reviewed the vital signs, available nursing notes, past medical history, past surgical history, family history and social history. Vital Signs-Reviewed the patient's vital signs. Patient Vitals for the past 12 hrs:   Temp Pulse Resp BP SpO2   11/29/20 0951 -- 85 -- (!) 174/78 --   11/29/20 0904 -- -- -- -- (!) 88 %   11/29/20 0902 -- -- -- -- (!) 88 %   11/29/20 0858 98.5 °F (36.9 °C) 88 16 -- (!) 88 %       EKG interpretation: (Preliminary): Performed at 0911; Read at 0911. Rhythm: normal sinus rhythm; Rate (approx.): 87 bpm; Axis: normal; WA interval: normal; QRS interval: prolonged; ST/T wave: non-specific changes; Other findings: Non-specific T abnormalities, inferior leads. Records Reviewed: Nursing Notes, Old Medical Records, Previous electrocardiograms, Previous Radiology Studies and Previous Laboratory Studies    The patient presents with shortness of breath with a differential diagnosis of COPD exacerbation vs CHF vs PNA.     ED Course:   0907  Initial assessment performed. The patients presenting problems have been discussed, and they are in agreement with the care plan formulated and outlined with them. I have encouraged them to ask questions as they arise throughout their visit. Provider Notes (Medical Decision Making):     0210  Patient is a 46-year-old female who presented with shortness of breath. Symptoms been going on for a few days. She has a history of COPD and CHF. She was afebrile. She had no known exposure to Covid. Unfortunately her  passed away this morning. Symptoms got worse after discovering this. She was hypoxic on presentation. Her sats were 88% on room air. Is not on home O2. Patient's labs were fairly unremarkable but her chest x-ray showed small bilateral pleural effusions with bibasilar opacities. She will be tested for Covid. Given Zithromax and Rocephin. Consultations:       Consultations: -  Hospitalist Consultant: Dr. Kirt Ramsey: We have asked for emergent assistance with regard to this patient. We have discussed the patients HPI, ROS, PE and results this far. They will come and evaluate the patient for admission. Procedures     Procedures      Disposition     Disposition: Admitted. The case was discussed with the admitting physician Dr. Kirt Ramsey. Abraham Rincon's  results have been reviewed with her. She has been counseled regarding her diagnosis, treatment, and plan. She verbally conveys understanding and agreement of the signs, symptoms, diagnosis, treatment and prognosis and additionally agrees to follow up as discussed. She also agrees with the care-plan and conveys that all of her questions have been answered.   .     Labs Reviewed   CBC WITH AUTOMATED DIFF - Abnormal; Notable for the following components:       Result Value    HGB 10.3 (*)     MCHC 29.4 (*)     RDW 18.3 (*)     PLATELET 880 (*)     All other components within normal limits   METABOLIC PANEL, BASIC - Abnormal; Notable for the following components:    Glucose 137 (*)     Creatinine 1.60 (*)     All other components within normal limits   TROPONIN I - Abnormal; Notable for the following components:    Troponin-I, Qt. <0.02 (*)     All other components within normal limits   CULTURE, BLOOD   CULTURE, BLOOD   MAGNESIUM   BNP   LACTIC ACID   SARS-COV-2   NOVEL CORONAVIRUS (COVID-19)        Recent Results (from the past 12 hour(s))   CBC WITH AUTOMATED DIFF    Collection Time: 11/29/20  9:05 AM   Result Value Ref Range    WBC 11.2 4.6 - 13.2 K/uL    RBC 4.30 4.20 - 5.30 M/uL    HGB 10.3 (L) 12.0 - 16.0 g/dL    HCT 35.0 35.0 - 45.0 %    MCV 81.4 74.0 - 97.0 FL    MCH 24.0 24.0 - 34.0 PG    MCHC 29.4 (L) 31.0 - 37.0 g/dL    RDW 18.3 (H) 11.6 - 14.5 %    PLATELET 392 (H) 076 - 420 K/uL    MPV 9.4 9.2 - 11.8 FL    NEUTROPHILS 87 %    BAND NEUTROPHILS 1 %    LYMPHOCYTES 5 %    MONOCYTES 6 %    EOSINOPHILS 1 %    BASOPHILS 0 %    IMMATURE GRANULOCYTES 0 %    ABS. NEUTROPHILS 9.8 K/UL    ABS. LYMPHOCYTES 0.6 K/UL    ABS. MONOCYTES 0.7 K/UL    ABS. EOSINOPHILS 0.1 K/UL    ABS. BASOPHILS 0.0 K/UL    ABS. IMM.  GRANS. 0.0 K/UL    RBC COMMENTS Normocytic, Normochromic     METABOLIC PANEL, BASIC    Collection Time: 11/29/20  9:05 AM   Result Value Ref Range    Sodium 141 135 - 145 mmol/L    Potassium 4.1 3.2 - 5.1 mmol/L    Chloride 102 94 - 111 mmol/L    CO2 29 21 - 33 mmol/L    Anion gap 10 mmol/L    Glucose 137 (H) 70 - 110 mg/dL    BUN 15 9 - 21 mg/dL    Creatinine 1.60 (H) 0.70 - 1.20 mg/dL    BUN/Creatinine ratio 9      GFR est AA 39 ml/min/1.73m2    GFR est non-AA 33 ml/min/1.73m2    Calcium 9.0 8.5 - 10.5 mg/dL   TROPONIN I    Collection Time: 11/29/20  9:05 AM   Result Value Ref Range    Troponin-I, Qt. <0.02 (L) 0.02 - 0.05 ng/mL   MAGNESIUM    Collection Time: 11/29/20  9:05 AM   Result Value Ref Range    Magnesium 2.2 1.7 - 2.8 mg/dL   BNP    Collection Time: 11/29/20  9:05 AM   Result Value Ref Range    BNP 62 0 - 100 pg/mL LACTIC ACID    Collection Time: 11/29/20  9:05 AM   Result Value Ref Range    Lactic acid 1.0 0.5 - 2.0 mmol/L   EKG, 12 LEAD, INITIAL    Collection Time: 11/29/20  9:11 AM   Result Value Ref Range    Ventricular Rate 87 BPM    Atrial Rate 88 BPM    P-R Interval 162 ms    QRS Duration 127 ms    Q-T Interval 396 ms    QTC Calculation (Bezet) 477 ms    Calculated R Axis 88 degrees    Calculated T Axis -53 degrees    Diagnosis       Sinus rhythm  Anterior infarct, old  Nonspecific T abnormalities, inferior leads     SARS-COV-2    Collection Time: 11/29/20 11:00 AM   Result Value Ref Range    SARS-CoV-2 Please find results under separate order          XR CHEST PORT   Final Result   IMPRESSION:      Small bilateral pleural effusions with bibasilar interstitial opacities   representing either interstitial edema or atypical infection. CLINICAL IMPRESSION    1. Pneumonia of both lungs due to infectious organism, unspecified part of lung    2. Acute exacerbation of chronic obstructive pulmonary disease (COPD) ([address]    3. Person under investigation for COVID-19          Diagnosis     Clinical Impression:   1. Pneumonia of both lungs due to infectious organism, unspecified part of lung    2. Acute exacerbation of chronic obstructive pulmonary disease (COPD) ([address]    3. Person under investigation for COVID-19        Attestations:    By signing my name below, I, ANTHONY Mcmanus, attest that this documentation has been prepared under the direction and in presence of Jake Wright MD on 11/29/20. Electronically signed: Ember Briggs, 11/29/20, 9:11 AM      Please note that this dictation was completed with [employer], the [employer] voice recognition software. Quite often unanticipated grammatical, syntax, homophones, and other interpretive errors are inadvertently transcribed by the computer software. Please disregard these errors. Please excuse any errors that have escaped final proofreading.   Thank you.

## 2020-11-29 NOTE — ASSESSMENT & PLAN NOTE
POC Glucose AC & HS  SSI   Lantus 30 Units daily  Tradjenta 5mg daily  Carb controlled diabetic diet.

## 2020-11-29 NOTE — ASSESSMENT & PLAN NOTE
-Initiate Isolation precautions  -No known exposures  -COVID test pending  -Pt brought in hypoxic 88% on RA

## 2020-11-29 NOTE — H&P
History and Physical    Patient: Salvador Montalvo MRN: [id number]      YOB: 1955  Age: 63 y.o. Sex: female      Subjective:      Salvador Montalvo is a 63 y.o. female with PMHx of HTN, DM, breast CA, high cholesterol, MI, and GERD presents to the ED via EMS with complaints of increasing SOB x 3 days. SOB is unchanged after using Albuterol inhaler treatments but improves when she uses her 's home O2. Pt reports that her SOB worsened this morning after she woke up and found that her  had passed away during the night. Pt notes associated increased lower extremity swelling. She states that she was told she is \"in the beginning stages of heart failure\" and was prescribed Lasix 20 mg, which she has been taking as prescribed without improvement. She also endorses nausea, dizziness & lightheadedness beginning this morning. Denies chest pain, vomiting, diarrhea, fever, or chills. Trop negative, EKG NSR @87,Creat mildly elevated art 1.6. CXR revealed small bilat pleural effusions with bibasilar interstitial opacities at bilateral lung bases. On assessment patient awake in bed alert & oriented X3 with no signs of distress, discomfort, or pain. VS have remained stable and no acute events reported during admission process. Pt satting 94% on 3 LNC. +3 pitting edema noted to bilateral lower extremities. Will continue to monitor patient. Discussed admission with patient and patient agrees with all questions answered    Approximate time spent on admission was 40 minutes. Patient will be admitted for Pneumonia [J18.9]  Person under investigation for COVID-19 [Z20.828]  COPD with acute exacerbation ([facility] [J44.1]  COPD (chronic obstructive pulmonary disease) ([facility] [J44.9]  to hospitalist service as Inpatient and evaluated daily via physical assessment, diagnostic testing, and laboratory assessment.      Past Medical History:   Diagnosis Date    Arthritis     spinal stenosis    Breast CA ([facility] 2017    Left Breast (chemo/radiation)    Diabetes (HCC)     Type 2    Edema     legs and ankles    GERD (gastroesophageal reflux disease)     High cholesterol     Hypertension     Ill-defined condition     patient states hemorrhage behind Left eye-following Dr. Emmett Ken MI (myocardial infarction) ([facility] 06/2018    per patient    Neuropathy     Restless leg      Past Surgical History:   Procedure Laterality Date    HX BACK SURGERY  08/30/2018    HX BREAST BIOPSY Left 02/03/2017    BREAST CANCER    HX BREAST LUMPECTOMY Left     HX CATARACT REMOVAL Right     HX HEART CATHETERIZATION  07/2018    no stents    HX KNEE REPLACEMENT        Family History   Problem Relation Age of Onset    No Known Problems Mother     No Known Problems Father     Heart Disease Sister      Social History     Tobacco Use    Smoking status: Never Smoker    Smokeless tobacco: Never Used   Substance Use Topics    Alcohol use: No      Allergies   Allergen Reactions    Ciprofloxacin Hives    Keflex [Cephalexin] Nausea and Vomiting    Metformin Other (comments)     GI DISTRESS    Soliqua 100/33 [Insulin Glargine-Lixisenatide] Nausea Only     Immunizations are UTD per pt. Review of Systems:  - CONSTITUTIONAL: Denies  fatigue, weight loss, fever and chills. - HEENT: Denies changes in vision and hearing.    - RESPIRATORY: Reports SOB and NP cough. - CV: Denies palpitations and CP.     - GI: Reports some nausea no vomiting and denies abdominal pain,  diarrhea and constipation. Last BM 11/28/2020    - : Denies dysuria and urinary frequency. - MSK: Denies myalgia and joint pain. - SKIN: Denies rash and pruritus.    - NEUROLOGICAL: Reports dizziness, weakness, headache but denies  syncope. - PSYCHIATRIC: Denies recent changes in mood. Denies anxiety and depression.       Objective:     Vitals:    11/29/20 0902 11/29/20 0904 11/29/20 0951 11/29/20 1355   BP:   (!) 174/78 (!) 148/57   Pulse:   85 95   Resp:    18   Temp:    98.2 °F (36.8 °C)   SpO2: (!) 88% (!) 88%  94%   Weight:    102 kg (224 lb 13.9 oz)   Height:    5' 2\" (1.575 m)        Physical Exam:  - GENERAL: Alert and oriented x 3. No acute distress. Well-nourished.      - HEENT: EOMI. Anicteric. PERRLA,Moist mucous membranes. No scleral icterus. No cervical lymphadenopathy. Oropharynx moist without any lesions    -NECK: Supple, no tracheal deviation, no JVD, no significant lymphadenopathy, no thyromegaly noted. - LUNGS: Clear but diminished to auscultation bilaterally. No accessory muscle use. Chest symmetrical, No wheezing, rales, rhonchi noted. Appropriate respiratory effort.     - CARDIOVASCULAR: Regular rate and rhythm. No murmur, rubs, gallops, +3 pitting edema to BLE  S1 & S2 audible. - ABDOMEN: Soft, round, obese, non-tender and non-distended. No palpable masses. , lesions, hepatomegaly. Bowels active X4 quadrants. - SKIN: Warm, dry, intact, no bruising, lesions, or rashes noted. Color appropriate for ethnicity.     - MUSCULOSKELETAL: Ambulates independently, no deformities, Full ROM     - NEUROLOGIC: No focal neurological deficits. CN II-XII grossly intact, Muscle strength 5/5, both U & L bilateral DTR in lower extremities 2+. - PSYCHIATRIC: Calm & Cooperative. Appropriate mood and affect. Recent Results (from the past 12 hour(s))   CBC WITH AUTOMATED DIFF    Collection Time: 11/29/20  9:05 AM   Result Value Ref Range    WBC 11.2 4.6 - 13.2 K/uL    RBC 4.30 4.20 - 5.30 M/uL    HGB 10.3 (L) 12.0 - 16.0 g/dL    HCT 35.0 35.0 - 45.0 %    MCV 81.4 74.0 - 97.0 FL    MCH 24.0 24.0 - 34.0 PG    MCHC 29.4 (L) 31.0 - 37.0 g/dL    RDW 18.3 (H) 11.6 - 14.5 %    PLATELET 166 (H) 425 - 420 K/uL    MPV 9.4 9.2 - 11.8 FL    NEUTROPHILS 87 %    BAND NEUTROPHILS 1 %    LYMPHOCYTES 5 %    MONOCYTES 6 %    EOSINOPHILS 1 %    BASOPHILS 0 %    IMMATURE GRANULOCYTES 0 %    ABS. NEUTROPHILS 9.8 K/UL    ABS. LYMPHOCYTES 0.6 K/UL    ABS.  MONOCYTES 0.7 K/UL    ABS. EOSINOPHILS 0.1 K/UL    ABS. BASOPHILS 0.0 K/UL    ABS. IMM. GRANS. 0.0 K/UL    RBC COMMENTS Normocytic, Normochromic     METABOLIC PANEL, BASIC    Collection Time: 11/29/20  9:05 AM   Result Value Ref Range    Sodium 141 135 - 145 mmol/L    Potassium 4.1 3.2 - 5.1 mmol/L    Chloride 102 94 - 111 mmol/L    CO2 29 21 - 33 mmol/L    Anion gap 10 mmol/L    Glucose 137 (H) 70 - 110 mg/dL    BUN 15 9 - 21 mg/dL    Creatinine 1.60 (H) 0.70 - 1.20 mg/dL    BUN/Creatinine ratio 9      GFR est AA 39 ml/min/1.73m2    GFR est non-AA 33 ml/min/1.73m2    Calcium 9.0 8.5 - 10.5 mg/dL   TROPONIN I    Collection Time: 11/29/20  9:05 AM   Result Value Ref Range    Troponin-I, Qt. <0.02 (L) 0.02 - 0.05 ng/mL   MAGNESIUM    Collection Time: 11/29/20  9:05 AM   Result Value Ref Range    Magnesium 2.2 1.7 - 2.8 mg/dL   BNP    Collection Time: 11/29/20  9:05 AM   Result Value Ref Range    BNP 62 0 - 100 pg/mL   LACTIC ACID    Collection Time: 11/29/20  9:05 AM   Result Value Ref Range    Lactic acid 1.0 0.5 - 2.0 mmol/L   EKG, 12 LEAD, INITIAL    Collection Time: 11/29/20  9:11 AM   Result Value Ref Range    Ventricular Rate 87 BPM    Atrial Rate 88 BPM    P-R Interval 162 ms    QRS Duration 127 ms    Q-T Interval 396 ms    QTC Calculation (Bezet) 477 ms    Calculated R Axis 88 degrees    Calculated T Axis -53 degrees    Diagnosis       Sinus rhythm  Anterior infarct, old  Nonspecific T abnormalities, inferior leads     SARS-COV-2    Collection Time: 11/29/20 11:00 AM   Result Value Ref Range    SARS-CoV-2 Please find results under separate order          Approximate time spent on admission process is. ..     Assessment/Plan:   * COPD with acute exacerbation (HCC)  Albuterol inhalers 2 puff Q4 hours per RT  Resp consult  Solumedrol 40mg BID  CXR completed and reviewed  Zithromycin 500mg X1  Zithro 250mg daily X4 days  2 L NC as needed  Maintain O2sat > 94%    DM (diabetes mellitus) ([facility])  POC Glucose AC & HS  SSI   Lantus 30 Units daily  Tradjenta 5mg daily  Carb controlled diabetic diet.       Person under investigation for COVID-19  -Initiate Isolation precautions  -No known exposures  -COVID test pending  -Pt brought in hypoxic 88% on RA        DVT prophylaxis  Lovenox 40mg daily    Diabetic neuropathy (HCC)  Gabapentin 300mg BID  Supportive measures      HTN (hypertension)  Amlodipine 10mg daily  Lasix 20mg BID  HCTZ 12.5mg daily  Lisinopril 20mg daily  Metoprolol 50 mg daily  Cardiac diet  Monitor BP per unit protocol    Pneumonia  CXR completed and reviewed  Zithromycin 500mg X1  Zithro 250mg daily X4 days  2 L NC as needed  Maintain O2sat > 94%  Blood cultures X2 pending      Code Status: Full Code  DVT: Lovenox 40mg daily  Medical Proxy: Renea De Oliveira (sister) [phone]    Signed By: Gianna Rodriguez NP     November 29, 2020

## 2020-11-30 LAB
ALBUMIN SERPL-MCNC: 3.4 G/DL (ref 3.5–4.7)
ALBUMIN/GLOB SERPL: 0.8 {RATIO}
ALP SERPL-CCNC: 67 U/L (ref 38–126)
ALT SERPL-CCNC: 13 U/L (ref 3–52)
ANION GAP SERPL CALC-SCNC: 9 MMOL/L
AST SERPL W P-5'-P-CCNC: 16 U/L (ref 14–74)
ATRIAL RATE: 88 BPM
BASOPHILS # BLD: 0 K/UL
BASOPHILS NFR BLD: 0 %
BILIRUB SERPL-MCNC: 0.4 MG/DL (ref 0.2–1)
BUN SERPL-MCNC: 20 MG/DL (ref 9–21)
BUN/CREAT SERPL: 12
CA-I BLD-MCNC: 8.9 MG/DL (ref 8.5–10.5)
CALCULATED R AXIS, ECG10: 88 DEGREES
CALCULATED T AXIS, ECG11: -53 DEGREES
CHLORIDE SERPL-SCNC: 104 MMOL/L (ref 94–111)
CO2 SERPL-SCNC: 26 MMOL/L (ref 21–33)
CREAT SERPL-MCNC: 1.7 MG/DL (ref 0.7–1.2)
DIAGNOSIS, 93000: NORMAL
DIFFERENTIAL METHOD BLD: ABNORMAL
EOSINOPHIL # BLD: 0 K/UL
EOSINOPHIL NFR BLD: 0 %
ERYTHROCYTE [DISTWIDTH] IN BLOOD BY AUTOMATED COUNT: 18.2 % (ref 11.6–14.5)
GLOBULIN SER CALC-MCNC: 4.1 G/DL
GLUCOSE BLD STRIP.AUTO-MCNC: 261 MG/DL (ref 70–110)
GLUCOSE BLD STRIP.AUTO-MCNC: 269 MG/DL (ref 70–110)
GLUCOSE BLD STRIP.AUTO-MCNC: 337 MG/DL (ref 70–110)
GLUCOSE BLD STRIP.AUTO-MCNC: 373 MG/DL (ref 70–110)
GLUCOSE SERPL-MCNC: 278 MG/DL (ref 70–110)
HCT VFR BLD AUTO: 35.3 % (ref 35–45)
HGB BLD-MCNC: 10.4 G/DL (ref 12–16)
IMM GRANULOCYTES # BLD AUTO: 0 K/UL
IMM GRANULOCYTES NFR BLD AUTO: 0 %
LACTATE SERPL-SCNC: 2.2 MMOL/L (ref 0.5–2)
LYMPHOCYTES # BLD: 0.6 K/UL
LYMPHOCYTES NFR BLD: 5 %
MCH RBC QN AUTO: 23.6 PG (ref 24–34)
MCHC RBC AUTO-ENTMCNC: 29.5 G/DL (ref 31–37)
MCV RBC AUTO: 80 FL (ref 74–97)
MONOCYTES # BLD: 0 K/UL
MONOCYTES NFR BLD: 0 %
NEUTS BAND NFR BLD MANUAL: 4 %
NEUTS SEG # BLD: 12.3 K/UL
NEUTS SEG NFR BLD: 91 %
P-R INTERVAL, ECG05: 162 MS
PERFORMED BY, TECHID: ABNORMAL
PLATELET # BLD AUTO: 493 K/UL (ref 135–420)
PLATELET COMMENTS,PCOM: ABNORMAL
PMV BLD AUTO: 9.2 FL (ref 9.2–11.8)
POTASSIUM SERPL-SCNC: 4 MMOL/L (ref 3.2–5.1)
PROT SERPL-MCNC: 7.5 G/DL (ref 6.1–8.4)
Q-T INTERVAL, ECG07: 396 MS
QRS DURATION, ECG06: 127 MS
QTC CALCULATION (BEZET), ECG08: 477 MS
RBC # BLD AUTO: 4.41 M/UL (ref 4.2–5.3)
RBC MORPH BLD: ABNORMAL
RBC MORPH BLD: ABNORMAL
SARS-COV-2, COV2NT: NOT DETECTED
SODIUM SERPL-SCNC: 139 MMOL/L (ref 135–145)
VENTRICULAR RATE, ECG03: 87 BPM
WBC # BLD AUTO: 12.9 K/UL (ref 4.6–13.2)
WBC MORPH BLD: ABNORMAL

## 2020-11-30 PROCEDURE — 74011250637 HC RX REV CODE- 250/637: Performed by: INTERNAL MEDICINE

## 2020-11-30 PROCEDURE — 94640 AIRWAY INHALATION TREATMENT: CPT

## 2020-11-30 PROCEDURE — 74011000258 HC RX REV CODE- 258: Performed by: FAMILY MEDICINE

## 2020-11-30 PROCEDURE — 36416 COLLJ CAPILLARY BLOOD SPEC: CPT

## 2020-11-30 PROCEDURE — 74011250637 HC RX REV CODE- 250/637: Performed by: NURSE PRACTITIONER

## 2020-11-30 PROCEDURE — 82962 GLUCOSE BLOOD TEST: CPT

## 2020-11-30 PROCEDURE — 77010033678 HC OXYGEN DAILY

## 2020-11-30 PROCEDURE — 74011000250 HC RX REV CODE- 250: Performed by: NURSE PRACTITIONER

## 2020-11-30 PROCEDURE — 74011250636 HC RX REV CODE- 250/636: Performed by: FAMILY MEDICINE

## 2020-11-30 PROCEDURE — 94760 N-INVAS EAR/PLS OXIMETRY 1: CPT

## 2020-11-30 PROCEDURE — 80053 COMPREHEN METABOLIC PANEL: CPT

## 2020-11-30 PROCEDURE — 74011636637 HC RX REV CODE- 636/637: Performed by: NURSE PRACTITIONER

## 2020-11-30 PROCEDURE — 85025 COMPLETE CBC W/AUTO DIFF WBC: CPT

## 2020-11-30 PROCEDURE — 83605 ASSAY OF LACTIC ACID: CPT

## 2020-11-30 PROCEDURE — 74011250636 HC RX REV CODE- 250/636: Performed by: NURSE PRACTITIONER

## 2020-11-30 PROCEDURE — 65270000029 HC RM PRIVATE

## 2020-11-30 PROCEDURE — 36415 COLL VENOUS BLD VENIPUNCTURE: CPT

## 2020-11-30 RX ORDER — ALBUTEROL SULFATE 90 UG/1
2 AEROSOL, METERED RESPIRATORY (INHALATION)
Status: DISCONTINUED | OUTPATIENT
Start: 2020-11-30 | End: 2020-12-02 | Stop reason: HOSPADM

## 2020-11-30 RX ADMIN — INSULIN LISPRO 10 UNITS: 100 INJECTION, SOLUTION INTRAVENOUS; SUBCUTANEOUS at 21:54

## 2020-11-30 RX ADMIN — INSULIN LISPRO 10 UNITS: 100 INJECTION, SOLUTION INTRAVENOUS; SUBCUTANEOUS at 16:53

## 2020-11-30 RX ADMIN — METHYLPREDNISOLONE SODIUM SUCCINATE 40 MG: 40 INJECTION, POWDER, FOR SOLUTION INTRAMUSCULAR; INTRAVENOUS at 21:54

## 2020-11-30 RX ADMIN — TIMOLOL MALEATE 1 DROP: 5 SOLUTION/ DROPS OPHTHALMIC at 11:07

## 2020-11-30 RX ADMIN — ATORVASTATIN CALCIUM 20 MG: 10 TABLET, FILM COATED ORAL at 08:11

## 2020-11-30 RX ADMIN — METHYLPREDNISOLONE SODIUM SUCCINATE 40 MG: 40 INJECTION, POWDER, FOR SOLUTION INTRAMUSCULAR; INTRAVENOUS at 08:10

## 2020-11-30 RX ADMIN — AMLODIPINE BESYLATE 10 MG: 5 TABLET ORAL at 08:11

## 2020-11-30 RX ADMIN — INSULIN LISPRO 6 UNITS: 100 INJECTION, SOLUTION INTRAVENOUS; SUBCUTANEOUS at 08:10

## 2020-11-30 RX ADMIN — Medication 3 MG: at 21:54

## 2020-11-30 RX ADMIN — INSULIN LISPRO 8 UNITS: 100 INJECTION, SOLUTION INTRAVENOUS; SUBCUTANEOUS at 11:07

## 2020-11-30 RX ADMIN — FAMOTIDINE 20 MG: 20 TABLET, FILM COATED ORAL at 08:11

## 2020-11-30 RX ADMIN — ACETAMINOPHEN 650 MG: 325 TABLET ORAL at 08:28

## 2020-11-30 RX ADMIN — ALBUTEROL SULFATE 2 PUFF: 108 INHALANT RESPIRATORY (INHALATION) at 20:00

## 2020-11-30 RX ADMIN — ALBUTEROL SULFATE 2 PUFF: 108 INHALANT RESPIRATORY (INHALATION) at 08:17

## 2020-11-30 RX ADMIN — VENLAFAXINE HYDROCHLORIDE 75 MG: 75 CAPSULE, EXTENDED RELEASE ORAL at 08:28

## 2020-11-30 RX ADMIN — CEFTRIAXONE SODIUM 1 G: 1 INJECTION, POWDER, FOR SOLUTION INTRAMUSCULAR; INTRAVENOUS at 10:53

## 2020-11-30 RX ADMIN — ALBUTEROL SULFATE 2 PUFF: 108 INHALANT RESPIRATORY (INHALATION) at 12:08

## 2020-11-30 RX ADMIN — ENOXAPARIN SODIUM 40 MG: 40 INJECTION SUBCUTANEOUS at 08:10

## 2020-11-30 RX ADMIN — FUROSEMIDE 20 MG: 20 TABLET ORAL at 17:13

## 2020-11-30 RX ADMIN — INSULIN GLARGINE 30 UNITS: 100 INJECTION, SOLUTION SUBCUTANEOUS at 08:11

## 2020-11-30 RX ADMIN — GABAPENTIN 300 MG: 300 CAPSULE ORAL at 08:11

## 2020-11-30 RX ADMIN — FUROSEMIDE 20 MG: 20 TABLET ORAL at 08:11

## 2020-11-30 RX ADMIN — AZITHROMYCIN MONOHYDRATE 250 MG: 250 TABLET ORAL at 08:11

## 2020-11-30 RX ADMIN — GABAPENTIN 300 MG: 300 CAPSULE ORAL at 17:13

## 2020-11-30 NOTE — ROUTINE PROCESS
Blood drawn for labs. Pt awake and alert. Denies any pain or discomfort at this time. VSS, NAD. Temp now 98.8 orally. Pt has gotten OOB to [facility] x2 to urinate, no further BMs. Will continue to monitor.

## 2020-11-30 NOTE — ROUTINE PROCESS
Pt has had 2nd large BM for this shift. States \"stomach is grumbling\". 1L NS complete and IVF DC'd per orders. Denies any other complaints at this time. VSS, NAD. Will report to oncoming nurse at 0700 change of shift during bedside rounds.

## 2020-11-30 NOTE — PROGRESS NOTES
Progress Note    Patient: Abel Marmolejo MRN: [id number]  SSN: [id number]    YOB: 1955  Age: 63 y.o. Sex: female      Admit Date: 11/29/2020    LOS: 1 day     Subjective:   Rosalee Odonnell is a 61yo female with PMHx of HTN, DM, breast CA, high cholesterol, MI, CHF (EF unknown) and GERD admitted 11/29 with reports of progressively worsening shortness of breath, unresponsive to home albuterol or intermittent use of her  home oxygen. Initial Trop negative, EKG NSR @87,Creat mildly elevated art 1.6. CXR revealed small bilat pleural effusions with bibasilar interstitial opacities at bilateral lung bases.   On assessment patient awake in bed alert & oriented X3 with no signs of acute distress. Room air trial reveals O2 desaturation to 88%. Otherwise vitals relatively stable, afebrile, with no leukocytosis. Given her presenting symptoms, she is a PUI for Covid 19. Discussed need for continued inpatient care, all questions answered. Objective:     Vitals:    11/30/20 0830 11/30/20 1126 11/30/20 1156 11/30/20 1208   BP: (!) 158/65 134/66     Pulse: 99 95 99    Resp: 12 15     Temp: 98.7 °F (37.1 °C) 98.8 °F (37.1 °C)     SpO2:    95%   Weight:       Height:            Intake and Output:  Current Shift: No intake/output data recorded. Last three shifts: 11/28 1901 - 11/30 0700  In: 2336.3 [P.O.:1250; I.V.:1086.3]  Out: -     Physical Exam:   GENERAL: alert, cooperative, no distress  THROAT & NECK: normal and no erythema or exudates noted. LUNG: clear to auscultation bilaterally, rales R base, L base  HEART: regular rate and rhythm, S1, S2 normal, no murmur, click, rub or gallop  ABDOMEN: soft, non-tender.  Bowel sounds normal. No masses,  no organomegaly, abnormal findings:  obese  EXTREMITIES:  extremities normal, atraumatic, edema 2+ BLE   SKIN: Normal. and no rash or abnormalities  NEUROLOGIC: alert, oriented x3 CN II-XII intact, no focal deficits  PSYCHIATRIC: non focal    Lab/Data Review: All lab results for the last 24 hours reviewed.          Assessment:     Principal Problem:    COPD with acute exacerbation ([facility] (11/29/2020)    Active Problems:    Pneumonia (9/30/2020)      HTN (hypertension) (10/1/2020)      Diabetic neuropathy ([facility] (10/1/2020)      DVT prophylaxis (10/1/2020)      Person under investigation for COVID-19 (11/29/2020)      COPD (chronic obstructive pulmonary disease) ([facility] (11/29/2020)      DM (diabetes mellitus) ([place] [address] (11/29/2020)        Plan:     COPD with acute exacerbation (HCC)  - Albuterol MDI  Q4 hours per RT  - Solumedrol 40mg BID  - Continue Azithromycin  - Supplemental O2 via NC to maintain O2sat > 92%  - Consult to RT to qualify pt for home O2     DM (diabetes mellitus) ([place] [address]  - Accuchecks AC & HS  - SSI coverage   - Lantus 30 Units daily  - Tradjenta 5mg daily  - Carb controlled diabetic diet.     Person under investigation for COVID-19  - Observe Droplet Plus precautions  - COVID PCR collected, resutls pending  - Hypoxic oon admission w o2 sat, 88% on RA       Diabetic neuropathy (HCC)  - Continue Gabapentin 300mg BID        HTN (hypertension)  - Continue Amlodipine 10mg QD  - Lasix 20mg BID  - HCTZ 12.5mg QD  - Lisinopril 20mg QD  - Metoprolol 50 mg QD  - Monitor BP per unit protocol     Pneumonia  - CXR completed and reviewed  - Continue Zithromycin   - Supplemetnal O2 as above  - BLD CX x 2 pending  - Incentive Spirometer TID-QID     AM Labs: CBC, BMP    Code Status: Full Code  VTE ppx: Lovenox 40mg daily  Medical Proxy: Renea De Oliveira (sister) [phone]    Total time spent in consultation and coordination of care - 35 mins     Signed By: Bety Harmon NP     November 30, 2020

## 2020-11-30 NOTE — PROGRESS NOTES
Pt states she is ready to be discharged. States she has a lot to get done to prepare for her husbands  and work on arrangements.  made aware and will make NP aware when she makes rounds.

## 2020-11-30 NOTE — PROGRESS NOTES
Comprehensive Nutrition Assessment    Type and Reason for Visit: Initial    Nutrition Recommendations/Plan: continue diabetic CCHO diet with 2G Na restriction and 1500 mL fluid restriction     Nutrition Assessment:  64 yo female PMH: HTN, DM, Breast Cancer, HLD, MI, CHF, GERD. admitted due to increasing SOB pt PUI for COVID-19 Pt in beginning stages of heart failure with LE +3 pitting edema pt on 1500 mL fluid restriction and Low Na diet glucose elevated likely due to solumedrol  Pt is eating % of meals  Pt with BMI of 43.4 using ABW of 142 lbs or 64 kg to calculate nutritional needs. Pt provided with Nutrition care manual CHF and wt loss nutrition therapy handout by nursing due to COVID-19 isolation. Recent Results (from the past 24 hour(s))   GLUCOSE, POC    Collection Time: 11/29/20  9:47 PM   Result Value Ref Range    Glucose (POC) 373 (H) 70 - 110 mg/dL    Performed by Edith Zambrano    LACTIC ACID    Collection Time: 11/30/20  3:50 AM   Result Value Ref Range    Lactic acid 2.2 (HH) 0.5 - 2.0 mmol/L   CBC WITH AUTOMATED DIFF    Collection Time: 11/30/20  3:50 AM   Result Value Ref Range    WBC 12.9 4.6 - 13.2 K/uL    RBC 4.41 4.20 - 5.30 M/uL    HGB 10.4 (L) 12.0 - 16.0 g/dL    HCT 35.3 35.0 - 45.0 %    MCV 80.0 74.0 - 97.0 FL    MCH 23.6 (L) 24.0 - 34.0 PG    MCHC 29.5 (L) 31.0 - 37.0 g/dL    RDW 18.2 (H) 11.6 - 14.5 %    PLATELET 256 (H) 940 - 420 K/uL    MPV 9.2 9.2 - 11.8 FL    NEUTROPHILS 91 %    BAND NEUTROPHILS 4 %    LYMPHOCYTES 5 %    MONOCYTES 0 %    EOSINOPHILS 0 %    BASOPHILS 0 %    IMMATURE GRANULOCYTES 0 %    ABS. NEUTROPHILS 12.3 K/UL    ABS. LYMPHOCYTES 0.6 K/UL    ABS. MONOCYTES 0.0 K/UL    ABS. EOSINOPHILS 0.0 K/UL    ABS. BASOPHILS 0.0 K/UL    ABS. IMM.  GRANS. 0.0 K/UL    DF Manual      PLATELET COMMENTS PLATELETS APPEAR INCREASED     RBC COMMENTS Anisocytosis  1+        RBC COMMENTS Hypochromia  1+        WBC COMMENTS Toxic Granulation     METABOLIC PANEL, COMPREHENSIVE Collection Time: 11/30/20  3:50 AM   Result Value Ref Range    Sodium 139 135 - 145 mmol/L    Potassium 4.0 3.2 - 5.1 mmol/L    Chloride 104 94 - 111 mmol/L    CO2 26 21 - 33 mmol/L    Anion gap 9 mmol/L    Glucose 278 (H) 70 - 110 mg/dL    BUN 20 9 - 21 mg/dL    Creatinine 1.70 (H) 0.70 - 1.20 mg/dL    BUN/Creatinine ratio 12      GFR est AA 37 ml/min/1.73m2    GFR est non-AA 30 ml/min/1.73m2    Calcium 8.9 8.5 - 10.5 mg/dL    Bilirubin, total 0.4 0.2 - 1.0 mg/dL    AST (SGOT) 16 14 - 74 U/L    ALT (SGPT) 13 3 - 52 U/L    Alk. phosphatase 67 38 - 126 U/L    Protein, total 7.5 6.1 - 8.4 g/dL    Albumin 3.4 (L) 3.5 - 4.7 g/dL    Globulin 4.1 g/dL    A-G Ratio 0.8     GLUCOSE, POC    Collection Time: 11/30/20  3:54 AM   Result Value Ref Range    Glucose (POC) 261 (H) 70 - 110 mg/dL    Performed by Marquez Segundo    GLUCOSE, POC    Collection Time: 11/30/20  7:37 AM   Result Value Ref Range    Glucose (POC) 269 (H) 70 - 110 mg/dL    Performed by [employer]    GLUCOSE, POC    Collection Time: 11/30/20 10:58 AM   Result Value Ref Range    Glucose (POC) 337 (H) 70 - 110 mg/dL    Performed by [employer]        Malnutrition Assessment:  Malnutrition Status:  No malnutrition    Context:  Acute illness     Findings of the 6 clinical characteristics of malnutrition:   Energy Intake:  No significant decrease in energy intake  Weight Loss:  Unable to assess     Body Fat Loss:  Unable to assess,     Muscle Mass Loss:  Unable to assess,    Fluid Accumulation:  Unable to assess,     Strength:  Not performed         Estimated Daily Nutrient Needs:  Energy (kcal): 2799-6082 kcal/day; Weight Used for Energy Requirements: Adjusted(64 kg)  Protein (g): 64-77 g/day; Weight Used for Protein Requirements: Adjusted(1-1.2 g/kg)  Fluid (ml/day): 0872-8540 mL/day; Method Used for Fluid Requirements: 1 ml/kcal      Nutrition Related Findings:  increased SOB at home pt PUI for COVID-19 results pending.  Pt eaitng % of meals per nursing      Wounds:    None       Current Nutrition Therapies:  DIET DIABETIC WITH OPTIONS Consistent Carb 1800kcal; Regular; 2 GM NA (House Low NA); FR 1500ML    Anthropometric Measures:  · Height:  5' 2\" (157.5 cm)  · Current Body Wt:  107.5 kg (237 lb)   · Admission Body Wt:  237 lb    · Usual Body Wt:        · Ideal Body Wt:  110 lbs:  215.5 %   · Adjusted Body Weight:   ; Weight Adjustment for: (142 lbs  or 64 kg)   · Adjusted BMI:       · BMI Category:  Obese class 3 (BMI 40.0 or greater)       Nutrition Diagnosis:   · Limited adherence to nutrition-related recommendations related to impaired respiratory function as evidenced by lab values      Nutrition Interventions:   Food and/or Nutrient Delivery: Continue current diet  Nutrition Education and Counseling: Education not indicated  Coordination of Nutrition Care: Continue to monitor while inpatient    Goals:  glucose , Hgb A1c < 7, Pt to eat > 75% of meals, BM q 1-3 days, gradual wt loss encouraged       Nutrition Monitoring and Evaluation:   Behavioral-Environmental Outcomes:    Food/Nutrient Intake Outcomes: Food and nutrient intake  Physical Signs/Symptoms Outcomes: Biochemical data, Meal time behavior, Nutrition focused physical findings, Weight     F/U 12/3/2020    Discharge Planning:    Continue current diet, Recommend pursue outpatient nutrition counseling(for wt loss)     Electronically signed by Shae Palencia on 11/30/2020 at 4:21 PM    Contact: LACEY [phone]

## 2020-11-30 NOTE — ROUTINE PROCESS
Pt resting quietly. Still awake and alert. VSS, NAD. Temp down to 99.2. Pt denies any complaints. Will continue to monitor.

## 2020-11-30 NOTE — ROUTINE PROCESS
Report rec'd from Linus Bishop LPN at 9163 change of shift during bedside rounds. Pt lying in bed, watching TV, in NAD. Denies any complaints or needs at this time. VSS.

## 2020-11-30 NOTE — PROGRESS NOTES
Morning meds provided without issue. Pt complaining of pain to knee from an old replacement that is \"stiff\". Tylenol provided. Pt denies other needs at this time.  Call bell is within reach

## 2020-11-30 NOTE — ROUTINE PROCESS
Pt had temp 100.0 earlier. Tylenol 650 mg admin po, will monitor results. Pt denies any pain or discomfort. Pt very, very talkative. Pleasant and cooperative. Very positive attitude, laughing and joking. Possible that 's death this a.m. has not been accepted at this time. Pt OOB to [facility] without assist and without any difficulty. Pt's SaO2 97% with pt on 2LNC. Pt fully capable of talking in complete sentences, without stopping to take a breath. No resp distress noted. Pt had large BM in BSC, soft, non-formed. Call bell in hand. Pt's FSBS @ 2200 was 373, covered with 10u, HERMILO Johnston notified per protocol.

## 2020-12-01 LAB
ANION GAP SERPL CALC-SCNC: 5 MMOL/L
BASOPHILS # BLD: 0 K/UL
BASOPHILS NFR BLD: 0 %
BUN SERPL-MCNC: 29 MG/DL (ref 9–21)
BUN/CREAT SERPL: 16
CA-I BLD-MCNC: 8.8 MG/DL (ref 8.5–10.5)
CHLORIDE SERPL-SCNC: 108 MMOL/L (ref 94–111)
CO2 SERPL-SCNC: 29 MMOL/L (ref 21–33)
CREAT SERPL-MCNC: 1.8 MG/DL (ref 0.7–1.2)
DIFFERENTIAL METHOD BLD: ABNORMAL
EOSINOPHIL # BLD: 0.2 K/UL
EOSINOPHIL NFR BLD: 1 %
ERYTHROCYTE [DISTWIDTH] IN BLOOD BY AUTOMATED COUNT: 18.6 % (ref 11.6–14.5)
GLUCOSE BLD STRIP.AUTO-MCNC: 278 MG/DL (ref 70–110)
GLUCOSE BLD STRIP.AUTO-MCNC: 355 MG/DL (ref 70–110)
GLUCOSE BLD STRIP.AUTO-MCNC: 367 MG/DL (ref 70–110)
GLUCOSE BLD STRIP.AUTO-MCNC: 369 MG/DL (ref 70–110)
GLUCOSE SERPL-MCNC: 267 MG/DL (ref 70–110)
HCT VFR BLD AUTO: 32.7 % (ref 35–45)
HGB BLD-MCNC: 9.3 G/DL (ref 12–16)
IMM GRANULOCYTES # BLD AUTO: 0 K/UL
IMM GRANULOCYTES NFR BLD AUTO: 0 %
LYMPHOCYTES # BLD: 0.3 K/UL
LYMPHOCYTES NFR BLD: 2 %
MCH RBC QN AUTO: 23.1 PG (ref 24–34)
MCHC RBC AUTO-ENTMCNC: 28.4 G/DL (ref 31–37)
MCV RBC AUTO: 81.3 FL (ref 74–97)
MONOCYTES # BLD: 0.2 K/UL
MONOCYTES NFR BLD: 1 %
NEUTS SEG # BLD: 16.6 K/UL
NEUTS SEG NFR BLD: 96 %
PERFORMED BY, TECHID: ABNORMAL
PLATELET # BLD AUTO: 462 K/UL (ref 135–420)
PLATELET COMMENTS,PCOM: ABNORMAL
PMV BLD AUTO: 9.1 FL (ref 9.2–11.8)
POTASSIUM SERPL-SCNC: 4.4 MMOL/L (ref 3.2–5.1)
RBC # BLD AUTO: 4.02 M/UL (ref 4.2–5.3)
RBC MORPH BLD: ABNORMAL
RBC MORPH BLD: ABNORMAL
SODIUM SERPL-SCNC: 142 MMOL/L (ref 135–145)
WBC # BLD AUTO: 17.3 K/UL (ref 4.6–13.2)

## 2020-12-01 PROCEDURE — 85025 COMPLETE CBC W/AUTO DIFF WBC: CPT

## 2020-12-01 PROCEDURE — 94640 AIRWAY INHALATION TREATMENT: CPT

## 2020-12-01 PROCEDURE — 74011250636 HC RX REV CODE- 250/636: Performed by: INTERNAL MEDICINE

## 2020-12-01 PROCEDURE — 94760 N-INVAS EAR/PLS OXIMETRY 1: CPT

## 2020-12-01 PROCEDURE — 77010033678 HC OXYGEN DAILY

## 2020-12-01 PROCEDURE — 74011250637 HC RX REV CODE- 250/637: Performed by: INTERNAL MEDICINE

## 2020-12-01 PROCEDURE — 36415 COLL VENOUS BLD VENIPUNCTURE: CPT

## 2020-12-01 PROCEDURE — 74011250636 HC RX REV CODE- 250/636: Performed by: NURSE PRACTITIONER

## 2020-12-01 PROCEDURE — 74011636637 HC RX REV CODE- 636/637: Performed by: NURSE PRACTITIONER

## 2020-12-01 PROCEDURE — 74011000258 HC RX REV CODE- 258: Performed by: NURSE PRACTITIONER

## 2020-12-01 PROCEDURE — 65270000029 HC RM PRIVATE

## 2020-12-01 PROCEDURE — 80048 BASIC METABOLIC PNL TOTAL CA: CPT

## 2020-12-01 PROCEDURE — 82962 GLUCOSE BLOOD TEST: CPT

## 2020-12-01 PROCEDURE — 74011250637 HC RX REV CODE- 250/637: Performed by: NURSE PRACTITIONER

## 2020-12-01 PROCEDURE — 94761 N-INVAS EAR/PLS OXIMETRY MLT: CPT

## 2020-12-01 RX ORDER — FUROSEMIDE 10 MG/ML
40 INJECTION INTRAMUSCULAR; INTRAVENOUS 2 TIMES DAILY
Status: DISCONTINUED | OUTPATIENT
Start: 2020-12-01 | End: 2020-12-01

## 2020-12-01 RX ORDER — FUROSEMIDE 10 MG/ML
40 INJECTION INTRAMUSCULAR; INTRAVENOUS DAILY
Status: DISCONTINUED | OUTPATIENT
Start: 2020-12-01 | End: 2020-12-02 | Stop reason: HOSPADM

## 2020-12-01 RX ADMIN — INSULIN LISPRO 10 UNITS: 100 INJECTION, SOLUTION INTRAVENOUS; SUBCUTANEOUS at 11:45

## 2020-12-01 RX ADMIN — AMLODIPINE BESYLATE 10 MG: 5 TABLET ORAL at 08:41

## 2020-12-01 RX ADMIN — Medication 3 MG: at 21:23

## 2020-12-01 RX ADMIN — FUROSEMIDE 20 MG: 20 TABLET ORAL at 08:42

## 2020-12-01 RX ADMIN — CEFTRIAXONE SODIUM 1 G: 1 INJECTION, POWDER, FOR SOLUTION INTRAMUSCULAR; INTRAVENOUS at 11:45

## 2020-12-01 RX ADMIN — INSULIN GLARGINE 30 UNITS: 100 INJECTION, SOLUTION SUBCUTANEOUS at 08:43

## 2020-12-01 RX ADMIN — FUROSEMIDE 40 MG: 10 INJECTION, SOLUTION INTRAMUSCULAR; INTRAVENOUS at 16:37

## 2020-12-01 RX ADMIN — ALBUTEROL SULFATE 2 PUFF: 108 INHALANT RESPIRATORY (INHALATION) at 08:00

## 2020-12-01 RX ADMIN — GABAPENTIN 300 MG: 300 CAPSULE ORAL at 17:52

## 2020-12-01 RX ADMIN — INSULIN LISPRO 10 UNITS: 100 INJECTION, SOLUTION INTRAVENOUS; SUBCUTANEOUS at 16:37

## 2020-12-01 RX ADMIN — VENLAFAXINE HYDROCHLORIDE 75 MG: 75 CAPSULE, EXTENDED RELEASE ORAL at 07:26

## 2020-12-01 RX ADMIN — METHYLPREDNISOLONE SODIUM SUCCINATE 40 MG: 40 INJECTION, POWDER, FOR SOLUTION INTRAMUSCULAR; INTRAVENOUS at 08:42

## 2020-12-01 RX ADMIN — ALBUTEROL SULFATE 2 PUFF: 108 INHALANT RESPIRATORY (INHALATION) at 13:35

## 2020-12-01 RX ADMIN — AZITHROMYCIN MONOHYDRATE 250 MG: 250 TABLET ORAL at 08:42

## 2020-12-01 RX ADMIN — ATORVASTATIN CALCIUM 20 MG: 10 TABLET, FILM COATED ORAL at 08:42

## 2020-12-01 RX ADMIN — FAMOTIDINE 20 MG: 20 TABLET, FILM COATED ORAL at 08:41

## 2020-12-01 RX ADMIN — ACETAMINOPHEN 650 MG: 325 TABLET ORAL at 17:54

## 2020-12-01 RX ADMIN — GABAPENTIN 300 MG: 300 CAPSULE ORAL at 08:42

## 2020-12-01 RX ADMIN — INSULIN LISPRO 10 UNITS: 100 INJECTION, SOLUTION INTRAVENOUS; SUBCUTANEOUS at 21:45

## 2020-12-01 RX ADMIN — ENOXAPARIN SODIUM 40 MG: 40 INJECTION SUBCUTANEOUS at 08:42

## 2020-12-01 RX ADMIN — INSULIN LISPRO 6 UNITS: 100 INJECTION, SOLUTION INTRAVENOUS; SUBCUTANEOUS at 07:26

## 2020-12-01 RX ADMIN — TIMOLOL MALEATE 1 DROP: 5 SOLUTION/ DROPS OPHTHALMIC at 08:41

## 2020-12-01 RX ADMIN — ACETAMINOPHEN 650 MG: 325 TABLET ORAL at 08:41

## 2020-12-01 RX ADMIN — ALBUTEROL SULFATE 2 PUFF: 108 INHALANT RESPIRATORY (INHALATION) at 19:04

## 2020-12-01 NOTE — ROUTINE PROCESS
Pt resting comfortably in bed watching TV. Pt shows no signs of distress and has no c/o pain or needs at this time. CBWR, 2 side rails up and bed locked in lowest position. Pt COVID test negative. AIDEN Vance notified. Droplet precautions discontinued.

## 2020-12-01 NOTE — ROUTINE PROCESS
Morning labs drawn. Pt tolerated well. Pt resting comfortably in bed with eyes closed. Pt shows no signs of distress and has no c/o pain or needs at this time. CBWR, 2 side rails up and bed locked in lowest position.

## 2020-12-01 NOTE — PROGRESS NOTES
conducted an initial consultation and Spiritual Assessment for Nellie Aquino, who is a 63 y.o.,female. Patients Primary Language is: [place]. According to the patients EMR [employer] Affiliation is: [facility].     The reason the Patient came to the hospital is:   Patient Active Problem List    Diagnosis Date Noted    COPD with acute exacerbation ([address] 11/29/2020    Person under investigation for COVID-19 11/29/2020    COPD (chronic obstructive pulmonary disease) ([address] 11/29/2020    DM (diabetes mellitus) ([address] 11/29/2020    COVID-19 10/01/2020    HTN (hypertension) 10/01/2020    Diabetic neuropathy ([address] 10/01/2020    DVT prophylaxis 10/01/2020    Pneumonia 09/30/2020    Acute respiratory failure with hypoxia ([address] 09/30/2020    Acute renal failure (ARF) ([address] 09/30/2020    Type 2 diabetes mellitus with hyperglycemia, with long-term current use of insulin ([address] 09/30/2020    S/P lumbar fusion 10/16/2018    CAP (community acquired pneumonia) 09/01/2018    Shortness of breath 09/01/2018    Fever and chills 09/01/2018    Spinal stenosis of lumbar region at multiple levels 08/30/2018    Scoliosis 08/30/2018    Spinal stenosis 08/30/2018    Pre-op testing 08/27/2018    Severe obesity (BMI 35.0-39.9) 06/20/2018    Spinal stenosis, lumbar region without neurogenic claudication 05/16/2018    Lumbosacral spondylosis without myelopathy 04/25/2018    DDD (degenerative disc disease), lumbar 04/25/2018    Malignant neoplasm of female breast ([address] 04/25/2018        The  provided the following Interventions:  Initiated a relationship of care and support. Explored issues of ct, spirituality and/or Temple needs while hospitalized. Listened empathically. Provided chaplaincy education. Provided information about Spiritual Care Services. Offered prayer and assurance of continued prayers on patient's behalf. Chart reviewed.     The following outcomes were achieved:  Patient shared some information about their medical narrative and spiritual journey/beliefs. Patient processed feeling about current hospitalization. Patient expressed gratitude for the 's visit. Assessment:  Patient did not indicate any spiritual or Mormon issues which require Spiritual Care Services interventions at this time. Patient does not have any Mormon/cultural needs that will affect patients preferences in health care. Plan:  Chaplains will continue to follow and will provide pastoral care on an as needed or requested basis.  recommends bedside caregivers page  on duty if patient shows signs of acute spiritual or emotional distress.     [address] [facility]   Staff [address] [facility]   [phone]

## 2020-12-01 NOTE — PROGRESS NOTES
Problem: Falls - Risk of  Goal: *Absence of Falls  Description: Document William Click Fall Risk and appropriate interventions in the flowsheet.   Outcome: Progressing Towards Goal  Note: Fall Risk Interventions:                                Problem: Chronic Obstructive Pulmonary Disease (COPD)  Goal: *Oxygen saturation during activity within specified parameters  Outcome: Progressing Towards Goal  Goal: *Absence of hypoxia  Outcome: Progressing Towards Goal

## 2020-12-01 NOTE — PROGRESS NOTES
Prior to the pt Walking Pulse Ox Test, the pt resting room air oxygen saturation was 93% and HR 91 BPM. After walking approx. 45 seconds, the pt oxygen saturation decreased to 84% and HR increased to 126 BPM requiring her to stop and rest in chair. Oxygen at 2L was applied causing her oxygen saturation to increased to 94% and  BPM.  The pt was able to complete the Six Minute Walking Test while maintaining her oxygen saturation at 94% and  BPM.  The pt was returned to bed on 2L of oxygen. The MD/RN were notified of the results.

## 2020-12-01 NOTE — ROUTINE PROCESS
Verbal shift change report given to KATERYNA HuynhRN (oncoming nurse) by LOAN Caballero RN (offgoing nurse). Report included the following information SBAR, Kardex, Intake/Output and MAR.

## 2020-12-01 NOTE — ROUTINE PROCESS
Pt resting comfortably in bed with eyes closed. Pt shows no signs of distress and has no c/o pain or needs at this time. CBWR, 2 side rails up and bed locked in lowest position.

## 2020-12-01 NOTE — PROGRESS NOTES
Per RT, patient qualifies for home O2, order and documentation submitted to [facility].

## 2020-12-01 NOTE — ROUTINE PROCESS
Assumed care of pt. Pt sitting in bed watching TV. Pt shows no signs of distress and has no c/o pain or needs at this time. CBWR, 2 side rails up and bed locked in lowest position.

## 2020-12-01 NOTE — PROGRESS NOTES
Physician Progress Note      Jhon Villanueva  CSN #:                  [id number]  :                       1955  ADMIT DATE:       2020 8:56 AM  DISCH DATE:  RESPONDING  PROVIDER #:        Alli Dukes MD          QUERY TEXT:    Dear hospitalist,    Pt admitted with pneumonia and has CHF documented per patient statement. If possible, please document in progress notes and discharge summary further specificity regarding the type and acuity of CHF:    The medical record reflects the following:    Risk Factors: pneumonia, COPD    Clinical Indicators: previous echo dated 10/1/20: Normal cavity size, wall thickness and systolic function (ejection fraction normal). Wall motion: normal. The estimated EF is 55 - 60%. Visually measured ejection fraction. There is mild (grade 1) left ventricular diastolic dysfunction. Treatment: receiving PO Lasix, PO Norvasc ; given IV Lasix    Please call me for any questions,  Thank you,  Wolf Ivan RN, [facility]  [phone]  Options provided:  -- Acute on Chronic Diastolic CHF/HFpEF  -- Chronic Diastolic CHF/HFpEF  -- CHF ruled out  -- Other - I will add my own diagnosis  -- Disagree - Not applicable / Not valid  -- Disagree - Clinically unable to determine / Unknown  -- Refer to Clinical Documentation Reviewer    PROVIDER RESPONSE TEXT:    This patient is in acute on chronic diastolic CHF/HFpEF.     Query created by: Leon Hendricks on 2020 1:55 PM      Electronically signed by:  Alil Dukes MD 2020 8:20 AM

## 2020-12-01 NOTE — PROGRESS NOTES
HOSPITALIST PROGRESS NOTE  aMru Renee MD, [address]         Daily Progress Note: 12/1/2020      Subjective:   Patient is alert and oriented x4. Sitting in bedside chair and eating breakfast.    Shortness of breath and wheezing improved significantly however continues to be dependent on 2 LNC O2. No overnight fevers/chills, chest pain, nausea/vomiting abdominal pain noted. Counseled patient on requiring further evaluation by respiratory therapy for home O2 requirement with advanced COPD noted.     Medications reviewed  Current Facility-Administered Medications   Medication Dose Route Frequency    albuterol (PROVENTIL HFA, VENTOLIN HFA, PROAIR HFA) inhaler 2 Puff  2 Puff Inhalation TID RT    cefTRIAXone (ROCEPHIN) 1 g in 0.9% sodium chloride (MBP/ADV) 50 mL MBP  1 g IntraVENous Q24H    sodium chloride (NS) flush 5-40 mL  5-40 mL IntraVENous PRN    acetaminophen (TYLENOL) tablet 650 mg  650 mg Oral Q6H PRN    Or    acetaminophen (TYLENOL) suppository 650 mg  650 mg Rectal Q6H PRN    polyethylene glycol (MIRALAX) packet 17 g  17 g Oral DAILY PRN    enoxaparin (LOVENOX) injection 40 mg  40 mg SubCUTAneous DAILY    ondansetron (ZOFRAN) injection 4 mg  4 mg IntraVENous Q6H PRN    famotidine (PEPCID) tablet 20 mg  20 mg Oral DAILY    azithromycin (ZITHROMAX) tablet 250 mg  250 mg Oral DAILY    methylPREDNISolone (PF) (SOLU-MEDROL) injection 40 mg  40 mg IntraVENous Q12H    amLODIPine (NORVASC) tablet 10 mg  10 mg Oral DAILY    furosemide (LASIX) tablet 20 mg  20 mg Oral BID    gabapentin (NEURONTIN) capsule 300 mg  300 mg Oral BID    melatonin tablet 3 mg  3 mg Oral QHS    timolol (TIMOPTIC) 0.5 % ophthalmic solution 1 Drop  1 Drop Both Eyes DAILY    venlafaxine-SR (EFFEXOR-XR) capsule 75 mg  75 mg Oral DAILY WITH BREAKFAST    atorvastatin (LIPITOR) tablet 20 mg  20 mg Oral DAILY    insulin glargine (LANTUS) injection 30 Units  30 Units SubCUTAneous DAILY    insulin lispro (HUMALOG) injection   SubCUTAneous AC&HS       Review of Systems:   A comprehensive review of systems was negative except for that written in the HPI. Objective:   Physical Exam:     Visit Vitals  BP (!) 164/80   Pulse 93   Temp 98.5 °F (36.9 °C)   Resp 21   Ht 5' 2\" (1.575 m)   Wt 105.2 kg (231 lb 14.8 oz)   SpO2 96%   BMI 42.42 kg/m²    O2 Flow Rate (L/min): 2 l/min O2 Device: Nasal cannula    Temp (24hrs), Av.3 °F (36.8 °C), Min:98.1 °F (36.7 °C), Max:98.5 °F (36.9 °C)    No intake/output data recorded.  1901 -  0700  In: 2950 [P.O.:2350; I.V.:600]  Out: -     General:  Alert, cooperative, no distress, appears stated age. Lungs:    Diminished bilateral breath sounds with mild bibasilar Rales without any rhonchi or wheezing noted. Chest wall:  No tenderness or deformity. Heart:  Regular rate and rhythm, S1, S2 normal, no murmur, click, rub or gallop. Abdomen:   Soft, non-tender. Bowel sounds normal. No masses,  No organomegaly. Extremities: Extremities normal, atraumatic, no cyanosis or edema. Pulses: 2+ and symmetric all extremities. Skin: Skin color, texture, turgor normal. No rashes or lesions   Neurologic: No gross sensory or motor deficits     Data Review:       Recent Days:  Recent Labs     20  0325 20  0350 20  0905   WBC 17.3* 12.9 11.2   HGB 9.3* 10.4* 10.3*   HCT 32.7* 35.3 35.0   * 493* 484*     Recent Labs     20  0325 20  0350 20  0905    139 141   K 4.4 4.0 4.1    104 102   CO2 29 26 29   * 278* 137*   BUN 29* 20 15   CREA 1.80* 1.70* 1.60*   CA 8.8 8.9 9.0   MG  --   --  2.2   ALB  --  3.4*  --    TBILI  --  0.4  --    ALT  --  13  --      No results for input(s): PH, PCO2, PO2, HCO3, FIO2 in the last 72 hours.     24 Hour Results:  Recent Results (from the past 24 hour(s))   GLUCOSE, POC    Collection Time: 20  4:48 PM   Result Value Ref Range    Glucose (POC) 373 (H) 70 - 110 mg/dL    Performed by Liv Louis    CBC WITH AUTOMATED DIFF    Collection Time: 12/01/20  3:25 AM   Result Value Ref Range    WBC 17.3 (H) 4.6 - 13.2 K/uL    RBC 4.02 (L) 4.20 - 5.30 M/uL    HGB 9.3 (L) 12.0 - 16.0 g/dL    HCT 32.7 (L) 35.0 - 45.0 %    MCV 81.3 74.0 - 97.0 FL    MCH 23.1 (L) 24.0 - 34.0 PG    MCHC 28.4 (L) 31.0 - 37.0 g/dL    RDW 18.6 (H) 11.6 - 14.5 %    PLATELET 545 (H) 150 - 420 K/uL    MPV 9.1 (L) 9.2 - 11.8 FL    NEUTROPHILS 96 %    LYMPHOCYTES 2 %    MONOCYTES 1 %    EOSINOPHILS 1 %    BASOPHILS 0 %    IMMATURE GRANULOCYTES 0 %    ABS. NEUTROPHILS 16.6 K/UL    ABS. LYMPHOCYTES 0.3 K/UL    ABS. MONOCYTES 0.2 K/UL    ABS. EOSINOPHILS 0.2 K/UL    ABS. BASOPHILS 0.0 K/UL    ABS. IMM. GRANS. 0.0 K/UL    DF Manual      PLATELET COMMENTS PLATELETS APPEAR INCREASED     RBC COMMENTS Anisocytosis  1+        RBC COMMENTS Hypochromia  3+       METABOLIC PANEL, BASIC    Collection Time: 12/01/20  3:25 AM   Result Value Ref Range    Sodium 142 135 - 145 mmol/L    Potassium 4.4 3.2 - 5.1 mmol/L    Chloride 108 94 - 111 mmol/L    CO2 29 21 - 33 mmol/L    Anion gap 5 mmol/L    Glucose 267 (H) 70 - 110 mg/dL    BUN 29 (H) 9 - 21 mg/dL    Creatinine 1.80 (H) 0.70 - 1.20 mg/dL    BUN/Creatinine ratio 16      GFR est AA 34 ml/min/1.73m2    GFR est non-AA 28 ml/min/1.73m2    Calcium 8.8 8.5 - 10.5 mg/dL   GLUCOSE, POC    Collection Time: 12/01/20  6:11 AM   Result Value Ref Range    Glucose (POC) 278 (H) 70 - 110 mg/dL    Performed by [address] [place] [address], POC    Collection Time: 12/01/20 11:34 AM   Result Value Ref Range    Glucose (POC) 355 (H) 70 - 110 mg/dL    Performed by Akshat Kenney            Assessment/Plan:     Acute hypoxic respiratory failure  Likely chronic respiratory failure with advanced COPD and mild diastolic heart failure noted.   Will consult respiratory therapy today for ambulating O2 and O2 saturation on rest to further document for requirement for chronic respiratory failure and home O2. COPD with acute exacerbation (HCC)  Albuterol MDI  Q4 hours per RT  Taper Solu-Medrol down 40 mg to Daily with improvement in bronchospasm  Continue Azithromycin  Discontinue Rocephin with no current indication. Supplemental O2 via NC to maintain O2sat > 92%  Consult to RT to qualify pt for home O2     Acute on chronic diastolic heart failure  Echocardiogram from October 2020 with preserved left regular systolic function and stage I diastolic heart failure without evidence for pulmonary hypertension noted. Chest x-ray with bilateral pleural effusions and mild groundglass opacity. Increase Lasix to 40 mg IV p.o. twice daily. COVID-19 Negative noted. DM (diabetes mellitus) ([address]  Accuchecks AC & HS  SSI coverage   Lantus 30 Units daily  Tradjenta 5mg daily  Carb controlled diabetic diet.      Negative COVID-19  Hypoxic on admission w o2 sat, 88% on RA     Diabetic neuropathy (HCC)  Continue Gabapentin 300mg BID    HTN (hypertension)  Continue Amlodipine, HCTZ, lisinopril and metoprolol     Pneumonia  Patient clinically does not seem to have a infectious process is no fevers and no significant fevers noted. Elevated WBC during hospitalization likely secondary to aggressive steroid/Solu-Medrol use. Discontinue Rocephin.     Code Status: Full Code    VTE ppx: Lovenox 40mg daily    Medical Proxy: Oralia Barker (sister) [phone]      Care Plan discussed with: Patient/Family, Nursing, Case management    Total time spent with patient: 35 minutes. With greater than 50% spent in coordination of care and counseling.     Jozef Pond MD

## 2020-12-02 VITALS
WEIGHT: 229.72 LBS | OXYGEN SATURATION: 99 % | BODY MASS INDEX: 42.27 KG/M2 | HEIGHT: 62 IN | TEMPERATURE: 98.2 F | HEART RATE: 78 BPM | DIASTOLIC BLOOD PRESSURE: 68 MMHG | RESPIRATION RATE: 18 BRPM | SYSTOLIC BLOOD PRESSURE: 168 MMHG

## 2020-12-02 LAB
ANION GAP SERPL CALC-SCNC: 7 MMOL/L
BASOPHILS # BLD: 0 K/UL (ref 0–0.1)
BASOPHILS NFR BLD: 0 % (ref 0–2)
BUN SERPL-MCNC: 37 MG/DL (ref 9–21)
BUN/CREAT SERPL: 21
CA-I BLD-MCNC: 8.8 MG/DL (ref 8.5–10.5)
CHLORIDE SERPL-SCNC: 105 MMOL/L (ref 94–111)
CO2 SERPL-SCNC: 28 MMOL/L (ref 21–33)
CREAT SERPL-MCNC: 1.8 MG/DL (ref 0.7–1.2)
EOSINOPHIL # BLD: 0 K/UL (ref 0–0.4)
EOSINOPHIL NFR BLD: 0 % (ref 0–5)
ERYTHROCYTE [DISTWIDTH] IN BLOOD BY AUTOMATED COUNT: 18.4 % (ref 11.6–14.5)
GLUCOSE BLD STRIP.AUTO-MCNC: 258 MG/DL (ref 70–110)
GLUCOSE BLD STRIP.AUTO-MCNC: 258 MG/DL (ref 70–110)
GLUCOSE BLD STRIP.AUTO-MCNC: 350 MG/DL (ref 65–100)
GLUCOSE SERPL-MCNC: 292 MG/DL (ref 70–110)
HCT VFR BLD AUTO: 34.4 % (ref 35–45)
HGB BLD-MCNC: 10.1 G/DL (ref 12–16)
IMM GRANULOCYTES # BLD AUTO: 0 K/UL
IMM GRANULOCYTES NFR BLD AUTO: 0 %
LYMPHOCYTES # BLD: 0.2 K/UL
LYMPHOCYTES NFR BLD: 1 %
MCH RBC QN AUTO: 23.6 PG (ref 24–34)
MCHC RBC AUTO-ENTMCNC: 29.4 G/DL (ref 31–37)
MCV RBC AUTO: 80.4 FL (ref 74–97)
MONOCYTES # BLD: 0 K/UL
MONOCYTES NFR BLD: 0 %
NEUTS BAND NFR BLD MANUAL: 2 %
NEUTS SEG # BLD: 15.4 K/UL
NEUTS SEG NFR BLD: 97 %
PERFORMED BY, TECHID: ABNORMAL
PLATELET # BLD AUTO: 478 K/UL (ref 135–420)
PMV BLD AUTO: 9.2 FL (ref 9.2–11.8)
POTASSIUM SERPL-SCNC: 3.9 MMOL/L (ref 3.2–5.1)
RBC # BLD AUTO: 4.28 M/UL (ref 4.2–5.3)
RBC MORPH BLD: ABNORMAL
SODIUM SERPL-SCNC: 140 MMOL/L (ref 135–145)
WBC # BLD AUTO: 15.6 K/UL (ref 4.6–13.2)

## 2020-12-02 PROCEDURE — 74011636637 HC RX REV CODE- 636/637: Performed by: NURSE PRACTITIONER

## 2020-12-02 PROCEDURE — 94664 DEMO&/EVAL PT USE INHALER: CPT

## 2020-12-02 PROCEDURE — 85025 COMPLETE CBC W/AUTO DIFF WBC: CPT

## 2020-12-02 PROCEDURE — 74011250636 HC RX REV CODE- 250/636: Performed by: NURSE PRACTITIONER

## 2020-12-02 PROCEDURE — 74011250637 HC RX REV CODE- 250/637: Performed by: INTERNAL MEDICINE

## 2020-12-02 PROCEDURE — 74011250636 HC RX REV CODE- 250/636: Performed by: INTERNAL MEDICINE

## 2020-12-02 PROCEDURE — 77010033678 HC OXYGEN DAILY

## 2020-12-02 PROCEDURE — 94760 N-INVAS EAR/PLS OXIMETRY 1: CPT

## 2020-12-02 PROCEDURE — 94640 AIRWAY INHALATION TREATMENT: CPT

## 2020-12-02 PROCEDURE — 80048 BASIC METABOLIC PNL TOTAL CA: CPT

## 2020-12-02 PROCEDURE — 74011250637 HC RX REV CODE- 250/637: Performed by: NURSE PRACTITIONER

## 2020-12-02 PROCEDURE — 82962 GLUCOSE BLOOD TEST: CPT

## 2020-12-02 RX ORDER — PREDNISONE 20 MG/1
40 TABLET ORAL DAILY
Qty: 6 TAB | Refills: 0 | Status: SHIPPED | OUTPATIENT
Start: 2020-12-02 | End: 2020-12-05

## 2020-12-02 RX ORDER — FUROSEMIDE 20 MG/1
40 TABLET ORAL DAILY
Qty: 60 TAB | Refills: 0 | Status: SHIPPED | OUTPATIENT
Start: 2020-12-02 | End: 2021-01-01

## 2020-12-02 RX ADMIN — FAMOTIDINE 20 MG: 20 TABLET, FILM COATED ORAL at 08:29

## 2020-12-02 RX ADMIN — ALBUTEROL SULFATE 2 PUFF: 108 INHALANT RESPIRATORY (INHALATION) at 07:27

## 2020-12-02 RX ADMIN — VENLAFAXINE HYDROCHLORIDE 75 MG: 75 CAPSULE, EXTENDED RELEASE ORAL at 07:55

## 2020-12-02 RX ADMIN — INSULIN LISPRO 6 UNITS: 100 INJECTION, SOLUTION INTRAVENOUS; SUBCUTANEOUS at 07:54

## 2020-12-02 RX ADMIN — POLYETHYLENE GLYCOL 3350 17 G: 17 POWDER, FOR SOLUTION ORAL at 08:40

## 2020-12-02 RX ADMIN — AZITHROMYCIN MONOHYDRATE 250 MG: 250 TABLET ORAL at 08:29

## 2020-12-02 RX ADMIN — TIMOLOL MALEATE 1 DROP: 5 SOLUTION/ DROPS OPHTHALMIC at 09:30

## 2020-12-02 RX ADMIN — METHYLPREDNISOLONE SODIUM SUCCINATE 40 MG: 40 INJECTION, POWDER, FOR SOLUTION INTRAMUSCULAR; INTRAVENOUS at 08:32

## 2020-12-02 RX ADMIN — ATORVASTATIN CALCIUM 20 MG: 10 TABLET, FILM COATED ORAL at 08:29

## 2020-12-02 RX ADMIN — ENOXAPARIN SODIUM 40 MG: 40 INJECTION SUBCUTANEOUS at 08:31

## 2020-12-02 RX ADMIN — GABAPENTIN 300 MG: 300 CAPSULE ORAL at 08:27

## 2020-12-02 RX ADMIN — AMLODIPINE BESYLATE 10 MG: 5 TABLET ORAL at 08:27

## 2020-12-02 RX ADMIN — INSULIN LISPRO 6 UNITS: 100 INJECTION, SOLUTION INTRAVENOUS; SUBCUTANEOUS at 11:54

## 2020-12-02 RX ADMIN — INSULIN GLARGINE 30 UNITS: 100 INJECTION, SOLUTION SUBCUTANEOUS at 08:30

## 2020-12-02 RX ADMIN — ACETAMINOPHEN 650 MG: 325 TABLET ORAL at 11:53

## 2020-12-02 RX ADMIN — FUROSEMIDE 40 MG: 10 INJECTION, SOLUTION INTRAMUSCULAR; INTRAVENOUS at 08:31

## 2020-12-02 NOTE — ROUTINE PROCESS
Pt OOB to BSC x1 to urinate. Back to bed without assist.  Denies any complaints, NAD.  VSS. Pt's SaO2 consistently 96 - 97% with pt on 2LNC. Pt now resting quietly with eyes closed.

## 2020-12-02 NOTE — ROUTINE PROCESS
Pt resting soundly with eyes closed. Did not wake when nurse in room to empty [facility] and obtain I&Os. VSS, NAD. Will report to oncoming nurse at 0700 change of shift during bedside rounds.

## 2020-12-02 NOTE — ROUTINE PROCESS
Pt states she feels much better today. States that her sister has \"taken care of all of the  arrangements so that I can be in here as long as it takes to get me better\". Seems in really good spirits. Pt told nurse all about her plans for her 's ashes, her financial situation and much more. Pt seems very relieved to have everything taken care of. Denies any c/o pain or discomfort. No needs voiced. Gave pt pitcher of fresh ice water. FSBS was 369, covered with 10u and notified NP. Will continue to monitor.

## 2020-12-02 NOTE — ROUTINE PROCESS
Pt resting quietly, awake and alert when nurse in room to obtain VS.  States she has \"really slept well, and had some really good dreams\". Denies any needs, no pain. Will continue to monitor.

## 2020-12-02 NOTE — PROGRESS NOTES
conducted a Follow up consultation and Spiritual Assessment for Carlita Morales, who is a 63 y.o.,female. The  provided the following Interventions:  Continued the relationship of care and support. Listened empathically. Offered assurance of continued prayer on patients behalf. Chart reviewed. The following outcomes were achieved:  Patient expressed gratitude for 's visit. Assessment:  There are no further spiritual or Hindu issues which require Spiritual Care Services interventions at this time. Plan:  Chaplains will continue to follow and will provide pastoral care on an as needed/requested basis.  recommends bedside caregivers page  on duty if patient shows signs of acute spiritual or emotional distress.        [id number]   [phone]

## 2020-12-02 NOTE — ROUTINE PROCESS
Pt signed D/C papers and IV removed. 02 tank arrived at this time - however the plus is broken and patient unable to use it for her D/C as the tank is unusable without the cord.

## 2020-12-02 NOTE — ROUTINE PROCESS
Pt OOB to [facility] without assist and back to bed. Denies any complaints or needs at this time. Denies any pain or discomfort. Call bell within reach, bed in lowest position.

## 2020-12-02 NOTE — DISCHARGE SUMMARY
HOSPITALIST DISCHARGE NOTE  Lionel Del Cid MD, [address]       PATIENT ID: Maria D Cadet  MRN: [id number]   YOB: 1955    DATE OF ADMISSION: 11/29/2020  8:56 AM    DATE OF DISCHARGE: 12/02/20    PRIMARY CARE PROVIDER: Neptali Conde MD     ATTENDING PHYSICIAN: Lionel Del Cid MD  DISCHARGING PROVIDER: Lionel Del Cid MD        CONSULTATIONS: None    PROCEDURES/SURGERIES: * No surgery found *    ADMITTING [address] [place] [address] COURSE:     63 y.o. female with PMHx of HTN, DM, breast CA, high cholesterol, MI, and GERD presents to the ED via EMS with complaints of increasing SOB x 3 days. SOB is unchanged after using Albuterol inhaler treatments but improves when she uses her 's home O2. Pt reports that her SOB worsened this morning after she woke up and found that her  had passed away during the night. Pt notes associated increased lower extremity swelling. She states that she was told she is \"in the beginning stages of heart failure\" and was prescribed Lasix 20 mg, which she has been taking as prescribed without improvement. She also endorses nausea, dizziness & lightheadedness beginning this morning. Denies chest pain, vomiting, diarrhea, fever, or chills.     Trop negative, EKG NSR @87,Creat mildly elevated art 1.6. CXR revealed small bilat pleural effusions with bibasilar interstitial opacities at bilateral lung bases.     On assessment patient awake in bed alert & oriented X3 with no signs of distress, discomfort, or pain. VS have remained stable and no acute events reported during admission process. Pt satting 94% on 3 LNC. +3 pitting edema noted to bilateral lower extremities. DISCHARGE DIAGNOSES / PLAN:      Acute on chronic hypoxic respiratory failure  Likely chronic respiratory failure with advanced COPD and mild diastolic heart failure noted.   Patient now requiring 2 L NC O2. As per respiratory therapy patient oxygen saturation down to 84% on room air with minimal exertion or ambulation. Patient has been qualified for home O2 and case management has arranged home O2 delivery.     COPD with acute exacerbation ([address]  Albuterol MDI  Q4 hours per RT  Tapered Solu-Medrol down 40 mg to Daily with improvement in bronchospasm  Continue Azithromycin  Discontinue Rocephin with no current indication. Supplemental O2 via NC to maintain O2sat > 92%  Patient will be discharged home on prednisone 40 mg daily for the next 3 to 4 days. Acute on chronic diastolic heart failure  Echocardiogram from October 2020 with preserved left regular systolic function and stage I diastolic heart failure without evidence for pulmonary hypertension noted. Chest x-ray with bilateral pleural effusions and mild groundglass opacity. Increased Lasix to 40 mg IV p.o. twice daily. COVID-19 Negative noted. Patient be discharged home on Lasix 40 mg daily.     Chronic renal failure stage III  Discontinue lisinopril and HCTZ. Also discontinued meloxicam.  Patient will need outpatient monitoring per nephrology since being treated with p.o. Lasix at home for CHF. Arrange appointment for nephrology outpatient. DM (diabetes mellitus) (HCC)  Accuchecks AC & HS  SSI coverage   Lantus 30 Units daily  Tradjenta 5mg daily  Carb controlled diabetic diet.      Negative COVID-19  Hypoxic on admission w o2 sat, 88% on RA     Diabetic neuropathy (HCC)  Continue Gabapentin 300mg BID     HTN (hypertension)  Continue Amlodipine  Discontinued HCTZ, lisinopril and metoprolol     Pneumonia - Ruled out  Patient clinically does not seem to have a infectious process is no fevers and no significant fevers noted. Elevated WBC during hospitalization likely secondary to aggressive steroid/Solu-Medrol use.   Discontinue Rocephin.     Code Status: Full Code      PENDING TEST RESULTS:   At the time of discharge the following test results are still pending: None    FOLLOW UP APPOINTMENTS:    Follow-up Information     Follow up With Specialties Details Why Becca Santillan MD Nephrology, Nephrology Go on 12/23/2020 at [phone] for follow up. Liam Gee  [phone]      Genevieve Mcdaniel MD Pulmonary Disease, Urgent Care, Internal Medicine Go on 1/20/2021 at 0930 for follow up. They have put patient on the cancelation list - and she will be called if anything becomes available prior to this date. [address]      Tip Humphreys MD Family Medicine   [address] [facility]  [facility]  [phone]             DIET: Diabetic Diet    ACTIVITY: Activity as tolerated      DISCHARGE MEDICATIONS:  Current Discharge Medication List      START taking these medications    Details   predniSONE (DELTASONE) 20 mg tablet Take 40 mg by mouth daily for 3 days. Qty: 6 Tab, Refills: 0         CONTINUE these medications which have CHANGED    Details   furosemide (LASIX) 20 mg tablet Take 2 Tabs by mouth daily for 30 days. Qty: 60 Tab, Refills: 0         CONTINUE these medications which have NOT CHANGED    Details   melatonin 3 mg tablet Take 3 mg by mouth nightly. famotidine (PEPCID) 20 mg tablet take 1 tablet by mouth twice a day      rosuvastatin (CRESTOR) 10 mg tablet       !! venlafaxine-SR (EFFEXOR-XR) 75 mg capsule       gabapentin (NEURONTIN) 300 mg capsule Take 300 mg by mouth two (2) times a day. LANTUS SOLOSTAR U-100 INSULIN 100 unit/mL (3 mL) inpn 30 units subcutaneously in morning. Can take an additional dose of 10 units subcutaneously in the evening if blood sugar is more than 200.   Qty: 10 mL, Refills: 0      albuterol (PROVENTIL HFA, VENTOLIN HFA, PROAIR HFA) 90 mcg/actuation inhaler 2 puffs three to four times a day for 5 days then every 6 hours as needed for shortness of breath  Qty: 1 Inhaler, Refills: 0      insulin lispro (HUMALOG) 100 unit/mL kwikpen by SubCUTAneous route. amLODIPine (NORVASC) 10 mg tablet Take 10 mg by mouth daily. Refills: 0      TRADJENTA 5 mg tablet TAKE 1 TABLET BY MOUTH ONCE A DAY  Refills: 0      Bydureon 2 mg/0.65 mL pnij inject 2 milligrams (1 PEN) subcutaneously ONCE weekly      OneTouch UltraSoft Lancets misc       !! venlafaxine-SR (EFFEXOR-XR) 150 mg capsule take 1 capsule by mouth once daily      polyethylene glycol (ClearLax) 17 gram/dose powder Take 17 g by mouth daily. neomycin-polymyxin-dexamethasone (MAXITROL) ophthalmic suspension Administer 1 Drop to both eyes two (2) times a day. timoloL maleate 0.5 % drpd ophthalmic solution Administer 1 Drop to both eyes daily. tamsulosin (FLOMAX) 0.4 mg capsule take 1 capsule by mouth once daily  Qty: 30 Cap, Refills: 3      naloxone (NARCAN) 4 mg/actuation nasal spray Use 1 spray intranasally, then discard. Repeat with new spray every 2 min as needed for opioid overdose symptoms, alternating nostrils. Qty: 1 Each, Refills: 0    Associated Diagnoses: Spinal stenosis of lumbar region at multiple levels      LORazepam (ATIVAN) 0.5 mg tablet Take 0.5 mg by mouth as needed for Anxiety. atorvastatin (LIPITOR) 40 mg tablet Take  by mouth nightly. esomeprazole (NEXIUM) 20 mg capsule Take 20 mg by mouth daily. Refills: 0      NOVOFINE 32 32 gauge x 1/4\" ndle Refills: 0       !! - Potential duplicate medications found. Please discuss with provider.       STOP taking these medications       lisinopriL (PRINIVIL, ZESTRIL) 20 mg tablet Comments:   Reason for Stopping:         insulin aspart U-100 (NOVOLOG) 100 unit/mL injection Comments:   Reason for Stopping:         glucose blood VI test strips (OneTouch Ultra Blue Test Strip) strip Comments:   Reason for Stopping:         meloxicam (MOBIC) 7.5 mg tablet Comments:   Reason for Stopping:         hydroCHLOROthiazide (MICROZIDE) 12.5 mg capsule Comments:   Reason for Stopping:         metoprolol tartrate (LOPRESSOR) 50 mg tablet Comments:   Reason for Stopping:                 Recent Days:  Recent Labs     12/02/20 0313 12/01/20 0325 11/30/20  0350   WBC 15.6* 17.3* 12.9   HGB 10.1* 9.3* 10.4*   HCT 34.4* 32.7* 35.3   * 462* 493*     Recent Labs     12/02/20 0313 12/01/20 0325 11/30/20  0350    142 139   K 3.9 4.4 4.0    108 104   CO2 28 29 26   * 267* 278*   BUN 37* 29* 20   CREA 1.80* 1.80* 1.70*   CA 8.8 8.8 8.9   ALB  --   --  3.4*   TBILI  --   --  0.4   ALT  --   --  13     No results for input(s): PH, PCO2, PO2, HCO3, FIO2 in the last 72 hours. 24 Hour Results:  Recent Results (from the past 24 hour(s))   GLUCOSE, POC    Collection Time: 12/01/20  3:47 PM   Result Value Ref Range    Glucose (POC) 367 (H) 70 - 110 mg/dL    Performed by [address] [place] [address] Drive, POC    Collection Time: 12/01/20  9:34 PM   Result Value Ref Range    Glucose (POC) 369 (H) 70 - 110 mg/dL    Performed by John Antonio    CBC WITH AUTOMATED DIFF    Collection Time: 12/02/20  3:13 AM   Result Value Ref Range    WBC 15.6 (H) 4.6 - 13.2 K/uL    RBC 4.28 4.20 - 5.30 M/uL    HGB 10.1 (L) 12.0 - 16.0 g/dL    HCT 34.4 (L) 35.0 - 45.0 %    MCV 80.4 74.0 - 97.0 FL    MCH 23.6 (L) 24.0 - 34.0 PG    MCHC 29.4 (L) 31.0 - 37.0 g/dL    RDW 18.4 (H) 11.6 - 14.5 %    PLATELET 633 (H) 927 - 420 K/uL    MPV 9.2 9.2 - 11.8 FL    NEUTROPHILS 97 %    LYMPHOCYTES 1 %    MONOCYTES 0 %    EOSINOPHILS 0 0 - 5 %    BASOPHILS 0 0 - 2 %    IMMATURE GRANULOCYTES 0 %    ABS. NEUTROPHILS 15.4 K/UL    ABS. LYMPHOCYTES 0.2 K/UL    ABS. MONOCYTES 0.0 K/UL    ABS. EOSINOPHILS 0.0 0.0 - 0.4 K/UL    ABS. BASOPHILS 0.0 0.0 - 0.1 K/UL    ABS. IMM.  GRANS. 0.0 K/UL    BAND NEUTROPHILS 2 %    RBC COMMENTS Hypochromia  1+       METABOLIC PANEL, BASIC    Collection Time: 12/02/20  3:13 AM   Result Value Ref Range    Sodium 140 135 - 145 mmol/L    Potassium 3.9 3.2 - 5.1 mmol/L    Chloride 105 94 - 111 mmol/L    CO2 28 21 - 33 mmol/L Anion gap 7 mmol/L    Glucose 292 (H) 70 - 110 mg/dL    BUN 37 (H) 9 - 21 mg/dL    Creatinine 1.80 (H) 0.70 - 1.20 mg/dL    BUN/Creatinine ratio 21      GFR est AA 34 ml/min/1.73m2    GFR est non-AA 28 ml/min/1.73m2    Calcium 8.8 8.5 - 10.5 mg/dL   GLUCOSE, POC    Collection Time: 12/02/20  7:36 AM   Result Value Ref Range    Glucose (POC) 258 (H) 70 - 110 mg/dL    Performed by Greg Cates    GLUCOSE, POC    Collection Time: 12/02/20 11:33 AM   Result Value Ref Range    Glucose (POC) 258 (H) 70 - 110 mg/dL    Performed by Greg aCtes        NOTIFY YOUR PHYSICIAN FOR ANY OF THE FOLLOWING:   Fever over 101 degrees for 24 hours. Chest pain, shortness of breath, fever, chills, nausea, vomiting, diarrhea, change in mentation, falling, weakness, bleeding. Severe pain or pain not relieved by medications. Or, any other signs or symptoms that you may have questions about. DISPOSITION:    Home With:   OT  PT  HH  RN       Long term SNF/Inpatient Rehab    Independent/assisted living    Hospice    Other:       PATIENT CONDITION AT DISCHARGE:     Functional status    Poor     Deconditioned     Independent      Cognition     Lucid     Forgetful     Dementia      Catheters/lines (plus indication)    Herrera     PICC     PEG     None      Code status     Full code     DNR      PHYSICAL EXAMINATION AT DISCHARGE:  General:          Alert, cooperative, no distress, appears stated age. HEENT:           Atraumatic, anicteric sclerae, pink conjunctivae                          No oral ulcers, mucosa moist, throat clear, dentition fair  Neck:               Supple, symmetrical  Lungs:             Clear to auscultation bilaterally. No Wheezing or Rhonchi. No rales. Chest wall:      No tenderness  No Accessory muscle use. Heart:              Regular  rhythm,  No  murmur   No edema  Abdomen:        Soft, non-tender. Not distended. Bowel sounds normal  Extremities:     No cyanosis.   No clubbing,                            Skin turgor normal, Capillary refill normal  Skin:                Not pale. Not Jaundiced  No rashes   Psych:             Not anxious or agitated.   Neurologic:      Alert, moves all extremities, answers questions appropriately and responds to commands       CHRONIC MEDICAL DIAGNOSES:  Problem List as of 12/2/2020 Date Reviewed: 11/29/2020          Codes Class Noted - Resolved    Acute respiratory failure with hypoxia [facility]) ICD-10-CM: J96.01  ICD-9-CM: 518.81  9/30/2020 - Present        COVID-19 ICD-10-CM: U07.1  ICD-9-CM: 079.89  10/1/2020 - Present        Type 2 diabetes mellitus with hyperglycemia, with long-term current use of insulin (HCC) ICD-10-CM: E11.65, Z79.4  ICD-9-CM: 250.00, 790.29, V58.67  9/30/2020 - Present        Acute renal failure (ARF) ([facility] ICD-10-CM: N17.9  ICD-9-CM: 584.9  9/30/2020 - Present        HTN (hypertension) ICD-10-CM: I10  ICD-9-CM: 401.9  10/1/2020 - Present        Diabetic neuropathy ([facility] ICD-10-CM: E11.40  ICD-9-CM: 250.60, 357.2  10/1/2020 - Present        * (Principal) COPD with acute exacerbation ([facility] ICD-10-CM: J44.1  ICD-9-CM: 491.21  11/29/2020 - Present        Person under investigation for COVID-19 ICD-10-CM: Z20.828  ICD-9-CM: V01.79  11/29/2020 - Present        COPD (chronic obstructive pulmonary disease) ([facility] ICD-10-CM: J44.9  ICD-9-CM: 994  11/29/2020 - Present        DM (diabetes mellitus) ([facility] ICD-10-CM: E11.9  ICD-9-CM: 250.00  11/29/2020 - Present        DVT prophylaxis ICD-10-CM: Z29.9  ICD-9-CM: V07.9  10/1/2020 - Present        Pneumonia ICD-10-CM: J18.9  ICD-9-CM: 529  9/30/2020 - Present        S/P lumbar fusion ICD-10-CM: Z98.1  ICD-9-CM: V45.4  10/16/2018 - Present        CAP (community acquired pneumonia) ICD-10-CM: J18.9  ICD-9-CM: 086  9/1/2018 - Present        Shortness of breath ICD-10-CM: R06.02  ICD-9-CM: 786.05  9/1/2018 - Present        Fever and chills ICD-10-CM: R50.9  ICD-9-CM: 780.60  9/1/2018 - Present        Spinal stenosis of lumbar region at multiple levels ICD-10-CM: M48.061  ICD-9-CM: 724.02  8/30/2018 - Present        Scoliosis ICD-10-CM: M41.9  ICD-9-CM: 737.30  8/30/2018 - Present        Spinal stenosis ICD-10-CM: M48.00  ICD-9-CM: 724.00  8/30/2018 - Present        Pre-op testing ICD-10-CM: Z01.818  ICD-9-CM: V72.84  8/27/2018 - Present        Severe obesity (BMI 35.0-39. 9) ICD-10-CM: E66.01  ICD-9-CM: 278.01  6/20/2018 - Present        Spinal stenosis, lumbar region without neurogenic claudication ICD-10-CM: M48.061  ICD-9-CM: 724.02  5/16/2018 - Present        Lumbosacral spondylosis without myelopathy ICD-10-CM: M47.817  ICD-9-CM: 721.3  4/25/2018 - Present        DDD (degenerative disc disease), lumbar ICD-10-CM: M51.36  ICD-9-CM: 722.52  4/25/2018 - Present        Malignant neoplasm of female breast [facility]) ICD-10-CM: C50.919  ICD-9-CM: 174.9  4/25/2018 - Present              Greater than 35 minutes were spent with the patient on counseling and coordination of care    Signed:   Jozef Pond MD  12/2/2020  2:38 PM

## 2020-12-02 NOTE — PROGRESS NOTES
Problem: Falls - Risk of  Goal: *Absence of Falls  Description: Document Araseli Butcher Fall Risk and appropriate interventions in the flowsheet.   Outcome: Progressing Towards Goal  Note: Fall Risk Interventions:                                Problem: Patient Education: Go to Patient Education Activity  Goal: Patient/Family Education  Outcome: Progressing Towards Goal     Problem: Chronic Obstructive Pulmonary Disease (COPD)  Goal: *Oxygen saturation during activity within specified parameters  Outcome: Progressing Towards Goal  Goal: *Absence of hypoxia  Outcome: Progressing Towards Goal     Problem: Nutrition Deficit  Goal: *Optimize nutritional status  Outcome: Progressing Towards Goal     Problem: Patient Education: Go to Patient Education Activity  Goal: Patient/Family Education  Outcome: Progressing Towards Goal

## 2020-12-02 NOTE — ROUTINE PROCESS
Assumed care of patient from LONNY Cates RN. Pt resting comfortably in bed, denies pain. Pt requesting to be able to take a shower today - will ask MD. FLORES.

## 2020-12-02 NOTE — ROUTINE PROCESS
Report rec'd from Juany hinojosa RN at 1900 change of shift during bedside rounds. Pt sitting up in bed, watching TV. Denies any needs or complaints at this time. Pt very pleasant and very talkative. VSS, NAD.

## 2020-12-06 LAB
BACTERIA SPEC CULT: NORMAL
BACTERIA SPEC CULT: NORMAL
SPECIAL REQUESTS,SREQ: NORMAL
SPECIAL REQUESTS,SREQ: NORMAL

## 2020-12-10 NOTE — PROGRESS NOTES
Physician Progress Note      Augusta Miranda  [facility] #:                  [id number]  :                       1955  ADMIT DATE:       2020 8:56 AM  DISCH DATE:        2020 4:21 PM  RESPONDING  PROVIDER #:        Delicia Blake MD          QUERY TEXT:    Dear hospitalist,    Patient admitted with COPD exacerbation and CHF exacerbation. Noted documentation of acute on chronic hypoxic respiratory failure in discharge summary. Please indicate one of the following regarding the acute portion of the respiratory failure and document in the medical record: The medical record reflects the following:    Risk Factors: COPD exacerbation    Clinical Indicators:  - RR WNL ; O2 sat 88% room air  - per nursing: regular respiratory pattern  - per ED eval: Pulmonary effort is normal  - per H&P: No accessory muscle use. Chest symmetrical, No wheezing, rales, rhonchi noted. Appropriate respiratory effort    Treatment: placed on 2L NC    Please call me for any questions,  Thank you,  Lucas Cevallos RN, [facility]  [phone]    Acute Respiratory Failure Clinical Indicators per  MS-DRG Training Guide and Quick Reference Guide:  pO2 < 60 mmHg or SpO2 (pulse oximetry) < 91% breathing room air  pCO2 > 50 and pH < 7.35  P/F ratio (pO2 / FIO2) < 300  pO2 decrease or pCO2 increase by 10 mmHg from baseline (if known)  Supplemental oxygen of 40% or more  Presence of respiratory distress, tachypnea, dyspnea, shortness of breath, wheezing  Unable to speak in complete sentences  Use of accessory muscles to breathe  Extreme anxiety and feeling of impending doom  Tripod position  Confusion/altered mental status/obtunded  Options provided:  -- Acute on chronic hypoxic Respiratory Failure currently as evidenced by, Please document/specify evidence per the list below.   -- Chronic hypoxic respiratory failure only, Acute Respiratory Failure ruled out after study  -- Other - I will add my own diagnosis  -- Disagree - Not applicable / Not valid  -- Disagree - Clinically unable to determine / Unknown  -- Refer to Clinical Documentation Reviewer    PROVIDER RESPONSE TEXT:    Patient with chronic hypoxic respiratory failure only, Acute Respiratory Failure has been ruled out after study.     Query created by: Antonio Corbett on 12/10/2020 1:35 PM      Electronically signed by:  Daphne Callaway MD [place] [employer] [facility] QIANA BEARDEN MD 12/10/2020 4:39 PM

## 2020-12-23 ENCOUNTER — OFFICE VISIT (OUTPATIENT)
Dept: NEPHROLOGY | Age: 65
End: 2020-12-23
Payer: MEDICAID

## 2020-12-23 VITALS
BODY MASS INDEX: 41.41 KG/M2 | OXYGEN SATURATION: 99 % | RESPIRATION RATE: 19 BRPM | HEIGHT: 62 IN | DIASTOLIC BLOOD PRESSURE: 78 MMHG | SYSTOLIC BLOOD PRESSURE: 153 MMHG | TEMPERATURE: 97.3 F | HEART RATE: 70 BPM | WEIGHT: 225 LBS

## 2020-12-23 DIAGNOSIS — N18.4 STAGE 4 CHRONIC KIDNEY DISEASE (HCC): Primary | ICD-10-CM

## 2020-12-23 DIAGNOSIS — N18.4 STAGE 4 CHRONIC KIDNEY DISEASE (HCC): ICD-10-CM

## 2020-12-23 PROCEDURE — 99205 OFFICE O/P NEW HI 60 MIN: CPT | Performed by: INTERNAL MEDICINE

## 2020-12-23 NOTE — PROGRESS NOTES
Nephrology Consult    Patient: Alexandru Barone MRN: [id number]  SSN: [id number]    YOB: 1955  Age: 63 y.o. Sex: female      Subjective: The patient is 71-year-old white woman with underlying history of diabetes mellitus type 2, hypertension, COPD, CHF due to LV diastolic dysfunction, chronic kidney disease stage III came in today for a follow-up visit after recent hospitalization. She was recently hospitalized on 11/29/2020 with increasing shortness of breath due to COPD exacerbation and CHF with fluid overload. She was noticed to have elevated creatinine 1.7-1.8 from an unknown baseline kidney functions. She came in today for a follow-up visit. No abdominal pain nausea vomiting or diarrhea. No urinary symptoms voiding issues or gross hematuria she still noticed to have lower extremity swelling and takes Lasix 40 mg daily.   No chest complaints  Past Medical History:   Diagnosis Date    Arthritis     spinal stenosis    Breast CA ([facility] 2017    Left Breast (chemo/radiation)    Diabetes (HCC)     Type 2    Edema     legs and ankles    GERD (gastroesophageal reflux disease)     High cholesterol     Hypertension     Ill-defined condition     patient states hemorrhage behind Left eye-following Dr. Kia ARAGON (myocardial infarction) ([facility] 06/2018    per patient    Neuropathy     Restless leg      Past Surgical History:   Procedure Laterality Date    HX BACK SURGERY  08/30/2018    HX BREAST BIOPSY Left 02/03/2017    BREAST CANCER    HX BREAST LUMPECTOMY Left     HX CATARACT REMOVAL Right     HX HEART CATHETERIZATION  07/2018    no stents    HX KNEE REPLACEMENT        Family History   Problem Relation Age of Onset    No Known Problems Mother     No Known Problems Father     Heart Disease Sister      Social History     Tobacco Use    Smoking status: Never Smoker    Smokeless tobacco: Never Used   Substance Use Topics    Alcohol use: No      Current Outpatient Medications   Medication Sig Dispense Refill    furosemide (LASIX) 20 mg tablet Take 2 Tabs by mouth daily for 30 days. 60 Tab 0    melatonin 3 mg tablet Take 3 mg by mouth nightly.  famotidine (PEPCID) 20 mg tablet take 1 tablet by mouth twice a day      OneTouch UltraSoft Lancets misc       rosuvastatin (CRESTOR) 10 mg tablet       venlafaxine-SR (EFFEXOR-XR) 75 mg capsule Take 75 mg by mouth daily.  gabapentin (NEURONTIN) 300 mg capsule Take 300 mg by mouth two (2) times a day.  polyethylene glycol (ClearLax) 17 gram/dose powder Take 17 g by mouth daily.  neomycin-polymyxin-dexamethasone (MAXITROL) ophthalmic suspension Administer 1 Drop to both eyes two (2) times a day.  timoloL maleate 0.5 % drpd ophthalmic solution Administer 1 Drop to both eyes daily.  LANTUS SOLOSTAR U-100 INSULIN 100 unit/mL (3 mL) inpn 30 units subcutaneously in morning. Can take an additional dose of 10 units subcutaneously in the evening if blood sugar is more than 200. 10 mL 0    albuterol (PROVENTIL HFA, VENTOLIN HFA, PROAIR HFA) 90 mcg/actuation inhaler 2 puffs three to four times a day for 5 days then every 6 hours as needed for shortness of breath 1 Inhaler 0    naloxone (NARCAN) 4 mg/actuation nasal spray Use 1 spray intranasally, then discard. Repeat with new spray every 2 min as needed for opioid overdose symptoms, alternating nostrils. 1 Each 0    LORazepam (ATIVAN) 0.5 mg tablet Take 0.5 mg by mouth as needed for Anxiety.  atorvastatin (LIPITOR) 40 mg tablet Take  by mouth nightly.  amLODIPine (NORVASC) 10 mg tablet Take 10 mg by mouth daily. 0    esomeprazole (NEXIUM) 20 mg capsule Take 20 mg by mouth daily.   0    TRADJENTA 5 mg tablet TAKE 1 TABLET BY MOUTH ONCE A DAY  0    NOVOFINE 32 32 gauge x 1/4\" ndle   0    tamsulosin (FLOMAX) 0.4 mg capsule take 1 capsule by mouth once daily 30 Cap 3        Allergies   Allergen Reactions    Ciprofloxacin Hives    Keflex [Cephalexin] Nausea and Vomiting    Metformin Other (comments)     GI DISTRESS    Soliqua 100/33 [Insulin Glargine-Lixisenatide] Nausea Only       Review of Systems:  A comprehensive review of systems was negative except for that written in the History of Present Illness. Objective:     Vitals:    12/23/20 1311   BP: (!) 153/78   Pulse: 70   Resp: 19   Temp: 97.3 °F (36.3 °C)   TempSrc: Temporal   SpO2: 99%   Weight: 102.1 kg (225 lb)   Height: 5' 2\" (1.575 m)        Physical Exam:  General: NAD  Eyes: sclera anicteric  Oral Cavity: No thrush or ulcers  Neck: no JVD  Chest: Fair bilateral air entry  Heart: normal sounds  Abdomen: soft and non tender   : no swann  Lower Extremities:  Edema+  Skin: no rash  Neuro: intact  Psychiatric: non-depressed            Assessment:     Hospital Problems  Date Reviewed: 11/29/2020    None          Plan:   1 elevated serum creatinine/chronic kidney disease stage IV. -She was referred for the serum creatinine 1.7-1.8 based on labs during her recent hospitalizations.    -I have no idea about her baseline kidney functions.    -She might have chronic kidney disease and etiology is presumed to be diabetic nephropathy versus hypertensive renal vascular disease.    -Clinically noticed to have lower extremity edema.    -She was advised to take Lasix 60 mg daily. - I will order a urinalysis to look at the urine sediment and will quantify for proteinuria. -I will order a kidney ultrasound.    -She was advised to avoid any kind of NSAIDs.    -I will see her back in 3 weeks. 2.  Edema, bilateral lower extremity.    -It could be from LV diastolic dysfunction.    -She is on Lasix 40 mg daily. - I will increase 60 mg daily.   - She was advised for fluid restriction and avoid salt intake. 3.  Hypertension.    -Her blood pressure is okay. -We will do no change in her blood pressure medication. 4.  Anemia. I will order CBC iron studies and ferritin level  5.   Renal osteodystrophy. Her calcium is okay. I will check phosphorus intact PTH and vitamin D 25-hydroxy level. 6.  COPD. Recent hospitalization for COPD exacerbation. 7.  History of CHF. Due to LV diastolic dysfunction. I will increase her Lasix dose. Thank you for the consultation.      Signed By: Valerie Reeves MD     December 23, 2020

## 2021-01-06 ENCOUNTER — HOSPITAL ENCOUNTER (OUTPATIENT)
Dept: ULTRASOUND IMAGING | Age: 66
Discharge: HOME | End: 2021-01-06
Attending: INTERNAL MEDICINE
Payer: MEDICAID

## 2021-01-06 ENCOUNTER — HOSPITAL ENCOUNTER (OUTPATIENT)
Dept: LAB | Age: 66
Discharge: HOME | End: 2021-01-06
Attending: INTERNAL MEDICINE

## 2021-01-06 DIAGNOSIS — N18.4 STAGE 4 CHRONIC KIDNEY DISEASE (HCC): ICD-10-CM

## 2021-01-06 PROCEDURE — 76775 US EXAM ABDO BACK WALL LIM: CPT

## 2021-01-11 ENCOUNTER — OFFICE VISIT (OUTPATIENT)
Dept: NEPHROLOGY | Age: 66
End: 2021-01-11
Payer: MEDICAID

## 2021-01-11 VITALS
WEIGHT: 225 LBS | SYSTOLIC BLOOD PRESSURE: 134 MMHG | DIASTOLIC BLOOD PRESSURE: 77 MMHG | HEART RATE: 79 BPM | BODY MASS INDEX: 41.41 KG/M2 | HEIGHT: 62 IN | TEMPERATURE: 97.2 F

## 2021-01-11 DIAGNOSIS — N18.31 STAGE 3A CHRONIC KIDNEY DISEASE (HCC): Primary | ICD-10-CM

## 2021-01-11 DIAGNOSIS — N18.31 STAGE 3A CHRONIC KIDNEY DISEASE (HCC): ICD-10-CM

## 2021-01-11 PROCEDURE — 99214 OFFICE O/P EST MOD 30 MIN: CPT | Performed by: INTERNAL MEDICINE

## 2021-01-11 RX ORDER — LISINOPRIL 20 MG/1
TABLET ORAL
COMMUNITY
Start: 2020-12-14 | End: 2021-09-27

## 2021-01-11 RX ORDER — FUROSEMIDE 20 MG/1
TABLET ORAL
COMMUNITY
Start: 2021-01-04 | End: 2021-08-11

## 2021-01-11 RX ORDER — ERGOCALCIFEROL 1.25 MG/1
50000 CAPSULE ORAL
Qty: 16 CAP | Refills: 0 | Status: SHIPPED | OUTPATIENT
Start: 2021-01-11 | End: 2021-05-09

## 2021-01-11 RX ORDER — LANOLIN ALCOHOL/MO/W.PET/CERES
325 CREAM (GRAM) TOPICAL 3 TIMES DAILY
Qty: 90 TAB | Refills: 1 | Status: SHIPPED | OUTPATIENT
Start: 2021-01-11 | End: 2021-05-13

## 2021-01-11 NOTE — PROGRESS NOTES
Nephrology Consult    Patient: Aurea Rincon MRN: [id number]  SSN: [id number]    YOB: 1955  Age: 65 y.o.  Sex: female      CC: f/u for CKD III and edema  Subjective:   The patient is 65-year-old white woman with underlying history of diabetes mellitus type 2, hypertension, COPD, CHF due to LV diastolic dysfunction, chronic kidney disease stage III came in today for a follow-up visit for CKD.  Her legs swelling is much better.   No abdominal pain nausea vomiting or diarrhea.  No urinary symptoms voiding issues or gross hematuria.  No chest complaints  Past Medical History:   Diagnosis Date   • Arthritis     spinal stenosis   • Breast CA (HCC) 2017    Left Breast (chemo/radiation)   • Diabetes (HCC)     Type 2   • Edema     legs and ankles   • GERD (gastroesophageal reflux disease)    • High cholesterol    • Hypertension    • Ill-defined condition     patient states hemorrhage behind Left eye-following Dr. Garcia   • MI (myocardial infarction) ([facility]) 06/2018    per patient   • Neuropathy    • Restless leg      Past Surgical History:   Procedure Laterality Date   • HX BACK SURGERY  08/30/2018   • HX BREAST BIOPSY Left 02/03/2017    BREAST CANCER   • HX BREAST LUMPECTOMY Left    • HX CATARACT REMOVAL Right    • HX HEART CATHETERIZATION  07/2018    no stents   • HX KNEE REPLACEMENT        Family History   Problem Relation Age of Onset   • No Known Problems Mother    • No Known Problems Father    • Heart Disease Sister      Social History     Tobacco Use   • Smoking status: Never Smoker   • Smokeless tobacco: Never Used   Substance Use Topics   • Alcohol use: No      Current Outpatient Medications   Medication Sig Dispense Refill   • furosemide (LASIX) 20 mg tablet take 2 tablets by mouth once daily     • lisinopriL (PRINIVIL, ZESTRIL) 20 mg tablet      • OneTouch Ultra Blue Test Strip strip      • melatonin 3 mg tablet Take 3 mg by mouth nightly.     • famotidine (PEPCID) 20 mg tablet take 1  tablet by mouth twice a day      OneTouch UltraSoft Lancets misc       rosuvastatin (CRESTOR) 10 mg tablet       venlafaxine-SR (EFFEXOR-XR) 75 mg capsule Take 75 mg by mouth daily.  gabapentin (NEURONTIN) 300 mg capsule Take 300 mg by mouth two (2) times a day.  polyethylene glycol (ClearLax) 17 gram/dose powder Take 17 g by mouth daily.  neomycin-polymyxin-dexamethasone (MAXITROL) ophthalmic suspension Administer 1 Drop to both eyes two (2) times a day.  timoloL maleate 0.5 % drpd ophthalmic solution Administer 1 Drop to both eyes daily.  tamsulosin (FLOMAX) 0.4 mg capsule take 1 capsule by mouth once daily 30 Cap 3    LANTUS SOLOSTAR U-100 INSULIN 100 unit/mL (3 mL) inpn 30 units subcutaneously in morning. Can take an additional dose of 10 units subcutaneously in the evening if blood sugar is more than 200. 10 mL 0    albuterol (PROVENTIL HFA, VENTOLIN HFA, PROAIR HFA) 90 mcg/actuation inhaler 2 puffs three to four times a day for 5 days then every 6 hours as needed for shortness of breath 1 Inhaler 0    naloxone (NARCAN) 4 mg/actuation nasal spray Use 1 spray intranasally, then discard. Repeat with new spray every 2 min as needed for opioid overdose symptoms, alternating nostrils. 1 Each 0    LORazepam (ATIVAN) 0.5 mg tablet Take 0.5 mg by mouth as needed for Anxiety.  atorvastatin (LIPITOR) 40 mg tablet Take  by mouth nightly.  amLODIPine (NORVASC) 10 mg tablet Take 10 mg by mouth daily. 0    esomeprazole (NEXIUM) 20 mg capsule Take 20 mg by mouth daily.   0    TRADJENTA 5 mg tablet TAKE 1 TABLET BY MOUTH ONCE A DAY  0    NOVOFINE 32 32 gauge x 1/4\" ndle   0        Allergies   Allergen Reactions    Ciprofloxacin Hives    Keflex [Cephalexin] Nausea and Vomiting    Metformin Other (comments)     GI DISTRESS    Soliqua 100/33 [Insulin Glargine-Lixisenatide] Nausea Only       Review of Systems:  A comprehensive review of systems was negative except for that written in the History of Present Illness. Objective:     Vitals:    01/11/21 1109   BP: 134/77   Pulse: 79   Temp: 97.2 °F (36.2 °C)   TempSrc: Temporal   Weight: 102.1 kg (225 lb)   Height: 5' 2\" (1.575 m)        Physical Exam:  General: NAD  Eyes: sclera anicteric  Oral Cavity: No thrush or ulcers  Neck: no JVD  Chest: Fair bilateral air entry  Heart: normal sounds  Abdomen: soft and non tender   : no swann  Lower Extremities:  No Edema  Skin: no rash  Neuro: intact  Psychiatric: non-depressed            Assessment:     Hospital Problems  Date Reviewed: 11/29/2020    None          Plan:   1. Chronic kidney disease stage IIIA :  -Her Cr is stable at 1.2   -She was referred for the serum creatinine 1.7-1.8 based on labs during her recent hospitalizations.    -I have no idea about her baseline kidney functions.    -She might have chronic kidney disease and etiology is presumed to be diabetic nephropathy versus hypertensive renal vascular disease.    -Clinically noticed to have lower extremity edema.    -She was advised to take Lasix 60 mg daily. -bland urinalysis and no proteinuria.    -A kidney ultrasound showed no solid mass.    -She was advised to avoid any kind of NSAIDs.    -I will see her back in 9 weeks. 2.  Edema, bilateral lower extremity.    -It could be from LV diastolic dysfunction.    -She is on Lasix 40 mg daily. - I will keep 60 mg daily.   - She was advised for fluid restriction and avoid salt intake. 3.  Hypertension.    -Her blood pressure is okay. -will do no change in her blood pressure medication. 4.  Anemia.    -Hb 9.2  -TSAT 16, Ferritin 29  -po FeSO4 325 mg tid  5. Renal osteodystrophy.    -Her calcium,phosphorus intact PTH levels are ok  - vitamin D 25-hydroxy level 6.0, vit D 50,000 units weekly for 16 weeks. 6.  COPD. Recent hospitalization for COPD exacerbation. 7.  History of CHF.   Due to LV diastolic dysfunction.     -compensated  -I will keep her on Lasix 60 mg daily.        Signed By: Carlo Olmedo MD     January 11, 2021

## 2021-01-11 NOTE — PROGRESS NOTES
Pito Berg presents today for   Chief Complaint   Patient presents with    Routine     with lab review        Is someone accompanying this pt? Patient is alone     Is the patient using any DME equipment during OV? no    Depression Screening:  3 most recent PHQ Screens 1/11/2021   PHQ Not Done -   Little interest or pleasure in doing things Several days   Feeling down, depressed, irritable, or hopeless Several days   Total Score PHQ 2 2       Learning Assessment:  Learning Assessment 7/31/2020   PRIMARY LEARNER Patient   HIGHEST LEVEL OF EDUCATION - PRIMARY LEARNER  DID NOT GRADUATE HIGH SCHOOL   BARRIERS PRIMARY LEARNER NONE   PRIMARY LANGUAGE ENGLISH   LEARNER PREFERENCE PRIMARY READING   ANSWERED BY patient   RELATIONSHIP SELF       Abuse Screening:  No flowsheet data found. Fall Risk  No flowsheet data found. ADL  No flowsheet data found. Health Maintenance reviewed and discussed and ordered per Provider. Health Maintenance Due   Topic Date Due    Hepatitis C Screening  1955    Foot Exam Q1  02/19/1967    MICROALBUMIN Q1  02/19/1967    Eye Exam Retinal or Dilated  02/19/1967    Lipid Screen  02/19/1967    DTaP/Tdap/Td series (1 - Tdap) 02/19/1978    PAP AKA CERVICAL CYTOLOGY  02/19/1978    Breast Cancer Screen Mammogram  02/19/1997    Shingrix Vaccine Age 50> (1 of 2) 02/19/2007    Pneumococcal 0-64 years (2 of 3 - PCV13) 12/19/2017    Flu Vaccine (1) 09/01/2020   . Coordination of Care:  1. Have you been to the ER, urgent care clinic since your last visit? Hospitalized since your last visit? no    2. Have you seen or consulted any other health care providers outside of the [address] [place] [address] since your last visit? Include any pap smears or colon screening.  no

## 2021-01-18 DIAGNOSIS — M17.2 POST-TRAUMATIC OSTEOARTHRITIS OF KNEES, BILATERAL: Primary | ICD-10-CM

## 2021-01-18 DIAGNOSIS — M25.561 RIGHT KNEE PAIN, UNSPECIFIED CHRONICITY: ICD-10-CM

## 2021-01-20 ENCOUNTER — ANESTHESIA EVENT (OUTPATIENT)
Dept: SURGERY | Age: 66
End: 2021-01-20
Payer: MEDICAID

## 2021-01-20 DIAGNOSIS — M25.561 CHRONIC PAIN OF RIGHT KNEE: Primary | ICD-10-CM

## 2021-01-20 DIAGNOSIS — G89.29 CHRONIC PAIN OF RIGHT KNEE: Primary | ICD-10-CM

## 2021-01-21 ENCOUNTER — HOSPITAL ENCOUNTER (OUTPATIENT)
Dept: PREADMISSION TESTING | Age: 66
Discharge: HOME | End: 2021-01-21
Payer: MEDICAID

## 2021-01-21 ENCOUNTER — OFFICE VISIT (OUTPATIENT)
Dept: ORTHOPEDIC SURGERY | Age: 66
End: 2021-01-21
Payer: MEDICAID

## 2021-01-21 VITALS — BODY MASS INDEX: 40.48 KG/M2 | WEIGHT: 220 LBS | HEIGHT: 62 IN

## 2021-01-21 DIAGNOSIS — M17.11 OSTEOARTHRITIS OF RIGHT KNEE, UNSPECIFIED OSTEOARTHRITIS TYPE: Primary | ICD-10-CM

## 2021-01-21 DIAGNOSIS — M25.561 RIGHT KNEE PAIN, UNSPECIFIED CHRONICITY: ICD-10-CM

## 2021-01-21 LAB — SARS-COV-2, COV2: NORMAL

## 2021-01-21 PROCEDURE — 99214 OFFICE O/P EST MOD 30 MIN: CPT | Performed by: ORTHOPAEDIC SURGERY

## 2021-01-21 PROCEDURE — U0003 INFECTIOUS AGENT DETECTION BY NUCLEIC ACID (DNA OR RNA); SEVERE ACUTE RESPIRATORY SYNDROME CORONAVIRUS 2 (SARS-COV-2) (CORONAVIRUS DISEASE [COVID-19]), AMPLIFIED PROBE TECHNIQUE, MAKING USE OF HIGH THROUGHPUT TECHNOLOGIES AS DESCRIBED BY CMS-2020-01-R: HCPCS

## 2021-01-21 RX ORDER — CLINDAMYCIN HYDROCHLORIDE 300 MG/1
300 CAPSULE ORAL EVERY 8 HOURS
Qty: 6 CAP | Refills: 0 | Status: SHIPPED | OUTPATIENT
Start: 2021-01-21 | End: 2021-01-22

## 2021-01-21 RX ORDER — OXYCODONE AND ACETAMINOPHEN 5; 325 MG/1; MG/1
1 TABLET ORAL
Qty: 30 TAB | Refills: 0 | Status: SHIPPED | OUTPATIENT
Start: 2021-01-21 | End: 2021-02-04

## 2021-01-21 NOTE — PROGRESS NOTES
Name: Aurea Longroia    : 1955     Service Dept: [address] [place] [address] and Sports Medicine    [facility]'s Pharmacies:    [facility] [address] [place] [address]  [address]  [place] [address]  Phone: [phone] Fax: [phone]       Chief Complaint   Patient presents with    Knee Pain    Pre-op Exam        Visit Vitals  Ht 5' 2\" (1.575 m)   Wt 220 lb (99.8 kg)   BMI 40.24 kg/m²      Allergies   Allergen Reactions    Ciprofloxacin Hives    Keflex [Cephalexin] Nausea and Vomiting    Metformin Other (comments)     GI DISTRESS    Soliqua 100/33 [Insulin Glargine-Lixisenatide] Nausea Only      Current Outpatient Medications   Medication Sig Dispense Refill    oxyCODONE-acetaminophen (Percocet) 5-325 mg per tablet Take 1 Tab by mouth every four (4) hours as needed for Pain for up to 14 days. Max Daily Amount: 6 Tabs. These medications are to be restarted after the surgery. 30 Tab 0    clindamycin (CLEOCIN) 300 mg capsule Take 1 Cap by mouth every eight (8) hours for 3 doses. Start antibiotics after surgery 6 Cap 0    furosemide (LASIX) 20 mg tablet take 2 tablets by mouth once daily      lisinopriL (PRINIVIL, ZESTRIL) 20 mg tablet       OneTouch Ultra Blue Test Strip strip       ergocalciferol (ERGOCALCIFEROL) 1,250 mcg (50,000 unit) capsule Take 1 Cap by mouth every seven (7) days. 16 Cap 0    ferrous sulfate 325 mg (65 mg iron) tablet Take 1 Tab by mouth three (3) times daily. 90 Tab 1    melatonin 3 mg tablet Take 3 mg by mouth nightly.  famotidine (PEPCID) 20 mg tablet take 1 tablet by mouth twice a day      OneTouch UltraSoft Lancets misc       rosuvastatin (CRESTOR) 10 mg tablet       venlafaxine-SR (EFFEXOR-XR) 75 mg capsule Take 75 mg by mouth daily.  gabapentin (NEURONTIN) 300 mg capsule Take 300 mg by mouth two (2) times a day.  polyethylene glycol (ClearLax) 17 gram/dose powder Take 17 g by mouth daily.       neomycin-polymyxin-dexamethasone (MAXITROL) ophthalmic suspension Administer 1 Drop to both eyes two (2) times a day.  timoloL maleate 0.5 % drpd ophthalmic solution Administer 1 Drop to both eyes daily.  tamsulosin (FLOMAX) 0.4 mg capsule take 1 capsule by mouth once daily 30 Cap 3    LANTUS SOLOSTAR U-100 INSULIN 100 unit/mL (3 mL) inpn 30 units subcutaneously in morning. Can take an additional dose of 10 units subcutaneously in the evening if blood sugar is more than 200. 10 mL 0    albuterol (PROVENTIL HFA, VENTOLIN HFA, PROAIR HFA) 90 mcg/actuation inhaler 2 puffs three to four times a day for 5 days then every 6 hours as needed for shortness of breath 1 Inhaler 0    naloxone (NARCAN) 4 mg/actuation nasal spray Use 1 spray intranasally, then discard. Repeat with new spray every 2 min as needed for opioid overdose symptoms, alternating nostrils. 1 Each 0    LORazepam (ATIVAN) 0.5 mg tablet Take 0.5 mg by mouth as needed for Anxiety.  atorvastatin (LIPITOR) 40 mg tablet Take  by mouth nightly.  amLODIPine (NORVASC) 10 mg tablet Take 10 mg by mouth daily. 0    esomeprazole (NEXIUM) 20 mg capsule Take 20 mg by mouth daily. 0    TRADJENTA 5 mg tablet TAKE 1 TABLET BY MOUTH ONCE A DAY  0    NOVOFINE 32 32 gauge x 1/4\" ndle   0      Patient Active Problem List   Diagnosis Code    Lumbosacral spondylosis without myelopathy M47.817    DDD (degenerative disc disease), lumbar M51.36    Malignant neoplasm of female breast ([facility] C50.919    Spinal stenosis, lumbar region without neurogenic claudication M48.061    Severe obesity (BMI 35.0-39. 9) E66.01    Pre-op testing Z01.818    Spinal stenosis of lumbar region at multiple levels M48.061    Scoliosis M41.9    Spinal stenosis M48.00    CAP (community acquired pneumonia) J18.9    Shortness of breath R06.02    Fever and chills R50.9    S/P lumbar fusion Z98.1    Pneumonia J18.9    Acute respiratory failure with hypoxia (HCC) J96.01  Acute renal failure (ARF) (HCC) N17.9    Type 2 diabetes mellitus with hyperglycemia, with long-term current use of insulin (HCC) E11.65, Z79.4    COVID-19 U07.1    HTN (hypertension) I10    Diabetic neuropathy ([facility]) E11.40    DVT prophylaxis Z29.9    COPD with acute exacerbation ([facility] J44.1    Person under investigation for COVID-19 Z20.822    COPD (chronic obstructive pulmonary disease) ([facility] J44.9    DM (diabetes mellitus) ([facility] E11.9      Family History   Problem Relation Age of Onset    No Known Problems Mother     No Known Problems Father     Heart Disease Sister       Social History     Socioeconomic History    Marital status:      Spouse name: Not on file    Number of children: Not on file    Years of education: Not on file    Highest education level: Not on file   Tobacco Use    Smoking status: Never Smoker    Smokeless tobacco: Never Used   Substance and Sexual Activity    Alcohol use: No    Drug use: No   Other Topics Concern      Past Surgical History:   Procedure Laterality Date    HX BACK SURGERY  08/30/2018    HX BREAST BIOPSY Left 02/03/2017    BREAST CANCER    HX BREAST LUMPECTOMY Left     HX CATARACT REMOVAL Right     HX HEART CATHETERIZATION  07/2018    no stents    HX KNEE REPLACEMENT        Past Medical History:   Diagnosis Date    Arthritis     spinal stenosis    Breast CA ([facility] 2017    Left Breast (chemo/radiation)    Diabetes (HCC)     Type 2    Edema     legs and ankles    GERD (gastroesophageal reflux disease)     High cholesterol     Hypertension     Ill-defined condition     patient states hemorrhage behind Left eye-following Dr. Cleopatra Sanders MI (myocardial infarction) ([facility] 06/2018    per patient    Neuropathy     Restless leg         I have reviewed and agree with [address] [place] [address] Nw and ROS and intake form in chart and the record.      Review of Systems:   Patient is a pleasant appearing individual, appropriately dressed, well hydrated, well nourished, who is alert, appropriately oriented for age, and in no acute distress with a cane based gait and normal affect who does not appear to be in any significant pain. Physical Exam:  Right Knee -Decrease range of motion with flexion, Knee arc of greater than 50 degrees, Some crepitation, Grossly neurovascularly intact, Good cap refill, No skin lesion, Moderate swelling, No gross instability, Some quadriceps weakness, Kellgren and Delvis at least grade 3    Left Knee - Full Range of Motion, No crepitation, Grossly neurovascularly intact, Good cap refill, No skin lesion, No swelling, No gross instability, No quadriceps weakness     Encounter Diagnoses     ICD-10-CM ICD-9-CM   1. Osteoarthritis of right knee, unspecified osteoarthritis type  M17.11 715.96   2. Right knee pain, unspecified chronicity  M25.561 719.46       HPI:  The patient is here with a chief complaint of right knee pain, dull, throbbing pain. Pain is 5/10. ROS:  10-point review of systems is positive for joint swelling, weight gain and fever. X-rays of the right knee are positive for severe OA. Assessment/Plan:  Plan will be for right total knee replacement, Percocet, Keflex and aspirin. We will go from there. Return to Office: Follow-up and Dispositions    · Return for [address] [place], [place] [address]elissa. Scribed by Brayan Prescott as dictated by [facility] Loving MD.  Documentation True and Accepted Gabriel Loving MD

## 2021-01-21 NOTE — PATIENT INSTRUCTIONS
Knee Pain or Injury: Care Instructions  Your Care Instructions     Injuries are a common cause of knee problems. Sudden (acute) injuries may be caused by a direct blow to the knee. They can also be caused by abnormal twisting, bending, or falling on the knee. Pain, bruising, or swelling may be severe, and may start within minutes of the injury. Overuse is another cause of knee pain. Other causes are climbing stairs, kneeling, and other activities that use the knee. Everyday wear and tear, especially as you get older, also can cause knee pain. Rest, along with home treatment, often relieves pain and allows your knee to heal. If you have a serious knee injury, you may need tests and treatment. Follow-up care is a key part of your treatment and safety. Be sure to make and go to all appointments, and call your doctor if you are having problems. It's also a good idea to know your test results and keep a list of the medicines you take. How can you care for yourself at home? · Be safe with medicines. Read and follow all instructions on the label. ? If the doctor gave you a prescription medicine for pain, take it as prescribed. ? If you are not taking a prescription pain medicine, ask your doctor if you can take an over-the-counter medicine. · Rest and protect your knee. Take a break from any activity that may cause pain. · Put ice or a cold pack on your knee for 10 to 20 minutes at a time. Put a thin cloth between the ice and your skin. · Prop up a sore knee on a pillow when you ice it or anytime you sit or lie down for the next 3 days. Try to keep it above the level of your heart. This will help reduce swelling. · If your knee is not swollen, you can put moist heat, a heating pad, or a warm cloth on your knee. · If your doctor recommends an elastic bandage, sleeve, or other type of support for your knee, wear it as directed.   · Follow your doctor's instructions about how much weight you can put on your leg. Use a cane, crutches, or a walker as instructed. · Follow your doctor's instructions about activity during your healing process. If you can do mild exercise, slowly increase your activity. · Reach and stay at a healthy weight. Extra weight can strain the joints, especially the knees and hips, and make the pain worse. Losing even a few pounds may help. When should you call for help? Call 911 anytime you think you may need emergency care. For example, call if:    · You have symptoms of a blood clot in your lung (called a pulmonary embolism). These may include:  ? Sudden chest pain. ? Trouble breathing. ? Coughing up blood. Call your doctor now or seek immediate medical care if:    · You have severe or increasing pain.     · Your leg or foot turns cold or changes color.     · You cannot stand or put weight on your knee.     · Your knee looks twisted or bent out of shape.     · You cannot move your knee.     · You have signs of infection, such as:  ? Increased pain, swelling, warmth, or redness. ? Red streaks leading from the knee. ? Pus draining from a place on your knee. ? A fever.     · You have signs of a blood clot in your leg (called a deep vein thrombosis), such as:  ? Pain in your calf, back of the knee, thigh, or groin. ? Redness and swelling in your leg or groin. Watch closely for changes in your health, and be sure to contact your doctor if:    · You have tingling, weakness, or numbness in your knee.     · You have any new symptoms, such as swelling.     · You have bruises from a knee injury that last longer than 2 weeks.     · You do not get better as expected. Where can you learn more? Go to http://www.gray.com/  Enter K195 in the search box to learn more about \"Knee Pain or Injury: Care Instructions. \"  Current as of: June 26, 2019               Content Version: 12.6  © 1717-0746 [employer].    Care instructions adapted under license by [employer] (which disclaims liability or warranty for this information). If you have questions about a medical condition or this instruction, always ask your healthcare professional. [employer] disclaims any warranty or liability for your use of this information.

## 2021-01-22 LAB — SARS-COV-2, COV2NT: NOT DETECTED

## 2021-01-25 ENCOUNTER — APPOINTMENT (OUTPATIENT)
Dept: GENERAL RADIOLOGY | Age: 66
End: 2021-01-25
Attending: ORTHOPAEDIC SURGERY
Payer: MEDICAID

## 2021-01-25 ENCOUNTER — ANESTHESIA (OUTPATIENT)
Dept: SURGERY | Age: 66
End: 2021-01-25
Payer: MEDICAID

## 2021-01-25 ENCOUNTER — HOSPITAL ENCOUNTER (OUTPATIENT)
Age: 66
Setting detail: OUTPATIENT SURGERY
Discharge: HOME | End: 2021-01-25
Attending: ORTHOPAEDIC SURGERY | Admitting: ORTHOPAEDIC SURGERY
Payer: MEDICAID

## 2021-01-25 VITALS
DIASTOLIC BLOOD PRESSURE: 81 MMHG | OXYGEN SATURATION: 95 % | SYSTOLIC BLOOD PRESSURE: 138 MMHG | WEIGHT: 225 LBS | RESPIRATION RATE: 15 BRPM | HEART RATE: 78 BPM | HEIGHT: 62 IN | BODY MASS INDEX: 41.41 KG/M2 | TEMPERATURE: 96.5 F

## 2021-01-25 PROBLEM — M17.9 KNEE OSTEOARTHRITIS: Status: ACTIVE | Noted: 2021-01-25

## 2021-01-25 LAB
ABO + RH BLD: NORMAL
BLOOD GROUP ANTIBODIES SERPL: NEGATIVE
GLUCOSE BLD STRIP.AUTO-MCNC: 120 MG/DL (ref 70–110)
GLUCOSE BLD STRIP.AUTO-MCNC: 125 MG/DL (ref 70–110)
GLUCOSE BLD STRIP.AUTO-MCNC: 58 MG/DL (ref 70–110)
PERFORMED BY, TECHID: ABNORMAL
SPECIMEN EXP DATE BLD: NORMAL

## 2021-01-25 PROCEDURE — 74011000250 HC RX REV CODE- 250: Performed by: NURSE ANESTHETIST, CERTIFIED REGISTERED

## 2021-01-25 PROCEDURE — 77030011266 HC ELECTRD BLD INSL COVD -A: Performed by: ORTHOPAEDIC SURGERY

## 2021-01-25 PROCEDURE — 74011250636 HC RX REV CODE- 250/636: Performed by: NURSE ANESTHETIST, CERTIFIED REGISTERED

## 2021-01-25 PROCEDURE — 77030007866 HC KT SPN ANES BBMI -B: Performed by: NURSE ANESTHETIST, CERTIFIED REGISTERED

## 2021-01-25 PROCEDURE — 36415 COLL VENOUS BLD VENIPUNCTURE: CPT

## 2021-01-25 PROCEDURE — 97116 GAIT TRAINING THERAPY: CPT

## 2021-01-25 PROCEDURE — 64450 NJX AA&/STRD OTHER PN/BRANCH: CPT | Performed by: NURSE ANESTHETIST, CERTIFIED REGISTERED

## 2021-01-25 PROCEDURE — 77030010783 HC BOWL MX BN CEM J&J -B: Performed by: ORTHOPAEDIC SURGERY

## 2021-01-25 PROCEDURE — 77030006835 HC BLD SAW SAG STRY -B: Performed by: ORTHOPAEDIC SURGERY

## 2021-01-25 PROCEDURE — 77030002982 HC SUT POLYSRB J&J -A: Performed by: ORTHOPAEDIC SURGERY

## 2021-01-25 PROCEDURE — 77030000032 HC CUF TRNQT ZIMM -B: Performed by: ORTHOPAEDIC SURGERY

## 2021-01-25 PROCEDURE — 76060000035 HC ANESTHESIA 2 TO 2.5 HR: Performed by: ORTHOPAEDIC SURGERY

## 2021-01-25 PROCEDURE — C1713 ANCHOR/SCREW BN/BN,TIS/BN: HCPCS | Performed by: ORTHOPAEDIC SURGERY

## 2021-01-25 PROCEDURE — 74011250637 HC RX REV CODE- 250/637: Performed by: NURSE ANESTHETIST, CERTIFIED REGISTERED

## 2021-01-25 PROCEDURE — 73560 X-RAY EXAM OF KNEE 1 OR 2: CPT

## 2021-01-25 PROCEDURE — C1776 JOINT DEVICE (IMPLANTABLE): HCPCS | Performed by: ORTHOPAEDIC SURGERY

## 2021-01-25 PROCEDURE — 97161 PT EVAL LOW COMPLEX 20 MIN: CPT

## 2021-01-25 PROCEDURE — 77030042022 HC SYST COLD THRPY BREG -B: Performed by: ORTHOPAEDIC SURGERY

## 2021-01-25 PROCEDURE — 76210000063 HC OR PH I REC FIRST 0.5 HR: Performed by: ORTHOPAEDIC SURGERY

## 2021-01-25 PROCEDURE — 76210000025 HC REC RM PH II 3 TO 3.5 HR: Performed by: ORTHOPAEDIC SURGERY

## 2021-01-25 PROCEDURE — 2709999900 HC NON-CHARGEABLE SUPPLY: Performed by: ORTHOPAEDIC SURGERY

## 2021-01-25 PROCEDURE — 76942 ECHO GUIDE FOR BIOPSY: CPT | Performed by: NURSE ANESTHETIST, CERTIFIED REGISTERED

## 2021-01-25 PROCEDURE — 77030031139 HC SUT VCRL2 J&J -A: Performed by: ORTHOPAEDIC SURGERY

## 2021-01-25 PROCEDURE — 77030018673: Performed by: ORTHOPAEDIC SURGERY

## 2021-01-25 PROCEDURE — 74011000250 HC RX REV CODE- 250

## 2021-01-25 PROCEDURE — 74011250637 HC RX REV CODE- 250/637: Performed by: ORTHOPAEDIC SURGERY

## 2021-01-25 PROCEDURE — 77030013708 HC HNDPC SUC IRR PULS STRY –B: Performed by: ORTHOPAEDIC SURGERY

## 2021-01-25 PROCEDURE — 74011250636 HC RX REV CODE- 250/636

## 2021-01-25 PROCEDURE — 77030041690 HC SYS PINNING KN JNJ -D: Performed by: ORTHOPAEDIC SURGERY

## 2021-01-25 PROCEDURE — 77030006812 HC BLD SAW RECIP STRY -B: Performed by: ORTHOPAEDIC SURGERY

## 2021-01-25 PROCEDURE — 77030042352 HC GARMT COMPR -B: Performed by: ORTHOPAEDIC SURGERY

## 2021-01-25 PROCEDURE — 77030029372 HC ADH SKN CLSR PRINEO J&J -C: Performed by: ORTHOPAEDIC SURGERY

## 2021-01-25 PROCEDURE — 76010000131 HC OR TIME 2 TO 2.5 HR: Performed by: ORTHOPAEDIC SURGERY

## 2021-01-25 PROCEDURE — 82962 GLUCOSE BLOOD TEST: CPT

## 2021-01-25 PROCEDURE — 77030040375: Performed by: ORTHOPAEDIC SURGERY

## 2021-01-25 PROCEDURE — 77030002933 HC SUT MCRYL J&J -A: Performed by: ORTHOPAEDIC SURGERY

## 2021-01-25 PROCEDURE — 74011250636 HC RX REV CODE- 250/636: Performed by: ORTHOPAEDIC SURGERY

## 2021-01-25 PROCEDURE — 86901 BLOOD TYPING SEROLOGIC RH(D): CPT

## 2021-01-25 PROCEDURE — 74011000258 HC RX REV CODE- 258: Performed by: NURSE ANESTHETIST, CERTIFIED REGISTERED

## 2021-01-25 PROCEDURE — 74011000250 HC RX REV CODE- 250: Performed by: ORTHOPAEDIC SURGERY

## 2021-01-25 DEVICE — COMPONENT PAT DIA35MM KNEE POLY DOME CEM MEDIALIZED ATTUNE: Type: IMPLANTABLE DEVICE | Site: KNEE | Status: FUNCTIONAL

## 2021-01-25 DEVICE — KNEE K1 TOT HEMI STD CEM IMPL CAPPED SYNTHES: Type: IMPLANTABLE DEVICE | Status: FUNCTIONAL

## 2021-01-25 DEVICE — BASEPLATE TIB SZ 4 ROT PLATFRM CO CHROM MOLYBDENUM TI ALLY: Type: IMPLANTABLE DEVICE | Site: KNEE | Status: FUNCTIONAL

## 2021-01-25 DEVICE — COMPONENT FEM SZ 5 R KNEE NAR POST STBL CEM ATTUNE: Type: IMPLANTABLE DEVICE | Site: KNEE | Status: FUNCTIONAL

## 2021-01-25 DEVICE — INSERT TIB SZ 5 THK5MM KNEE POST STBL ROT PLATFRM ATTUNE: Type: IMPLANTABLE DEVICE | Site: KNEE | Status: FUNCTIONAL

## 2021-01-25 DEVICE — CEMENT BNE 40GM FULL DOSE PMMA W/ GENT HI VISC RADPQ LNG: Type: IMPLANTABLE DEVICE | Site: KNEE | Status: FUNCTIONAL

## 2021-01-25 RX ORDER — DEXTROSE 50 % IN WATER (D50W) INTRAVENOUS SYRINGE
12.5
Status: COMPLETED | OUTPATIENT
Start: 2021-01-25 | End: 2021-01-25

## 2021-01-25 RX ORDER — ACETAMINOPHEN 325 MG/1
650 TABLET ORAL
Status: DISCONTINUED | OUTPATIENT
Start: 2021-01-25 | End: 2021-01-25 | Stop reason: HOSPADM

## 2021-01-25 RX ORDER — ALBUTEROL SULFATE 0.83 MG/ML
2.5 SOLUTION RESPIRATORY (INHALATION)
Status: DISCONTINUED | OUTPATIENT
Start: 2021-01-25 | End: 2021-01-25 | Stop reason: HOSPADM

## 2021-01-25 RX ORDER — DEXTROSE 50 % IN WATER (D50W) INTRAVENOUS SYRINGE
Status: COMPLETED
Start: 2021-01-25 | End: 2021-01-25

## 2021-01-25 RX ORDER — ONDANSETRON 2 MG/ML
4 INJECTION INTRAMUSCULAR; INTRAVENOUS ONCE
Status: DISCONTINUED | OUTPATIENT
Start: 2021-01-25 | End: 2021-01-25 | Stop reason: HOSPADM

## 2021-01-25 RX ORDER — NALOXONE HYDROCHLORIDE 0.4 MG/ML
0.4 INJECTION, SOLUTION INTRAMUSCULAR; INTRAVENOUS; SUBCUTANEOUS AS NEEDED
Status: DISCONTINUED | OUTPATIENT
Start: 2021-01-25 | End: 2021-01-25 | Stop reason: HOSPADM

## 2021-01-25 RX ORDER — BUPIVACAINE HYDROCHLORIDE 2.5 MG/ML
INJECTION, SOLUTION INFILTRATION; PERINEURAL AS NEEDED
Status: DISCONTINUED | OUTPATIENT
Start: 2021-01-25 | End: 2021-01-25 | Stop reason: HOSPADM

## 2021-01-25 RX ORDER — SODIUM CHLORIDE 0.9 % (FLUSH) 0.9 %
5-40 SYRINGE (ML) INJECTION EVERY 8 HOURS
Status: DISCONTINUED | OUTPATIENT
Start: 2021-01-25 | End: 2021-01-25 | Stop reason: HOSPADM

## 2021-01-25 RX ORDER — MIDAZOLAM HYDROCHLORIDE 1 MG/ML
INJECTION, SOLUTION INTRAMUSCULAR; INTRAVENOUS AS NEEDED
Status: DISCONTINUED | OUTPATIENT
Start: 2021-01-25 | End: 2021-01-25 | Stop reason: HOSPADM

## 2021-01-25 RX ORDER — SODIUM CHLORIDE, SODIUM LACTATE, POTASSIUM CHLORIDE, CALCIUM CHLORIDE 600; 310; 30; 20 MG/100ML; MG/100ML; MG/100ML; MG/100ML
25 INJECTION, SOLUTION INTRAVENOUS CONTINUOUS
Status: DISCONTINUED | OUTPATIENT
Start: 2021-01-25 | End: 2021-01-25 | Stop reason: HOSPADM

## 2021-01-25 RX ORDER — SODIUM CHLORIDE 0.9 % (FLUSH) 0.9 %
5-40 SYRINGE (ML) INJECTION AS NEEDED
Status: DISCONTINUED | OUTPATIENT
Start: 2021-01-25 | End: 2021-01-25 | Stop reason: HOSPADM

## 2021-01-25 RX ORDER — OXYCODONE AND ACETAMINOPHEN 10; 325 MG/1; MG/1
1 TABLET ORAL
Status: DISCONTINUED | OUTPATIENT
Start: 2021-01-25 | End: 2021-01-25 | Stop reason: HOSPADM

## 2021-01-25 RX ORDER — NALOXONE HYDROCHLORIDE 0.4 MG/ML
0.1 INJECTION, SOLUTION INTRAMUSCULAR; INTRAVENOUS; SUBCUTANEOUS
Status: DISCONTINUED | OUTPATIENT
Start: 2021-01-25 | End: 2021-01-25 | Stop reason: HOSPADM

## 2021-01-25 RX ORDER — KETOROLAC TROMETHAMINE 30 MG/ML
INJECTION, SOLUTION INTRAMUSCULAR; INTRAVENOUS AS NEEDED
Status: DISCONTINUED | OUTPATIENT
Start: 2021-01-25 | End: 2021-01-25 | Stop reason: HOSPADM

## 2021-01-25 RX ORDER — GABAPENTIN 300 MG/1
300 CAPSULE ORAL ONCE
Status: COMPLETED | OUTPATIENT
Start: 2021-01-25 | End: 2021-01-25

## 2021-01-25 RX ORDER — LABETALOL HCL 20 MG/4 ML
SYRINGE (ML) INTRAVENOUS AS NEEDED
Status: DISCONTINUED | OUTPATIENT
Start: 2021-01-25 | End: 2021-01-25 | Stop reason: HOSPADM

## 2021-01-25 RX ORDER — SENNOSIDES 8.6 MG/1
1 TABLET ORAL 2 TIMES DAILY
Status: DISCONTINUED | OUTPATIENT
Start: 2021-01-25 | End: 2021-01-25 | Stop reason: HOSPADM

## 2021-01-25 RX ORDER — DIPHENHYDRAMINE HYDROCHLORIDE 50 MG/ML
12.5 INJECTION, SOLUTION INTRAMUSCULAR; INTRAVENOUS
Status: DISCONTINUED | OUTPATIENT
Start: 2021-01-25 | End: 2021-01-25 | Stop reason: HOSPADM

## 2021-01-25 RX ORDER — DEXAMETHASONE SODIUM PHOSPHATE 4 MG/ML
INJECTION, SOLUTION INTRA-ARTICULAR; INTRALESIONAL; INTRAMUSCULAR; INTRAVENOUS; SOFT TISSUE
Status: SHIPPED | OUTPATIENT
Start: 2021-01-25 | End: 2021-01-25

## 2021-01-25 RX ORDER — DEXTROSE 50 % IN WATER (D50W) INTRAVENOUS SYRINGE
25-50 AS NEEDED
Status: DISCONTINUED | OUTPATIENT
Start: 2021-01-25 | End: 2021-01-25 | Stop reason: HOSPADM

## 2021-01-25 RX ORDER — BUPIVACAINE HYDROCHLORIDE 2.5 MG/ML
INJECTION, SOLUTION EPIDURAL; INFILTRATION; INTRACAUDAL
Status: SHIPPED | OUTPATIENT
Start: 2021-01-25 | End: 2021-01-25

## 2021-01-25 RX ORDER — INSULIN LISPRO 100 [IU]/ML
INJECTION, SOLUTION INTRAVENOUS; SUBCUTANEOUS ONCE
Status: DISCONTINUED | OUTPATIENT
Start: 2021-01-25 | End: 2021-01-25 | Stop reason: HOSPADM

## 2021-01-25 RX ORDER — SODIUM CHLORIDE 9 MG/ML
INJECTION, SOLUTION INTRAVENOUS
Status: DISCONTINUED
Start: 2021-01-25 | End: 2021-01-25 | Stop reason: HOSPADM

## 2021-01-25 RX ORDER — ASPIRIN 325 MG
325 TABLET, DELAYED RELEASE (ENTERIC COATED) ORAL 2 TIMES DAILY
Status: DISCONTINUED | OUTPATIENT
Start: 2021-01-26 | End: 2021-01-25 | Stop reason: HOSPADM

## 2021-01-25 RX ORDER — FENTANYL CITRATE 50 UG/ML
INJECTION, SOLUTION INTRAMUSCULAR; INTRAVENOUS AS NEEDED
Status: DISCONTINUED | OUTPATIENT
Start: 2021-01-25 | End: 2021-01-25 | Stop reason: HOSPADM

## 2021-01-25 RX ORDER — FLUMAZENIL 0.1 MG/ML
0.2 INJECTION INTRAVENOUS
Status: DISCONTINUED | OUTPATIENT
Start: 2021-01-25 | End: 2021-01-25 | Stop reason: HOSPADM

## 2021-01-25 RX ORDER — FENTANYL CITRATE 50 UG/ML
50 INJECTION, SOLUTION INTRAMUSCULAR; INTRAVENOUS
Status: DISCONTINUED | OUTPATIENT
Start: 2021-01-25 | End: 2021-01-25 | Stop reason: HOSPADM

## 2021-01-25 RX ORDER — OXYCODONE AND ACETAMINOPHEN 5; 325 MG/1; MG/1
2 TABLET ORAL
Status: DISCONTINUED | OUTPATIENT
Start: 2021-01-25 | End: 2021-01-25 | Stop reason: HOSPADM

## 2021-01-25 RX ORDER — FACIAL-BODY WIPES
10 EACH TOPICAL DAILY PRN
Status: DISCONTINUED | OUTPATIENT
Start: 2021-01-25 | End: 2021-01-25 | Stop reason: HOSPADM

## 2021-01-25 RX ORDER — MAGNESIUM SULFATE 100 %
4 CRYSTALS MISCELLANEOUS AS NEEDED
Status: DISCONTINUED | OUTPATIENT
Start: 2021-01-25 | End: 2021-01-25 | Stop reason: HOSPADM

## 2021-01-25 RX ORDER — KETAMINE HYDROCHLORIDE 10 MG/ML
INJECTION, SOLUTION INTRAMUSCULAR; INTRAVENOUS AS NEEDED
Status: DISCONTINUED | OUTPATIENT
Start: 2021-01-25 | End: 2021-01-25 | Stop reason: HOSPADM

## 2021-01-25 RX ORDER — ONDANSETRON 2 MG/ML
4 INJECTION INTRAMUSCULAR; INTRAVENOUS
Status: DISCONTINUED | OUTPATIENT
Start: 2021-01-25 | End: 2021-01-25 | Stop reason: HOSPADM

## 2021-01-25 RX ORDER — FENTANYL CITRATE 50 UG/ML
50 INJECTION, SOLUTION INTRAMUSCULAR; INTRAVENOUS AS NEEDED
Status: DISCONTINUED | OUTPATIENT
Start: 2021-01-25 | End: 2021-01-25 | Stop reason: HOSPADM

## 2021-01-25 RX ORDER — ACETAMINOPHEN 500 MG
1000 TABLET ORAL ONCE
Status: COMPLETED | OUTPATIENT
Start: 2021-01-25 | End: 2021-01-25

## 2021-01-25 RX ORDER — BUPIVACAINE HYDROCHLORIDE 7.5 MG/ML
INJECTION, SOLUTION EPIDURAL; RETROBULBAR
Status: SHIPPED | OUTPATIENT
Start: 2021-01-25 | End: 2021-01-25

## 2021-01-25 RX ORDER — VANCOMYCIN HYDROCHLORIDE 1.5 G/30ML
INJECTION, POWDER, LYOPHILIZED, FOR SOLUTION INTRAVENOUS
Status: COMPLETED
Start: 2021-01-25 | End: 2021-01-25

## 2021-01-25 RX ADMIN — FENTANYL CITRATE 25 MCG: 50 INJECTION, SOLUTION INTRAMUSCULAR; INTRAVENOUS at 08:11

## 2021-01-25 RX ADMIN — TRANEXAMIC ACID 1 G: 1 INJECTION, SOLUTION INTRAVENOUS at 09:54

## 2021-01-25 RX ADMIN — KETAMINE HYDROCHLORIDE 10 MG: 10 INJECTION, SOLUTION INTRAMUSCULAR; INTRAVENOUS at 08:31

## 2021-01-25 RX ADMIN — KETAMINE HYDROCHLORIDE 10 MG: 10 INJECTION, SOLUTION INTRAMUSCULAR; INTRAVENOUS at 08:45

## 2021-01-25 RX ADMIN — KETOROLAC TROMETHAMINE 15 MG: 30 INJECTION, SOLUTION INTRAMUSCULAR at 10:08

## 2021-01-25 RX ADMIN — BUPIVACAINE HYDROCHLORIDE 12 MG: 7.5 INJECTION, SOLUTION EPIDURAL; RETROBULBAR at 08:19

## 2021-01-25 RX ADMIN — FENTANYL CITRATE 25 MCG: 50 INJECTION, SOLUTION INTRAMUSCULAR; INTRAVENOUS at 08:50

## 2021-01-25 RX ADMIN — BUPIVACAINE HYDROCHLORIDE 20 ML: 2.5 INJECTION, SOLUTION EPIDURAL; INFILTRATION; INTRACAUDAL at 08:05

## 2021-01-25 RX ADMIN — OXYCODONE HYDROCHLORIDE AND ACETAMINOPHEN 2 TABLET: 5; 325 TABLET ORAL at 13:43

## 2021-01-25 RX ADMIN — DEXTROSE 50 % IN WATER (D50W) INTRAVENOUS SYRINGE 12.5 G: at 07:30

## 2021-01-25 RX ADMIN — SODIUM CHLORIDE, POTASSIUM CHLORIDE, SODIUM LACTATE AND CALCIUM CHLORIDE 25 ML/HR: 600; 310; 30; 20 INJECTION, SOLUTION INTRAVENOUS at 07:15

## 2021-01-25 RX ADMIN — KETAMINE HYDROCHLORIDE 10 MG: 10 INJECTION, SOLUTION INTRAMUSCULAR; INTRAVENOUS at 09:05

## 2021-01-25 RX ADMIN — GABAPENTIN 300 MG: 300 CAPSULE ORAL at 06:54

## 2021-01-25 RX ADMIN — ACETAMINOPHEN 1000 MG: 500 TABLET ORAL at 06:54

## 2021-01-25 RX ADMIN — KETAMINE HYDROCHLORIDE 10 MG: 10 INJECTION, SOLUTION INTRAMUSCULAR; INTRAVENOUS at 09:25

## 2021-01-25 RX ADMIN — TRANEXAMIC ACID 1 G: 1 INJECTION, SOLUTION INTRAVENOUS at 08:25

## 2021-01-25 RX ADMIN — FENTANYL CITRATE 25 MCG: 50 INJECTION, SOLUTION INTRAMUSCULAR; INTRAVENOUS at 08:42

## 2021-01-25 RX ADMIN — KETAMINE HYDROCHLORIDE 10 MG: 10 INJECTION, SOLUTION INTRAMUSCULAR; INTRAVENOUS at 09:58

## 2021-01-25 RX ADMIN — DEXTROSE MONOHYDRATE 12.5 G: 500 INJECTION PARENTERAL at 07:30

## 2021-01-25 RX ADMIN — DEXAMETHASONE SODIUM PHOSPHATE 4 MG: 4 INJECTION, SOLUTION INTRAMUSCULAR; INTRAVENOUS at 08:05

## 2021-01-25 RX ADMIN — LABETALOL HYDROCHLORIDE 10 MG: 5 INJECTION, SOLUTION INTRAVENOUS at 10:13

## 2021-01-25 RX ADMIN — VANCOMYCIN HYDROCHLORIDE 1500 MG: 1.5 INJECTION, POWDER, LYOPHILIZED, FOR SOLUTION INTRAVENOUS at 07:00

## 2021-01-25 RX ADMIN — FENTANYL CITRATE 25 MCG: 50 INJECTION, SOLUTION INTRAMUSCULAR; INTRAVENOUS at 08:20

## 2021-01-25 RX ADMIN — MIDAZOLAM 2 MG: 1 INJECTION INTRAMUSCULAR; INTRAVENOUS at 08:00

## 2021-01-25 NOTE — ANESTHESIA PREPROCEDURE EVALUATION
Relevant Problems   RESPIRATORY SYSTEM   (+) CAP (community acquired pneumonia)   (+) COPD (chronic obstructive pulmonary disease) ([facility])   (+) COPD with acute exacerbation ([facility])   (+) Pneumonia   (+) Shortness of breath      CARDIOVASCULAR   (+) HTN (hypertension)      RENAL FAILURE   (+) Acute renal failure (ARF) ([facility])      ENDOCRINE   (+) DM (diabetes mellitus) ([facility])   (+) Severe obesity (BMI 35.0-39.9)   (+) Type 2 diabetes mellitus with hyperglycemia, with long-term current use of insulin ([facility])      PERSONAL HX & FAMILY HX OF CANCER   (+) Malignant neoplasm of female breast ([facility])       Anesthetic History   No history of anesthetic complications            Review of Systems / Medical History  Patient summary reviewed, nursing notes reviewed and pertinent labs reviewed    Pulmonary    COPD: moderate    Sleep apnea           Neuro/Psych   Within defined limits           Cardiovascular    Hypertension  Valvular problems/murmurs: mitral insufficiency        CAD and hyperlipidemia    Exercise tolerance: <4 METS     GI/Hepatic/Renal  Within defined limits   GERD           Endo/Other  Within defined limits  Diabetes: type 2    Morbid obesity and arthritis     Other Findings              Physical Exam    Airway  Mallampati: III  TM Distance: 4 - 6 cm  Neck ROM: normal range of motion   Mouth opening: Normal     Cardiovascular  Regular rate and rhythm,  S1 and S2 normal,  no murmur, click, rub, or gallop  Rhythm: regular  Rate: normal         Dental  No notable dental hx       Pulmonary  Breath sounds clear to auscultation               Abdominal  GI exam deferred       Other Findings            Anesthetic Plan    ASA: 3  Anesthesia type: spinal      Post-op pain plan if not by surgeon: peripheral nerve block single    Induction: Intravenous  Anesthetic plan and risks discussed with: Patient

## 2021-01-25 NOTE — ANESTHESIA PROCEDURE NOTES
Spinal Block    Start time: 1/25/2021 8:11 AM  End time: 1/25/2021 8:19 AM  Performed by: Catina Colin CRNA  Authorized by: Catina Colin CRNA     Pre-procedure:   Indications: primary anesthetic  Preanesthetic Checklist: patient identified, risks and benefits discussed, anesthesia consent, site marked, patient being monitored and timeout performed    Timeout Time: 08:11          Spinal Block:   Patient Position:  Seated        Location:  L2-3  Technique:  Single shot        Needle:   Needle Type:  Pencil-tip  Needle Gauge:  25 G  Attempts:  2      Events: CSF confirmed, no blood with aspiration and no paresthesia        Assessment:  Insertion:  Uncomplicated  Patient tolerance:  Patient tolerated the procedure well with no immediate complications

## 2021-01-25 NOTE — PERIOP NOTES
Spoke to AUDREY Metcalf, RN with Case Management about setting up Home Health PT. She is working on it with [facility].

## 2021-01-25 NOTE — DISCHARGE INSTRUCTIONS
TOTAL KNEE REPLACEMENT DISCHARGE INFORMATION      Home Health Physical Therapy: [facility] [phone]. They should be calling you, if you don't hear from them, please give them a call. You have undergone a Total Knee Replacement. The following list is to provide you with some expectations over the next week upon your discharge from the hospital.     1. Please continue your Aspirin 325mg every 12 hours (twice daily) or any other blood thinner as directed until Dr. Ny Garza instructs you to discontinue it. If you are not sure which blood thinner to take please contact Dr. Cristóbal Thomas office next business day for clarification. 2. Please be sure to continue your thigh-high compression stockings on both sides until instructed to discontinue them. 3. DO NOT GET THE INCISION WET until instructed to do so. 4. The Ace wrap applied on your knee may be removed 48 hours after your surgery. We will leave the stockings on.  5. During the next week, should you experience fevers of 101.5 F, a white drainage from the incision, extreme redness around the incision, or the incision begins to have a pungent smell; Please call our office or page Dr. Ny Garza whose numbers are provided in your discharge paperwork. To Page Dr. Ny Garza please call [phone] and dial [phone]. Have the  page whomever is on call for Orthopedics. These are signs of infection and it should be addressed immediately. 6. Please do not drive until instructed to do so.  7. It is very important for you to begin your Outpatient Physical Therapy within a couple days of the day of your discharge and your appointment should have been set up. If your physical therapy has not been set up please call our office the next business day for assistance. Details provided in a separate sheet. 8. Finish all antibiotics. Start the antibiotics as soon as you go home if you have prescribed antibiotics.   9.  You should perform your daily home exercises at least 4 times a day 30 minutes each time. 10.  Do not place anything under your knee while sleeping at night. Elevate your heel so your knee  is straight while sleeping at night. 11.  Perform deep breathing exercises 10 times every hour while awake. Use your Incentive Spirometer. 12.  If you had a nerve block and you are not having pain the day of the surgery, at nighttime it is okay to take 1 pain medication before going to sleep to help prevent excruciating pain when the nerve block wears off. Please eat something before taking pain medicine to prevent stomach upset. 13.  You will be given an ice machine to use to help prevent swelling. Do not apply heat to the incision area. See polar care instructions below. 14.  While you are awake at least 10 times every 30 minutes move your foot up and down as if you are pumping gas from both feet to help prevent swelling and to promote blood circulation in the calf. 15.  If you develop sudden onset of shortness of breath or severe calf pain please go to closest emergency room. 16. Your pain medicine is a Narcotic and may cause constipation. You may take an over the counter stool softener while taking pain medicine. POLAR CARE INSTRUCTIONS:     DAY 1-3 WEAR CONTINUOUSLY WHILE AWAKE.  INSPECT SKIN EVERY 1-2 HOURS   MAKE SURE YOU PLACE A TOWEL IN BETWEEN SKIN AND POLAR PAD.  DAY 4 AND ON USE AS NEEDED FOR PAIN CONTROL FOR 1 HOUR INTERVALS; NOT TO EXCEED 12 HOURS/DAY. Things to watch for:             Increased swelling of the surgical site             Spreading of redness around the incision site             Drainage of pus from the incision site             Developing a fever of 101.5 °F or higher             If any of these symptoms occur you have any questions please contact our office at [phone]. If you need to talk to Dr. Arabella Valladares on an urgent basis please call the [facility] at [phone]. 0 for the .   Please let the  know you are a surgical patient of Dr. Can Mata and you wish to get in contact with him. If Dr. Can Mata or his staff do not call you back within 30 minutes. Please tell the  to try again. Phone: [phone]  www. [employer]

## 2021-01-25 NOTE — OP NOTES
Operative Note    Patient: Vasyl Bose MRN: [id number]  Surgery Date: 1/25/2021  [unfilled]          Procedure  Primary Surgeon    RIGHT TKA  Les Palencia MD    * Panel 2 does not exist *  * Panel 2 does not exist *    * Panel 3 does not exist *  * Panel 3 does not exist *     Surgeon(s) and Role:     * Les Palencia MD - Primary    Other OR Staff/Assistants:  Circ-1: Essie Dodson  Scrub Tech-1: Arliss Dove  Surg Asst-1: Anise Juniper  Surg Asst-2: Darshan Hanson    1st Assistant Tasks:  Closing    Pre-operative Diagnosis:  Primary osteoarthritis of right knee [M17.11]    Post-operative Diagnosis: same as preop diagnosis    Anesthesia Type: Spinal     Findings: djd    Complications: No    EBL: 50 cc    Specimens: None    Implants     Implant    Graft Bne Recomb Hum Infus Sm -- - Sn/A - Implanted  Spine Lumbar    Inventory item: GRAFT BNE RECOMB HUM INFUS SM --  Model/Cat number: [id number]    Serial number: N/A : Justice Papi    Lot number: [id number]      As of 8/30/2018     Status: Implanted                  Synfix Evolution Fine Tip Screw 25mm - Implanted  Spine Lumbar    Model/Cat number: [phone] Serial number: N/A    : Rufe Motts Lot number: [id number]    As of 8/30/2018     Status: Implanted                  Synfix Evolution Spacer Small 13.5mm - Implanted  Spine Lumbar    Model/Cat number: [phone] Serial number: N/A    : Mary Motts Lot number: N/A    As of 8/30/2018     Status: Implanted                  Graft Bne Recomb Hum Infus Med -- - Sn/A - Implanted  Spine Lumbar    Inventory item: GRAFT BNE RECOMB HUM INFUS MED --  Model/Cat number: [id number]    Serial number: N/A : [employer] [address]    Lot number: [id number]      As of 8/30/2018     Status: Implanted                  Spacer Peek Xl L [id number] -- Coroent - [id number] - Implanted  Spine Lumbar    Inventory item: CAGE SPINALXL [id number] 10DEG LUMBER INTBDY FUS PEEK Model/Cat number: [id number]    : Gilmarlyn Pin Lot number: N/A    As of 8/30/2018     Status: Implanted                  Reduction Screw 6.5x35 - Implanted  Spine Lumbar    Model/Cat number: [id number] Serial number: N/A    Lot number: N/A      As of 8/30/2018     Status: Implanted                  Reduction Screw 6.5 X 40 - Implanted  Spine Lumbar    Model/Cat number: [id number] Serial number: N/A    Lot number: N/A      As of 8/30/2018     Status: Implanted                  Scr Lck Open Tulip 5.5mm -- Reline - Sn/A - Implanted  Spine Lumbar    Inventory item: SCREW SPNL DIA5. 5MM OPN TULIP RAEANN RELINE Model/Cat number: [id number]    Serial number: N/A : Madlyn Pin    Lot number: N/A      As of 8/30/2018     Status: Implanted                  Meliza Spne Lordtc 5.5x75mm Ti -- Reline - Sn/A - Implanted  Spine Lumbar    Inventory item: MELIZA SPNL L75MM DIA5. 5MM POST THORACOLUMBOSACRAL TI LORD Model/Cat number: [id number]    Serial number: N/A : Madlyn Pin    Lot number: N/A      As of 8/30/2018     Status: Implanted                  Meliza Spne Lordtc Mas 5.9v136mg -- Reline Ti - Sn/A - Implanted  Spine Lumbar    Inventory item: MELIZA SPNL L110MM DIA5. 5MM POST THORACOLUMBOSACRAL TI LORD Model/Cat number: [id number]    Serial number: N/A : Madlyn Pin    Lot number: N/A      As of 8/30/2018     Status: Implanted                  Cement Bne 40gm Full Dose Pmma W/ Gent Hi Visc Radpq Lng - [id number] - Implanted   (Right) Knee    Inventory item: CEMENT BNE 40GM FULL DOSE PMMA W/ GENT HI VISC RADPQ LNG Model/Cat number: [id number]    : Ciro Jaimes [employer] Lot number: [id number]    Size: 40g      As of 1/25/2021     Status: Implanted                  Pat Tony Dome Medial 35mm -- Attune - [id number] - Implanted   (Right) Knee    Inventory item: PAT TONY DOME MEDIAL 35MM -- ATTUNE Model/Cat number: [id number]    : Alexandra Mccauley [employer] Lot number: [id number] Size: 35mm      As of 1/25/2021     Status: Implanted                  Component Fem Sz 5 R Knee Sayra Post Stbl Tony Attune - [id number] - Implanted   (Right) Knee    Inventory item: COMPONENT FEM SZ 5 R KNEE SAYRA POST STBL TONY ATTUNE Model/Cat number: [id number]    : [employer] Lot number: [id number]    Size: 5n right      As of 1/25/2021     Status: Implanted                  Insert Tib Sz 5 Thk5mm Knee Post Stbl Rot Platfrm Attune - [id number] - Implanted   (Right) Knee    Inventory item: INSERT TIB SZ 5 THK5MM KNEE POST STBL ROT PLATFRM ATTUNE Model/Cat number: [id number]    : [employer] Lot number: [id number]    Size: 5 5mm      As of 1/25/2021     Status: Implanted                  Baseplate Tib Sz 4 Rot Platfrm Co Chrom Molybdenum Ti Ally - [id number] - Implanted   (Right) Knee    Inventory item: BASEPLATE TIB SZ 4 ROT PLATFRM CO CHROM MOLYBDENUM TI ALLY Model/Cat number: [id number]    : [employer] Lot number: [id number]    Size: 4      As of 1/25/2021     Status: Implanted                         OPERATIVE PROCEDURE:  Please note the first assistant role was to help in patient positioning and draping of the extremity in a sterile fashion. Also during the surgery the assistant's responsibilities included but not limited to extremity positioning during critical portions of the surgery. Assisting in using and placement of retractors during surgery. Lower extremity was prepped and draped in a sterile fashion. After adequate anesthesia was given, the patient was placed in a well-padded supine position. Subvastus arthrotomy from the tibial tubercle to the superior pole of the patella was made. Knee was hyperflexed. Intramedullary reaming of distal femur and proximal tibia was performed. 10 mm of distal femur was cut. Anterior-posterior sizing guide was used.   Anterior, posterior, chamfer cuts, and box cuts were made next.  Proximal tibial cut and preparation performed. Posterior osteophyte meniscal remnants were removed, and also patella was everted. Free-hand cut of the patella was made. Trial components were placed. The patient was found to have excellent range of motion and stability with all trial components. All the trial components removed. Copious irrigation performed. Distal femur, proximal tibia, and patella were impacted in place. Excessive cement was removed. After the cement was hard, Subvastus arthrotomy closed with Vicryl and Prineo stitch. Compressive dressing was applied. The patient was taken to PACU in stable condition. Please note due to the patient's BMI of greater than 35 significant surgical effort was required compared to the standard patient with a BMI lower than 35. Surgical time increased approximately 20% from the normal surgical time due to the patient's high BMI.       Dhiraj Conteh MD

## 2021-01-25 NOTE — ANESTHESIA PROCEDURE NOTES
Peripheral Block    Start time: 1/25/2021 8:00 AM  End time: 1/25/2021 8:05 AM  Performed by: Ric Bernal CRNA  Authorized by: Ric Bernal CRNA       Pre-procedure: Indications: at surgeon's request and post-op pain management    Preanesthetic Checklist: patient identified, risks and benefits discussed, site marked, timeout performed, anesthesia consent given and patient being monitored    Timeout Time: 08:00          Block Type:   Block Type:   Adductor canal  Laterality:  Right  Monitoring:  Standard ASA monitoring  Injection Technique:  Single shot  Procedures: ultrasound guided    Patient Position: supine  Prep: chlorhexidine    Needle Type:  Ultraplex  Needle Gauge:  21 G  Needle Localization:  Ultrasound guidance  Medication Injected:  Bupivacaine 0.25%, 20 mL  dexamethasone (DECADRON) 4 mg/mL injection, 4 mg  Med Admin Time: 1/25/2021 8:05 AM    Assessment:  Number of attempts:  1  Injection Assessment:  Incremental injection every 5 mL, negative aspiration for CSF and no paresthesia  Patient tolerance:  Patient tolerated the procedure well with no immediate complications

## 2021-01-25 NOTE — ANESTHESIA POSTPROCEDURE EVALUATION
Procedure(s):  RIGHT TKA.     spinal    Anesthesia Post Evaluation      Multimodal analgesia: multimodal analgesia not used between 6 hours prior to anesthesia start to PACU discharge  Patient location during evaluation: PACU  Patient participation: complete - patient participated  Level of consciousness: awake and alert  Pain management: adequate  Airway patency: patent  Anesthetic complications: no  Cardiovascular status: acceptable and stable  Respiratory status: acceptable, spontaneous ventilation and room air  Hydration status: acceptable  Comments: Ok to discharge when post op criteria met  Post anesthesia nausea and vomiting:  none  Final Post Anesthesia Temperature Assessment:  Normothermia (36.0-37.5 degrees C)      INITIAL Post-op Vital signs:   Vitals Value Taken Time   /74 01/25/21 1020   Temp 36.1 °C (97 °F) 01/25/21 1020   Pulse 63 01/25/21 1020   Resp 14 01/25/21 1020   SpO2 96 % 01/25/21 1020

## 2021-01-26 ENCOUNTER — OFFICE VISIT (OUTPATIENT)
Dept: ORTHOPEDIC SURGERY | Age: 66
End: 2021-01-26
Payer: MEDICAID

## 2021-01-26 DIAGNOSIS — M17.11 OSTEOARTHRITIS OF RIGHT KNEE, UNSPECIFIED OSTEOARTHRITIS TYPE: Primary | ICD-10-CM

## 2021-01-26 PROCEDURE — 99024 POSTOP FOLLOW-UP VISIT: CPT | Performed by: ORTHOPAEDIC SURGERY

## 2021-01-26 NOTE — PATIENT INSTRUCTIONS
Knee Pain or Injury: Care Instructions  Your Care Instructions     Injuries are a common cause of knee problems. Sudden (acute) injuries may be caused by a direct blow to the knee. They can also be caused by abnormal twisting, bending, or falling on the knee. Pain, bruising, or swelling may be severe, and may start within minutes of the injury. Overuse is another cause of knee pain. Other causes are climbing stairs, kneeling, and other activities that use the knee. Everyday wear and tear, especially as you get older, also can cause knee pain. Rest, along with home treatment, often relieves pain and allows your knee to heal. If you have a serious knee injury, you may need tests and treatment. Follow-up care is a key part of your treatment and safety. Be sure to make and go to all appointments, and call your doctor if you are having problems. It's also a good idea to know your test results and keep a list of the medicines you take. How can you care for yourself at home? · Be safe with medicines. Read and follow all instructions on the label. ? If the doctor gave you a prescription medicine for pain, take it as prescribed. ? If you are not taking a prescription pain medicine, ask your doctor if you can take an over-the-counter medicine. · Rest and protect your knee. Take a break from any activity that may cause pain. · Put ice or a cold pack on your knee for 10 to 20 minutes at a time. Put a thin cloth between the ice and your skin. · Prop up a sore knee on a pillow when you ice it or anytime you sit or lie down for the next 3 days. Try to keep it above the level of your heart. This will help reduce swelling. · If your knee is not swollen, you can put moist heat, a heating pad, or a warm cloth on your knee. · If your doctor recommends an elastic bandage, sleeve, or other type of support for your knee, wear it as directed.   · Follow your doctor's instructions about how much weight you can put on your leg. Use a cane, crutches, or a walker as instructed. · Follow your doctor's instructions about activity during your healing process. If you can do mild exercise, slowly increase your activity. · Reach and stay at a healthy weight. Extra weight can strain the joints, especially the knees and hips, and make the pain worse. Losing even a few pounds may help. When should you call for help? Call 911 anytime you think you may need emergency care. For example, call if:    · You have symptoms of a blood clot in your lung (called a pulmonary embolism). These may include:  ? Sudden chest pain. ? Trouble breathing. ? Coughing up blood. Call your doctor now or seek immediate medical care if:    · You have severe or increasing pain.     · Your leg or foot turns cold or changes color.     · You cannot stand or put weight on your knee.     · Your knee looks twisted or bent out of shape.     · You cannot move your knee.     · You have signs of infection, such as:  ? Increased pain, swelling, warmth, or redness. ? Red streaks leading from the knee. ? Pus draining from a place on your knee. ? A fever.     · You have signs of a blood clot in your leg (called a deep vein thrombosis), such as:  ? Pain in your calf, back of the knee, thigh, or groin. ? Redness and swelling in your leg or groin. Watch closely for changes in your health, and be sure to contact your doctor if:    · You have tingling, weakness, or numbness in your knee.     · You have any new symptoms, such as swelling.     · You have bruises from a knee injury that last longer than 2 weeks.     · You do not get better as expected. Where can you learn more? Go to http://www.gray.com/  Enter K195 in the search box to learn more about \"Knee Pain or Injury: Care Instructions. \"  Current as of: June 26, 2019               Content Version: 12.6  © 9697-5600 [employer].    Care instructions adapted under license by [employer] (which disclaims liability or warranty for this information). If you have questions about a medical condition or this instruction, always ask your healthcare professional. [address] any warranty or liability for your use of this information.

## 2021-01-26 NOTE — PERIOP NOTES
Nurse called Dr. Ann Harvey on behalf of pt (per her request) to let him know that pt fell at home. Her dog got up under her feet and she fell on her bottom. Pt states she didn't hit her operative knee. Dr. Ann Harvey said he would call the patient.

## 2021-01-26 NOTE — PROGRESS NOTES
Name: Kandace Max    : 1955     Service Dept: [facility] and [facility]    [facility]'s Pharmacies:    [facility] [address] [place] [address] - [address]  [address]  [place] [address]  Phone: [phone] Fax: [phone]       Chief Complaint   Patient presents with    Knee Pain    Post-Op Problem        There were no vitals taken for this visit. Allergies   Allergen Reactions    Ciprofloxacin Hives    Keflex [Cephalexin] Nausea and Vomiting    Metformin Other (comments)     GI DISTRESS    Soliqua 100/33 [Insulin Glargine-Lixisenatide] Nausea Only      Current Outpatient Medications   Medication Sig Dispense Refill    oxyCODONE-acetaminophen (Percocet) 5-325 mg per tablet Take 1 Tab by mouth every four (4) hours as needed for Pain for up to 14 days. Max Daily Amount: 6 Tabs. These medications are to be restarted after the surgery. 30 Tab 0    furosemide (LASIX) 20 mg tablet take 2 tablets by mouth once daily      lisinopriL (PRINIVIL, ZESTRIL) 20 mg tablet       OneTouch Ultra Blue Test Strip strip       ergocalciferol (ERGOCALCIFEROL) 1,250 mcg (50,000 unit) capsule Take 1 Cap by mouth every seven (7) days. 16 Cap 0    ferrous sulfate 325 mg (65 mg iron) tablet Take 1 Tab by mouth three (3) times daily. 90 Tab 1    melatonin 3 mg tablet Take 3 mg by mouth nightly.  famotidine (PEPCID) 20 mg tablet take 1 tablet by mouth twice a day      OneTouch UltraSoft Lancets misc       rosuvastatin (CRESTOR) 10 mg tablet       venlafaxine-SR (EFFEXOR-XR) 75 mg capsule Take 75 mg by mouth daily.  gabapentin (NEURONTIN) 300 mg capsule Take 300 mg by mouth two (2) times a day.  polyethylene glycol (ClearLax) 17 gram/dose powder Take 17 g by mouth daily.  neomycin-polymyxin-dexamethasone (MAXITROL) ophthalmic suspension Administer 1 Drop to both eyes two (2) times a day.       timoloL maleate 0.5 % drpd ophthalmic solution Administer 1 Drop to both eyes daily.  tamsulosin (FLOMAX) 0.4 mg capsule take 1 capsule by mouth once daily 30 Cap 3    LANTUS SOLOSTAR U-100 INSULIN 100 unit/mL (3 mL) inpn 30 units subcutaneously in morning. Can take an additional dose of 10 units subcutaneously in the evening if blood sugar is more than 200. 10 mL 0    albuterol (PROVENTIL HFA, VENTOLIN HFA, PROAIR HFA) 90 mcg/actuation inhaler 2 puffs three to four times a day for 5 days then every 6 hours as needed for shortness of breath 1 Inhaler 0    naloxone (NARCAN) 4 mg/actuation nasal spray Use 1 spray intranasally, then discard. Repeat with new spray every 2 min as needed for opioid overdose symptoms, alternating nostrils. 1 Each 0    LORazepam (ATIVAN) 0.5 mg tablet Take 0.5 mg by mouth as needed for Anxiety.  atorvastatin (LIPITOR) 40 mg tablet Take  by mouth nightly.  amLODIPine (NORVASC) 10 mg tablet Take 10 mg by mouth daily. 0    esomeprazole (NEXIUM) 20 mg capsule Take 20 mg by mouth daily. 0    TRADJENTA 5 mg tablet TAKE 1 TABLET BY MOUTH ONCE A DAY  0    NOVOFINE 32 32 gauge x 1/4\" ndle   0      Patient Active Problem List   Diagnosis Code    Lumbosacral spondylosis without myelopathy M47.817    DDD (degenerative disc disease), lumbar M51.36    Malignant neoplasm of female breast ([facility] C50.919    Spinal stenosis, lumbar region without neurogenic claudication M48.061    Severe obesity (BMI 35.0-39. 9) E66.01    Pre-op testing Z01.818    Spinal stenosis of lumbar region at multiple levels M48.061    Scoliosis M41.9    Spinal stenosis M48.00    CAP (community acquired pneumonia) J18.9    Shortness of breath R06.02    Fever and chills R50.9    S/P lumbar fusion Z98.1    Pneumonia J18.9    Acute respiratory failure with hypoxia (HCC) J96.01    Acute renal failure (ARF) (HCC) N17.9    Type 2 diabetes mellitus with hyperglycemia, with long-term current use of insulin (HCC) E11.65, Z79.4    COVID-19 U07.1    HTN (hypertension) I10    Diabetic neuropathy (HCC) E11.40    DVT prophylaxis Z29.9    COPD with acute exacerbation ([facility] J44.1    Person under investigation for COVID-19 Z20.822    COPD (chronic obstructive pulmonary disease) ([facility] J44.9    DM (diabetes mellitus) ([facility]) E11.9    Knee osteoarthritis M17.10      Family History   Problem Relation Age of Onset    No Known Problems Mother     No Known Problems Father     Heart Disease Sister       Social History     Socioeconomic History    Marital status:      Spouse name: Not on file    Number of children: Not on file    Years of education: Not on file    Highest education level: Not on file   Tobacco Use    Smoking status: Never Smoker    Smokeless tobacco: Never Used   Substance and Sexual Activity    Alcohol use: No    Drug use: No   Other Topics Concern      Past Surgical History:   Procedure Laterality Date    HX BACK SURGERY  08/30/2018    HX BREAST BIOPSY Left 02/03/2017    BREAST CANCER    HX BREAST LUMPECTOMY Left     HX CATARACT REMOVAL Right     HX HEART CATHETERIZATION  07/2018    no stents    HX KNEE REPLACEMENT        Past Medical History:   Diagnosis Date    Arthritis     spinal stenosis    Breast CA ([facility] 2017    Left Breast (chemo/radiation)    Diabetes ([facility])     Type 2    Edema     legs and ankles    GERD (gastroesophageal reflux disease)     High cholesterol     Hypertension     Ill-defined condition     patient states hemorrhage behind Left eye-following Dr. Alfred Mcqueen MI (myocardial infarction) ([facility] 06/2018    per patient    Neuropathy     Restless leg         I have reviewed and agree with [facility] Georgina and intake form in chart and the record.      Review of Systems:   Patient is a pleasant appearing individual, appropriately dressed, well hydrated, well nourished, who is alert, appropriately oriented for age, and in no acute distress with a walker gait and normal affect who does not appear to be in any significant pain. Physical Exam:  Left Knee - Full Range of Motion, No crepitation, Grossly neurovascularly intact, Good cap refill, No skin lesion, No effusion, No gross instability, No point tenderness, No quadriceps weakness, No medial or lateral joint line tenderness, Negative Lachman's, Negative Lelia's exam.     Encounter Diagnoses     ICD-10-CM ICD-9-CM   1. Osteoarthritis of right knee, unspecified osteoarthritis type  M17.11 715.96       HPI:  The patient is here status post right total knee replacement. She fell yesterday, so we are just seeing her as a followup and just to make sure. X-rays of the right knee, AP, lateral and oblique, show well-aligned implants with no any failure. Assessment/Plan:  1. Right knee pain status post fall but unremarkable. Plan at this point, activities as tolerated, weightbearing started, no restrictions. We will see the patient back in a week for her virtual appointment for total knee replacement postoperatively and go from there. Return to Office:         Scribed by Laurie Byrd as dictated by [employer] [facility] [place] LOAN Don MD.  Documentation True and Accepted Gabrielkeshawn Don MD

## 2021-02-01 ENCOUNTER — VIRTUAL VISIT (OUTPATIENT)
Dept: ORTHOPEDIC SURGERY | Age: 66
End: 2021-02-01
Payer: MEDICAID

## 2021-02-01 DIAGNOSIS — M17.11 PRIMARY OSTEOARTHRITIS OF RIGHT KNEE: Primary | ICD-10-CM

## 2021-02-01 PROCEDURE — 99024 POSTOP FOLLOW-UP VISIT: CPT | Performed by: ORTHOPAEDIC SURGERY

## 2021-02-01 NOTE — PATIENT INSTRUCTIONS
Knee Arthritis: Care Instructions Your Care Instructions Knee arthritis is a breakdown of the cartilage that cushions your knee joint. When the cartilage wears down, your bones rub against each other. This causes pain and stiffness. Knee arthritis tends to get worse with time. Treatment for knee arthritis involves reducing pain, making the leg muscles stronger, and staying at a healthy body weight. The treatment usually does not improve the health of the cartilage, but it can reduce pain and improve how well your knee works. You can take simple measures to protect your knee joints, ease your pain, and help you stay active. Follow-up care is a key part of your treatment and safety. Be sure to make and go to all appointments, and call your doctor if you are having problems. It's also a good idea to know your test results and keep a list of the medicines you take. How can you care for yourself at home? · Know that knee arthritis will cause more pain on some days than on others. · Stay at a healthy weight. Lose weight if you are overweight. When you stand up, the pressure on your knees from every pound of body weight is multiplied four times. So if you lose 10 pounds, you will reduce the pressure on your knees by 40 pounds. · Talk to your doctor or physical therapist about exercises that will help ease joint pain. ? Stretch to help prevent stiffness and to prevent injury before you exercise. You may enjoy gentle forms of yoga to help keep your knee joints and muscles flexible. ? Walk instead of jog. 
? Ride a bike. This makes your thigh muscles stronger and takes pressure off your knee. ? Wear well-fitting and comfortable shoes. ? Exercise in chest-deep water. This can help you exercise longer with less pain. ? Avoid exercises that include squatting or kneeling. They can put a lot of strain on your knees. ? Talk to your doctor to make sure that the exercise you do is not making the arthritis worse. · Do not sit for long periods of time. Try to walk once in a while to keep your knee from getting stiff. · Ask your doctor or physical therapist whether shoe inserts may reduce your arthritis pain. · If you can afford it, get new athletic shoes at least every year. This can help reduce the strain on your knees. · Use a device to help you do everyday activities. ? A cane or walking stick can help you keep your balance when you walk. Hold the cane or walking stick in the hand opposite the painful knee. ? If you feel like you may fall when you walk, try using crutches or a front-wheeled walker. These can prevent falls that could cause more damage to your knee. ? A knee brace may help keep your knee stable and prevent pain. ? You also can use other things to make life easier, such as a higher toilet seat and handrails in the bathtub or shower. · Take pain medicines exactly as directed. ? Do not wait until you are in severe pain. You will get better results if you take it sooner. ? If you are not taking a prescription pain medicine, take an over-the-counter medicine such as acetaminophen (Tylenol), ibuprofen (Advil, Motrin), or naproxen (Aleve). Read and follow all instructions on the label. ? Do not take two or more pain medicines at the same time unless the doctor told you to. Many pain medicines have acetaminophen, which is Tylenol. Too much acetaminophen (Tylenol) can be harmful. ? Tell your doctor if you take a blood thinner, have diabetes, or have allergies to shellfish. · Ask your doctor if you might benefit from a shot of steroid medicine into your knee. This may provide pain relief for several months. · Many people take the supplements glucosamine and chondroitin for osteoarthritis. Some people feel they help, but the medical research does not show that they work. Talk to your doctor before you take these supplements. When should you call for help? Call your doctor now or seek immediate medical care if: 
  · You have sudden swelling, warmth, or pain in your knee.  
  · You have knee pain and a fever or rash.  
  · You have such bad pain that you cannot use your knee. Watch closely for changes in your health, and be sure to contact your doctor if you have any problems. Where can you learn more? Go to http://www.gray.com/ Enter S738 in the search box to learn more about \"Knee Arthritis: Care Instructions. \" Current as of: December 9, 2019               Content Version: 12.6 © 3394-3958 [employer]. Care instructions adapted under license by [employer] (which disclaims liability or warranty for this information). If you have questions about a medical condition or this instruction, always ask your healthcare professional. [address] any warranty or liability for your use of this information.

## 2021-02-01 NOTE — PROGRESS NOTES
Name: Arlen Aguilera    : 1955     Service Dept: [facility] and [facility]    Patient's Pharmacies:    [facility] [address] [place] [address] - [address]  [address]  [place] [address]  Phone: [phone] Fax: [phone]       Chief Complaint   Patient presents with    Surgical Follow-up    Knee Pain        There were no vitals taken for this visit. Allergies   Allergen Reactions    Ciprofloxacin Hives    Keflex [Cephalexin] Nausea and Vomiting    Metformin Other (comments)     GI DISTRESS    Soliqua 100/33 [Insulin Glargine-Lixisenatide] Nausea Only        Current Outpatient Medications   Medication Sig Dispense Refill    oxyCODONE-acetaminophen (Percocet) 5-325 mg per tablet Take 1 Tab by mouth every four (4) hours as needed for Pain for up to 14 days. Max Daily Amount: 6 Tabs. These medications are to be restarted after the surgery. 30 Tab 0    furosemide (LASIX) 20 mg tablet take 2 tablets by mouth once daily      lisinopriL (PRINIVIL, ZESTRIL) 20 mg tablet       OneTouch Ultra Blue Test Strip strip       ergocalciferol (ERGOCALCIFEROL) 1,250 mcg (50,000 unit) capsule Take 1 Cap by mouth every seven (7) days. 16 Cap 0    ferrous sulfate 325 mg (65 mg iron) tablet Take 1 Tab by mouth three (3) times daily. 90 Tab 1    melatonin 3 mg tablet Take 3 mg by mouth nightly.  famotidine (PEPCID) 20 mg tablet take 1 tablet by mouth twice a day      OneTouch UltraSoft Lancets misc       rosuvastatin (CRESTOR) 10 mg tablet       venlafaxine-SR (EFFEXOR-XR) 75 mg capsule Take 75 mg by mouth daily.  gabapentin (NEURONTIN) 300 mg capsule Take 300 mg by mouth two (2) times a day.  polyethylene glycol (ClearLax) 17 gram/dose powder Take 17 g by mouth daily.  neomycin-polymyxin-dexamethasone (MAXITROL) ophthalmic suspension Administer 1 Drop to both eyes two (2) times a day.       timoloL maleate 0.5 % kwasi ophthalmic solution Administer 1 Drop to both eyes daily.  tamsulosin (FLOMAX) 0.4 mg capsule take 1 capsule by mouth once daily 30 Cap 3    LANTUS SOLOSTAR U-100 INSULIN 100 unit/mL (3 mL) inpn 30 units subcutaneously in morning. Can take an additional dose of 10 units subcutaneously in the evening if blood sugar is more than 200. 10 mL 0    albuterol (PROVENTIL HFA, VENTOLIN HFA, PROAIR HFA) 90 mcg/actuation inhaler 2 puffs three to four times a day for 5 days then every 6 hours as needed for shortness of breath 1 Inhaler 0    naloxone (NARCAN) 4 mg/actuation nasal spray Use 1 spray intranasally, then discard. Repeat with new spray every 2 min as needed for opioid overdose symptoms, alternating nostrils. 1 Each 0    LORazepam (ATIVAN) 0.5 mg tablet Take 0.5 mg by mouth as needed for Anxiety.  atorvastatin (LIPITOR) 40 mg tablet Take  by mouth nightly.  amLODIPine (NORVASC) 10 mg tablet Take 10 mg by mouth daily. 0    esomeprazole (NEXIUM) 20 mg capsule Take 20 mg by mouth daily. 0    TRADJENTA 5 mg tablet TAKE 1 TABLET BY MOUTH ONCE A DAY  0    NOVOFINE 32 32 gauge x 1/4\" ndle   0        Patient Active Problem List   Diagnosis Code    Lumbosacral spondylosis without myelopathy M47.817    DDD (degenerative disc disease), lumbar M51.36    Malignant neoplasm of female breast ([address] C50.919    Spinal stenosis, lumbar region without neurogenic claudication M48.061    Severe obesity (BMI 35.0-39. 9) E66.01    Pre-op testing Z01.818    Spinal stenosis of lumbar region at multiple levels M48.061    Scoliosis M41.9    Spinal stenosis M48.00    CAP (community acquired pneumonia) J18.9    Shortness of breath R06.02    Fever and chills R50.9    S/P lumbar fusion Z98.1    Pneumonia J18.9    Acute respiratory failure with hypoxia (HCC) J96.01    Acute renal failure (ARF) (HCC) N17.9    Type 2 diabetes mellitus with hyperglycemia, with long-term current use of insulin ([address] E11.65, Z79.4    COVID-19 U07.1    HTN (hypertension) I10    Diabetic neuropathy ([facility]) E11.40    DVT prophylaxis Z29.9    COPD with acute exacerbation ([facility] J44.1    Person under investigation for COVID-19 Z20.822    COPD (chronic obstructive pulmonary disease) ([facility]) J44.9    DM (diabetes mellitus) ([facility]) E11.9    Knee osteoarthritis M17.10        Family History   Problem Relation Age of Onset    No Known Problems Mother     No Known Problems Father     Heart Disease Sister         Social History     Socioeconomic History    Marital status:      Spouse name: Not on file    Number of children: Not on file    Years of education: Not on file    Highest education level: Not on file   Tobacco Use    Smoking status: Never Smoker    Smokeless tobacco: Never Used   Substance and Sexual Activity    Alcohol use: No    Drug use: No   Other Topics Concern        Past Surgical History:   Procedure Laterality Date    HX BACK SURGERY  08/30/2018    HX BREAST BIOPSY Left 02/03/2017    BREAST CANCER    HX BREAST LUMPECTOMY Left     HX CATARACT REMOVAL Right     HX HEART CATHETERIZATION  07/2018    no stents    HX KNEE REPLACEMENT          Past Medical History:   Diagnosis Date    Arthritis     spinal stenosis    Breast CA ([facility] 2017    Left Breast (chemo/radiation)    Diabetes ([facility])     Type 2    Edema     legs and ankles    GERD (gastroesophageal reflux disease)     High cholesterol     Hypertension     Ill-defined condition     patient states hemorrhage behind Left eye-following Dr. Holland Capellan MI (myocardial infarction) ([facility] 06/2018    per patient    Neuropathy     Restless leg         I have reviewed and agree with [address] [place] [address] Nw and ROS and intake form in chart and the record. Encounter Diagnoses     ICD-10-CM ICD-9-CM   1. Primary osteoarthritis of right knee  M17.11 715.16          VIRTUAL APPOINTMENT    HPI:  The patient is here status post right total knee replacement, doing well. Just a little bit of soreness. Much improved compared to before. It is a virtual appointment. Assessment/Plan:  Plan at this point, activities as tolerated, weightbearing started, no restrictions. We will see her back on a yearly appointment. If she gets worse, she is to give me a call. Hung Crenshaw, who was evaluated through a synchronous (real-time) audio-video encounter, and/or her healthcare decision maker, is aware that it is a billable service, with coverage as determined by her insurance carrier. She provided verbal consent to proceed: Yes, and patient identification was verified. It was conducted pursuant to the emergency declaration under the [address] [place] [address], [address] [place] [address] authority and the [employer] and [employer] Act. A caregiver was present when appropriate. Ability to conduct physical exam was limited. I was in the office. The patient was at home. Return to Office: Follow-up and Dispositions    · Return in about 1 year (around 2/1/2022) for w/ Xrays. Scribed by Del Shanika as dictated by Laura Fontaine. Maureen Gutierrez MD.    Documentation True and Accepted Gabriel Gutierrez MD

## 2021-02-09 PROBLEM — N20.1 URETERAL STONE: Status: ACTIVE | Noted: 2019-02-15

## 2021-02-09 PROBLEM — E78.5 DYSLIPIDEMIA: Status: ACTIVE | Noted: 2021-02-09

## 2021-02-09 PROBLEM — F32.A DEPRESSION: Status: ACTIVE | Noted: 2021-02-09

## 2021-02-09 PROBLEM — F41.9 ANXIETY: Status: ACTIVE | Noted: 2021-02-09

## 2021-02-09 PROBLEM — I11.9 HYPERTENSIVE CARDIOVASCULAR DISEASE: Status: ACTIVE | Noted: 2021-02-09

## 2021-02-10 ENCOUNTER — TELEPHONE (OUTPATIENT)
Dept: ORTHOPEDIC SURGERY | Age: 66
End: 2021-02-10

## 2021-02-10 DIAGNOSIS — M17.11 PRIMARY OSTEOARTHRITIS OF RIGHT KNEE: Primary | ICD-10-CM

## 2021-02-10 RX ORDER — OXYCODONE AND ACETAMINOPHEN 5; 325 MG/1; MG/1
1 TABLET ORAL
Qty: 30 TAB | Refills: 0 | Status: SHIPPED | OUTPATIENT
Start: 2021-02-10 | End: 2021-02-24

## 2021-02-10 NOTE — TELEPHONE ENCOUNTER
Patient says she is still in pain and would like to know if you can refill her pain meds,   [facility] [place]

## 2021-04-20 LAB — HBA1C MFR BLD HPLC: 10.3 %

## 2021-04-21 LAB — LDL-C, EXTERNAL: 119

## 2021-04-28 ENCOUNTER — HOSPITAL ENCOUNTER (OUTPATIENT)
Dept: LAB | Age: 66
Discharge: HOME | End: 2021-04-28
Payer: MEDICAID

## 2021-04-28 LAB
ANION GAP SERPL CALC-SCNC: 10 MMOL/L
BASOPHILS # BLD: 0.1 K/UL (ref 0–0.1)
BASOPHILS NFR BLD: 1 % (ref 0–2)
BUN SERPL-MCNC: 33 MG/DL (ref 9–21)
BUN/CREAT SERPL: 15
CA-I BLD-MCNC: 9.6 MG/DL (ref 8.5–10.5)
CHLORIDE SERPL-SCNC: 101 MMOL/L (ref 94–111)
CO2 SERPL-SCNC: 26 MMOL/L (ref 21–33)
CREAT SERPL-MCNC: 2.2 MG/DL (ref 0.7–1.2)
EOSINOPHIL # BLD: 0.3 K/UL (ref 0–0.4)
EOSINOPHIL NFR BLD: 2 % (ref 0–5)
ERYTHROCYTE [DISTWIDTH] IN BLOOD BY AUTOMATED COUNT: 14.9 % (ref 11.6–14.5)
GLUCOSE SERPL-MCNC: 178 MG/DL (ref 70–110)
HCT VFR BLD AUTO: 38.6 % (ref 35–45)
HGB BLD-MCNC: 11.8 G/DL (ref 12–16)
IMM GRANULOCYTES # BLD AUTO: 0.1 K/UL
IMM GRANULOCYTES NFR BLD AUTO: 1 %
LYMPHOCYTES # BLD: 1.1 K/UL (ref 0.9–3.6)
LYMPHOCYTES NFR BLD: 10 % (ref 21–52)
MCH RBC QN AUTO: 25.8 PG (ref 24–34)
MCHC RBC AUTO-ENTMCNC: 30.6 G/DL (ref 31–37)
MCV RBC AUTO: 84.3 FL (ref 74–97)
MONOCYTES # BLD: 1 K/UL (ref 0.05–1.2)
MONOCYTES NFR BLD: 9 % (ref 3–10)
NEUTS SEG # BLD: 8.6 K/UL (ref 1.8–8)
NEUTS SEG NFR BLD: 77 % (ref 40–73)
PLATELET # BLD AUTO: 442 K/UL (ref 135–420)
PMV BLD AUTO: 10 FL (ref 9.2–11.8)
POTASSIUM SERPL-SCNC: 4.3 MMOL/L (ref 3.2–5.1)
RBC # BLD AUTO: 4.58 M/UL (ref 4.2–5.3)
SODIUM SERPL-SCNC: 137 MMOL/L (ref 135–145)
WBC # BLD AUTO: 11.1 K/UL (ref 4.6–13.2)

## 2021-04-28 PROCEDURE — 80048 BASIC METABOLIC PNL TOTAL CA: CPT

## 2021-04-28 PROCEDURE — 36415 COLL VENOUS BLD VENIPUNCTURE: CPT

## 2021-04-28 PROCEDURE — 85025 COMPLETE CBC W/AUTO DIFF WBC: CPT

## 2021-05-09 RX ORDER — ERGOCALCIFEROL 1.25 MG/1
CAPSULE ORAL
Qty: 16 CAP | Refills: 0 | Status: SHIPPED | OUTPATIENT
Start: 2021-05-09 | End: 2021-12-05

## 2021-05-13 ENCOUNTER — APPOINTMENT (OUTPATIENT)
Dept: GENERAL RADIOLOGY | Age: 66
End: 2021-05-13
Attending: FAMILY MEDICINE
Payer: MEDICAID

## 2021-05-13 ENCOUNTER — HOSPITAL ENCOUNTER (EMERGENCY)
Age: 66
Discharge: HOME | End: 2021-05-13
Attending: FAMILY MEDICINE
Payer: MEDICAID

## 2021-05-13 VITALS
BODY MASS INDEX: 40.48 KG/M2 | HEIGHT: 62 IN | SYSTOLIC BLOOD PRESSURE: 167 MMHG | HEART RATE: 88 BPM | WEIGHT: 220 LBS | RESPIRATION RATE: 18 BRPM | OXYGEN SATURATION: 99 % | TEMPERATURE: 98.8 F | DIASTOLIC BLOOD PRESSURE: 82 MMHG

## 2021-05-13 DIAGNOSIS — J20.9 ACUTE BRONCHITIS, UNSPECIFIED ORGANISM: Primary | ICD-10-CM

## 2021-05-13 DIAGNOSIS — Z20.822 PERSON UNDER INVESTIGATION FOR COVID-19: ICD-10-CM

## 2021-05-13 LAB
FLUAV AG NPH QL IA: NEGATIVE
FLUBV AG NOSE QL IA: NEGATIVE
SARS-COV-2, COV2: NORMAL

## 2021-05-13 PROCEDURE — 87804 INFLUENZA ASSAY W/OPTIC: CPT

## 2021-05-13 PROCEDURE — U0003 INFECTIOUS AGENT DETECTION BY NUCLEIC ACID (DNA OR RNA); SEVERE ACUTE RESPIRATORY SYNDROME CORONAVIRUS 2 (SARS-COV-2) (CORONAVIRUS DISEASE [COVID-19]), AMPLIFIED PROBE TECHNIQUE, MAKING USE OF HIGH THROUGHPUT TECHNOLOGIES AS DESCRIBED BY CMS-2020-01-R: HCPCS

## 2021-05-13 PROCEDURE — 71045 X-RAY EXAM CHEST 1 VIEW: CPT

## 2021-05-13 PROCEDURE — 99282 EMERGENCY DEPT VISIT SF MDM: CPT

## 2021-05-13 RX ORDER — AZITHROMYCIN 250 MG/1
TABLET, FILM COATED ORAL
Qty: 6 TAB | Refills: 0 | Status: SHIPPED | OUTPATIENT
Start: 2021-05-13 | End: 2021-05-18

## 2021-05-13 RX ORDER — BENZONATATE 100 MG/1
100 CAPSULE ORAL
Qty: 21 CAP | Refills: 0 | Status: SHIPPED | OUTPATIENT
Start: 2021-05-13 | End: 2021-05-20

## 2021-05-13 NOTE — ED TRIAGE NOTES
Pt states she has been sick for 3 weeks, pt c/o n/v/d, fever, body aches, and congestion.    Pt came to get a COVID test because her neighbor told her to, pt denies any known  exposure

## 2021-05-13 NOTE — ED PROVIDER NOTES
EMERGENCY DEPARTMENT HISTORY AND PHYSICAL EXAM      Date: 5/13/2021  Patient Name: Ct Mohan    History of Presenting Illness     Chief Complaint   Patient presents with    Flu Like Symptoms       History Provided By: Patient    HPI: Ct Mohan, 61 y.o. female with a past medical history significant diabetes, hypertension and hyperlipidemia presents to the ED with cc of feeling sick for about 3 weeks. Her symptoms are gotten worse in the past couple of days. She has had fever and chills. She has had myalgias. She has had a cough. The cough is been nonproductive. She feels slightly short of breath. He has no known exposure to Covid. There are no other complaints, changes, or physical findings at this time. PCP: Chapincito Pham MD    No current facility-administered medications on file prior to encounter. Current Outpatient Medications on File Prior to Encounter   Medication Sig Dispense Refill    Vitamin D2 1,250 mcg (50,000 unit) capsule take 1 capsule by mouth every week 16 Cap 0    furosemide (LASIX) 20 mg tablet take 2 tablets by mouth once daily      lisinopriL (PRINIVIL, ZESTRIL) 20 mg tablet       melatonin 3 mg tablet Take 3 mg by mouth nightly.  famotidine (PEPCID) 20 mg tablet take 1 tablet by mouth twice a day      rosuvastatin (CRESTOR) 10 mg tablet       venlafaxine-SR (EFFEXOR-XR) 75 mg capsule Take 75 mg by mouth daily.  gabapentin (NEURONTIN) 300 mg capsule Take 300 mg by mouth two (2) times a day.  polyethylene glycol (ClearLax) 17 gram/dose powder Take 17 g by mouth daily.  tamsulosin (FLOMAX) 0.4 mg capsule take 1 capsule by mouth once daily 30 Cap 3    LANTUS SOLOSTAR U-100 INSULIN 100 unit/mL (3 mL) inpn 30 units subcutaneously in morning.   Can take an additional dose of 10 units subcutaneously in the evening if blood sugar is more than 200. 10 mL 0    albuterol (PROVENTIL HFA, VENTOLIN HFA, PROAIR HFA) 90 mcg/actuation inhaler 2 puffs three to four times a day for 5 days then every 6 hours as needed for shortness of breath 1 Inhaler 0    naloxone (NARCAN) 4 mg/actuation nasal spray Use 1 spray intranasally, then discard. Repeat with new spray every 2 min as needed for opioid overdose symptoms, alternating nostrils. 1 Each 0    LORazepam (ATIVAN) 0.5 mg tablet Take 0.5 mg by mouth as needed for Anxiety.  atorvastatin (LIPITOR) 40 mg tablet Take  by mouth nightly.  amLODIPine (NORVASC) 10 mg tablet Take 10 mg by mouth daily. 0    TRADJENTA 5 mg tablet TAKE 1 TABLET BY MOUTH ONCE A DAY  0    OneTouch Ultra Blue Test Strip strip       [DISCONTINUED] ferrous sulfate 325 mg (65 mg iron) tablet Take 1 Tab by mouth three (3) times daily. 90 Tab 1    OneTouch UltraSoft Lancets misc       [DISCONTINUED] neomycin-polymyxin-dexamethasone (MAXITROL) ophthalmic suspension Administer 1 Drop to both eyes two (2) times a day.  [DISCONTINUED] timoloL maleate 0.5 % drpd ophthalmic solution Administer 1 Drop to both eyes daily.  NOVOFINE 32 32 gauge x 1/4\" ndle   0    [DISCONTINUED] esomeprazole (NEXIUM) 20 mg capsule Take 20 mg by mouth daily.   0       Past History     Past Medical History:  Past Medical History:   Diagnosis Date    Arthritis     spinal stenosis    Breast CA ([facility] [address] 2017    Left Breast (chemo/radiation)    Diabetes (HCC)     Type 2    Edema     legs and ankles    GERD (gastroesophageal reflux disease)     High cholesterol     Hypertension     Ill-defined condition     patient states hemorrhage behind Left eye-following Dr. Liliana Treadwell MI (myocardial infarction) ([facility] [address] 06/2018    per patient    Neuropathy     Restless leg        Past Surgical History:  Past Surgical History:   Procedure Laterality Date    HX BACK SURGERY  08/30/2018    HX BREAST BIOPSY Left 02/03/2017    BREAST CANCER    HX BREAST LUMPECTOMY Left     HX CATARACT REMOVAL Right     HX HEART CATHETERIZATION  07/2018    no stents    HX KNEE REPLACEMENT         Family History:  Family History   Problem Relation Age of Onset    No Known Problems Mother     No Known Problems Father     Heart Disease Sister        Social History:  Social History     Tobacco Use    Smoking status: Never Smoker    Smokeless tobacco: Never Used   Substance Use Topics    Alcohol use: No    Drug use: No       Allergies: Allergies   Allergen Reactions    Ciprofloxacin Hives    Keflex [Cephalexin] Nausea and Vomiting    Metformin Other (comments)     GI DISTRESS    Soliqua 100/33 [Insulin Glargine-Lixisenatide] Nausea Only         Review of Systems     Review of Systems   Constitutional: Positive for chills and fever. Negative for fatigue. HENT: Positive for congestion. Negative for rhinorrhea and sore throat. Respiratory: Positive for cough. Negative for shortness of breath. Cardiovascular: Negative for chest pain and palpitations. Gastrointestinal: Negative for abdominal pain, diarrhea, nausea and vomiting. Genitourinary: Negative for difficulty urinating and dysuria. Musculoskeletal: Positive for myalgias. Negative for arthralgias. Skin: Negative for color change and rash. Neurological: Positive for headaches. Negative for light-headedness. Physical Exam     Physical Exam  Vitals signs and nursing note reviewed. Constitutional:       General: She is awake. She is not in acute distress. Appearance: Normal appearance. She is well-developed and normal weight. She is not ill-appearing, toxic-appearing or diaphoretic. Interventions: Face mask in place. HENT:      Head: Normocephalic and atraumatic. Eyes:      Conjunctiva/sclera: Conjunctivae normal.      Pupils: Pupils are equal, round, and reactive to light. Neck:      Musculoskeletal: Normal range of motion and neck supple. Cardiovascular:      Rate and Rhythm: Normal rate and regular rhythm. Pulses: Normal pulses. Heart sounds: Normal heart sounds. Pulmonary:      Effort: Pulmonary effort is normal.      Breath sounds: Normal breath sounds. Abdominal:      General: Abdomen is flat. Palpations: Abdomen is soft. Tenderness: There is no abdominal tenderness. Musculoskeletal: Normal range of motion. Skin:     General: Skin is warm and dry. Neurological:      General: No focal deficit present. Mental Status: She is alert and oriented to person, place, and time. GCS: GCS eye subscore is 4. GCS verbal subscore is 5. GCS motor subscore is 6. Psychiatric:         Mood and Affect: Mood and affect normal.         Behavior: Behavior normal. Behavior is cooperative. Thought Content: Thought content normal.         Lab and Diagnostic Study Results     Labs -     Recent Results (from the past 12 hour(s))   INFLUENZA A & B AG (RAPID TEST)    Collection Time: 05/13/21  8:10 AM   Result Value Ref Range    Influenza A Antigen Negative Negative      Influenza B Antigen Negative Negative     SARS-COV-2    Collection Time: 05/13/21  8:10 AM   Result Value Ref Range    SARS-CoV-2 Please find results under separate order         Radiologic Studies -   @lastxrresult@  CT Results  (Last 48 hours)    None        CXR Results  (Last 48 hours)               05/13/21 0839  XR CHEST PORT Final result    Impression:      No acute cardiopulmonary abnormality. Narrative:  EXAM: XR CHEST PORT       CLINICAL INDICATION/HISTORY: cough   -Additional: None       COMPARISON: 11/29/2020       TECHNIQUE: Portable frontal view of the chest       _______________       FINDINGS:       SUPPORT DEVICES: None. HEART AND MEDIASTINUM: Cardiomediastinal silhouette within normal limits. LUNGS AND PLEURAL SPACES: No dense consolidation, large effusion or   pneumothorax.       _______________                   Medical Decision Making   - I am the first provider for this patient.     - I reviewed the vital signs, available nursing notes, past medical history, past surgical history, family history and social history. - Initial assessment performed. The patients presenting problems have been discussed, and they are in agreement with the care plan formulated and outlined with them. I have encouraged them to ask questions as they arise throughout their visit. Vital Signs-Reviewed the patient's vital signs. Patient Vitals for the past 12 hrs:   Temp Pulse Resp BP SpO2   05/13/21 0753 98.8 °F (37.1 °C) 88 18 (!) 167/82 99 %       Records Reviewed: Nursing Notes    The patient presents with cough with a differential diagnosis of influenza, Covid, pneumonia, bronchitis. ED Course:          Provider Notes (Medical Decision Making):     8204 -patient presented with flulike symptoms. Her vitals are stable. Her O2 sats are stable. Her chest x-ray is negative. Flu swabs are negative. Covid test are pending. Will treat with Zithromax and Tessalon Perles. She needs to be on isolation until the Covid test comes back. MDM       Procedures   Medical Decision Makingedical Decision Making  Performed by: Carina Lawrence MD  PROCEDURES:  Procedures       Disposition   Disposition:     Discharged    DISCHARGE PLAN:  1. Current Discharge Medication List      CONTINUE these medications which have NOT CHANGED    Details   Vitamin D2 1,250 mcg (50,000 unit) capsule take 1 capsule by mouth every week  Qty: 16 Cap, Refills: 0      furosemide (LASIX) 20 mg tablet take 2 tablets by mouth once daily      lisinopriL (PRINIVIL, ZESTRIL) 20 mg tablet       melatonin 3 mg tablet Take 3 mg by mouth nightly. famotidine (PEPCID) 20 mg tablet take 1 tablet by mouth twice a day      rosuvastatin (CRESTOR) 10 mg tablet       venlafaxine-SR (EFFEXOR-XR) 75 mg capsule Take 75 mg by mouth daily. gabapentin (NEURONTIN) 300 mg capsule Take 300 mg by mouth two (2) times a day. polyethylene glycol (ClearLax) 17 gram/dose powder Take 17 g by mouth daily.       tamsulosin (FLOMAX) 0.4 mg capsule take 1 capsule by mouth once daily  Qty: 30 Cap, Refills: 3      LANTUS SOLOSTAR U-100 INSULIN 100 unit/mL (3 mL) inpn 30 units subcutaneously in morning. Can take an additional dose of 10 units subcutaneously in the evening if blood sugar is more than 200. Qty: 10 mL, Refills: 0      albuterol (PROVENTIL HFA, VENTOLIN HFA, PROAIR HFA) 90 mcg/actuation inhaler 2 puffs three to four times a day for 5 days then every 6 hours as needed for shortness of breath  Qty: 1 Inhaler, Refills: 0      naloxone (NARCAN) 4 mg/actuation nasal spray Use 1 spray intranasally, then discard. Repeat with new spray every 2 min as needed for opioid overdose symptoms, alternating nostrils. Qty: 1 Each, Refills: 0    Associated Diagnoses: Spinal stenosis of lumbar region at multiple levels      LORazepam (ATIVAN) 0.5 mg tablet Take 0.5 mg by mouth as needed for Anxiety. atorvastatin (LIPITOR) 40 mg tablet Take  by mouth nightly. amLODIPine (NORVASC) 10 mg tablet Take 10 mg by mouth daily. Refills: 0      TRADJENTA 5 mg tablet TAKE 1 TABLET BY MOUTH ONCE A DAY  Refills: 0      OneTouch Ultra Blue Test Strip strip       OneTouch UltraSoft Lancets misc       NOVOFINE 32 32 gauge x 1/4\" ndle Refills: 0           2. Follow-up Information     Follow up With Specialties Details Why Contact Info    Eric Kovacs MD Family Medicine  As needed [address] [facility]  [address] [place] [id number] [phone]          3. Return to ED if worse   4. Current Discharge Medication List      START taking these medications    Details   azithromycin (ZITHROMAX) 250 mg tablet Take as directed  Qty: 6 Tab, Refills: 0  Start date: 5/13/2021, End date: 5/18/2021      benzonatate (Tessalon Perles) 100 mg capsule Take 1 Cap by mouth three (3) times daily as needed for Cough for up to 7 days. Qty: 21 Cap, Refills: 0  Start date: 5/13/2021, End date: 5/20/2021               Diagnosis     Clinical Impression:   1.  Acute bronchitis, unspecified organism    2. Person under investigation for COVID-19        Attestations:    Bam Robins MD    Please note that this dictation was completed with [employer], the computer voice recognition software. Quite often unanticipated grammatical, syntax, homophones, and other interpretive errors are inadvertently transcribed by the computer software. Please disregard these errors. Please excuse any errors that have escaped final proofreading. Thank you.

## 2021-05-14 LAB — SARS-COV-2, COV2NT: NOT DETECTED

## 2021-05-17 DIAGNOSIS — M17.11 OSTEOARTHRITIS OF RIGHT KNEE, UNSPECIFIED OSTEOARTHRITIS TYPE: ICD-10-CM

## 2021-06-28 RX ORDER — FERROUS SULFATE TAB 325 MG (65 MG ELEMENTAL FE) 325 (65 FE) MG
TAB ORAL
Qty: 90 TABLET | Refills: 1 | Status: SHIPPED | OUTPATIENT
Start: 2021-06-28 | End: 2021-09-14

## 2021-08-11 ENCOUNTER — HOSPITAL ENCOUNTER (EMERGENCY)
Age: 66
Discharge: HOME | End: 2021-08-11
Attending: EMERGENCY MEDICINE
Payer: MEDICAID

## 2021-08-11 ENCOUNTER — APPOINTMENT (OUTPATIENT)
Dept: GENERAL RADIOLOGY | Age: 66
End: 2021-08-11
Attending: EMERGENCY MEDICINE
Payer: MEDICAID
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
## 2021-08-11 DIAGNOSIS — M25.561 RIGHT KNEE PAIN, UNSPECIFIED CHRONICITY: ICD-10-CM

## 2021-08-11 DIAGNOSIS — M54.50 LOW BACK PAIN WITHOUT SCIATICA, UNSPECIFIED BACK PAIN LATERALITY, UNSPECIFIED CHRONICITY: ICD-10-CM

## 2021-08-11 PROCEDURE — 72100 X-RAY EXAM L-S SPINE 2/3 VWS: CPT

## 2021-08-11 PROCEDURE — 99284 EMERGENCY DEPT VISIT MOD MDM: CPT

## 2021-08-11 PROCEDURE — 75810000275 HC EMERGENCY DEPT VISIT NO LEVEL OF CARE

## 2021-08-11 PROCEDURE — 74011250637 HC RX REV CODE- 250/637: Performed by: EMERGENCY MEDICINE

## 2021-08-11 RX ORDER — VENLAFAXINE HYDROCHLORIDE 150 MG/1
75 CAPSULE, EXTENDED RELEASE ORAL
COMMUNITY
Start: 2021-06-15 | End: 2021-08-11

## 2021-08-11 RX ORDER — FUROSEMIDE 40 MG/1
TABLET ORAL
COMMUNITY
Start: 2021-06-11 | End: 2021-09-27

## 2021-08-11 RX ORDER — VENLAFAXINE HYDROCHLORIDE 150 MG/1
150 TABLET, EXTENDED RELEASE ORAL DAILY
COMMUNITY

## 2021-08-11 RX ORDER — KETOROLAC TROMETHAMINE 10 MG/1
10 TABLET, FILM COATED ORAL ONCE
Status: COMPLETED | OUTPATIENT
Start: 2021-08-11 | End: 2021-08-11

## 2021-08-11 RX ORDER — KETOROLAC TROMETHAMINE 10 MG/1
10 TABLET, FILM COATED ORAL
Qty: 12 TABLET | Refills: 0 | Status: SHIPPED | OUTPATIENT
Start: 2021-08-11 | End: 2021-09-27

## 2021-08-11 RX ORDER — VENLAFAXINE 75 MG/1
75 TABLET ORAL DAILY
COMMUNITY
End: 2021-09-27

## 2021-08-11 RX ORDER — CYCLOBENZAPRINE HCL 10 MG
10 TABLET ORAL
Qty: 15 TABLET | Refills: 0 | Status: SHIPPED | OUTPATIENT
Start: 2021-08-11 | End: 2021-12-05

## 2021-08-11 RX ORDER — INSULIN LISPRO 100 [IU]/ML
INJECTION, SOLUTION INTRAVENOUS; SUBCUTANEOUS
COMMUNITY
Start: 2021-06-16 | End: 2021-09-27

## 2021-08-11 RX ADMIN — KETOROLAC TROMETHAMINE 10 MG: 10 TABLET, FILM COATED ORAL at 20:03
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
## 2021-08-12 ENCOUNTER — PATIENT OUTREACH (OUTPATIENT)
Dept: CASE MANAGEMENT | Age: 66
End: 2021-08-12

## 2021-08-12 VITALS
HEIGHT: 62 IN | DIASTOLIC BLOOD PRESSURE: 86 MMHG | TEMPERATURE: 98.3 F | BODY MASS INDEX: 40.48 KG/M2 | RESPIRATION RATE: 18 BRPM | HEART RATE: 84 BPM | SYSTOLIC BLOOD PRESSURE: 161 MMHG | WEIGHT: 220 LBS | OXYGEN SATURATION: 94 %

## 2021-08-12 NOTE — ED NOTES
I have reviewed discharge instructions with the patient. The patient verbalized understanding. Patient ambulated to wheelchair with steady gait and  escorted to waiting room . No distress noted.

## 2021-08-13 ENCOUNTER — PATIENT OUTREACH (OUTPATIENT)
Dept: CASE MANAGEMENT | Age: 66
End: 2021-08-13
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
## 2021-09-14 RX ORDER — FERROUS SULFATE TAB 325 MG (65 MG ELEMENTAL FE) 325 (65 FE) MG
TAB ORAL
Qty: 90 TABLET | Refills: 1 | Status: SHIPPED | OUTPATIENT
Start: 2021-09-14

## 2021-09-19 ENCOUNTER — APPOINTMENT (OUTPATIENT)
Dept: GENERAL RADIOLOGY | Age: 66
DRG: 243 | End: 2021-09-19
Attending: EMERGENCY MEDICINE
Payer: MEDICAID

## 2021-09-19 ENCOUNTER — HOSPITAL ENCOUNTER (INPATIENT)
Age: 66
LOS: 7 days | Discharge: HOME HEALTH | DRG: 243 | End: 2021-09-27
Attending: EMERGENCY MEDICINE | Admitting: HOSPITALIST
Payer: MEDICAID

## 2021-09-19 DIAGNOSIS — R07.9 ACUTE CHEST PAIN: Primary | ICD-10-CM

## 2021-09-19 DIAGNOSIS — I48.91 ATRIAL FIBRILLATION, UNSPECIFIED TYPE (HCC): ICD-10-CM

## 2021-09-19 DIAGNOSIS — E16.2 HYPOGLYCEMIA: ICD-10-CM

## 2021-09-19 LAB
ALBUMIN SERPL-MCNC: 3.2 G/DL (ref 3.5–5)
ALBUMIN/GLOB SERPL: 0.8 {RATIO} (ref 1.1–2.2)
ALP SERPL-CCNC: 78 U/L (ref 45–117)
ALT SERPL-CCNC: 27 U/L (ref 12–78)
AMORPH CRY URNS QL MICRO: ABNORMAL
AMPHET UR QL SCN: NEGATIVE
ANION GAP SERPL CALC-SCNC: 10 MMOL/L (ref 5–15)
APPEARANCE UR: CLEAR
AST SERPL W P-5'-P-CCNC: 18 U/L (ref 15–37)
BACTERIA URNS QL MICRO: NEGATIVE /HPF
BARBITURATES UR QL SCN: NEGATIVE
BASOPHILS # BLD: 0 K/UL (ref 0–0.2)
BASOPHILS NFR BLD: 0 % (ref 0–2.5)
BENZODIAZ UR QL: NEGATIVE
BILIRUB SERPL-MCNC: 0.3 MG/DL (ref 0.2–1)
BILIRUB UR QL: NEGATIVE
BUN SERPL-MCNC: 37 MG/DL (ref 6–20)
BUN/CREAT SERPL: 14 (ref 12–20)
CA-I BLD-MCNC: 9.4 MG/DL (ref 8.5–10.1)
CANNABINOIDS UR QL SCN: NEGATIVE
CHLORIDE SERPL-SCNC: 103 MMOL/L (ref 97–108)
CO2 SERPL-SCNC: 28 MMOL/L (ref 21–32)
COCAINE UR QL SCN: NEGATIVE
COLOR UR: ABNORMAL
CREAT SERPL-MCNC: 2.66 MG/DL (ref 0.55–1.02)
DRUG SCRN COMMENT,DRGCM: NORMAL
ECSTASY, ECST: NEGATIVE
EOSINOPHIL # BLD: 0.1 K/UL (ref 0–0.7)
EOSINOPHIL NFR BLD: 1 % (ref 0.9–2.9)
ERYTHROCYTE [DISTWIDTH] IN BLOOD BY AUTOMATED COUNT: 16.9 % (ref 11.5–14.5)
ETHANOL SERPL-MCNC: <4 MG/DL
GLOBULIN SER CALC-MCNC: 4.2 G/DL (ref 2–4)
GLUCOSE BLD STRIP.AUTO-MCNC: 127 MG/DL (ref 65–117)
GLUCOSE BLD STRIP.AUTO-MCNC: 172 MG/DL (ref 65–117)
GLUCOSE BLD STRIP.AUTO-MCNC: 303 MG/DL (ref 65–117)
GLUCOSE BLD STRIP.AUTO-MCNC: 70 MG/DL (ref 65–117)
GLUCOSE BLD STRIP.AUTO-MCNC: 76 MG/DL (ref 65–117)
GLUCOSE BLD STRIP.AUTO-MCNC: 97 MG/DL (ref 65–117)
GLUCOSE SERPL-MCNC: 110 MG/DL (ref 65–100)
GLUCOSE UR STRIP.AUTO-MCNC: 100 MG/DL
HCT VFR BLD AUTO: 34.1 % (ref 36–46)
HGB BLD-MCNC: 10.7 G/DL (ref 13.5–17.5)
HGB UR QL STRIP: ABNORMAL
INR PPP: 1 (ref 0.9–1.1)
KETONES SERPL QL: NEGATIVE
KETONES UR QL STRIP.AUTO: NEGATIVE MG/DL
LEUKOCYTE ESTERASE UR QL STRIP.AUTO: NEGATIVE
LYMPHOCYTES # BLD: 0.6 K/UL (ref 1–4.8)
LYMPHOCYTES NFR BLD: 5 % (ref 20.5–51.1)
MAGNESIUM SERPL-MCNC: 2.1 MG/DL (ref 1.6–2.4)
MCH RBC QN AUTO: 25.5 PG (ref 31–34)
MCHC RBC AUTO-ENTMCNC: 31.4 G/DL (ref 31–36)
MCV RBC AUTO: 81.2 FL (ref 80–100)
METHADONE UR QL: NEGATIVE
MONOCYTES # BLD: 1.1 K/UL (ref 0.2–2.4)
MONOCYTES NFR BLD: 10 % (ref 1.7–9.3)
NEUTS SEG # BLD: 9.7 K/UL (ref 1.8–7.7)
NEUTS SEG NFR BLD: 84 % (ref 42–75)
NITRITE UR QL STRIP.AUTO: NEGATIVE
NRBC # BLD: 0.01 K/UL
NRBC BLD-RTO: 0.1 PER 100 WBC
OPIATES UR QL: NEGATIVE
PCP UR QL: NEGATIVE
PERFORMED BY, TECHID: ABNORMAL
PERFORMED BY, TECHID: NORMAL
PH UR STRIP: 5.5 [PH] (ref 5–8)
PLATELET # BLD AUTO: 308 K/UL (ref 150–400)
PMV BLD AUTO: 7.2 FL (ref 6.5–11.5)
POTASSIUM SERPL-SCNC: 4 MMOL/L (ref 3.5–5.1)
PROT SERPL-MCNC: 7.4 G/DL (ref 6.4–8.2)
PROT UR STRIP-MCNC: >300 MG/DL
PROTHROMBIN TIME: 10.3 SEC (ref 9–11.1)
RBC # BLD AUTO: 4.2 M/UL (ref 4.5–5.9)
RBC #/AREA URNS HPF: ABNORMAL /HPF (ref 0–3)
SODIUM SERPL-SCNC: 141 MMOL/L (ref 136–145)
SP GR UR REFRACTOMETRY: 1.02 (ref 1–1.03)
TROPONIN I SERPL-MCNC: 0.12 NG/ML
TROPONIN I SERPL-MCNC: 0.12 NG/ML
UROBILINOGEN UR QL STRIP.AUTO: 0.2 EU/DL (ref 0.2–1)
WBC # BLD AUTO: 11.7 K/UL (ref 4.4–11.3)
WBC URNS QL MICRO: ABNORMAL /HPF (ref 0–5)

## 2021-09-19 PROCEDURE — 96361 HYDRATE IV INFUSION ADD-ON: CPT

## 2021-09-19 PROCEDURE — 74011000250 HC RX REV CODE- 250: Performed by: EMERGENCY MEDICINE

## 2021-09-19 PROCEDURE — 82009 KETONE BODYS QUAL: CPT

## 2021-09-19 PROCEDURE — 71045 X-RAY EXAM CHEST 1 VIEW: CPT

## 2021-09-19 PROCEDURE — 83735 ASSAY OF MAGNESIUM: CPT

## 2021-09-19 PROCEDURE — 96376 TX/PRO/DX INJ SAME DRUG ADON: CPT

## 2021-09-19 PROCEDURE — 99285 EMERGENCY DEPT VISIT HI MDM: CPT

## 2021-09-19 PROCEDURE — 74011250637 HC RX REV CODE- 250/637: Performed by: EMERGENCY MEDICINE

## 2021-09-19 PROCEDURE — 96375 TX/PRO/DX INJ NEW DRUG ADDON: CPT

## 2021-09-19 PROCEDURE — 85610 PROTHROMBIN TIME: CPT

## 2021-09-19 PROCEDURE — 96374 THER/PROPH/DIAG INJ IV PUSH: CPT

## 2021-09-19 PROCEDURE — 36415 COLL VENOUS BLD VENIPUNCTURE: CPT

## 2021-09-19 PROCEDURE — 74011250636 HC RX REV CODE- 250/636: Performed by: EMERGENCY MEDICINE

## 2021-09-19 PROCEDURE — 82077 ASSAY SPEC XCP UR&BREATH IA: CPT

## 2021-09-19 PROCEDURE — 80053 COMPREHEN METABOLIC PANEL: CPT

## 2021-09-19 PROCEDURE — 82962 GLUCOSE BLOOD TEST: CPT

## 2021-09-19 PROCEDURE — 80307 DRUG TEST PRSMV CHEM ANLYZR: CPT

## 2021-09-19 PROCEDURE — 99218 HC RM OBSERVATION: CPT

## 2021-09-19 PROCEDURE — 84484 ASSAY OF TROPONIN QUANT: CPT

## 2021-09-19 PROCEDURE — 85025 COMPLETE CBC W/AUTO DIFF WBC: CPT

## 2021-09-19 PROCEDURE — 81001 URINALYSIS AUTO W/SCOPE: CPT

## 2021-09-19 PROCEDURE — 93005 ELECTROCARDIOGRAM TRACING: CPT

## 2021-09-19 RX ORDER — SODIUM CHLORIDE 0.9 % (FLUSH) 0.9 %
5-40 SYRINGE (ML) INJECTION AS NEEDED
Status: DISCONTINUED | OUTPATIENT
Start: 2021-09-19 | End: 2021-09-27 | Stop reason: HOSPADM

## 2021-09-19 RX ORDER — ENOXAPARIN SODIUM 100 MG/ML
30 INJECTION SUBCUTANEOUS DAILY
Status: DISCONTINUED | OUTPATIENT
Start: 2021-09-20 | End: 2021-09-27 | Stop reason: HOSPADM

## 2021-09-19 RX ORDER — GUAIFENESIN 100 MG/5ML
324 LIQUID (ML) ORAL
Status: COMPLETED | OUTPATIENT
Start: 2021-09-19 | End: 2021-09-19

## 2021-09-19 RX ORDER — POLYETHYLENE GLYCOL 3350 17 G/17G
17 POWDER, FOR SOLUTION ORAL DAILY PRN
Status: DISCONTINUED | OUTPATIENT
Start: 2021-09-19 | End: 2021-09-27 | Stop reason: HOSPADM

## 2021-09-19 RX ORDER — SODIUM CHLORIDE 0.9 % (FLUSH) 0.9 %
5-40 SYRINGE (ML) INJECTION EVERY 8 HOURS
Status: DISCONTINUED | OUTPATIENT
Start: 2021-09-19 | End: 2021-09-27 | Stop reason: HOSPADM

## 2021-09-19 RX ORDER — ACETAMINOPHEN 325 MG/1
650 TABLET ORAL
Status: DISCONTINUED | OUTPATIENT
Start: 2021-09-19 | End: 2021-09-27 | Stop reason: HOSPADM

## 2021-09-19 RX ORDER — ONDANSETRON 2 MG/ML
4 INJECTION INTRAMUSCULAR; INTRAVENOUS
Status: DISCONTINUED | OUTPATIENT
Start: 2021-09-19 | End: 2021-09-27 | Stop reason: HOSPADM

## 2021-09-19 RX ORDER — DEXTROSE MONOHYDRATE AND SODIUM CHLORIDE 5; .45 G/100ML; G/100ML
100 INJECTION, SOLUTION INTRAVENOUS CONTINUOUS
Status: DISCONTINUED | OUTPATIENT
Start: 2021-09-19 | End: 2021-09-20

## 2021-09-19 RX ORDER — ONDANSETRON 4 MG/1
4 TABLET, ORALLY DISINTEGRATING ORAL
Status: DISCONTINUED | OUTPATIENT
Start: 2021-09-19 | End: 2021-09-27 | Stop reason: HOSPADM

## 2021-09-19 RX ORDER — ACETAMINOPHEN 650 MG/1
650 SUPPOSITORY RECTAL
Status: DISCONTINUED | OUTPATIENT
Start: 2021-09-19 | End: 2021-09-27 | Stop reason: HOSPADM

## 2021-09-19 RX ORDER — DILTIAZEM HYDROCHLORIDE 5 MG/ML
20 INJECTION INTRAVENOUS
Status: COMPLETED | OUTPATIENT
Start: 2021-09-19 | End: 2021-09-19

## 2021-09-19 RX ORDER — DEXTROSE 50 % IN WATER (D50W) INTRAVENOUS SYRINGE
25
Status: COMPLETED | OUTPATIENT
Start: 2021-09-19 | End: 2021-09-19

## 2021-09-19 RX ADMIN — SODIUM CHLORIDE 1000 ML: 9 INJECTION, SOLUTION INTRAVENOUS at 13:55

## 2021-09-19 RX ADMIN — DEXTROSE MONOHYDRATE 25 G: 25 INJECTION, SOLUTION INTRAVENOUS at 13:55

## 2021-09-19 RX ADMIN — DEXTROSE AND SODIUM CHLORIDE 100 ML/HR: 5; 450 INJECTION, SOLUTION INTRAVENOUS at 15:19

## 2021-09-19 RX ADMIN — ASPIRIN 324 MG: 81 TABLET, CHEWABLE ORAL at 15:10

## 2021-09-19 RX ADMIN — DILTIAZEM HYDROCHLORIDE 20 MG: 5 INJECTION INTRAVENOUS at 16:53

## 2021-09-19 RX ADMIN — Medication 10 ML: at 22:04

## 2021-09-19 NOTE — ED NOTES
Attempted to call report on pt,  states that all RNs are currently busy and she will have receiving RN call back when available

## 2021-09-19 NOTE — ED PROVIDER NOTES
EMERGENCY DEPARTMENT HISTORY AND PHYSICAL EXAM      Date: 9/19/2021  Patient Name: Tabatha Valencia      History of Presenting Illness     Chief Complaint   Patient presents with    Low Blood Sugar     Pt presents via EMS with complaint of altered mental status found in back of truck to be unresponsive, pt per EMS had fsbs at 34mg/dL. Pt given d10 and pt FSBS now 170mg/dL. Pt now alert to baseline       History Provided By: EMS    HPI: Tabatha Valencia, 61 y.o. female with a past medical history significant diabetes and hypertension presents to the ED with cc of patient was passenger in the backseat pickup truck  was observed to be slumped over, on EMS arrival her blood glucose was 34 patient was given an amp of D50 and it increased to 174 patient was also given Narcan since there was a delayed response to the glucose injection    There are no other complaints, changes, or physical findings at this time. PCP: Sunday Copeland MD    Current Facility-Administered Medications   Medication Dose Route Frequency Provider Last Rate Last Admin    sodium chloride 0.9 % bolus infusion 1,000 mL  1,000 mL IntraVENous ONCE Jacqueline Travis MD        dextrose (D50W) injection syrg 25 g  25 g IntraVENous NOW Jacqueline Travis MD         Current Outpatient Medications   Medication Sig Dispense Refill    FeroSuL 325 mg (65 mg iron) tablet take 1 tablet by mouth three times a day 90 Tablet 1    HumaLOG KwikPen Insulin 100 unit/mL kwikpen inject three times a day with meals A MAX DAILY DOSE USING SLIDIN. ..  (REFER TO PRESCRIPTION NOTES).  furosemide (LASIX) 40 mg tablet take 1 tablet by mouth once daily      venlafaxine (EFFEXOR) 75 mg tablet Take 75 mg by mouth daily.  Venlafaxine-ER 24 HR (EFFEXOR-ER) 150 mg tr24 tablet Take 150 mg by mouth daily.  ketorolac (TORADOL) 10 mg tablet Take 1 Tablet by mouth every eight (8) hours as needed for Pain.  12 Tablet 0    cyclobenzaprine (FLEXERIL) 10 mg tablet Take 1 Tablet by mouth three (3) times daily as needed for Muscle Spasm(s). 15 Tablet 0    Vitamin D2 1,250 mcg (50,000 unit) capsule take 1 capsule by mouth every week 16 Cap 0    lisinopriL (PRINIVIL, ZESTRIL) 20 mg tablet       OneTouch Ultra Blue Test Strip strip       melatonin 3 mg tablet Take 3 mg by mouth nightly.  famotidine (PEPCID) 20 mg tablet take 1 tablet by mouth twice a day      OneTouch UltraSoft Lancets misc       rosuvastatin (CRESTOR) 10 mg tablet       gabapentin (NEURONTIN) 300 mg capsule Take 300 mg by mouth two (2) times a day.  polyethylene glycol (ClearLax) 17 gram/dose powder Take 17 g by mouth daily.  tamsulosin (FLOMAX) 0.4 mg capsule take 1 capsule by mouth once daily 30 Cap 3    LANTUS SOLOSTAR U-100 INSULIN 100 unit/mL (3 mL) inpn 30 units subcutaneously in morning. Can take an additional dose of 10 units subcutaneously in the evening if blood sugar is more than 200. (Patient taking differently: 40 Units. 40 units subcutaneously in morning. Can take an additional dose of 10 units subcutaneously in the evening if blood sugar is more than 200.) 10 mL 0    albuterol (PROVENTIL HFA, VENTOLIN HFA, PROAIR HFA) 90 mcg/actuation inhaler 2 puffs three to four times a day for 5 days then every 6 hours as needed for shortness of breath 1 Inhaler 0    LORazepam (ATIVAN) 0.5 mg tablet Take 0.5 mg by mouth as needed for Anxiety.  atorvastatin (LIPITOR) 40 mg tablet Take  by mouth nightly.  amLODIPine (NORVASC) 10 mg tablet Take 10 mg by mouth daily.   0    TRADJENTA 5 mg tablet TAKE 1 TABLET BY MOUTH ONCE A DAY  0    NOVOFINE 28 32 gauge x 1/4\" ndle   0       Past History     Past Medical History:  Past Medical History:   Diagnosis Date    Arthritis     spinal stenosis    Breast CA ([address] 2017    Left Breast (chemo/radiation)    Diabetes (HCC)     Type 2    Edema     legs and ankles    GERD (gastroesophageal reflux disease)     High cholesterol     Hypertension     Ill-defined condition     patient states hemorrhage behind Left eye-following Dr. Doc Barajas MI (myocardial infarction) ([facility] [address] 06/2018    per patient    Neuropathy     Restless leg        Past Surgical History:  Past Surgical History:   Procedure Laterality Date    HX BACK SURGERY  08/30/2018    HX BREAST BIOPSY Left 02/03/2017    BREAST CANCER    HX BREAST LUMPECTOMY Left     HX CATARACT REMOVAL Right     HX HEART CATHETERIZATION  07/2018    no stents    HX KNEE REPLACEMENT         Family History:  Family History   Problem Relation Age of Onset    No Known Problems Mother     No Known Problems Father     Heart Disease Sister        Social History:  Social History     Tobacco Use    Smoking status: Never Smoker    Smokeless tobacco: Never Used   Vaping Use    Vaping Use: Never used   Substance Use Topics    Alcohol use: No    Drug use: No       Allergies: Allergies   Allergen Reactions    Ciprofloxacin Hives    Keflex [Cephalexin] Nausea and Vomiting    Metformin Other (comments)     GI DISTRESS    Soliqua 100/33 [Insulin Glargine-Lixisenatide] Nausea Only         Review of Systems     Review of Systems   Constitutional: Negative for chills and fever. HENT: Negative for rhinorrhea and sore throat. Eyes: Negative for pain and visual disturbance. Respiratory: Negative for cough and shortness of breath. Cardiovascular: Negative for chest pain and leg swelling. Gastrointestinal: Negative for abdominal pain and vomiting. Endocrine: Negative for polydipsia and polyuria. Genitourinary: Negative for dysuria and urgency. Musculoskeletal: Negative for back pain and neck pain. Skin: Negative for color change and pallor. Neurological: Negative for weakness and numbness. Psychiatric/Behavioral: Negative. Physical Exam     Physical Exam  Vitals and nursing note reviewed. Constitutional:       General: She is not in acute distress.      Appearance: She is morbidly obese. She is ill-appearing. She is not toxic-appearing or diaphoretic. HENT:      Head: Normocephalic and atraumatic. Right Ear: Tympanic membrane normal.      Mouth/Throat:      Mouth: Mucous membranes are dry. Pharynx: Oropharynx is clear. Eyes:      Extraocular Movements: Extraocular movements intact. Conjunctiva/sclera: Conjunctivae normal.      Pupils: Pupils are equal, round, and reactive to light. Cardiovascular:      Rate and Rhythm: Normal rate and regular rhythm. Pulses: Normal pulses. Heart sounds: Normal heart sounds. Pulmonary:      Effort: Pulmonary effort is normal.      Breath sounds: Normal breath sounds. Abdominal:      General: Bowel sounds are normal.      Palpations: Abdomen is soft. Musculoskeletal:         General: No swelling or tenderness. Normal range of motion. Cervical back: Normal range of motion and neck supple. Skin:     General: Skin is warm. Capillary Refill: Capillary refill takes less than 2 seconds. Neurological:      General: No focal deficit present. Mental Status: She is lethargic. Motor: Motor function is intact. Psychiatric:         Mood and Affect: Mood normal.         Behavior: Behavior normal.         Lab and Diagnostic Study Results     Labs -     Recent Results (from the past 12 hour(s))   GLUCOSE, POC    Collection Time: 09/19/21  1:35 PM   Result Value Ref Range    Glucose (POC) 76 65 - 117 mg/dL    Performed by Florencio Ybarra        Radiologic Studies -   [unfilled]  CT Results  (Last 48 hours)    None        CXR Results  (Last 48 hours)    None          Medical Decision Making and ED Course   - I am the first and primary provider for this patient AND AM THE PRIMARY PROVIDER OF RECORD. - I reviewed the vital signs, available nursing notes, past medical history, past surgical history, family history and social history. - Initial assessment performed.  The patients presenting problems have been discussed, and the staff are in agreement with the care plan formulated and outlined with them. I have encouraged them to ask questions as they arise throughout their visit. Vital Signs-Reviewed the patient's vital signs. Patient Vitals for the past 12 hrs:   Temp Pulse Resp BP SpO2   09/19/21 1336 98.5 °F (36.9 °C) (!) 118 20 93/83 91 %       Records Reviewed: Nursing Notes    The patient presents with syncope with a differential diagnosis of CVA, hypoglycemia, drug effect, dysrhythmia    ED Course:       ED Course as of Sep 20 2117   Sun Sep 19, 2021   1429 Patient states that she was having chest pain sharp midline today and second she has been having it for over the last 3 days she had chest pain last night also    [SB]   1449 Troponin-I, Qt.(!): 0.12 [SB]      ED Course User Index  [SB] Tianna Garvin MD         Provider Notes (Medical Decision Making): MDM           Consultations:       Consultations: - NONE        Procedures and Critical Care       Performed by: Baron Torres MD  PROCEDURES:  Procedures             CRITICAL CARE NOTE :  1:42 PM    Baron Torres MD        Disposition     Disposition: Condition stable and improved  Admitted to Floor Medical Floor the case was discussed with the admitting physician Radha Ross if not discharged  DISCHARGE PLAN:  1. Current Discharge Medication List      CONTINUE these medications which have NOT CHANGED    Details   FeroSuL 325 mg (65 mg iron) tablet take 1 tablet by mouth three times a day  Qty: 90 Tablet, Refills: 1      HumaLOG KwikPen Insulin 100 unit/mL kwikpen inject three times a day with meals A MAX DAILY DOSE USING SLIDIN. ..  (REFER TO PRESCRIPTION NOTES). furosemide (LASIX) 40 mg tablet take 1 tablet by mouth once daily      venlafaxine (EFFEXOR) 75 mg tablet Take 75 mg by mouth daily. Venlafaxine-ER 24 HR (EFFEXOR-ER) 150 mg tr24 tablet Take 150 mg by mouth daily.       ketorolac (TORADOL) 10 mg tablet Take 1 Tablet by mouth every eight (8) hours as needed for Pain. Qty: 12 Tablet, Refills: 0      cyclobenzaprine (FLEXERIL) 10 mg tablet Take 1 Tablet by mouth three (3) times daily as needed for Muscle Spasm(s). Qty: 15 Tablet, Refills: 0      Vitamin D2 1,250 mcg (50,000 unit) capsule take 1 capsule by mouth every week  Qty: 16 Cap, Refills: 0      lisinopriL (PRINIVIL, ZESTRIL) 20 mg tablet       OneTouch Ultra Blue Test Strip strip       melatonin 3 mg tablet Take 3 mg by mouth nightly. famotidine (PEPCID) 20 mg tablet take 1 tablet by mouth twice a day      OneTouch UltraSoft Lancets misc       rosuvastatin (CRESTOR) 10 mg tablet       gabapentin (NEURONTIN) 300 mg capsule Take 300 mg by mouth two (2) times a day. polyethylene glycol (ClearLax) 17 gram/dose powder Take 17 g by mouth daily. tamsulosin (FLOMAX) 0.4 mg capsule take 1 capsule by mouth once daily  Qty: 30 Cap, Refills: 3      LANTUS SOLOSTAR U-100 INSULIN 100 unit/mL (3 mL) inpn 30 units subcutaneously in morning. Can take an additional dose of 10 units subcutaneously in the evening if blood sugar is more than 200. Qty: 10 mL, Refills: 0      albuterol (PROVENTIL HFA, VENTOLIN HFA, PROAIR HFA) 90 mcg/actuation inhaler 2 puffs three to four times a day for 5 days then every 6 hours as needed for shortness of breath  Qty: 1 Inhaler, Refills: 0      LORazepam (ATIVAN) 0.5 mg tablet Take 0.5 mg by mouth as needed for Anxiety. atorvastatin (LIPITOR) 40 mg tablet Take  by mouth nightly. amLODIPine (NORVASC) 10 mg tablet Take 10 mg by mouth daily. Refills: 0      TRADJENTA 5 mg tablet TAKE 1 TABLET BY MOUTH ONCE A DAY  Refills: 0      NOVOFINE 32 32 gauge x 1/4\" ndle Refills: 0           2. Follow-up Information    None       3. Return to ED if worse   4. Current Discharge Medication List          Diagnosis     Clinical Impression: No diagnosis found.     Attestations:    Jesus Solo, MD    Please note that this dictation was completed with [employer], the computer voice recognition software. Quite often unanticipated grammatical, syntax, homophones, and other interpretive errors are inadvertently transcribed by the computer software. Please disregard these errors. Please excuse any errors that have escaped final proofreading. Thank you.

## 2021-09-19 NOTE — ROUTINE PROCESS
Pt received from ER AAOX3, sat 93% on room air with audible wheezing, pt advised that she does use oxygen at home. Pt placed on 02 2l nc now sat 97%.

## 2021-09-19 NOTE — ED TRIAGE NOTES
.  Chief Complaint   Patient presents with    Low Blood Sugar     Pt presents via EMS with complaint of altered mental status found in back of truck to be unresponsive, pt per EMS had fsbs at 34mg/dL. Pt given d10 and pt FSBS now 170mg/dL.  Pt now alert to baseline     Pt FSBS in department 76 mg/dL

## 2021-09-20 ENCOUNTER — APPOINTMENT (OUTPATIENT)
Dept: ULTRASOUND IMAGING | Age: 66
DRG: 243 | End: 2021-09-20
Attending: INTERNAL MEDICINE
Payer: MEDICAID

## 2021-09-20 ENCOUNTER — APPOINTMENT (OUTPATIENT)
Dept: VASCULAR SURGERY | Age: 66
DRG: 243 | End: 2021-09-20
Attending: HOSPITALIST
Payer: MEDICAID

## 2021-09-20 PROBLEM — R55 SYNCOPE: Status: ACTIVE | Noted: 2021-09-20

## 2021-09-20 PROBLEM — J96.11 CHRONIC RESPIRATORY FAILURE WITH HYPOXIA (HCC): Status: ACTIVE | Noted: 2021-09-20

## 2021-09-20 PROBLEM — R77.8 ELEVATED TROPONIN: Status: ACTIVE | Noted: 2021-09-20

## 2021-09-20 PROBLEM — E16.2 HYPOGLYCEMIA: Status: ACTIVE | Noted: 2021-09-20

## 2021-09-20 LAB
ALBUMIN SERPL-MCNC: 2.8 G/DL (ref 3.5–5)
ANION GAP SERPL CALC-SCNC: 7 MMOL/L (ref 5–15)
ATRIAL RATE: 67 BPM
BASOPHILS # BLD: 0.1 K/UL (ref 0–0.2)
BASOPHILS NFR BLD: 1 % (ref 0–2.5)
BUN SERPL-MCNC: 37 MG/DL (ref 6–20)
BUN/CREAT SERPL: 15 (ref 12–20)
CA-I BLD-MCNC: 9 MG/DL (ref 8.5–10.1)
CALCULATED P AXIS, ECG09: 63 DEGREES
CALCULATED R AXIS, ECG10: -41 DEGREES
CALCULATED T AXIS, ECG11: 45 DEGREES
CHLORIDE SERPL-SCNC: 103 MMOL/L (ref 97–108)
CHOLEST SERPL-MCNC: 150 MG/DL
CO2 SERPL-SCNC: 28 MMOL/L (ref 21–32)
CREAT SERPL-MCNC: 2.54 MG/DL (ref 0.55–1.02)
DIAGNOSIS, 93000: NORMAL
ECHO AO ROOT DIAM: 3.58 CM
ECHO AV MEAN GRADIENT: 6.02 MMHG
ECHO AV MEAN VELOCITY: 119.09 CM/S
ECHO AV PEAK GRADIENT: 8.92 MMHG
ECHO AV PEAK VELOCITY: 149.35 CM/S
ECHO AV VTI: 31.7 CM
ECHO LA VOL 2C: 48.89 ML (ref 22–52)
ECHO LA VOL 4C: 41.49 ML (ref 22–52)
ECHO LA VOL BP: 50.53 ML (ref 22–52)
ECHO LA VOL BP: 50.53 ML (ref 22–52)
ECHO LA VOLUME INDEX A2C: 24.45 ML/M2 (ref 16–28)
ECHO LA VOLUME INDEX A4C: 20.75 ML/M2 (ref 16–28)
ECHO LV EDV A2C: 138.87 ML
ECHO LV EDV A2C: 138.87 ML
ECHO LV EDV A2C: 158.72 ML
ECHO LV EDV A2C: 158.72 ML
ECHO LV EDV BP: 77.16 ML (ref 56–104)
ECHO LV EDV BP: 77.16 ML (ref 56–104)
ECHO LV EJECTION FRACTION A2C: 58 PERCENT
ECHO LV EJECTION FRACTION A2C: 58 PERCENT
ECHO LV EJECTION FRACTION A4C: 60 PERCENT
ECHO LV EJECTION FRACTION A4C: 60 PERCENT
ECHO LV EJECTION FRACTION BIPLANE: 59.2 PERCENT (ref 55–100)
ECHO LV EJECTION FRACTION BIPLANE: 59.2 PERCENT (ref 55–100)
ECHO LV ESV A2C: 32.21 ML
ECHO LV ESV BP: 31.51 ML (ref 19–49)
ECHO LV ESV BP: 31.51 ML (ref 19–49)
ECHO LV ESV INDEX A2C: 16.1 ML/M2
ECHO LV INTERNAL DIMENSION DIASTOLIC: 5.36 CM (ref 3.9–5.3)
ECHO LV INTERNAL DIMENSION DIASTOLIC: 5.68 CM (ref 3.9–5.3)
ECHO LV INTERNAL DIMENSION SYSTOLIC: 4.13 CM
ECHO LV IVSD: 1.11 CM (ref 0.6–0.9)
ECHO LV POSTERIOR WALL DIASTOLIC: 1.2 CM (ref 0.6–0.9)
ECHO LVOT PEAK GRADIENT: 3.09 MMHG
ECHO LVOT PEAK VELOCITY: 87.87 CM/S
ECHO LVOT SV: 112.8 ML
ECHO LVOT SV: 113.8 ML
ECHO LVOT VTI: 19.85 CM
ECHO MV A VELOCITY: 120.66 CM/S
ECHO MV AREA PHT: 4.1 CM2
ECHO MV AREA PHT: 4.1 CM2
ECHO MV E DECELERATION TIME (DT): 184.89 MS
ECHO MV E VELOCITY: 144.11 CM/S
ECHO MV E/A RATIO: 1.19
ECHO MV PRESSURE HALF TIME (PHT): 53.62 MS
EOSINOPHIL # BLD: 0.3 K/UL (ref 0–0.7)
EOSINOPHIL NFR BLD: 3 % (ref 0.9–2.9)
ERYTHROCYTE [DISTWIDTH] IN BLOOD BY AUTOMATED COUNT: 16.6 % (ref 11.5–14.5)
EST. AVERAGE GLUCOSE BLD GHB EST-MCNC: 171 MG/DL
GLUCOSE BLD STRIP.AUTO-MCNC: 143 MG/DL (ref 65–117)
GLUCOSE BLD STRIP.AUTO-MCNC: 175 MG/DL (ref 65–117)
GLUCOSE BLD STRIP.AUTO-MCNC: 199 MG/DL (ref 65–117)
GLUCOSE BLD STRIP.AUTO-MCNC: 251 MG/DL (ref 65–117)
GLUCOSE BLD STRIP.AUTO-MCNC: 261 MG/DL (ref 65–117)
GLUCOSE BLD STRIP.AUTO-MCNC: 467 MG/DL (ref 65–117)
GLUCOSE SERPL-MCNC: 196 MG/DL (ref 65–100)
HBA1C MFR BLD: 7.6 % (ref 4–5.6)
HCT VFR BLD AUTO: 30.3 % (ref 36–46)
HDLC SERPL-MCNC: 55 MG/DL
HDLC SERPL: 2.7 {RATIO} (ref 0–5)
HGB BLD-MCNC: 9.7 G/DL (ref 13.5–17.5)
IRON SATN MFR SERPL: 7 % (ref 20–50)
IRON SERPL-MCNC: 19 UG/DL (ref 35–150)
LDLC SERPL CALC-MCNC: 70.2 MG/DL (ref 0–100)
LIPID PROFILE,FLP: NORMAL
LVOT MG: 1.97 MMHG
LYMPHOCYTES # BLD: 0.7 K/UL (ref 1–4.8)
LYMPHOCYTES NFR BLD: 6 % (ref 20.5–51.1)
MCH RBC QN AUTO: 26 PG (ref 31–34)
MCHC RBC AUTO-ENTMCNC: 32.1 G/DL (ref 31–36)
MCV RBC AUTO: 81 FL (ref 80–100)
MONOCYTES # BLD: 0.8 K/UL (ref 0.2–2.4)
MONOCYTES NFR BLD: 7 % (ref 1.7–9.3)
NEUTS SEG # BLD: 9.8 K/UL (ref 1.8–7.7)
NEUTS SEG NFR BLD: 83 % (ref 42–75)
NRBC # BLD: 0.01 K/UL
NRBC BLD-RTO: 0.1 PER 100 WBC
P-R INTERVAL, ECG05: 168 MS
PERFORMED BY, TECHID: ABNORMAL
PHOSPHATE SERPL-MCNC: 3.8 MG/DL (ref 2.6–4.7)
PHOSPHATE SERPL-MCNC: 3.8 MG/DL (ref 2.6–4.7)
PLATELET # BLD AUTO: 293 K/UL (ref 150–400)
PMV BLD AUTO: 7.2 FL (ref 6.5–11.5)
POTASSIUM SERPL-SCNC: 4.9 MMOL/L (ref 3.5–5.1)
Q-T INTERVAL, ECG07: 439 MS
QRS DURATION, ECG06: 138 MS
QTC CALCULATION (BEZET), ECG08: 464 MS
RBC # BLD AUTO: 3.74 M/UL (ref 4.5–5.9)
SODIUM SERPL-SCNC: 138 MMOL/L (ref 136–145)
TIBC SERPL-MCNC: 263 UG/DL (ref 250–450)
TRIGL SERPL-MCNC: 124 MG/DL (ref ?–150)
TROPONIN I SERPL-MCNC: 0.09 NG/ML
TROPONIN I SERPL-MCNC: 0.15 NG/ML
TROPONIN I SERPL-MCNC: 0.21 NG/ML
TSH SERPL DL<=0.05 MIU/L-ACNC: 2.78 UIU/ML (ref 0.36–3.74)
VENTRICULAR RATE, ECG03: 67 BPM
VLDLC SERPL CALC-MCNC: 24.8 MG/DL
WBC # BLD AUTO: 11.6 K/UL (ref 4.4–11.3)

## 2021-09-20 PROCEDURE — 99218 HC RM OBSERVATION: CPT

## 2021-09-20 PROCEDURE — 94640 AIRWAY INHALATION TREATMENT: CPT

## 2021-09-20 PROCEDURE — 65270000029 HC RM PRIVATE

## 2021-09-20 PROCEDURE — 74011250637 HC RX REV CODE- 250/637: Performed by: HOSPITALIST

## 2021-09-20 PROCEDURE — 84484 ASSAY OF TROPONIN QUANT: CPT

## 2021-09-20 PROCEDURE — 82043 UR ALBUMIN QUANTITATIVE: CPT

## 2021-09-20 PROCEDURE — 99223 1ST HOSP IP/OBS HIGH 75: CPT | Performed by: INTERNAL MEDICINE

## 2021-09-20 PROCEDURE — 74011250636 HC RX REV CODE- 250/636: Performed by: HOSPITALIST

## 2021-09-20 PROCEDURE — 82962 GLUCOSE BLOOD TEST: CPT

## 2021-09-20 PROCEDURE — 74011000250 HC RX REV CODE- 250: Performed by: EMERGENCY MEDICINE

## 2021-09-20 PROCEDURE — 84100 ASSAY OF PHOSPHORUS: CPT

## 2021-09-20 PROCEDURE — 84443 ASSAY THYROID STIM HORMONE: CPT

## 2021-09-20 PROCEDURE — 96372 THER/PROPH/DIAG INJ SC/IM: CPT

## 2021-09-20 PROCEDURE — 94760 N-INVAS EAR/PLS OXIMETRY 1: CPT

## 2021-09-20 PROCEDURE — 83540 ASSAY OF IRON: CPT

## 2021-09-20 PROCEDURE — 77010033678 HC OXYGEN DAILY

## 2021-09-20 PROCEDURE — 80061 LIPID PANEL: CPT

## 2021-09-20 PROCEDURE — 74011250636 HC RX REV CODE- 250/636: Performed by: EMERGENCY MEDICINE

## 2021-09-20 PROCEDURE — 96375 TX/PRO/DX INJ NEW DRUG ADDON: CPT

## 2021-09-20 PROCEDURE — 36415 COLL VENOUS BLD VENIPUNCTURE: CPT

## 2021-09-20 PROCEDURE — 76770 US EXAM ABDO BACK WALL COMP: CPT

## 2021-09-20 PROCEDURE — 83036 HEMOGLOBIN GLYCOSYLATED A1C: CPT

## 2021-09-20 PROCEDURE — 85025 COMPLETE CBC W/AUTO DIFF WBC: CPT

## 2021-09-20 PROCEDURE — 93306 TTE W/DOPPLER COMPLETE: CPT

## 2021-09-20 PROCEDURE — 80069 RENAL FUNCTION PANEL: CPT

## 2021-09-20 PROCEDURE — 74011636637 HC RX REV CODE- 636/637: Performed by: HOSPITALIST

## 2021-09-20 PROCEDURE — 74011000250 HC RX REV CODE- 250: Performed by: HOSPITALIST

## 2021-09-20 RX ORDER — MAGNESIUM SULFATE 100 %
4 CRYSTALS MISCELLANEOUS AS NEEDED
Status: DISCONTINUED | OUTPATIENT
Start: 2021-09-20 | End: 2021-09-27 | Stop reason: HOSPADM

## 2021-09-20 RX ORDER — SODIUM CHLORIDE 9 MG/ML
75 INJECTION, SOLUTION INTRAVENOUS CONTINUOUS
Status: DISCONTINUED | OUTPATIENT
Start: 2021-09-20 | End: 2021-09-21

## 2021-09-20 RX ORDER — IPRATROPIUM BROMIDE AND ALBUTEROL SULFATE 2.5; .5 MG/3ML; MG/3ML
3 SOLUTION RESPIRATORY (INHALATION)
Status: DISCONTINUED | OUTPATIENT
Start: 2021-09-20 | End: 2021-09-27 | Stop reason: HOSPADM

## 2021-09-20 RX ORDER — INSULIN LISPRO 100 [IU]/ML
INJECTION, SOLUTION INTRAVENOUS; SUBCUTANEOUS
Status: DISCONTINUED | OUTPATIENT
Start: 2021-09-20 | End: 2021-09-27 | Stop reason: HOSPADM

## 2021-09-20 RX ORDER — DEXTROSE 50 % IN WATER (D50W) INTRAVENOUS SYRINGE
25-50 AS NEEDED
Status: DISCONTINUED | OUTPATIENT
Start: 2021-09-20 | End: 2021-09-27 | Stop reason: HOSPADM

## 2021-09-20 RX ORDER — ASPIRIN 81 MG/1
81 TABLET ORAL DAILY
Status: DISCONTINUED | OUTPATIENT
Start: 2021-09-20 | End: 2021-09-27 | Stop reason: HOSPADM

## 2021-09-20 RX ORDER — GABAPENTIN 300 MG/1
300 CAPSULE ORAL
Status: COMPLETED | OUTPATIENT
Start: 2021-09-20 | End: 2021-09-20

## 2021-09-20 RX ORDER — INSULIN GLARGINE 100 [IU]/ML
30 INJECTION, SOLUTION SUBCUTANEOUS DAILY
Status: DISCONTINUED | OUTPATIENT
Start: 2021-09-20 | End: 2021-09-20

## 2021-09-20 RX ORDER — GUAIFENESIN 600 MG/1
600 TABLET, EXTENDED RELEASE ORAL EVERY 12 HOURS
Status: DISCONTINUED | OUTPATIENT
Start: 2021-09-20 | End: 2021-09-27 | Stop reason: HOSPADM

## 2021-09-20 RX ORDER — INSULIN GLARGINE 100 [IU]/ML
20 INJECTION, SOLUTION SUBCUTANEOUS DAILY
Status: DISCONTINUED | OUTPATIENT
Start: 2021-09-20 | End: 2021-09-20

## 2021-09-20 RX ORDER — EXENATIDE 2 MG/.85ML
2 INJECTION, SUSPENSION, EXTENDED RELEASE SUBCUTANEOUS
COMMUNITY
End: 2021-09-27

## 2021-09-20 RX ORDER — FAMOTIDINE 20 MG/1
20 TABLET, FILM COATED ORAL 2 TIMES DAILY
Status: DISCONTINUED | OUTPATIENT
Start: 2021-09-20 | End: 2021-09-21

## 2021-09-20 RX ADMIN — GUAIFENESIN 600 MG: 600 TABLET, EXTENDED RELEASE ORAL at 22:06

## 2021-09-20 RX ADMIN — Medication 10 ML: at 13:52

## 2021-09-20 RX ADMIN — IPRATROPIUM BROMIDE AND ALBUTEROL SULFATE 3 ML: .5; 2.5 SOLUTION RESPIRATORY (INHALATION) at 16:56

## 2021-09-20 RX ADMIN — GABAPENTIN 300 MG: 300 CAPSULE ORAL at 02:40

## 2021-09-20 RX ADMIN — ENOXAPARIN SODIUM 30 MG: 30 INJECTION SUBCUTANEOUS at 09:56

## 2021-09-20 RX ADMIN — Medication 10 ML: at 06:28

## 2021-09-20 RX ADMIN — IPRATROPIUM BROMIDE AND ALBUTEROL SULFATE 3 ML: .5; 2.5 SOLUTION RESPIRATORY (INHALATION) at 20:05

## 2021-09-20 RX ADMIN — INSULIN LISPRO 8 UNITS: 100 INJECTION, SOLUTION INTRAVENOUS; SUBCUTANEOUS at 22:06

## 2021-09-20 RX ADMIN — GUAIFENESIN 600 MG: 600 TABLET, EXTENDED RELEASE ORAL at 13:51

## 2021-09-20 RX ADMIN — DEXTROSE AND SODIUM CHLORIDE 100 ML/HR: 5; 450 INJECTION, SOLUTION INTRAVENOUS at 00:25

## 2021-09-20 RX ADMIN — FAMOTIDINE 20 MG: 20 TABLET, FILM COATED ORAL at 09:56

## 2021-09-20 RX ADMIN — METHYLPREDNISOLONE SODIUM SUCCINATE 40 MG: 125 INJECTION, POWDER, FOR SOLUTION INTRAMUSCULAR; INTRAVENOUS at 22:06

## 2021-09-20 RX ADMIN — Medication 10 ML: at 22:08

## 2021-09-20 RX ADMIN — Medication 10 ML: at 18:35

## 2021-09-20 RX ADMIN — INSULIN LISPRO 5 UNITS: 100 INJECTION, SOLUTION INTRAVENOUS; SUBCUTANEOUS at 12:03

## 2021-09-20 RX ADMIN — ASPIRIN 81 MG: 81 TABLET, COATED ORAL at 18:02

## 2021-09-20 RX ADMIN — INSULIN LISPRO 5 UNITS: 100 INJECTION, SOLUTION INTRAVENOUS; SUBCUTANEOUS at 18:02

## 2021-09-20 RX ADMIN — DEXTROSE AND SODIUM CHLORIDE 100 ML/HR: 5; 450 INJECTION, SOLUTION INTRAVENOUS at 13:51

## 2021-09-20 RX ADMIN — FAMOTIDINE 20 MG: 20 TABLET, FILM COATED ORAL at 22:06

## 2021-09-20 RX ADMIN — METHYLPREDNISOLONE SODIUM SUCCINATE 40 MG: 125 INJECTION, POWDER, FOR SOLUTION INTRAMUSCULAR; INTRAVENOUS at 13:51

## 2021-09-20 NOTE — CONSULTS
Consult Date: 9/20/2021    Consults    Subjective     66y F w/ pmhx remarkable for HTN, DM, CKD Stage II and obesity BIBEMS due to AMS w/o reports of CP,N/V/D, dizziness, lighteadiness or fever or chills. As per records, pt was found unresponsive w/ FSG of 34 as per EMS. On arrival, VSS w/o evidence of distress, received D10 w/ improvement on FSG up to 150-170s ranges. Evaluated and seen at bedside, no complaints reported by the pt. Routine labs showed SCR at 2.66.  Nephrology consulted in account of KANIKA on CKD       Past Medical History:   Diagnosis Date    Arthritis     spinal stenosis    Breast CA ([facility] [address] 2017    Left Breast (chemo/radiation)    Diabetes (HCC)     Type 2    Edema     legs and ankles    GERD (gastroesophageal reflux disease)     High cholesterol     Hypertension     Ill-defined condition     patient states hemorrhage behind Left eye-following Dr. Celsa Watters MI (myocardial infarction) ([facility] [address] 06/2018    per patient    Neuropathy     Restless leg       Past Surgical History:   Procedure Laterality Date    HX BACK SURGERY  08/30/2018    HX BREAST BIOPSY Left 02/03/2017    BREAST CANCER    HX BREAST LUMPECTOMY Left     HX CATARACT REMOVAL Right     HX HEART CATHETERIZATION  07/2018    no stents    HX KNEE REPLACEMENT       Family History   Problem Relation Age of Onset    No Known Problems Mother     No Known Problems Father     Heart Disease Sister       Social History     Tobacco Use    Smoking status: Never Smoker    Smokeless tobacco: Never Used   Substance Use Topics    Alcohol use: No       Current Facility-Administered Medications   Medication Dose Route Frequency Provider Last Rate Last Admin    glucose chewable tablet 16 g  4 Tablet Oral PRN Edward Jennings MD        dextrose (D50W) injection syrg 12.5-25 g  25-50 mL IntraVENous PRN Edward Jennings MD        glucagon (GLUCAGEN) injection 1 mg  1 mg IntraMUSCular PRN Nikki Khan MD        insulin lispro (HUMALOG) injection   SubCUTAneous AC&HS Angelina Jennings MD        famotidine (PEPCID) tablet 20 mg  20 mg Oral BID Geno Fuentes MD   20 mg at 09/20/21 0956    dextrose 5 % - 0.45% NaCl infusion  100 mL/hr IntraVENous CONTINUOUS Jacqueline Travis  mL/hr at 09/20/21 0025 100 mL/hr at 09/20/21 0025    sodium chloride (NS) flush 5-40 mL  5-40 mL IntraVENous Q8H Jacqueline Travis MD   10 mL at 09/20/21 9342    sodium chloride (NS) flush 5-40 mL  5-40 mL IntraVENous PRN Jacqueline Travis MD        acetaminophen (TYLENOL) tablet 650 mg  650 mg Oral Q6H PRN Jacqueline Travis MD        Or    acetaminophen (TYLENOL) suppository 650 mg  650 mg Rectal Q6H PRN Jacqueline Travis MD        polyethylene glycol (MIRALAX) packet 17 g  17 g Oral DAILY PRN Jacqueline Travis MD        ondansetron (ZOFRAN ODT) tablet 4 mg  4 mg Oral Q8H PRN Jacqueline Travis MD        Or    ondansetron (ZOFRAN) injection 4 mg  4 mg IntraVENous Q6H PRN Jacqueline Travis MD        enoxaparin (LOVENOX) injection 30 mg  30 mg SubCUTAneous DAILY Jacqueline Travis MD   30 mg at 09/20/21 7217        Review of Systems   Constitutional: Negative for fever. HENT: Negative for facial swelling. Respiratory: Negative for chest tightness and shortness of breath. Cardiovascular: Negative for chest pain and palpitations. Gastrointestinal: Negative for abdominal distention, constipation, diarrhea, nausea and vomiting. Genitourinary: Negative for dysuria and hematuria. Musculoskeletal: Negative for joint swelling. Neurological: Negative for syncope, light-headedness and numbness. Psychiatric/Behavioral: Negative for confusion.        Objective     Vital signs for last 24 hours:  Visit Vitals  /72   Pulse 84   Temp 98.4 °F (36.9 °C)   Resp 20   Ht 5' 2\" (1.575 m)   Wt 100.7 kg (222 lb)   SpO2 95%   Breastfeeding No   BMI 40.60 kg/m²       Intake/Output this shift:  Current Shift: 09/20 0701 - 09/20 1900  In: 300 [P.O.:300]  Out: -   Last 3 Shifts: 09/18 1901 - 09/20 0700  In: 2020 [I.V.:2020]  Out: 400 [Urine:400]    Data Review:   Recent Results (from the past 24 hour(s))   GLUCOSE, POC    Collection Time: 09/19/21  1:35 PM   Result Value Ref Range    Glucose (POC) 76 65 - 117 mg/dL    Performed by Ginette Haney    GLUCOSE, POC    Collection Time: 09/19/21  1:46 PM   Result Value Ref Range    Glucose (POC) 97 65 - 117 mg/dL    Performed by Bill Salazar    CBC WITH AUTOMATED DIFF    Collection Time: 09/19/21  1:48 PM   Result Value Ref Range    WBC 11.7 (H) 4.4 - 11.3 K/uL    RBC 4.20 (L) 4.50 - 5.90 M/uL    HGB 10.7 (L) 13.5 - 17.5 g/dL    HCT 34.1 (L) 36 - 46 %    MCV 81.2 80 - 100 FL    MCH 25.5 (L) 31 - 34 PG    MCHC 31.4 31.0 - 36.0 g/dL    RDW 16.9 (H) 11.5 - 14.5 %    PLATELET 805 517 - 426 K/uL    MPV 7.2 6.5 - 11.5 FL    NRBC 0.1  WBC    ABSOLUTE NRBC 0.01 K/uL    NEUTROPHILS 84 (H) 42 - 75 %    LYMPHOCYTES 5 (L) 20.5 - 51.1 %    MONOCYTES 10 (H) 1.7 - 9.3 %    EOSINOPHILS 1 0.9 - 2.9 %    BASOPHILS 0 0.0 - 2.5 %    ABS. NEUTROPHILS 9.7 (H) 1.8 - 7.7 K/UL    ABS. LYMPHOCYTES 0.6 (L) 1.0 - 4.8 K/UL    ABS. MONOCYTES 1.1 0.2 - 2.4 K/UL    ABS. EOSINOPHILS 0.1 0.0 - 0.7 K/UL    ABS.  BASOPHILS 0.0 0.0 - 0.2 K/UL   PROTHROMBIN TIME + INR    Collection Time: 09/19/21  1:48 PM   Result Value Ref Range    Prothrombin time 10.3 9.0 - 11.1 sec    INR 1.0 0.9 - 1.1     TROPONIN I    Collection Time: 09/19/21  1:48 PM   Result Value Ref Range    Troponin-I, Qt. 0.12 (H) <4.05 ng/mL   METABOLIC PANEL, COMPREHENSIVE    Collection Time: 09/19/21  1:48 PM   Result Value Ref Range    Sodium 141 136 - 145 mmol/L    Potassium 4.0 3.5 - 5.1 mmol/L    Chloride 103 97 - 108 mmol/L    CO2 28 21 - 32 mmol/L    Anion gap 10 5 - 15 mmol/L    Glucose 110 (H) 65 - 100 mg/dL    BUN 37 (H) 6 - 20 mg/dL    Creatinine 2.66 (H) 0.55 - 1.02 mg/dL    BUN/Creatinine ratio 14 12 - 20 GFR est AA 22 (L) >60 ml/min/1.73m2    GFR est non-AA 18 (L) >60 ml/min/1.73m2    Calcium 9.4 8.5 - 10.1 mg/dL    Bilirubin, total 0.3 0.2 - 1.0 mg/dL    AST (SGOT) 18 15 - 37 U/L    ALT (SGPT) 27 12 - 78 U/L    Alk. phosphatase 78 45 - 117 U/L    Protein, total 7.4 6.4 - 8.2 g/dL    Albumin 3.2 (L) 3.5 - 5.0 g/dL    Globulin 4.2 (H) 2.0 - 4.0 g/dL    A-G Ratio 0.8 (L) 1.1 - 2.2     ETHYL ALCOHOL    Collection Time: 09/19/21  1:48 PM   Result Value Ref Range    ALCOHOL(ETHYL),SERUM <4 <10 mg/dL   MAGNESIUM    Collection Time: 09/19/21  1:48 PM   Result Value Ref Range    Magnesium 2.1 1.6 - 2.4 mg/dL   ACETONE/KETONE, QL    Collection Time: 09/19/21  1:48 PM   Result Value Ref Range    Acetone/Ketone serum, QL.  Negative     GLUCOSE, POC    Collection Time: 09/19/21  2:26 PM   Result Value Ref Range    Glucose (POC) 127 (H) 65 - 117 mg/dL    Performed by Dov Benson    URINALYSIS W/ RFLX MICROSCOPIC    Collection Time: 09/19/21  2:31 PM   Result Value Ref Range    Color Yellow/Straw      Appearance Clear Clear      Specific gravity 1.025 1.003 - 1.030      pH (UA) 5.5 5.0 - 8.0      Protein >300 (A) Negative mg/dL    Glucose 100 (A) Negative mg/dL    Ketone Negative Negative mg/dL    Bilirubin Negative Negative      Blood Small (A) Negative      Urobilinogen 0.2 0.2 - 1.0 EU/dL    Nitrites Negative Negative      Leukocyte Esterase Negative Negative     DRUG SCREEN, URINE    Collection Time: 09/19/21  2:31 PM   Result Value Ref Range    AMPHETAMINES Negative Negative      BARBITURATES Negative Negative      BENZODIAZEPINES Negative Negative      COCAINE Negative Negative      ECSTASY, MDMA Negative Negative      METHADONE Negative Negative      OPIATES Negative Negative      PCP(PHENCYCLIDINE) Negative Negative      THC (TH-CANNABINOL) Negative Negative      Drug screen comment PH=5.5    URINE MICROSCOPIC    Collection Time: 09/19/21  2:31 PM   Result Value Ref Range    WBC 0-4 0 - 5 /hpf    RBC 0-5 0 - 3 /hpf Bacteria Negative Negative /hpf    Amorphous Crystals 4+ (A) Negative   GLUCOSE, POC    Collection Time: 09/19/21  3:15 PM   Result Value Ref Range    Glucose (POC) 70 65 - 117 mg/dL    Performed by Ck Maya    TROPONIN I    Collection Time: 09/19/21  3:30 PM   Result Value Ref Range    Troponin-I, Qt. 0.12 (H) <0.05 ng/mL   EKG, 12 LEAD, SUBSEQUENT    Collection Time: 09/19/21  5:29 PM   Result Value Ref Range    Ventricular Rate 67 BPM    Atrial Rate 67 BPM    P-R Interval 168 ms    QRS Duration 138 ms    Q-T Interval 439 ms    QTC Calculation (Bezet) 464 ms    Calculated P Axis 63 degrees    Calculated R Axis -41 degrees    Calculated T Axis 45 degrees    Diagnosis       Sinus rhythm  Left bundle branch block  Baseline wander in lead(s) III,aVF     GLUCOSE, POC    Collection Time: 09/19/21  6:12 PM   Result Value Ref Range    Glucose (POC) 172 (H) 65 - 117 mg/dL    Performed by Ck Maya    GLUCOSE, POC    Collection Time: 09/19/21  9:36 PM   Result Value Ref Range    Glucose (POC) 303 (H) 65 - 117 mg/dL    Performed by [address] [place], [place] [id number] Sherman, POC    Collection Time: 09/20/21  2:47 AM   Result Value Ref Range    Glucose (POC) 199 (H) 65 - 117 mg/dL    Performed by Faheem Maxwell    GLUCOSE, POC    Collection Time: 09/20/21  6:23 AM   Result Value Ref Range    Glucose (POC) 143 (H) 65 - 117 mg/dL    Performed by  [employer] Sherman, POC    Collection Time: 09/20/21  8:32 AM   Result Value Ref Range    Glucose (POC) 175 (H) 65 - 117 mg/dL    Performed by Kirstie Peña    CBC WITH AUTOMATED DIFF    Collection Time: 09/20/21  9:25 AM   Result Value Ref Range    WBC 11.6 (H) 4.4 - 11.3 K/uL    RBC 3.74 (L) 4.50 - 5.90 M/uL    HGB 9.7 (L) 13.5 - 17.5 g/dL    HCT 30.3 (L) 36 - 46 %    MCV 81.0 80 - 100 FL    MCH 26.0 (L) 31 - 34 PG    MCHC 32.1 31.0 - 36.0 g/dL    RDW 16.6 (H) 11.5 - 14.5 %    PLATELET 082 760 - 536 K/uL    MPV 7.2 6.5 - 11.5 FL    NRBC 0.1  WBC    ABSOLUTE NRBC 0.01 K/uL    NEUTROPHILS 83 (H) 42 - 75 %    LYMPHOCYTES 6 (L) 20.5 - 51.1 %    MONOCYTES 7 1.7 - 9.3 %    EOSINOPHILS 3 (H) 0.9 - 2.9 %    BASOPHILS 1 0.0 - 2.5 %    ABS. NEUTROPHILS 9.8 (H) 1.8 - 7.7 K/UL    ABS. LYMPHOCYTES 0.7 (L) 1.0 - 4.8 K/UL    ABS. MONOCYTES 0.8 0.2 - 2.4 K/UL    ABS. EOSINOPHILS 0.3 0.0 - 0.7 K/UL    ABS. BASOPHILS 0.1 0.0 - 0.2 K/UL   ECHO ADULT COMPLETE    Collection Time: 09/20/21  9:58 AM   Result Value Ref Range    LV ED Vol A2C 138. 87 mL    LV ED Vol A2C 138. 87 mL    LV ED Vol A2C 158. 72 mL    LV ED Vol A2C 158. 72 mL    IVSd 1.11 (A) 0.60 - 0.90 cm    LVIDd 5.36 (A) 3.90 - 5.30 cm    LVIDd 5.68 (A) 3.90 - 5.30 cm    LVIDs 4.13 cm    LVPWd 1.20 (A) 0.60 - 0.90 cm    LVOT .8 mL    LVOT .8 mL    BP EF 59.2 55.0 - 100.0 percent    BP EF 59.2 55.0 - 100.0 percent    LV Ejection Fraction MOD 2C 58 percent    LV Ejection Fraction MOD 2C 58 percent    LV Ejection Fraction MOD 4C 60 percent    LV Ejection Fraction MOD 4C 60 percent    LV ED Vol BP 77.16 56.0 - 104.0 mL    LV ED Vol BP 77.16 56.0 - 104.0 mL    LV ES Vol A2C 32.21 mL    LV ES Vol BP 31.51 19.0 - 49.0 mL    LV ES Vol BP 31.51 19.0 - 49.0 mL    LVOT Peak Gradient 3.09 mmHg    Left Ventricular Outflow Tract Mean Gradient 1.97 mmHg    LVOT Peak Velocity 87.87 cm/s    LVOT VTI 19.85 cm    LA Volume 50.53 22.0 - 52.0 mL    LA Volume 50.53 22.0 - 52.0 mL    LA Vol 2C 48.89 22.00 - 52.00 mL    LA Vol 4C 41.49 22.00 - 52.00 mL    AoV PG 8.92 mmHg    Aortic Valve Systolic Mean Gradient 5.88 mmHg    Aortic Valve Systolic Peak Velocity 721.47 cm/s    Aortic valve mean velocity 119.09 cm/s    AoV VTI 31.70 cm    MV A Brian 120.66 cm/s    Mitral Valve E Wave Deceleration Time 184.89 ms    MV E Brian 144.11 cm/s    MV E/A 1.19     Mitral Valve Pressure Half-time 53.62 ms    MVA (PHT) 4.10 cm2    MVA (PHT) 4.10 cm2    Ao Root D 3.58 cm       Physical Exam  Vitals reviewed. Constitutional:       Appearance: Normal appearance. HENT:      Head: Atraumatic. Cardiovascular:      Rate and Rhythm: Normal rate and regular rhythm. Pulses: Normal pulses. Heart sounds: Normal heart sounds. No murmur heard. No gallop. Pulmonary:      Effort: Pulmonary effort is normal. No respiratory distress. Breath sounds: No wheezing or rales. Abdominal:      General: Abdomen is flat. Bowel sounds are normal. There is no distension. Neurological:      General: No focal deficit present. Recent Results (from the past 24 hour(s))   GLUCOSE, POC    Collection Time: 09/19/21  1:35 PM   Result Value Ref Range    Glucose (POC) 76 65 - 117 mg/dL    Performed by Liam Boyce, POC    Collection Time: 09/19/21  1:46 PM   Result Value Ref Range    Glucose (POC) 97 65 - 117 mg/dL    Performed by Shawnee Dempsey    CBC WITH AUTOMATED DIFF    Collection Time: 09/19/21  1:48 PM   Result Value Ref Range    WBC 11.7 (H) 4.4 - 11.3 K/uL    RBC 4.20 (L) 4.50 - 5.90 M/uL    HGB 10.7 (L) 13.5 - 17.5 g/dL    HCT 34.1 (L) 36 - 46 %    MCV 81.2 80 - 100 FL    MCH 25.5 (L) 31 - 34 PG    MCHC 31.4 31.0 - 36.0 g/dL    RDW 16.9 (H) 11.5 - 14.5 %    PLATELET 206 892 - 769 K/uL    MPV 7.2 6.5 - 11.5 FL    NRBC 0.1  WBC    ABSOLUTE NRBC 0.01 K/uL    NEUTROPHILS 84 (H) 42 - 75 %    LYMPHOCYTES 5 (L) 20.5 - 51.1 %    MONOCYTES 10 (H) 1.7 - 9.3 %    EOSINOPHILS 1 0.9 - 2.9 %    BASOPHILS 0 0.0 - 2.5 %    ABS. NEUTROPHILS 9.7 (H) 1.8 - 7.7 K/UL    ABS. LYMPHOCYTES 0.6 (L) 1.0 - 4.8 K/UL    ABS. MONOCYTES 1.1 0.2 - 2.4 K/UL    ABS. EOSINOPHILS 0.1 0.0 - 0.7 K/UL    ABS.  BASOPHILS 0.0 0.0 - 0.2 K/UL   PROTHROMBIN TIME + INR    Collection Time: 09/19/21  1:48 PM   Result Value Ref Range    Prothrombin time 10.3 9.0 - 11.1 sec    INR 1.0 0.9 - 1.1     TROPONIN I    Collection Time: 09/19/21  1:48 PM   Result Value Ref Range    Troponin-I, Qt. 0.12 (H) <0.33 ng/mL   METABOLIC PANEL, COMPREHENSIVE    Collection Time: 09/19/21  1:48 PM   Result Value Ref Range    Sodium 141 136 - 145 mmol/L    Potassium 4.0 3.5 - 5.1 mmol/L    Chloride 103 97 - 108 mmol/L    CO2 28 21 - 32 mmol/L    Anion gap 10 5 - 15 mmol/L    Glucose 110 (H) 65 - 100 mg/dL    BUN 37 (H) 6 - 20 mg/dL    Creatinine 2.66 (H) 0.55 - 1.02 mg/dL    BUN/Creatinine ratio 14 12 - 20      GFR est AA 22 (L) >60 ml/min/1.73m2    GFR est non-AA 18 (L) >60 ml/min/1.73m2    Calcium 9.4 8.5 - 10.1 mg/dL    Bilirubin, total 0.3 0.2 - 1.0 mg/dL    AST (SGOT) 18 15 - 37 U/L    ALT (SGPT) 27 12 - 78 U/L    Alk. phosphatase 78 45 - 117 U/L    Protein, total 7.4 6.4 - 8.2 g/dL    Albumin 3.2 (L) 3.5 - 5.0 g/dL    Globulin 4.2 (H) 2.0 - 4.0 g/dL    A-G Ratio 0.8 (L) 1.1 - 2.2     ETHYL ALCOHOL    Collection Time: 09/19/21  1:48 PM   Result Value Ref Range    ALCOHOL(ETHYL),SERUM <4 <10 mg/dL   MAGNESIUM    Collection Time: 09/19/21  1:48 PM   Result Value Ref Range    Magnesium 2.1 1.6 - 2.4 mg/dL   ACETONE/KETONE, QL    Collection Time: 09/19/21  1:48 PM   Result Value Ref Range    Acetone/Ketone serum, QL.  Negative     GLUCOSE, POC    Collection Time: 09/19/21  2:26 PM   Result Value Ref Range    Glucose (POC) 127 (H) 65 - 117 mg/dL    Performed by Onelia Bhatti    URINALYSIS W/ RFLX MICROSCOPIC    Collection Time: 09/19/21  2:31 PM   Result Value Ref Range    Color Yellow/Straw      Appearance Clear Clear      Specific gravity 1.025 1.003 - 1.030      pH (UA) 5.5 5.0 - 8.0      Protein >300 (A) Negative mg/dL    Glucose 100 (A) Negative mg/dL    Ketone Negative Negative mg/dL    Bilirubin Negative Negative      Blood Small (A) Negative      Urobilinogen 0.2 0.2 - 1.0 EU/dL    Nitrites Negative Negative      Leukocyte Esterase Negative Negative     DRUG SCREEN, URINE    Collection Time: 09/19/21  2:31 PM   Result Value Ref Range    AMPHETAMINES Negative Negative      BARBITURATES Negative Negative      BENZODIAZEPINES Negative Negative      COCAINE Negative Negative      ECSTASY, MDMA Negative Negative      METHADONE Negative Negative      OPIATES Negative Negative      PCP(PHENCYCLIDINE) Negative Negative      THC (TH-CANNABINOL) Negative Negative      Drug screen comment PH=5.5    URINE MICROSCOPIC    Collection Time: 09/19/21  2:31 PM   Result Value Ref Range    WBC 0-4 0 - 5 /hpf    RBC 0-5 0 - 3 /hpf    Bacteria Negative Negative /hpf    Amorphous Crystals 4+ (A) Negative   GLUCOSE, POC    Collection Time: 09/19/21  3:15 PM   Result Value Ref Range    Glucose (POC) 70 65 - 117 mg/dL    Performed by Ambrose Pisano    TROPONIN I    Collection Time: 09/19/21  3:30 PM   Result Value Ref Range    Troponin-I, Qt. 0.12 (H) <0.05 ng/mL   EKG, 12 LEAD, SUBSEQUENT    Collection Time: 09/19/21  5:29 PM   Result Value Ref Range    Ventricular Rate 67 BPM    Atrial Rate 67 BPM    P-R Interval 168 ms    QRS Duration 138 ms    Q-T Interval 439 ms    QTC Calculation (Bezet) 464 ms    Calculated P Axis 63 degrees    Calculated R Axis -41 degrees    Calculated T Axis 45 degrees    Diagnosis       Sinus rhythm  Left bundle branch block  Baseline wander in lead(s) III,aVF     GLUCOSE, POC    Collection Time: 09/19/21  6:12 PM   Result Value Ref Range    Glucose (POC) 172 (H) 65 - 117 mg/dL    Performed by Nelida Albert, POC    Collection Time: 09/19/21  9:36 PM   Result Value Ref Range    Glucose (POC) 303 (H) 65 - 117 mg/dL    Performed by [address] [place] [address], POC    Collection Time: 09/20/21  2:47 AM   Result Value Ref Range    Glucose (POC) 199 (H) 65 - 117 mg/dL    Performed by [address] [place] [address], POC    Collection Time: 09/20/21  6:23 AM   Result Value Ref Range    Glucose (POC) 143 (H) 65 - 117 mg/dL    Performed by [address] [place] [address], POC    Collection Time: 09/20/21  8:32 AM   Result Value Ref Range    Glucose (POC) 175 (H) 65 - 117 mg/dL    Performed by Estuardo Chaney    CBC WITH AUTOMATED DIFF    Collection Time: 09/20/21  9:25 AM   Result Value Ref Range    WBC 11.6 (H) 4.4 - 11.3 K/uL    RBC 3.74 (L) 4.50 - 5.90 M/uL    HGB 9.7 (L) 13.5 - 17.5 g/dL    HCT 30.3 (L) 36 - 46 %    MCV 81.0 80 - 100 FL    MCH 26.0 (L) 31 - 34 PG    MCHC 32.1 31.0 - 36.0 g/dL    RDW 16.6 (H) 11.5 - 14.5 %    PLATELET 061 665 - 077 K/uL    MPV 7.2 6.5 - 11.5 FL    NRBC 0.1  WBC    ABSOLUTE NRBC 0.01 K/uL    NEUTROPHILS 83 (H) 42 - 75 %    LYMPHOCYTES 6 (L) 20.5 - 51.1 %    MONOCYTES 7 1.7 - 9.3 %    EOSINOPHILS 3 (H) 0.9 - 2.9 %    BASOPHILS 1 0.0 - 2.5 %    ABS. NEUTROPHILS 9.8 (H) 1.8 - 7.7 K/UL    ABS. LYMPHOCYTES 0.7 (L) 1.0 - 4.8 K/UL    ABS. MONOCYTES 0.8 0.2 - 2.4 K/UL    ABS. EOSINOPHILS 0.3 0.0 - 0.7 K/UL    ABS. BASOPHILS 0.1 0.0 - 0.2 K/UL   ECHO ADULT COMPLETE    Collection Time: 09/20/21  9:58 AM   Result Value Ref Range    LV ED Vol A2C 138. 87 mL    LV ED Vol A2C 138. 87 mL    LV ED Vol A2C 158. 72 mL    LV ED Vol A2C 158. 72 mL    IVSd 1.11 (A) 0.60 - 0.90 cm    LVIDd 5.36 (A) 3.90 - 5.30 cm    LVIDd 5.68 (A) 3.90 - 5.30 cm    LVIDs 4.13 cm    LVPWd 1.20 (A) 0.60 - 0.90 cm    LVOT .8 mL    LVOT .8 mL    BP EF 59.2 55.0 - 100.0 percent    BP EF 59.2 55.0 - 100.0 percent    LV Ejection Fraction MOD 2C 58 percent    LV Ejection Fraction MOD 2C 58 percent    LV Ejection Fraction MOD 4C 60 percent    LV Ejection Fraction MOD 4C 60 percent    LV ED Vol BP 77.16 56.0 - 104.0 mL    LV ED Vol BP 77.16 56.0 - 104.0 mL    LV ES Vol A2C 32.21 mL    LV ES Vol BP 31.51 19.0 - 49.0 mL    LV ES Vol BP 31.51 19.0 - 49.0 mL    LVOT Peak Gradient 3.09 mmHg    Left Ventricular Outflow Tract Mean Gradient 1.97 mmHg    LVOT Peak Velocity 87.87 cm/s    LVOT VTI 19.85 cm    LA Volume 50.53 22.0 - 52.0 mL    LA Volume 50.53 22.0 - 52.0 mL    LA Vol 2C 48.89 22.00 - 52.00 mL    LA Vol 4C 41.49 22.00 - 52.00 mL    AoV PG 8.92 mmHg    Aortic Valve Systolic Mean Gradient 3.44 mmHg    Aortic Valve Systolic Peak Velocity 403.21 cm/s    Aortic valve mean velocity 119.09 cm/s    AoV VTI 31.70 cm    MV A Brian 120.66 cm/s    Mitral Valve E Wave Deceleration Time 184.89 ms    MV E Brian 144.11 cm/s    MV E/A 1.19     Mitral Valve Pressure Half-time 53.62 ms    MVA (PHT) 4.10 cm2    MVA (PHT) 4.10 cm2    Ao Root D 3.58 cm         Current Facility-Administered Medications:     glucose chewable tablet 16 g, 4 Tablet, Oral, PRN, Edward Jennings MD    dextrose (D50W) injection syrg 12.5-25 g, 25-50 mL, IntraVENous, PRN, Edward Jennings MD    glucagon (GLUCAGEN) injection 1 mg, 1 mg, IntraMUSCular, PRN, Edward Jennings MD    insulin lispro (HUMALOG) injection, , SubCUTAneous, AC&HS, Edward Jennings MD    famotidine (PEPCID) tablet 20 mg, 20 mg, Oral, BID, Edward Jennings MD, 20 mg at 09/20/21 0956    dextrose 5 % - 0.45% NaCl infusion, 100 mL/hr, IntraVENous, CONTINUOUS, Jacqueline Travis MD, Last Rate: 100 mL/hr at 09/20/21 0025, 100 mL/hr at 09/20/21 0025    sodium chloride (NS) flush 5-40 mL, 5-40 mL, IntraVENous, Q8H, Jacqueline Travis MD, 10 mL at 09/20/21 7794    sodium chloride (NS) flush 5-40 mL, 5-40 mL, IntraVENous, PRN, Jaqcueline Travis MD    acetaminophen (TYLENOL) tablet 650 mg, 650 mg, Oral, Q6H PRN **OR** acetaminophen (TYLENOL) suppository 650 mg, 650 mg, Rectal, Q6H PRN, Jacqueline Travis MD    polyethylene glycol (MIRALAX) packet 17 g, 17 g, Oral, DAILY PRN, Jacqueline Travis MD    ondansetron (ZOFRAN ODT) tablet 4 mg, 4 mg, Oral, Q8H PRN **OR** ondansetron (ZOFRAN) injection 4 mg, 4 mg, IntraVENous, Q6H PRN, Jacqueline Travis MD    enoxaparin (LOVENOX) injection 30 mg, 30 mg, SubCUTAneous, DAILY, Jacqueline Travis MD, 30 mg at 09/20/21 0919    Assessment       Plan     1. Non oliguric KANIKA sec to pre renal azotemia - SCR at 2.66  No new labs at present   C/w IVF at current rate for 24 hrs ~ then encourage oral intake   Avoid nephrotoxins as  Much as possible  C/w strict I/O   Daily BMP for monitoring  Renal US and urine studies requested    2. CKD stage III - c/w renal diet    3. HTN- off BP meds.  May need to resume medications once Scr stabilizes     4. Symptomatic hypoglycemia - glycemic control and work up as per primary team     5.  Anemia of chronic disease- new iron studies requested    SW/CM for disposition     Signed By: Nazario Daniel MD     September 20, 2021
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
## 2021-09-20 NOTE — ROUTINE PROCESS
The pt is resting with the HOB up & her O2 on. She is noted to have audible wheezing at times. No c/o of pain is being voiced.

## 2021-09-20 NOTE — H&P
History and Physical      Chief Complaints:     Chief Complaint   Patient presents with    Low Blood Sugar     Pt presents via EMS with complaint of altered mental status found in back of truck to be unresponsive, pt per EMS had fsbs at 34mg/dL. Pt given d10 and pt FSBS now 170mg/dL. Pt now alert to baseline         Subjective:     Cristina Gomez is a 61 y.o. female followed by Zafar sOwald MD and  has a past medical history of Arthritis, Breast CA ([facility] [address] (2017), Diabetes ([facility] [address] Edema, GERD (gastroesophageal reflux disease), High cholesterol, Hypertension, Ill-defined condition, MI (myocardial infarction) ([facility] [address] (06/2018), Neuropathy, and Restless leg. CKD with last creat noted in 12/2020 at 1.8 and 2.2 on 4/28 and follows with Dr. Enedelia Spears Nephrology in [facility]. Patient unsure what happened but was noted to have passed out and on evaluation gluocose of 34 . Similar event happened about 1 year ago. Patient is on lantus high dose as well as 10 units TID WM. Patient notes decrease in apepitie for about 1 year now and mostly notes it is secondary to increased stress and and recent passing of her  in 11/30/2020. Currently patient lives with her sister America Lehman and able to ambulate without walker. Patient states her glucose level which she checks after breakfast at 10:30 usually is in 140 range and says on recheck at lunch and dinner glucose at times does get below 100. Patient also notes that for the past 3 days has been more sob with + wheeze and does use nebulizer twice daily but no relief. Denies any sick contacts. Patient relates chest pain and initial troponin x 2 was at 0.12 and no change. Repeat this AM slightly higher at 0.21. denies any GERD like symptoms but patient has been vomiting and has hx of esophageal stricture for which had dilation done at [facility] about 1 1/2 year ago. CXR shows mild cardiac enlargement.  Echo done and shows EF 60-65% and no evidence of diastolic dysfunction. With patients current presentation was admitting on telemetry for syncope, severy hypoglycemia, KANIKA on CKD, chest pain , elevated tropoinin and Copd exacerbation. Past Medical History:   Diagnosis Date    Arthritis     spinal stenosis    Breast CA ([facility] [address] 2017    Left Breast (chemo/radiation)    Diabetes (HCC)     Type 2    Edema     legs and ankles    GERD (gastroesophageal reflux disease)     High cholesterol     Hypertension     Ill-defined condition     patient states hemorrhage behind Left eye-following Dr. Jose Alexandra MI (myocardial infarction) ([facility] [address] 06/2018    per patient    Neuropathy     Restless leg       Past Surgical History:   Procedure Laterality Date    HX BACK SURGERY  08/30/2018    HX BREAST BIOPSY Left 02/03/2017    BREAST CANCER    HX BREAST LUMPECTOMY Left     HX CATARACT REMOVAL Right     HX HEART CATHETERIZATION  07/2018    no stents    HX KNEE REPLACEMENT       Family History   Problem Relation Age of Onset    Hypertension Mother     Heart Failure Father     Heart Disease Sister       Social History     Tobacco Use    Smoking status: Never Smoker    Smokeless tobacco: Never Used   Substance Use Topics    Alcohol use: No       Prior to Admission medications    Medication Sig Start Date End Date Taking? Authorizing Provider   FeroSuL 325 mg (65 mg iron) tablet take 1 tablet by mouth three times a day 9/14/21   Desire Still MD   HumaLOG KwikPen Insulin 100 unit/mL kwikpen inject three times a day with meals A MAX DAILY DOSE USING SLIDIN. ..  (REFER TO PRESCRIPTION NOTES). 6/16/21   Alma Delia Roman MD   furosemide (LASIX) 40 mg tablet take 1 tablet by mouth once daily 6/11/21   Alma Delia Roman MD   venlafaxine [facility]) 75 mg tablet Take 75 mg by mouth daily. Alma Delia Roman MD   Venlafaxine-ER 24 HR (EFFEXOR-ER) 150 mg tr24 tablet Take 150 mg by mouth daily.     Alma Delia Roman MD   ketorolac (TORADOL) 10 mg tablet Take 1 Tablet by mouth every eight (8) hours as needed for Pain. 8/11/21   Shiela Gates MD   cyclobenzaprine (FLEXERIL) 10 mg tablet Take 1 Tablet by mouth three (3) times daily as needed for Muscle Spasm(s). 8/11/21   Jena Gates MD   Vitamin D2 1,250 mcg (50,000 unit) capsule take 1 capsule by mouth every week 5/9/21   Misty Boyer MD   lisinopriL (PRINIVIL, ZESTRIL) 20 mg tablet  12/14/20   Provider, Historical   OneTouch Ultra Blue Test Strip strip  12/14/20   Provider, Historical   melatonin 3 mg tablet Take 3 mg by mouth nightly. Provider, Historical   famotidine (PEPCID) 20 mg tablet take 1 tablet by mouth twice a day 11/2/20   Provider, Historical   OneTouch UltraSoft Lancets misc  11/13/20   Provider, Historical   rosuvastatin (CRESTOR) 10 mg tablet  11/16/20   Provider, Historical   gabapentin (NEURONTIN) 300 mg capsule Take 300 mg by mouth two (2) times a day. Provider, Historical   polyethylene glycol (ClearLax) 17 gram/dose powder Take 17 g by mouth daily. Provider, Historical   tamsulosin (FLOMAX) 0.4 mg capsule take 1 capsule by mouth once daily 12/28/18   Haseeb Santos Counts E, NP   LANTUS SOLOSTAR U-100 INSULIN 100 unit/mL (3 mL) inpn 30 units subcutaneously in morning. Can take an additional dose of 10 units subcutaneously in the evening if blood sugar is more than 200. Patient taking differently: 40 Units. 40 units subcutaneously in morning. Can take an additional dose of 10 units subcutaneously in the evening if blood sugar is more than 200. 9/4/18   Eugene Clay MD   albuterol (PROVENTIL HFA, VENTOLIN HFA, PROAIR HFA) 90 mcg/actuation inhaler 2 puffs three to four times a day for 5 days then every 6 hours as needed for shortness of breath 9/4/18   Eugene Clay MD   LORazepam (ATIVAN) 0.5 mg tablet Take 0.5 mg by mouth as needed for Anxiety. Provider, Historical   atorvastatin (LIPITOR) 40 mg tablet Take  by mouth nightly.     Provider, Historical   amLODIPine (NORVASC) 10 mg tablet Take 10 mg by mouth daily. 4/6/18   Provider, Historical   TRADJENTA 5 mg tablet TAKE 1 TABLET BY MOUTH ONCE A DAY 4/19/18   Provider, Historical   NOVOFINE 32 32 gauge x 1/4\" ndle  1/26/18   Provider, Historical     Allergies   Allergen Reactions    Ciprofloxacin Hives    Keflex [Cephalexin] Nausea and Vomiting    Metformin Other (comments)     GI DISTRESS    Soliqua 100/33 [Insulin Glargine-Lixisenatide] Nausea Only        Review of Systems:  Review of Systems   Constitutional: Positive for appetite change. HENT: Negative. Eyes: Negative. Respiratory: Positive for cough, shortness of breath and wheezing. Cardiovascular: Positive for chest pain. Gastrointestinal: Negative. Endocrine: Negative. Genitourinary: Negative. Musculoskeletal: Negative. Skin: Negative. Allergic/Immunologic: Negative. Neurological: Positive for syncope. Hematological: Negative. Psychiatric/Behavioral: Negative. Objective:     Vitals:  Visit Vitals  BP (!) 157/76   Pulse 93   Temp 99.9 °F (37.7 °C)   Resp 20   Ht 5' 2\" (1.575 m)   Wt 100.7 kg (222 lb)   SpO2 95%   Breastfeeding No   BMI 40.60 kg/m²       Physical Exam:  General: Alert, cooperative, no distress. Head:  Normocephalic, without obvious abnormality, atraumatic. Eyes:  Conjunctivae/corneas clear. Pupils equal, round, reactive to light. Extraocular movements intact. Lungs:  Clear to auscultation bilaterally, no wheezes, crackles  Chest wall: No tenderness or deformity. Heart:  Regular rate and rhythm, S1, S2 normal, no murmur, click, rub, or gallop. Abdomen:   Soft, non-tender. Bowel sounds normal. No masses. No organomegaly. Back:  No spine tenderness to palpation  Extremities: Extremities normal, atraumatic, no cyanosis or edema. Pulses: Symmetric all extremities. Skin: Skin color, texture, turgor normal.   Lymph nodes: Cervical nodes normal.  Neurologic: CNII-XII intact.  Normal strength, sensation, and reflexes throughout.       Labs:  Recent Results (from the past 24 hour(s))   GLUCOSE, POC    Collection Time: 09/19/21  2:26 PM   Result Value Ref Range    Glucose (POC) 127 (H) 65 - 117 mg/dL    Performed by Orion Garza    URINALYSIS W/ RFLX MICROSCOPIC    Collection Time: 09/19/21  2:31 PM   Result Value Ref Range    Color Yellow/Straw      Appearance Clear Clear      Specific gravity 1.025 1.003 - 1.030      pH (UA) 5.5 5.0 - 8.0      Protein >300 (A) Negative mg/dL    Glucose 100 (A) Negative mg/dL    Ketone Negative Negative mg/dL    Bilirubin Negative Negative      Blood Small (A) Negative      Urobilinogen 0.2 0.2 - 1.0 EU/dL    Nitrites Negative Negative      Leukocyte Esterase Negative Negative     DRUG SCREEN, URINE    Collection Time: 09/19/21  2:31 PM   Result Value Ref Range    AMPHETAMINES Negative Negative      BARBITURATES Negative Negative      BENZODIAZEPINES Negative Negative      COCAINE Negative Negative      ECSTASY, MDMA Negative Negative      METHADONE Negative Negative      OPIATES Negative Negative      PCP(PHENCYCLIDINE) Negative Negative      THC (TH-CANNABINOL) Negative Negative      Drug screen comment PH=5.5    URINE MICROSCOPIC    Collection Time: 09/19/21  2:31 PM   Result Value Ref Range    WBC 0-4 0 - 5 /hpf    RBC 0-5 0 - 3 /hpf    Bacteria Negative Negative /hpf    Amorphous Crystals 4+ (A) Negative   GLUCOSE, POC    Collection Time: 09/19/21  3:15 PM   Result Value Ref Range    Glucose (POC) 70 65 - 117 mg/dL    Performed by Ximena Gilman    TROPONIN I    Collection Time: 09/19/21  3:30 PM   Result Value Ref Range    Troponin-I, Qt. 0.12 (H) <0.05 ng/mL   EKG, 12 LEAD, SUBSEQUENT    Collection Time: 09/19/21  5:29 PM   Result Value Ref Range    Ventricular Rate 67 BPM    Atrial Rate 67 BPM    P-R Interval 168 ms    QRS Duration 138 ms    Q-T Interval 439 ms    QTC Calculation (Bezet) 464 ms    Calculated P Axis 63 degrees    Calculated R Axis -41 degrees    Calculated T Axis 45 degrees Diagnosis       Sinus rhythm  Left bundle branch block  Baseline wander in lead(s) III,aVF     GLUCOSE, POC    Collection Time: 09/19/21  6:12 PM   Result Value Ref Range    Glucose (POC) 172 (H) 65 - 117 mg/dL    Performed by Nelida Albert, POC    Collection Time: 09/19/21  9:36 PM   Result Value Ref Range    Glucose (POC) 303 (H) 65 - 117 mg/dL    Performed by [address] [place] [address], POC    Collection Time: 09/20/21  2:47 AM   Result Value Ref Range    Glucose (POC) 199 (H) 65 - 117 mg/dL    Performed by [address] [place] [address], POC    Collection Time: 09/20/21  6:23 AM   Result Value Ref Range    Glucose (POC) 143 (H) 65 - 117 mg/dL    Performed by [address] [place] [address], POC    Collection Time: 09/20/21  8:32 AM   Result Value Ref Range    Glucose (POC) 175 (H) 65 - 117 mg/dL    Performed by Aleyda Bailey    RENAL FUNCTION PANEL    Collection Time: 09/20/21  9:25 AM   Result Value Ref Range    Sodium 138 136 - 145 mmol/L    Potassium 4.9 3.5 - 5.1 mmol/L    Chloride 103 97 - 108 mmol/L    CO2 28 21 - 32 mmol/L    Anion gap 7 5 - 15 mmol/L    Glucose 196 (H) 65 - 100 mg/dL    BUN 37 (H) 6 - 20 mg/dL    Creatinine 2.54 (H) 0.55 - 1.02 mg/dL    BUN/Creatinine ratio 15 12 - 20      GFR est AA 23 (L) >60 ml/min/1.73m2    GFR est non-AA 19 (L) >60 ml/min/1.73m2    Calcium 9.0 8.5 - 10.1 mg/dL    Phosphorus 3.8 2.6 - 4.7 mg/dL    Albumin 2.8 (L) 3.5 - 5.0 g/dL   CBC WITH AUTOMATED DIFF    Collection Time: 09/20/21  9:25 AM   Result Value Ref Range    WBC 11.6 (H) 4.4 - 11.3 K/uL    RBC 3.74 (L) 4.50 - 5.90 M/uL    HGB 9.7 (L) 13.5 - 17.5 g/dL    HCT 30.3 (L) 36 - 46 %    MCV 81.0 80 - 100 FL    MCH 26.0 (L) 31 - 34 PG    MCHC 32.1 31.0 - 36.0 g/dL    RDW 16.6 (H) 11.5 - 14.5 %    PLATELET 290 921 - 200 K/uL    MPV 7.2 6.5 - 11.5 FL    NRBC 0.1  WBC    ABSOLUTE NRBC 0.01 K/uL    NEUTROPHILS 83 (H) 42 - 75 %    LYMPHOCYTES 6 (L) 20.5 - 51.1 %    MONOCYTES 7 1.7 - 9.3 %    EOSINOPHILS 3 (H) 0.9 - 2.9 % BASOPHILS 1 0.0 - 2.5 %    ABS. NEUTROPHILS 9.8 (H) 1.8 - 7.7 K/UL    ABS. LYMPHOCYTES 0.7 (L) 1.0 - 4.8 K/UL    ABS. MONOCYTES 0.8 0.2 - 2.4 K/UL    ABS. EOSINOPHILS 0.3 0.0 - 0.7 K/UL    ABS. BASOPHILS 0.1 0.0 - 0.2 K/UL   TROPONIN I    Collection Time: 09/20/21  9:25 AM   Result Value Ref Range    Troponin-I, Qt. 0.21 (H) <0.05 ng/mL   TSH 3RD GENERATION    Collection Time: 09/20/21  9:25 AM   Result Value Ref Range    TSH 2.78 0.36 - 3.74 uIU/mL   ECHO ADULT COMPLETE    Collection Time: 09/20/21  9:58 AM   Result Value Ref Range    LV ED Vol A2C 138. 87 mL    LV ED Vol A2C 138. 87 mL    LV ED Vol A2C 158. 72 mL    LV ED Vol A2C 158. 72 mL    IVSd 1.11 (A) 0.60 - 0.90 cm    LVIDd 5.36 (A) 3.90 - 5.30 cm    LVIDd 5.68 (A) 3.90 - 5.30 cm    LVIDs 4.13 cm    LVPWd 1.20 (A) 0.60 - 0.90 cm    LVOT .8 mL    LVOT .8 mL    BP EF 59.2 55.0 - 100.0 percent    BP EF 59.2 55.0 - 100.0 percent    LV Ejection Fraction MOD 2C 58 percent    LV Ejection Fraction MOD 2C 58 percent    LV Ejection Fraction MOD 4C 60 percent    LV Ejection Fraction MOD 4C 60 percent    LV ED Vol BP 77.16 56.0 - 104.0 mL    LV ED Vol BP 77.16 56.0 - 104.0 mL    LV ES Vol A2C 32.21 mL    LV ES Vol BP 31.51 19.0 - 49.0 mL    LV ES Vol BP 31.51 19.0 - 49.0 mL    LVOT Peak Gradient 3.09 mmHg    Left Ventricular Outflow Tract Mean Gradient 1.97 mmHg    LVOT Peak Velocity 87.87 cm/s    LVOT VTI 19.85 cm    LA Volume 50.53 22.0 - 52.0 mL    LA Volume 50.53 22.0 - 52.0 mL    LA Vol 2C 48.89 22.00 - 52.00 mL    LA Vol 4C 41.49 22.00 - 52.00 mL    AoV PG 8.92 mmHg    Aortic Valve Systolic Mean Gradient 4.92 mmHg    Aortic Valve Systolic Peak Velocity 735.48 cm/s    Aortic valve mean velocity 119.09 cm/s    AoV VTI 31.70 cm    MV A Brian 120.66 cm/s    Mitral Valve E Wave Deceleration Time 184.89 ms    MV E Brian 144.11 cm/s    MV E/A 1.19     Mitral Valve Pressure Half-time 53.62 ms    MVA (PHT) 4.10 cm2    MVA (PHT) 4.10 cm2    Ao Root D 3.58 cm    LA Vol Index 24.45 16.00 - 28.00 ml/m2    LA Vol Index 20.75 16.00 - 28.00 ml/m2    LVES Vol Index A2C 16.1 mL/m2   PHOSPHORUS    Collection Time: 09/20/21 11:00 AM   Result Value Ref Range    Phosphorus 3.8 2.6 - 4.7 mg/dL   GLUCOSE, POC    Collection Time: 09/20/21 11:04 AM   Result Value Ref Range    Glucose (POC) 251 (H) 65 - 117 mg/dL    Performed by Alberto Thomas        Imaging:  XR CHEST PORT    Result Date: 9/19/2021  Mild cardiac enlargement. Exam is otherwise unremarkable. Assessment & Plan:     Chest pain   - possible secondary to coughing and vomiting in setting of hx of esophageal stricture.    -Initial troponin 0.12 and repeat in similar range of 0.12 drawn at 3:30 PM on 9/19 and on recheck on 9/20 slightly elevated 0.21  -Continue to cycle every 6 hours troponins x2  -Cardiology consultation appreciated  -The echo shows preserved EF and no diastolic dysfunction  -Noted to have history of cardiac catheterization at [place] and will attempt to get records  -Monitor closely on telemetry    Syncope  -Noted to have passed out and felt to be secondary to hypoglycemia  -Monitor on telemetry for any irregular rhythms  -Initial troponins elevated 0.12x2 but repeat elevated 0.21 and will continue cycle every 6 hours x2  -2D echo shows preserved EF no diastolic dysfunction    Hypoglycemia  - was 34 on initial check and improved slowly after treatment and currently glucose levels at 175 this morning and 9/20 and 251  -We will hold long-acting insulin as well as mealtime insulin  -Continue monitor Accu-Cheks AC at bedtime  -Nutrition consultation  -Poor appetite most likely secondary to increased stress and loss of  on 11/30/2020 as well as issues with vomiting possibly secondary to esophageal stricture    Vomiting  -Had vomiting episodes periodically most likely secondary to esophageal stricture  -We will get speech therapy to evaluate swallow and GI to evaluate for possible EGD and esophageal dilation  -Last had dilation [facility] at [facility] about 1 year ago    KANIKA on CKD  -Creatinine slightly elevated at 2.66 with previous baseline creatinine earlier this year in April at 2.2 and prior to that was at 1.8 in December 2020  -Obtain baseline labs from Dr. Luis Hardy office and patient has new appointment with Dr. Jose J Tavera nephrologist in [address] current IV fluids of D5 half NS at 100 cc an hour and repeat creatinine down to 2.54 on 9/20  -Nephrology consult appreciated  - hx of toradol use and will discontinue any further NSAID use.    -Obtain urinalysis and urine protein creatinine ratio  -Monitor daily weights and input output    Acute on chronic COPD exacerbation   -Presents with shortness of breath and cough for the past 3 days along with wheezing and no history of tobacco use  -Chest x-ray negative for any acute pneumonic process  -Place patient on nebulizer DuoNeb 4 times daily  -With diminished breath sounds upper airway wheeze start Solu-Medrol 40 mg IV every 12 hours  -Mucinex twice daily    DVT prophylaxis  -Lovenox    CODE STATUS: Full code  Patient designates her sister Jean Carlos Alexander as her medical decision-maker if for some reason she is unable to make decisions for herself    Spent 50 minutes evaluating cording patient's admission to acute telemetry and expect at least 2 midnights of acute care stay            Electronically signed by Quin Newton MD on 9/20/2021 at 1:24 PM

## 2021-09-20 NOTE — CONSULTS
CONSULTATION    REASON FOR CONSULT:  Elevated Troponin    REQUESTING PROVIDER:  Dr. Suha Parry:    Chief Complaint   Patient presents with    Low Blood Sugar     Pt presents via EMS with complaint of altered mental status found in back of truck to be unresponsive, pt per EMS had fsbs at 34mg/dL. Pt given d10 and pt FSBS now 170mg/dL. Pt now alert to baseline         HISTORY OF PRESENT ILLNESS:  Chriss Lenz is a 61y.o. year-old female with past medical history significant for HTN, HLD, DM, COPD with Continuous supplemental oxygen required, Left Breast CA s/p Radiation/Chemo Therapy, Esophageal strictures s/p dilatation procedure/EGD, obesity, and ? Cardiomyopathy who presented to ED for evaluation after being found altered and with Glucose of 34. Patient reports that she has been having difficulty swallowing for the past few weeks progressively worsening and when she attempts to eat bread/carbs it gets \"stuck\" and causes her to have chest pressure until it passes. She does not have exertional sx. She had extensive Cardiac Workup in 2020 @ [facility] per her reports. She had to have her esophagus dilated approx 6 months ago. Workup showed Leukocytosis with shift, Creat 2.66, Trop 0.12 x2 flat, CXR showing mild cardiomegaly, and EKG showing SR with LBBB. This am she reports that she is feeling better, but continues to have problems swallowing and chest pressure associated with eating. Records from hospital admission course thus far reviewed.       Telemetry Review: SR with occasional PVC      PAST MEDICAL HISTORY:    Past Medical History:   Diagnosis Date    Arthritis     spinal stenosis    Breast CA ([facility] 2017    Left Breast (chemo/radiation)    Diabetes ([facility]     Type 2    Edema     legs and ankles    GERD (gastroesophageal reflux disease)     High cholesterol     Hypertension     Ill-defined condition     patient states hemorrhage behind Left eye-following  Jose    MI (myocardial infarction) ([facility] 2018    per patient    Neuropathy     Restless leg        PAST SURGICAL HISTORY:   Past Surgical History:   Procedure Laterality Date    HX BACK SURGERY  2018    HX BREAST BIOPSY Left 2017    BREAST CANCER    HX BREAST LUMPECTOMY Left     HX CATARACT REMOVAL Right     HX HEART CATHETERIZATION  2018    no stents    HX KNEE REPLACEMENT         ALLERGIES:    Allergies   Allergen Reactions    Ciprofloxacin Hives    Keflex [Cephalexin] Nausea and Vomiting    Metformin Other (comments)     GI DISTRESS    Soliqua 100/33 [Insulin Glargine-Lixisenatide] Nausea Only       FAMILY HISTORY:    Family History   Problem Relation Age of Onset    Hypertension Mother     Heart Failure Father     Heart Disease Sister        SOCIAL HISTORY:    Tobacco: Denies   Drugs: Denies  ETOH: Denies    HOME MEDICATIONS:    Prior to Admission Medications   Prescriptions Last Dose Informant Patient Reported? Taking? FeroSuL 325 mg (65 mg iron) tablet   No No   Sig: take 1 tablet by mouth three times a day   HumaLOG KwikPen Insulin 100 unit/mL kwikpen   Yes No   Sig: inject three times a day with meals A MAX DAILY DOSE USING SLIDIN. ..  (REFER TO PRESCRIPTION NOTES). LANTUS SOLOSTAR U-100 INSULIN 100 unit/mL (3 mL) inpn   No No   Si units subcutaneously in morning. Can take an additional dose of 10 units subcutaneously in the evening if blood sugar is more than 200. Patient taking differently: 40 Units. 40 units subcutaneously in morning. Can take an additional dose of 10 units subcutaneously in the evening if blood sugar is more than 200. LORazepam (ATIVAN) 0.5 mg tablet   Yes No   Sig: Take 0.5 mg by mouth as needed for Anxiety.    NOVOFINE 32 32 gauge x 1/\" ndle   Yes No   OneTouch Ultra Blue Test Strip strip   Yes No   OneTouch UltraSoft Lancets misc   Yes No   TRADJENTA 5 mg tablet   Yes No   Sig: TAKE 1 TABLET BY MOUTH ONCE A DAY   Venlafaxine-ER 24 HR (EFFEXOR-ER) 150 mg tr24 tablet   Yes No   Sig: Take 150 mg by mouth daily. Vitamin D2 1,250 mcg (50,000 unit) capsule   No No   Sig: take 1 capsule by mouth every week   albuterol (PROVENTIL HFA, VENTOLIN HFA, PROAIR HFA) 90 mcg/actuation inhaler   No No   Si puffs three to four times a day for 5 days then every 6 hours as needed for shortness of breath   amLODIPine (NORVASC) 10 mg tablet   Yes No   Sig: Take 10 mg by mouth daily. atorvastatin (LIPITOR) 40 mg tablet   Yes No   Sig: Take  by mouth nightly. cyclobenzaprine (FLEXERIL) 10 mg tablet   No No   Sig: Take 1 Tablet by mouth three (3) times daily as needed for Muscle Spasm(s). famotidine (PEPCID) 20 mg tablet   Yes No   Sig: take 1 tablet by mouth twice a day   furosemide (LASIX) 40 mg tablet   Yes No   Sig: take 1 tablet by mouth once daily   gabapentin (NEURONTIN) 300 mg capsule   Yes No   Sig: Take 300 mg by mouth two (2) times a day.   ketorolac (TORADOL) 10 mg tablet   No No   Sig: Take 1 Tablet by mouth every eight (8) hours as needed for Pain. lisinopriL (PRINIVIL, ZESTRIL) 20 mg tablet   Yes No   melatonin 3 mg tablet   Yes No   Sig: Take 3 mg by mouth nightly. polyethylene glycol (ClearLax) 17 gram/dose powder   Yes No   Sig: Take 17 g by mouth daily. rosuvastatin (CRESTOR) 10 mg tablet   Yes No   tamsulosin (FLOMAX) 0.4 mg capsule   No No   Sig: take 1 capsule by mouth once daily   venlafaxine (EFFEXOR) 75 mg tablet   Yes No   Sig: Take 75 mg by mouth daily. Facility-Administered Medications: None       REVIEW OF SYSTEMS:  Complete review of systems performed, pertinents noted above, all other systems are negative.     Patient Vitals for the past 24 hrs:   Temp Pulse Resp BP SpO2   21 1204 99.9 °F (37.7 °C) 93 20 (!) 157/76 95 %   21 1200 -- 93 -- -- --   21 0812 98.4 °F (36.9 °C) 84 20 139/72 95 %   21 0425 98.3 °F (36.8 °C) 79 20 (!) 141/73 96 %   21 0400 -- 79 -- -- --   21 0009 97.6 °F (36.4 °C) 76 20 (!) 147/79 96 %   09/19/21 2012 98 °F (36.7 °C) 73 20 136/63 97 %   09/19/21 1905 -- -- -- -- 97 %   09/19/21 1853 -- 70 18 (!) 120/53 97 %   09/19/21 1813 -- 72 20 130/74 96 %   09/19/21 1712 -- 68 14 121/67 97 %   09/19/21 1649 -- -- -- -- 98 %   09/19/21 1648 -- (!) 105 20 100/68 98 %   09/19/21 1642 -- (!) 110 18 110/65 96 %       PHYSICAL EXAMINATION:    General: Well nourished female lying in bed, NAD, A&O  HEENT: Normocephalic, PERRL, no drainage  Neck: Supple, Trachea midline, No JVD  RESP: CTA bilaterally, diminished in bses. + Symmetrical chest movement. No SOB or distress. On O2 via NC. Cardiovascular: RRR no RG. + murmur. PVS: No rubor, cyanosis, no edema, Radial, DP, PT pulses equal bilaterally  ABD: obese, soft, NT, Normoactive BS  Derm: Warm/Dry/Intact with no lesions, poor turgor  Neuro: A&O PPTS, cranial nerves II- XII grossly intact via interaction with patient. No focal deficits  PSYCH: No anxiety or agitation      Electrocardiogram performed earlier reviewed, it shows SR, LBBB (No atrial fibrillation)    Recent labs results and imaging reviewed.       Recent Results (from the past 24 hour(s))   EKG, 12 LEAD, SUBSEQUENT    Collection Time: 09/19/21  5:29 PM   Result Value Ref Range    Ventricular Rate 67 BPM    Atrial Rate 67 BPM    P-R Interval 168 ms    QRS Duration 138 ms    Q-T Interval 439 ms    QTC Calculation (Bezet) 464 ms    Calculated P Axis 63 degrees    Calculated R Axis -41 degrees    Calculated T Axis 45 degrees    Diagnosis       Sinus Rhythm  Left bundle branch block  Abnormal ECG  When compared with ECG of 09/19/2021  Sinus Rhythm has replaced Atrial fibrillation          Confirmed by Kimberlyn Cabrera ([id number]) on 9/20/2021 2:06:54 PM     GLUCOSE, POC    Collection Time: 09/19/21  6:12 PM   Result Value Ref Range    Glucose (POC) 172 (H) 65 - 117 mg/dL    Performed by Nelida Albert, POC    Collection Time: 09/19/21  9:36 PM   Result Value Ref Range Glucose (POC) 303 (H) 65 - 117 mg/dL    [facility] by  [facility], POC    Collection Time: 09/20/21  2:47 AM   Result Value Ref Range    Glucose (POC) 199 (H) 65 - 117 mg/dL    Performed by  [facility], POC    Collection Time: 09/20/21  6:23 AM   Result Value Ref Range    Glucose (POC) 143 (H) 65 - 117 mg/dL    Performed by [address] [place] [address], POC    Collection Time: 09/20/21  8:32 AM   Result Value Ref Range    Glucose (POC) 175 (H) 65 - 117 mg/dL    Performed by Luis Enrique Mathews    RENAL FUNCTION PANEL    Collection Time: 09/20/21  9:25 AM   Result Value Ref Range    Sodium 138 136 - 145 mmol/L    Potassium 4.9 3.5 - 5.1 mmol/L    Chloride 103 97 - 108 mmol/L    CO2 28 21 - 32 mmol/L    Anion gap 7 5 - 15 mmol/L    Glucose 196 (H) 65 - 100 mg/dL    BUN 37 (H) 6 - 20 mg/dL    Creatinine 2.54 (H) 0.55 - 1.02 mg/dL    BUN/Creatinine ratio 15 12 - 20      GFR est AA 23 (L) >60 ml/min/1.73m2    GFR est non-AA 19 (L) >60 ml/min/1.73m2    Calcium 9.0 8.5 - 10.1 mg/dL    Phosphorus 3.8 2.6 - 4.7 mg/dL    Albumin 2.8 (L) 3.5 - 5.0 g/dL   CBC WITH AUTOMATED DIFF    Collection Time: 09/20/21  9:25 AM   Result Value Ref Range    WBC 11.6 (H) 4.4 - 11.3 K/uL    RBC 3.74 (L) 4.50 - 5.90 M/uL    HGB 9.7 (L) 13.5 - 17.5 g/dL    HCT 30.3 (L) 36 - 46 %    MCV 81.0 80 - 100 FL    MCH 26.0 (L) 31 - 34 PG    MCHC 32.1 31.0 - 36.0 g/dL    RDW 16.6 (H) 11.5 - 14.5 %    PLATELET 511 253 - 698 K/uL    MPV 7.2 6.5 - 11.5 FL    NRBC 0.1  WBC    ABSOLUTE NRBC 0.01 K/uL    NEUTROPHILS 83 (H) 42 - 75 %    LYMPHOCYTES 6 (L) 20.5 - 51.1 %    MONOCYTES 7 1.7 - 9.3 %    EOSINOPHILS 3 (H) 0.9 - 2.9 %    BASOPHILS 1 0.0 - 2.5 %    ABS. NEUTROPHILS 9.8 (H) 1.8 - 7.7 K/UL    ABS. LYMPHOCYTES 0.7 (L) 1.0 - 4.8 K/UL    ABS. MONOCYTES 0.8 0.2 - 2.4 K/UL    ABS. EOSINOPHILS 0.3 0.0 - 0.7 K/UL    ABS.  BASOPHILS 0.1 0.0 - 0.2 K/UL   TROPONIN I    Collection Time: 09/20/21  9:25 AM   Result Value Ref Range    Troponin-I, Qt. 0.21 (H) <0.05 ng/mL   TSH 3RD GENERATION    Collection Time: 09/20/21  9:25 AM   Result Value Ref Range    TSH 2.78 0.36 - 3.74 uIU/mL   ECHO ADULT COMPLETE    Collection Time: 09/20/21  9:58 AM   Result Value Ref Range    LV ED Vol A2C 138. 87 mL    LV ED Vol A2C 138. 87 mL    LV ED Vol A2C 158. 72 mL    LV ED Vol A2C 158. 72 mL    IVSd 1.11 (A) 0.60 - 0.90 cm    LVIDd 5.36 (A) 3.90 - 5.30 cm    LVIDd 5.68 (A) 3.90 - 5.30 cm    LVIDs 4.13 cm    LVPWd 1.20 (A) 0.60 - 0.90 cm    LVOT .8 mL    LVOT .8 mL    BP EF 59.2 55.0 - 100.0 percent    BP EF 59.2 55.0 - 100.0 percent    LV Ejection Fraction MOD 2C 58 percent    LV Ejection Fraction MOD 2C 58 percent    LV Ejection Fraction MOD 4C 60 percent    LV Ejection Fraction MOD 4C 60 percent    LV ED Vol BP 77.16 56.0 - 104.0 mL    LV ED Vol BP 77.16 56.0 - 104.0 mL    LV ES Vol A2C 32.21 mL    LV ES Vol BP 31.51 19.0 - 49.0 mL    LV ES Vol BP 31.51 19.0 - 49.0 mL    LVOT Peak Gradient 3.09 mmHg    Left Ventricular Outflow Tract Mean Gradient 1.97 mmHg    LVOT Peak Velocity 87.87 cm/s    LVOT VTI 19.85 cm    LA Volume 50.53 22.0 - 52.0 mL    LA Volume 50.53 22.0 - 52.0 mL    LA Vol 2C 48.89 22.00 - 52.00 mL    LA Vol 4C 41.49 22.00 - 52.00 mL    AoV PG 8.92 mmHg    Aortic Valve Systolic Mean Gradient 3.82 mmHg    Aortic Valve Systolic Peak Velocity 727.34 cm/s    Aortic valve mean velocity 119.09 cm/s    AoV VTI 31.70 cm    MV A Brian 120.66 cm/s    Mitral Valve E Wave Deceleration Time 184.89 ms    MV E Brian 144.11 cm/s    MV E/A 1.19     Mitral Valve Pressure Half-time 53.62 ms    MVA (PHT) 4.10 cm2    MVA (PHT) 4.10 cm2    Ao Root D 3.58 cm    LA Vol Index 24.45 16.00 - 28.00 ml/m2    LA Vol Index 20.75 16.00 - 28.00 ml/m2    LVES Vol Index A2C 16.1 mL/m2   PHOSPHORUS    Collection Time: 09/20/21 11:00 AM   Result Value Ref Range    Phosphorus 3.8 2.6 - 4.7 mg/dL   GLUCOSE, POC    Collection Time: 09/20/21 11:04 AM   Result Value Ref Range    Glucose (POC) 251 (H) 65 - 117 mg/dL    Performed by Sissy Espinosa POC    Collection Time: 09/20/21  3:23 PM   Result Value Ref Range    Glucose (POC) 261 (H) 65 - 117 mg/dL    Performed by Estuardo Chaney        XR Results (maximum last 3): [facility]    XR CHEST PORT    Impression  Mild cardiac enlargement. Exam is otherwise unremarkable. Results from [place] encounter on 08/11/21    XR SPINE LUMB 2 OR 3 V    Impression  Postoperative fusion from L3-S1. No hardware failure or acute osseous  abnormality. Results from [place] encounter on 05/13/21    XR CHEST PORT    Impression  No acute cardiopulmonary abnormality. CT Results (maximum last 3): Results from [place] encounter on 11/29/20    CT CHEST WO CONT    Impression  IMPRESSION:    1. No confluent pneumonic consolidation. Small bilateral pleural effusions and  accompanying bibasilar atelectasis. 2. Mild mosaic attenuation of the pulmonary parenchyma, which can be seen in the  context of small airways disease. 3. Subtle upper lung predominant interlobular septal line thickening;  potentially a manifestation of minor interstitial pulmonary edema. 4. Partial inclusion of exophytic anterior left hepatic lobe hemangioma. Note: Preliminary report sent to the Emergency Department by the teleradiology  service at the time of the study. Results from [place] encounter on 08/30/18    CTA CHEST W OR W WO CONT    Addendum 9/2/2018  2:34 AM  Addendum: Prior reports and imaging are found to be available on the [employer] system. Comparison is made with prior body CT of 9/6/2017 and 2/25/2016, as  well as an MRI of February 2017. With regards to the above described liver  lesion, it has been chronically present, and prior imaging has confirmed that  this is a partially exophytic hemangioma likely with some adjacent scarring. The  caliber of the lesion is grossly unchanged.  No further follow-up warranted with  regards to this lesion. Impression  IMPRESSION:    No evidence for pulmonary embolism. Groundglass densities and slight consolidative change right lung as above. Please clinically clinically for atelectasis or infiltrate. Heterogeneous mass left hepatic lobe. Nonemergent hepatic protocol CT is  recommended for further evaluation. Results from [facility] encounter on 05/15/18    CT ABD PELV W CONT      MRI Results (maximum last 3): Results from [facility] encounter on 06/14/18    MRI LUMB SPINE WO CONT      Nuclear Medicine Results (maximum last 3): No results found for this or any previous visit. US Results (maximum last 3): Results from [place] encounter on 01/06/21    US RETROPERITONEUM LTD    Impression  IMPRESSION:    No evidence of hydronephrosis or nephrolithiasis. Right upper pole angiomyolipoma. VAS/US Results (maximum last 3): No results found for this or any previous visit.         Current Facility-Administered Medications:     glucose chewable tablet 16 g, 4 Tablet, Oral, PRN, Edward Jennings MD    dextrose (D50W) injection syrg 12.5-25 g, 25-50 mL, IntraVENous, PRN, Edward Jennings MD    glucagon (GLUCAGEN) injection 1 mg, 1 mg, IntraMUSCular, PRN, Edward Jennings MD    insulin lispro (HUMALOG) injection, , SubCUTAneous, AC&HS, Edward Jennings MD, 5 Units at 09/20/21 1203    famotidine (PEPCID) tablet 20 mg, 20 mg, Oral, BID, Edward Jennings MD, 20 mg at 09/20/21 0956    albuterol-ipratropium (DUO-NEB) 2.5 MG-0.5 MG/3 ML, 3 mL, Nebulization, QID RT, Edward Jennings MD    guaiFENesin ER (MUCINEX) tablet 600 mg, 600 mg, Oral, Q12H, Edward Jennings MD, 600 mg at 09/20/21 1351    methylPREDNISolone (PF) (Solu-MEDROL) injection 40 mg, 40 mg, IntraVENous, Q12H, Edward Jennings MD, 40 mg at 09/20/21 1351    aspirin delayed-release tablet 81 mg, 81 mg, Oral, DAILY, Niecy Jennings MD   dextrose 5 % - 0.45% NaCl infusion, 100 mL/hr, IntraVENous, CONTINUOUS, Jacqueline Travis MD, Last Rate: 100 mL/hr at 09/20/21 1351, 100 mL/hr at 09/20/21 1351    sodium chloride (NS) flush 5-40 mL, 5-40 mL, IntraVENous, Q8H, Jacqueline Travis MD, 10 mL at 09/20/21 1352    sodium chloride (NS) flush 5-40 mL, 5-40 mL, IntraVENous, PRN, Jacqueline Travis MD    acetaminophen (TYLENOL) tablet 650 mg, 650 mg, Oral, Q6H PRN **OR** acetaminophen (TYLENOL) suppository 650 mg, 650 mg, Rectal, Q6H PRN, Jacqueline Travis MD    polyethylene glycol (MIRALAX) packet 17 g, 17 g, Oral, DAILY PRN, Jacqueline Travis MD    ondansetron (ZOFRAN ODT) tablet 4 mg, 4 mg, Oral, Q8H PRN **OR** ondansetron (ZOFRAN) injection 4 mg, 4 mg, IntraVENous, Q6H PRN, Jacqueline Travis MD    enoxaparin (LOVENOX) injection 30 mg, 30 mg, SubCUTAneous, DAILY, Jacqueline Travis MD, 30 mg at 09/20/21 6077          Case discussed with collaborating physician Dr. Osman Goodwin and our impression and recommendations are as follows:   1. Elevated Troponin - Flat in trends on first.  Note that this am Trop was 0.21. No ACS per EKG. Reports extensive workup last year including Cath that was \"oK. \"  Sx are atypical and associated with food intake. Will get records from [facility].  TTE pending. Continue risk factor modification and medication regimen. Further recommendations once records and TTE reviewed. Continue to trend Troponin while up-trending. Repeat EKG. 2. KANIKA on CKD - appreciate nephrology input. Hold home lasix and Lisinopril for now. 3. HTN -BP labile. Hold off on making adjustments for today. Restart home regimen Amlodipine. Hold Lasix and Lisinopril home dosing. Appreciate Nephrology input. Use prn hydralazine as needed. 4. HLD: Continue home statin dosing      Thank you for involving us in the care of this patient. Please do not hesitate to call if additional questions arise.  If after hours please call [phone]

## 2021-09-20 NOTE — ROUTINE PROCESS
The pt have slept at short intervals. She have received her ordered med. Continuing to monitor her glucose.

## 2021-09-20 NOTE — PROGRESS NOTES
Patient is requesting a rollator and a shower chair. Also, requesting home health aide services. Will discuss DME with provider. List of home health aide providers will be given to patient.

## 2021-09-20 NOTE — PROGRESS NOTES
Problem: Falls - Risk of  Goal: *Absence of Falls  Description: Document Doron Butler Fall Risk and appropriate interventions in the flowsheet.   Outcome: Progressing Towards Goal  Note: Fall Risk Interventions:  Mobility Interventions: PT Consult for mobility concerns, Utilize walker, cane, or other assistive device              Elimination Interventions: Call light in reach, Bed/chair exit alarm, Patient to call for help with toileting needs    History of Falls Interventions: Bed/chair exit alarm, Door open when patient unattended, Room close to nurse's station         Problem: Patient Education: Go to Patient Education Activity  Goal: Patient/Family Education  Outcome: Progressing Towards Goal

## 2021-09-20 NOTE — ROUTINE PROCESS
[facility] Care was resumed via the [address] [place], [place] [address]N. The pt is resting awake in bed eating a sandwich at this time. The Iv of D5/0.45 NS cont to infuse at 100cc/hr. Fall measures was placed due to the pt saying she had some falls. No c/o of pain is being voiced.

## 2021-09-20 NOTE — ROUTINE PROCESS
The pt have had a fairly decent nite after receiving her neurontin. She have shown no diabetic symptoms. She is being assisted as needed.

## 2021-09-20 NOTE — PROGRESS NOTES
Patients case reviewed during interdisciplinary team meeting in [address] [place], [place] [address]Acute Care Unit. Rev.  Trixie Ortiz, Franky Tapia, Taitana Meyer

## 2021-09-20 NOTE — ROUTINE PROCESS
The pt have been assisted up to the [facility] several times. She is starting t c/o of having some back discomfort. She was assisted with repositioning in bed.

## 2021-09-20 NOTE — ROUTINE PROCESS
The pt is c/o of aching all over & needing her neurontin.  was notified for the order. Order receivd. Awaiting med to be verified.

## 2021-09-20 NOTE — ACP (ADVANCE CARE PLANNING)
Advance Care Planning   Healthcare Decision Maker:       Primary Decision Maker: Sukhjinder Victor M -  - [phone]    Click here to complete [address] including selection of the Healthcare Decision Maker Relationship (ie \"Primary\")

## 2021-09-20 NOTE — ROUTINE PROCESS
Bedside shift change report given to Zay Escamilla LPN (oncoming nurse) by Herlinda Jc RN (offgoing nurse). Report included the following information SBAR.

## 2021-09-21 ENCOUNTER — ANESTHESIA EVENT (OUTPATIENT)
Dept: ENDOSCOPY | Age: 66
DRG: 243 | End: 2021-09-21
Payer: MEDICAID

## 2021-09-21 ENCOUNTER — ANESTHESIA (OUTPATIENT)
Dept: ENDOSCOPY | Age: 66
DRG: 243 | End: 2021-09-21
Payer: MEDICAID

## 2021-09-21 ENCOUNTER — APPOINTMENT (OUTPATIENT)
Dept: CT IMAGING | Age: 66
DRG: 243 | End: 2021-09-21
Attending: HOSPITALIST
Payer: MEDICAID

## 2021-09-21 LAB
ANION GAP SERPL CALC-SCNC: 12 MMOL/L (ref 5–15)
ARTERIAL PATENCY WRIST A: ABNORMAL
BASE DEFICIT BLDA-SCNC: 4.1 MMOL/L (ref 0–2)
BASOPHILS # BLD: 0 K/UL (ref 0–0.2)
BASOPHILS NFR BLD: 0 % (ref 0–2.5)
BDY SITE: ABNORMAL
BUN SERPL-MCNC: 48 MG/DL (ref 6–20)
BUN/CREAT SERPL: 17 (ref 12–20)
CA-I BLD-MCNC: 8.9 MG/DL (ref 8.5–10.1)
CHLORIDE SERPL-SCNC: 102 MMOL/L (ref 97–108)
CO2 SERPL-SCNC: 23 MMOL/L (ref 21–32)
COHGB MFR BLD: 0.3 % (ref 0–5)
COVID-19 RAPID TEST, COVR: NOT DETECTED
CREAT SERPL-MCNC: 2.83 MG/DL (ref 0.55–1.02)
EOSINOPHIL # BLD: 0 K/UL (ref 0–0.7)
EOSINOPHIL NFR BLD: 0 % (ref 0.9–2.9)
ERYTHROCYTE [DISTWIDTH] IN BLOOD BY AUTOMATED COUNT: 16.7 % (ref 11.5–14.5)
FIO2 ON VENT: 32 %
GAS FLOW.O2 O2 DELIVERY SYS: 3 L/MIN
GLUCOSE BLD STRIP.AUTO-MCNC: 323 MG/DL (ref 65–117)
GLUCOSE BLD STRIP.AUTO-MCNC: 330 MG/DL (ref 65–117)
GLUCOSE BLD STRIP.AUTO-MCNC: 338 MG/DL (ref 65–117)
GLUCOSE SERPL-MCNC: 380 MG/DL (ref 65–100)
HCO3 BLDA-SCNC: 21 MMOL/L (ref 22–26)
HCT VFR BLD AUTO: 31.5 % (ref 36–46)
HGB BLD OXIMETRY-MCNC: 10.5 G/DL (ref 13.5–17.5)
HGB BLD-MCNC: 10 G/DL (ref 13.5–17.5)
LYMPHOCYTES # BLD: 0.2 K/UL (ref 1–4.8)
LYMPHOCYTES NFR BLD: 2 % (ref 20.5–51.1)
MCH RBC QN AUTO: 25.5 PG (ref 31–34)
MCHC RBC AUTO-ENTMCNC: 31.9 G/DL (ref 31–36)
MCV RBC AUTO: 80.1 FL (ref 80–100)
METHGB MFR BLD: 0.3 % (ref 0–5)
MONOCYTES # BLD: 0.3 K/UL (ref 0.2–2.4)
MONOCYTES NFR BLD: 2 % (ref 1.7–9.3)
NEUTS SEG # BLD: 14.3 K/UL (ref 1.8–7.7)
NEUTS SEG NFR BLD: 96 % (ref 42–75)
NRBC # BLD: 0.01 K/UL
NRBC BLD-RTO: 0 PER 100 WBC
OXYHGB MFR BLD: 95.9 % (ref 95–100)
PCO2 BLDA: 37 MMHG (ref 35–45)
PERFORMED BY, TECHID: ABNORMAL
PH BLDA: 7.37 [PH] (ref 7.35–7.45)
PLATELET # BLD AUTO: 314 K/UL (ref 150–400)
PMV BLD AUTO: 7.8 FL (ref 6.5–11.5)
PO2 BLDA: 85 MMHG (ref 70–100)
POTASSIUM SERPL-SCNC: 5.1 MMOL/L (ref 3.5–5.1)
RBC # BLD AUTO: 3.93 M/UL (ref 4.5–5.9)
SAO2% DEVICE SAO2% SENSOR NAME: ABNORMAL
SODIUM SERPL-SCNC: 137 MMOL/L (ref 136–145)
SPECIMEN SITE: ABNORMAL
SPECIMEN SOURCE: NORMAL
WBC # BLD AUTO: 14.9 K/UL (ref 4.4–11.3)

## 2021-09-21 PROCEDURE — 88305 TISSUE EXAM BY PATHOLOGIST: CPT

## 2021-09-21 PROCEDURE — 74011250636 HC RX REV CODE- 250/636: Performed by: HOSPITALIST

## 2021-09-21 PROCEDURE — 36600 WITHDRAWAL OF ARTERIAL BLOOD: CPT

## 2021-09-21 PROCEDURE — 74011250637 HC RX REV CODE- 250/637: Performed by: HOSPITALIST

## 2021-09-21 PROCEDURE — 43249 ESOPH EGD DILATION <30 MM: CPT | Performed by: INTERNAL MEDICINE

## 2021-09-21 PROCEDURE — 65270000029 HC RM PRIVATE

## 2021-09-21 PROCEDURE — 76040000019: Performed by: INTERNAL MEDICINE

## 2021-09-21 PROCEDURE — 74011250636 HC RX REV CODE- 250/636: Performed by: EMERGENCY MEDICINE

## 2021-09-21 PROCEDURE — 2709999900 HC NON-CHARGEABLE SUPPLY: Performed by: INTERNAL MEDICINE

## 2021-09-21 PROCEDURE — 74011250637 HC RX REV CODE- 250/637: Performed by: NURSE PRACTITIONER

## 2021-09-21 PROCEDURE — 94640 AIRWAY INHALATION TREATMENT: CPT

## 2021-09-21 PROCEDURE — 74011636637 HC RX REV CODE- 636/637: Performed by: HOSPITALIST

## 2021-09-21 PROCEDURE — 82962 GLUCOSE BLOOD TEST: CPT

## 2021-09-21 PROCEDURE — 74011000258 HC RX REV CODE- 258: Performed by: NURSE ANESTHETIST, CERTIFIED REGISTERED

## 2021-09-21 PROCEDURE — 0DB78ZX EXCISION OF STOMACH, PYLORUS, VIA NATURAL OR ARTIFICIAL OPENING ENDOSCOPIC, DIAGNOSTIC: ICD-10-PCS | Performed by: INTERNAL MEDICINE

## 2021-09-21 PROCEDURE — 80048 BASIC METABOLIC PNL TOTAL CA: CPT

## 2021-09-21 PROCEDURE — C1726 CATH, BAL DIL, NON-VASCULAR: HCPCS | Performed by: INTERNAL MEDICINE

## 2021-09-21 PROCEDURE — 76060000031 HC ANESTHESIA FIRST 0.5 HR: Performed by: INTERNAL MEDICINE

## 2021-09-21 PROCEDURE — 43239 EGD BIOPSY SINGLE/MULTIPLE: CPT | Performed by: INTERNAL MEDICINE

## 2021-09-21 PROCEDURE — 0D728ZZ DILATION OF MIDDLE ESOPHAGUS, VIA NATURAL OR ARTIFICIAL OPENING ENDOSCOPIC: ICD-10-PCS | Performed by: INTERNAL MEDICINE

## 2021-09-21 PROCEDURE — 87635 SARS-COV-2 COVID-19 AMP PRB: CPT

## 2021-09-21 PROCEDURE — 94760 N-INVAS EAR/PLS OXIMETRY 1: CPT

## 2021-09-21 PROCEDURE — 36415 COLL VENOUS BLD VENIPUNCTURE: CPT

## 2021-09-21 PROCEDURE — 74011250636 HC RX REV CODE- 250/636: Performed by: NURSE ANESTHETIST, CERTIFIED REGISTERED

## 2021-09-21 PROCEDURE — 85025 COMPLETE CBC W/AUTO DIFF WBC: CPT

## 2021-09-21 PROCEDURE — 74011000250 HC RX REV CODE- 250: Performed by: NURSE ANESTHETIST, CERTIFIED REGISTERED

## 2021-09-21 PROCEDURE — 77010033678 HC OXYGEN DAILY

## 2021-09-21 PROCEDURE — 97161 PT EVAL LOW COMPLEX 20 MIN: CPT

## 2021-09-21 PROCEDURE — 77030021593 HC FCPS BIOP ENDOSC BSC -A: Performed by: INTERNAL MEDICINE

## 2021-09-21 PROCEDURE — 82803 BLOOD GASES ANY COMBINATION: CPT

## 2021-09-21 PROCEDURE — C9113 INJ PANTOPRAZOLE SODIUM, VIA: HCPCS | Performed by: HOSPITALIST

## 2021-09-21 PROCEDURE — 74011000250 HC RX REV CODE- 250: Performed by: HOSPITALIST

## 2021-09-21 PROCEDURE — 71250 CT THORAX DX C-: CPT

## 2021-09-21 RX ORDER — MIDAZOLAM HYDROCHLORIDE 1 MG/ML
INJECTION, SOLUTION INTRAMUSCULAR; INTRAVENOUS AS NEEDED
Status: DISCONTINUED | OUTPATIENT
Start: 2021-09-21 | End: 2021-09-21 | Stop reason: HOSPADM

## 2021-09-21 RX ORDER — GLYCOPYRROLATE 0.2 MG/ML
INJECTION INTRAMUSCULAR; INTRAVENOUS AS NEEDED
Status: DISCONTINUED | OUTPATIENT
Start: 2021-09-21 | End: 2021-09-21 | Stop reason: HOSPADM

## 2021-09-21 RX ORDER — SODIUM CHLORIDE FOR INHALATION 3 %
4 VIAL, NEBULIZER (ML) INHALATION ONCE
Status: COMPLETED | OUTPATIENT
Start: 2021-09-21 | End: 2021-09-21

## 2021-09-21 RX ORDER — SODIUM CHLORIDE 9 MG/ML
100 INJECTION, SOLUTION INTRAVENOUS CONTINUOUS
Status: DISCONTINUED | OUTPATIENT
Start: 2021-09-21 | End: 2021-09-21

## 2021-09-21 RX ORDER — KETAMINE HYDROCHLORIDE 10 MG/ML
INJECTION, SOLUTION INTRAMUSCULAR; INTRAVENOUS AS NEEDED
Status: DISCONTINUED | OUTPATIENT
Start: 2021-09-21 | End: 2021-09-21 | Stop reason: HOSPADM

## 2021-09-21 RX ORDER — PROPOFOL 10 MG/ML
INJECTION, EMULSION INTRAVENOUS AS NEEDED
Status: DISCONTINUED | OUTPATIENT
Start: 2021-09-21 | End: 2021-09-21 | Stop reason: HOSPADM

## 2021-09-21 RX ORDER — SODIUM CHLORIDE 9 MG/ML
INJECTION, SOLUTION INTRAVENOUS
Status: DISCONTINUED | OUTPATIENT
Start: 2021-09-21 | End: 2021-09-21 | Stop reason: HOSPADM

## 2021-09-21 RX ORDER — QUETIAPINE FUMARATE 25 MG/1
25 TABLET, FILM COATED ORAL EVERY EVENING
Status: DISCONTINUED | OUTPATIENT
Start: 2021-09-21 | End: 2021-09-23

## 2021-09-21 RX ORDER — FUROSEMIDE 10 MG/ML
40 INJECTION INTRAMUSCULAR; INTRAVENOUS 2 TIMES DAILY
Status: DISCONTINUED | OUTPATIENT
Start: 2021-09-21 | End: 2021-09-22

## 2021-09-21 RX ORDER — METOCLOPRAMIDE HYDROCHLORIDE 5 MG/ML
5 INJECTION INTRAMUSCULAR; INTRAVENOUS
Status: DISCONTINUED | OUTPATIENT
Start: 2021-09-21 | End: 2021-09-23

## 2021-09-21 RX ORDER — ATORVASTATIN CALCIUM 40 MG/1
80 TABLET, FILM COATED ORAL
Status: DISCONTINUED | OUTPATIENT
Start: 2021-09-21 | End: 2021-09-27 | Stop reason: HOSPADM

## 2021-09-21 RX ORDER — INSULIN GLARGINE 100 [IU]/ML
20 INJECTION, SOLUTION SUBCUTANEOUS DAILY
Status: DISCONTINUED | OUTPATIENT
Start: 2021-09-21 | End: 2021-09-22

## 2021-09-21 RX ORDER — LANOLIN ALCOHOL/MO/W.PET/CERES
1 CREAM (GRAM) TOPICAL
Status: DISCONTINUED | OUTPATIENT
Start: 2021-09-21 | End: 2021-09-27 | Stop reason: HOSPADM

## 2021-09-21 RX ORDER — LORAZEPAM 2 MG/ML
0.5 INJECTION INTRAMUSCULAR
Status: DISCONTINUED | OUTPATIENT
Start: 2021-09-21 | End: 2021-09-27 | Stop reason: HOSPADM

## 2021-09-21 RX ORDER — AMLODIPINE BESYLATE 10 MG/1
10 TABLET ORAL DAILY
Status: DISCONTINUED | OUTPATIENT
Start: 2021-09-21 | End: 2021-09-27 | Stop reason: HOSPADM

## 2021-09-21 RX ADMIN — INSULIN LISPRO 7 UNITS: 100 INJECTION, SOLUTION INTRAVENOUS; SUBCUTANEOUS at 11:24

## 2021-09-21 RX ADMIN — METHYLPREDNISOLONE SODIUM SUCCINATE 60 MG: 125 INJECTION, POWDER, FOR SOLUTION INTRAMUSCULAR; INTRAVENOUS at 08:39

## 2021-09-21 RX ADMIN — IPRATROPIUM BROMIDE AND ALBUTEROL SULFATE 3 ML: .5; 2.5 SOLUTION RESPIRATORY (INHALATION) at 11:14

## 2021-09-21 RX ADMIN — PROPOFOL 25 MG: 10 INJECTION, EMULSION INTRAVENOUS at 12:02

## 2021-09-21 RX ADMIN — GUAIFENESIN 600 MG: 600 TABLET, EXTENDED RELEASE ORAL at 22:01

## 2021-09-21 RX ADMIN — QUETIAPINE FUMARATE 25 MG: 25 TABLET ORAL at 22:01

## 2021-09-21 RX ADMIN — Medication 10 ML: at 22:02

## 2021-09-21 RX ADMIN — KETAMINE HYDROCHLORIDE 10 MG: 10 INJECTION, SOLUTION INTRAMUSCULAR; INTRAVENOUS at 11:53

## 2021-09-21 RX ADMIN — SODIUM CHLORIDE: 9 INJECTION INTRAVENOUS at 11:43

## 2021-09-21 RX ADMIN — ENOXAPARIN SODIUM 30 MG: 30 INJECTION SUBCUTANEOUS at 08:37

## 2021-09-21 RX ADMIN — IPRATROPIUM BROMIDE AND ALBUTEROL SULFATE 3 ML: .5; 2.5 SOLUTION RESPIRATORY (INHALATION) at 15:22

## 2021-09-21 RX ADMIN — LORAZEPAM 0.5 MG: 2 INJECTION INTRAMUSCULAR; INTRAVENOUS at 16:32

## 2021-09-21 RX ADMIN — METHYLPREDNISOLONE SODIUM SUCCINATE 60 MG: 125 INJECTION, POWDER, FOR SOLUTION INTRAMUSCULAR; INTRAVENOUS at 22:01

## 2021-09-21 RX ADMIN — AMLODIPINE BESYLATE 10 MG: 10 TABLET ORAL at 13:20

## 2021-09-21 RX ADMIN — INSULIN LISPRO 7 UNITS: 100 INJECTION, SOLUTION INTRAVENOUS; SUBCUTANEOUS at 08:32

## 2021-09-21 RX ADMIN — INSULIN LISPRO 7 UNITS: 100 INJECTION, SOLUTION INTRAVENOUS; SUBCUTANEOUS at 16:53

## 2021-09-21 RX ADMIN — PROPOFOL 25 MG: 10 INJECTION, EMULSION INTRAVENOUS at 11:54

## 2021-09-21 RX ADMIN — Medication 10 ML: at 06:01

## 2021-09-21 RX ADMIN — FUROSEMIDE 40 MG: 10 INJECTION, SOLUTION INTRAMUSCULAR; INTRAVENOUS at 22:01

## 2021-09-21 RX ADMIN — ATORVASTATIN CALCIUM 80 MG: 40 TABLET, FILM COATED ORAL at 22:01

## 2021-09-21 RX ADMIN — SODIUM CHLORIDE 40 MG: 9 INJECTION, SOLUTION INTRAMUSCULAR; INTRAVENOUS; SUBCUTANEOUS at 08:33

## 2021-09-21 RX ADMIN — GLYCOPYRROLATE 0.2 MG: 0.2 INJECTION, SOLUTION INTRAMUSCULAR; INTRAVENOUS at 11:46

## 2021-09-21 RX ADMIN — INSULIN LISPRO 4 UNITS: 100 INJECTION, SOLUTION INTRAVENOUS; SUBCUTANEOUS at 22:02

## 2021-09-21 RX ADMIN — PROPOFOL 25 MG: 10 INJECTION, EMULSION INTRAVENOUS at 12:07

## 2021-09-21 RX ADMIN — KETAMINE HYDROCHLORIDE 20 MG: 10 INJECTION, SOLUTION INTRAMUSCULAR; INTRAVENOUS at 11:49

## 2021-09-21 RX ADMIN — RACEPINEPHRINE HYDROCHLORIDE 0.5 ML: 11.25 SOLUTION RESPIRATORY (INHALATION) at 08:16

## 2021-09-21 RX ADMIN — IPRATROPIUM BROMIDE AND ALBUTEROL SULFATE 3 ML: .5; 2.5 SOLUTION RESPIRATORY (INHALATION) at 07:16

## 2021-09-21 RX ADMIN — FERROUS SULFATE TAB 325 MG (65 MG ELEMENTAL FE) 325 MG: 325 (65 FE) TAB at 16:56

## 2021-09-21 RX ADMIN — SODIUM CHLORIDE SOLN NEBU 3% 4 ML: 3 NEBU SOLN at 08:16

## 2021-09-21 RX ADMIN — METHYLPREDNISOLONE SODIUM SUCCINATE 60 MG: 125 INJECTION, POWDER, FOR SOLUTION INTRAMUSCULAR; INTRAVENOUS at 14:48

## 2021-09-21 RX ADMIN — IPRATROPIUM BROMIDE AND ALBUTEROL SULFATE 3 ML: .5; 2.5 SOLUTION RESPIRATORY (INHALATION) at 19:27

## 2021-09-21 RX ADMIN — INSULIN GLARGINE 20 UNITS: 100 INJECTION, SOLUTION SUBCUTANEOUS at 08:32

## 2021-09-21 RX ADMIN — PROPOFOL 25 MG: 10 INJECTION, EMULSION INTRAVENOUS at 11:59

## 2021-09-21 RX ADMIN — MIDAZOLAM 2 MG: 1 INJECTION INTRAMUSCULAR; INTRAVENOUS at 11:45

## 2021-09-21 RX ADMIN — METOCLOPRAMIDE 5 MG: 5 INJECTION, SOLUTION INTRAMUSCULAR; INTRAVENOUS at 22:01

## 2021-09-21 RX ADMIN — SODIUM CHLORIDE 75 ML/HR: 9 INJECTION, SOLUTION INTRAVENOUS at 09:23

## 2021-09-21 RX ADMIN — AZITHROMYCIN DIHYDRATE 500 MG: 500 INJECTION, POWDER, LYOPHILIZED, FOR SOLUTION INTRAVENOUS at 19:40

## 2021-09-21 NOTE — PROGRESS NOTES
Problem: Patient Education: Go to Patient Education Activity  Goal: Patient/Family Education  Outcome: Progressing Towards Goal     Problem: Diabetes Self-Management  Goal: *Disease process and treatment process  Description: Define diabetes and identify own type of diabetes; list 3 options for treating diabetes. Outcome: Progressing Towards Goal  Goal: *Incorporating nutritional management into lifestyle  Description: Describe effect of type, amount and timing of food on blood glucose; list 3 methods for planning meals. Outcome: Progressing Towards Goal  Goal: *Incorporating physical activity into lifestyle  Description: State effect of exercise on blood glucose levels. Outcome: Progressing Towards Goal  Goal: *Developing strategies to promote health/change behavior  Description: Define the ABC's of diabetes; identify appropriate screenings, schedule and personal plan for screenings. Outcome: Progressing Towards Goal  Goal: *Using medications safely  Description: State effect of diabetes medications on diabetes; name diabetes medication taking, action and side effects. Outcome: Progressing Towards Goal  Goal: *Monitoring blood glucose, interpreting and using results  Description: Identify recommended blood glucose targets  and personal targets. Outcome: Progressing Towards Goal  Goal: *Prevention, detection, treatment of acute complications  Description: List symptoms of hyper- and hypoglycemia; describe how to treat low blood sugar and actions for lowering  high blood glucose level. Outcome: Progressing Towards Goal  Goal: *Prevention, detection and treatment of chronic complications  Description: Define the natural course of diabetes and describe the relationship of blood glucose levels to long term complications of diabetes.   Outcome: Progressing Towards Goal  Goal: *Developing strategies to address psychosocial issues  Description: Describe feelings about living with diabetes; identify support needed and support network  Outcome: Progressing Towards Goal  Goal: *Insulin pump training  Outcome: Progressing Towards Goal  Goal: *Sick day guidelines  Outcome: Progressing Towards Goal  Goal: *Patient Specific Goal (EDIT GOAL, INSERT TEXT)  Outcome: Progressing Towards Goal     Problem: Patient Education: Go to Patient Education Activity  Goal: Patient/Family Education  Outcome: Progressing Towards Goal

## 2021-09-21 NOTE — ROUTINE PROCESS
Pt placed on baby monitor due to restlessness, ativan is helping she said. Denies pain.  Continues to have intermittent confusion supervisor and Dr. Deepa Bae is aware

## 2021-09-21 NOTE — PROGRESS NOTES
PHYSICAL THERAPY EVALUATION/DISCHARGE  Patient: Toro Huang (57 y.o. female)  Date: 9/21/2021  Primary Diagnosis: Chest pain [R07.9]  Syncope [R55]  Chronic respiratory failure with hypoxia (HCC) [J96.11]  Hypoglycemia [E16.2]  Procedure(s) (LRB):  ESOPHAGOGASTRODUODENOSCOPY (EGD) (N/A) Day of Surgery   Precautions: falls         ASSESSMENT  Based on the objective data described below, the patient presents with generalized weakness and debility which limits her endurance for ambulatory capacity. She normally ambulates with rollator but her current one has fallen into disrepair and can no longer be used. She will likely benefit from a new one to allow her to access the community and safely be able to rest between longer bouts of ambulation. Other factors to consider for discharge: pt has a rollator for endurance issues but she advises it has fallen into a state of disrepair     Further skilled acute physical therapy is not indicated at this time. PLAN :  Recommendation for discharge: (in order for the patient to meet his/her long term goals)  No skilled physical therapy/ follow up rehabilitation needs identified at this time.     This discharge recommendation:  Has been made in collaboration with the attending provider and/or case management    IF patient discharges home will need the following DME: rollator       SUBJECTIVE:   Patient stated I am doing okau but am struggling to walk very far and I don't have my rollator so I cannot stop and rest.    OBJECTIVE DATA SUMMARY:   HISTORY:    Past Medical History:   Diagnosis Date    Arthritis     spinal stenosis    Breast CA ([facility] 2017    Left Breast (chemo/radiation)    [facility] ([facility]     Type 2    Edema     legs and ankles    GERD (gastroesophageal reflux disease)     High cholesterol     Hypertension     Ill-defined condition     patient states hemorrhage behind Left eye-following Dr. Esther ARAGON (myocardial infarction) ([facility] 06/2018 per patient    Neuropathy     Restless leg      Past Surgical History:   Procedure Laterality Date    HX BACK SURGERY  08/30/2018    HX BREAST BIOPSY Left 02/03/2017    BREAST CANCER    HX BREAST LUMPECTOMY Left     HX CATARACT REMOVAL Right     HX HEART CATHETERIZATION  07/2018    no stents    HX KNEE REPLACEMENT         Prior level of function: modified independent with a rollator  Personal factors and/or comorbidities impacting plan of care: pt needs a new rollator so she can reintegrate into the community    Home Situation  Home Environment: Private residence  # Steps to Enter: 0  Rails to Enter: No  Wheelchair Ramp: Yes  One/Two Story Residence: One story  Living Alone: No  Support Systems: Other Family Member(s) (sister)  Patient Expects to be Discharged to[de-identified] House  Current DME Used/Available at Home: Mandie Morales rollator    EXAMINATION/PRESENTATION/DECISION MAKING:   Critical Behavior:  Neurologic State: Alert  Orientation Level: Oriented to person, Oriented to place  Cognition: Appropriate decision making     Hearing:   Auditory  Auditory Impairment: None  Range Of Motion:  AROM: Generally decreased, functional                       Strength:    Strength: Generally decreased, functional                    Tone & Sensation:                                  Coordination:     Vision:      Functional Mobility:  Bed Mobility:  Rolling: Independent  Supine to Sit: Independent  Sit to Supine: Independent  Scooting: Independent  Transfers:  Sit to Stand: Modified independent  Stand to Sit: Modified independent        Bed to Chair: Modified independent              Balance:   Sitting: Intact  Standing: With support  Ambulation/Gait Training:  Distance (ft): 150 Feet (ft)  Assistive Device: Walker, rolling  Ambulation - Level of Assistance: Modified independent     Gait Description (WDL): Exceptions to [facility]           Base of Support: Widened     Speed/Patricia: Slow                         Physical Therapy Evaluation Charge Determination   History Examination Presentation Decision-Making   LOW Complexity : Zero comorbidities / personal factors that will impact the outcome / POC LOW Complexity : 1-2 Standardized tests and measures addressing body structure, function, activity limitation and / or participation in recreation  LOW Complexity : Stable, uncomplicated  Low      Based on the above components, the patient evaluation is determined to be of the following complexity level: LOW     Pain Ratin/10, bilateral shoulder and hips due to OA    Activity Tolerance:   Fair      After treatment patient left in no apparent distress:   Supine in bed, Call bell within reach and Caregiver / family present    COMMUNICATION/EDUCATION:   The patients plan of care was discussed with: Physical therapist, Physician and Case management. Fall prevention education was provided and the patient/caregiver indicated understanding.     Thank you for this referral.  Eloisa Jiang, PT, DPT   Time Calculation: 15 mins

## 2021-09-21 NOTE — CONSULTS
Gastroenterology Consult      Patient: Juany Garnica MRN: [id number]  SSN: [id number]    YOB: 1955  Age: 61 y.o. Sex: female        Assessment:     1. Esophageal dysphagia  -Secondary to the esophageal stricture which had been noted in the past.  Probably related to chronic acid reflux     2. Esophageal stricture     3 . Gastroesophageal reflux disease    4. Syncope  -Likely secondary to hypoglycemia    5. Hypoglycemia    6. COPD    Plan:     1. We will schedule patient for an upper endoscopy with esophageal dilatation on tomorrow. 2.  Daily IV PPI therapy. 3.  We will obtain speech therapy evaluation. 4.  N.p.o. until the procedure is done. Subjective:     Reason for consultation: Dysphagia    History: 62-year-old female with history of hypertension, hyperlipidemia, cardiomyopathy, diabetes mellitus type 2, COPD on O2 therapy, degenerative joint disease, depression/anxiety, left breast cancer, status post radiation therapy and chemotherapy, gastroesophageal reflux disease, and esophageal stricture status post esophageal dilatation who presented to the emergency room with altered mental status and was found to have hypoglycemia with glucose of 34. Patient states he is recently not been able to take her medication or eat adequate oral intake secondary to dysphagia. She states that certain foods get hung in her throat which has been going on intermittently for the last 1-1/2 years. Patient states 1/2 years ago she last had a esophageal dilatation done. She states is recently getting worse over the last 6 months. She states she cannot take most solid foods or medications. She has a lot of acid reflux. She has been taking Nexium which does give her some relief. GI consult was sought to evaluate and treat the patient's dysphagia from a suspected esophageal stricture.     Hospital Problems  Date Reviewed: 2/3/2021        Codes Class Noted POA    Elevated troponin ICD-10-CM: R77.8  ICD-9-CM: 790.6  9/20/2021 Unknown        Syncope ICD-10-CM: R55  ICD-9-CM: 780.2  9/20/2021 Unknown        Hypoglycemia ICD-10-CM: E16.2  ICD-9-CM: 251.2  9/20/2021 Unknown        Chronic respiratory failure with hypoxia [facility]) ICD-10-CM: J96.11  ICD-9-CM: 518.83, 799.02  9/20/2021 Unknown        * (Principal) Chest pain ICD-10-CM: R07.9  ICD-9-CM: 786.50  9/19/2021 Unknown            Past Medical History:   Diagnosis Date    Arthritis     spinal stenosis    [facility] CA ([facility] 2017    Left Breast (chemo/radiation)    Diabetes ([facility]     Type 2    Edema     legs and ankles    GERD (gastroesophageal reflux disease)     High cholesterol     Hypertension     Ill-defined condition     patient states hemorrhage behind Left eye-following Dr. Erick Rutledge MI (myocardial infarction) ([facility] 06/2018    per patient    Neuropathy     Restless leg      Past Surgical History:   Procedure Laterality Date    HX BACK SURGERY  08/30/2018    HX BREAST BIOPSY Left 02/03/2017    BREAST CANCER    HX BREAST LUMPECTOMY Left     HX CATARACT REMOVAL Right     HX HEART CATHETERIZATION  07/2018    no stents    HX KNEE REPLACEMENT        Family History   Problem Relation Age of Onset    Hypertension Mother     Heart Failure Father     Heart Disease Sister      Social History     Tobacco Use    Smoking status: Never Smoker    Smokeless tobacco: Never Used   Substance Use Topics    Alcohol use: No      Current Facility-Administered Medications   Medication Dose Route Frequency Provider Last Rate Last Admin    glucose chewable tablet 16 g  4 Tablet Oral PRN Edward Jennings MD        dextrose (D50W) injection syrg 12.5-25 g  25-50 mL IntraVENous PRN Edward Jenninsg MD        glucagon (GLUCAGEN) injection 1 mg  1 mg IntraMUSCular PRN Coby Jennings MD        insulin lispro (HUMALOG) injection   SubCUTAneous AC&HS Colette Red MD   5 Units at 09/20/21 1802    famotidine (PEPCID) tablet 20 mg  20 mg Oral BID Dia Sears MD   20 mg at 09/20/21 0956    albuterol-ipratropium (DUO-NEB) 2.5 MG-0.5 MG/3 ML  3 mL Nebulization QID RT Dia Sears MD   3 mL at 09/20/21 2005    guaiFENesin ER (MUCINEX) tablet 600 mg  600 mg Oral Q12H Edward Jennings MD   600 mg at 09/20/21 1351    methylPREDNISolone (PF) (Solu-MEDROL) injection 40 mg  40 mg IntraVENous Q12H Edward Jennings MD   40 mg at 09/20/21 1351    aspirin delayed-release tablet 81 mg  81 mg Oral DAILY Sita Jennings MD   81 mg at 09/20/21 1802    0.9% sodium chloride infusion  75 mL/hr IntraVENous CONTINUOUS Sita Jennings MD        sodium chloride (NS) flush 5-40 mL  5-40 mL IntraVENous Q8H Jacqueline Travis MD   10 mL at 09/20/21 1835    sodium chloride (NS) flush 5-40 mL  5-40 mL IntraVENous PRN Jacqueline Travis MD        acetaminophen (TYLENOL) tablet 650 mg  650 mg Oral Q6H PRN Jacqueline Travis MD        Or    acetaminophen (TYLENOL) suppository 650 mg  650 mg Rectal Q6H PRN Jacqueline Travis MD        polyethylene glycol (MIRALAX) packet 17 g  17 g Oral DAILY PRN Jacqueline Travis MD        ondansetron (ZOFRAN ODT) tablet 4 mg  4 mg Oral Q8H PRN Jacqueline Travis MD        Or    ondansetron (ZOFRAN) injection 4 mg  4 mg IntraVENous Q6H PRN Jacqueline Travis MD        enoxaparin (LOVENOX) injection 30 mg  30 mg SubCUTAneous DAILY Jacqueline Travis MD   30 mg at 09/20/21 3202        Allergies   Allergen Reactions    Ciprofloxacin Hives    Keflex [Cephalexin] Nausea and Vomiting    Metformin Other (comments)     GI DISTRESS    Soliqua 100/33 [Insulin Glargine-Lixisenatide] Nausea Only       Review of Systems:  Constitutional: No recent weight change, fever,fatigue, or loss of appetite. ENT/Mouth: No hearing loss, nose bleeds, sore throat, voice change, hoarseness, or difficulties with chewing and swallowing.   Cardiovascular: No chest pain, palpitations, swelling of feet/ankles/hands. Respiratory: No chronic or frequent coughs, spitting up blood; (+)shortness of breath, asthma, and wheezing. Gastrointestinal: No abdominal pain, heartburn, nausea, vomiting, constipation, frequent diarrhea, rectal bleeding, or blood in stool. Genitourinary: No frequent urination, burning or painful urination, blood in urine. Musculoskeletal:  No joint pain, stiffness/swelling, weakness of muscles, muscle pain/cramp, or back pain. Integument:  No rash/itching, change in skin color. Neurological: No dizziness/vertigo, loss of consciousness, numbness/tingling sensation, tremors, weakness in limbs, difficulty with balance, frequent or recurring headaches, memory loss or confusion. Psychiatric:  No nervousness, depression, hallucinations, paranoia or suspiciousness. Endocrine: No excessive thirst or urination, heat or cold intolerance. Hematologic/Lymphatic: No bleeding/bruising tendency, phlebitis, or past transfusion. Objective:     Vitals:    09/20/21 1955 09/20/21 2000 09/20/21 2004 09/20/21 2005   BP: (!) 147/63      Pulse: (!) 109 (!) 116     Resp: 18      Temp: 99.5 °F (37.5 °C)      SpO2: 94%  96% 96%   Weight:       Height:            Physical Exam:  General: Alert, cooperative, no distress. Head:  Normocephalic, without obvious abnormality, atraumatic. Eyes:  Conjunctivae/corneas clear. Pupils equal, round, reactive to light. Extraocular movements intact. Lungs: A few scattered wheezes noted over both lung fields. .  Chest wall: No tenderness or deformity. Heart:  Regular rate and rhythm, S1, S2 normal, no murmur, click, rub, or gallop. Abdomen:  Soft, non-tender. Bowel sounds normal. No masses. No organomegaly. Extremities: Extremities normal, atraumatic, no cyanosis or edema. Pulses: 2+ and symmetric all extremities. Skin: Skin color, texture, turgor normal. No rashes or lesions.   Lymph nodes: Cervical, supraclavicular, and axillary nodes normal.  Neurologic: CNII-XII intact. Normal strength, sensation, and reflexes throughout. CBC w/Diff Recent Labs     09/20/21  0925 09/19/21  1348   WBC 11.6* 11.7*   RBC 3.74* 4.20*   HGB 9.7* 10.7*   HCT 30.3* 34.1*    308   GRANS 83* 84*   LYMPH 6* 5*   EOS 3* 1        Chemistry Recent Labs     09/20/21  1100 09/20/21  0925 09/19/21  1348   GLU  --  196* 110*   NA  --  138 141   K  --  4.9 4.0   CL  --  103 103   CO2  --  28 28   BUN  --  37* 37*   CREA  --  2.54* 2.66*   CA  --  9.0 9.4   MG  --   --  2.1   PHOS 3.8 3.8  --    AGAP  --  7 10   BUCR  --  15 14   AP  --   --  78   TP  --   --  7.4   ALB  --  2.8* 3.2*   GLOB  --   --  4.2*   AGRAT  --   --  0.8*        Lactic Acid Lactic acid   Date Value Ref Range Status   11/30/2020 2.2 (HH) 0.5 - 2.0 mmol/L Final     Comment:     CALLED TO AND READ BACK BY HOLDEN/ ICU @ 04:33  J     No results for input(s): LAC in the last 72 hours. Micro  No results for input(s): SDES, CULT in the last 72 hours. No results for input(s): CULT in the last 72 hours. Liver Enzymes Protein, total   Date Value Ref Range Status   09/19/2021 7.4 6.4 - 8.2 g/dL Final     Albumin   Date Value Ref Range Status   09/20/2021 2.8 (L) 3.5 - 5.0 g/dL Final     Globulin   Date Value Ref Range Status   09/19/2021 4.2 (H) 2.0 - 4.0 g/dL Final     A-G Ratio   Date Value Ref Range Status   09/19/2021 0.8 (L) 1.1 - 2.2   Final     Alk.  phosphatase   Date Value Ref Range Status   09/19/2021 78 45 - 117 U/L Final     Recent Labs     09/20/21  0925 09/19/21  1348   TP  --  7.4   ALB 2.8* 3.2*   GLOB  --  4.2*   AGRAT  --  0.8*   AP  --  78          Cardiac Enzymes Lab Results   Component Value Date/Time    TROIQ 0.15 (H) 09/20/2021 03:30 PM    TROIQ 0.21 (H) 09/20/2021 09:25 AM        BNP Lab Results   Component Value Date/Time    BNP 62 11/29/2020 09:05 AM    BNP 56 09/30/2020 07:25 PM        Coagulation Recent Labs     09/19/21  1348   PTP 10.3   INR 1.0         Thyroid  Lab Results   Component Value Date/Time    TSH 2.78 09/20/2021 09:25 AM          Lipid Panel No results found for: CHOL, CHOLPOCT, CHOLX, CHLST, CHOLV, 316291, HDL, HDLP, LDL, LDLC, DLDLP, 986896, VLDLC, VLDL, TGLX, TRIGL, TRIGP, TGLPOCT, CHHD, CHHDX     Urinalysis Lab Results   Component Value Date/Time    Color Yellow/Straw 09/19/2021 02:31 PM    Appearance Clear 09/19/2021 02:31 PM    Specific gravity 1.025 09/19/2021 02:31 PM    pH (UA) 5.5 09/19/2021 02:31 PM    Protein >300 (A) 09/19/2021 02:31 PM    Glucose 100 (A) 09/19/2021 02:31 PM    Ketone Negative 09/19/2021 02:31 PM    Bilirubin Negative 09/19/2021 02:31 PM    Urobilinogen 0.2 09/19/2021 02:31 PM    Nitrites Negative 09/19/2021 02:31 PM    Leukocyte Esterase Negative 09/19/2021 02:31 PM    Epithelial cells Few 10/03/2020 07:50 AM    Bacteria Negative 09/19/2021 02:31 PM    WBC 0-4 09/19/2021 02:31 PM    RBC 0-5 09/19/2021 02:31 PM        XR (Most Recent). CXR reviewed by me and compared with previous CXR Results from Hospital Encounter encounter on 09/19/21    XR CHEST PORT    Narrative  AP view of the chest compared to prior exam dated 5/13/2021. Cardiac silhouette  is magnified by projection but appears prominent. No focal airspace disease  pneumothorax or pleural effusion is seen. Exam is otherwise unremarkable. Impression  Mild cardiac enlargement. Exam is otherwise unremarkable. CT (Most Recent) Results from Hospital Encounter encounter on 11/29/20    CT CHEST WO CONT    Narrative  EXAM: CT Chest    INDICATION: 53-year-old patient, currently admitted for shortness of breath,  emphysema. COMPARISON: CT scan of the chest 9/1/2018. TECHNIQUE: Axial CT imaging from the thoracic inlet through the diaphragm  Without    intravenous contrast. Multiplanar reformations were generated.     One or more dose reduction techniques were used on this CT: automated exposure  control, adjustment of the mAs and/or kVp according to patient size, and  iterative reconstruction techniques. The specific techniques used on this CT  exam have been documented in the patient's electronic medical record. Digital  Imaging and Communications in Medicine (DICOM) format image data are available  to nonaffiliated external healthcare facilities or entities on a secure, media  free, reciprocally searchable basis with patient authorization for at least a  12-month period after this study. _______________    FINDINGS:    LUNGS: Mild mosaic attenuation of the pulmonary parenchyma is noted involving  the lungs bilaterally. There is subtle interlobular septal line thickening  present at the lung apices bilaterally. Dependent, linear regions of atelectasis  noted at each lung base. No confluent alveolar consolidation. Several small  fissural lymph nodes are noted. No suspicious pulmonary nodule or mass. PLEURA: There are small bilateral pleural effusions present. No evidence of  pneumothorax. AIRWAY: Unremarkable. MEDIASTINUM: Included thyroid gland unremarkable. Cardiac size normal. Small  quantity of fluid within the superior pericardial recess. No pericardial  effusion. LYMPH NODES: No enlarged lymph nodes by size criteria. UPPER ABDOMEN: Exophytic left hepatic lobe hemangioma, partially included within  the imaged field of view. No acute upper abdominal abnormality. OSSEOUS STRUCTURES: No acute osseous abnormality. No suspicious appearing  osseous lesion. Multilevel thoracic spondylosis. OTHER:    _______________    Impression  IMPRESSION:    1. No confluent pneumonic consolidation. Small bilateral pleural effusions and  accompanying bibasilar atelectasis. 2. Mild mosaic attenuation of the pulmonary parenchyma, which can be seen in the  context of small airways disease. 3. Subtle upper lung predominant interlobular septal line thickening;  potentially a manifestation of minor interstitial pulmonary edema.   4. Partial inclusion of exophytic anterior left hepatic lobe hemangioma. Note: Preliminary report sent to the Emergency Department by the teleradiology  service at the time of the study.              Judah Swanson MD  9/20/2021  8:45 PM.

## 2021-09-21 NOTE — ROUTINE PROCESS
Bedside shift change report given to Milad Hanks LPN (oncoming nurse) by Meeta Laureano RN (offgoing nurse). Report included the following information SBAR.

## 2021-09-21 NOTE — ROUTINE PROCESS
Pt pulled O2 off and ambulating with shortness of breath. Dr. Caldera Muse aware and pt assisted back to bed. O2 replaced.  Pt wheezes

## 2021-09-21 NOTE — PROGRESS NOTES
Progress Note    Patient: Pato Forbes MRN: [id number]  SSN: [id number]    YOB: 1955  Age: 61 y.o. Sex: female      Admit Date: 9/19/2021    LOS: 1 day     Subjective:      Patient seen and examined. Pato Forbes is a 61y.o. year-old female with past medical history significant for HTN, HLD, DM, COPD with Continuous supplemental oxygen required, Left Breast CA s/p Radiation/Chemo Therapy, Esophageal strictures s/p dilatation procedure/EGD, obesity, and ? Cardiomyopathy who is followed for Elevated Troponin and atypical chest pain. This am patient reports that she is feeling much better and has no new complaints. BP is above goal and Creatinine continues to worsen. Telemetry Review: SR/ST    Review of Symptoms:   Review of Systems   Constitutional: Negative. HENT: Positive for stridor. Difficulty swallowing   Eyes: Negative. Cardiovascular: Positive for dyspnea on exertion. Respiratory: Positive for wheezing. Endocrine: Negative. Hematologic/Lymphatic: Negative. Skin: Negative. Musculoskeletal: Negative. Gastrointestinal: Negative. Genitourinary: Negative. Neurological: Negative. Psychiatric/Behavioral: Negative. Allergic/Immunologic: Negative. Objective:     Vitals:    09/21/21 0422 09/21/21 0716 09/21/21 0803 09/21/21 0816   BP: (!) 149/72  (!) 146/78    Pulse: (!) 102  84    Resp: 18  18    Temp: 98.5 °F (36.9 °C)  98.3 °F (36.8 °C)    SpO2: 92% 91% 94% 95%   Weight:       Height:            Intake and Output:  Current Shift: No intake/output data recorded. Last three shifts: 09/19 1901 - 09/21 0700  In: 1320 [P.O.:300; I.V.:1020]  Out: 400 [Urine:400]    Physical Exam:   General: Well nourished female sitting up in chair beside bed, NAD, A&O  HEENT: Normocephalic, PERRL, no drainage  Neck: Supple, Trachea midline, No JVD  RESP: CTA bilaterally, diminished in bses. + Symmetrical chest movement. No SOB or distress.  On O2 via NC.   Cardiovascular: RRR no RG. + murmur. PVS: No rubor, cyanosis, no edema, Radial, DP, PT pulses equal bilaterally  ABD: obese, soft, NT, Normoactive BS  Derm: Warm/Dry/Intact with no lesions, poor turgor  Neuro: A&O PPTS, cranial nerves II- XII grossly intact via interaction with patient.  No focal deficits  PSYCH: No anxiety or agitation      Lab/Data Review:  BMP:   Lab Results   Component Value Date/Time     09/21/2021 05:42 AM    K 5.1 09/21/2021 05:42 AM     09/21/2021 05:42 AM    CO2 23 09/21/2021 05:42 AM    AGAP 12 09/21/2021 05:42 AM     (H) 09/21/2021 05:42 AM    BUN 48 (H) 09/21/2021 05:42 AM    CREA 2.83 (H) 09/21/2021 05:42 AM    GFRAA 20 (L) 09/21/2021 05:42 AM    GFRNA 17 (L) 09/21/2021 05:42 AM            Current Facility-Administered Medications:     pantoprazole (PROTONIX) 40 mg in 0.9% sodium chloride 10 mL injection, 40 mg, IntraVENous, DAILY, Edward Jennings MD, 40 mg at 09/21/21 0833    methylPREDNISolone (PF) (Solu-MEDROL) injection 60 mg, 60 mg, IntraVENous, Q8H, Edward Jennings MD, 60 mg at 09/21/21 0839    insulin glargine (LANTUS) injection 20 Units, 20 Units, SubCUTAneous, DAILY, Edward Jennings MD, 20 Units at 09/21/21 6889    ferrous sulfate tablet 325 mg, 1 Tablet, Oral, TID WITH MEALS, Edward Jennings MD    amLODIPine (NORVASC) tablet 10 mg, 10 mg, Oral, DAILY, Dahlia Buckley NP    atorvastatin (LIPITOR) tablet 80 mg, 80 mg, Oral, QHS, Delta Buckley NP    0.9% sodium chloride infusion, 75 mL/hr, IntraVENous, CONTINUOUS, Edward Jennings MD, Last Rate: 75 mL/hr at 09/21/21 0923, 75 mL/hr at 09/21/21 0923    glucose chewable tablet 16 g, 4 Tablet, Oral, PRN, Edward Jennings MD    dextrose (D50W) injection syrg 12.5-25 g, 25-50 mL, IntraVENous, PRDick JUAREZ Goutham R, MD    glucagon (GLUCAGEN) injection 1 mg, 1 mg, IntraMUSCular, PRDick JUAREZ Goutham R, MD    insulin lispro (HUMALOG) injection, , SubCUTAneous, AC&HS, Dick, Juancarlos Iqbal MD, 7 Units at 09/21/21 0832    albuterol-ipratropium (DUO-NEB) 2.5 MG-0.5 MG/3 ML, 3 mL, Nebulization, QID RT, Edward Jennings MD, 3 mL at 09/21/21 0716    guaiFENesin ER (MUCINEX) tablet 600 mg, 600 mg, Oral, Q12H, Edward Jennings MD, 600 mg at 09/20/21 2206    [Held by provider] aspirin delayed-release tablet 81 mg, 81 mg, Oral, DAILY, Edward Jennings MD, 81 mg at 09/20/21 1802    sodium chloride (NS) flush 5-40 mL, 5-40 mL, IntraVENous, Q8H, Jacqueline Travis MD, 10 mL at 09/21/21 0601    sodium chloride (NS) flush 5-40 mL, 5-40 mL, IntraVENous, PRN, Jacqueline Travis MD    acetaminophen (TYLENOL) tablet 650 mg, 650 mg, Oral, Q6H PRN **OR** acetaminophen (TYLENOL) suppository 650 mg, 650 mg, Rectal, Q6H PRN, Jacqueline Travis MD    polyethylene glycol (MIRALAX) packet 17 g, 17 g, Oral, DAILY PRN, Jacqueline Travis MD    ondansetron (ZOFRAN ODT) tablet 4 mg, 4 mg, Oral, Q8H PRN **OR** ondansetron (ZOFRAN) injection 4 mg, 4 mg, IntraVENous, Q6H PRN, Jacqueline Travis MD    enoxaparin (LOVENOX) injection 30 mg, 30 mg, SubCUTAneous, DAILY, Jacqueline Travis MD, 30 mg at 09/21/21 0167      Assessment:     Principal Problem:    Chest pain (9/19/2021)    Active Problems:    Elevated troponin (9/20/2021)      Syncope (9/20/2021)      Hypoglycemia (9/20/2021)      Chronic respiratory failure with hypoxia (HCC) (9/20/2021)        Plan:     Case discussed with Collaborating physician Dr. Richard Hernandez and our recommendations are as follows:     1. Elevated Troponin - Flat in trends on first checks @ 0.12. Note that 9/20 am Trop was 0.21, this was peak and it down-trended from there. No ACS per EKG. [facility] records reviewed this am and patient had Cath 2018 that showed normal coronaries other than small lesion RCA. Sx are atypical and associated with food intake. TTE grossly normal with no wall motion abnormalities.   Continue risk factor modification and medication regimen. If sx continue after GI intervention can consider NST in OP setting. 2. KANIKA on CKD - appreciate nephrology input. Hold home lasix and Lisinopril for now. Creat was 1.6 early 2021 and 1.9 on 9/15/21 per PCP records. 3. HTN -BP above goal.  Restart home regimen Amlodipine 10mg daily. Hold Lasix and Lisinopril home dosing. Appreciate Nephrology input. Use prn hydralazine as needed. 4. HLD: LDL @ 70 in setting of A1C of 7.6. Will increase Atorvastatin to 80mg QHS effective today 9/21/21. 5. DM: A1C 7.6 on labs. This is significant improvement from previous when reviewing PCP notes. Continue aggressive glycemic control. Thank you for involving us in the care of this patient. Please do not hesitate to call if additional questions arise.  If after hours please call [phone]    Signed By: Arslan Dyson NP     September 21, 2021

## 2021-09-21 NOTE — ANESTHESIA PREPROCEDURE EVALUATION
Relevant Problems   No relevant active problems       Anesthetic History   No history of anesthetic complications  Other anesthesia complications          Review of Systems / Medical History  Patient summary reviewed, nursing notes reviewed and pertinent labs reviewed    Pulmonary  Within defined limits                 Neuro/Psych   Within defined limits           Cardiovascular  Within defined limits                     GI/Hepatic/Renal  Within defined limits              Endo/Other  Within defined limits           Other Findings              Physical Exam    Airway  Mallampati: II  TM Distance: 4 - 6 cm  Neck ROM: normal range of motion   Mouth opening: Normal     Cardiovascular    Rhythm: regular  Rate: normal         Dental  No notable dental hx       Pulmonary  Breath sounds clear to auscultation               Abdominal  Abdominal exam normal       Other Findings            Anesthetic Plan    ASA: 3  Anesthesia type: MAC            Anesthetic plan and risks discussed with: Patient

## 2021-09-21 NOTE — PROGRESS NOTES
Patients case reviewed during interdisciplinary team meeting in [address] [place], [place] [address]Acute Care Unit. Rev.  Franky Mejía [address], [address]

## 2021-09-21 NOTE — ROUTINE PROCESS
Audible wheezes noted when pt up ambulating. RT been in for treatment. Assisted to bed. O2 on per nc. Monitor on.  Instructed pt to rest and maintain O2 nc

## 2021-09-21 NOTE — PROGRESS NOTES
Attempted PT evaluation this morning but the patient had been taken downstairs for a procedure in the OR. Will re-attempt PT evaluation at next earliest convenience.

## 2021-09-21 NOTE — PROGRESS NOTES
Renal follow up note     Subjective     66y F w/ pmhx remarkable for HTN, DM, CKD Stage II and obesity BIBEMS due to AMS w/o reports of CP,N/V/D, dizziness, lighteadiness or fever or chills. As per records, pt was found unresponsive w/ FSG of 34 as per EMS. obviously patient is known to have CKD?   and followed by Nephrology   she is feeling much better today and she is eating supper at the time of exam   I informed her that renal function is improving,       Past Medical History:   Diagnosis Date    Arthritis     spinal stenosis    Breast CA ([facility] 2017    Left Breast (chemo/radiation)    Diabetes ([facility] [facility]     Type 2    Edema     legs and ankles    GERD (gastroesophageal reflux disease)     High cholesterol     Hypertension     Ill-defined condition     patient states hemorrhage behind Left eye-following Dr. Doc Barajas MI (myocardial infarction) ([facility] [address] 06/2018    per patient    Neuropathy     Restless leg       Past Surgical History:   Procedure Laterality Date    HX BACK SURGERY  08/30/2018    HX BREAST BIOPSY Left 02/03/2017    BREAST CANCER    HX BREAST LUMPECTOMY Left     HX CATARACT REMOVAL Right     HX HEART CATHETERIZATION  07/2018    no stents    HX KNEE REPLACEMENT       Family History   Problem Relation Age of Onset    Hypertension Mother     Heart Failure Father     Heart Disease Sister       Social History     Tobacco Use    Smoking status: Never Smoker    Smokeless tobacco: Never Used   Substance Use Topics    Alcohol use: No       Current Facility-Administered Medications   Medication Dose Route Frequency Provider Last Rate Last Admin    pantoprazole (PROTONIX) 40 mg in 0.9% sodium chloride 10 mL injection  40 mg IntraVENous DAILY Edward Jennings MD   40 mg at 09/21/21 0833    methylPREDNISolone (PF) (Solu-MEDROL) injection 60 mg  60 mg IntraVENous Q8H Edward Jennings MD   60 mg at 09/21/21 1448    insulin glargine (LANTUS) injection 20 Units  20 Units SubCUTAneous DAILY [place], MD   20 Units at 09/21/21 9474    ferrous sulfate tablet 325 mg  1 Tablet Oral TID WITH MEALS Moi Jennings MD        amLODIPine (NORVASC) tablet 10 mg  10 mg Oral DAILY Vicenta Buckley NP   10 mg at 09/21/21 1320    atorvastatin (LIPITOR) tablet 80 mg  80 mg Oral QHS Lexie Buckley NP        0.9% sodium chloride infusion  100 mL/hr IntraVENous CONTINUOUS [place],  mL/hr at 09/21/21 1321 100 mL/hr at 09/21/21 1321    glucose chewable tablet 16 g  4 Tablet Oral PRN Moi Jennings MD        dextrose (D50W) injection syrg 12.5-25 g  25-50 mL IntraVENous PRN Moi Jennings MD        glucagon (GLUCAGEN) injection 1 mg  1 mg IntraMUSCular PRN Moi Jennings MD        insulin lispro (HUMALOG) injection   SubCUTAneous AC&HS [place], MD   7 Units at 09/21/21 1124    albuterol-ipratropium (DUO-NEB) 2.5 MG-0.5 MG/3 ML  3 mL Nebulization QID RT Tamia GALAVIZ MD   3 mL at 09/21/21 1522    guaiFENesin ER (MUCINEX) tablet 600 mg  600 mg Oral Q12H Mingo Jennings MD   600 mg at 09/20/21 2206    [Held by provider] aspirin delayed-release tablet 81 mg  81 mg Oral DAILY Moi Jennings MD   81 mg at 09/20/21 1802    sodium chloride (NS) flush 5-40 mL  5-40 mL IntraVENous Q8H Jacqueline Travis MD   10 mL at 09/21/21 0601    sodium chloride (NS) flush 5-40 mL  5-40 mL IntraVENous PRN Jacqueline Travis MD        acetaminophen (TYLENOL) tablet 650 mg  650 mg Oral Q6H PRN Jacqueline Travis MD        Or    acetaminophen (TYLENOL) suppository 650 mg  650 mg Rectal Q6H PRN Jacqueline Travis MD        polyethylene glycol (MIRALAX) packet 17 g  17 g Oral DAILY PRN Jacqueline Travis MD        ondansetron (ZOFRAN ODT) tablet 4 mg  4 mg Oral Q8H PRN Jacqueline Traivs MD        Or    ondansetron (ZOFRAN) injection 4 mg  4 mg IntraVENous Q6H PRN Jacqueline Travis MD        enoxaparin (LOVENOX) injection 30 mg  30 mg SubCUTAneous DAILY Jacqueline Travis MD   30 mg at 09/21/21 9823        Review of Systems   Constitutional: Negative for chills and fever. HENT: Negative for congestion, facial swelling and trouble swallowing. Eyes: Negative for discharge. Respiratory: Negative for cough, chest tightness and shortness of breath. Cardiovascular: Negative for chest pain and palpitations. Gastrointestinal: Negative for abdominal distention, constipation, diarrhea, nausea and vomiting. Endocrine: Negative for cold intolerance and heat intolerance. Genitourinary: Negative for dysuria, flank pain, frequency and hematuria. Musculoskeletal: Negative for joint swelling. Neurological: Negative for dizziness, syncope, light-headedness, numbness and headaches. Psychiatric/Behavioral: Negative for confusion. Objective     Vital signs for last 24 hours:  Visit Vitals  /67   Pulse (!) 106   Temp 97.4 °F (36.3 °C)   Resp 18   Ht 5' 2\" (1.575 m)   Wt 100.7 kg (222 lb)   SpO2 97%   Breastfeeding No   BMI 40.60 kg/m²       Intake/Output this shift:  Current Shift: 09/21 0701 - 09/21 1900  In: 100 [I.V.:100]  Out: -   Last 3 Shifts: 09/19 1901 - 09/21 0700  In: 1320 [P.O.:300;  I.V.:1020]  Out: 400 [Urine:400]    Data Review:   Recent Results (from the past 24 hour(s))   GLUCOSE, POC    Collection Time: 09/20/21  9:01 PM   Result Value Ref Range    Glucose (POC) 467 (H) 65 - 117 mg/dL    Performed by Rl Ferro    TROPONIN I    Collection Time: 09/20/21  9:30 PM   Result Value Ref Range    Troponin-I, Qt. 0.09 (H) <0.05 ng/mL   CBC WITH AUTOMATED DIFF    Collection Time: 09/21/21  5:42 AM   Result Value Ref Range    WBC 14.9 (H) 4.4 - 11.3 K/uL    RBC 3.93 (L) 4.50 - 5.90 M/uL    HGB 10.0 (L) 13.5 - 17.5 g/dL    HCT 31.5 (L) 36 - 46 %    MCV 80.1 80 - 100 FL    MCH 25.5 (L) 31 - 34 PG    MCHC 31.9 31.0 - 36.0 g/dL    RDW 16.7 (H) 11.5 - 14.5 %    PLATELET 666 332 - 785 K/uL    MPV 7.8 6.5 - 11.5 FL    NRBC 0.0  WBC    ABSOLUTE NRBC 0.01 K/uL    NEUTROPHILS 96 (H) 42 - 75 %    LYMPHOCYTES 2 (L) 20.5 - 51.1 %    MONOCYTES 2 1.7 - 9.3 %    EOSINOPHILS 0 (L) 0.9 - 2.9 %    BASOPHILS 0 0.0 - 2.5 %    ABS. NEUTROPHILS 14.3 (H) 1.8 - 7.7 K/UL    ABS. LYMPHOCYTES 0.2 (L) 1.0 - 4.8 K/UL    ABS. MONOCYTES 0.3 0.2 - 2.4 K/UL    ABS. EOSINOPHILS 0.0 0.0 - 0.7 K/UL    ABS. BASOPHILS 0.0 0.0 - 0.2 K/UL   METABOLIC PANEL, BASIC    Collection Time: 09/21/21  5:42 AM   Result Value Ref Range    Sodium 137 136 - 145 mmol/L    Potassium 5.1 3.5 - 5.1 mmol/L    Chloride 102 97 - 108 mmol/L    CO2 23 21 - 32 mmol/L    Anion gap 12 5 - 15 mmol/L    Glucose 380 (H) 65 - 100 mg/dL    BUN 48 (H) 6 - 20 mg/dL    Creatinine 2.83 (H) 0.55 - 1.02 mg/dL    BUN/Creatinine ratio 17 12 - 20      GFR est AA 20 (L) >60 ml/min/1.73m2    GFR est non-AA 17 (L) >60 ml/min/1.73m2    Calcium 8.9 8.5 - 10.1 mg/dL   BLOOD GAS, ARTERIAL    Collection Time: 09/21/21  9:00 AM   Result Value Ref Range    pH 7.37 7.35 - 7.45      PCO2 37 35 - 45 mmHg    PO2 85 70 - 100 mmHg    BICARBONATE 21 (L) 22 - 26 mmol/L    BASE DEFICIT 4.1 (H) 0 - 2 mmol/L    O2 METHOD Nasal Cannula      O2 FLOW RATE 3 L/min    FIO2 32.0 %    Sample source Arterial      SITE Right Radial      ANDREA'S TEST PASS      Carboxy-Hgb 0.3 0 - 5 %    Methemoglobin 0.3 0 - 5 %    tHb 10.5 (L) 13.5 - 17.5 g/dL    Oxyhemoglobin 95.9 95 - 100 %   COVID-19 RAPID TEST    Collection Time: 09/21/21  9:00 AM   Result Value Ref Range    Specimen source Nasopharyngeal      COVID-19 rapid test Not Detected Not Detected     GLUCOSE, POC    Collection Time: 09/21/21 11:21 AM   Result Value Ref Range    Glucose (POC) 330 (H) 65 - 117 mg/dL    Performed by Reza Lindsay        Physical Exam  Vitals reviewed. Constitutional:       General: She is not in acute distress. Appearance: Normal appearance. She is obese. HENT:      Head: Normocephalic and atraumatic.       Nose: Nose normal. Mouth/Throat:      Mouth: Mucous membranes are moist.   Eyes:      Pupils: Pupils are equal, round, and reactive to light. Neck:      Comments: No JVD  Cardiovascular:      Rate and Rhythm: Normal rate and regular rhythm. Pulses: Normal pulses. Heart sounds: Normal heart sounds. No murmur heard. No gallop. Pulmonary:      Effort: Pulmonary effort is normal. No respiratory distress. Breath sounds: No wheezing or rales. Abdominal:      General: Abdomen is flat. Bowel sounds are normal. There is no distension. Musculoskeletal:      Cervical back: No rigidity. Right lower leg: No edema. Left lower leg: No edema. Skin:     General: Skin is warm and dry. Neurological:      General: No focal deficit present. Mental Status: She is alert and oriented to person, place, and time. Motor: No weakness. Psychiatric:         Mood and Affect: Mood normal.         Recent Results (from the past 24 hour(s))   GLUCOSE, POC    Collection Time: 09/20/21  9:01 PM   Result Value Ref Range    Glucose (POC) 467 (H) 65 - 117 mg/dL    Performed by Cleopatra Rao    TROPONIN I    Collection Time: 09/20/21  9:30 PM   Result Value Ref Range    Troponin-I, Qt. 0.09 (H) <0.05 ng/mL   CBC WITH AUTOMATED DIFF    Collection Time: 09/21/21  5:42 AM   Result Value Ref Range    WBC 14.9 (H) 4.4 - 11.3 K/uL    RBC 3.93 (L) 4.50 - 5.90 M/uL    HGB 10.0 (L) 13.5 - 17.5 g/dL    HCT 31.5 (L) 36 - 46 %    MCV 80.1 80 - 100 FL    MCH 25.5 (L) 31 - 34 PG    MCHC 31.9 31.0 - 36.0 g/dL    RDW 16.7 (H) 11.5 - 14.5 %    PLATELET 811 028 - 282 K/uL    MPV 7.8 6.5 - 11.5 FL    NRBC 0.0  WBC    ABSOLUTE NRBC 0.01 K/uL    NEUTROPHILS 96 (H) 42 - 75 %    LYMPHOCYTES 2 (L) 20.5 - 51.1 %    MONOCYTES 2 1.7 - 9.3 %    EOSINOPHILS 0 (L) 0.9 - 2.9 %    BASOPHILS 0 0.0 - 2.5 %    ABS. NEUTROPHILS 14.3 (H) 1.8 - 7.7 K/UL    ABS. LYMPHOCYTES 0.2 (L) 1.0 - 4.8 K/UL    ABS. MONOCYTES 0.3 0.2 - 2.4 K/UL    ABS.  EOSINOPHILS 0.0 0.0 - 0.7 K/UL    ABS.  BASOPHILS 0.0 0.0 - 0.2 K/UL   METABOLIC PANEL, BASIC    Collection Time: 09/21/21  5:42 AM   Result Value Ref Range    Sodium 137 136 - 145 mmol/L    Potassium 5.1 3.5 - 5.1 mmol/L    Chloride 102 97 - 108 mmol/L    CO2 23 21 - 32 mmol/L    Anion gap 12 5 - 15 mmol/L    Glucose 380 (H) 65 - 100 mg/dL    BUN 48 (H) 6 - 20 mg/dL    Creatinine 2.83 (H) 0.55 - 1.02 mg/dL    BUN/Creatinine ratio 17 12 - 20      GFR est AA 20 (L) >60 ml/min/1.73m2    GFR est non-AA 17 (L) >60 ml/min/1.73m2    Calcium 8.9 8.5 - 10.1 mg/dL   BLOOD GAS, ARTERIAL    Collection Time: 09/21/21  9:00 AM   Result Value Ref Range    pH 7.37 7.35 - 7.45      PCO2 37 35 - 45 mmHg    PO2 85 70 - 100 mmHg    BICARBONATE 21 (L) 22 - 26 mmol/L    BASE DEFICIT 4.1 (H) 0 - 2 mmol/L    O2 METHOD Nasal Cannula      O2 FLOW RATE 3 L/min    FIO2 32.0 %    Sample source Arterial      SITE Right Radial      ANDREA'S TEST PASS      Carboxy-Hgb 0.3 0 - 5 %    Methemoglobin 0.3 0 - 5 %    tHb 10.5 (L) 13.5 - 17.5 g/dL    Oxyhemoglobin 95.9 95 - 100 %   COVID-19 RAPID TEST    Collection Time: 09/21/21  9:00 AM   Result Value Ref Range    Specimen source Nasopharyngeal      COVID-19 rapid test Not Detected Not Detected     GLUCOSE, POC    Collection Time: 09/21/21 11:21 AM   Result Value Ref Range    Glucose (POC) 330 (H) 65 - 117 mg/dL    Performed by Deshaun Sheth            Current Facility-Administered Medications:     pantoprazole (PROTONIX) 40 mg in 0.9% sodium chloride 10 mL injection, 40 mg, IntraVENous, DAILY, Edward Jennings MD, 40 mg at 09/21/21 0833    methylPREDNISolone (PF) (Solu-MEDROL) injection 60 mg, 60 mg, IntraVENous, Q8H, Edward Jennings MD, 60 mg at 09/21/21 1448    insulin glargine (LANTUS) injection 20 Units, 20 Units, SubCUTAneous, DAILY, Edward Jennings MD, 20 Units at 09/21/21 7803    ferrous sulfate tablet 325 mg, 1 Tablet, Oral, TID WITH MEALS, Edward Jennings MD    amLODIPine (NORVASC) tablet 10 mg, 10 mg, Oral, DAILY, Richard Buckley NP, 10 mg at 09/21/21 1320    atorvastatin (LIPITOR) tablet 80 mg, 80 mg, Oral, QHS, Yue Buckley NP    0.9% sodium chloride infusion, 100 mL/hr, IntraVENous, CONTINUOUS, Edward Jennings MD, Last Rate: 100 mL/hr at 09/21/21 1321, 100 mL/hr at 09/21/21 1321    glucose chewable tablet 16 g, 4 Tablet, Oral, PRN, Edward Jennings MD    dextrose (D50W) injection syrg 12.5-25 g, 25-50 mL, IntraVENous, PRN, Edward Jennings MD    glucagon (GLUCAGEN) injection 1 mg, 1 mg, IntraMUSCular, PRN, Edward Jennings MD    insulin lispro (HUMALOG) injection, , SubCUTAneous, AC&HS, Edward Jennings MD, 7 Units at 09/21/21 1124    albuterol-ipratropium (DUO-NEB) 2.5 MG-0.5 MG/3 ML, 3 mL, Nebulization, QID RT, Edward Jennings MD, 3 mL at 09/21/21 1522    guaiFENesin ER (MUCINEX) tablet 600 mg, 600 mg, Oral, Q12H, Edward Jennings MD, 600 mg at 09/20/21 2206    [Held by provider] aspirin delayed-release tablet 81 mg, 81 mg, Oral, DAILY, Edward Jennings MD, 81 mg at 09/20/21 1802    sodium chloride (NS) flush 5-40 mL, 5-40 mL, IntraVENous, Q8H, Jacqueline Travis MD, 10 mL at 09/21/21 0601    sodium chloride (NS) flush 5-40 mL, 5-40 mL, IntraVENous, PRN, Jacqueline Travis MD    acetaminophen (TYLENOL) tablet 650 mg, 650 mg, Oral, Q6H PRN **OR** acetaminophen (TYLENOL) suppository 650 mg, 650 mg, Rectal, Q6H PRN, Jacqueline Travis MD    polyethylene glycol (MIRALAX) packet 17 g, 17 g, Oral, DAILY PRN, Jacqueline Travis MD    ondansetron (ZOFRAN ODT) tablet 4 mg, 4 mg, Oral, Q8H PRN **OR** ondansetron (ZOFRAN) injection 4 mg, 4 mg, IntraVENous, Q6H PRN, Jacqueline Travis MD    enoxaparin (LOVENOX) injection 30 mg, 30 mg, SubCUTAneous, DAILY, Jacqueline Travis MD, 30 mg at 09/21/21 0837    Assessment       Plan     1. Non oliguric KANIKA sec to pre renal azotemia - with increase in serum creat 2.83.    Avoid nephrotoxins as  Much as possible  C/w  IV fluids.-Keep up with intakes and outputs  Right kidney is mostly obscured by bowel gas measures approximately 9.1 x 4.5 x  4.2 cm. No hydronephrosis. Left kidney is mostly obscured by bowel gas, measuring approximately 9.0 x 4.4 x  4.3 cm. No hydronephrosis. 2. CKD stage III - c/w renal diet    3. HTN- off BP meds. Resume BP Rx to maintain BP less than 140/80  4. Symptomatic hypoglycemia - glycemic control and work up as per primary team     5.  Anemia of chronic disease- with acceptable H/H     Signed By: Huyen Nagel MD     September 21, 2021

## 2021-09-21 NOTE — PROGRESS NOTES
Spiritual Care Assessment/Progress Note  [facility]      NAME: Toro Huang      MRN: [id number]  AGE: 61 y.o.  SEX: female  Shinto Affiliation: Yarsani   Language: English     9/21/2021     Total Time (in minutes): 14     Spiritual Assessment begun in [facility] [address] through conversation with:         [x]Patient        [] Family    [] Friend(s)        Reason for Consult: Initial/Spiritual assessment, patient floor, Request by staff     Spiritual beliefs: (Please include comment if needed)     [x] Identifies with a ct tradition: Full Carmina April        [] Supported by a ct community:            [] Claims no spiritual orientation:           [] Seeking spiritual identity:                [] Adheres to an individual form of spirituality:           [] Not able to assess:                           Identified resources for coping:      [] Prayer                               [] Music                  [] Guided Imagery     [x] Family/friends                 [] Pet visits     [] Devotional reading                         [] Unknown     [] Other:                                              Interventions offered during this visit: (See comments for more details)    Patient Interventions: Affirmation of emotions/emotional suffering, Affirmation of ct, Iconic (affirming the presence of God/Higher Power), Shinto beliefs/image of God discussed           Plan of Care:     [x] Support spiritual and/or cultural needs    [] Support AMD and/or advance care planning process      [] Support grieving process   [] Coordinate Rites and/or Rituals    [] Coordination with community clergy   [] No spiritual needs identified at this time   [] Detailed Plan of Care below (See Comments)  [] Make referral to Music Therapy  [] Make referral to Pet Therapy     [] Make referral to Addiction services  [] Make referral to [facility]  [] Make referral to Spiritual Care Partner  [] No future visits requested        [x] Follow up upon further referrals     Comments: Introduced myself and offered spiritual care and support while rounding in [address] [place], [place] [address]Acute Care Unit. Patient shared that she is Full Bryson Tineo and her  was Kenan. She stated that she grew up [facility] and her mother was a preacher for 27 years and called a spade a spade, and no one liked her. She shared that her  Dwight Barrientos is  and all she has is her sister Fernando Gtz as a support.  provided spiritual support and care, attentively listening as patient went on various tangents.  provided presence, words of encouragement and assurance of prayer. Advised of  availability. Advised nurse to contact [facility] for any further referrals. Rev.  Franky Hyatt 53, [address]

## 2021-09-21 NOTE — PROGRESS NOTES
Problem: Falls - Risk of  Goal: *Absence of Falls  Description: Document Roseann Worley Fall Risk and appropriate interventions in the flowsheet. Outcome: Progressing Towards Goal  Note: Fall Risk Interventions:  Mobility Interventions: PT Consult for mobility concerns, Utilize walker, cane, or other assistive device              Elimination Interventions: Call light in reach, Bed/chair exit alarm, Patient to call for help with toileting needs    History of Falls Interventions: Bed/chair exit alarm, Door open when patient unattended, Room close to nurse's station         Problem: Patient Education: Go to Patient Education Activity  Goal: Patient/Family Education  Outcome: Progressing Towards Goal     Problem: Diabetes Self-Management  Goal: *Disease process and treatment process  Description: Define diabetes and identify own type of diabetes; list 3 options for treating diabetes. Outcome: Progressing Towards Goal  Goal: *Incorporating nutritional management into lifestyle  Description: Describe effect of type, amount and timing of food on blood glucose; list 3 methods for planning meals. Outcome: Progressing Towards Goal  Goal: *Incorporating physical activity into lifestyle  Description: State effect of exercise on blood glucose levels. Outcome: Progressing Towards Goal  Goal: *Developing strategies to promote health/change behavior  Description: Define the ABC's of diabetes; identify appropriate screenings, schedule and personal plan for screenings. Outcome: Progressing Towards Goal  Goal: *Using medications safely  Description: State effect of diabetes medications on diabetes; name diabetes medication taking, action and side effects. Outcome: Progressing Towards Goal  Goal: *Monitoring blood glucose, interpreting and using results  Description: Identify recommended blood glucose targets  and personal targets.   Outcome: Progressing Towards Goal  Goal: *Prevention, detection, treatment of acute complications  Description: List symptoms of hyper- and hypoglycemia; describe how to treat low blood sugar and actions for lowering  high blood glucose level. Outcome: Progressing Towards Goal  Goal: *Prevention, detection and treatment of chronic complications  Description: Define the natural course of diabetes and describe the relationship of blood glucose levels to long term complications of diabetes.   Outcome: Progressing Towards Goal  Goal: *Developing strategies to address psychosocial issues  Description: Describe feelings about living with diabetes; identify support needed and support network  Outcome: Progressing Towards Goal  Goal: *Insulin pump training  Outcome: Progressing Towards Goal  Goal: *Sick day guidelines  Outcome: Progressing Towards Goal  Goal: *Patient Specific Goal (EDIT GOAL, INSERT TEXT)  Outcome: Progressing Towards Goal     Problem: Patient Education: Go to Patient Education Activity  Goal: Patient/Family Education  Outcome: Progressing Towards Goal

## 2021-09-21 NOTE — OP NOTES
EGD Procedure Note        Patient: Darlene Alvarez MRN: [id number]  SSN: [id number]    YOB: 1955  Age: 61 y.o. Sex: female        Date/Time:  9/21/2021 12:13 PM         IMPRESSION:       1. Lower esophageal stricture  2. Distal esophagitis (grade 2)  3. Antral gastritis  4. Retained gastric content  5. Decreased gastric motility (gastroparesis)  6. Duodenitis       RECOMMENDATIONS:    1. Check biopsy results. 2. Repeat esophageal dilatation as needed. 3. Patient should be on a daily PPI therapy. We will start on pantoprazole 40 mg daily. Procedure: Esophagogastroduodenoscopy with cold biopsy                       Esophageal dilatation (pneumatic)    Indication: Dysphagia    Endoscopist:  Herrera Buitrago MD    Referring Provider:   Ara Grace MD    History: The history and physical exam were reviewed and updated. Endoscope: GIF H190 Olympus video endoscope    Extent of Exam: Second part of the duodenum    ASA: Grade 3    Anethesia/Sedation:  MAC    Description of the procedure: The procedure was discussed with the patient including risks, benefits, alternatives including risks of iv sedation, bleeding, perforation and aspiration. A safety timeout was performed. The patient was placed in the left lateral decubitus position. A bite block was placed. The patient was using standard protocol. The patients vital signs were monitored at all times including heart rate/rhythm, blood pressure and oxygen saturation. The endoscope was then passed under direct visualization to the second part of the duodenum. The endoscope was then slowly withdrawn while visualizing the mucosa. In the stomach a retroflexion was performed and gastric fundus and cardia visualized. The patient was then transferred to recovery in stable condition. Findings:   Esophagus: The esophageal mucosa was inflamed in the distal esophagus consistent with a grade 2 esophagitis.   Patient also had presence of a lower esophageal stricture. The stricture was dilated with the pneumatic dilator. Stomach: The gastric mucosa was inflamed throughout the gastric antrum. Multiple biopsies were taken there. The patient also had food matter that was present in particular in the gastric body and fundus. Patient was noted to have decreased peristalsis. Duodenum: The duodenum mucosa was somewhat edematous with food matter present in the mucosal lining. .     Therapies: Esophageal dilatation using the TTS dilator. The dilator was placed mid distal esophageal stricture and inflated up to 8 angelo of pressure which brought the balloon to a 45 [place] size. The balloon was kept in place for 90 seconds and then deflated. We then reinflated the balloon at 8 angelo of pressure and again kept in place for 90 seconds and then deflated the balloon. Patient tolerated the procedure procedure well. Specimens:   ID Type Source Tests Collected by Time Destination   1 :  Preservative Stomach, Antrum  Funmilayo Zamora MD 9/21/2021 1157 Pathology              EBL: Minimal    Complications:   None; patient tolerated the procedure well.      Implants: None    Discharge disposition:  Out of the recovery area when discharge criteria met         Paul Yusuf MD  September 21, 2021  12:13 PM

## 2021-09-21 NOTE — ROUTINE PROCESS
Patient is taking a basin bath. Patient took of cardiac monitor. Educated patient that she need to keep her monitor on. She still took it off. After is finish taking her bath will attempt to apply cardiac monitor.

## 2021-09-22 ENCOUNTER — APPOINTMENT (OUTPATIENT)
Dept: CT IMAGING | Age: 66
DRG: 243 | End: 2021-09-22
Attending: HOSPITALIST
Payer: MEDICAID

## 2021-09-22 PROBLEM — K29.70 ESOPHAGITIS WITH GASTRITIS: Status: ACTIVE | Noted: 2021-09-22

## 2021-09-22 PROBLEM — K20.90 ESOPHAGITIS WITH GASTRITIS: Status: ACTIVE | Noted: 2021-09-22

## 2021-09-22 PROBLEM — K31.84 DIABETIC GASTROPARESIS (HCC): Status: ACTIVE | Noted: 2020-10-01

## 2021-09-22 PROBLEM — A41.9 SEPSIS (HCC): Status: ACTIVE | Noted: 2021-09-22

## 2021-09-22 PROBLEM — E11.43 DIABETIC GASTROPARESIS (HCC): Status: ACTIVE | Noted: 2020-10-01

## 2021-09-22 LAB
ANION GAP SERPL CALC-SCNC: 11 MMOL/L (ref 5–15)
APPEARANCE UR: CLEAR
BACTERIA URNS QL MICRO: ABNORMAL /HPF
BASOPHILS # BLD: 0 K/UL (ref 0–0.2)
BASOPHILS NFR BLD: 0 % (ref 0–2.5)
BILIRUB UR QL: NEGATIVE
BUN SERPL-MCNC: 60 MG/DL (ref 6–20)
BUN/CREAT SERPL: 23 (ref 12–20)
CA-I BLD-MCNC: 9.5 MG/DL (ref 8.5–10.1)
CHLORIDE SERPL-SCNC: 106 MMOL/L (ref 97–108)
CHLORIDE UR-SCNC: 79 MMOL/L
CO2 SERPL-SCNC: 26 MMOL/L (ref 21–32)
COLOR UR: ABNORMAL
CREAT SERPL-MCNC: 2.57 MG/DL (ref 0.55–1.02)
CREAT UR-MCNC: 51.87 MG/DL
EOSINOPHIL # BLD: 0 K/UL (ref 0–0.7)
EOSINOPHIL NFR BLD: 0 % (ref 0.9–2.9)
ERYTHROCYTE [DISTWIDTH] IN BLOOD BY AUTOMATED COUNT: 16.6 % (ref 11.5–14.5)
GLUCOSE BLD STRIP.AUTO-MCNC: 280 MG/DL (ref 65–117)
GLUCOSE BLD STRIP.AUTO-MCNC: 351 MG/DL (ref 65–117)
GLUCOSE BLD STRIP.AUTO-MCNC: 356 MG/DL (ref 65–117)
GLUCOSE BLD STRIP.AUTO-MCNC: 419 MG/DL (ref 65–117)
GLUCOSE SERPL-MCNC: 333 MG/DL (ref 65–100)
GLUCOSE UR STRIP.AUTO-MCNC: 500 MG/DL
HCT VFR BLD AUTO: 32.1 % (ref 36–46)
HGB BLD-MCNC: 10.1 G/DL (ref 13.5–17.5)
HGB UR QL STRIP: ABNORMAL
KETONES UR QL STRIP.AUTO: NEGATIVE MG/DL
LEUKOCYTE ESTERASE UR QL STRIP.AUTO: NEGATIVE
LYMPHOCYTES # BLD: 0.5 K/UL (ref 1–4.8)
LYMPHOCYTES NFR BLD: 3 % (ref 20.5–51.1)
MCH RBC QN AUTO: 25.3 PG (ref 31–34)
MCHC RBC AUTO-ENTMCNC: 31.5 G/DL (ref 31–36)
MCV RBC AUTO: 80.2 FL (ref 80–100)
MONOCYTES # BLD: 0.4 K/UL (ref 0.2–2.4)
MONOCYTES NFR BLD: 2 % (ref 1.7–9.3)
NEUTS SEG # BLD: 18.3 K/UL (ref 1.8–7.7)
NEUTS SEG NFR BLD: 95 % (ref 42–75)
NITRITE UR QL STRIP.AUTO: NEGATIVE
NRBC # BLD: 0.01 K/UL
NRBC BLD-RTO: 0 PER 100 WBC
PERFORMED BY, TECHID: ABNORMAL
PH UR STRIP: 6 [PH] (ref 5–8)
PLATELET # BLD AUTO: 357 K/UL (ref 150–400)
PMV BLD AUTO: 7.4 FL (ref 6.5–11.5)
POTASSIUM SERPL-SCNC: 5.3 MMOL/L (ref 3.5–5.1)
PROCALCITONIN SERPL-MCNC: 0.14 NG/ML
PROT UR STRIP-MCNC: 100 MG/DL
PROT UR-MCNC: 183 MG/DL (ref 0–11.9)
PROT/CREAT UR-RTO: 3.5
RBC # BLD AUTO: 4 M/UL (ref 4.5–5.9)
RBC #/AREA URNS HPF: ABNORMAL /HPF (ref 0–3)
SODIUM SERPL-SCNC: 143 MMOL/L (ref 136–145)
SP GR UR REFRACTOMETRY: 1.02 (ref 1–1.03)
UA: UC IF INDICATED,UAUC: ABNORMAL
UROBILINOGEN UR QL STRIP.AUTO: 0.2 EU/DL (ref 0.2–1)
WBC # BLD AUTO: 19.2 K/UL (ref 4.4–11.3)
WBC URNS QL MICRO: ABNORMAL /HPF (ref 0–5)

## 2021-09-22 PROCEDURE — 74011250637 HC RX REV CODE- 250/637: Performed by: NURSE PRACTITIONER

## 2021-09-22 PROCEDURE — 65270000029 HC RM PRIVATE

## 2021-09-22 PROCEDURE — 74011250636 HC RX REV CODE- 250/636: Performed by: EMERGENCY MEDICINE

## 2021-09-22 PROCEDURE — 74011250636 HC RX REV CODE- 250/636: Performed by: NURSE PRACTITIONER

## 2021-09-22 PROCEDURE — 92610 EVALUATE SWALLOWING FUNCTION: CPT

## 2021-09-22 PROCEDURE — 84156 ASSAY OF PROTEIN URINE: CPT

## 2021-09-22 PROCEDURE — 94760 N-INVAS EAR/PLS OXIMETRY 1: CPT

## 2021-09-22 PROCEDURE — 82962 GLUCOSE BLOOD TEST: CPT

## 2021-09-22 PROCEDURE — 87040 BLOOD CULTURE FOR BACTERIA: CPT

## 2021-09-22 PROCEDURE — C9113 INJ PANTOPRAZOLE SODIUM, VIA: HCPCS | Performed by: HOSPITALIST

## 2021-09-22 PROCEDURE — 82436 ASSAY OF URINE CHLORIDE: CPT

## 2021-09-22 PROCEDURE — 77010033678 HC OXYGEN DAILY

## 2021-09-22 PROCEDURE — 74011000258 HC RX REV CODE- 258: Performed by: HOSPITALIST

## 2021-09-22 PROCEDURE — 80048 BASIC METABOLIC PNL TOTAL CA: CPT

## 2021-09-22 PROCEDURE — 74011636637 HC RX REV CODE- 636/637: Performed by: HOSPITALIST

## 2021-09-22 PROCEDURE — 74011250637 HC RX REV CODE- 250/637: Performed by: HOSPITALIST

## 2021-09-22 PROCEDURE — 87077 CULTURE AEROBIC IDENTIFY: CPT

## 2021-09-22 PROCEDURE — 87086 URINE CULTURE/COLONY COUNT: CPT

## 2021-09-22 PROCEDURE — 85025 COMPLETE CBC W/AUTO DIFF WBC: CPT

## 2021-09-22 PROCEDURE — 81001 URINALYSIS AUTO W/SCOPE: CPT

## 2021-09-22 PROCEDURE — 87186 SC STD MICRODIL/AGAR DIL: CPT

## 2021-09-22 PROCEDURE — 84145 PROCALCITONIN (PCT): CPT

## 2021-09-22 PROCEDURE — 70450 CT HEAD/BRAIN W/O DYE: CPT

## 2021-09-22 PROCEDURE — 74011000250 HC RX REV CODE- 250: Performed by: HOSPITALIST

## 2021-09-22 PROCEDURE — 94640 AIRWAY INHALATION TREATMENT: CPT

## 2021-09-22 PROCEDURE — 74011250636 HC RX REV CODE- 250/636: Performed by: HOSPITALIST

## 2021-09-22 RX ORDER — CETIRIZINE HCL 10 MG
10 TABLET ORAL EVERY EVENING
Status: DISCONTINUED | OUTPATIENT
Start: 2021-09-22 | End: 2021-09-27 | Stop reason: HOSPADM

## 2021-09-22 RX ORDER — CYCLOBENZAPRINE HCL 10 MG
10 TABLET ORAL
Status: DISCONTINUED | OUTPATIENT
Start: 2021-09-22 | End: 2021-09-27 | Stop reason: HOSPADM

## 2021-09-22 RX ORDER — INSULIN GLARGINE 100 [IU]/ML
15 INJECTION, SOLUTION SUBCUTANEOUS
Status: COMPLETED | OUTPATIENT
Start: 2021-09-22 | End: 2021-09-22

## 2021-09-22 RX ORDER — HYDRALAZINE HYDROCHLORIDE 25 MG/1
25 TABLET, FILM COATED ORAL 3 TIMES DAILY
Status: DISCONTINUED | OUTPATIENT
Start: 2021-09-22 | End: 2021-09-27 | Stop reason: HOSPADM

## 2021-09-22 RX ORDER — FUROSEMIDE 10 MG/ML
40 INJECTION INTRAMUSCULAR; INTRAVENOUS DAILY
Status: DISCONTINUED | OUTPATIENT
Start: 2021-09-22 | End: 2021-09-23

## 2021-09-22 RX ORDER — CALCIUM CARB/MAGNESIUM CARB 311-232MG
5 TABLET ORAL
Status: DISCONTINUED | OUTPATIENT
Start: 2021-09-22 | End: 2021-09-27 | Stop reason: HOSPADM

## 2021-09-22 RX ORDER — FLUTICASONE PROPIONATE 50 MCG
2 SPRAY, SUSPENSION (ML) NASAL DAILY
Status: DISCONTINUED | OUTPATIENT
Start: 2021-09-22 | End: 2021-09-27 | Stop reason: HOSPADM

## 2021-09-22 RX ORDER — GABAPENTIN 300 MG/1
300 CAPSULE ORAL 2 TIMES DAILY
Status: DISCONTINUED | OUTPATIENT
Start: 2021-09-22 | End: 2021-09-27 | Stop reason: HOSPADM

## 2021-09-22 RX ORDER — INSULIN GLARGINE 100 [IU]/ML
35 INJECTION, SOLUTION SUBCUTANEOUS DAILY
Status: DISCONTINUED | OUTPATIENT
Start: 2021-09-23 | End: 2021-09-23

## 2021-09-22 RX ORDER — VENLAFAXINE HYDROCHLORIDE 150 MG/1
150 CAPSULE, EXTENDED RELEASE ORAL DAILY
Status: DISCONTINUED | OUTPATIENT
Start: 2021-09-22 | End: 2021-09-27 | Stop reason: HOSPADM

## 2021-09-22 RX ADMIN — METOCLOPRAMIDE 5 MG: 5 INJECTION, SOLUTION INTRAMUSCULAR; INTRAVENOUS at 11:02

## 2021-09-22 RX ADMIN — GUAIFENESIN 600 MG: 600 TABLET, EXTENDED RELEASE ORAL at 09:50

## 2021-09-22 RX ADMIN — GABAPENTIN 300 MG: 300 CAPSULE ORAL at 21:26

## 2021-09-22 RX ADMIN — GUAIFENESIN 600 MG: 600 TABLET, EXTENDED RELEASE ORAL at 21:26

## 2021-09-22 RX ADMIN — INSULIN GLARGINE 20 UNITS: 100 INJECTION, SOLUTION SUBCUTANEOUS at 09:48

## 2021-09-22 RX ADMIN — IPRATROPIUM BROMIDE AND ALBUTEROL SULFATE 3 ML: .5; 2.5 SOLUTION RESPIRATORY (INHALATION) at 19:03

## 2021-09-22 RX ADMIN — FERROUS SULFATE TAB 325 MG (65 MG ELEMENTAL FE) 325 MG: 325 (65 FE) TAB at 09:56

## 2021-09-22 RX ADMIN — FLUTICASONE PROPIONATE 2 SPRAY: 50 SPRAY, METERED NASAL at 16:40

## 2021-09-22 RX ADMIN — ENOXAPARIN SODIUM 30 MG: 30 INJECTION SUBCUTANEOUS at 09:51

## 2021-09-22 RX ADMIN — METHYLPREDNISOLONE SODIUM SUCCINATE 60 MG: 125 INJECTION, POWDER, FOR SOLUTION INTRAMUSCULAR; INTRAVENOUS at 21:25

## 2021-09-22 RX ADMIN — HYDRALAZINE HYDROCHLORIDE 25 MG: 25 TABLET, FILM COATED ORAL at 18:04

## 2021-09-22 RX ADMIN — FERROUS SULFATE TAB 325 MG (65 MG ELEMENTAL FE) 325 MG: 325 (65 FE) TAB at 16:23

## 2021-09-22 RX ADMIN — QUETIAPINE FUMARATE 25 MG: 25 TABLET ORAL at 20:55

## 2021-09-22 RX ADMIN — AZITHROMYCIN DIHYDRATE 500 MG: 500 INJECTION, POWDER, LYOPHILIZED, FOR SOLUTION INTRAVENOUS at 20:54

## 2021-09-22 RX ADMIN — AMLODIPINE BESYLATE 10 MG: 10 TABLET ORAL at 09:50

## 2021-09-22 RX ADMIN — FERROUS SULFATE TAB 325 MG (65 MG ELEMENTAL FE) 325 MG: 325 (65 FE) TAB at 14:55

## 2021-09-22 RX ADMIN — GABAPENTIN 300 MG: 300 CAPSULE ORAL at 14:55

## 2021-09-22 RX ADMIN — CETIRIZINE HYDROCHLORIDE 10 MG: 10 TABLET, FILM COATED ORAL at 18:02

## 2021-09-22 RX ADMIN — METOCLOPRAMIDE 5 MG: 5 INJECTION, SOLUTION INTRAMUSCULAR; INTRAVENOUS at 21:25

## 2021-09-22 RX ADMIN — INSULIN LISPRO 3 UNITS: 100 INJECTION, SOLUTION INTRAVENOUS; SUBCUTANEOUS at 21:26

## 2021-09-22 RX ADMIN — IPRATROPIUM BROMIDE AND ALBUTEROL SULFATE 3 ML: .5; 2.5 SOLUTION RESPIRATORY (INHALATION) at 15:23

## 2021-09-22 RX ADMIN — CYCLOBENZAPRINE 10 MG: 10 TABLET, FILM COATED ORAL at 12:11

## 2021-09-22 RX ADMIN — METOCLOPRAMIDE 5 MG: 5 INJECTION, SOLUTION INTRAMUSCULAR; INTRAVENOUS at 14:55

## 2021-09-22 RX ADMIN — Medication 5 MG: at 21:26

## 2021-09-22 RX ADMIN — VENLAFAXINE HYDROCHLORIDE 150 MG: 150 CAPSULE, EXTENDED RELEASE ORAL at 14:55

## 2021-09-22 RX ADMIN — IPRATROPIUM BROMIDE AND ALBUTEROL SULFATE 3 ML: .5; 2.5 SOLUTION RESPIRATORY (INHALATION) at 07:46

## 2021-09-22 RX ADMIN — Medication 10 ML: at 06:11

## 2021-09-22 RX ADMIN — METOCLOPRAMIDE 5 MG: 5 INJECTION, SOLUTION INTRAMUSCULAR; INTRAVENOUS at 16:21

## 2021-09-22 RX ADMIN — INSULIN LISPRO 10 UNITS: 100 INJECTION, SOLUTION INTRAVENOUS; SUBCUTANEOUS at 16:20

## 2021-09-22 RX ADMIN — INSULIN GLARGINE 15 UNITS: 100 INJECTION, SOLUTION SUBCUTANEOUS at 14:54

## 2021-09-22 RX ADMIN — ATORVASTATIN CALCIUM 80 MG: 40 TABLET, FILM COATED ORAL at 21:26

## 2021-09-22 RX ADMIN — INSULIN LISPRO 20 UNITS: 100 INJECTION, SOLUTION INTRAVENOUS; SUBCUTANEOUS at 14:55

## 2021-09-22 RX ADMIN — FUROSEMIDE 40 MG: 10 INJECTION, SOLUTION INTRAMUSCULAR; INTRAVENOUS at 11:01

## 2021-09-22 RX ADMIN — INSULIN LISPRO 15 UNITS: 100 INJECTION, SOLUTION INTRAVENOUS; SUBCUTANEOUS at 09:48

## 2021-09-22 RX ADMIN — PIPERACILLIN AND TAZOBACTAM 3.38 G: 3; .375 INJECTION, POWDER, LYOPHILIZED, FOR SOLUTION INTRAVENOUS at 12:12

## 2021-09-22 RX ADMIN — METHYLPREDNISOLONE SODIUM SUCCINATE 60 MG: 125 INJECTION, POWDER, FOR SOLUTION INTRAMUSCULAR; INTRAVENOUS at 11:00

## 2021-09-22 RX ADMIN — PIPERACILLIN AND TAZOBACTAM 3.38 G: 3; .375 INJECTION, POWDER, LYOPHILIZED, FOR SOLUTION INTRAVENOUS at 16:20

## 2021-09-22 RX ADMIN — Medication 10 ML: at 16:21

## 2021-09-22 RX ADMIN — HYDRALAZINE HYDROCHLORIDE 25 MG: 25 TABLET, FILM COATED ORAL at 21:26

## 2021-09-22 RX ADMIN — Medication 10 ML: at 21:27

## 2021-09-22 RX ADMIN — SODIUM CHLORIDE 40 MG: 9 INJECTION, SOLUTION INTRAMUSCULAR; INTRAVENOUS; SUBCUTANEOUS at 12:12

## 2021-09-22 NOTE — PROGRESS NOTES
Nephrology Progress Note  Date:2021       Room:203/01  Patient Ilan Rosario     YOB: 1955     Age:66 y.o. Subjective    Subjective:  Symptoms:  No shortness of breath, chest pain or diarrhea. Diet:  No nausea or vomiting. UOP: 450cc    Seen and evaluated at bedside, no complaints reported by the pt. Review of Systems   Constitutional: Negative for fever. HENT: Negative for facial swelling. Respiratory: Negative for chest tightness and shortness of breath. Cardiovascular: Negative for chest pain and palpitations. Gastrointestinal: Negative for abdominal distention, constipation, diarrhea, nausea and vomiting. Genitourinary: Negative for dysuria and hematuria. Musculoskeletal: Negative for joint swelling. Neurological: Negative for syncope, light-headedness and numbness. Psychiatric/Behavioral: Negative for confusion. Objective         Vitals Last 24 Hours:  TEMPERATURE:  Temp  Av.9 °F (36.6 °C)  Min: 97 °F (36.1 °C)  Max: 98.4 °F (36.9 °C)  RESPIRATIONS RANGE: Resp  Av.4  Min: 18  Max: 20  PULSE OXIMETRY RANGE: SpO2  Av.1 %  Min: 82 %  Max: 98 %  PULSE RANGE: Pulse  Av.2  Min: 94  Max: 108  BLOOD PRESSURE RANGE: Systolic (47XYG), XHU:654 , Min:130 , XYE:401   ; Diastolic (44OZJ), KJL:62, Min:7, Max:69    I/O (24Hr): Intake/Output Summary (Last 24 hours) at 2021 1251  Last data filed at 2021 1745  Gross per 24 hour   Intake 380 ml   Output 450 ml   Net -70 ml     Objective:  General Appearance: In no acute distress. Vital signs: (most recent): Blood pressure (!) 141/7, pulse (!) 108, temperature 97.9 °F (36.6 °C), resp. rate 18, height 5' 2\" (1.575 m), weight 100.7 kg (222 lb), SpO2 98 %, not currently breastfeeding. Vital signs are normal.  No fever. HEENT: Normal HEENT exam.    Lungs:  Normal respiratory rate. She is not in respiratory distress. No decreased breath sounds. Heart: Normal rate. Regular rhythm. No murmur, gallop or friction rub. Abdomen: Abdomen is soft. Bowel sounds are normal.   There is no abdominal tenderness. There is no epigastric area or suprapubic area tenderness. Neurological: Patient is alert and oriented to person, place and time. Labs/Imaging/Diagnostics    Labs:  CBC:  Recent Labs     09/22/21  0757 09/21/21  0542 09/20/21  0925   WBC 19.2* 14.9* 11.6*   RBC 4.00* 3.93* 3.74*   HGB 10.1* 10.0* 9.7*   HCT 32.1* 31.5* 30.3*   MCV 80.2 80.1 81.0   RDW 16.6* 16.7* 16.6*    314 293     CHEMISTRIES:  Recent Labs     09/22/21  0757 09/21/21  0542 09/20/21  1100 09/20/21  0925 09/19/21  1348 09/19/21  1348    137  --  138   < > 141   K 5.3* 5.1  --  4.9   < > 4.0    102  --  103   < > 103   CO2 26 23  --  28   < > 28   BUN 60* 48*  --  37*   < > 37*   CA 9.5 8.9  --  9.0   < > 9.4   PHOS  --   --  3.8 3.8  --   --    MG  --   --   --   --   --  2.1    < > = values in this interval not displayed. PT/INR:  Recent Labs     09/19/21  1348   INR 1.0     APTT:No results for input(s): APTT in the last 72 hours. LIVER PROFILE:  Recent Labs     09/19/21  1348   AST 18   ALT 27     Lab Results   Component Value Date/Time    ALT (SGPT) 27 09/19/2021 01:48 PM    AST (SGOT) 18 09/19/2021 01:48 PM    Alk. phosphatase 78 09/19/2021 01:48 PM    Bilirubin, total 0.3 09/19/2021 01:48 PM       Imaging Last 24 Hours:  CT HEAD WO CONT    Result Date: 9/22/2021  Increasing confusion. No comparison. Technique: Axial images head without IV contrast, with multiplanar reformatting. Multiplanar reformatting. Dose reduction: All CT scans at this facility are performed using dose reduction optimization techniques as appropriate to a performed exam including the following: Automated exposure control, adjustments of the mA and/or kV according to patient's size, or use of iterative reconstruction technique.  Findings: Bilateral periventricular white matter hypodensity. Lacunar infarcts left basal ganglia, possibly mee and right basal ganglia. No mass effect, extra-axial fluid collection or hemorrhage. Normal position craniocervical junction. Atrophy. Mucosal thickening bilateral maxillary sinuses, left greater than right. Mastoid air cells are unopacified. Calvarium intact. 1. No acute intracranial findings. 2. Atrophy and small vessel ischemic disease. 3. Bilateral maxillary sinus disease. Assessment//Plan   Principal Problem:    Chest pain (9/19/2021)    Active Problems:    PNA (pneumonia) (9/30/2020)      Diabetic gastroparesis ([address] (10/1/2020)      Elevated troponin (9/20/2021)      Syncope (9/20/2021)      Hypoglycemia (9/20/2021)      Chronic respiratory failure with hypoxia ([address] (9/20/2021)      Sepsis ([facility] (9/22/2021)      Esophagitis with gastritis (9/22/2021)      Recent Results (from the past 24 hour(s))   GLUCOSE, POC    Collection Time: 09/21/21  4:51 PM   Result Value Ref Range    Glucose (POC) 338 (H) 65 - 117 mg/dL    Performed by Taye Rico    GLUCOSE, POC    Collection Time: 09/21/21  8:45 PM   Result Value Ref Range    Glucose (POC) 323 (H) 65 - 117 mg/dL    Performed by Serena Cochran    CBC WITH AUTOMATED DIFF    Collection Time: 09/22/21  7:57 AM   Result Value Ref Range    WBC 19.2 (H) 4.4 - 11.3 K/uL    RBC 4.00 (L) 4.50 - 5.90 M/uL    HGB 10.1 (L) 13.5 - 17.5 g/dL    HCT 32.1 (L) 36 - 46 %    MCV 80.2 80 - 100 FL    MCH 25.3 (L) 31 - 34 PG    MCHC 31.5 31.0 - 36.0 g/dL    RDW 16.6 (H) 11.5 - 14.5 %    PLATELET 841 770 - 104 K/uL    MPV 7.4 6.5 - 11.5 FL    NRBC 0.0  WBC    ABSOLUTE NRBC 0.01 K/uL    NEUTROPHILS 95 (H) 42 - 75 %    LYMPHOCYTES 3 (L) 20.5 - 51.1 %    MONOCYTES 2 1.7 - 9.3 %    EOSINOPHILS 0 (L) 0.9 - 2.9 %    BASOPHILS 0 0.0 - 2.5 %    ABS. NEUTROPHILS 18.3 (H) 1.8 - 7.7 K/UL    ABS. LYMPHOCYTES 0.5 (L) 1.0 - 4.8 K/UL    ABS. MONOCYTES 0.4 0.2 - 2.4 K/UL    ABS. EOSINOPHILS 0.0 0.0 - 0.7 K/UL    ABS.  BASOPHILS 0.0 0.0 - 0.2 K/UL   METABOLIC PANEL, BASIC    Collection Time: 09/22/21  7:57 AM   Result Value Ref Range    Sodium 143 136 - 145 mmol/L    Potassium 5.3 (H) 3.5 - 5.1 mmol/L    Chloride 106 97 - 108 mmol/L    CO2 26 21 - 32 mmol/L    Anion gap 11 5 - 15 mmol/L    Glucose 333 (H) 65 - 100 mg/dL    BUN 60 (H) 6 - 20 mg/dL    Creatinine 2.57 (H) 0.55 - 1.02 mg/dL    BUN/Creatinine ratio 23 (H) 12 - 20      GFR est AA 23 (L) >60 ml/min/1.73m2    GFR est non-AA 19 (L) >60 ml/min/1.73m2    Calcium 9.5 8.5 - 10.1 mg/dL   GLUCOSE, POC    Collection Time: 09/22/21  9:12 AM   Result Value Ref Range    Glucose (POC) 356 (H) 65 - 117 mg/dL    Performed by Nadege BURRIS          Current Facility-Administered Medications:     piperacillin-tazobactam (ZOSYN) 3.375 g in 0.9% sodium chloride (MBP/ADV) 100 mL MBP, 3.375 g, IntraVENous, Q8H, Edward Jennings MD, Last Rate: 25 mL/hr at 09/22/21 1212, 3.375 g at 09/22/21 1212    [Held by provider] furosemide (LASIX) injection 40 mg, 40 mg, IntraVENous, DAILY, Musa Buckley, NP, 40 mg at 09/22/21 1101    gabapentin (NEURONTIN) capsule 300 mg, 300 mg, Oral, BID, Charlie Jennings MD    cyclobenzaprine (FLEXERIL) tablet 10 mg, 10 mg, Oral, TID PRN, Edward Jennings MD, 10 mg at 09/22/21 1211    melatonin (rapid dissolve) tablet 5 mg, 5 mg, Oral, QHS, Edward Jennings MD    [START ON 9/23/2021] insulin glargine (LANTUS) injection 35 Units, 35 Units, SubCUTAneous, DAILY, Edward Jennings MD    insulin glargine (LANTUS) injection 15 Units, 15 Units, SubCUTAneous, NOW, Edward Jennings MD    venlafaxine-SR (EFFEXOR-XR) capsule 150 mg, 150 mg, Oral, DAILY, Edward Jennings MD    pantoprazole (PROTONIX) 40 mg in 0.9% sodium chloride 10 mL injection, 40 mg, IntraVENous, DAILY, Edward Jennings MD, 40 mg at 09/22/21 1212    ferrous sulfate tablet 325 mg, 1 Tablet, Oral, TID WITH MEALS, Edward Jennings MD, 325 mg at 09/22/21 0956    amLODIPine (NORVASC) tablet 10 mg, 10 mg, Oral, DAILY, Sudeep Buckley, NP, 10 mg at 09/22/21 0950    atorvastatin (LIPITOR) tablet 80 mg, 80 mg, Oral, QHS, Yennifer Buckley, NP, 80 mg at 09/21/21 2201    QUEtiapine (SEROquel) tablet 25 mg, 25 mg, Oral, QPM, Edward Jennings MD, 25 mg at 09/21/21 2201    LORazepam (ATIVAN) injection 0.5 mg, 0.5 mg, IntraVENous, Q6H PRN, Edward Jennings MD, 0.5 mg at 09/21/21 1632    methylPREDNISolone (PF) (Solu-MEDROL) injection 60 mg, 60 mg, IntraVENous, Q12H, Edward Jennings MD, 60 mg at 09/22/21 1100    azithromycin (ZITHROMAX) 500 mg in 0.9% sodium chloride 250 mL (VIAL-MATE), 500 mg, IntraVENous, Q24H, Edward Jennings MD, Last Rate: 250 mL/hr at 09/21/21 1940, 500 mg at 09/21/21 1940    metoclopramide HCl (REGLAN) injection 5 mg, 5 mg, IntraVENous, AC&HS, Edward Jennings MD, 5 mg at 09/22/21 1102    glucose chewable tablet 16 g, 4 Tablet, Oral, PRN, Edward Jennings MD    dextrose (D50W) injection syrg 12.5-25 g, 25-50 mL, IntraVENous, PRN, Edward Jennings MD    glucagon (GLUCAGEN) injection 1 mg, 1 mg, IntraMUSCular, PRN, Edward Jennings MD    insulin lispro (HUMALOG) injection, , SubCUTAneous, AC&HS, Edward Jennings MD, 15 Units at 09/22/21 0948    albuterol-ipratropium (DUO-NEB) 2.5 MG-0.5 MG/3 ML, 3 mL, Nebulization, QID RT, Edward Jennings MD, 3 mL at 09/22/21 0746    guaiFENesin ER (MUCINEX) tablet 600 mg, 600 mg, Oral, Q12H, Edward Jennings MD, 600 mg at 09/22/21 0950    [Held by provider] aspirin delayed-release tablet 81 mg, 81 mg, Oral, DAILY, Edward Jennings MD, 81 mg at 09/20/21 1802    sodium chloride (NS) flush 5-40 mL, 5-40 mL, IntraVENous, Q8H, Jacqueline Travis MD, 10 mL at 09/22/21 1532    sodium chloride (NS) flush 5-40 mL, 5-40 mL, IntraVENous, PRN, Jacqueline Travis MD    acetaminophen (TYLENOL) tablet 650 mg, 650 mg, Oral, Q6H PRN **OR** acetaminophen (TYLENOL) suppository 650 mg, 650 mg, Rectal, Q6H PRN, Jcaqueline Travis MD    polyethylene glycol (MIRALAX) packet 17 g, 17 g, Oral, DAILY PRN, Jacqueline Travis MD    ondansetron (ZOFRAN ODT) tablet 4 mg, 4 mg, Oral, Q8H PRN **OR** ondansetron (ZOFRAN) injection 4 mg, 4 mg, IntraVENous, Q6H PRN, Jacqueline Travis MD    enoxaparin (LOVENOX) injection 30 mg, 30 mg, SubCUTAneous, DAILY, Jacqueline Travis MD, 30 mg at 09/22/21 0951        Assessment & Plan       1. Non oliguric KANIKA sec to pre renal azotemia - SCR peaked at 2.66  SCra t presetn 2.5 gradual improvement noted  Encourage oral intake   Avoid nephrotoxins as  Much as possible  C/w strict I/O   Daily BMP for monitoring     2. CKD stage III - c/w renal diet     3. HTN- c/w SBP monitoring      4. Symptomatic hypoglycemia - glycemic control and work up as per primary team      5. Anemia of chronic disease- h/h stable.  C/w cbc trend for additional monitoring        Electronically signed by Brad Art MD on 9/22/2021 at 12:51 PM

## 2021-09-22 NOTE — PROGRESS NOTES
SPEECH LANGUAGE PATHOLOGY BEDSIDE SWALLOW EVALUATION AND DISCHARGE    Patient: Kristina Rogers (36 y.o. female)  Date: 9/22/2021  Primary Diagnosis: Chest pain [R07.9]  Syncope [R55]  Chronic respiratory failure with hypoxia (HCC) [J96.11]  Hypoglycemia [E16.2]  Procedure(s) (LRB):  ESOPHAGOGASTRODUODENOSCOPY (EGD) with esophageal dilation  (N/A) 1 Day Post-Op   Precautions: aspiration, fall, altered mental status         ASSESSMENT :  Clinical beside swallow eval completed per MD orders. Pt A&Ox2. Functional communication. Intelligibility >90%. OM examination revealed oral motor structures functional for mastication and deglutition. Presented with thin liquid, puree, and solid trials. Exhibited adeuqate bolus cohesion, manipulation and A-P transit. Pt able to manipulate and clear without clinical s/s aspiration and/or oropharyngeal dysphagia. Patient with recent EGD, therefore c/o dysphagia and globus sensation likely related to esophageal dysphagia; however, patient educated on s/s of oropharyngeal dysphagia to report to MD.  Patient verbalized understanding. Of note, approx one hour prior to this evaluation, ST attempted evaluation. Patient at that time was very lethargic, did not rouse to name or sternal rub, and was disoriented. Mara Aldrich, SLP     PLAN :  Recommendations and Planned Interventions:  -Recommend soft and bite sized solids (SB6) with thin liquids   -No formal ST needs for dysphagia indicated at this time. SLP educated pt on role of speech therapist in current setting with re: speech/swallow; verbalized comprehension. SLP available for re-evaluation if indicated by MD.    Discharge Recommendations: To Be Determined     SUBJECTIVE:   Patient stated I feel better now.     OBJECTIVE:     Past Medical History:   Diagnosis Date    Arthritis     spinal stenosis    Breast CA ([facility] 2017    Left Breast (chemo/radiation)    Diabetes (HCC)     Type 2    Edema     legs and ankles    GERD (gastroesophageal reflux disease)     High cholesterol     Hypertension     Ill-defined condition     patient states hemorrhage behind Left eye-following Dr. Tony ARAGON (myocardial infarction) [facility] 06/2018    per patient    Neuropathy     Restless leg      Past Surgical History:   Procedure Laterality Date    HX BACK SURGERY  08/30/2018    HX BREAST BIOPSY Left 02/03/2017    BREAST CANCER    HX BREAST LUMPECTOMY Left     HX CATARACT REMOVAL Right     HX HEART CATHETERIZATION  07/2018    no stents    HX KNEE REPLACEMENT       Home Situation:   Home Situation  Home Environment: Private residence  # Steps to Enter: 0  Rails to Enter: No  Wheelchair Ramp: Yes  One/Two Story Residence: One story  Living Alone: No  Support Systems: Other Family Member(s) (sister)  Patient Expects to be Discharged to[de-identified] House  Current DME Used/Available at Home: jens Garcia    Diet prior to admission: Regular with thin liquids  Current Diet:  Regular with thin liquids     Cognitive and Communication Status:  Neurologic State: Alert  Orientation Level: Oriented to person, Oriented to place  Cognition: Follows commands  Perception: Appears intact        Oral Assessment:  Oral Assessment  Labial: No impairment  Lingual: No impairment  Velum: No impairment  Mandible: No impairment  P.O. Trials:  Patient Position: HOB raised upright  Vocal quality prior to P.O.: No impairment  Consistency Presented:  Thin liquid;Puree;Mechanical soft  How Presented: Self-fed/presented;Spoon;Straw;Successive swallows     Bolus Acceptance: No impairment  Bolus Formation/Control: No impairment     Propulsion: No impairment  Oral Residue: None  Initiation of Swallow: No impairment  Laryngeal Elevation: Functional  Aspiration Signs/Symptoms: None  Pharyngeal Phase Characteristics: No impairment, issues, or problems                      PAIN:  Pain level pre-treatment: 0/10   Pain level post-treatment: 0/10   Pain Intervention(s): Medication (see MAR); Rest, Ice, Repositioning   Response to intervention: None required    After evaluation:   []            Patient left in no apparent distress sitting up in chair  [x]            Patient left in no apparent distress in bed  [x]            Call bell left within reach  [x]            Nursing notified  []            Family present  []            Caregiver present  []            Bed alarm activated      COMMUNICATION/EDUCATION:   [x]            Aspiration precautions; swallow safety; compensatory techniques. [x]            Patient/family have participated as able in goal setting and plan of care. []            Patient/family agree to work toward stated goals and plan of care. []            Patient understands intent and goals of therapy; neutral about participation. []            Patient unable to participate in goal setting/plan of care; educ ongoing with interdisciplinary staff  []         Posted safety precautions in patient's room. The patient's plan of care including recommendations, planned interventions, and recommended diet changes were discussed with: Registered nurse and Physician.      Thank you for this referral.  Raquel Conde, SLP  Time Calculation: 16 mins

## 2021-09-22 NOTE — PROGRESS NOTES
Patients case reviewed during interdisciplinary team meeting in [address] [place], [place] [address]Acute Care Unit. Rev.  Franky Mej[address], [address]

## 2021-09-22 NOTE — PROGRESS NOTES
Progress Note  Date:9/22/2021       Room:203/01  Patient Nikki Piper     YOB: 1955     Age:66 y.o. Subjective    As per admititng provider:  Chriss Lenz is a 61 y.o. female followed by Jayme Boles MD and  has a past medical history of Arthritis, Breast CA ([facility] [address] (2017), Diabetes ([facility] [address] Edema, GERD (gastroesophageal reflux disease), High cholesterol, Hypertension, Ill-defined condition, MI (myocardial infarction) ([facility] [address] (06/2018), Neuropathy, and Restless leg. CKD with last creat noted in 12/2020 at 1.8 and 2.2 on 4/28 and follows with Dr. Lina [facility] in [facility]. Patient unsure what happened but was noted to have passed out and on evaluation gluocose of 34 . Similar event happened about 1 year ago. Patient is on lantus high dose as well as 10 units TID WM. Patient notes decrease in apepitie for about 1 year now and mostly notes it is secondary to increased stress and and recent passing of her  in 11/30/2020. Currently patient lives with her sister Neil Valdovinos and able to ambulate without walker. Patient states her glucose level which she checks after breakfast at 10:30 usually is in 140 range and says on recheck at lunch and dinner glucose at times does get below 100. Patient also notes that for the past 3 days has been more sob with + wheeze and does use nebulizer twice daily but no relief. Denies any sick contacts. Patient relates chest pain and initial troponin x 2 was at 0.12 and no change. Repeat this AM slightly higher at 0.21. denies any GERD like symptoms but patient has been vomiting and has hx of esophageal stricture for which had dilation done at [facility] about 1 1/2 year ago. CXR shows mild cardiac enlargement. Echo done and shows EF 60-65% and no evidence of diastolic dysfunction.   With patients current presentation was admitting on telemetry for syncope, severy hypoglycemia, KANIKA on CKD, chest pain , elevated tropoinin and Copd exacerbation    Patient seen on follow-up. Resting in bed no acute distress. Continues on 3L NC (home dose). Patient able to tell me that she is in [place] [address] and gave correct year but not month. Patient sister concerned of continued hallucinations and confusion which is more then her baseline. Patient states she follows with pulmonologist who is out of [facility]. Afebrile, nurse notes that patient was attempting to get out of bed most of the night but currently is resting well. Had extensive conversation with sister and gave all current results on the patient and answered all questions. Noted that patient over past year has been having off and on hallucinations. Noncompliant with o2 and generally sleep most of the day. Has never seen pulmonologist but has had two prior episodes of pna.   CT head shows previous lacunar infarct and maxillary sinusitis. Afebrile,     Review of Systems   Constitutional: Negative. HENT: Negative. Eyes: Negative. Respiratory: Positive for shortness of breath. Cardiovascular: Negative. Gastrointestinal: Negative. Endocrine: Negative. Genitourinary: Negative. Musculoskeletal: Negative. Allergic/Immunologic: Negative. Neurological: Negative. Hematological: Negative. Psychiatric/Behavioral: Positive for confusion and hallucinations.        Objective           Vitals Last 24 Hours:  Patient Vitals for the past 24 hrs:   Temp Pulse Resp BP SpO2   09/22/21 0854 97.9 °F (36.6 °C) (!) 108 -- (!) 141/7 98 %   09/22/21 0341 98.1 °F (36.7 °C) -- 18 (!) 135/54 95 %   09/21/21 2355 98.4 °F (36.9 °C) (!) 105 18 137/60 91 %   09/21/21 1939 98.3 °F (36.8 °C) (!) 108 18 (!) 147/69 94 %   09/21/21 1927 -- -- -- -- 92 %   09/21/21 1926 -- -- -- -- 92 %   09/21/21 1552 97.4 °F (36.3 °C) (!) 106 18 136/67 97 %   09/21/21 1522 -- -- -- -- 95 %   09/21/21 1316 97 °F (36.1 °C) 94 20 130/65 95 %   09/21/21 1235 98.7 °F (37.1 °C) (!) 116 20 138/69 94 %   09/21/21 1219 -- 78 20 (!) 134/55 95 %   09/21/21 1218 -- 79 18 135/66 95 %   09/21/21 1114 -- -- -- -- 98 %   09/21/21 1105 97.6 °F (36.4 °C) 88 16 138/76 96 %        I/O (24Hr): Intake/Output Summary (Last 24 hours) at 9/22/2021 0884  Last data filed at 9/21/2021 1745  Gross per 24 hour   Intake 480 ml   Output 450 ml   Net 30 ml       Physical Exam:  General: Alert and awake but increasing confusion not oriented  Head:  Normocephalic, without obvious abnormality, atraumatic. Eyes:  Conjunctivae/corneas clear. Pupils equal, round, reactive to light. Extraocular movements intact. Lungs: markedly Bilateral air entry diminished, no wheeze or crackles Chest wall: No tenderness or deformity. Heart:  Regular rate and rhythm, S1, S2 normal, no murmur, click, rub or gallop. Abdomen:  Soft, non-tender. Bowel sounds normal. No masses,  No organomegaly. Extremities: Extremities normal, atraumatic, no cyanosis or edema. Pulses: 2+ and symmetric all extremities. Skin: Skin color, texture, turgor normal. No rashes or lesions  Neurologic: Awake, Alert,oriented at times.  No obvious gross sensory or motor deficits      Medications           Current Facility-Administered Medications   Medication Dose Route Frequency    piperacillin-tazobactam (ZOSYN) 3.375 g in 0.9% sodium chloride (MBP/ADV) 100 mL MBP  3.375 g IntraVENous Q8H    pantoprazole (PROTONIX) 40 mg in 0.9% sodium chloride 10 mL injection  40 mg IntraVENous DAILY    insulin glargine (LANTUS) injection 20 Units  20 Units SubCUTAneous DAILY    ferrous sulfate tablet 325 mg  1 Tablet Oral TID WITH MEALS    amLODIPine (NORVASC) tablet 10 mg  10 mg Oral DAILY    atorvastatin (LIPITOR) tablet 80 mg  80 mg Oral QHS    QUEtiapine (SEROquel) tablet 25 mg  25 mg Oral QPM    LORazepam (ATIVAN) injection 0.5 mg  0.5 mg IntraVENous Q6H PRN    furosemide (LASIX) injection 40 mg  40 mg IntraVENous BID    methylPREDNISolone (PF) (Solu-MEDROL) injection 60 mg  60 mg IntraVENous Q12H  azithromycin (ZITHROMAX) 500 mg in 0.9% sodium chloride 250 mL (VIAL-MATE)  500 mg IntraVENous Q24H    metoclopramide HCl (REGLAN) injection 5 mg  5 mg IntraVENous AC&HS    glucose chewable tablet 16 g  4 Tablet Oral PRN    dextrose (D50W) injection syrg 12.5-25 g  25-50 mL IntraVENous PRN    glucagon (GLUCAGEN) injection 1 mg  1 mg IntraMUSCular PRN    insulin lispro (HUMALOG) injection   SubCUTAneous AC&HS    albuterol-ipratropium (DUO-NEB) 2.5 MG-0.5 MG/3 ML  3 mL Nebulization QID RT    guaiFENesin ER (MUCINEX) tablet 600 mg  600 mg Oral Q12H    [Held by provider] aspirin delayed-release tablet 81 mg  81 mg Oral DAILY    sodium chloride (NS) flush 5-40 mL  5-40 mL IntraVENous Q8H    sodium chloride (NS) flush 5-40 mL  5-40 mL IntraVENous PRN    acetaminophen (TYLENOL) tablet 650 mg  650 mg Oral Q6H PRN    Or    acetaminophen (TYLENOL) suppository 650 mg  650 mg Rectal Q6H PRN    polyethylene glycol (MIRALAX) packet 17 g  17 g Oral DAILY PRN    ondansetron (ZOFRAN ODT) tablet 4 mg  4 mg Oral Q8H PRN    Or    ondansetron (ZOFRAN) injection 4 mg  4 mg IntraVENous Q6H PRN    enoxaparin (LOVENOX) injection 30 mg  30 mg SubCUTAneous DAILY         Allergies         Ciprofloxacin, Keflex [cephalexin], Metformin, and Soliqua 100/33 [insulin glargine-lixisenatide]       Labs/Imaging/Diagnostics      Labs:  Recent Results (from the past 48 hour(s))   HEMOGLOBIN A1C WITH EAG    Collection Time: 09/20/21  9:25 AM   Result Value Ref Range    Hemoglobin A1c 7.6 (H) 4.0 - 5.6 %    Est. average glucose 171 mg/dL   LIPID PANEL    Collection Time: 09/20/21  9:25 AM   Result Value Ref Range    LIPID PROFILE        Cholesterol, total 150 <200 mg/dL    Triglyceride 124 <150 mg/dL    HDL Cholesterol 55 mg/dL    LDL, calculated 70.2 0 - 100 mg/dL    VLDL, calculated 24.8 mg/dL    CHOL/HDL Ratio 2.7 0.0 - 5.0     RENAL FUNCTION PANEL    Collection Time: 09/20/21  9:25 AM   Result Value Ref Range    Sodium 138 136 - 145 mmol/L    Potassium 4.9 3.5 - 5.1 mmol/L    Chloride 103 97 - 108 mmol/L    CO2 28 21 - 32 mmol/L    Anion gap 7 5 - 15 mmol/L    Glucose 196 (H) 65 - 100 mg/dL    BUN 37 (H) 6 - 20 mg/dL    Creatinine 2.54 (H) 0.55 - 1.02 mg/dL    BUN/Creatinine ratio 15 12 - 20      GFR est AA 23 (L) >60 ml/min/1.73m2    GFR est non-AA 19 (L) >60 ml/min/1.73m2    Calcium 9.0 8.5 - 10.1 mg/dL    Phosphorus 3.8 2.6 - 4.7 mg/dL    Albumin 2.8 (L) 3.5 - 5.0 g/dL   CBC WITH AUTOMATED DIFF    Collection Time: 09/20/21  9:25 AM   Result Value Ref Range    WBC 11.6 (H) 4.4 - 11.3 K/uL    RBC 3.74 (L) 4.50 - 5.90 M/uL    HGB 9.7 (L) 13.5 - 17.5 g/dL    HCT 30.3 (L) 36 - 46 %    MCV 81.0 80 - 100 FL    MCH 26.0 (L) 31 - 34 PG    MCHC 32.1 31.0 - 36.0 g/dL    RDW 16.6 (H) 11.5 - 14.5 %    PLATELET 038 940 - 354 K/uL    MPV 7.2 6.5 - 11.5 FL    NRBC 0.1  WBC    ABSOLUTE NRBC 0.01 K/uL    NEUTROPHILS 83 (H) 42 - 75 %    LYMPHOCYTES 6 (L) 20.5 - 51.1 %    MONOCYTES 7 1.7 - 9.3 %    EOSINOPHILS 3 (H) 0.9 - 2.9 %    BASOPHILS 1 0.0 - 2.5 %    ABS. NEUTROPHILS 9.8 (H) 1.8 - 7.7 K/UL    ABS. LYMPHOCYTES 0.7 (L) 1.0 - 4.8 K/UL    ABS. MONOCYTES 0.8 0.2 - 2.4 K/UL    ABS. EOSINOPHILS 0.3 0.0 - 0.7 K/UL    ABS. BASOPHILS 0.1 0.0 - 0.2 K/UL   TROPONIN I    Collection Time: 09/20/21  9:25 AM   Result Value Ref Range    Troponin-I, Qt. 0.21 (H) <0.05 ng/mL   TSH 3RD GENERATION    Collection Time: 09/20/21  9:25 AM   Result Value Ref Range    TSH 2.78 0.36 - 3.74 uIU/mL   ECHO ADULT COMPLETE    Collection Time: 09/20/21  9:58 AM   Result Value Ref Range    LV ED Vol A2C 138. 87 mL    LV ED Vol A2C 138. 87 mL    LV ED Vol A2C 158. 72 mL    LV ED Vol A2C 158. 72 mL    IVSd 1.11 (A) 0.60 - 0.90 cm    LVIDd 5.36 (A) 3.90 - 5.30 cm    LVIDd 5.68 (A) 3.90 - 5.30 cm    LVIDs 4.13 cm    LVPWd 1.20 (A) 0.60 - 0.90 cm    LVOT .8 mL    LVOT .8 mL    BP EF 59.2 55.0 - 100.0 percent    BP EF 59.2 55.0 - 100.0 percent    LV Ejection Fraction MOD 2C 58 percent    LV Ejection Fraction MOD 2C 58 percent    LV Ejection Fraction MOD 4C 60 percent    LV Ejection Fraction MOD 4C 60 percent    LV ED Vol BP 77.16 56.0 - 104.0 mL    LV ED Vol BP 77.16 56.0 - 104.0 mL    LV ES Vol A2C 32.21 mL    LV ES Vol BP 31.51 19.0 - 49.0 mL    LV ES Vol BP 31.51 19.0 - 49.0 mL    LVOT Peak Gradient 3.09 mmHg    Left Ventricular Outflow Tract Mean Gradient 1.97 mmHg    LVOT Peak Velocity 87.87 cm/s    LVOT VTI 19.85 cm    LA Volume 50.53 22.0 - 52.0 mL    LA Volume 50.53 22.0 - 52.0 mL    LA Vol 2C 48.89 22.00 - 52.00 mL    LA Vol 4C 41.49 22.00 - 52.00 mL    AoV PG 8.92 mmHg    Aortic Valve Systolic Mean Gradient 2.92 mmHg    Aortic Valve Systolic Peak Velocity 693.40 cm/s    Aortic valve mean velocity 119.09 cm/s    AoV VTI 31.70 cm    MV A Brian 120.66 cm/s    Mitral Valve E Wave Deceleration Time 184.89 ms    MV E Brian 144.11 cm/s    MV E/A 1.19     Mitral Valve Pressure Half-time 53.62 ms    MVA (PHT) 4.10 cm2    MVA (PHT) 4.10 cm2    Ao Root D 3.58 cm    LA Vol Index 24.45 16.00 - 28.00 ml/m2    LA Vol Index 20.75 16.00 - 28.00 ml/m2    LVES Vol Index A2C 16.1 mL/m2   PHOSPHORUS    Collection Time: 09/20/21 11:00 AM   Result Value Ref Range    Phosphorus 3.8 2.6 - 4.7 mg/dL   IRON PROFILE    Collection Time: 09/20/21 11:00 AM   Result Value Ref Range    Iron 19 (L) 35 - 150 ug/dL    TIBC 263 250 - 450 ug/dL    Iron % saturation 7 (L) 20 - 50 %   GLUCOSE, POC    Collection Time: 09/20/21 11:04 AM   Result Value Ref Range    Glucose (POC) 251 (H) 65 - 117 mg/dL    Performed by Alberto Thomas    GLUCOSE, POC    Collection Time: 09/20/21  3:23 PM   Result Value Ref Range    Glucose (POC) 261 (H) 65 - 117 mg/dL    Performed by Gregery Chester    TROPONIN I    Collection Time: 09/20/21  3:30 PM   Result Value Ref Range    Troponin-I, Qt. 0.15 (H) <0.05 ng/mL   GLUCOSE, POC    Collection Time: 09/20/21  9:01 PM   Result Value Ref Range    Glucose (POC) 467 (H) 65 - 117 mg/dL    Performed by ARSALAN KIRAN    TROPONIN I    Collection Time: 09/20/21  9:30 PM   Result Value Ref Range    Troponin-I, Qt. 0.09 (H) <0.05 ng/mL   CBC WITH AUTOMATED DIFF    Collection Time: 09/21/21  5:42 AM   Result Value Ref Range    WBC 14.9 (H) 4.4 - 11.3 K/uL    RBC 3.93 (L) 4.50 - 5.90 M/uL    HGB 10.0 (L) 13.5 - 17.5 g/dL    HCT 31.5 (L) 36 - 46 %    MCV 80.1 80 - 100 FL    MCH 25.5 (L) 31 - 34 PG    MCHC 31.9 31.0 - 36.0 g/dL    RDW 16.7 (H) 11.5 - 14.5 %    PLATELET 472 555 - 195 K/uL    MPV 7.8 6.5 - 11.5 FL    NRBC 0.0  WBC    ABSOLUTE NRBC 0.01 K/uL    NEUTROPHILS 96 (H) 42 - 75 %    LYMPHOCYTES 2 (L) 20.5 - 51.1 %    MONOCYTES 2 1.7 - 9.3 %    EOSINOPHILS 0 (L) 0.9 - 2.9 %    BASOPHILS 0 0.0 - 2.5 %    ABS. NEUTROPHILS 14.3 (H) 1.8 - 7.7 K/UL    ABS. LYMPHOCYTES 0.2 (L) 1.0 - 4.8 K/UL    ABS. MONOCYTES 0.3 0.2 - 2.4 K/UL    ABS. EOSINOPHILS 0.0 0.0 - 0.7 K/UL    ABS.  BASOPHILS 0.0 0.0 - 0.2 K/UL   METABOLIC PANEL, BASIC    Collection Time: 09/21/21  5:42 AM   Result Value Ref Range    Sodium 137 136 - 145 mmol/L    Potassium 5.1 3.5 - 5.1 mmol/L    Chloride 102 97 - 108 mmol/L    CO2 23 21 - 32 mmol/L    Anion gap 12 5 - 15 mmol/L    Glucose 380 (H) 65 - 100 mg/dL    BUN 48 (H) 6 - 20 mg/dL    Creatinine 2.83 (H) 0.55 - 1.02 mg/dL    BUN/Creatinine ratio 17 12 - 20      GFR est AA 20 (L) >60 ml/min/1.73m2    GFR est non-AA 17 (L) >60 ml/min/1.73m2    Calcium 8.9 8.5 - 10.1 mg/dL   BLOOD GAS, ARTERIAL    Collection Time: 09/21/21  9:00 AM   Result Value Ref Range    pH 7.37 7.35 - 7.45      PCO2 37 35 - 45 mmHg    PO2 85 70 - 100 mmHg    BICARBONATE 21 (L) 22 - 26 mmol/L    BASE DEFICIT 4.1 (H) 0 - 2 mmol/L    O2 METHOD Nasal Cannula      O2 FLOW RATE 3 L/min    FIO2 32.0 %    Sample source Arterial      SITE Right Radial      ANDREA'S TEST PASS      Carboxy-Hgb 0.3 0 - 5 %    Methemoglobin 0.3 0 - 5 %    tHb 10.5 (L) 13.5 - 17.5 g/dL    Oxyhemoglobin 95.9 95 - 100 %   COVID-19 RAPID TEST    Collection Time: 09/21/21  9:00 AM   Result Value Ref Range    Specimen source Nasopharyngeal      COVID-19 rapid test Not Detected Not Detected     GLUCOSE, POC    Collection Time: 09/21/21 11:21 AM   Result Value Ref Range    Glucose (POC) 330 (H) 65 - 117 mg/dL    Performed by Andry Mace    GLUCOSE, POC    Collection Time: 09/21/21  4:51 PM   Result Value Ref Range    Glucose (POC) 338 (H) 65 - 117 mg/dL    Performed by Ledy Rod    GLUCOSE, POC    Collection Time: 09/21/21  8:45 PM   Result Value Ref Range    Glucose (POC) 323 (H) 65 - 117 mg/dL    Performed by Homar Aguilar    CBC WITH AUTOMATED DIFF    Collection Time: 09/22/21  7:57 AM   Result Value Ref Range    WBC 19.2 (H) 4.4 - 11.3 K/uL    RBC 4.00 (L) 4.50 - 5.90 M/uL    HGB 10.1 (L) 13.5 - 17.5 g/dL    HCT 32.1 (L) 36 - 46 %    MCV 80.2 80 - 100 FL    MCH 25.3 (L) 31 - 34 PG    MCHC 31.5 31.0 - 36.0 g/dL    RDW 16.6 (H) 11.5 - 14.5 %    PLATELET 572 787 - 119 K/uL    MPV 7.4 6.5 - 11.5 FL    NRBC 0.0  WBC    ABSOLUTE NRBC 0.01 K/uL    NEUTROPHILS 95 (H) 42 - 75 %    LYMPHOCYTES 3 (L) 20.5 - 51.1 %    MONOCYTES 2 1.7 - 9.3 %    EOSINOPHILS 0 (L) 0.9 - 2.9 %    BASOPHILS 0 0.0 - 2.5 %    ABS. NEUTROPHILS 18.3 (H) 1.8 - 7.7 K/UL    ABS. LYMPHOCYTES 0.5 (L) 1.0 - 4.8 K/UL    ABS. MONOCYTES 0.4 0.2 - 2.4 K/UL    ABS. EOSINOPHILS 0.0 0.0 - 0.7 K/UL    ABS.  BASOPHILS 0.0 0.0 - 0.2 K/UL   METABOLIC PANEL, BASIC    Collection Time: 09/22/21  7:57 AM   Result Value Ref Range    Sodium 143 136 - 145 mmol/L    Potassium 5.3 (H) 3.5 - 5.1 mmol/L    Chloride 106 97 - 108 mmol/L    CO2 26 21 - 32 mmol/L    Anion gap 11 5 - 15 mmol/L    Glucose 333 (H) 65 - 100 mg/dL    BUN 60 (H) 6 - 20 mg/dL    Creatinine 2.57 (H) 0.55 - 1.02 mg/dL    BUN/Creatinine ratio 23 (H) 12 - 20      GFR est AA 23 (L) >60 ml/min/1.73m2    GFR est non-AA 19 (L) >60 ml/min/1.73m2    Calcium 9.5 8.5 - 10.1 mg/dL        Imaging:  CT CHEST WO CONT    Result Date: 9/21/2021  There are small to moderate-sized bilateral pleural effusions as well as acute interstitial disease with thickening of the interlobular and intralobular septae. The differential diagnosis would include pulmonary edema versus an atypical interstitial pneumonia/pneumonitis. The pleural effusions favor pulmonary interstitial edema over acute interstitial pneumonia. The cardiac chambers are normal in size without significant cardiomegaly. There are coronary artery calcifications. US RETROPERITONEUM COMP    Result Date: 9/21/2021  Limited by bowel gas. 1. Kidneys grossly normal size, without hydronephrosis. XR CHEST PORT    Result Date: 9/19/2021  Mild cardiac enlargement. Exam is otherwise unremarkable.        Assessment//Plan           Problem List:  Hospital Problems  Date Reviewed: 2/3/2021        Codes Class Noted POA    Elevated troponin ICD-10-CM: R77.8  ICD-9-CM: 790.6  9/20/2021 Unknown        Syncope ICD-10-CM: R55  ICD-9-CM: 780.2  9/20/2021 Unknown        Hypoglycemia ICD-10-CM: E16.2  ICD-9-CM: 251.2  9/20/2021 Unknown        Chronic respiratory failure with hypoxia [facility]) ICD-10-CM: J96.11  ICD-9-CM: 518.83, 799.02  9/20/2021 Unknown        * (Principal) Chest pain ICD-10-CM: R07.9  ICD-9-CM: 786.50  9/19/2021 Unknown            Sepsis  -Currently is meeting sepsis secondary to tachycardia and leukocytosis most likely secondary to bilateral pneumonia as urinalysis is negative  -Cultures x2 obtained follow results  -New current IV antibiotics  -Hold IV fluids secondary to noted effusions on x-ray    Acute on chronic Hypoxic Respiratory failure  -Possibly on chronic 3 L but noncompliant at home may have some signs and symptoms of sleep apnea  -Currently is on 3 L with good O2 saturation continues to get short of breath with minimal exertion most likely secondary to underlying COPD/interstitial lung findings as well as bilateral pneumonia  -Continue current DuoNebs  -Monitor O2 sat closely and encourage incentive spirometry    Acute bilateral pneumonia  -Unknown etiology but patient has had previous history of pneumonia for which she was admitted to [facility] over a year ago  -Patient states she follows with pulmonologist but patient sister is unaware of any physician at [facility] [address] scan of the chest shows small to moderate sized bilateral pleural effusions as well as acute interstitial disease with thickening of the interlobular and intralobular septae with differential being pulmonary edema versus atypical interstitial pneumonia/pneumonitis  -Increased leukocytosis possibly secondary steroids versus infection  -Follow-up procalcitonin level  -Blood cultures x2 obtained  -Continued on azithromycin 500 mg as well as Zosyn 3.375 g every 8 hours    Maxillary sinusitis  -As noted on CT of the head   -We will start Flonase 2 sprays daily and cetirizine 10 mg every afternoon  -continue current antibiotics      Chest pain   - possible secondary to coughing and vomiting in setting of hx of esophageal stricture.    -Initial troponin 0.12 and repeat in similar range of 0.12 drawn at 3:30 PM on 9/19 and on recheck on 9/20 slightly elevated 0.21  -Continue to cycle every 6 hours troponins x2  -Cardiology consultation appreciated  -The echo shows preserved EF and no diastolic dysfunction  -Noted to have history of cardiac catheterization at [place] and records show minimal obstruction no stents needed  -Monitor closely on telemetry     Syncope  -Noted to have passed out and felt to be secondary to hypoglycemia  -Monitor on telemetry for any irregular rhythms  -Initial troponins elevated 0.12x2 but repeat elevated 0.21 and will continue cycle every 6 hours x2  -2D echo shows preserved EF no diastolic dysfunction     Hypoglycemia  - was 34 on initial check and improved slowly after treatment and currently glucose levels at 175 this morning and 9/20 and 251  -We will hold long-acting insulin as well as mealtime insulin  -Continue monitor Accu-Cheks AC at bedtime  -Nutrition consultation  -Poor appetite most likely secondary to increased stress and loss of  on 11/30/2020 as well as issues with vomiting possibly secondary to esophageal stricture  -On 9/21 with current steroids glucose levels in the 300 range restarted home Lantus but at lower dose of 20 units and will continue to monitor closely  -9/22 patient glucose levels continue to be uncontrolled and will increase patient to 35 units Lantus daily and new on sliding scale    Antral gastritis/esophagitis  -As noted on EGD and will continue IV Protonix 40 mg daily  -Continue to avoid NSAIDs     Vomiting  -Had vomiting episodes periodically most likely secondary to esophageal stricture  -We will get speech therapy to evaluate swallow and GI to evaluate EGD performed and esophageal dilation performed and EGD also shows esophagitis as well as evidence of gastroparesis so we will start Reglan 5 mg prior to meals and at bedtime     KANIKA on CKD  -Creatinine slightly elevated at 2.66 with previous baseline creatinine earlier this year in April at 2.2 and prior to that was at 1.8 in December 2020  -Obtain baseline labs from Dr. Rafaela Prajapati office and patient has new appointment with Dr. Ana Cortez nephrologist in [address] [place] [address] current IV fluids of D5 half NS at 100 cc an hour and repeat creatinine down to 2.54 on 9/20  -Nephrology consult appreciated  - hx of toradol use and will discontinue any further NSAID use.   -On 9/21 it was noted from records that patient's creatinine previously was at 1.9 and so initially continued fluids but with patient's worsening shortness of breath with exertion and CT of the chest showing extensive pulmonary edema discontinue and start Lasix of 40 mg IV twice daily  -9/22 continue worsening of KANIKA follow with nephrology who is recommending to encourage oral fluids hold any further diuresis    Acute on chronic COPD exacerbation   -Presents with shortness of breath and cough for the past 3 days along with wheezing and no history of tobacco use  -Chest x-ray negative for any acute pneumonic process  -Place patient on nebulizer DuoNeb 4 times daily  -On 9/21 noted to have increasing upper airway stridor and a racemic epinephrine was given and Solu-Medrol increased to 60 mg IV every 8 hours and decreased to every 12 hours  -CT of the chest shows small to moderate pulmonary edema bilaterally along with interstitial disease with noted possible atypical infection and so azithromycin 500 mg IV daily will be started    Acute encephalopathy  -Continues to have cough confusion possibly secondary to underlying dementia/psychiatric and poor acute hospitalization  -ABG performed shows pH of 7.37 PCO2 of 37 with PO2 of 85 on 3 L  -Patient started on Seroquel 25 mg every afternoon and as needed Ativan 0.5 mg IV every 6 hours as needed for agitation  -Cussed with patient sister and for a year now has been having off-and-on hallucinations and has been placed on Effexor extended release 150 mg and has not been started since admission so we will go ahead and restart  -We will obtain psychiatric consultation    DVT prophylaxis  -Lovenox     CODE STATUS: Full code  Patient designates her sister Renetta Duncan as her medical decision-maker if for some reason she is unable to make decisions for herself     Spent 40 minutes evaluting and coordinating patient care of which >50% was spent coordinating and counseling.           Electronically signed by Milady Murdock MD on 9/22/2021 at 7:20 AM

## 2021-09-22 NOTE — ROUTINE PROCESS
Pt aaox3, c/o pain and cramping in toes in bilateral feet, dr. Yeimi Moreno and ordered neurontin. neurontin pndg pharm verification, pharm called and rn req, med verification. Flexeril oral given at this time. Heladio Hawley

## 2021-09-22 NOTE — ROUTINE PROCESS
Patient pulled out I.V. site. Also, patient has been pulling off cardiac monitor all night and getting up out of bed all night. Patient is resting quietly. Bed monitor is in the room. Will continue to monitor.

## 2021-09-22 NOTE — ROUTINE PROCESS
Talked to patient's sister. She stated that the condition that her sister is not what she is use too. She feels that her sister condition is worse. She states that she has never been this confused. She wants her sister to get a CT scan to see if she has had a stroke. She wants to know the reason behind the confusion. She states that she has talked to three nurses and has not gotten anywhere. Instructed her to call tomorrow and ask for the doctor. Also, was told that nurse would pass the message to make sure that her concerns are answered.

## 2021-09-22 NOTE — PROGRESS NOTES
Progress Note    Patient: Darlene Alvarez MRN: [id number]  SSN: [id number]    YOB: 1955  Age: 61 y.o. Sex: female      Admit Date: 9/19/2021    LOS: 2 days     Subjective:      Patient seen and examined. Darlene Alvarez is a 61y.o. year-old female with past medical history significant for HTN, HLD, DM, COPD with Continuous supplemental oxygen required, Left Breast CA s/p Radiation/Chemo Therapy, Esophageal strictures s/p dilatation procedure/EGD, obesity, and ? Cardiomyopathy who is followed for Elevated Troponin and atypical chest pain. This am patient reports that she is feeling much better, is able to eat muffin on tray with ease, and has no new complaints. Note patient has had confusion overnight. Renal function improved some today, note hyperkalemia at 5.3 however. Telemetry Review: SR/ST    Review of Symptoms:   Review of Systems   Constitutional: Negative. HENT:        Difficulty swallowing   Eyes: Negative. Cardiovascular: Positive for dyspnea on exertion. Respiratory: Positive for wheezing. Endocrine: Negative. Hematologic/Lymphatic: Negative. Skin: Negative. Musculoskeletal: Negative. Gastrointestinal: Negative. Genitourinary: Negative. Neurological: Negative. Psychiatric/Behavioral: Negative. Allergic/Immunologic: Negative. Objective:     Vitals:    09/21/21 2355 09/22/21 0341 09/22/21 0746 09/22/21 0854   BP: 137/60 (!) 135/54  (!) 141/7   Pulse: (!) 105   (!) 108   Resp: 18 18     Temp: 98.4 °F (36.9 °C) 98.1 °F (36.7 °C)  97.9 °F (36.6 °C)   SpO2: 91% 95% (!) 82% 98%   Weight:       Height:            Intake and Output:  Current Shift: No intake/output data recorded.   Last three shifts: 09/20 1901 - 09/22 0700  In: 480 [P.O.:380; I.V.:100]  Out: 450 [Urine:450]    Physical Exam:   General: Well nourished female sitting up in bed eating breakfast, NAD, A&O  HEENT: Normocephalic, PERRL, no drainage  Neck: Supple, Trachea midline, No JVD  RESP: CTA bilaterally, diminished in bases. + Symmetrical chest movement. No SOB or distress. On O2 via NC. Cardiovascular: RRR no RG. + murmur. PVS: No rubor, cyanosis, no edema, Radial, DP, PT pulses equal bilaterally  ABD: obese, soft, NT, Normoactive BS  Derm: Warm/Dry/Intact with no lesions, poor turgor  Neuro: A&O PPTS, cranial nerves II- XII grossly intact via interaction with patient.  No focal deficits  PSYCH: No anxiety or agitation      Lab/Data Review:  BMP:   Lab Results   Component Value Date/Time     09/22/2021 07:57 AM    K 5.3 (H) 09/22/2021 07:57 AM     09/22/2021 07:57 AM    CO2 26 09/22/2021 07:57 AM    AGAP 11 09/22/2021 07:57 AM     (H) 09/22/2021 07:57 AM    BUN 60 (H) 09/22/2021 07:57 AM    CREA 2.57 (H) 09/22/2021 07:57 AM    GFRAA 23 (L) 09/22/2021 07:57 AM    GFRNA 19 (L) 09/22/2021 07:57 AM            Current Facility-Administered Medications:     piperacillin-tazobactam (ZOSYN) 3.375 g in 0.9% sodium chloride (MBP/ADV) 100 mL MBP, 3.375 g, IntraVENous, Q8H, Edward Jennings MD    furosemide (LASIX) injection 40 mg, 40 mg, IntraVENous, DAILY, Dahlia Buckley NP    pantoprazole (PROTONIX) 40 mg in 0.9% sodium chloride 10 mL injection, 40 mg, IntraVENous, DAILY, Edward Jennings MD, 40 mg at 09/21/21 0833    insulin glargine (LANTUS) injection 20 Units, 20 Units, SubCUTAneous, DAILY, Edward Jennings MD, 20 Units at 09/22/21 0948    ferrous sulfate tablet 325 mg, 1 Tablet, Oral, TID WITH MEALS, Edward Jennings MD, 325 mg at 09/22/21 0956    amLODIPine (NORVASC) tablet 10 mg, 10 mg, Oral, DAILY, Delta Buckley NP, 10 mg at 09/22/21 0950    atorvastatin (LIPITOR) tablet 80 mg, 80 mg, Oral, QHS, Delta Buckley, AIDEN, 80 mg at 09/21/21 2201    QUEtiapine (SEROquel) tablet 25 mg, 25 mg, Oral, QPM, Edward Jennings MD, 25 mg at 09/21/21 2201    LORazepam (ATIVAN) injection 0.5 mg, 0.5 mg, IntraVENous, Q6H PRN, Edward Jennings MD, 0.5 mg at 09/21/21 1632    methylPREDNISolone (PF) (Solu-MEDROL) injection 60 mg, 60 mg, IntraVENous, Q12H, Edward Jennings MD, 60 mg at 09/21/21 2201    azithromycin (ZITHROMAX) 500 mg in 0.9% sodium chloride 250 mL (VIAL-MATE), 500 mg, IntraVENous, Q24H, Edward Jennings MD, Last Rate: 250 mL/hr at 09/21/21 1940, 500 mg at 09/21/21 1940    metoclopramide HCl (REGLAN) injection 5 mg, 5 mg, IntraVENous, AC&HS, Edward Jennings MD, 5 mg at 09/21/21 2201    glucose chewable tablet 16 g, 4 Tablet, Oral, PRN, Edward Jennings MD    dextrose (D50W) injection syrg 12.5-25 g, 25-50 mL, IntraVENous, PRN, Edward Jennings MD    glucagon (GLUCAGEN) injection 1 mg, 1 mg, IntraMUSCular, PRN, Edward Jennings MD    insulin lispro (HUMALOG) injection, , SubCUTAneous, AC&HS, Edward Jennings MD, 15 Units at 09/22/21 0948    albuterol-ipratropium (DUO-NEB) 2.5 MG-0.5 MG/3 ML, 3 mL, Nebulization, QID RT, Edward Jennings MD, 3 mL at 09/22/21 0746    guaiFENesin ER (MUCINEX) tablet 600 mg, 600 mg, Oral, Q12H, Edward Jennings MD, 600 mg at 09/22/21 0950    [Held by provider] aspirin delayed-release tablet 81 mg, 81 mg, Oral, DAILY, Edward Jennings MD, 81 mg at 09/20/21 1802    sodium chloride (NS) flush 5-40 mL, 5-40 mL, IntraVENous, Q8H, Jacqueline Travis MD, 10 mL at 09/22/21 3171    sodium chloride (NS) flush 5-40 mL, 5-40 mL, IntraVENous, PRN, Jacqueline Travis MD    acetaminophen (TYLENOL) tablet 650 mg, 650 mg, Oral, Q6H PRN **OR** acetaminophen (TYLENOL) suppository 650 mg, 650 mg, Rectal, Q6H PRN, Jacqueline Travis MD    polyethylene glycol (MIRALAX) packet 17 g, 17 g, Oral, DAILY PRN, Jacqueline Travis MD    ondansetron (ZOFRAN ODT) tablet 4 mg, 4 mg, Oral, Q8H PRN **OR** ondansetron (ZOFRAN) injection 4 mg, 4 mg, IntraVENous, Q6H PRN, Jacqueline Travis MD    enoxaparin (LOVENOX) injection 30 mg, 30 mg, SubCUTAneous, DAILY, Jacqueline Travis MD, 30 mg at 09/22/21 1290      Assessment:     Principal Problem:    Chest pain (9/19/2021)    Active Problems:    Elevated troponin (9/20/2021)      Syncope (9/20/2021)      Hypoglycemia (9/20/2021)      Chronic respiratory failure with hypoxia ([facility] [address] (9/20/2021)        Plan:     Case discussed with Collaborating physician Dr. Leora Galicia and our recommendations are as follows:     1. Elevated Troponin - Flat in trends on first checks @ 0.12. Note that 9/20 am Trop was 0.21, this was peak and it down-trended from there. No ACS per EKG. [facility] records reviewed and patient had Cath 2018 that showed normal coronaries other than small lesion RCA. Sx were atypical and associated with food intake, and have resolved since dilatation of esophagus yesterday. TTE grossly normal with no wall motion abnormalities. Continue risk factor modification and medication regimen. If sx continue after GI intervention yesterday can consider NST in OP setting. 2. KANIKA on CKD - appreciate nephrology input. Hold home Lisinopril for now. Creat was 1.6 early 2021 and 1.9 on 9/15/21 per PCP records. Note ?edema on CT Chest with pleural effusions yesterday. Can trial lasix IV today and see response. May consider stopping if minimal response or worsening of renal function. 3. HTN -BP close to goal.  Continue home regimen Amlodipine 10mg daily. Hold Lisinopril home dosing. Appreciate Nephrology input. Use prn hydralazine as needed. Diuresis as above. 4. HLD: LDL @ 70 in setting of A1C of 7.6. Increased Atorvastatin to 80mg QHS effective 9/21/21. 5. DM: A1C 7.6 on labs. This is significant improvement from previous when reviewing PCP notes. Continue aggressive glycemic control. Thank you for involving us in the care of this patient. Please do not hesitate to call if additional questions arise.  If after hours please call [phone]    Signed By: Geno Munzo NP     September 22, 2021

## 2021-09-23 LAB
ANION GAP SERPL CALC-SCNC: 10 MMOL/L (ref 5–15)
BASOPHILS # BLD: 0 K/UL (ref 0–0.2)
BASOPHILS NFR BLD: 0 % (ref 0–2.5)
BUN SERPL-MCNC: 71 MG/DL (ref 6–20)
BUN/CREAT SERPL: 26 (ref 12–20)
CA-I BLD-MCNC: 9.7 MG/DL (ref 8.5–10.1)
CHLORIDE SERPL-SCNC: 109 MMOL/L (ref 97–108)
CO2 SERPL-SCNC: 27 MMOL/L (ref 21–32)
CREAT SERPL-MCNC: 2.76 MG/DL (ref 0.55–1.02)
CREAT UR-MCNC: 57.4 MG/DL
EOSINOPHIL # BLD: 0 K/UL (ref 0–0.7)
EOSINOPHIL NFR BLD: 0 % (ref 0.9–2.9)
ERYTHROCYTE [DISTWIDTH] IN BLOOD BY AUTOMATED COUNT: 17 % (ref 11.5–14.5)
GLUCOSE BLD STRIP.AUTO-MCNC: 233 MG/DL (ref 65–117)
GLUCOSE BLD STRIP.AUTO-MCNC: 306 MG/DL (ref 65–117)
GLUCOSE BLD STRIP.AUTO-MCNC: 319 MG/DL (ref 65–117)
GLUCOSE BLD STRIP.AUTO-MCNC: 365 MG/DL (ref 65–117)
GLUCOSE SERPL-MCNC: 243 MG/DL (ref 65–100)
HCT VFR BLD AUTO: 32.2 % (ref 36–46)
HGB BLD-MCNC: 10.4 G/DL (ref 13.5–17.5)
LYMPHOCYTES # BLD: 0.5 K/UL (ref 1–4.8)
LYMPHOCYTES NFR BLD: 3 % (ref 20.5–51.1)
MAGNESIUM SERPL-MCNC: 2.6 MG/DL (ref 1.6–2.4)
MCH RBC QN AUTO: 25.9 PG (ref 31–34)
MCHC RBC AUTO-ENTMCNC: 32.1 G/DL (ref 31–36)
MCV RBC AUTO: 80.5 FL (ref 80–100)
MICROALBUMIN UR-MCNC: 144 MG/DL
MICROALBUMIN/CREAT UR-RTO: 2509 MGMALB/GCRE (ref 0–30)
MONOCYTES # BLD: 0.3 K/UL (ref 0.2–2.4)
MONOCYTES NFR BLD: 2 % (ref 1.7–9.3)
NEUTS SEG # BLD: 15.3 K/UL (ref 1.8–7.7)
NEUTS SEG NFR BLD: 95 % (ref 42–75)
NRBC # BLD: 0.04 K/UL
NRBC BLD-RTO: 0.3 PER 100 WBC
PERFORMED BY, TECHID: ABNORMAL
PLATELET # BLD AUTO: 389 K/UL (ref 150–400)
PMV BLD AUTO: 7.5 FL (ref 6.5–11.5)
POTASSIUM SERPL-SCNC: 4.7 MMOL/L (ref 3.5–5.1)
RBC # BLD AUTO: 4 M/UL (ref 4.5–5.9)
SODIUM SERPL-SCNC: 146 MMOL/L (ref 136–145)
WBC # BLD AUTO: 16.1 K/UL (ref 4.4–11.3)

## 2021-09-23 PROCEDURE — 74011636637 HC RX REV CODE- 636/637: Performed by: HOSPITALIST

## 2021-09-23 PROCEDURE — 74011000258 HC RX REV CODE- 258: Performed by: HOSPITALIST

## 2021-09-23 PROCEDURE — 74011250636 HC RX REV CODE- 250/636: Performed by: EMERGENCY MEDICINE

## 2021-09-23 PROCEDURE — 83735 ASSAY OF MAGNESIUM: CPT

## 2021-09-23 PROCEDURE — 94760 N-INVAS EAR/PLS OXIMETRY 1: CPT

## 2021-09-23 PROCEDURE — 74011250637 HC RX REV CODE- 250/637: Performed by: NURSE PRACTITIONER

## 2021-09-23 PROCEDURE — C9113 INJ PANTOPRAZOLE SODIUM, VIA: HCPCS | Performed by: HOSPITALIST

## 2021-09-23 PROCEDURE — 85025 COMPLETE CBC W/AUTO DIFF WBC: CPT

## 2021-09-23 PROCEDURE — 74011000250 HC RX REV CODE- 250: Performed by: HOSPITALIST

## 2021-09-23 PROCEDURE — 82962 GLUCOSE BLOOD TEST: CPT

## 2021-09-23 PROCEDURE — 74011250636 HC RX REV CODE- 250/636: Performed by: HOSPITALIST

## 2021-09-23 PROCEDURE — 94640 AIRWAY INHALATION TREATMENT: CPT

## 2021-09-23 PROCEDURE — 80048 BASIC METABOLIC PNL TOTAL CA: CPT

## 2021-09-23 PROCEDURE — 74011250637 HC RX REV CODE- 250/637: Performed by: HOSPITALIST

## 2021-09-23 PROCEDURE — 77010033678 HC OXYGEN DAILY

## 2021-09-23 PROCEDURE — 93005 ELECTROCARDIOGRAM TRACING: CPT

## 2021-09-23 PROCEDURE — 65270000029 HC RM PRIVATE

## 2021-09-23 PROCEDURE — 36415 COLL VENOUS BLD VENIPUNCTURE: CPT

## 2021-09-23 RX ORDER — PANTOPRAZOLE SODIUM 40 MG/1
40 TABLET, DELAYED RELEASE ORAL
Status: DISCONTINUED | OUTPATIENT
Start: 2021-09-24 | End: 2021-09-27 | Stop reason: HOSPADM

## 2021-09-23 RX ORDER — INSULIN LISPRO 100 [IU]/ML
15 INJECTION, SOLUTION INTRAVENOUS; SUBCUTANEOUS ONCE
Status: COMPLETED | OUTPATIENT
Start: 2021-09-23 | End: 2021-09-23

## 2021-09-23 RX ORDER — INSULIN GLARGINE 100 [IU]/ML
20 INJECTION, SOLUTION SUBCUTANEOUS
Status: DISCONTINUED | OUTPATIENT
Start: 2021-09-23 | End: 2021-09-24

## 2021-09-23 RX ORDER — METOCLOPRAMIDE 5 MG/1
5 TABLET ORAL
Status: DISCONTINUED | OUTPATIENT
Start: 2021-09-24 | End: 2021-09-27 | Stop reason: HOSPADM

## 2021-09-23 RX ORDER — INSULIN LISPRO 100 [IU]/ML
12 INJECTION, SOLUTION INTRAVENOUS; SUBCUTANEOUS
Status: DISCONTINUED | OUTPATIENT
Start: 2021-09-24 | End: 2021-09-24

## 2021-09-23 RX ADMIN — HYDRALAZINE HYDROCHLORIDE 25 MG: 25 TABLET, FILM COATED ORAL at 16:20

## 2021-09-23 RX ADMIN — INSULIN GLARGINE 20 UNITS: 100 INJECTION, SOLUTION SUBCUTANEOUS at 22:05

## 2021-09-23 RX ADMIN — ENOXAPARIN SODIUM 30 MG: 30 INJECTION SUBCUTANEOUS at 08:21

## 2021-09-23 RX ADMIN — GUAIFENESIN 600 MG: 600 TABLET, EXTENDED RELEASE ORAL at 22:03

## 2021-09-23 RX ADMIN — ATORVASTATIN CALCIUM 80 MG: 40 TABLET, FILM COATED ORAL at 22:03

## 2021-09-23 RX ADMIN — GUAIFENESIN 600 MG: 600 TABLET, EXTENDED RELEASE ORAL at 08:16

## 2021-09-23 RX ADMIN — Medication 10 ML: at 22:08

## 2021-09-23 RX ADMIN — FERROUS SULFATE TAB 325 MG (65 MG ELEMENTAL FE) 325 MG: 325 (65 FE) TAB at 16:19

## 2021-09-23 RX ADMIN — METOCLOPRAMIDE 5 MG: 5 INJECTION, SOLUTION INTRAMUSCULAR; INTRAVENOUS at 08:18

## 2021-09-23 RX ADMIN — INSULIN LISPRO 3 UNITS: 100 INJECTION, SOLUTION INTRAVENOUS; SUBCUTANEOUS at 08:18

## 2021-09-23 RX ADMIN — PIPERACILLIN AND TAZOBACTAM 3.38 G: 3; .375 INJECTION, POWDER, LYOPHILIZED, FOR SOLUTION INTRAVENOUS at 08:25

## 2021-09-23 RX ADMIN — FERROUS SULFATE TAB 325 MG (65 MG ELEMENTAL FE) 325 MG: 325 (65 FE) TAB at 08:16

## 2021-09-23 RX ADMIN — GABAPENTIN 300 MG: 300 CAPSULE ORAL at 22:03

## 2021-09-23 RX ADMIN — METOCLOPRAMIDE 5 MG: 5 INJECTION, SOLUTION INTRAMUSCULAR; INTRAVENOUS at 11:25

## 2021-09-23 RX ADMIN — AMLODIPINE BESYLATE 10 MG: 10 TABLET ORAL at 08:16

## 2021-09-23 RX ADMIN — METHYLPREDNISOLONE SODIUM SUCCINATE 60 MG: 125 INJECTION, POWDER, FOR SOLUTION INTRAMUSCULAR; INTRAVENOUS at 08:18

## 2021-09-23 RX ADMIN — GABAPENTIN 300 MG: 300 CAPSULE ORAL at 08:16

## 2021-09-23 RX ADMIN — IPRATROPIUM BROMIDE AND ALBUTEROL SULFATE 3 ML: .5; 2.5 SOLUTION RESPIRATORY (INHALATION) at 11:36

## 2021-09-23 RX ADMIN — VENLAFAXINE HYDROCHLORIDE 150 MG: 150 CAPSULE, EXTENDED RELEASE ORAL at 08:17

## 2021-09-23 RX ADMIN — SODIUM CHLORIDE 40 MG: 9 INJECTION, SOLUTION INTRAMUSCULAR; INTRAVENOUS; SUBCUTANEOUS at 08:17

## 2021-09-23 RX ADMIN — PIPERACILLIN AND TAZOBACTAM 3.38 G: 3; .375 INJECTION, POWDER, LYOPHILIZED, FOR SOLUTION INTRAVENOUS at 00:08

## 2021-09-23 RX ADMIN — HYDRALAZINE HYDROCHLORIDE 25 MG: 25 TABLET, FILM COATED ORAL at 08:16

## 2021-09-23 RX ADMIN — HYDRALAZINE HYDROCHLORIDE 25 MG: 25 TABLET, FILM COATED ORAL at 22:03

## 2021-09-23 RX ADMIN — FERROUS SULFATE TAB 325 MG (65 MG ELEMENTAL FE) 325 MG: 325 (65 FE) TAB at 11:25

## 2021-09-23 RX ADMIN — CETIRIZINE HYDROCHLORIDE 10 MG: 10 TABLET, FILM COATED ORAL at 16:19

## 2021-09-23 RX ADMIN — Medication 10 ML: at 13:29

## 2021-09-23 RX ADMIN — FLUTICASONE PROPIONATE 2 SPRAY: 50 SPRAY, METERED NASAL at 08:21

## 2021-09-23 RX ADMIN — METHYLPREDNISOLONE SODIUM SUCCINATE 60 MG: 125 INJECTION, POWDER, FOR SOLUTION INTRAMUSCULAR; INTRAVENOUS at 22:03

## 2021-09-23 RX ADMIN — PIPERACILLIN AND TAZOBACTAM 3.38 G: 3; .375 INJECTION, POWDER, LYOPHILIZED, FOR SOLUTION INTRAVENOUS at 16:25

## 2021-09-23 RX ADMIN — IPRATROPIUM BROMIDE AND ALBUTEROL SULFATE 3 ML: .5; 2.5 SOLUTION RESPIRATORY (INHALATION) at 21:12

## 2021-09-23 RX ADMIN — Medication 10 ML: at 05:48

## 2021-09-23 RX ADMIN — INSULIN LISPRO 15 UNITS: 100 INJECTION, SOLUTION INTRAVENOUS; SUBCUTANEOUS at 16:44

## 2021-09-23 RX ADMIN — INSULIN LISPRO 7 UNITS: 100 INJECTION, SOLUTION INTRAVENOUS; SUBCUTANEOUS at 11:25

## 2021-09-23 RX ADMIN — Medication 5 MG: at 22:02

## 2021-09-23 RX ADMIN — METOCLOPRAMIDE 5 MG: 5 INJECTION, SOLUTION INTRAMUSCULAR; INTRAVENOUS at 16:22

## 2021-09-23 RX ADMIN — AZITHROMYCIN DIHYDRATE 500 MG: 500 INJECTION, POWDER, LYOPHILIZED, FOR SOLUTION INTRAVENOUS at 22:02

## 2021-09-23 RX ADMIN — IPRATROPIUM BROMIDE AND ALBUTEROL SULFATE 3 ML: .5; 2.5 SOLUTION RESPIRATORY (INHALATION) at 07:30

## 2021-09-23 RX ADMIN — QUETIAPINE FUMARATE 25 MG: 25 TABLET ORAL at 22:02

## 2021-09-23 RX ADMIN — IPRATROPIUM BROMIDE AND ALBUTEROL SULFATE 3 ML: .5; 2.5 SOLUTION RESPIRATORY (INHALATION) at 16:00

## 2021-09-23 RX ADMIN — INSULIN LISPRO 4 UNITS: 100 INJECTION, SOLUTION INTRAVENOUS; SUBCUTANEOUS at 22:04

## 2021-09-23 NOTE — ROUTINE PROCESS
Been up to [facility] to void. Monitor on. O2 at 3L/m. MA intact with haley wrap. No c/o. No n/v. Assist to bed head of bed elevated.

## 2021-09-23 NOTE — PROGRESS NOTES
Progress Note    Patient: Ruthie Mota MRN: [id number]  SSN: [id number]    YOB: 1955  Age: 61 y.o. Sex: female      Admit Date: 9/19/2021    LOS: 3 days     Subjective:      Patient seen and examined. Ruthie Mota is a 61y.o. year-old female with past medical history significant for HTN, HLD, DM, COPD with Continuous supplemental oxygen required, Left Breast CA s/p Radiation/Chemo Therapy, Esophageal strictures s/p dilatation procedure/EGD, obesity, and ? Cardiomyopathy who is followed for Elevated Troponin and atypical chest pain. This am patient reports that she is feeling much better, rested well overnight, and has no new complaints. Renal function worsened some today. Telemetry Review: SR    Review of Symptoms:   Review of Systems   Constitutional: Negative. HENT:        Difficulty swallowing   Eyes: Negative. Cardiovascular: Positive for dyspnea on exertion. Respiratory: Positive for wheezing. Endocrine: Negative. Hematologic/Lymphatic: Negative. Skin: Negative. Musculoskeletal: Negative. Gastrointestinal: Negative. Genitourinary: Negative. Neurological: Negative. Psychiatric/Behavioral: Negative. Allergic/Immunologic: Negative. Objective:     Vitals:    09/23/21 0413 09/23/21 0727 09/23/21 0730 09/23/21 1057   BP: 131/67 (!) 148/76  137/60   Pulse: 99 95  97   Resp: 18 18  18   Temp: 98.2 °F (36.8 °C) 97.9 °F (36.6 °C)  98 °F (36.7 °C)   SpO2: 99% 98% 96% 100%   Weight:       Height:            Intake and Output:  Current Shift: No intake/output data recorded. Last three shifts: 09/21 1901 - 09/23 0700  In: 120 [P.O.:120]  Out: -     Physical Exam:   General: Well nourished female lying in bed being bathed by nursing students, NAD, A&O  HEENT: Normocephalic, PERRL, no drainage  Neck: Supple, Trachea midline, No JVD  RESP: CTA bilaterally, diminished in bases (though improve aeration from previous). + Symmetrical chest movement. No SOB or distress. On O2 via NC. Cardiovascular: RRR no RG. + murmur. PVS: No rubor, cyanosis, no edema, Radial, DP, PT pulses equal bilaterally  ABD: obese, soft, NT, Normoactive BS  Derm: Warm/Dry/Intact with no lesions, poor turgor  Neuro: A&O PPTS, cranial nerves II- XII grossly intact via interaction with patient.  No focal deficits  PSYCH: No anxiety or agitation      Lab/Data Review:  BMP:   Lab Results   Component Value Date/Time     (H) 09/23/2021 08:32 AM    K 4.7 09/23/2021 08:32 AM     (H) 09/23/2021 08:32 AM    CO2 27 09/23/2021 08:32 AM    AGAP 10 09/23/2021 08:32 AM     (H) 09/23/2021 08:32 AM    BUN 71 (H) 09/23/2021 08:32 AM    CREA 2.76 (H) 09/23/2021 08:32 AM    GFRAA 21 (L) 09/23/2021 08:32 AM    GFRNA 17 (L) 09/23/2021 08:32 AM            Current Facility-Administered Medications:     insulin glargine (LANTUS) injection 20 Units, 20 Units, SubCUTAneous, ACB/HS, Edward Jennings MD    piperacillin-tazobactam (ZOSYN) 3.375 g in 0.9% sodium chloride (MBP/ADV) 100 mL MBP, 3.375 g, IntraVENous, Q8H, Edward Jennings MD, Last Rate: 25 mL/hr at 09/23/21 0825, 3.375 g at 09/23/21 0825    [Held by provider] furosemide (LASIX) injection 40 mg, 40 mg, IntraVENous, DAILY, Richard Buckley, AIDEN, 40 mg at 09/22/21 1101    gabapentin (NEURONTIN) capsule 300 mg, 300 mg, Oral, BID, Edward Jennings MD, 300 mg at 09/23/21 0816    cyclobenzaprine (FLEXERIL) tablet 10 mg, 10 mg, Oral, TID PRN, Edward Jennings MD, 10 mg at 09/22/21 1211    melatonin (rapid dissolve) tablet 5 mg, 5 mg, Oral, QHS, Edward Jennings MD, 5 mg at 09/22/21 2126    venlafaxine-SR (EFFEXOR-XR) capsule 150 mg, 150 mg, Oral, DAILY, Edward Jennings MD, 150 mg at 09/23/21 0817    fluticasone propionate (FLONASE) 50 mcg/actuation nasal spray 2 Spray, 2 Spray, Both Nostrils, DAILY, Edward Jennings MD, 2 [place] at 09/23/21 0821    cetirizine (ZYRTEC) tablet 10 mg, 10 mg, Oral, QPM, Dick, Nadege Canas MD, 10 mg at 09/22/21 1802    hydrALAZINE (APRESOLINE) tablet 25 mg, 25 mg, Oral, TID, Edward Jennings MD, 25 mg at 09/23/21 0816    pantoprazole (PROTONIX) 40 mg in 0.9% sodium chloride 10 mL injection, 40 mg, IntraVENous, DAILY, Edward Jennings MD, 40 mg at 09/23/21 6632    ferrous sulfate tablet 325 mg, 1 Tablet, Oral, TID WITH MEALS, Edward Jennings MD, 325 mg at 09/23/21 0816    amLODIPine (NORVASC) tablet 10 mg, 10 mg, Oral, DAILY, Ricky Buckley NP, 10 mg at 09/23/21 0816    atorvastatin (LIPITOR) tablet 80 mg, 80 mg, Oral, QHS, Myrna Buckley NP, 80 mg at 09/22/21 2126    QUEtiapine (SEROquel) tablet 25 mg, 25 mg, Oral, QPM, Edward Jennings MD, 25 mg at 09/22/21 2055    LORazepam (ATIVAN) injection 0.5 mg, 0.5 mg, IntraVENous, Q6H PRN, Edward Jennings MD, 0.5 mg at 09/21/21 1632    methylPREDNISolone (PF) (Solu-MEDROL) injection 60 mg, 60 mg, IntraVENous, Q12H, Edward Jennings MD, 60 mg at 09/23/21 0818    azithromycin (ZITHROMAX) 500 mg in 0.9% sodium chloride 250 mL (VIAL-MATE), 500 mg, IntraVENous, Q24H, Edward Jennings MD, Last Rate: 250 mL/hr at 09/22/21 2054, 500 mg at 09/22/21 2054    metoclopramide HCl (REGLAN) injection 5 mg, 5 mg, IntraVENous, AC&HS, Edward Jennings MD, 5 mg at 09/23/21 0818    glucose chewable tablet 16 g, 4 Tablet, Oral, PRN, Edward Jennings MD    dextrose (D50W) injection syrg 12.5-25 g, 25-50 mL, IntraVENous, PRN, Edward Jennings MD    glucagon (GLUCAGEN) injection 1 mg, 1 mg, IntraMUSCular, PRN, Edward Jennings MD    insulin lispro (HUMALOG) injection, , SubCUTAneous, AC&HS, Edward Jennings MD, 3 Units at 09/23/21 0818    albuterol-ipratropium (DUO-NEB) 2.5 MG-0.5 MG/3 ML, 3 mL, Nebulization, QID RT, Edward Jennings MD, 3 mL at 09/23/21 0730    guaiFENesin ER (MUCINEX) tablet 600 mg, 600 mg, Oral, Q12H, Edward Jennings MD, 600 mg at 09/23/21 0816    [Held by provider] aspirin delayed-release tablet 81 mg, 81 mg, Oral, DAILY, Edward Jennings MD, 81 mg at 09/20/21 1802    sodium chloride (NS) flush 5-40 mL, 5-40 mL, IntraVENous, Q8H, Jacqueline Travis MD, 10 mL at 09/23/21 0548    sodium chloride (NS) flush 5-40 mL, 5-40 mL, IntraVENous, PRN, Jacqueline Travis MD    acetaminophen (TYLENOL) tablet 650 mg, 650 mg, Oral, Q6H PRN **OR** acetaminophen (TYLENOL) suppository 650 mg, 650 mg, Rectal, Q6H PRN, Jacqueline Travis MD    polyethylene glycol (MIRALAX) packet 17 g, 17 g, Oral, DAILY PRN, Jacqueline Travis MD    ondansetron (ZOFRAN ODT) tablet 4 mg, 4 mg, Oral, Q8H PRN **OR** ondansetron (ZOFRAN) injection 4 mg, 4 mg, IntraVENous, Q6H PRN, Jacqueline Travis MD    enoxaparin (LOVENOX) injection 30 mg, 30 mg, SubCUTAneous, DAILY, Jacqueline Travis MD, 30 mg at 09/23/21 4375      Assessment:     Principal Problem:    PNA (pneumonia) (9/30/2020)    Active Problems:    Diabetic gastroparesis ([facility] [address] (10/1/2020)      Chest pain (9/19/2021)      Elevated troponin (9/20/2021)      Syncope (9/20/2021)      Hypoglycemia (9/20/2021)      Chronic respiratory failure with hypoxia ([facility]) (9/20/2021)      Sepsis ([facility] [address] (9/22/2021)      Esophagitis with gastritis (9/22/2021)        Plan:     Case discussed with Collaborating physician Dr. Emre Galvez and our recommendations are as follows:     1. Elevated Troponin - Flat in trends on first checks @ 0.12. Note that 9/20 am Trop was 0.21, this was peak and it down-trended from there. No ACS per EKG. [facility] records reviewed and patient had Cath 2018 that showed normal coronaries other than small lesion RCA. Sx were atypical and associated with food intake, and have resolved since dilatation of esophagus yesterday. TTE grossly normal with no wall motion abnormalities. Continue risk factor modification and medication regimen. If sx return can consider NST in OP setting. 2. KANIKA on CKD - appreciate nephrology input.   Hold home Lisinopril for now. Creat was 1.6 early 2021 and 1.9 on 9/15/21 per PCP records. Note ?edema on CT Chest with pleural effusions 9/21. IV Lasix given x2 doses 9/21 & 9/22 with noted worsening renal function today. Would avoid further diuresis attempts at this time. 3. HTN -BP close to goal.  Continue home regimen Amlodipine 10mg daily. Hold Lisinopril home dosing. Appreciate Nephrology input. No diuesis. Agree with initiation of Hydralazine 9/22 pm.    4. HLD: LDL @ 70 in setting of A1C of 7.6. Increased Atorvastatin to 80mg QHS effective 9/21/21. 5. DM: A1C 7.6 on labs. This is significant improvement from previous when reviewing PCP notes. Continue aggressive glycemic control. 6. EKG monitoring:  Given simultaneous use of Seroquel and Reglan will obtain EKG this am to evaluate QTc. Consider stopping if increased. Thank you for involving us in the care of this patient. Please do not hesitate to call if additional questions arise.  If after hours please call [phone]    Signed By: Iliana Quigley NP     September 23, 2021

## 2021-09-23 NOTE — PROGRESS NOTES
Progress Note  Date:9/23/2021       Room:203/  Patient Xochitl Brock     YOB: 1955     Age:66 y.o. Subjective    As per admititng provider:  Mayo Yusuf is a 61 y.o. female followed by Saravanan Mendez MD and  has a past medical history of Arthritis, Breast CA ([facility] [address] (2017), Diabetes ([facility] [address] Edema, GERD (gastroesophageal reflux disease), High cholesterol, Hypertension, Ill-defined condition, MI (myocardial infarction) ([facility] [address] (06/2018), Neuropathy, and Restless leg. CKD with last creat noted in 12/2020 at 1.8 and 2.2 on 4/28 and follows with Dr. Deny [facility] in [facility]. Patient unsure what happened but was noted to have passed out and on evaluation gluocose of 34 . Similar event happened about 1 year ago. Patient is on lantus high dose as well as 10 units TID WM. Patient notes decrease in apepitie for about 1 year now and mostly notes it is secondary to increased stress and and recent passing of her  in 11/30/2020. Currently patient lives with her sister Jeff Reis and able to ambulate without walker. Patient states her glucose level which she checks after breakfast at 10:30 usually is in 140 range and says on recheck at lunch and dinner glucose at times does get below 100. Patient also notes that for the past 3 days has been more sob with + wheeze and does use nebulizer twice daily but no relief. Denies any sick contacts. Patient relates chest pain and initial troponin x 2 was at 0.12 and no change. Repeat this AM slightly higher at 0.21. denies any GERD like symptoms but patient has been vomiting and has hx of esophageal stricture for which had dilation done at [facility] about 1 1/2 year ago. CXR shows mild cardiac enlargement. Echo done and shows EF 60-65% and no evidence of diastolic dysfunction.   With patients current presentation was admitting on telemetry for syncope, severy hypoglycemia, KANIKA on CKD, chest pain , elevated tropoinin and Copd exacerbation    Patient seen on follow-up. Sitting up in the chair awake alert and oriented denies any further hallucinations and says that she feels well is doing well on her chronic 3 L with good O2 saturation continues to have improvement in leukocytosis. And says that appetite has improved and tolerating Reglan dosing            Review of Systems   Constitutional: Negative. HENT: Negative. Eyes: Negative. Respiratory: Positive for shortness of breath. Cardiovascular: Negative. Gastrointestinal: Negative. Endocrine: Negative. Genitourinary: Negative. Musculoskeletal: Negative. Allergic/Immunologic: Negative. Neurological: Negative. Hematological: Negative. Psychiatric/Behavioral: Positive for confusion and hallucinations. Objective           Vitals Last 24 Hours:  Patient Vitals for the past 24 hrs:   Temp Pulse Resp BP SpO2   09/23/21 1057 98 °F (36.7 °C) 97 18 137/60 100 %   09/23/21 0730 -- -- -- -- 96 %   09/23/21 0727 97.9 °F (36.6 °C) 95 18 (!) 148/76 98 %   09/23/21 0413 98.2 °F (36.8 °C) 99 18 131/67 99 %   09/23/21 0009 98.9 °F (37.2 °C) 97 16 (!) 156/86 97 %   09/22/21 1903 98.2 °F (36.8 °C) (!) 101 19 137/79 97 %   09/22/21 1641 97.5 °F (36.4 °C) (!) 108 18 (!) 156/76 98 %   09/22/21 1523 -- -- -- -- 96 %   09/22/21 1259 98.2 °F (36.8 °C) (!) 104 18 (!) 148/70 97 %        I/O (24Hr): Intake/Output Summary (Last 24 hours) at 9/23/2021 1258  Last data filed at 9/22/2021 2126  Gross per 24 hour   Intake 120 ml   Output --   Net 120 ml       Physical Exam:  General: Alert and awake but increasing confusion not oriented  Head:  Normocephalic, without obvious abnormality, atraumatic. Eyes:  Conjunctivae/corneas clear. Pupils equal, round, reactive to light. Extraocular movements intact. Lungs: markedly Bilateral air entry diminished, no wheeze or crackles Chest wall: No tenderness or deformity.   Heart:  Regular rate and rhythm, S1, S2 normal, no murmur, click, rub or gallop. Abdomen:  Soft, non-tender. Bowel sounds normal. No masses,  No organomegaly. Extremities: Extremities normal, atraumatic, no cyanosis or edema. Pulses: 2+ and symmetric all extremities. Skin: Skin color, texture, turgor normal. No rashes or lesions  Neurologic: Awake, Alert,oriented at times.  No obvious gross sensory or motor deficits      Medications           Current Facility-Administered Medications   Medication Dose Route Frequency    insulin glargine (LANTUS) injection 20 Units  20 Units SubCUTAneous ACB/HS    piperacillin-tazobactam (ZOSYN) 3.375 g in 0.9% sodium chloride (MBP/ADV) 100 mL MBP  3.375 g IntraVENous Q8H    [Held by provider] furosemide (LASIX) injection 40 mg  40 mg IntraVENous DAILY    gabapentin (NEURONTIN) capsule 300 mg  300 mg Oral BID    cyclobenzaprine (FLEXERIL) tablet 10 mg  10 mg Oral TID PRN    melatonin (rapid dissolve) tablet 5 mg  5 mg Oral QHS    venlafaxine-SR (EFFEXOR-XR) capsule 150 mg  150 mg Oral DAILY    fluticasone propionate (FLONASE) 50 mcg/actuation nasal spray 2 Spray  2 Spray Both Nostrils DAILY    cetirizine (ZYRTEC) tablet 10 mg  10 mg Oral QPM    hydrALAZINE (APRESOLINE) tablet 25 mg  25 mg Oral TID    pantoprazole (PROTONIX) 40 mg in 0.9% sodium chloride 10 mL injection  40 mg IntraVENous DAILY    ferrous sulfate tablet 325 mg  1 Tablet Oral TID WITH MEALS    amLODIPine (NORVASC) tablet 10 mg  10 mg Oral DAILY    atorvastatin (LIPITOR) tablet 80 mg  80 mg Oral QHS    QUEtiapine (SEROquel) tablet 25 mg  25 mg Oral QPM    LORazepam (ATIVAN) injection 0.5 mg  0.5 mg IntraVENous Q6H PRN    methylPREDNISolone (PF) (Solu-MEDROL) injection 60 mg  60 mg IntraVENous Q12H    azithromycin (ZITHROMAX) 500 mg in 0.9% sodium chloride 250 mL (VIAL-MATE)  500 mg IntraVENous Q24H    metoclopramide HCl (REGLAN) injection 5 mg  5 mg IntraVENous AC&HS    glucose chewable tablet 16 g  4 Tablet Oral PRN    dextrose (D50W) injection syrg 12.5-25 g 25-50 mL IntraVENous PRN    glucagon (GLUCAGEN) injection 1 mg  1 mg IntraMUSCular PRN    insulin lispro (HUMALOG) injection   SubCUTAneous AC&HS    albuterol-ipratropium (DUO-NEB) 2.5 MG-0.5 MG/3 ML  3 mL Nebulization QID RT    guaiFENesin ER (MUCINEX) tablet 600 mg  600 mg Oral Q12H    [Held by provider] aspirin delayed-release tablet 81 mg  81 mg Oral DAILY    sodium chloride (NS) flush 5-40 mL  5-40 mL IntraVENous Q8H    sodium chloride (NS) flush 5-40 mL  5-40 mL IntraVENous PRN    acetaminophen (TYLENOL) tablet 650 mg  650 mg Oral Q6H PRN    Or    acetaminophen (TYLENOL) suppository 650 mg  650 mg Rectal Q6H PRN    polyethylene glycol (MIRALAX) packet 17 g  17 g Oral DAILY PRN    ondansetron (ZOFRAN ODT) tablet 4 mg  4 mg Oral Q8H PRN    Or    ondansetron (ZOFRAN) injection 4 mg  4 mg IntraVENous Q6H PRN    enoxaparin (LOVENOX) injection 30 mg  30 mg SubCUTAneous DAILY         Allergies         Ciprofloxacin, Keflex [cephalexin], Metformin, and Soliqua 100/33 [insulin glargine-lixisenatide]       Labs/Imaging/Diagnostics      Labs:  Recent Results (from the past 48 hour(s))   GLUCOSE, POC    Collection Time: 09/21/21  4:51 PM   Result Value Ref Range    Glucose (POC) 338 (H) 65 - 117 mg/dL    Performed by Ledy Rod    GLUCOSE, POC    Collection Time: 09/21/21  8:45 PM   Result Value Ref Range    Glucose (POC) 323 (H) 65 - 117 mg/dL    Performed by Homar Aguilar    CBC WITH AUTOMATED DIFF    Collection Time: 09/22/21  7:57 AM   Result Value Ref Range    WBC 19.2 (H) 4.4 - 11.3 K/uL    RBC 4.00 (L) 4.50 - 5.90 M/uL    HGB 10.1 (L) 13.5 - 17.5 g/dL    HCT 32.1 (L) 36 - 46 %    MCV 80.2 80 - 100 FL    MCH 25.3 (L) 31 - 34 PG    MCHC 31.5 31.0 - 36.0 g/dL    RDW 16.6 (H) 11.5 - 14.5 %    PLATELET 400 163 - 167 K/uL    MPV 7.4 6.5 - 11.5 FL    NRBC 0.0  WBC    ABSOLUTE NRBC 0.01 K/uL    NEUTROPHILS 95 (H) 42 - 75 %    LYMPHOCYTES 3 (L) 20.5 - 51.1 %    MONOCYTES 2 1.7 - 9.3 %    EOSINOPHILS 0 (L) 0.9 - 2.9 %    BASOPHILS 0 0.0 - 2.5 %    ABS. NEUTROPHILS 18.3 (H) 1.8 - 7.7 K/UL    ABS. LYMPHOCYTES 0.5 (L) 1.0 - 4.8 K/UL    ABS. MONOCYTES 0.4 0.2 - 2.4 K/UL    ABS. EOSINOPHILS 0.0 0.0 - 0.7 K/UL    ABS. BASOPHILS 0.0 0.0 - 0.2 K/UL   METABOLIC PANEL, BASIC    Collection Time: 09/22/21  7:57 AM   Result Value Ref Range    Sodium 143 136 - 145 mmol/L    Potassium 5.3 (H) 3.5 - 5.1 mmol/L    Chloride 106 97 - 108 mmol/L    CO2 26 21 - 32 mmol/L    Anion gap 11 5 - 15 mmol/L    Glucose 333 (H) 65 - 100 mg/dL    BUN 60 (H) 6 - 20 mg/dL    Creatinine 2.57 (H) 0.55 - 1.02 mg/dL    BUN/Creatinine ratio 23 (H) 12 - 20      GFR est AA 23 (L) >60 ml/min/1.73m2    GFR est non-AA 19 (L) >60 ml/min/1.73m2    Calcium 9.5 8.5 - 10.1 mg/dL   PROCALCITONIN    Collection Time: 09/22/21  7:57 AM   Result Value Ref Range    Procalcitonin 0.14 ng/mL   GLUCOSE, POC    Collection Time: 09/22/21  9:12 AM   Result Value Ref Range    Glucose (POC) 356 (H) 65 - 117 mg/dL    Performed by Karen BURRIS    CULTURE, BLOOD, PAIRED    Collection Time: 09/22/21 10:34 AM    Specimen: Blood   Result Value Ref Range    Special Requests: No Special Requests      Culture result: No growth after 18 hours     GLUCOSE, POC    Collection Time: 09/22/21 12:53 PM   Result Value Ref Range    Glucose (POC) 419 (H) 65 - 117 mg/dL    Performed by Karen BURRIS    CHLORIDE, URINE RANDOM    Collection Time: 09/22/21  1:04 PM   Result Value Ref Range    Chloride,urine random 79 mmol/L   PROTEIN/CREATININE RATIO, URINE    Collection Time: 09/22/21  1:04 PM   Result Value Ref Range    Protein, urine random 183 (H) 0.0 - 11.9 mg/dL    Creatinine, urine 51.87 (A) No reference range has been established. mg/dL    Protein/Creat.  urine Ratio 3.5     URINALYSIS W/ REFLEX CULTURE    Collection Time: 09/22/21  1:04 PM    Specimen: Urine   Result Value Ref Range    Color Yellow/Straw      Appearance Clear Clear      Specific gravity 1.025 1.003 - 1.030 pH (UA) 6.0 5.0 - 8.0      Protein 100 (A) Negative mg/dL    Glucose 500 (A) Negative mg/dL    Ketone Negative Negative mg/dL    Bilirubin Negative Negative      Blood Small (A) Negative      Urobilinogen 0.2 0.2 - 1.0 EU/dL    Nitrites Negative Negative      Leukocyte Esterase Negative Negative      WBC 0-4 0 - 5 /hpf    RBC 5-10 0 - 3 /hpf    Bacteria 1+ (A) Negative /hpf    UA:UC IF INDICATED Culture not indicated by UA result Culture not indicated by UA result     GLUCOSE, POC    Collection Time: 09/22/21  4:14 PM   Result Value Ref Range    Glucose (POC) 351 (H) 65 - 117 mg/dL    Performed by Rebecca BURRIS    GLUCOSE, POC    Collection Time: 09/22/21  9:16 PM   Result Value Ref Range    Glucose (POC) 280 (H) 65 - 117 mg/dL    [facility] by [facility] POC    Collection Time: 09/23/21  7:21 AM   Result Value Ref Range    Glucose (POC) 233 (H) 65 - 117 mg/dL    Performed by ADONIS SCHWARTZ    CBC WITH AUTOMATED DIFF    Collection Time: 09/23/21  8:32 AM   Result Value Ref Range    WBC 16.1 (H) 4.4 - 11.3 K/uL    RBC 4.00 (L) 4.50 - 5.90 M/uL    HGB 10.4 (L) 13.5 - 17.5 g/dL    HCT 32.2 (L) 36 - 46 %    MCV 80.5 80 - 100 FL    MCH 25.9 (L) 31 - 34 PG    MCHC 32.1 31.0 - 36.0 g/dL    RDW 17.0 (H) 11.5 - 14.5 %    PLATELET 820 251 - 796 K/uL    MPV 7.5 6.5 - 11.5 FL    NRBC 0.3  WBC    ABSOLUTE NRBC 0.04 K/uL    NEUTROPHILS 95 (H) 42 - 75 %    LYMPHOCYTES 3 (L) 20.5 - 51.1 %    MONOCYTES 2 1.7 - 9.3 %    EOSINOPHILS 0 (L) 0.9 - 2.9 %    BASOPHILS 0 0.0 - 2.5 %    ABS. NEUTROPHILS 15.3 (H) 1.8 - 7.7 K/UL    ABS. LYMPHOCYTES 0.5 (L) 1.0 - 4.8 K/UL    ABS. MONOCYTES 0.3 0.2 - 2.4 K/UL    ABS. EOSINOPHILS 0.0 0.0 - 0.7 K/UL    ABS.  BASOPHILS 0.0 0.0 - 0.2 K/UL   METABOLIC PANEL, BASIC    Collection Time: 09/23/21  8:32 AM   Result Value Ref Range    Sodium 146 (H) 136 - 145 mmol/L    Potassium 4.7 3.5 - 5.1 mmol/L    Chloride 109 (H) 97 - 108 mmol/L    CO2 27 21 - 32 mmol/L    Anion gap 10 5 - 15 mmol/L    Glucose 243 (H) 65 - 100 mg/dL    BUN 71 (H) 6 - 20 mg/dL    Creatinine 2.76 (H) 0.55 - 1.02 mg/dL    BUN/Creatinine ratio 26 (H) 12 - 20      GFR est AA 21 (L) >60 ml/min/1.73m2    GFR est non-AA 17 (L) >60 ml/min/1.73m2    Calcium 9.7 8.5 - 10.1 mg/dL   MAGNESIUM    Collection Time: 09/23/21  8:32 AM   Result Value Ref Range    Magnesium 2.6 (H) 1.6 - 2.4 mg/dL   GLUCOSE, POC    Collection Time: 09/23/21 10:50 AM   Result Value Ref Range    Glucose (POC) 319 (H) 65 - 117 mg/dL    Performed by ADONIS SCHWARTZ         Imaging:  CT HEAD WO CONT    Result Date: 9/22/2021  1. No acute intracranial findings. 2. Atrophy and small vessel ischemic disease. 3. Bilateral maxillary sinus disease. CT CHEST WO CONT    Result Date: 9/21/2021  There are small to moderate-sized bilateral pleural effusions as well as acute interstitial disease with thickening of the interlobular and intralobular septae. The differential diagnosis would include pulmonary edema versus an atypical interstitial pneumonia/pneumonitis. The pleural effusions favor pulmonary interstitial edema over acute interstitial pneumonia. The cardiac chambers are normal in size without significant cardiomegaly. There are coronary artery calcifications. US RETROPERITONEUM COMP    Result Date: 9/21/2021  Limited by bowel gas. 1. Kidneys grossly normal size, without hydronephrosis. XR CHEST PORT    Result Date: 9/19/2021  Mild cardiac enlargement. Exam is otherwise unremarkable.        Assessment//Plan           Problem List:  Hospital Problems  Date Reviewed: 2/3/2021        Codes Class Noted POA    Sepsis ([facility] [address] ICD-10-CM: A41.9  ICD-9-CM: 038.9, 995.91  9/22/2021 Unknown        Esophagitis with gastritis ICD-10-CM: K29.70, K20.90  ICD-9-CM: 530.19  9/22/2021 Unknown        Elevated troponin ICD-10-CM: R77.8  ICD-9-CM: 790.6  9/20/2021 Unknown        Syncope ICD-10-CM: R55  ICD-9-CM: 780.2  9/20/2021 Unknown        Hypoglycemia ICD-10-CM: E16.2  ICD-9-CM: 251.2  9/20/2021 Unknown        Chronic respiratory failure with hypoxia [facility]) ICD-10-CM: J96.11  ICD-9-CM: 518.83, 799.02  9/20/2021 Unknown        Chest pain ICD-10-CM: R07.9  ICD-9-CM: 786.50  9/19/2021 Unknown        Diabetic gastroparesis [facility]) ICD-10-CM: E11.43, K31.84  ICD-9-CM: 250.60, 536.3  10/1/2020 Unknown        * (Principal) PNA (pneumonia) ICD-10-CM: J18.9  ICD-9-CM: 486  9/30/2020 Unknown            Sepsis  -Currently is meeting sepsis secondary to tachycardia and leukocytosis most likely secondary to bilateral pneumonia as urinalysis is negative  -Urine culture obtained and showing 50,000 colonies ID and sensitivity pending  -Blood cultures x2 obtained follow results  -Continue current IV antibiotics  -Hold IV fluids secondary to noted effusions on x-ray    Acute on chronic Hypoxic Respiratory failure  -Possibly on chronic 3 L but noncompliant at home may have some signs and symptoms of sleep apnea  -Currently is on 3 L with good O2 saturation continues to get short of breath with minimal exertion most likely secondary to underlying COPD/interstitial lung findings as well as bilateral pneumonia  -Continue current DuoNebs  -Monitor O2 sat closely and encourage incentive spirometry    Acute bilateral pneumonia  -Unknown etiology but patient has had previous history of pneumonia for which she was admitted to [facility] over a year ago  -Patient states she follows with pulmonologist but patient sister is unaware of any physician at [facility]  -CT scan of the chest shows small to moderate sized bilateral pleural effusions as well as acute interstitial disease with thickening of the interlobular and intralobular septae with differential being pulmonary edema versus atypical interstitial pneumonia/pneumonitis  -Increased leukocytosis possibly secondary steroids versus infection  -Follow-up procalcitonin level at 0.14  -Blood cultures x2 obtained prelim shows no growth  -Continued on azithromycin 500 mg as well as Zosyn 3.375 g every 8 hours    Maxillary sinusitis  -As noted on CT of the head   -We will start Flonase 2 sprays daily and cetirizine 10 mg every afternoon  -continue current antibiotics      Chest pain   - possible secondary to coughing and vomiting in setting of hx of esophageal stricture.    -Initial troponin 0.12 and repeat in similar range of 0.12 drawn at 3:30 PM on 9/19 and on recheck on 9/20 slightly elevated 0.21  -Continue to cycle every 6 hours troponins x2  -Cardiology consultation appreciated  -The echo shows preserved EF and no diastolic dysfunction  -Noted to have history of cardiac catheterization at [place] and records show minimal obstruction no stents needed  -Monitor closely on telemetry     Syncope  -Noted to have passed out and felt to be secondary to hypoglycemia  -Monitor on telemetry for any irregular rhythms  -Initial troponins elevated 0.12x2 but repeat elevated 0.21 and will continue cycle every 6 hours x2  -2D echo shows preserved EF no diastolic dysfunction     Hypoglycemia  - was 34 on initial check and improved slowly after treatment and currently glucose levels at 175 this morning and 9/20 and 251  -We will hold long-acting insulin as well as mealtime insulin  -Continue monitor Accu-Cheks AC at bedtime  -Nutrition consultation  -Poor appetite most likely secondary to increased stress and loss of  on 11/30/2020 as well as issues with vomiting possibly secondary to esophageal stricture  -On 9/21 with current steroids glucose levels in the 300 range restarted home Lantus but at lower dose of 20 units and will continue to monitor closely  -9/22 patient glucose levels continue to be uncontrolled and will increase patient to 35 units Lantus daily and new on sliding scale  -On 9/23 glucose levels initially improved but again again in 300 and will restart insulin lispro at 12 units prior to meals and change Lantus to 20 units twice daily    Antral gastritis/esophagitis  -As noted on EGD and will continue Protonix 40 mg daily  -Continue to avoid NSAIDs     Vomiting  -Had vomiting episodes periodically most likely secondary to esophageal stricture  -We will get speech therapy to evaluate swallow and GI to evaluate EGD performed and esophageal dilation performed and EGD also shows esophagitis as well as evidence of gastroparesis so we will start Reglan 5 mg prior to meals and at bedtime     KANIKA on CKD  -Creatinine slightly elevated at 2.66 with previous baseline creatinine earlier this year in April at 2.2 and prior to that was at 1.8 in December 2020  -Obtain baseline labs from Dr. Bonita Gutierrez office and patient has new appointment with Dr. Kasi Lemus nephrologist in [address] [facility] current IV fluids of D5 half NS at 100 cc an hour and repeat creatinine down to 2.54 on 9/20  -Nephrology consult appreciated  - hx of toradol use and will discontinue any further NSAID use.   -On 9/21 it was noted from records that patient's creatinine previously was at 1.9 and so initially continued fluids but with patient's worsening shortness of breath with exertion and CT of the chest showing extensive pulmonary edema discontinue and start Lasix of 40 mg IV twice daily  -9/22 continue worsening of KANIKA follow with nephrology who is recommending to encourage oral fluids hold any further diuresis  -Creatinine level still elevated and will continue encourage oral fluid intake and nephrology monitoring closely    Acute on chronic COPD exacerbation   -Presents with shortness of breath and cough for the past 3 days along with wheezing and no history of tobacco use  -Chest x-ray negative for any acute pneumonic process  -Place patient on nebulizer DuoNeb 4 times daily  -On 9/21 noted to have increasing upper airway stridor and a racemic epinephrine was given and Solu-Medrol increased to 60 mg IV every 8 hours and decreased to every 12 hours  -CT of the chest shows small to moderate pulmonary edema bilaterally along with interstitial disease with noted possible atypical infection and so azithromycin 500 mg IV daily will be started  -9/23 decrease steroids down to 40 mg IV daily    Acute encephalopathy  -Continues to have cough confusion possibly secondary to underlying dementia/psychiatric and poor acute hospitalization  -ABG performed shows pH of 7.37 PCO2 of 37 with PO2 of 85 on 3 L  -Patient started on Seroquel 25 mg every afternoon and as needed Ativan 0.5 mg IV every 6 hours as needed for agitation  -Cussed with patient sister and for a year now has been having off-and-on hallucinations and has been placed on Effexor extended release 150 mg and has not been started since admission so we will go ahead and restart  -Patient has marked improvement in mentation is awake alert oriented no further hallucinations will discontinue Seroquel continue as needed Ativan and continue patient's venlafaxine home dose    DVT prophylaxis  -Lovenox     CODE STATUS: Full code  Patient designates her sister Jacky Hoskins as her medical decision-maker if for some reason she is unable to make decisions for herself     Spent 40 minutes evaluting and coordinating patient care of which >50% was spent coordinating and counseling.           Electronically signed by Sari Hilliard MD on 9/23/2021 at 7:20 AM

## 2021-09-23 NOTE — ROUTINE PROCESS
Patient took all medication without difficulty. Patient is resting in bed, IV ABT running without signs or symptoms of infiltration, infection or irritation. There are currently no signs or symptoms of adverse reactions at this time. Fluid restriction is being continued.

## 2021-09-24 ENCOUNTER — APPOINTMENT (OUTPATIENT)
Dept: GENERAL RADIOLOGY | Age: 66
DRG: 243 | End: 2021-09-24
Attending: HOSPITALIST
Payer: MEDICAID

## 2021-09-24 PROBLEM — N18.9 CKD (CHRONIC KIDNEY DISEASE): Status: ACTIVE | Noted: 2021-09-24

## 2021-09-24 LAB
ALBUMIN SERPL-MCNC: 2.9 G/DL (ref 3.5–5)
ANION GAP SERPL CALC-SCNC: 8 MMOL/L (ref 5–15)
BACTERIA SPEC CULT: ABNORMAL
BASOPHILS # BLD: 0 K/UL (ref 0–0.2)
BASOPHILS NFR BLD: 0 % (ref 0–2.5)
BNP SERPL-MCNC: 2338 PG/ML
BUN SERPL-MCNC: 76 MG/DL (ref 6–20)
BUN/CREAT SERPL: 25 (ref 12–20)
CA-I BLD-MCNC: 8.9 MG/DL (ref 8.5–10.1)
CHLORIDE SERPL-SCNC: 108 MMOL/L (ref 97–108)
CO2 SERPL-SCNC: 27 MMOL/L (ref 21–32)
COLONY COUNT,CNT: ABNORMAL
COLONY COUNT,CNT: ABNORMAL
CREAT SERPL-MCNC: 3.08 MG/DL (ref 0.55–1.02)
EOSINOPHIL # BLD: 0 K/UL (ref 0–0.7)
EOSINOPHIL NFR BLD: 0 % (ref 0.9–2.9)
ERYTHROCYTE [DISTWIDTH] IN BLOOD BY AUTOMATED COUNT: 17.2 % (ref 11.5–14.5)
GLUCOSE BLD STRIP.AUTO-MCNC: 266 MG/DL (ref 65–117)
GLUCOSE BLD STRIP.AUTO-MCNC: 362 MG/DL (ref 65–117)
GLUCOSE BLD STRIP.AUTO-MCNC: 406 MG/DL (ref 65–117)
GLUCOSE BLD STRIP.AUTO-MCNC: 489 MG/DL (ref 65–117)
GLUCOSE SERPL-MCNC: 349 MG/DL (ref 65–100)
HCT VFR BLD AUTO: 30.9 % (ref 36–46)
HGB BLD-MCNC: 9.9 G/DL (ref 13.5–17.5)
LYMPHOCYTES # BLD: 0.3 K/UL (ref 1–4.8)
LYMPHOCYTES NFR BLD: 3 % (ref 20.5–51.1)
MCH RBC QN AUTO: 25.6 PG (ref 31–34)
MCHC RBC AUTO-ENTMCNC: 31.9 G/DL (ref 31–36)
MCV RBC AUTO: 80.2 FL (ref 80–100)
MONOCYTES # BLD: 0.2 K/UL (ref 0.2–2.4)
MONOCYTES NFR BLD: 2 % (ref 1.7–9.3)
NEUTS SEG # BLD: 10.4 K/UL (ref 1.8–7.7)
NEUTS SEG NFR BLD: 95 % (ref 42–75)
NRBC # BLD: 0.01 K/UL
NRBC BLD-RTO: 0.1 PER 100 WBC
PERFORMED BY, TECHID: ABNORMAL
PHOSPHATE SERPL-MCNC: 4.8 MG/DL (ref 2.6–4.7)
PLATELET # BLD AUTO: 373 K/UL (ref 150–400)
PMV BLD AUTO: 7.4 FL (ref 6.5–11.5)
POTASSIUM SERPL-SCNC: 4.9 MMOL/L (ref 3.5–5.1)
RBC # BLD AUTO: 3.85 M/UL (ref 4.5–5.9)
RHEUMATOID FACT SERPL-ACNC: <10 IU/ML
SODIUM SERPL-SCNC: 143 MMOL/L (ref 136–145)
SPECIAL REQUESTS,SREQ: ABNORMAL
WBC # BLD AUTO: 10.8 K/UL (ref 4.4–11.3)

## 2021-09-24 PROCEDURE — 82962 GLUCOSE BLOOD TEST: CPT

## 2021-09-24 PROCEDURE — 87340 HEPATITIS B SURFACE AG IA: CPT

## 2021-09-24 PROCEDURE — 85025 COMPLETE CBC W/AUTO DIFF WBC: CPT

## 2021-09-24 PROCEDURE — 74011250637 HC RX REV CODE- 250/637: Performed by: HOSPITALIST

## 2021-09-24 PROCEDURE — 71046 X-RAY EXAM CHEST 2 VIEWS: CPT

## 2021-09-24 PROCEDURE — 86431 RHEUMATOID FACTOR QUANT: CPT

## 2021-09-24 PROCEDURE — 74011636637 HC RX REV CODE- 636/637: Performed by: HOSPITALIST

## 2021-09-24 PROCEDURE — 80069 RENAL FUNCTION PANEL: CPT

## 2021-09-24 PROCEDURE — 94760 N-INVAS EAR/PLS OXIMETRY 1: CPT

## 2021-09-24 PROCEDURE — 36415 COLL VENOUS BLD VENIPUNCTURE: CPT

## 2021-09-24 PROCEDURE — 87389 HIV-1 AG W/HIV-1&-2 AB AG IA: CPT

## 2021-09-24 PROCEDURE — 83880 ASSAY OF NATRIURETIC PEPTIDE: CPT

## 2021-09-24 PROCEDURE — 86160 COMPLEMENT ANTIGEN: CPT

## 2021-09-24 PROCEDURE — 86803 HEPATITIS C AB TEST: CPT

## 2021-09-24 PROCEDURE — 77010033678 HC OXYGEN DAILY

## 2021-09-24 PROCEDURE — 74011000250 HC RX REV CODE- 250: Performed by: HOSPITALIST

## 2021-09-24 PROCEDURE — 86038 ANTINUCLEAR ANTIBODIES: CPT

## 2021-09-24 PROCEDURE — 94640 AIRWAY INHALATION TREATMENT: CPT

## 2021-09-24 PROCEDURE — 74011000258 HC RX REV CODE- 258: Performed by: HOSPITALIST

## 2021-09-24 PROCEDURE — 74011250636 HC RX REV CODE- 250/636: Performed by: HOSPITALIST

## 2021-09-24 PROCEDURE — 74011250637 HC RX REV CODE- 250/637: Performed by: NURSE PRACTITIONER

## 2021-09-24 PROCEDURE — 65270000029 HC RM PRIVATE

## 2021-09-24 PROCEDURE — 83520 IMMUNOASSAY QUANT NOS NONAB: CPT

## 2021-09-24 PROCEDURE — 74011250636 HC RX REV CODE- 250/636: Performed by: EMERGENCY MEDICINE

## 2021-09-24 PROCEDURE — 74011250637 HC RX REV CODE- 250/637: Performed by: EMERGENCY MEDICINE

## 2021-09-24 RX ORDER — INSULIN GLARGINE 100 [IU]/ML
30 INJECTION, SOLUTION SUBCUTANEOUS
Status: DISCONTINUED | OUTPATIENT
Start: 2021-09-24 | End: 2021-09-25

## 2021-09-24 RX ORDER — INSULIN LISPRO 100 [IU]/ML
10 INJECTION, SOLUTION INTRAVENOUS; SUBCUTANEOUS ONCE
Status: DISCONTINUED | OUTPATIENT
Start: 2021-09-24 | End: 2021-09-24

## 2021-09-24 RX ORDER — DOCUSATE SODIUM 100 MG/1
100 CAPSULE, LIQUID FILLED ORAL 2 TIMES DAILY
Status: DISCONTINUED | OUTPATIENT
Start: 2021-09-24 | End: 2021-09-27 | Stop reason: HOSPADM

## 2021-09-24 RX ORDER — INSULIN GLARGINE 100 [IU]/ML
25 INJECTION, SOLUTION SUBCUTANEOUS
Status: DISCONTINUED | OUTPATIENT
Start: 2021-09-24 | End: 2021-09-24

## 2021-09-24 RX ORDER — INSULIN GLARGINE 100 [IU]/ML
5 INJECTION, SOLUTION SUBCUTANEOUS
Status: COMPLETED | OUTPATIENT
Start: 2021-09-24 | End: 2021-09-24

## 2021-09-24 RX ORDER — INSULIN LISPRO 100 [IU]/ML
15 INJECTION, SOLUTION INTRAVENOUS; SUBCUTANEOUS
Status: DISCONTINUED | OUTPATIENT
Start: 2021-09-24 | End: 2021-09-25

## 2021-09-24 RX ORDER — INSULIN LISPRO 100 [IU]/ML
8 INJECTION, SOLUTION INTRAVENOUS; SUBCUTANEOUS ONCE
Status: COMPLETED | OUTPATIENT
Start: 2021-09-24 | End: 2021-09-24

## 2021-09-24 RX ORDER — METOPROLOL SUCCINATE 25 MG/1
25 TABLET, EXTENDED RELEASE ORAL DAILY
Status: DISCONTINUED | OUTPATIENT
Start: 2021-09-24 | End: 2021-09-27 | Stop reason: HOSPADM

## 2021-09-24 RX ORDER — PREDNISONE 20 MG/1
20 TABLET ORAL
Status: DISCONTINUED | OUTPATIENT
Start: 2021-09-24 | End: 2021-09-24

## 2021-09-24 RX ORDER — AZITHROMYCIN 250 MG/1
500 TABLET, FILM COATED ORAL DAILY
Status: DISCONTINUED | OUTPATIENT
Start: 2021-09-24 | End: 2021-09-27 | Stop reason: HOSPADM

## 2021-09-24 RX ORDER — INSULIN LISPRO 100 [IU]/ML
10 INJECTION, SOLUTION INTRAVENOUS; SUBCUTANEOUS ONCE
Status: COMPLETED | OUTPATIENT
Start: 2021-09-24 | End: 2021-09-24

## 2021-09-24 RX ADMIN — PIPERACILLIN AND TAZOBACTAM 3.38 G: 3; .375 INJECTION, POWDER, LYOPHILIZED, FOR SOLUTION INTRAVENOUS at 00:21

## 2021-09-24 RX ADMIN — IPRATROPIUM BROMIDE AND ALBUTEROL SULFATE 3 ML: .5; 2.5 SOLUTION RESPIRATORY (INHALATION) at 11:22

## 2021-09-24 RX ADMIN — VENLAFAXINE HYDROCHLORIDE 150 MG: 150 CAPSULE, EXTENDED RELEASE ORAL at 12:00

## 2021-09-24 RX ADMIN — INSULIN LISPRO 15 UNITS: 100 INJECTION, SOLUTION INTRAVENOUS; SUBCUTANEOUS at 12:02

## 2021-09-24 RX ADMIN — DOCUSATE SODIUM 100 MG: 100 CAPSULE, LIQUID FILLED ORAL at 12:01

## 2021-09-24 RX ADMIN — ACETAMINOPHEN 650 MG: 325 TABLET ORAL at 16:06

## 2021-09-24 RX ADMIN — INSULIN GLARGINE 5 UNITS: 100 INJECTION, SOLUTION SUBCUTANEOUS at 12:02

## 2021-09-24 RX ADMIN — METOCLOPRAMIDE 5 MG: 5 TABLET ORAL at 12:25

## 2021-09-24 RX ADMIN — PIPERACILLIN AND TAZOBACTAM 3.38 G: 3; .375 INJECTION, POWDER, LYOPHILIZED, FOR SOLUTION INTRAVENOUS at 11:14

## 2021-09-24 RX ADMIN — Medication 5 MG: at 21:25

## 2021-09-24 RX ADMIN — CETIRIZINE HYDROCHLORIDE 10 MG: 10 TABLET, FILM COATED ORAL at 17:30

## 2021-09-24 RX ADMIN — Medication 10 ML: at 06:06

## 2021-09-24 RX ADMIN — HYDRALAZINE HYDROCHLORIDE 25 MG: 25 TABLET, FILM COATED ORAL at 21:25

## 2021-09-24 RX ADMIN — INSULIN LISPRO 15 UNITS: 100 INJECTION, SOLUTION INTRAVENOUS; SUBCUTANEOUS at 17:32

## 2021-09-24 RX ADMIN — FERROUS SULFATE TAB 325 MG (65 MG ELEMENTAL FE) 325 MG: 325 (65 FE) TAB at 10:22

## 2021-09-24 RX ADMIN — METOCLOPRAMIDE 5 MG: 5 TABLET ORAL at 09:54

## 2021-09-24 RX ADMIN — HYDRALAZINE HYDROCHLORIDE 25 MG: 25 TABLET, FILM COATED ORAL at 09:52

## 2021-09-24 RX ADMIN — ENOXAPARIN SODIUM 30 MG: 30 INJECTION SUBCUTANEOUS at 09:56

## 2021-09-24 RX ADMIN — GUAIFENESIN 600 MG: 600 TABLET, EXTENDED RELEASE ORAL at 21:25

## 2021-09-24 RX ADMIN — FERROUS SULFATE TAB 325 MG (65 MG ELEMENTAL FE) 325 MG: 325 (65 FE) TAB at 12:25

## 2021-09-24 RX ADMIN — INSULIN GLARGINE 30 UNITS: 100 INJECTION, SOLUTION SUBCUTANEOUS at 21:25

## 2021-09-24 RX ADMIN — IPRATROPIUM BROMIDE AND ALBUTEROL SULFATE 3 ML: .5; 2.5 SOLUTION RESPIRATORY (INHALATION) at 15:55

## 2021-09-24 RX ADMIN — PANTOPRAZOLE SODIUM 40 MG: 40 TABLET, DELAYED RELEASE ORAL at 09:54

## 2021-09-24 RX ADMIN — AZITHROMYCIN MONOHYDRATE 500 MG: 250 TABLET ORAL at 09:51

## 2021-09-24 RX ADMIN — FERROUS SULFATE TAB 325 MG (65 MG ELEMENTAL FE) 325 MG: 325 (65 FE) TAB at 17:30

## 2021-09-24 RX ADMIN — ASPIRIN 81 MG: 81 TABLET, COATED ORAL at 10:27

## 2021-09-24 RX ADMIN — AMLODIPINE BESYLATE 10 MG: 10 TABLET ORAL at 09:53

## 2021-09-24 RX ADMIN — INSULIN GLARGINE 20 UNITS: 100 INJECTION, SOLUTION SUBCUTANEOUS at 10:22

## 2021-09-24 RX ADMIN — INSULIN LISPRO 3 UNITS: 100 INJECTION, SOLUTION INTRAVENOUS; SUBCUTANEOUS at 21:24

## 2021-09-24 RX ADMIN — INSULIN LISPRO 8 UNITS: 100 INJECTION, SOLUTION INTRAVENOUS; SUBCUTANEOUS at 11:00

## 2021-09-24 RX ADMIN — FLUTICASONE PROPIONATE 2 SPRAY: 50 SPRAY, METERED NASAL at 11:12

## 2021-09-24 RX ADMIN — Medication 10 ML: at 21:26

## 2021-09-24 RX ADMIN — HYDRALAZINE HYDROCHLORIDE 25 MG: 25 TABLET, FILM COATED ORAL at 17:30

## 2021-09-24 RX ADMIN — METOPROLOL SUCCINATE 25 MG: 25 TABLET, EXTENDED RELEASE ORAL at 12:01

## 2021-09-24 RX ADMIN — ATORVASTATIN CALCIUM 80 MG: 40 TABLET, FILM COATED ORAL at 21:25

## 2021-09-24 RX ADMIN — GABAPENTIN 300 MG: 300 CAPSULE ORAL at 21:25

## 2021-09-24 RX ADMIN — METOCLOPRAMIDE 5 MG: 5 TABLET ORAL at 17:30

## 2021-09-24 RX ADMIN — IPRATROPIUM BROMIDE AND ALBUTEROL SULFATE 3 ML: .5; 2.5 SOLUTION RESPIRATORY (INHALATION) at 07:27

## 2021-09-24 RX ADMIN — GUAIFENESIN 600 MG: 600 TABLET, EXTENDED RELEASE ORAL at 09:55

## 2021-09-24 RX ADMIN — Medication 10 ML: at 14:14

## 2021-09-24 RX ADMIN — INSULIN LISPRO 10 UNITS: 100 INJECTION, SOLUTION INTRAVENOUS; SUBCUTANEOUS at 17:32

## 2021-09-24 RX ADMIN — GABAPENTIN 300 MG: 300 CAPSULE ORAL at 10:24

## 2021-09-24 RX ADMIN — PREDNISONE 20 MG: 20 TABLET ORAL at 10:22

## 2021-09-24 RX ADMIN — INSULIN LISPRO 12 UNITS: 100 INJECTION, SOLUTION INTRAVENOUS; SUBCUTANEOUS at 10:59

## 2021-09-24 RX ADMIN — GABAPENTIN 300 MG: 300 CAPSULE ORAL at 10:22

## 2021-09-24 RX ADMIN — PIPERACILLIN AND TAZOBACTAM 3.38 G: 3; .375 INJECTION, POWDER, LYOPHILIZED, FOR SOLUTION INTRAVENOUS at 17:36

## 2021-09-24 RX ADMIN — INSULIN LISPRO 7 UNITS: 100 INJECTION, SOLUTION INTRAVENOUS; SUBCUTANEOUS at 11:34

## 2021-09-24 NOTE — PROGRESS NOTES
Physician Progress Note      Atul Garibay  [facility] #:                  [id number]  :                       1955  ADMIT DATE:       2021 1:39 PM  [address] [place] DATE:  RESPONDING  PROVIDER #:        Ledy Walsh MD          QUERY TEXT:    Dear Dr. Colette Brink -    Pt admitted with chest pain, esophageal stricture. Pt noted to have PNA documented in attending progress note dated 21. If possible, please document in progress notes and discharge summary the present on admission status of PNA :    The medical record reflects the following:  Risk Factors: 61 F presented with c/o chest pain, hypoglycemia, AMS; patient with esophageal stricture, hypoglycemia, KANIKA  Clinical Indicators: per  attending PN, patient with acute bilateral pneumonia; patient with history of PNA; initial CXR on  negative for PNA; CT scan performed  shows small to moderate sized bilateral pleural effusions as well as acute interstitial disease with thickening of the interlobular and intralobular septae with differential being pulmonary edema versus atypical interstitial pneumonia/pneumonitis; WBC 11.7. ..11.6. Thea Sida Thea Sida 14.9. ..19.2. .. 16.1;  IV Azithromycin started ; IV Zosyn started   Treatment: labs, CXR, CT chest, IV ABT x 2    Thank you,  GERTRUDIS IqbalN, RN, [facility]  Clinical   [phone]  Options provided:  -- Yes, PNA was present at the time of the order to admit to the hospital  -- No, PNA was not present on admission and developed during the inpatient stay  -- Other - I will add my own diagnosis  -- Disagree - Not applicable / Not valid  -- Disagree - Clinically unable to determine / Unknown  -- Refer to Clinical Documentation Reviewer    PROVIDER RESPONSE TEXT:    Yes, PNA was present at the time of the order to admit to the hospital.    Query created by: Jarret Cheung on 2021 10:09 AM      QUERY TEXT:    Dear Dr. Colette Brink -    Pt admitted with chest pain, esophageal stricture. Pt noted to have sepsis documented in attending progress note dated 9/22/21. If possible, please document in progress notes and discharge summary the present on admission status of sepsis :    The medical record reflects the following:  Risk Factors: 61 F presented with c/o chest pain, hypoglycemia, AMS; patient with esophageal stricture, hypoglycemia, KANIKA  Clinical Indicators: per 9/22 attending PN, patient with acute bilateral pneumonia; patient with history of PNA; initial CXR on 9/19 negative for PNA; CT scan performed 9/21 shows small to moderate sized bilateral pleural effusions as well as acute interstitial disease with thickening of the interlobular and intralobular septae with differential being pulmonary edema versus atypical interstitial pneumonia/pneumonitis; WBC 11.7. ..11.6. Dorena Eric Dorena Eric 14.9. ..19.2. .. 16.1; IV Azithromycin started 9/21; IV Zosyn started 9/22; tmax 99.9; +tachycardia; +AMS; procalcitonin 0.14  Treatment: labs, CXR, CT chest, IV ABT x 2    Thank you,  GERTRUDIS LozanoN, RN, [facility]  Clinical   [phone]  Options provided:  -- Yes, sepsis was present at the time of the order to admit to the hospital  -- No, sepsis was not present on admission and developed during the inpatient stay  -- Other - I will add my own diagnosis  -- Disagree - Not applicable / Not valid  -- Disagree - Clinically unable to determine / Unknown  -- Refer to Clinical Documentation Reviewer    PROVIDER RESPONSE TEXT:    No, sepsis was not present on admission and developed during the inpatient stay.     Query created by: Tammie Mg on 9/23/2021 10:20 AM      Electronically signed by:  Lilliam Cardoso MD 9/23/2021 10:07 PM

## 2021-09-24 NOTE — PROGRESS NOTES
Progress Note  Date:9/24/2021       Room:203/  Patient Sweta Davis     YOB: 1955     Age:66 y.o. Subjective    As per admititng provider:  Marygrace Gaucher is a 61 y.o. female followed by Kika Torres MD and  has a past medical history of Arthritis, Breast CA ([facility] (2017), Diabetes ([facility] [address] Edema, GERD (gastroesophageal reflux disease), High cholesterol, Hypertension, Ill-defined condition, MI (myocardial infarction) ([facility] [address] (06/2018), Neuropathy, and Restless leg. CKD with last creat noted in 12/2020 at 1.8 and 2.2 on 4/28 and follows with Dr. Ana Cortez Nephrology in [facility]. Patient unsure what happened but was noted to have passed out and on evaluation gluocose of 34 . Similar event happened about 1 year ago. Patient is on lantus high dose as well as 10 units TID WM. Patient notes decrease in apepitie for about 1 year now and mostly notes it is secondary to increased stress and and recent passing of her  in 11/30/2020. Currently patient lives with her sister Rosaura Adler and able to ambulate without walker. Patient states her glucose level which she checks after breakfast at 10:30 usually is in 140 range and says on recheck at lunch and dinner glucose at times does get below 100. Patient also notes that for the past 3 days has been more sob with + wheeze and does use nebulizer twice daily but no relief. Denies any sick contacts. Patient relates chest pain and initial troponin x 2 was at 0.12 and no change. Repeat this AM slightly higher at 0.21. denies any GERD like symptoms but patient has been vomiting and has hx of esophageal stricture for which had dilation done at [facility] about 1 1/2 year ago. CXR shows mild cardiac enlargement. Echo done and shows EF 60-65% and no evidence of diastolic dysfunction.   With patients current presentation was admitting on telemetry for syncope, severy hypoglycemia, KANIKA on CKD, chest pain , elevated tropoinin and Copd exacerbation    Patient seen on follow-up. Sitting up in bed awake, alert and oriented. States she feels better again today. Patient o2 saturation stable on her chronic 3LNC. Denies any sob or cough. Creatinine continues to be on upward trend but patient states she is eating and drinking well. No hypotension and off any diuretics. Decreasing steroid use. Did say she had some confusion last night but denies any hallucinations           Review of Systems   Constitutional: Negative. HENT: Negative. Eyes: Negative. Cardiovascular: Negative. Gastrointestinal: Negative. Endocrine: Negative. Genitourinary: Negative. Musculoskeletal: Negative. Allergic/Immunologic: Negative. Neurological: Negative. Hematological: Negative. Objective           Vitals Last 24 Hours:  Patient Vitals for the past 24 hrs:   Temp Pulse Resp BP SpO2   09/24/21 0727 -- -- -- -- 95 %   09/24/21 0705 96.9 °F (36.1 °C) 94 20 (!) 116/59 92 %   09/24/21 0006 97.5 °F (36.4 °C) 90 20 136/65 97 %   09/23/21 2112 -- -- -- -- 94 %   09/23/21 2000 98 °F (36.7 °C) (!) 111 20 (!) 147/87 94 %   09/23/21 1953 98 °F (36.7 °C) (!) 111 20 (!) 147/87 94 %   09/23/21 1600 -- -- -- -- 98 %   09/23/21 1518 97.7 °F (36.5 °C) 91 20 (!) 155/80 100 %   09/23/21 1136 -- 90 20 130/74 95 %   09/23/21 1057 98 °F (36.7 °C) 97 18 137/60 100 %        I/O (24Hr): No intake or output data in the 24 hours ending 09/24/21 0902    Physical Exam:  General: Alert and awake but increasing confusion not oriented  Head:  Normocephalic, without obvious abnormality, atraumatic. Eyes:  Conjunctivae/corneas clear. Pupils equal, round, reactive to light. Extraocular movements intact. Lungs: b/l air entry improved, no wheeze , rales or crackles noted   Chest wall: No tenderness or deformity. Heart:  Regular rate and rhythm, S1, S2 normal, no murmur, click, rub or gallop. Abdomen:  Soft, non-tender.  Bowel sounds normal. No masses,  No organomegaly. Extremities: Extremities normal, atraumatic, no cyanosis or edema. Pulses: 2+ and symmetric all extremities. Skin: Skin color, texture, turgor normal. No rashes or lesions  Neurologic: Awake, Alert,oriented at times.  No obvious gross sensory or motor deficits      Medications           Current Facility-Administered Medications   Medication Dose Route Frequency    predniSONE (DELTASONE) tablet 20 mg  20 mg Oral DAILY WITH BREAKFAST    azithromycin (ZITHROMAX) tablet 500 mg  500 mg Oral DAILY    insulin glargine (LANTUS) injection 20 Units  20 Units SubCUTAneous ACB/HS    insulin lispro (HUMALOG) injection 12 Units  12 Units SubCUTAneous TIDAC    metoclopramide HCl (REGLAN) tablet 5 mg  5 mg Oral TIDAC    pantoprazole (PROTONIX) tablet 40 mg  40 mg Oral ACB    piperacillin-tazobactam (ZOSYN) 3.375 g in 0.9% sodium chloride (MBP/ADV) 100 mL MBP  3.375 g IntraVENous Q8H    gabapentin (NEURONTIN) capsule 300 mg  300 mg Oral BID    cyclobenzaprine (FLEXERIL) tablet 10 mg  10 mg Oral TID PRN    melatonin (rapid dissolve) tablet 5 mg  5 mg Oral QHS    venlafaxine-SR (EFFEXOR-XR) capsule 150 mg  150 mg Oral DAILY    fluticasone propionate (FLONASE) 50 mcg/actuation nasal spray 2 Spray  2 Spray Both Nostrils DAILY    cetirizine (ZYRTEC) tablet 10 mg  10 mg Oral QPM    hydrALAZINE (APRESOLINE) tablet 25 mg  25 mg Oral TID    ferrous sulfate tablet 325 mg  1 Tablet Oral TID WITH MEALS    amLODIPine (NORVASC) tablet 10 mg  10 mg Oral DAILY    atorvastatin (LIPITOR) tablet 80 mg  80 mg Oral QHS    LORazepam (ATIVAN) injection 0.5 mg  0.5 mg IntraVENous Q6H PRN    glucose chewable tablet 16 g  4 Tablet Oral PRN    dextrose (D50W) injection syrg 12.5-25 g  25-50 mL IntraVENous PRN    glucagon (GLUCAGEN) injection 1 mg  1 mg IntraMUSCular PRN    insulin lispro (HUMALOG) injection   SubCUTAneous AC&HS    albuterol-ipratropium (DUO-NEB) 2.5 MG-0.5 MG/3 ML  3 mL Nebulization QID RT    guaiFENesin ER (MUCINEX) tablet 600 mg  600 mg Oral Q12H    aspirin delayed-release tablet 81 mg  81 mg Oral DAILY    sodium chloride (NS) flush 5-40 mL  5-40 mL IntraVENous Q8H    sodium chloride (NS) flush 5-40 mL  5-40 mL IntraVENous PRN    acetaminophen (TYLENOL) tablet 650 mg  650 mg Oral Q6H PRN    Or    acetaminophen (TYLENOL) suppository 650 mg  650 mg Rectal Q6H PRN    polyethylene glycol (MIRALAX) packet 17 g  17 g Oral DAILY PRN    ondansetron (ZOFRAN ODT) tablet 4 mg  4 mg Oral Q8H PRN    Or    ondansetron (ZOFRAN) injection 4 mg  4 mg IntraVENous Q6H PRN    enoxaparin (LOVENOX) injection 30 mg  30 mg SubCUTAneous DAILY         Allergies         Ciprofloxacin, Keflex [cephalexin], Metformin, and Soliqua 100/33 [insulin glargine-lixisenatide]       Labs/Imaging/Diagnostics      Labs:  Recent Results (from the past 48 hour(s))   GLUCOSE, POC    Collection Time: 09/22/21  9:12 AM   Result Value Ref Range    Glucose (POC) 356 (H) 65 - 117 mg/dL    Performed by Agnes BURRIS    CULTURE, BLOOD, PAIRED    Collection Time: 09/22/21 10:34 AM    Specimen: Blood   Result Value Ref Range    Special Requests: No Special Requests      Culture result: No growth 2 days     GLUCOSE, POC    Collection Time: 09/22/21 12:53 PM   Result Value Ref Range    Glucose (POC) 419 (H) 65 - 117 mg/dL    Performed by Agnes BURRIS    CHLORIDE, URINE RANDOM    Collection Time: 09/22/21  1:04 PM   Result Value Ref Range    Chloride,urine random 79 mmol/L   PROTEIN/CREATININE RATIO, URINE    Collection Time: 09/22/21  1:04 PM   Result Value Ref Range    Protein, urine random 183 (H) 0.0 - 11.9 mg/dL    Creatinine, urine 51.87 (A) No reference range has been established. mg/dL    Protein/Creat.  urine Ratio 3.5     URINALYSIS W/ REFLEX CULTURE    Collection Time: 09/22/21  1:04 PM    Specimen: Urine   Result Value Ref Range    Color Yellow/Straw      Appearance Clear Clear      Specific gravity 1.025 1.003 - 1.030 pH (UA) 6.0 5.0 - 8.0      Protein 100 (A) Negative mg/dL    Glucose 500 (A) Negative mg/dL    Ketone Negative Negative mg/dL    Bilirubin Negative Negative      Blood Small (A) Negative      Urobilinogen 0.2 0.2 - 1.0 EU/dL    Nitrites Negative Negative      Leukocyte Esterase Negative Negative      WBC 0-4 0 - 5 /hpf    RBC 5-10 0 - 3 /hpf    Bacteria 1+ (A) Negative /hpf    UA:UC IF INDICATED Culture not indicated by UA result Culture not indicated by UA result     CULTURE, URINE    Collection Time: 09/22/21  1:04 PM    Specimen: Urine   Result Value Ref Range    Special Requests: No Special Requests      [place] Count 50,000      [place] Count colonies/ml      Culture result: culture in progress,further updates to follow     GLUCOSE, POC    Collection Time: 09/22/21  4:14 PM   Result Value Ref Range    Glucose (POC) 351 (H) 65 - 117 mg/dL    Performed by Nadya BURRIS    GLUCOSE, POC    Collection Time: 09/22/21  9:16 PM   Result Value Ref Range    Glucose (POC) 280 (H) 65 - 117 mg/dL    Performed by [facility]jit [facility], POC    Collection Time: 09/23/21  7:21 AM   Result Value Ref Range    Glucose (POC) 233 (H) 65 - 117 mg/dL    Performed by ADONIS SCHWARTZ    CBC WITH AUTOMATED DIFF    Collection Time: 09/23/21  8:32 AM   Result Value Ref Range    WBC 16.1 (H) 4.4 - 11.3 K/uL    RBC 4.00 (L) 4.50 - 5.90 M/uL    HGB 10.4 (L) 13.5 - 17.5 g/dL    HCT 32.2 (L) 36 - 46 %    MCV 80.5 80 - 100 FL    MCH 25.9 (L) 31 - 34 PG    MCHC 32.1 31.0 - 36.0 g/dL    RDW 17.0 (H) 11.5 - 14.5 %    PLATELET 300 999 - 996 K/uL    MPV 7.5 6.5 - 11.5 FL    NRBC 0.3  WBC    ABSOLUTE NRBC 0.04 K/uL    NEUTROPHILS 95 (H) 42 - 75 %    LYMPHOCYTES 3 (L) 20.5 - 51.1 %    MONOCYTES 2 1.7 - 9.3 %    EOSINOPHILS 0 (L) 0.9 - 2.9 %    BASOPHILS 0 0.0 - 2.5 %    ABS. NEUTROPHILS 15.3 (H) 1.8 - 7.7 K/UL    ABS. LYMPHOCYTES 0.5 (L) 1.0 - 4.8 K/UL    ABS. MONOCYTES 0.3 0.2 - 2.4 K/UL    ABS.  EOSINOPHILS 0.0 0.0 - 0.7 K/UL ABS. BASOPHILS 0.0 0.0 - 0.2 K/UL   METABOLIC PANEL, BASIC    Collection Time: 09/23/21  8:32 AM   Result Value Ref Range    Sodium 146 (H) 136 - 145 mmol/L    Potassium 4.7 3.5 - 5.1 mmol/L    Chloride 109 (H) 97 - 108 mmol/L    CO2 27 21 - 32 mmol/L    Anion gap 10 5 - 15 mmol/L    Glucose 243 (H) 65 - 100 mg/dL    BUN 71 (H) 6 - 20 mg/dL    Creatinine 2.76 (H) 0.55 - 1.02 mg/dL    BUN/Creatinine ratio 26 (H) 12 - 20      GFR est AA 21 (L) >60 ml/min/1.73m2    GFR est non-AA 17 (L) >60 ml/min/1.73m2    Calcium 9.7 8.5 - 10.1 mg/dL   MAGNESIUM    Collection Time: 09/23/21  8:32 AM   Result Value Ref Range    Magnesium 2.6 (H) 1.6 - 2.4 mg/dL   GLUCOSE, POC    Collection Time: 09/23/21 10:50 AM   Result Value Ref Range    Glucose (POC) 319 (H) 65 - 117 mg/dL    Performed by ADONIS EFREN    GLUCOSE, POC    Collection Time: 09/23/21  3:15 PM   Result Value Ref Range    Glucose (POC) 365 (H) 65 - 117 mg/dL    Performed by [employer] ElectricARET    GLUCOSE, POC    Collection Time: 09/23/21  8:06 PM   Result Value Ref Range    Glucose (POC) 306 (H) 65 - 117 mg/dL    Performed by Lani Leung    RENAL FUNCTION PANEL    Collection Time: 09/24/21  5:52 AM   Result Value Ref Range    Sodium 143 136 - 145 mmol/L    Potassium 4.9 3.5 - 5.1 mmol/L    Chloride 108 97 - 108 mmol/L    CO2 27 21 - 32 mmol/L    Anion gap 8 5 - 15 mmol/L    Glucose 349 (H) 65 - 100 mg/dL    BUN 76 (H) 6 - 20 mg/dL    Creatinine 3.08 (H) 0.55 - 1.02 mg/dL    BUN/Creatinine ratio 25 (H) 12 - 20      GFR est AA 18 (L) >60 ml/min/1.73m2    GFR est non-AA 15 (L) >60 ml/min/1.73m2    Calcium 8.9 8.5 - 10.1 mg/dL    Phosphorus 4.8 (H) 2.6 - 4.7 mg/dL    Albumin 2.9 (L) 3.5 - 5.0 g/dL   CBC WITH AUTOMATED DIFF    Collection Time: 09/24/21  5:52 AM   Result Value Ref Range    WBC 10.8 4.4 - 11.3 K/uL    RBC 3.85 (L) 4.50 - 5.90 M/uL    HGB 9.9 (L) 13.5 - 17.5 g/dL    HCT 30.9 (L) 36 - 46 %    MCV 80.2 80 - 100 FL    MCH 25.6 (L) 31 - 34 PG    MCHC 31.9 31.0 - 36.0 g/dL    RDW 17.2 (H) 11.5 - 14.5 %    PLATELET 744 943 - 862 K/uL    MPV 7.4 6.5 - 11.5 FL    NRBC 0.1  WBC    ABSOLUTE NRBC 0.01 K/uL    NEUTROPHILS 95 (H) 42 - 75 %    LYMPHOCYTES 3 (L) 20.5 - 51.1 %    MONOCYTES 2 1.7 - 9.3 %    EOSINOPHILS 0 (L) 0.9 - 2.9 %    BASOPHILS 0 0.0 - 2.5 %    ABS. NEUTROPHILS 10.4 (H) 1.8 - 7.7 K/UL    ABS. LYMPHOCYTES 0.3 (L) 1.0 - 4.8 K/UL    ABS. MONOCYTES 0.2 0.2 - 2.4 K/UL    ABS. EOSINOPHILS 0.0 0.0 - 0.7 K/UL    ABS. BASOPHILS 0.0 0.0 - 0.2 K/UL   BNP    Collection Time: 09/24/21  5:52 AM   Result Value Ref Range    NT pro-BNP 2,338 (H) <125 pg/mL   GLUCOSE, POC    Collection Time: 09/24/21  8:47 AM   Result Value Ref Range    Glucose (POC) 406 (H) 65 - 117 mg/dL    Performed by Phuong Darby         Imaging:  CT HEAD WO CONT    Result Date: 9/22/2021  1. No acute intracranial findings. 2. Atrophy and small vessel ischemic disease. 3. Bilateral maxillary sinus disease. CT CHEST WO CONT    Result Date: 9/21/2021  There are small to moderate-sized bilateral pleural effusions as well as acute interstitial disease with thickening of the interlobular and intralobular septae. The differential diagnosis would include pulmonary edema versus an atypical interstitial pneumonia/pneumonitis. The pleural effusions favor pulmonary interstitial edema over acute interstitial pneumonia. The cardiac chambers are normal in size without significant cardiomegaly. There are coronary artery calcifications. US RETROPERITONEUM COMP    Result Date: 9/21/2021  Limited by bowel gas. 1. Kidneys grossly normal size, without hydronephrosis. XR CHEST PORT    Result Date: 9/19/2021  Mild cardiac enlargement. Exam is otherwise unremarkable.        Assessment//Plan           Problem List:  Hospital Problems  Date Reviewed: 2/3/2021        Codes Class Noted POA    Sepsis [facility]) ICD-10-CM: A41.9  ICD-9-CM: 038.9, 995.91  9/22/2021 Unknown        Esophagitis with gastritis ICD-10-CM: K29.70, K20.90  ICD-9-CM: 530.19  9/22/2021 Unknown        Elevated troponin ICD-10-CM: R77.8  ICD-9-CM: 790.6  9/20/2021 Unknown        Syncope ICD-10-CM: R55  ICD-9-CM: 780.2  9/20/2021 Unknown        Hypoglycemia ICD-10-CM: E16.2  ICD-9-CM: 251.2  9/20/2021 Unknown        Chronic respiratory failure with hypoxia [facility]) ICD-10-CM: J96.11  ICD-9-CM: 518.83, 799.02  9/20/2021 Unknown        Chest pain ICD-10-CM: R07.9  ICD-9-CM: 786.50  9/19/2021 Unknown        Diabetic gastroparesis [facility]) ICD-10-CM: E11.43, K31.84  ICD-9-CM: 250.60, 536.3  10/1/2020 Unknown        * (Principal) PNA (pneumonia) ICD-10-CM: J18.9  ICD-9-CM: 486  9/30/2020 Unknown            Sepsis  -Currently is meeting sepsis secondary to tachycardia and leukocytosis most likely secondary to bilateral pneumonia as urinalysis is negative  -Urine culture obtained and showing 50,000 colonies ID and sensitivity pending  -Blood cultures x2 obtained showing prelim NG x 2 days   -Continue current IV antibiotics  -Hold IV fluids secondary to noted effusions on x-ray    Acute on chronic Hypoxic Respiratory failure  -Possibly on chronic 3 L but noncompliant at home may have some signs and symptoms of sleep apnea  -Currently is on 3 L with good O2 saturation continues to get short of breath with minimal exertion most likely secondary to underlying COPD/interstitial lung findings as well as bilateral pneumonia  -Continue current DuoNebs  -Monitor O2 sat closely and encourage incentive spirometry    Acute bilateral pneumonia  -Unknown etiology but patient has had previous history of pneumonia for which she was admitted to [facility] over a year ago  -Patient states she follows with pulmonologist but patient sister is unaware of any physician at [facility]  -CT scan of the chest shows small to moderate sized bilateral pleural effusions as well as acute interstitial disease with thickening of the interlobular and intralobular septae with differential being pulmonary edema versus atypical interstitial pneumonia/pneumonitis  -Increased leukocytosis possibly secondary steroids versus infection  -Follow-up procalcitonin level at 0.14  -Blood cultures x2 obtained prelim shows no growth x 2 dats  -Continued on azithromycin 500 mg as well as Zosyn 3.375 g every 8 hours  - repeat wbc on 9/24 normalized at 10.8 but still left shift noted but steroids most likely contributing to this. - follow up Pa/lateral CXR today to evaluate pna and effusions previously noted     Maxillary sinusitis  -As noted on CT of the head   -We will start Flonase 2 sprays daily and cetirizine 10 mg every afternoon  -continue current antibiotics      Chest pain   - possible secondary to coughing and vomiting in setting of hx of esophageal stricture.    -Initial troponin 0.12 and repeat in similar range of 0.12 drawn at 3:30 PM on 9/19 and on recheck on 9/20 slightly elevated 0.21  -Continue to cycle every 6 hours troponins x2  -Cardiology consultation appreciated  -The echo shows preserved EF and no diastolic dysfunction  -Noted to have history of cardiac catheterization at [place] and records show minimal obstruction no stents needed  -Monitor closely on telemetry     Syncope  -Noted to have passed out and felt to be secondary to hypoglycemia  -Monitor on telemetry for any irregular rhythms  -Initial troponins elevated 0.12x2 but repeat elevated 0.21 and will continue cycle every 6 hours x2  -2D echo shows preserved EF no diastolic dysfunction     Hypoglycemia / Hyperglycemia   - was 34 on initial check and improved slowly after treatment and currently glucose levels at 175 this morning and 9/20 and 251  -We will hold long-acting insulin as well as mealtime insulin  -Continue monitor Accu-Cheks AC at bedtime  -Nutrition consultation  -Poor appetite most likely secondary to increased stress and loss of  on 11/30/2020 as well as issues with vomiting possibly secondary to esophageal stricture  -On 9/21 with current steroids glucose levels in the 300 range restarted home Lantus but at lower dose of 20 units and will continue to monitor closely  -9/22 patient glucose levels continue to be uncontrolled and will increase patient to 35 units Lantus daily and new on sliding scale  -On 9/23 glucose levels initially improved but again again in 300 and will restart insulin lispro at 12 units prior to meals and change Lantus to 20 units twice daily  - 9/24 glucose of 406 and started on 12 units with meals regular insulin and will increase lantus to 25 units bid     Antral gastritis/esophagitis  -As noted on EGD and will continue Protonix 40 mg daily  -Continue to avoid NSAIDs     Vomiting  -Had vomiting episodes periodically most likely secondary to esophageal stricture  -We will get speech therapy to evaluate swallow and GI to evaluate EGD performed and esophageal dilation performed and EGD also shows esophagitis as well as evidence of gastroparesis so we will start Reglan 5 mg prior to meals and at bedtime     KANIKA on CKD  -Creatinine slightly elevated at 2.66 with previous baseline creatinine earlier this year in April at 2.2 and prior to that was at 1.8 in December 2020  -Obtain baseline labs from Dr. Hallie Pérez office and patient has new appointment with Dr. Ryan Powers nephrologist in [address] current IV fluids of D5 half NS at 100 cc an hour and repeat creatinine down to 2.54 on 9/20  -Nephrology consult appreciated  - hx of toradol use and will discontinue any further NSAID use.   -On 9/21 it was noted from records that patient's creatinine previously was at 1.9 and so initially continued fluids but with patient's worsening shortness of breath with exertion and CT of the chest showing extensive pulmonary edema discontinue and start Lasix of 40 mg IV twice daily  -9/22 continue worsening of KANIKA follow with nephrology who is recommending to encourage oral fluids hold any further diuresis  -Creatinine level still elevated and will continue encourage oral fluid intake and nephrology monitoring closely  - 9/24: creat still on upward trend to 3.08 with BUN of 76.patient states she is eating and drinking fluids well. Follow up with nephrology recommendations. Obtain bladder scan post void   BNP elevated at 2,338. Follow up PA / lateral cxr     Acute on chronic COPD exacerbation   -Presents with shortness of breath and cough for the past 3 days along with wheezing and no history of tobacco use  -Chest x-ray negative for any acute pneumonic process  -Place patient on nebulizer DuoNeb 4 times daily  -On 9/21 noted to have increasing upper airway stridor and a racemic epinephrine was given and Solu-Medrol increased to 60 mg IV every 8 hours and decreased to every 12 hours  -CT of the chest shows small to moderate pulmonary edema bilaterally along with interstitial disease with noted possible atypical infection and so azithromycin 500 mg IV daily will be started  -9/23 decrease steroids down to 40 mg IV daily    Acute encephalopathy  -Continues to have cough confusion possibly secondary to underlying dementia/psychiatric and poor acute hospitalization  -ABG performed shows pH of 7.37 PCO2 of 37 with PO2 of 85 on 3 L  -Patient started on Seroquel 25 mg every afternoon and as needed Ativan 0.5 mg IV every 6 hours as needed for agitation  -Cussed with patient sister and for a year now has been having off-and-on hallucinations and has been placed on Effexor extended release 150 mg and has not been started since admission so we will go ahead and restart  -Patient has marked improvement in mentation is awake alert oriented no further hallucinations will discontinue Seroquel continue as needed Ativan and continue patient's venlafaxine home dose    Iron Def Anemia  - hg has been stable during hospitalization but iron profile done on 9/20 showing iron level at 19 with saturation of 7%.    - continue with Ferrous sulfate 325mg oral TID WM   - colace daily     DVT prophylaxis  -Lovenox     CODE STATUS: Full code  Patient designates her sister Rafa Mendez as her medical decision-maker if for some reason she is unable to make decisions for herself     Spent 30 minutes evaluting and coordinating patient care of which >50% was spent coordinating and counseling. Disposition: improving from respiratory / pna but worsening renal fxn and will need to monitor and dc once renal fxn stablizes.           Electronically signed by Hernandez Henriquez MD on 9/24/2021 at 7:20 AM

## 2021-09-24 NOTE — PROGRESS NOTES
Progress Note    Patient: Toro Huang MRN: [id number]  SSN: [id number]    YOB: 1955  Age: 61 y.o. Sex: female      Admit Date: 9/19/2021    LOS: 4 days     Subjective:      Patient seen and examined. Toro Huang is a 61y.o. year-old female with past medical history significant for HTN, HLD, DM, COPD with Continuous supplemental oxygen required, Left Breast CA s/p Radiation/Chemo Therapy, Esophageal strictures s/p dilatation procedure/EGD, obesity, and ? Cardiomyopathy who is followed for Elevated Troponin and atypical chest pain. This am patient reports that she is feeling much better, rested well overnight, and has no new complaints. Renal function worsened again today. BP remains above goal.    Telemetry Review: SR/ST with occasional PVC    Review of Symptoms:   Review of Systems   Constitutional: Negative. HENT:        Difficulty swallowing   Eyes: Negative. Cardiovascular: Positive for dyspnea on exertion. Respiratory: Negative. Endocrine: Negative. Hematologic/Lymphatic: Negative. Skin: Negative. Musculoskeletal: Negative. Gastrointestinal: Negative. Genitourinary: Negative. Neurological: Negative. Psychiatric/Behavioral: Negative. Allergic/Immunologic: Negative. Objective:     Vitals:    09/24/21 0006 09/24/21 0705 09/24/21 0727 09/24/21 0905   BP: 136/65 (!) 116/59  (!) 172/88   Pulse: 90 94  88   Resp: 20 20  20   Temp: 97.5 °F (36.4 °C) 96.9 °F (36.1 °C)  97.7 °F (36.5 °C)   SpO2: 97% 92% 95% 97%   Weight:       Height:            Intake and Output:  Current Shift: No intake/output data recorded. Last three shifts: 09/22 1901 - 09/24 0700  In: 120 [P.O.:120]  Out: -     Physical Exam:   General: Well nourished female sitting up in bed watching TV, NAD, A&O  HEENT: Normocephalic, PERRL, no drainage  Neck: Supple, Trachea midline, No JVD  RESP: CTA bilaterally, dsiminished in bases posteriorly.  + Symmetrical chest movement.  No SOB or distress. On O2 via NC. Cardiovascular: RRR no RG. + murmur. PVS: No rubor, cyanosis, no edema, Radial, DP, PT pulses equal bilaterally  ABD: obese, soft, NT, Normoactive BS  Derm: Warm/Dry/Intact with no lesions, poor turgor  Neuro: A&O PPTS, cranial nerves II- XII grossly intact via interaction with patient.  No focal deficits  PSYCH: No anxiety or agitation      Lab/Data Review:  BMP:   Lab Results   Component Value Date/Time     09/24/2021 05:52 AM    K 4.9 09/24/2021 05:52 AM     09/24/2021 05:52 AM    CO2 27 09/24/2021 05:52 AM    AGAP 8 09/24/2021 05:52 AM     (H) 09/24/2021 05:52 AM    BUN 76 (H) 09/24/2021 05:52 AM    CREA 3.08 (H) 09/24/2021 05:52 AM    GFRAA 18 (L) 09/24/2021 05:52 AM    GFRNA 15 (L) 09/24/2021 05:52 AM            Current Facility-Administered Medications:     predniSONE (DELTASONE) tablet 20 mg, 20 mg, Oral, DAILY WITH BREAKFAST, Edawrd Jennings MD    azithromycin (ZITHROMAX) tablet 500 mg, 500 mg, Oral, DAILY, Edward Jennings MD, 500 mg at 09/24/21 0951    docusate sodium (COLACE) capsule 100 mg, 100 mg, Oral, BID, Edward Jennings MD    insulin lispro (HUMALOG) injection 10 Units, 10 Units, SubCUTAneous, ONCE, Edward Jennings MD    metoprolol succinate (TOPROL-XL) XL tablet 25 mg, 25 mg, Oral, DAILY, Anjana Buckley, NP    insulin glargine (LANTUS) injection 20 Units, 20 Units, SubCUTAneous, ACB/HS, Edward Jennings MD, 20 Units at 09/23/21 2205    insulin lispro (HUMALOG) injection 12 Units, 12 Units, SubCUTAneous, ANABELLE, Edward Jennings MD    metoclopramide HCl (REGLAN) tablet 5 mg, 5 mg, Oral, TIDick ROWAN Goutham R, MD, 5 mg at 09/24/21 0954    pantoprazole (PROTONIX) tablet 40 mg, 40 mg, Oral, ACB, Edward Jennings MD, 40 mg at 09/24/21 0954    piperacillin-tazobactam (ZOSYN) 3.375 g in 0.9% sodium chloride (MBP/ADV) 100 mL MBP, 3.375 g, IntraVENous, Q8H, Edward Jennings MD, Last Rate: 25 mL/hr at 09/24/21 0021, 3.375 g at 09/24/21 0021    gabapentin (NEURONTIN) capsule 300 mg, 300 mg, Oral, BID, Edward Jennings MD, 300 mg at 09/23/21 2203    cyclobenzaprine (FLEXERIL) tablet 10 mg, 10 mg, Oral, TID PRN, Edward Jennings MD, 10 mg at 09/22/21 1211    melatonin (rapid dissolve) tablet 5 mg, 5 mg, Oral, QHS, Edward Jennings MD, 5 mg at 09/23/21 2202    venlafaxine-SR (EFFEXOR-XR) capsule 150 mg, 150 mg, Oral, DAILY, Edward Jennings MD, 150 mg at 09/23/21 0817    fluticasone propionate (FLONASE) 50 mcg/actuation nasal spray 2 Spray, 2 Spray, Both Nostrils, DAILY, Edward Jennings MD, 2 [facility] at 09/23/21 0821    cetirizine (ZYRTEC) tablet 10 mg, 10 mg, Oral, QPM, Kenroy GALAVIZ MD, 10 mg at 09/23/21 1619    hydrALAZINE (APRESOLINE) tablet 25 mg, 25 mg, Oral, TID, Zari Joe MD, 25 mg at 09/24/21 1055    ferrous sulfate tablet 325 mg, 1 Tablet, Oral, TID WITH MEALS, Edward Jennings MD, 325 mg at 09/23/21 1619    amLODIPine (NORVASC) tablet 10 mg, 10 mg, Oral, DAILY, Paras Buckley NP, 10 mg at 09/24/21 0953    atorvastatin (LIPITOR) tablet 80 mg, 80 mg, Oral, QHS, Paras Buckley, AIDEN, 80 mg at 09/23/21 2203    LORazepam (ATIVAN) injection 0.5 mg, 0.5 mg, IntraVENous, Q6H PRN, Edward Jennings MD, 0.5 mg at 09/21/21 1632    glucose chewable tablet 16 g, 4 Tablet, Oral, PRN, Edward Jennings MD    dextrose (D50W) injection syrg 12.5-25 g, 25-50 mL, IntraVENous, PRN, Edward Jennings MD    glucagon (GLUCAGEN) injection 1 mg, 1 mg, IntraMUSCular, PRN, Edward Jennings MD    insulin lispro (HUMALOG) injection, , SubCUTAneous, AC&HS, Edward Jennings MD, 4 Units at 09/23/21 2204    albuterol-ipratropium (DUO-NEB) 2.5 MG-0.5 MG/3 ML, 3 mL, Nebulization, QID RT, Edward Jennings MD, 3 mL at 09/24/21 0727    guaiFENesin ER (MUCINEX) tablet 600 mg, 600 mg, Oral, Q12H, Edward Jennings MD, 600 mg at 09/24/21 0955    aspirin delayed-release tablet 81 mg, 81 mg, Oral, DAILY, William Ramirez MD, 81 mg at 09/20/21 1802    sodium chloride (NS) flush 5-40 mL, 5-40 mL, IntraVENous, Q8H, Jacqueline Travis MD, 10 mL at 09/24/21 0606    sodium chloride (NS) flush 5-40 mL, 5-40 mL, IntraVENous, PRN, Jacqueline Travis MD    acetaminophen (TYLENOL) tablet 650 mg, 650 mg, Oral, Q6H PRN **OR** acetaminophen (TYLENOL) suppository 650 mg, 650 mg, Rectal, Q6H PRN, Jacqueline Travis MD    polyethylene glycol (MIRALAX) packet 17 g, 17 g, Oral, DAILY PRN, Jacqueline Travis MD    ondansetron (ZOFRAN ODT) tablet 4 mg, 4 mg, Oral, Q8H PRN **OR** ondansetron (ZOFRAN) injection 4 mg, 4 mg, IntraVENous, Q6H PRN, Jacqueline Travis MD    enoxaparin (LOVENOX) injection 30 mg, 30 mg, SubCUTAneous, DAILY, Jacqueline Travis MD, 30 mg at 09/24/21 0956      Assessment:     Principal Problem:    PNA (pneumonia) (9/30/2020)    Active Problems:    Diabetic gastroparesis (HCC) (10/1/2020)      Chest pain (9/19/2021)      Elevated troponin (9/20/2021)      Syncope (9/20/2021)      Hypoglycemia (9/20/2021)      Chronic respiratory failure with hypoxia (HCC) (9/20/2021)      Sepsis ([facility] [address] (9/22/2021)      Esophagitis with gastritis (9/22/2021)        Plan:     Case discussed with Collaborating physician Dr. Leora Galicia and our recommendations are as follows:     1. Elevated Troponin - Flat in trends on first checks @ 0.12. Note that 9/20 am Trop was 0.21, this was peak and it down-trended from there. No ACS per EKG. [facility] records reviewed and patient had Cath 2018 that showed normal coronaries other than small lesion RCA. Sx were atypical and associated with food intake, and have resolved since dilatation of esophagus yesterday. TTE grossly normal with no wall motion abnormalities. Continue risk factor modification and medication regimen. If sx return can consider NST in OP setting. 2. KANIKA on CKD - appreciate nephrology input. Hold home Lisinopril for now.  Creat was 1.6 early 2021 and 1.9 on 9/15/21 per PCP records. Note ?edema on CT Chest with pleural effusions 9/21. IV Lasix given x2 doses 9/21 & 9/22 with noted worsening renal function past 48 hours. Would avoid further diuresis attempts at this time. 3. HTN -BP above goal.  Continue home regimen Amlodipine 10mg daily. Hold Lisinopril home dosing. Appreciate Nephrology input. No diuesis. Agree with initiation of Hydralazine 9/22 pm.  Given tachycardia and ventricular ectopy will start Metoprolol Succinate 25mg PO Daily today. Trend BP/HR and see how patient tolerates. May decrease dose if needed. 4. HLD: LDL @ 70 in setting of A1C of 7.6. Increased Atorvastatin to 80mg QHS effective 9/21/21. 5. DM: A1C 7.6 on labs. This is significant improvement from previous when reviewing PCP notes. Continue aggressive glycemic control. 6. EKG monitoring:  Given simultaneous use of Seroquel and Reglan monitor QTc intervals. Thank you for involving us in the care of this patient. Please do not hesitate to call if additional questions arise.  If after hours please call [phone]    Signed By: Geno Munoz NP     September 24, 2021

## 2021-09-24 NOTE — ASSESSMENT & PLAN NOTE
Historical findings include having turbid urine about 3 weeks ago. Reported as being muddy. She reports that she had flu like sx, nasal congestion around the same time. She has been having burning urination for longer period of time and was referred to see Dr. Washington Dillon, but she has been unable. Creatinine trend shows worsening creatinine between 09/21 and 09/24. UA:   Results for Alysha Ram (MRN [id number]) as of 9/24/2021 15:35   Ref. Range 9/19/2021 14:31 9/22/2021 13:04   Color Latest Units:   Yellow/Straw Yellow/Straw   Appearance Latest Ref Range: Clear   Clear Clear   Specific gravity Latest Ref Range: 1.003 - 1.030   1.025 1.025   pH (UA) Latest Ref Range: 5.0 - 8.0   5.5 6.0   Protein Latest Ref Range: Negative mg/dL >300 (A) 100 (A)   Glucose Latest Ref Range: Negative mg/dL 100 (A) 500 (A)   Ketone Latest Ref Range: Negative mg/dL Negative Negative   Blood Latest Ref Range: Negative   Small (A) Small (A)   Bilirubin Latest Ref Range: Negative   Negative Negative   Urobilinogen Latest Ref Range: 0.2 - 1.0 EU/dL 0.2 0.2   Nitrites Latest Ref Range: Negative   Negative Negative   Leukocyte Esterase Latest Ref Range: Negative   Negative Negative   WBC Latest Ref Range: 0 - 5 /hpf 0-4 0-4   RBC Latest Ref Range: 0 - 3 /hpf 0-5 5-10   Bacteria Latest Ref Range: Negative /hpf Negative 1+ (A)   Amorphous Crystals Latest Ref Range: Negative  4+ (A)        UACR: 2.5g    No prior UACR on file      Plan:   Hold diuretics  Obtain ANCA, NANCY, HIV, HCV, HBV, ASO, complement levels, RF   Outpatient records reviewed.

## 2021-09-24 NOTE — PROGRESS NOTES
Comprehensive Nutrition Assessment    Type and Reason for Visit: Consult, Initial    Nutrition Recommendations/Plan:   Modify Diet w/ cho choices restriction of 3 cho choices   Request an A1C level   Monitor and Record daily wts & po intake     Will continue to follow 1x/wk     Nutrition Assessment:  63 y.o. female admitted for PNA w/ pmh: HTN, DM, Breast Cancer, CHF & GERD. Patient noted to have a decreased in her appetite @ adm., however, appetite has since approved and observed lunch tray @ 100%. Patient stated, \"It was delicous. \" \"I am doing better today and i have my appetite back. \" Diet hx as the following: steak, baked chicken,fruits, veggies, pudding & Jello. Currently on a soft & bite size diet w/o diffculties noted. Nutrition related lab values: glucose (349), bun (76), cre (.08), phos (4.8), gfr (15), alb (2.9). Meds: lipitor, amlodpine, ferrous, sulfate. Nutrition Intervention for patient's questions about a diabetic diet and aswered all of her questions. Handouts from the NC were provided. Malnutrition Assessment:  Malnutrition Status:  No malnutrition        Estimated Daily Nutrient Needs:  Energy (kcal): 1,550-1,800kcals(25-30kcal/adjkg); Weight Used for Energy Requirements: Adjusted (62kg)  Protein (g): 51-68g (.8-1.0g/adjkg); Weight Used for Protein Requirements: Adjusted  Fluid (ml/day): 1,550-1,800ml; Method Used for Fluid Requirements: 1 ml/kcal      Nutrition Related Findings:  no reported n/v, c/d, BM x2 (9/24), observed no fat or muscle wasting. Wounds:    None       Current Nutrition Therapies:  ADULT DIET Dysphagia - Soft & Bite Sized; 4 carb choices (60 gm/meal);  Low Fat/Low Chol/High Fiber/2 gm Na; GI Wells (GERD/Peptic Ulcer)    Anthropometric Measures:  · Height:  5' 2\" (157.5 cm)  · Current Body Wt:  100.7 kg (222 lb)   · Admission Body Wt:       · Usual Body Wt:  100.7 kg (222 lb)     · Ideal Body Wt:  110 lbs:  201.8 %   · Adjusted Body Weight:  62kg  · BMI Category:  Obese class 3 (BMI 40.0 or greater)       Nutrition Diagnosis:   · Overweight/obesity related to food and nutrition related knowledge deficit as evidenced by BMI      Nutrition Interventions:   Food and/or Nutrient Delivery: Modify current diet  Nutrition Education and Counseling: Education initiated  Coordination of Nutrition Care: Continue to monitor while inpatient, Interdisciplinary rounds    Goals:  >75% of EENs, glucose levels <180mmg/dl x 3 days, Wt.  Stability w/ gradual wt. loss encouraged =/- kg x 7days       Nutrition Monitoring and Evaluation:   Behavioral-Environmental Outcomes: None identified  Food/Nutrient Intake Outcomes: Food and nutrient intake  Physical Signs/Symptoms Outcomes: Biochemical data, Meal time behavior, Weight    Discharge Planning:    Recommend pursue outpatient diabetes education, Continue current diet     Electronically signed by Leonor Degroot on 9/24/2021 at 3:05 PM    Contact: [phone]

## 2021-09-24 NOTE — ASSESSMENT & PLAN NOTE
Outpatient follow up at discharge  RAAS inhibition once stablized  Strict BP control  SGLT-2 inhibition once UTI resolved

## 2021-09-24 NOTE — PROGRESS NOTES
Patients case reviewed during interdisciplinary team meeting in [address] [place] [address]/Acute Care Unit. Rev.  Franky Vargas [address]

## 2021-09-24 NOTE — PROGRESS NOTES
Progress Note  Date:2021       Room:  Patient Reva Cardona     YOB: 1955     Age:66 y.o. Subjective    Subjective:  Symptoms:  Improved. No shortness of breath or malaise. Activity level: Impaired due to weakness. Pain:  She reports no pain. Review of Systems   Constitutional: Negative for fever. Respiratory: Negative for shortness of breath. Objective         Vitals Last 24 Hours:  TEMPERATURE:  Temp  Av.7 °F (36.5 °C)  Min: 96.9 °F (36.1 °C)  Max: 98 °F (36.7 °C)  RESPIRATIONS RANGE: Resp  Av  Min: 20  Max: 20  PULSE OXIMETRY RANGE: SpO2  Av.6 %  Min: 92 %  Max: 98 %  PULSE RANGE: Pulse  Av.8  Min: 81  Max: 111  BLOOD PRESSURE RANGE: Systolic (85RIW), OCN:232 , Min:116 , KEU:032   ; Diastolic (67BRC), OMW:84, Min:59, Max:88    I/O (24Hr): Intake/Output Summary (Last 24 hours) at 2021 1558  Last data filed at 2021 1305  Gross per 24 hour   Intake 120 ml   Output 250 ml   Net -130 ml     Objective:  General Appearance:  Comfortable and ill-appearing. Vital signs: (most recent): Blood pressure (!) 167/81, pulse 81, temperature 97.8 °F (36.6 °C), resp. rate 20, height 5' 2\" (1.575 m), weight 100.7 kg (222 lb), SpO2 98 %, not currently breastfeeding. Vital signs are normal.  No fever. Output: Producing urine. Lungs:  Normal effort. She is not in respiratory distress. (On oxygen)  Abdomen: Abdomen is soft and distended. Pupils:  Pupils are equal, round, and reactive to light. Skin:  Warm.       Labs/Imaging/Diagnostics    Labs:  CBC:  Recent Labs     21  0552 21  0832 21  0757   WBC 10.8 16.1* 19.2*   RBC 3.85* 4.00* 4.00*   HGB 9.9* 10.4* 10.1*   HCT 30.9* 32.2* 32.1*   MCV 80.2 80.5 80.2   RDW 17.2* 17.0* 16.6*    389 357     CHEMISTRIES:  Recent Labs     21  0552 21  0832 21  0757    146* 143   K 4.9 4.7 5.3*    109* 106 CO2 27 27 26   BUN 76* 71* 60*   CA 8.9 9.7 9.5   PHOS 4.8*  --   --    MG  --  2.6*  --    PT/INR:No results for input(s): INR, INREXT in the last 72 hours. No lab exists for component: PROTIME  APTT:No results for input(s): APTT in the last 72 hours. LIVER PROFILE:No results for input(s): AST, ALT in the last 72 hours. No lab exists for component: [place], ALKPHOS  Lab Results   Component Value Date/Time    ALT (SGPT) 27 09/19/2021 01:48 PM    AST (SGOT) 18 09/19/2021 01:48 PM    Alk. phosphatase 78 09/19/2021 01:48 PM    Bilirubin, total 0.3 09/19/2021 01:48 PM       Imaging Last 24 Hours:  XR CHEST PA LAT    Result Date: 9/24/2021  Indication: Evaluate effusion. Frontal and lateral radiographs of the chest 9/24/2021. Correlation CT chest unenhanced 9/21/2021. Lungs appear clear. No pleural effusion. Borderline heart size exaggerated by technique. No pneumothorax. Mild thoracic spondylosis. No acute pathology. Assessment//Plan   Principal Problem:    PNA (pneumonia) (9/30/2020)    Active Problems:    Acute kidney injury ([address] (9/30/2020)      Diabetic gastroparesis ([address] (10/1/2020)      Chest pain (9/19/2021)      Elevated troponin (9/20/2021)      Syncope (9/20/2021)      Hypoglycemia (9/20/2021)      Chronic respiratory failure with hypoxia ([address] (9/20/2021)      Sepsis ([address] (9/22/2021)      Esophagitis with gastritis (9/22/2021)      CKD (chronic kidney disease) (9/24/2021)      Overview: In the setting of severe, uncontrolled and prolonged diabetes, with       diabetic retinopathy. Protienuria to 2.5g/g of creatinine. Would consider workup for acute GN given inconsistent history and rising       creatinine. Though clinical hx appears consistent with aggressive diabetic       nephropathy      Assessment & Plan  Acute kidney injury ([address]  Historical findings include having turbid urine about 3 weeks ago. Reported as being muddy.  She reports that she had flu like sx, nasal congestion around the same time. She has been having burning urination for longer period of time and was referred to see Dr. Gay Darby, but she has been unable. Creatinine trend shows worsening creatinine between 09/21 and 09/24. UA:   Results for Aldair Barton (MRN [id number]) as of 9/24/2021 15:35   Ref. Range 9/19/2021 14:31 9/22/2021 13:04   Color Latest Units:   Yellow/Straw Yellow/Straw   Appearance Latest Ref Range: Clear   Clear Clear   Specific gravity Latest Ref Range: 1.003 - 1.030   1.025 1.025   pH (UA) Latest Ref Range: 5.0 - 8.0   5.5 6.0   Protein Latest Ref Range: Negative mg/dL >300 (A) 100 (A)   Glucose Latest Ref Range: Negative mg/dL 100 (A) 500 (A)   Ketone Latest Ref Range: Negative mg/dL Negative Negative   Blood Latest Ref Range: Negative   Small (A) Small (A)   Bilirubin Latest Ref Range: Negative   Negative Negative   Urobilinogen Latest Ref Range: 0.2 - 1.0 EU/dL 0.2 0.2   Nitrites Latest Ref Range: Negative   Negative Negative   Leukocyte Esterase Latest Ref Range: Negative   Negative Negative   WBC Latest Ref Range: 0 - 5 /hpf 0-4 0-4   RBC Latest Ref Range: 0 - 3 /hpf 0-5 5-10   Bacteria Latest Ref Range: Negative /hpf Negative 1+ (A)   Amorphous Crystals Latest Ref Range: Negative  4+ (A)        UACR: 2.5g    No prior UACR on file      Plan:   Hold diuretics  Obtain ANCA, NANCY, HIV, HCV, HBV, ASO, complement levels, RF   Outpatient records reviewed.      CKD (chronic kidney disease)  Outpatient follow up at discharge  RAAS inhibition once stablized  Strict BP control  SGLT-2 inhibition once UTI resolved      Electronically signed by Toy Feliz MD on 9/24/2021 at 3:58 PM

## 2021-09-25 LAB
ALBUMIN SERPL-MCNC: 3 G/DL (ref 3.5–5)
ANION GAP SERPL CALC-SCNC: 8 MMOL/L (ref 5–15)
BASOPHILS # BLD: 0 K/UL (ref 0–0.2)
BASOPHILS NFR BLD: 0 % (ref 0–2.5)
BUN SERPL-MCNC: 77 MG/DL (ref 6–20)
BUN/CREAT SERPL: 29 (ref 12–20)
CA-I BLD-MCNC: 8.9 MG/DL (ref 8.5–10.1)
CHLORIDE SERPL-SCNC: 111 MMOL/L (ref 97–108)
CO2 SERPL-SCNC: 29 MMOL/L (ref 21–32)
CREAT SERPL-MCNC: 2.68 MG/DL (ref 0.55–1.02)
EOSINOPHIL # BLD: 0 K/UL (ref 0–0.7)
EOSINOPHIL NFR BLD: 0 % (ref 0.9–2.9)
ERYTHROCYTE [DISTWIDTH] IN BLOOD BY AUTOMATED COUNT: 16.8 % (ref 11.5–14.5)
GLUCOSE BLD STRIP.AUTO-MCNC: 124 MG/DL (ref 65–117)
GLUCOSE BLD STRIP.AUTO-MCNC: 127 MG/DL (ref 65–117)
GLUCOSE BLD STRIP.AUTO-MCNC: 140 MG/DL (ref 65–117)
GLUCOSE BLD STRIP.AUTO-MCNC: 71 MG/DL (ref 65–117)
GLUCOSE SERPL-MCNC: 74 MG/DL (ref 65–100)
HBV SURFACE AG SER QL: <0.1 INDEX
HBV SURFACE AG SER QL: NEGATIVE
HCT VFR BLD AUTO: 32.3 % (ref 36–46)
HGB BLD-MCNC: 10.3 G/DL (ref 13.5–17.5)
HIV 1+2 AB+HIV1 P24 AG SERPL QL IA: NONREACTIVE
HIV12 RESULT COMMENT, HHIVC: NORMAL
LYMPHOCYTES # BLD: 0.6 K/UL (ref 1–4.8)
LYMPHOCYTES NFR BLD: 4 % (ref 20.5–51.1)
MCH RBC QN AUTO: 25.4 PG (ref 31–34)
MCHC RBC AUTO-ENTMCNC: 31.9 G/DL (ref 31–36)
MCV RBC AUTO: 79.7 FL (ref 80–100)
MONOCYTES # BLD: 1.2 K/UL (ref 0.2–2.4)
MONOCYTES NFR BLD: 8 % (ref 1.7–9.3)
NEUTS SEG # BLD: 13.7 K/UL (ref 1.8–7.7)
NEUTS SEG NFR BLD: 88 % (ref 42–75)
NRBC # BLD: 0.01 K/UL
NRBC BLD-RTO: 0.1 PER 100 WBC
PERFORMED BY, TECHID: ABNORMAL
PERFORMED BY, TECHID: NORMAL
PHOSPHATE SERPL-MCNC: 4.5 MG/DL (ref 2.6–4.7)
PLATELET # BLD AUTO: 393 K/UL (ref 150–400)
PMV BLD AUTO: 7.5 FL (ref 6.5–11.5)
POTASSIUM SERPL-SCNC: 4.9 MMOL/L (ref 3.5–5.1)
RBC # BLD AUTO: 4.06 M/UL (ref 4.5–5.9)
SODIUM SERPL-SCNC: 148 MMOL/L (ref 136–145)
WBC # BLD AUTO: 15.5 K/UL (ref 4.4–11.3)

## 2021-09-25 PROCEDURE — 80069 RENAL FUNCTION PANEL: CPT

## 2021-09-25 PROCEDURE — 36415 COLL VENOUS BLD VENIPUNCTURE: CPT

## 2021-09-25 PROCEDURE — 74011250637 HC RX REV CODE- 250/637: Performed by: HOSPITALIST

## 2021-09-25 PROCEDURE — 94760 N-INVAS EAR/PLS OXIMETRY 1: CPT

## 2021-09-25 PROCEDURE — 74011250637 HC RX REV CODE- 250/637: Performed by: NURSE PRACTITIONER

## 2021-09-25 PROCEDURE — 82962 GLUCOSE BLOOD TEST: CPT

## 2021-09-25 PROCEDURE — 74011250636 HC RX REV CODE- 250/636: Performed by: EMERGENCY MEDICINE

## 2021-09-25 PROCEDURE — 94640 AIRWAY INHALATION TREATMENT: CPT

## 2021-09-25 PROCEDURE — 74011636637 HC RX REV CODE- 636/637: Performed by: HOSPITALIST

## 2021-09-25 PROCEDURE — 74011000258 HC RX REV CODE- 258: Performed by: HOSPITALIST

## 2021-09-25 PROCEDURE — 85025 COMPLETE CBC W/AUTO DIFF WBC: CPT

## 2021-09-25 PROCEDURE — 74011000250 HC RX REV CODE- 250: Performed by: HOSPITALIST

## 2021-09-25 PROCEDURE — 74011250636 HC RX REV CODE- 250/636: Performed by: HOSPITALIST

## 2021-09-25 PROCEDURE — 77010033678 HC OXYGEN DAILY

## 2021-09-25 PROCEDURE — 65270000029 HC RM PRIVATE

## 2021-09-25 RX ORDER — INSULIN GLARGINE 100 [IU]/ML
25 INJECTION, SOLUTION SUBCUTANEOUS
Status: DISCONTINUED | OUTPATIENT
Start: 2021-09-25 | End: 2021-09-27 | Stop reason: HOSPADM

## 2021-09-25 RX ORDER — INSULIN LISPRO 100 [IU]/ML
12 INJECTION, SOLUTION INTRAVENOUS; SUBCUTANEOUS
Status: DISCONTINUED | OUTPATIENT
Start: 2021-09-25 | End: 2021-09-27 | Stop reason: HOSPADM

## 2021-09-25 RX ADMIN — FERROUS SULFATE TAB 325 MG (65 MG ELEMENTAL FE) 325 MG: 325 (65 FE) TAB at 17:10

## 2021-09-25 RX ADMIN — METOCLOPRAMIDE 5 MG: 5 TABLET ORAL at 08:44

## 2021-09-25 RX ADMIN — METOCLOPRAMIDE 5 MG: 5 TABLET ORAL at 12:21

## 2021-09-25 RX ADMIN — IPRATROPIUM BROMIDE AND ALBUTEROL SULFATE 3 ML: .5; 2.5 SOLUTION RESPIRATORY (INHALATION) at 21:10

## 2021-09-25 RX ADMIN — ENOXAPARIN SODIUM 30 MG: 30 INJECTION SUBCUTANEOUS at 08:43

## 2021-09-25 RX ADMIN — Medication 10 ML: at 21:27

## 2021-09-25 RX ADMIN — INSULIN LISPRO 12 UNITS: 100 INJECTION, SOLUTION INTRAVENOUS; SUBCUTANEOUS at 12:20

## 2021-09-25 RX ADMIN — HYDRALAZINE HYDROCHLORIDE 25 MG: 25 TABLET, FILM COATED ORAL at 17:10

## 2021-09-25 RX ADMIN — PIPERACILLIN AND TAZOBACTAM 3.38 G: 3; .375 INJECTION, POWDER, LYOPHILIZED, FOR SOLUTION INTRAVENOUS at 08:45

## 2021-09-25 RX ADMIN — FERROUS SULFATE TAB 325 MG (65 MG ELEMENTAL FE) 325 MG: 325 (65 FE) TAB at 12:21

## 2021-09-25 RX ADMIN — METOCLOPRAMIDE 5 MG: 5 TABLET ORAL at 17:10

## 2021-09-25 RX ADMIN — ATORVASTATIN CALCIUM 80 MG: 40 TABLET, FILM COATED ORAL at 21:26

## 2021-09-25 RX ADMIN — AZITHROMYCIN MONOHYDRATE 500 MG: 250 TABLET ORAL at 08:44

## 2021-09-25 RX ADMIN — INSULIN LISPRO 12 UNITS: 100 INJECTION, SOLUTION INTRAVENOUS; SUBCUTANEOUS at 17:15

## 2021-09-25 RX ADMIN — DOCUSATE SODIUM 100 MG: 100 CAPSULE, LIQUID FILLED ORAL at 21:26

## 2021-09-25 RX ADMIN — PANTOPRAZOLE SODIUM 40 MG: 40 TABLET, DELAYED RELEASE ORAL at 08:42

## 2021-09-25 RX ADMIN — GABAPENTIN 300 MG: 300 CAPSULE ORAL at 08:44

## 2021-09-25 RX ADMIN — FERROUS SULFATE TAB 325 MG (65 MG ELEMENTAL FE) 325 MG: 325 (65 FE) TAB at 08:42

## 2021-09-25 RX ADMIN — IPRATROPIUM BROMIDE AND ALBUTEROL SULFATE 3 ML: .5; 2.5 SOLUTION RESPIRATORY (INHALATION) at 11:20

## 2021-09-25 RX ADMIN — Medication 5 MG: at 21:26

## 2021-09-25 RX ADMIN — AMLODIPINE BESYLATE 10 MG: 10 TABLET ORAL at 08:44

## 2021-09-25 RX ADMIN — PIPERACILLIN AND TAZOBACTAM 3.38 G: 3; .375 INJECTION, POWDER, LYOPHILIZED, FOR SOLUTION INTRAVENOUS at 00:15

## 2021-09-25 RX ADMIN — Medication 10 ML: at 14:57

## 2021-09-25 RX ADMIN — IPRATROPIUM BROMIDE AND ALBUTEROL SULFATE 3 ML: .5; 2.5 SOLUTION RESPIRATORY (INHALATION) at 15:37

## 2021-09-25 RX ADMIN — Medication 10 ML: at 05:12

## 2021-09-25 RX ADMIN — DOCUSATE SODIUM 100 MG: 100 CAPSULE, LIQUID FILLED ORAL at 08:44

## 2021-09-25 RX ADMIN — HYDRALAZINE HYDROCHLORIDE 25 MG: 25 TABLET, FILM COATED ORAL at 21:26

## 2021-09-25 RX ADMIN — GUAIFENESIN 600 MG: 600 TABLET, EXTENDED RELEASE ORAL at 21:26

## 2021-09-25 RX ADMIN — INSULIN GLARGINE 25 UNITS: 100 INJECTION, SOLUTION SUBCUTANEOUS at 21:26

## 2021-09-25 RX ADMIN — GABAPENTIN 300 MG: 300 CAPSULE ORAL at 21:26

## 2021-09-25 RX ADMIN — CETIRIZINE HYDROCHLORIDE 10 MG: 10 TABLET, FILM COATED ORAL at 17:10

## 2021-09-25 RX ADMIN — ASPIRIN 81 MG: 81 TABLET, COATED ORAL at 08:44

## 2021-09-25 RX ADMIN — IPRATROPIUM BROMIDE AND ALBUTEROL SULFATE 3 ML: .5; 2.5 SOLUTION RESPIRATORY (INHALATION) at 07:13

## 2021-09-25 RX ADMIN — GUAIFENESIN 600 MG: 600 TABLET, EXTENDED RELEASE ORAL at 08:44

## 2021-09-25 RX ADMIN — PIPERACILLIN AND TAZOBACTAM 3.38 G: 3; .375 INJECTION, POWDER, LYOPHILIZED, FOR SOLUTION INTRAVENOUS at 17:10

## 2021-09-25 RX ADMIN — VENLAFAXINE HYDROCHLORIDE 150 MG: 150 CAPSULE, EXTENDED RELEASE ORAL at 08:44

## 2021-09-25 RX ADMIN — HYDRALAZINE HYDROCHLORIDE 25 MG: 25 TABLET, FILM COATED ORAL at 08:43

## 2021-09-25 RX ADMIN — METOPROLOL SUCCINATE 25 MG: 25 TABLET, EXTENDED RELEASE ORAL at 08:42

## 2021-09-25 NOTE — PROGRESS NOTES
Progress Note  Date:9/25/2021       Room:203/01  Patient Reva Cardona     YOB: 1955     Age:66 y.o. Subjective    As per admititng provider:  Bryan Dunaway is a 61 y.o. female followed by Joshua Saleh MD and  has a past medical history of Arthritis, Breast CA ([facility] [address] (2017), Diabetes ([facility] [address] Edema, GERD (gastroesophageal reflux disease), High cholesterol, Hypertension, Ill-defined condition, MI (myocardial infarction) ([facility] [address] (06/2018), Neuropathy, and Restless leg. CKD with last creat noted in 12/2020 at 1.8 and 2.2 on 4/28 and follows with Dr. Gerri Ferrera Nephrology in [facility]. Patient unsure what happened but was noted to have passed out and on evaluation gluocose of 34 . Similar event happened about 1 year ago. Patient is on lantus high dose as well as 10 units TID WM. Patient notes decrease in apepitie for about 1 year now and mostly notes it is secondary to increased stress and and recent passing of her  in 11/30/2020. Currently patient lives with her sister Mary Alice Thao and able to ambulate without walker. Patient states her glucose level which she checks after breakfast at 10:30 usually is in 140 range and says on recheck at lunch and dinner glucose at times does get below 100. Patient also notes that for the past 3 days has been more sob with + wheeze and does use nebulizer twice daily but no relief. Denies any sick contacts. Patient relates chest pain and initial troponin x 2 was at 0.12 and no change. Repeat this AM slightly higher at 0.21. denies any GERD like symptoms but patient has been vomiting and has hx of esophageal stricture for which had dilation done at [facility] about 1 1/2 year ago. CXR shows mild cardiac enlargement. Echo done and shows EF 60-65% and no evidence of diastolic dysfunction.   With patients current presentation was admitting on telemetry for syncope, severy hypoglycemia, KANIKA on CKD, chest pain , elevated tropoinin and Copd exacerbation    Patient seen on follow-up. Sitting up in bed awake, alert and oriented. Continues to do well. Afebrile and good o2 saturation on chronic 3L NC. Creat improving but wbc increased once again. Clinically improving. Will need another 24 hours monitoring and repeat labs in AM tomorrow. Discussed with sister plan of care and discharge plan patient's sister and the importance of follow-up with nephrology and pulmonary on outpatient basis. Glucose levels markedly improved and adjusting short acting and long-acting insulin and will need additional 24 hours of monitoring of glucose levels and to repeat creatinine in a.m. Review of Systems   Constitutional: Negative. HENT: Negative. Eyes: Negative. Cardiovascular: Negative. Gastrointestinal: Negative. Endocrine: Negative. Genitourinary: Negative. Musculoskeletal: Negative. Allergic/Immunologic: Negative. Neurological: Negative. Hematological: Negative. Objective           Vitals Last 24 Hours:  Patient Vitals for the past 24 hrs:   Temp Pulse Resp BP SpO2   09/25/21 1229 -- 79 16 (!) 166/78 98 %   09/25/21 1120 -- -- -- -- 99 %   09/25/21 0931 98.7 °F (37.1 °C) 65 18 (!) 165/81 --   09/25/21 0713 -- -- -- -- 97 %   09/25/21 0431 98.5 °F (36.9 °C) 84 22 (!) 177/90 98 %   09/25/21 0007 98.5 °F (36.9 °C) 82 20 (!) 168/86 96 %   09/24/21 2024 98.4 °F (36.9 °C) 82 20 (!) 148/99 98 %   09/24/21 1641 98.3 °F (36.8 °C) 86 18 (!) 149/65 94 %   09/24/21 1555 -- -- -- -- 97 %        I/O (24Hr): Intake/Output Summary (Last 24 hours) at 9/25/2021 1406  Last data filed at 9/24/2021 1748  Gross per 24 hour   Intake 1000 ml   Output 1200 ml   Net -200 ml       Physical Exam:  General: Alert and awake but increasing confusion not oriented  Head:  Normocephalic, without obvious abnormality, atraumatic. Eyes:  Conjunctivae/corneas clear. Pupils equal, round, reactive to light. Extraocular movements intact.   Lungs: b/l air entry improved, no wheeze , rales or crackles noted   Chest wall: No tenderness or deformity. Heart:  Regular rate and rhythm, S1, S2 normal, no murmur, click, rub or gallop. Abdomen:  Soft, non-tender. Bowel sounds normal. No masses,  No organomegaly. Extremities: Extremities normal, atraumatic, no cyanosis or edema. Pulses: 2+ and symmetric all extremities. Skin: Skin color, texture, turgor normal. No rashes or lesions  Neurologic: Awake, Alert,oriented at times.  No obvious gross sensory or motor deficits      Medications           Current Facility-Administered Medications   Medication Dose Route Frequency    insulin glargine (LANTUS) injection 25 Units  25 Units SubCUTAneous ACB/HS    insulin lispro (HUMALOG) injection 12 Units  12 Units SubCUTAneous TIDAC    azithromycin (ZITHROMAX) tablet 500 mg  500 mg Oral DAILY    docusate sodium (COLACE) capsule 100 mg  100 mg Oral BID    metoprolol succinate (TOPROL-XL) XL tablet 25 mg  25 mg Oral DAILY    metoclopramide HCl (REGLAN) tablet 5 mg  5 mg Oral TIDAC    pantoprazole (PROTONIX) tablet 40 mg  40 mg Oral ACB    piperacillin-tazobactam (ZOSYN) 3.375 g in 0.9% sodium chloride (MBP/ADV) 100 mL MBP  3.375 g IntraVENous Q8H    gabapentin (NEURONTIN) capsule 300 mg  300 mg Oral BID    cyclobenzaprine (FLEXERIL) tablet 10 mg  10 mg Oral TID PRN    melatonin (rapid dissolve) tablet 5 mg  5 mg Oral QHS    venlafaxine-SR (EFFEXOR-XR) capsule 150 mg  150 mg Oral DAILY    fluticasone propionate (FLONASE) 50 mcg/actuation nasal spray 2 Spray  2 Spray Both Nostrils DAILY    cetirizine (ZYRTEC) tablet 10 mg  10 mg Oral QPM    hydrALAZINE (APRESOLINE) tablet 25 mg  25 mg Oral TID    ferrous sulfate tablet 325 mg  1 Tablet Oral TID WITH MEALS    amLODIPine (NORVASC) tablet 10 mg  10 mg Oral DAILY    atorvastatin (LIPITOR) tablet 80 mg  80 mg Oral QHS    LORazepam (ATIVAN) injection 0.5 mg  0.5 mg IntraVENous Q6H PRN    glucose chewable tablet 16 g  4 Tablet Oral PRN    dextrose (D50W) injection syrg 12.5-25 g  25-50 mL IntraVENous PRN    glucagon (GLUCAGEN) injection 1 mg  1 mg IntraMUSCular PRN    insulin lispro (HUMALOG) injection   SubCUTAneous AC&HS    albuterol-ipratropium (DUO-NEB) 2.5 MG-0.5 MG/3 ML  3 mL Nebulization QID RT    guaiFENesin ER (MUCINEX) tablet 600 mg  600 mg Oral Q12H    aspirin delayed-release tablet 81 mg  81 mg Oral DAILY    sodium chloride (NS) flush 5-40 mL  5-40 mL IntraVENous Q8H    sodium chloride (NS) flush 5-40 mL  5-40 mL IntraVENous PRN    acetaminophen (TYLENOL) tablet 650 mg  650 mg Oral Q6H PRN    Or    acetaminophen (TYLENOL) suppository 650 mg  650 mg Rectal Q6H PRN    polyethylene glycol (MIRALAX) packet 17 g  17 g Oral DAILY PRN    ondansetron (ZOFRAN ODT) tablet 4 mg  4 mg Oral Q8H PRN    Or    ondansetron (ZOFRAN) injection 4 mg  4 mg IntraVENous Q6H PRN    enoxaparin (LOVENOX) injection 30 mg  30 mg SubCUTAneous DAILY         Allergies         Ciprofloxacin, Keflex [cephalexin], Metformin, and Soliqua 100/33 [insulin glargine-lixisenatide]       Labs/Imaging/Diagnostics      Labs:  Recent Results (from the past 48 hour(s))   GLUCOSE, POC    Collection Time: 09/23/21  3:15 PM   Result Value Ref Range    Glucose (POC) 365 (H) 65 - 117 mg/dL    Performed by ADONIS SCHWARTZ    GLUCOSE, POC    Collection Time: 09/23/21  8:06 PM   Result Value Ref Range    Glucose (POC) 306 (H) 65 - 117 mg/dL    Performed by Connor Lyons    RENAL FUNCTION PANEL    Collection Time: 09/24/21  5:52 AM   Result Value Ref Range    Sodium 143 136 - 145 mmol/L    Potassium 4.9 3.5 - 5.1 mmol/L    Chloride 108 97 - 108 mmol/L    CO2 27 21 - 32 mmol/L    Anion gap 8 5 - 15 mmol/L    Glucose 349 (H) 65 - 100 mg/dL    BUN 76 (H) 6 - 20 mg/dL    Creatinine 3.08 (H) 0.55 - 1.02 mg/dL    BUN/Creatinine ratio 25 (H) 12 - 20      GFR est AA 18 (L) >60 ml/min/1.73m2    GFR est non-AA 15 (L) >60 ml/min/1.73m2    Calcium 8.9 8.5 - 10.1 mg/dL    Phosphorus 4.8 (H) 2.6 - 4.7 mg/dL    Albumin 2.9 (L) 3.5 - 5.0 g/dL   CBC WITH AUTOMATED DIFF    Collection Time: 09/24/21  5:52 AM   Result Value Ref Range    WBC 10.8 4.4 - 11.3 K/uL    RBC 3.85 (L) 4.50 - 5.90 M/uL    HGB 9.9 (L) 13.5 - 17.5 g/dL    HCT 30.9 (L) 36 - 46 %    MCV 80.2 80 - 100 FL    MCH 25.6 (L) 31 - 34 PG    MCHC 31.9 31.0 - 36.0 g/dL    RDW 17.2 (H) 11.5 - 14.5 %    PLATELET 130 836 - 346 K/uL    MPV 7.4 6.5 - 11.5 FL    NRBC 0.1  WBC    ABSOLUTE NRBC 0.01 K/uL    NEUTROPHILS 95 (H) 42 - 75 %    LYMPHOCYTES 3 (L) 20.5 - 51.1 %    MONOCYTES 2 1.7 - 9.3 %    EOSINOPHILS 0 (L) 0.9 - 2.9 %    BASOPHILS 0 0.0 - 2.5 %    ABS. NEUTROPHILS 10.4 (H) 1.8 - 7.7 K/UL    ABS. LYMPHOCYTES 0.3 (L) 1.0 - 4.8 K/UL    ABS. MONOCYTES 0.2 0.2 - 2.4 K/UL    ABS. EOSINOPHILS 0.0 0.0 - 0.7 K/UL    ABS. BASOPHILS 0.0 0.0 - 0.2 K/UL   BNP    Collection Time: 09/24/21  5:52 AM   Result Value Ref Range    NT pro-BNP 2,338 (H) <125 pg/mL   GLUCOSE, POC    Collection Time: 09/24/21  8:47 AM   Result Value Ref Range    Glucose (POC) 406 (H) 65 - 117 mg/dL    Performed by [employer]    GLUCOSE, POC    Collection Time: 09/24/21 10:54 AM   Result Value Ref Range    Glucose (POC) 489 (H) 65 - 117 mg/dL    Performed by [employer]    HIV 1/2 AG/AB, 4TH GENERATION,W RFLX CONFIRM    Collection Time: 09/24/21  4:32 PM   Result Value Ref Range    HIV 1/2 Interpretation NONREACTIVE NONREACTIVE    HIV 1/2 result comment SEE NOTE     HEP B SURFACE AG    Collection Time: 09/24/21  4:32 PM   Result Value Ref Range    Hepatitis B surface Ag <0.10 Index    Hep B surface Ag Interp.  Negative Negative   RHEUMATOID FACTOR, QT    Collection Time: 09/24/21  4:32 PM   Result Value Ref Range    Rheumatoid factor <10 <15 IU/mL   GLUCOSE, POC    Collection Time: 09/24/21  5:06 PM   Result Value Ref Range    Glucose (POC) 362 (H) 65 - 117 mg/dL    Performed by [address] [place] [address], POC    Collection Time: 09/24/21  8:29 PM   Result Value Ref Range    Glucose (POC) 266 (H) 65 - 117 mg/dL    Performed by Keila Pineda    RENAL FUNCTION PANEL    Collection Time: 09/25/21  6:15 AM   Result Value Ref Range    Sodium 148 (H) 136 - 145 mmol/L    Potassium 4.9 3.5 - 5.1 mmol/L    Chloride 111 (H) 97 - 108 mmol/L    CO2 29 21 - 32 mmol/L    Anion gap 8 5 - 15 mmol/L    Glucose 74 65 - 100 mg/dL    BUN 77 (H) 6 - 20 mg/dL    Creatinine 2.68 (H) 0.55 - 1.02 mg/dL    BUN/Creatinine ratio 29 (H) 12 - 20      GFR est AA 22 (L) >60 ml/min/1.73m2    GFR est non-AA 18 (L) >60 ml/min/1.73m2    Calcium 8.9 8.5 - 10.1 mg/dL    Phosphorus 4.5 2.6 - 4.7 mg/dL    Albumin 3.0 (L) 3.5 - 5.0 g/dL   CBC WITH AUTOMATED DIFF    Collection Time: 09/25/21  6:15 AM   Result Value Ref Range    WBC 15.5 (H) 4.4 - 11.3 K/uL    RBC 4.06 (L) 4.50 - 5.90 M/uL    HGB 10.3 (L) 13.5 - 17.5 g/dL    HCT 32.3 (L) 36 - 46 %    MCV 79.7 (L) 80 - 100 FL    MCH 25.4 (L) 31 - 34 PG    MCHC 31.9 31.0 - 36.0 g/dL    RDW 16.8 (H) 11.5 - 14.5 %    PLATELET 067 306 - 688 K/uL    MPV 7.5 6.5 - 11.5 FL    NRBC 0.1  WBC    ABSOLUTE NRBC 0.01 K/uL    NEUTROPHILS 88 (H) 42 - 75 %    LYMPHOCYTES 4 (L) 20.5 - 51.1 %    MONOCYTES 8 1.7 - 9.3 %    EOSINOPHILS 0 (L) 0.9 - 2.9 %    BASOPHILS 0 0.0 - 2.5 %    ABS. NEUTROPHILS 13.7 (H) 1.8 - 7.7 K/UL    ABS. LYMPHOCYTES 0.6 (L) 1.0 - 4.8 K/UL    ABS. MONOCYTES 1.2 0.2 - 2.4 K/UL    ABS. EOSINOPHILS 0.0 0.0 - 0.7 K/UL    ABS. BASOPHILS 0.0 0.0 - 0.2 K/UL   GLUCOSE, POC    Collection Time: 09/25/21  8:00 AM   Result Value Ref Range    Glucose (POC) 71 65 - 117 mg/dL    Performed by Brittny Luther, POC    Collection Time: 09/25/21 11:52 AM   Result Value Ref Range    Glucose (POC) 124 (H) 65 - 117 mg/dL    Performed by Michelle Calix         Imaging:  XR CHEST PA LAT    Result Date: 9/24/2021  No acute pathology. CT HEAD WO CONT    Result Date: 9/22/2021  1. No acute intracranial findings. 2. Atrophy and small vessel ischemic disease.  3. Bilateral maxillary sinus disease. CT CHEST WO CONT    Result Date: 9/21/2021  There are small to moderate-sized bilateral pleural effusions as well as acute interstitial disease with thickening of the interlobular and intralobular septae. The differential diagnosis would include pulmonary edema versus an atypical interstitial pneumonia/pneumonitis. The pleural effusions favor pulmonary interstitial edema over acute interstitial pneumonia. The cardiac chambers are normal in size without significant cardiomegaly. There are coronary artery calcifications. US RETROPERITONEUM COMP    Result Date: 9/21/2021  Limited by bowel gas. 1. Kidneys grossly normal size, without hydronephrosis. XR CHEST PORT    Result Date: 9/19/2021  Mild cardiac enlargement. Exam is otherwise unremarkable. Assessment//Plan           Problem List:  Hospital Problems  Date Reviewed: 2/3/2021        Codes Class Noted POA    CKD (chronic kidney disease) ICD-10-CM: N18.9  ICD-9-CM: 585.9  9/24/2021 Unknown    Overview Signed 9/24/2021  3:57 PM by Tila Krishnan MD     In the setting of severe, uncontrolled and prolonged diabetes, with diabetic retinopathy. Protienuria to 2.5g/g of creatinine. Would consider workup for acute GN given inconsistent history and rising creatinine.  Though clinical hx appears consistent with aggressive diabetic nephropathy             Sepsis ([facility] [address] ICD-10-CM: A41.9  ICD-9-CM: 038.9, 995.91  9/22/2021 Unknown        Esophagitis with gastritis ICD-10-CM: K29.70, K20.90  ICD-9-CM: 530.19  9/22/2021 Unknown        Elevated troponin ICD-10-CM: R77.8  ICD-9-CM: 790.6  9/20/2021 Unknown        Syncope ICD-10-CM: R55  ICD-9-CM: 780.2  9/20/2021 Unknown        Hypoglycemia ICD-10-CM: E16.2  ICD-9-CM: 251.2  9/20/2021 Unknown        Chronic respiratory failure with hypoxia ([facility] [address] ICD-10-CM: J96.11  ICD-9-CM: 518.83, 799.02  9/20/2021 Unknown        Chest pain ICD-10-CM: R07.9  ICD-9-CM: 786.50  9/19/2021 Unknown Diabetic gastroparesis [facility]) ICD-10-CM: E11.43, K31.84  ICD-9-CM: 250.60, 536.3  10/1/2020 Unknown        * (Principal) PNA (pneumonia) ICD-10-CM: J18.9  ICD-9-CM: 486  9/30/2020 Unknown        Acute kidney injury [facility]) ICD-10-CM: N17.9  ICD-9-CM: 584.9  9/30/2020 Unknown            Sepsis  -Currently is meeting sepsis secondary to tachycardia and leukocytosis most likely secondary to bilateral pneumonia as urinalysis is negative  -Urine culture obtained and showing 50,000 colonies ID and sensitivity pending  -Blood cultures x2 obtained showing prelim NG x 2 days   -Continue current IV antibiotics  -Hold IV fluids secondary to noted effusions on x-ray    Acute on chronic Hypoxic Respiratory failure  -Possibly on chronic 3 L but noncompliant at home may have some signs and symptoms of sleep apnea  -Currently is on 3 L with good O2 saturation continues to get short of breath with minimal exertion most likely secondary to underlying COPD/interstitial lung findings as well as bilateral pneumonia  -Continue current DuoNebs  -Monitor O2 sat closely and encourage incentive spirometry    Acute bilateral pneumonia  -Unknown etiology but patient has had previous history of pneumonia for which she was admitted to [facility] over a year ago  -Patient states she follows with pulmonologist but patient sister is unaware of any physician at [facility]  -CT scan of the chest shows small to moderate sized bilateral pleural effusions as well as acute interstitial disease with thickening of the interlobular and intralobular septae with differential being pulmonary edema versus atypical interstitial pneumonia/pneumonitis  -Increased leukocytosis possibly secondary steroids versus infection  -Follow-up procalcitonin level at 0.14  -Blood cultures x2 obtained prelim shows no growth x 2 dats  -Continued on azithromycin 500 mg as well as Zosyn 3.375 g every 8 hours  -Initial white blood cell count normalized but then repeat on 9/25 increased to 15 but chest x-ray done on 9/24 shows resolution of pneumonia and no evidence of fluid continue to monitor and possible white blood count of 10 may be lab error as patient was still continued on steroids    Maxillary sinusitis  -As noted on CT of the head   -We will start Flonase 2 sprays daily and cetirizine 10 mg every afternoon  -continue current antibiotics      Chest pain   - possible secondary to coughing and vomiting in setting of hx of esophageal stricture.    -Initial troponin 0.12 and repeat in similar range of 0.12 drawn at 3:30 PM on 9/19 and on recheck on 9/20 slightly elevated 0.21  -Continue to cycle every 6 hours troponins x2  -Cardiology consultation appreciated  -The echo shows preserved EF and no diastolic dysfunction  -Noted to have history of cardiac catheterization at [facility] and records show minimal obstruction no stents needed  -Monitor closely on telemetry     Syncope  -Noted to have passed out and felt to be secondary to hypoglycemia  -Monitor on telemetry for any irregular rhythms  -Initial troponins elevated 0.12x2 but repeat elevated 0.21 and will continue cycle every 6 hours x2  -2D echo shows preserved EF no diastolic dysfunction     Hypoglycemia / Hyperglycemia   - was 34 on initial check and improved slowly after treatment and currently glucose levels at 175 this morning and 9/20 and 251  -We will hold long-acting insulin as well as mealtime insulin  -Continue monitor Accu-Cheks AC at bedtime  -Nutrition consultation  -Poor appetite most likely secondary to increased stress and loss of  on 11/30/2020 as well as issues with vomiting possibly secondary to esophageal stricture  -On 9/21 with current steroids glucose levels in the 300 range restarted home Lantus but at lower dose of 20 units and will continue to monitor closely  -9/22 patient glucose levels continue to be uncontrolled and will increase patient to 35 units Lantus daily and new on sliding scale  -On 9/23 glucose levels initially improved but again again in 300 and will restart insulin lispro at 12 units prior to meals and change Lantus to 20 units twice daily  -9/25 patient received 30 units of insulin last night and was increased to 15 units with meals but sugar this morning was 77 so change patient to 25 units twice daily Lantus and 12 units with meals.   Patient continues to have good p.o. intake and will need additional 24 hours of close monitoring and adjustments of long-acting and short acting insulin    Antral gastritis/esophagitis  -As noted on EGD and will continue Protonix 40 mg daily  -Continue to avoid NSAIDs     Vomiting  -Had vomiting episodes periodically most likely secondary to esophageal stricture  -We will get speech therapy to evaluate swallow and GI to evaluate EGD performed and esophageal dilation performed and EGD also shows esophagitis as well as evidence of gastroparesis so we will start Reglan 5 mg prior to meals and at bedtime     KANIKA on CKD  -Creatinine slightly elevated at 2.66 with previous baseline creatinine earlier this year in April at 2.2 and prior to that was at 1.8 in December 2020  -Obtain baseline labs from Dr. Abundio Garcia office and patient has new appointment with Dr. Minor Giraldo nephrologist in [address] [facility] current IV fluids of D5 half NS at 100 cc an hour and repeat creatinine down to 2.54 on 9/20  -Nephrology consult appreciated  - hx of toradol use and will discontinue any further NSAID use.   -On 9/21 it was noted from records that patient's creatinine previously was at 1.9 and so initially continued fluids but with patient's worsening shortness of breath with exertion and CT of the chest showing extensive pulmonary edema discontinue and start Lasix of 40 mg IV twice daily  -9/22 continue worsening of KANIKA follow with nephrology who is recommending to encourage oral fluids hold any further diuresis  -Creatinine level still elevated and will continue encourage oral fluid intake and nephrology monitoring closely  -9/25 creatinine had been continued upward trend on 9/24 but then improved to 2.68 on 9/25.   Nephrology evaluated patient and sent labs for ANCA as patient has combination of sinusitis and interstitial pneumonia along with proteinuria and will need close monitoring with  nephrologist in [facility] on outpatient basis    Acute on chronic COPD exacerbation   -Presents with shortness of breath and cough for the past 3 days along with wheezing and no history of tobacco use  -Chest x-ray negative for any acute pneumonic process  -Place patient on nebulizer DuoNeb 4 times daily  -On 9/21 noted to have increasing upper airway stridor and a racemic epinephrine was given and Solu-Medrol increased to 60 mg IV every 8 hours and decreased to every 12 hours  -CT of the chest shows small to moderate pulmonary edema bilaterally along with interstitial disease with noted possible atypical infection and so azithromycin 500 mg IV daily will be started  -9/23 decrease steroids down to 40 mg IV daily  -9/25 discontinued steroids altogether secondary to hyperglycemia and will need follow-up with pulmonologist    Acute encephalopathy  -Continues to have cough confusion possibly secondary to underlying dementia/psychiatric and poor acute hospitalization  -ABG performed shows pH of 7.37 PCO2 of 37 with PO2 of 85 on 3 L  -Patient started on Seroquel 25 mg every afternoon and as needed Ativan 0.5 mg IV every 6 hours as needed for agitation  -Cussed with patient sister and for a year now has been having off-and-on hallucinations and has been placed on Effexor extended release 150 mg and has not been started since admission so we will go ahead and restart  -Patient has marked improvement in mentation is awake alert oriented no further hallucinations will discontinue Seroquel continue as needed Ativan and continue patient's venlafaxine home dose    Iron Def Anemia  - hg has been stable during hospitalization but iron profile done on 9/20 showing iron level at 19 with saturation of 7%. - continue with Ferrous sulfate 325mg oral TID WM   - colace daily     DVT prophylaxis  -Lovenox     CODE STATUS: Full code  Patient designates her sister Kenan Hood as her medical decision-maker if for some reason she is unable to make decisions for herself     Spent 30 minutes evaluting and coordinating patient care of which >50% was spent coordinating and counseling. Disposition: Overall improving with glucose levels as well as creatinine monitor additional 24 hours with possible discharge in a.m. tomorrow or a.m.  Monday      Electronically signed by Gabby Madera MD on 9/25/2021 at 7:20 AM

## 2021-09-25 NOTE — PROGRESS NOTES
Problem: Patient Education: Go to Patient Education Activity  Goal: Patient/Family Education  Outcome: Progressing Towards Goal     Problem: Diabetes Self-Management  Goal: *Disease process and treatment process  Description: Define diabetes and identify own type of diabetes; list 3 options for treating diabetes. Outcome: Progressing Towards Goal     Problem: Diabetes Self-Management  Goal: *Prevention, detection, treatment of acute complications  Description: List symptoms of hyper- and hypoglycemia; describe how to treat low blood sugar and actions for lowering  high blood glucose level.   Outcome: Progressing Towards Goal

## 2021-09-25 NOTE — PROGRESS NOTES
Progress Note  Date:9/25/2021       Room:203/01  Patient Reva Cardona     YOB: 1955     Age:66 y.o. Subjective    As per admititng provider:  Bryan Dunaway is a 61 y.o. female followed by Joshua Saleh MD and  has a past medical history of Arthritis, Breast CA ([facility] [address] (2017), Diabetes ([facility] [address] Edema, GERD (gastroesophageal reflux disease), High cholesterol, Hypertension, Ill-defined condition, MI (myocardial infarction) ([facility] [address] (06/2018), Neuropathy, and Restless leg. CKD with last creat noted in 12/2020 at 1.8 and 2.2 on 4/28 and follows with Dr. Gerri Ferrera Nephrology in [facility]. Patient unsure what happened but was noted to have passed out and on evaluation gluocose of 34 . Similar event happened about 1 year ago. Patient is on lantus high dose as well as 10 units TID WM. Patient notes decrease in apepitie for about 1 year now and mostly notes it is secondary to increased stress and and recent passing of her  in 11/30/2020. Currently patient lives with her sister Mary Alice Thao and able to ambulate without walker. Patient states her glucose level which she checks after breakfast at 10:30 usually is in 140 range and says on recheck at lunch and dinner glucose at times does get below 100. Patient also notes that for the past 3 days has been more sob with + wheeze and does use nebulizer twice daily but no relief. Denies any sick contacts. Patient relates chest pain and initial troponin x 2 was at 0.12 and no change. Repeat this AM slightly higher at 0.21. denies any GERD like symptoms but patient has been vomiting and has hx of esophageal stricture for which had dilation done at [facility] about 1 1/2 year ago. CXR shows mild cardiac enlargement. Echo done and shows EF 60-65% and no evidence of diastolic dysfunction.   With patients current presentation was admitting on telemetry for syncope, severy hypoglycemia, KANIKA on CKD, chest pain , elevated tropoinin and Copd exacerbation    Patient seen on follow-up. Sitting up in bed awake, alert and oriented. States she feels better again today. Patient o2 saturation stable on her chronic 3LNC. Denies any sob or cough. Creatinine continues to be on upward trend but patient states she is eating and drinking well. No hypotension and off any diuretics. Decreasing steroid use. Did say she had some confusion last night but denies any hallucinations           Review of Systems   Constitutional: Negative. HENT: Negative. Eyes: Negative. Cardiovascular: Negative. Gastrointestinal: Negative. Endocrine: Negative. Genitourinary: Negative. Musculoskeletal: Negative. Allergic/Immunologic: Negative. Neurological: Negative. Hematological: Negative. Objective           Vitals Last 24 Hours:  Patient Vitals for the past 24 hrs:   Temp Pulse Resp BP SpO2   09/25/21 0713 -- -- -- -- 97 %   09/25/21 0431 98.5 °F (36.9 °C) 84 22 (!) 177/90 98 %   09/25/21 0007 98.5 °F (36.9 °C) 82 20 (!) 168/86 96 %   09/24/21 2024 98.4 °F (36.9 °C) 82 20 (!) 148/99 98 %   09/24/21 1641 98.3 °F (36.8 °C) 86 18 (!) 149/65 94 %   09/24/21 1555 -- -- -- -- 97 %   09/24/21 1149 97.8 °F (36.6 °C) 81 20 (!) 167/81 98 %   09/24/21 1122 -- -- -- -- 97 %   09/24/21 0905 97.7 °F (36.5 °C) 88 20 (!) 172/88 97 %        I/O (24Hr): Intake/Output Summary (Last 24 hours) at 9/25/2021 0853  Last data filed at 9/24/2021 1748  Gross per 24 hour   Intake 1120 ml   Output 1450 ml   Net -330 ml       Physical Exam:  General: Alert and awake but increasing confusion not oriented  Head:  Normocephalic, without obvious abnormality, atraumatic. Eyes:  Conjunctivae/corneas clear. Pupils equal, round, reactive to light. Extraocular movements intact. Lungs: b/l air entry improved, no wheeze , rales or crackles noted   Chest wall: No tenderness or deformity. Heart:  Regular rate and rhythm, S1, S2 normal, no murmur, click, rub or gallop.   Abdomen:  Soft, non-tender. Bowel sounds normal. No masses,  No organomegaly. Extremities: Extremities normal, atraumatic, no cyanosis or edema. Pulses: 2+ and symmetric all extremities. Skin: Skin color, texture, turgor normal. No rashes or lesions  Neurologic: Awake, Alert,oriented at times.  No obvious gross sensory or motor deficits      Medications           Current Facility-Administered Medications   Medication Dose Route Frequency    insulin glargine (LANTUS) injection 25 Units  25 Units SubCUTAneous ACB/HS    insulin lispro (HUMALOG) injection 12 Units  12 Units SubCUTAneous TIDAC    azithromycin (ZITHROMAX) tablet 500 mg  500 mg Oral DAILY    docusate sodium (COLACE) capsule 100 mg  100 mg Oral BID    metoprolol succinate (TOPROL-XL) XL tablet 25 mg  25 mg Oral DAILY    metoclopramide HCl (REGLAN) tablet 5 mg  5 mg Oral TIDAC    pantoprazole (PROTONIX) tablet 40 mg  40 mg Oral ACB    piperacillin-tazobactam (ZOSYN) 3.375 g in 0.9% sodium chloride (MBP/ADV) 100 mL MBP  3.375 g IntraVENous Q8H    gabapentin (NEURONTIN) capsule 300 mg  300 mg Oral BID    cyclobenzaprine (FLEXERIL) tablet 10 mg  10 mg Oral TID PRN    melatonin (rapid dissolve) tablet 5 mg  5 mg Oral QHS    venlafaxine-SR (EFFEXOR-XR) capsule 150 mg  150 mg Oral DAILY    fluticasone propionate (FLONASE) 50 mcg/actuation nasal spray 2 Spray  2 Spray Both Nostrils DAILY    cetirizine (ZYRTEC) tablet 10 mg  10 mg Oral QPM    hydrALAZINE (APRESOLINE) tablet 25 mg  25 mg Oral TID    ferrous sulfate tablet 325 mg  1 Tablet Oral TID WITH MEALS    amLODIPine (NORVASC) tablet 10 mg  10 mg Oral DAILY    atorvastatin (LIPITOR) tablet 80 mg  80 mg Oral QHS    LORazepam (ATIVAN) injection 0.5 mg  0.5 mg IntraVENous Q6H PRN    glucose chewable tablet 16 g  4 Tablet Oral PRN    dextrose (D50W) injection syrg 12.5-25 g  25-50 mL IntraVENous PRN    glucagon (GLUCAGEN) injection 1 mg  1 mg IntraMUSCular PRN    insulin lispro (HUMALOG) injection SubCUTAneous AC&HS    albuterol-ipratropium (DUO-NEB) 2.5 MG-0.5 MG/3 ML  3 mL Nebulization QID RT    guaiFENesin ER (MUCINEX) tablet 600 mg  600 mg Oral Q12H    aspirin delayed-release tablet 81 mg  81 mg Oral DAILY    sodium chloride (NS) flush 5-40 mL  5-40 mL IntraVENous Q8H    sodium chloride (NS) flush 5-40 mL  5-40 mL IntraVENous PRN    acetaminophen (TYLENOL) tablet 650 mg  650 mg Oral Q6H PRN    Or    acetaminophen (TYLENOL) suppository 650 mg  650 mg Rectal Q6H PRN    polyethylene glycol (MIRALAX) packet 17 g  17 g Oral DAILY PRN    ondansetron (ZOFRAN ODT) tablet 4 mg  4 mg Oral Q8H PRN    Or    ondansetron (ZOFRAN) injection 4 mg  4 mg IntraVENous Q6H PRN    enoxaparin (LOVENOX) injection 30 mg  30 mg SubCUTAneous DAILY         Allergies         Ciprofloxacin, Keflex [cephalexin], Metformin, and Soliqua 100/33 [insulin glargine-lixisenatide]       Labs/Imaging/Diagnostics      Labs:  Recent Results (from the past 48 hour(s))   GLUCOSE, POC    Collection Time: 09/23/21 10:50 AM   Result Value Ref Range    Glucose (POC) 319 (H) 65 - 117 mg/dL    Performed by [facility]    GLUCOSE, POC    Collection Time: 09/23/21  3:15 PM   Result Value Ref Range    Glucose (POC) 365 (H) 65 - 117 mg/dL    Performed by [facility]    GLUCOSE, POC    Collection Time: 09/23/21  8:06 PM   Result Value Ref Range    Glucose (POC) 306 (H) 65 - 117 mg/dL    Performed by Nick Broom    RENAL FUNCTION PANEL    Collection Time: 09/24/21  5:52 AM   Result Value Ref Range    Sodium 143 136 - 145 mmol/L    Potassium 4.9 3.5 - 5.1 mmol/L    Chloride 108 97 - 108 mmol/L    CO2 27 21 - 32 mmol/L    Anion gap 8 5 - 15 mmol/L    Glucose 349 (H) 65 - 100 mg/dL    BUN 76 (H) 6 - 20 mg/dL    Creatinine 3.08 (H) 0.55 - 1.02 mg/dL    BUN/Creatinine ratio 25 (H) 12 - 20      GFR est AA 18 (L) >60 ml/min/1.73m2    GFR est non-AA 15 (L) >60 ml/min/1.73m2    Calcium 8.9 8.5 - 10.1 mg/dL    Phosphorus 4.8 (H) 2.6 - 4.7 mg/dL Albumin 2.9 (L) 3.5 - 5.0 g/dL   CBC WITH AUTOMATED DIFF    Collection Time: 09/24/21  5:52 AM   Result Value Ref Range    WBC 10.8 4.4 - 11.3 K/uL    RBC 3.85 (L) 4.50 - 5.90 M/uL    HGB 9.9 (L) 13.5 - 17.5 g/dL    HCT 30.9 (L) 36 - 46 %    MCV 80.2 80 - 100 FL    MCH 25.6 (L) 31 - 34 PG    MCHC 31.9 31.0 - 36.0 g/dL    RDW 17.2 (H) 11.5 - 14.5 %    PLATELET 774 323 - 222 K/uL    MPV 7.4 6.5 - 11.5 FL    NRBC 0.1  WBC    ABSOLUTE NRBC 0.01 K/uL    NEUTROPHILS 95 (H) 42 - 75 %    LYMPHOCYTES 3 (L) 20.5 - 51.1 %    MONOCYTES 2 1.7 - 9.3 %    EOSINOPHILS 0 (L) 0.9 - 2.9 %    BASOPHILS 0 0.0 - 2.5 %    ABS. NEUTROPHILS 10.4 (H) 1.8 - 7.7 K/UL    ABS. LYMPHOCYTES 0.3 (L) 1.0 - 4.8 K/UL    ABS. MONOCYTES 0.2 0.2 - 2.4 K/UL    ABS. EOSINOPHILS 0.0 0.0 - 0.7 K/UL    ABS. BASOPHILS 0.0 0.0 - 0.2 K/UL   BNP    Collection Time: 09/24/21  5:52 AM   Result Value Ref Range    NT pro-BNP 2,338 (H) <125 pg/mL   GLUCOSE, POC    Collection Time: 09/24/21  8:47 AM   Result Value Ref Range    Glucose (POC) 406 (H) 65 - 117 mg/dL    Performed by Elias Todd    GLUCOSE, POC    Collection Time: 09/24/21 10:54 AM   Result Value Ref Range    Glucose (POC) 489 (H) 65 - 117 mg/dL    Performed by Elias Todd    HIV 1/2 AG/AB, 4TH GENERATION,W RFLX CONFIRM    Collection Time: 09/24/21  4:32 PM   Result Value Ref Range    HIV 1/2 Interpretation NONREACTIVE NONREACTIVE    HIV 1/2 result comment SEE NOTE     HEP B SURFACE AG    Collection Time: 09/24/21  4:32 PM   Result Value Ref Range    Hepatitis B surface Ag <0.10 Index    Hep B surface Ag Interp.  Negative Negative   RHEUMATOID FACTOR, QT    Collection Time: 09/24/21  4:32 PM   Result Value Ref Range    Rheumatoid factor <10 <15 IU/mL   GLUCOSE, POC    Collection Time: 09/24/21  5:06 PM   Result Value Ref Range    Glucose (POC) 362 (H) 65 - 117 mg/dL    Performed by [address] [place] [address], POC    Collection Time: 09/24/21  8:29 PM   Result Value Ref Range    Glucose (POC) 266 (H) 65 - 117 mg/dL    Performed by Chyrl Rinne    RENAL FUNCTION PANEL    Collection Time: 09/25/21  6:15 AM   Result Value Ref Range    Sodium 148 (H) 136 - 145 mmol/L    Potassium 4.9 3.5 - 5.1 mmol/L    Chloride 111 (H) 97 - 108 mmol/L    CO2 29 21 - 32 mmol/L    Anion gap 8 5 - 15 mmol/L    Glucose 74 65 - 100 mg/dL    BUN 77 (H) 6 - 20 mg/dL    Creatinine 2.68 (H) 0.55 - 1.02 mg/dL    BUN/Creatinine ratio 29 (H) 12 - 20      GFR est AA 22 (L) >60 ml/min/1.73m2    GFR est non-AA 18 (L) >60 ml/min/1.73m2    Calcium 8.9 8.5 - 10.1 mg/dL    Phosphorus 4.5 2.6 - 4.7 mg/dL    Albumin 3.0 (L) 3.5 - 5.0 g/dL   CBC WITH AUTOMATED DIFF    Collection Time: 09/25/21  6:15 AM   Result Value Ref Range    WBC 15.5 (H) 4.4 - 11.3 K/uL    RBC 4.06 (L) 4.50 - 5.90 M/uL    HGB 10.3 (L) 13.5 - 17.5 g/dL    HCT 32.3 (L) 36 - 46 %    MCV 79.7 (L) 80 - 100 FL    MCH 25.4 (L) 31 - 34 PG    MCHC 31.9 31.0 - 36.0 g/dL    RDW 16.8 (H) 11.5 - 14.5 %    PLATELET 472 183 - 379 K/uL    MPV 7.5 6.5 - 11.5 FL    NRBC 0.1  WBC    ABSOLUTE NRBC 0.01 K/uL    NEUTROPHILS 88 (H) 42 - 75 %    LYMPHOCYTES 4 (L) 20.5 - 51.1 %    MONOCYTES 8 1.7 - 9.3 %    EOSINOPHILS 0 (L) 0.9 - 2.9 %    BASOPHILS 0 0.0 - 2.5 %    ABS. NEUTROPHILS 13.7 (H) 1.8 - 7.7 K/UL    ABS. LYMPHOCYTES 0.6 (L) 1.0 - 4.8 K/UL    ABS. MONOCYTES 1.2 0.2 - 2.4 K/UL    ABS. EOSINOPHILS 0.0 0.0 - 0.7 K/UL    ABS. BASOPHILS 0.0 0.0 - 0.2 K/UL   GLUCOSE, POC    Collection Time: 09/25/21  8:00 AM   Result Value Ref Range    Glucose (POC) 71 65 - 117 mg/dL    Performed by Marjorie Gale         Imaging:  XR CHEST PA LAT    Result Date: 9/24/2021  No acute pathology. CT HEAD WO CONT    Result Date: 9/22/2021  1. No acute intracranial findings. 2. Atrophy and small vessel ischemic disease. 3. Bilateral maxillary sinus disease.     CT CHEST WO CONT    Result Date: 9/21/2021  There are small to moderate-sized bilateral pleural effusions as well as acute interstitial disease with thickening of the interlobular and intralobular septae. The differential diagnosis would include pulmonary edema versus an atypical interstitial pneumonia/pneumonitis. The pleural effusions favor pulmonary interstitial edema over acute interstitial pneumonia. The cardiac chambers are normal in size without significant cardiomegaly. There are coronary artery calcifications. US RETROPERITONEUM COMP    Result Date: 9/21/2021  Limited by bowel gas. 1. Kidneys grossly normal size, without hydronephrosis. XR CHEST PORT    Result Date: 9/19/2021  Mild cardiac enlargement. Exam is otherwise unremarkable. Assessment//Plan           Problem List:  Hospital Problems  Date Reviewed: 2/3/2021        Codes Class Noted POA    CKD (chronic kidney disease) ICD-10-CM: N18.9  ICD-9-CM: 585.9  9/24/2021 Unknown    Overview Signed 9/24/2021  3:57 PM by James Hinds MD     In the setting of severe, uncontrolled and prolonged diabetes, with diabetic retinopathy. Protienuria to 2.5g/g of creatinine. Would consider workup for acute GN given inconsistent history and rising creatinine.  Though clinical hx appears consistent with aggressive diabetic nephropathy             Sepsis ([facility] [address] ICD-10-CM: A41.9  ICD-9-CM: 038.9, 995.91  9/22/2021 Unknown        Esophagitis with gastritis ICD-10-CM: K29.70, K20.90  ICD-9-CM: 530.19  9/22/2021 Unknown        Elevated troponin ICD-10-CM: R77.8  ICD-9-CM: 790.6  9/20/2021 Unknown        Syncope ICD-10-CM: R55  ICD-9-CM: 780.2  9/20/2021 Unknown        Hypoglycemia ICD-10-CM: E16.2  ICD-9-CM: 251.2  9/20/2021 Unknown        Chronic respiratory failure with hypoxia [facility]) ICD-10-CM: J96.11  ICD-9-CM: 518.83, 799.02  9/20/2021 Unknown        Chest pain ICD-10-CM: R07.9  ICD-9-CM: 786.50  9/19/2021 Unknown        Diabetic gastroparesis ([facility] [address] ICD-10-CM: E11.43, K31.84  ICD-9-CM: 250.60, 536.3  10/1/2020 Unknown        * (Principal) PNA (pneumonia) ICD-10-CM: J18.9  ICD-9-CM: 444 9/30/2020 Unknown        Acute kidney injury [facility]) ICD-10-CM: N17.9  ICD-9-CM: 584.9  9/30/2020 Unknown            Sepsis  -Currently is meeting sepsis secondary to tachycardia and leukocytosis most likely secondary to bilateral pneumonia as urinalysis is negative  -Urine culture obtained and showing 50,000 colonies ID and sensitivity pending  -Blood cultures x2 obtained showing prelim NG x 2 days   -Continue current IV antibiotics  -Hold IV fluids secondary to noted effusions on x-ray    Acute on chronic Hypoxic Respiratory failure  -Possibly on chronic 3 L but noncompliant at home may have some signs and symptoms of sleep apnea  -Currently is on 3 L with good O2 saturation continues to get short of breath with minimal exertion most likely secondary to underlying COPD/interstitial lung findings as well as bilateral pneumonia  -Continue current DuoNebs  -Monitor O2 sat closely and encourage incentive spirometry    Acute bilateral pneumonia  -Unknown etiology but patient has had previous history of pneumonia for which she was admitted to [facility] over a year ago  -Patient states she follows with pulmonologist but patient sister is unaware of any physician at [facility]  -CT scan of the chest shows small to moderate sized bilateral pleural effusions as well as acute interstitial disease with thickening of the interlobular and intralobular septae with differential being pulmonary edema versus atypical interstitial pneumonia/pneumonitis  -Increased leukocytosis possibly secondary steroids versus infection  -Follow-up procalcitonin level at 0.14  -Blood cultures x2 obtained prelim shows no growth x 2 dats  -Continued on azithromycin 500 mg as well as Zosyn 3.375 g every 8 hours  - repeat wbc on 9/24 normalized at 10.8 but still left shift noted but steroids most likely contributing to this.    - follow up Pa/lateral CXR today to evaluate pna and effusions previously noted     Maxillary sinusitis  -As noted on CT of the head   -We will start Flonase 2 sprays daily and cetirizine 10 mg every afternoon  -continue current antibiotics      Chest pain   - possible secondary to coughing and vomiting in setting of hx of esophageal stricture.    -Initial troponin 0.12 and repeat in similar range of 0.12 drawn at 3:30 PM on 9/19 and on recheck on 9/20 slightly elevated 0.21  -Continue to cycle every 6 hours troponins x2  -Cardiology consultation appreciated  -The echo shows preserved EF and no diastolic dysfunction  -Noted to have history of cardiac catheterization at [place] and records show minimal obstruction no stents needed  -Monitor closely on telemetry     Syncope  -Noted to have passed out and felt to be secondary to hypoglycemia  -Monitor on telemetry for any irregular rhythms  -Initial troponins elevated 0.12x2 but repeat elevated 0.21 and will continue cycle every 6 hours x2  -2D echo shows preserved EF no diastolic dysfunction     Hypoglycemia / Hyperglycemia   - was 34 on initial check and improved slowly after treatment and currently glucose levels at 175 this morning and 9/20 and 251  -We will hold long-acting insulin as well as mealtime insulin  -Continue monitor Accu-Cheks AC at bedtime  -Nutrition consultation  -Poor appetite most likely secondary to increased stress and loss of  on 11/30/2020 as well as issues with vomiting possibly secondary to esophageal stricture  -On 9/21 with current steroids glucose levels in the 300 range restarted home Lantus but at lower dose of 20 units and will continue to monitor closely  -9/22 patient glucose levels continue to be uncontrolled and will increase patient to 35 units Lantus daily and new on sliding scale  -On 9/23 glucose levels initially improved but again again in 300 and will restart insulin lispro at 12 units prior to meals and change Lantus to 20 units twice daily  - 9/24 glucose of 406 and started on 12 units with meals regular insulin and will increase lantus to 25 units bid     Antral gastritis/esophagitis  -As noted on EGD and will continue Protonix 40 mg daily  -Continue to avoid NSAIDs     Vomiting  -Had vomiting episodes periodically most likely secondary to esophageal stricture  -We will get speech therapy to evaluate swallow and GI to evaluate EGD performed and esophageal dilation performed and EGD also shows esophagitis as well as evidence of gastroparesis so we will start Reglan 5 mg prior to meals and at bedtime     KANIKA on CKD  -Creatinine slightly elevated at 2.66 with previous baseline creatinine earlier this year in April at 2.2 and prior to that was at 1.8 in December 2020  -Obtain baseline labs from Dr. Davey Kemp office and patient has new appointment with Dr. Jane Huynh nephrologist in [address] [facility] IV fluids of D5 half NS at 100 cc an hour and repeat creatinine down to 2.54 on 9/20  -Nephrology consult appreciated  - hx of toradol use and will discontinue any further NSAID use.   -On 9/21 it was noted from records that patient's creatinine previously was at 1.9 and so initially continued fluids but with patient's worsening shortness of breath with exertion and CT of the chest showing extensive pulmonary edema discontinue and start Lasix of 40 mg IV twice daily  -9/22 continue worsening of KANIKA follow with nephrology who is recommending to encourage oral fluids hold any further diuresis  -Creatinine level still elevated and will continue encourage oral fluid intake and nephrology monitoring closely  - 9/24: creat still on upward trend to 3.08 with BUN of 76.patient states she is eating and drinking fluids well. Follow up with nephrology recommendations. Obtain bladder scan post void   BNP elevated at 2,338.  Follow up PA / lateral cxr     Acute on chronic COPD exacerbation   -Presents with shortness of breath and cough for the past 3 days along with wheezing and no history of tobacco use  -Chest x-ray negative for any acute pneumonic process  -Place patient on nebulizer DuoNeb 4 times daily  -On 9/21 noted to have increasing upper airway stridor and a racemic epinephrine was given and Solu-Medrol increased to 60 mg IV every 8 hours and decreased to every 12 hours  -CT of the chest shows small to moderate pulmonary edema bilaterally along with interstitial disease with noted possible atypical infection and so azithromycin 500 mg IV daily will be started  -9/23 decrease steroids down to 40 mg IV daily    Acute encephalopathy  -Continues to have cough confusion possibly secondary to underlying dementia/psychiatric and poor acute hospitalization  -ABG performed shows pH of 7.37 PCO2 of 37 with PO2 of 85 on 3 L  -Patient started on Seroquel 25 mg every afternoon and as needed Ativan 0.5 mg IV every 6 hours as needed for agitation  -Cussed with patient sister and for a year now has been having off-and-on hallucinations and has been placed on Effexor extended release 150 mg and has not been started since admission so we will go ahead and restart  -Patient has marked improvement in mentation is awake alert oriented no further hallucinations will discontinue Seroquel continue as needed Ativan and continue patient's venlafaxine home dose    Iron Def Anemia  - hg has been stable during hospitalization but iron profile done on 9/20 showing iron level at 19 with saturation of 7%. - continue with Ferrous sulfate 325mg oral TID WM   - colace daily     DVT prophylaxis  -Lovenox     CODE STATUS: Full code  Patient designates her sister Cande Martino as her medical decision-maker if for some reason she is unable to make decisions for herself     Spent 30 minutes evaluting and coordinating patient care of which >50% was spent coordinating and counseling. Disposition: improving from respiratory / pna but worsening renal fxn and will need to monitor and dc once renal fxn stablizes.           Electronically signed by Lauren Tinoco MD on 9/25/2021 at 7:20 AM

## 2021-09-26 LAB
ANION GAP SERPL CALC-SCNC: 10 MMOL/L (ref 5–15)
BASOPHILS # BLD: 0.1 K/UL (ref 0–0.2)
BASOPHILS NFR BLD: 0 % (ref 0–2.5)
BUN SERPL-MCNC: 64 MG/DL (ref 6–20)
BUN/CREAT SERPL: 26 (ref 12–20)
C3 SERPL-MCNC: 142 MG/DL (ref 82–167)
C4 SERPL-MCNC: 24 MG/DL (ref 12–38)
CA-I BLD-MCNC: 8.9 MG/DL (ref 8.5–10.1)
CHLORIDE SERPL-SCNC: 111 MMOL/L (ref 97–108)
CO2 SERPL-SCNC: 28 MMOL/L (ref 21–32)
CREAT SERPL-MCNC: 2.5 MG/DL (ref 0.55–1.02)
EOSINOPHIL # BLD: 0.5 K/UL (ref 0–0.7)
EOSINOPHIL NFR BLD: 3 % (ref 0.9–2.9)
ERYTHROCYTE [DISTWIDTH] IN BLOOD BY AUTOMATED COUNT: 16.7 % (ref 11.5–14.5)
GLUCOSE BLD STRIP.AUTO-MCNC: 119 MG/DL (ref 65–117)
GLUCOSE BLD STRIP.AUTO-MCNC: 136 MG/DL (ref 65–117)
GLUCOSE BLD STRIP.AUTO-MCNC: 168 MG/DL (ref 65–117)
GLUCOSE BLD STRIP.AUTO-MCNC: 37 MG/DL (ref 65–117)
GLUCOSE BLD STRIP.AUTO-MCNC: 38 MG/DL (ref 65–117)
GLUCOSE BLD STRIP.AUTO-MCNC: 55 MG/DL (ref 65–117)
GLUCOSE BLD STRIP.AUTO-MCNC: 66 MG/DL (ref 65–117)
GLUCOSE BLD STRIP.AUTO-MCNC: 74 MG/DL (ref 65–117)
GLUCOSE SERPL-MCNC: 73 MG/DL (ref 65–100)
HCT VFR BLD AUTO: 33.6 % (ref 36–46)
HCV AB S/CO SERPL IA: 0.1 S/CO RATIO (ref 0–0.9)
HCV AB SERPL QL IA: NORMAL
HGB BLD-MCNC: 10.8 G/DL (ref 13.5–17.5)
LYMPHOCYTES # BLD: 1.3 K/UL (ref 1–4.8)
LYMPHOCYTES NFR BLD: 9 % (ref 20.5–51.1)
MCH RBC QN AUTO: 25.7 PG (ref 31–34)
MCHC RBC AUTO-ENTMCNC: 32.2 G/DL (ref 31–36)
MCV RBC AUTO: 79.8 FL (ref 80–100)
MONOCYTES # BLD: 1.3 K/UL (ref 0.2–2.4)
MONOCYTES NFR BLD: 9 % (ref 1.7–9.3)
NEUTS SEG # BLD: 11.3 K/UL (ref 1.8–7.7)
NEUTS SEG NFR BLD: 79 % (ref 42–75)
NRBC # BLD: 0.01 K/UL
NRBC BLD-RTO: 0.1 PER 100 WBC
PERFORMED BY, TECHID: ABNORMAL
PERFORMED BY, TECHID: NORMAL
PERFORMED BY, TECHID: NORMAL
PLATELET # BLD AUTO: 420 K/UL (ref 150–400)
PMV BLD AUTO: 7.4 FL (ref 6.5–11.5)
POTASSIUM SERPL-SCNC: 4.3 MMOL/L (ref 3.5–5.1)
RBC # BLD AUTO: 4.21 M/UL (ref 4.5–5.9)
SODIUM SERPL-SCNC: 149 MMOL/L (ref 136–145)
WBC # BLD AUTO: 14.4 K/UL (ref 4.4–11.3)

## 2021-09-26 PROCEDURE — 74011000250 HC RX REV CODE- 250: Performed by: HOSPITALIST

## 2021-09-26 PROCEDURE — 74011000258 HC RX REV CODE- 258: Performed by: HOSPITALIST

## 2021-09-26 PROCEDURE — 74011250636 HC RX REV CODE- 250/636: Performed by: INTERNAL MEDICINE

## 2021-09-26 PROCEDURE — 80048 BASIC METABOLIC PNL TOTAL CA: CPT

## 2021-09-26 PROCEDURE — 94760 N-INVAS EAR/PLS OXIMETRY 1: CPT

## 2021-09-26 PROCEDURE — 77010033678 HC OXYGEN DAILY

## 2021-09-26 PROCEDURE — 94640 AIRWAY INHALATION TREATMENT: CPT

## 2021-09-26 PROCEDURE — 36415 COLL VENOUS BLD VENIPUNCTURE: CPT

## 2021-09-26 PROCEDURE — 74011250636 HC RX REV CODE- 250/636: Performed by: EMERGENCY MEDICINE

## 2021-09-26 PROCEDURE — 74011250637 HC RX REV CODE- 250/637: Performed by: NURSE PRACTITIONER

## 2021-09-26 PROCEDURE — 74011250636 HC RX REV CODE- 250/636: Performed by: HOSPITALIST

## 2021-09-26 PROCEDURE — 74011250637 HC RX REV CODE- 250/637: Performed by: HOSPITALIST

## 2021-09-26 PROCEDURE — 65270000029 HC RM PRIVATE

## 2021-09-26 PROCEDURE — 82962 GLUCOSE BLOOD TEST: CPT

## 2021-09-26 PROCEDURE — 74011636637 HC RX REV CODE- 636/637: Performed by: HOSPITALIST

## 2021-09-26 PROCEDURE — 85025 COMPLETE CBC W/AUTO DIFF WBC: CPT

## 2021-09-26 RX ORDER — VANCOMYCIN HYDROCHLORIDE 1 G/20ML
INJECTION, POWDER, LYOPHILIZED, FOR SOLUTION INTRAVENOUS
Status: DISCONTINUED
Start: 2021-09-26 | End: 2021-09-26 | Stop reason: SDUPTHER

## 2021-09-26 RX ORDER — DEXTROSE MONOHYDRATE 100 MG/ML
100 INJECTION, SOLUTION INTRAVENOUS AS NEEDED
Status: DISCONTINUED | OUTPATIENT
Start: 2021-09-26 | End: 2021-09-27 | Stop reason: HOSPADM

## 2021-09-26 RX ORDER — SODIUM CHLORIDE 9 MG/ML
INJECTION, SOLUTION INTRAVENOUS
Status: DISPENSED
Start: 2021-09-26 | End: 2021-09-27

## 2021-09-26 RX ADMIN — VENLAFAXINE HYDROCHLORIDE 150 MG: 150 CAPSULE, EXTENDED RELEASE ORAL at 08:26

## 2021-09-26 RX ADMIN — METOCLOPRAMIDE 5 MG: 5 TABLET ORAL at 08:26

## 2021-09-26 RX ADMIN — DOCUSATE SODIUM 100 MG: 100 CAPSULE, LIQUID FILLED ORAL at 08:26

## 2021-09-26 RX ADMIN — METOPROLOL SUCCINATE 25 MG: 25 TABLET, EXTENDED RELEASE ORAL at 08:27

## 2021-09-26 RX ADMIN — HYDRALAZINE HYDROCHLORIDE 25 MG: 25 TABLET, FILM COATED ORAL at 15:25

## 2021-09-26 RX ADMIN — DOCUSATE SODIUM 100 MG: 100 CAPSULE, LIQUID FILLED ORAL at 21:52

## 2021-09-26 RX ADMIN — Medication 10 ML: at 06:13

## 2021-09-26 RX ADMIN — FERROUS SULFATE TAB 325 MG (65 MG ELEMENTAL FE) 325 MG: 325 (65 FE) TAB at 12:37

## 2021-09-26 RX ADMIN — GUAIFENESIN 600 MG: 600 TABLET, EXTENDED RELEASE ORAL at 21:52

## 2021-09-26 RX ADMIN — AZITHROMYCIN MONOHYDRATE 500 MG: 250 TABLET ORAL at 08:27

## 2021-09-26 RX ADMIN — HYDRALAZINE HYDROCHLORIDE 25 MG: 25 TABLET, FILM COATED ORAL at 21:52

## 2021-09-26 RX ADMIN — ASPIRIN 81 MG: 81 TABLET, COATED ORAL at 08:28

## 2021-09-26 RX ADMIN — PIPERACILLIN AND TAZOBACTAM 3.38 G: 3; .375 INJECTION, POWDER, LYOPHILIZED, FOR SOLUTION INTRAVENOUS at 00:52

## 2021-09-26 RX ADMIN — IPRATROPIUM BROMIDE AND ALBUTEROL SULFATE 3 ML: .5; 2.5 SOLUTION RESPIRATORY (INHALATION) at 07:05

## 2021-09-26 RX ADMIN — IPRATROPIUM BROMIDE AND ALBUTEROL SULFATE 3 ML: .5; 2.5 SOLUTION RESPIRATORY (INHALATION) at 11:31

## 2021-09-26 RX ADMIN — INSULIN GLARGINE 25 UNITS: 100 INJECTION, SOLUTION SUBCUTANEOUS at 08:17

## 2021-09-26 RX ADMIN — ENOXAPARIN SODIUM 30 MG: 30 INJECTION SUBCUTANEOUS at 08:19

## 2021-09-26 RX ADMIN — Medication 10 ML: at 21:53

## 2021-09-26 RX ADMIN — METOCLOPRAMIDE 5 MG: 5 TABLET ORAL at 15:31

## 2021-09-26 RX ADMIN — HYDRALAZINE HYDROCHLORIDE 25 MG: 25 TABLET, FILM COATED ORAL at 08:27

## 2021-09-26 RX ADMIN — Medication 5 MG: at 21:53

## 2021-09-26 RX ADMIN — INSULIN LISPRO 12 UNITS: 100 INJECTION, SOLUTION INTRAVENOUS; SUBCUTANEOUS at 08:19

## 2021-09-26 RX ADMIN — GUAIFENESIN 600 MG: 600 TABLET, EXTENDED RELEASE ORAL at 08:26

## 2021-09-26 RX ADMIN — METOCLOPRAMIDE 5 MG: 5 TABLET ORAL at 12:37

## 2021-09-26 RX ADMIN — GABAPENTIN 300 MG: 300 CAPSULE ORAL at 08:27

## 2021-09-26 RX ADMIN — AMLODIPINE BESYLATE 10 MG: 10 TABLET ORAL at 08:28

## 2021-09-26 RX ADMIN — FERROUS SULFATE TAB 325 MG (65 MG ELEMENTAL FE) 325 MG: 325 (65 FE) TAB at 16:52

## 2021-09-26 RX ADMIN — PIPERACILLIN AND TAZOBACTAM 3.38 G: 3; .375 INJECTION, POWDER, LYOPHILIZED, FOR SOLUTION INTRAVENOUS at 09:14

## 2021-09-26 RX ADMIN — CETIRIZINE HYDROCHLORIDE 10 MG: 10 TABLET, FILM COATED ORAL at 17:26

## 2021-09-26 RX ADMIN — VANCOMYCIN HYDROCHLORIDE 1000 MG: 1 INJECTION, POWDER, LYOPHILIZED, FOR SOLUTION INTRAVENOUS at 15:07

## 2021-09-26 RX ADMIN — IPRATROPIUM BROMIDE AND ALBUTEROL SULFATE 3 ML: .5; 2.5 SOLUTION RESPIRATORY (INHALATION) at 15:21

## 2021-09-26 RX ADMIN — Medication 10 ML: at 15:16

## 2021-09-26 RX ADMIN — PIPERACILLIN AND TAZOBACTAM 3.38 G: 3; .375 INJECTION, POWDER, LYOPHILIZED, FOR SOLUTION INTRAVENOUS at 17:25

## 2021-09-26 RX ADMIN — DEXTROSE MONOHYDRATE 25 G: 25 INJECTION, SOLUTION INTRAVENOUS at 12:18

## 2021-09-26 RX ADMIN — Medication 16 G: at 17:03

## 2021-09-26 RX ADMIN — PANTOPRAZOLE SODIUM 40 MG: 40 TABLET, DELAYED RELEASE ORAL at 08:27

## 2021-09-26 RX ADMIN — ATORVASTATIN CALCIUM 80 MG: 40 TABLET, FILM COATED ORAL at 21:52

## 2021-09-26 RX ADMIN — FERROUS SULFATE TAB 325 MG (65 MG ELEMENTAL FE) 325 MG: 325 (65 FE) TAB at 08:28

## 2021-09-26 RX ADMIN — IPRATROPIUM BROMIDE AND ALBUTEROL SULFATE 3 ML: .5; 2.5 SOLUTION RESPIRATORY (INHALATION) at 19:04

## 2021-09-26 RX ADMIN — GABAPENTIN 300 MG: 300 CAPSULE ORAL at 21:52

## 2021-09-26 RX ADMIN — Medication 16 G: at 17:31

## 2021-09-26 NOTE — PROGRESS NOTES
Problem: Patient Education: Go to Patient Education Activity  Goal: Patient/Family Education  Outcome: Progressing Towards Goal     Problem: Diabetes Self-Management  Goal: *Disease process and treatment process  Description: Define diabetes and identify own type of diabetes; list 3 options for treating diabetes. Outcome: Progressing Towards Goal  Goal: *Incorporating nutritional management into lifestyle  Description: Describe effect of type, amount and timing of food on blood glucose; list 3 methods for planning meals. Outcome: Progressing Towards Goal  Goal: *Incorporating physical activity into lifestyle  Description: State effect of exercise on blood glucose levels. Outcome: Progressing Towards Goal  Goal: *Developing strategies to promote health/change behavior  Description: Define the ABC's of diabetes; identify appropriate screenings, schedule and personal plan for screenings. Outcome: Progressing Towards Goal  Goal: *Using medications safely  Description: State effect of diabetes medications on diabetes; name diabetes medication taking, action and side effects. Outcome: Progressing Towards Goal  Goal: *Prevention, detection, treatment of acute complications  Description: List symptoms of hyper- and hypoglycemia; describe how to treat low blood sugar and actions for lowering  high blood glucose level. Outcome: Progressing Towards Goal  Goal: *Prevention, detection and treatment of chronic complications  Description: Define the natural course of diabetes and describe the relationship of blood glucose levels to long term complications of diabetes.   Outcome: Progressing Towards Goal  Goal: *Developing strategies to address psychosocial issues  Description: Describe feelings about living with diabetes; identify support needed and support network  Outcome: Progressing Towards Goal  Goal: *Insulin pump training  Outcome: Progressing Towards Goal  Goal: *Sick day guidelines  Outcome: Progressing Towards Goal  Goal: *Patient Specific Goal (EDIT GOAL, INSERT TEXT)  Outcome: Progressing Towards Goal     Problem: Patient Education: Go to Patient Education Activity  Goal: Patient/Family Education  Outcome: Progressing Towards Goal

## 2021-09-26 NOTE — PROGRESS NOTES
HOSPITALIST PROGRESS NOTE  Roya Nevarez MD, [address]         Daily Progress Note: 9/26/2021      Subjective:     Patient is alert and oriented x4. Continues to improve in terms of breathing with slowly resolving pneumonia and currently continued on her chronic baseline 3 L NC O2. Cough and dyspnea have improved and no overnight fever/chills, chest pain, nausea/vomiting noted. Patient counseled that she continues to have significantly elevated leukocytosis and was low as low blood sugars in the 30s this a.m.       Medications reviewed  Current Facility-Administered Medications   Medication Dose Route Frequency    insulin glargine (LANTUS) injection 25 Units  25 Units SubCUTAneous ACB/HS    insulin lispro (HUMALOG) injection 12 Units  12 Units SubCUTAneous TIDAC    azithromycin (ZITHROMAX) tablet 500 mg  500 mg Oral DAILY    docusate sodium (COLACE) capsule 100 mg  100 mg Oral BID    metoprolol succinate (TOPROL-XL) XL tablet 25 mg  25 mg Oral DAILY    metoclopramide HCl (REGLAN) tablet 5 mg  5 mg Oral TIDAC    pantoprazole (PROTONIX) tablet 40 mg  40 mg Oral ACB    piperacillin-tazobactam (ZOSYN) 3.375 g in 0.9% sodium chloride (MBP/ADV) 100 mL MBP  3.375 g IntraVENous Q8H    gabapentin (NEURONTIN) capsule 300 mg  300 mg Oral BID    cyclobenzaprine (FLEXERIL) tablet 10 mg  10 mg Oral TID PRN    melatonin (rapid dissolve) tablet 5 mg  5 mg Oral QHS    venlafaxine-SR (EFFEXOR-XR) capsule 150 mg  150 mg Oral DAILY    fluticasone propionate (FLONASE) 50 mcg/actuation nasal spray 2 Spray  2 Spray Both Nostrils DAILY    cetirizine (ZYRTEC) tablet 10 mg  10 mg Oral QPM    hydrALAZINE (APRESOLINE) tablet 25 mg  25 mg Oral TID    ferrous sulfate tablet 325 mg  1 Tablet Oral TID WITH MEALS    amLODIPine (NORVASC) tablet 10 mg  10 mg Oral DAILY    atorvastatin (LIPITOR) tablet 80 mg  80 mg Oral QHS    LORazepam (ATIVAN) injection 0.5 mg  0.5 mg IntraVENous Q6H PRN    glucose chewable tablet 16 g  4 Tablet Oral PRN    dextrose (D50W) injection syrg 12.5-25 g  25-50 mL IntraVENous PRN    glucagon (GLUCAGEN) injection 1 mg  1 mg IntraMUSCular PRN    insulin lispro (HUMALOG) injection   SubCUTAneous AC&HS    albuterol-ipratropium (DUO-NEB) 2.5 MG-0.5 MG/3 ML  3 mL Nebulization QID RT    guaiFENesin ER (MUCINEX) tablet 600 mg  600 mg Oral Q12H    aspirin delayed-release tablet 81 mg  81 mg Oral DAILY    sodium chloride (NS) flush 5-40 mL  5-40 mL IntraVENous Q8H    sodium chloride (NS) flush 5-40 mL  5-40 mL IntraVENous PRN    acetaminophen (TYLENOL) tablet 650 mg  650 mg Oral Q6H PRN    Or    acetaminophen (TYLENOL) suppository 650 mg  650 mg Rectal Q6H PRN    polyethylene glycol (MIRALAX) packet 17 g  17 g Oral DAILY PRN    ondansetron (ZOFRAN ODT) tablet 4 mg  4 mg Oral Q8H PRN    Or    ondansetron (ZOFRAN) injection 4 mg  4 mg IntraVENous Q6H PRN    enoxaparin (LOVENOX) injection 30 mg  30 mg SubCUTAneous DAILY       Review of Systems:   A comprehensive review of systems was negative. Objective:   Physical Exam:     Visit Vitals  BP (!) 151/72   Pulse 66   Temp 97.2 °F (36.2 °C)   Resp 16   Ht 5' 2\" (1.575 m)   Wt 100.7 kg (222 lb)   SpO2 99%   Breastfeeding No   BMI 40.60 kg/m²    O2 Flow Rate (L/min): 3 l/min O2 Device: Nasal cannula  Patient Vitals for the past 8 hrs:   Temp Resp BP SpO2   21 1131 -- -- -- 99 %   21 0754 97.2 °F (36.2 °C) 16 (!) 151/72 --   21 0705 -- -- -- 100 %          Temp (24hrs), Av.8 °F (36.6 °C), Min:97.2 °F (36.2 °C), Max:98.4 °F (36.9 °C)    No intake/output data recorded. 1901 -  0700  In: 640 [P.O.:120; I.V.:520]  Out: -     General:  Alert, cooperative, no distress, appears stated age. Lungs:   Clear to auscultation bilaterally. Chest wall:  No tenderness or deformity.    Heart:  Regular rate and rhythm, S1, S2 normal, no murmur, click, rub or gallop. Abdomen:   Soft, non-tender. Bowel sounds normal. No masses,  No organomegaly. Extremities: Extremities normal, atraumatic, no cyanosis or edema. Pulses: 2+ and symmetric all extremities. Skin: Skin color, texture, turgor normal. No rashes or lesions   Neurologic: CNII-XII intact. No gross sensory or motor deficits     Data Review:       Recent Days:  Recent Labs     09/26/21  0950 09/25/21  0615 09/24/21  0552   WBC 14.4* 15.5* 10.8   HGB 10.8* 10.3* 9.9*   HCT 33.6* 32.3* 30.9*   * 393 373     Recent Labs     09/26/21  0950 09/25/21  0615 09/24/21  0552   * 148* 143   K 4.3 4.9 4.9   * 111* 108   CO2 28 29 27   GLU 73 74 349*   BUN 64* 77* 76*   CREA 2.50* 2.68* 3.08*   CA 8.9 8.9 8.9   PHOS  --  4.5 4.8*   ALB  --  3.0* 2.9*     No results for input(s): PH, PCO2, PO2, HCO3, FIO2 in the last 72 hours.     24 Hour Results:  Recent Results (from the past 24 hour(s))   GLUCOSE, POC    Collection Time: 09/25/21  4:55 PM   Result Value Ref Range    Glucose (POC) 127 (H) 65 - 117 mg/dL    Performed by [id number] [place] [address], POC    Collection Time: 09/25/21  8:56 PM   Result Value Ref Range    Glucose (POC) 140 (H) 65 - 117 mg/dL    Performed by [address] [place] [address], POC    Collection Time: 09/26/21  7:52 AM   Result Value Ref Range    Glucose (POC) 66 65 - 117 mg/dL    Performed by Mitra See [address], BASIC    Collection Time: 09/26/21  9:50 AM   Result Value Ref Range    Sodium 149 (H) 136 - 145 mmol/L    Potassium 4.3 3.5 - 5.1 mmol/L    Chloride 111 (H) 97 - 108 mmol/L    CO2 28 21 - 32 mmol/L    Anion gap 10 5 - 15 mmol/L    Glucose 73 65 - 100 mg/dL    BUN 64 (H) 6 - 20 mg/dL    Creatinine 2.50 (H) 0.55 - 1.02 mg/dL    BUN/Creatinine ratio 26 (H) 12 - 20      GFR est AA 24 (L) >60 ml/min/1.73m2    GFR est non-AA 19 (L) >60 ml/min/1.73m2    Calcium 8.9 8.5 - 10.1 mg/dL   CBC WITH AUTOMATED DIFF    Collection Time: 09/26/21  9:50 AM Result Value Ref Range    WBC 14.4 (H) 4.4 - 11.3 K/uL    RBC 4.21 (L) 4.50 - 5.90 M/uL    HGB 10.8 (L) 13.5 - 17.5 g/dL    HCT 33.6 (L) 36 - 46 %    MCV 79.8 (L) 80 - 100 FL    MCH 25.7 (L) 31 - 34 PG    MCHC 32.2 31.0 - 36.0 g/dL    RDW 16.7 (H) 11.5 - 14.5 %    PLATELET 840 (H) 441 - 400 K/uL    MPV 7.4 6.5 - 11.5 FL    NRBC 0.1  WBC    ABSOLUTE NRBC 0.01 K/uL    NEUTROPHILS 79 (H) 42 - 75 %    LYMPHOCYTES 9 (L) 20.5 - 51.1 %    MONOCYTES 9 1.7 - 9.3 %    EOSINOPHILS 3 (H) 0.9 - 2.9 %    BASOPHILS 0 0.0 - 2.5 %    ABS. NEUTROPHILS 11.3 (H) 1.8 - 7.7 K/UL    ABS. LYMPHOCYTES 1.3 1.0 - 4.8 K/UL    ABS. MONOCYTES 1.3 0.2 - 2.4 K/UL    ABS. EOSINOPHILS 0.5 0.0 - 0.7 K/UL    ABS. BASOPHILS 0.1 0.0 - 0.2 K/UL   GLUCOSE, POC    Collection Time: 09/26/21 12:00 PM   Result Value Ref Range    Glucose (POC) 38 (LL) 65 - 117 mg/dL    Performed by Michelle Legato, POC    Collection Time: 09/26/21 12:15 PM   Result Value Ref Range    Glucose (POC) 37 (LL) 65 - 117 mg/dL    Performed by Michelle Legato, POC    Collection Time: 09/26/21 12:41 PM   Result Value Ref Range    Glucose (POC) 168 (H) 65 - 117 mg/dL    Performed by [facility]            Assessment/Plan:     Sepsis  Initially met sepsis secondary to tachycardia and leukocytosis most likely secondary to bilateral pneumonia and possible UTI  Urine culture with significant Enterococcus faecalis  Blood cultures negative thus far. Blood pressures are normotensive. Slowly improved with IV hydration and antibiotics.     Acute on chronic Hypoxic Respiratory failure  On chronic 3 L but noncompliant at home may have some signs and symptoms of sleep apnea  Secondary to bilateral pneumonia which is slowly improving.   Continue DuoNebs.     Acute bilateral pneumonia  CT scan of the chest shows small to moderate sized bilateral pleural effusions as well as acute interstitial disease with thickening of the interlobular and intralobular septae with differential being pulmonary edema versus atypical inter  Persistent leukocytosis noted. Continued on azithromycin 500 mg as well as Zosyn 3.375 g every 8 hours. 9/25/21 - Consider Adding vancomycin IV daily given current urine cultures with Enterococcus resistant to significant antibiotics and sensitive to vancomycin. Vancomycin will also cover complicated pneumonia as well. O2 saturations and O2 dependence to baseline at 3 L NC O2. Urinary tract infection  Enterococcus noted with greater than 50,000 colonies. Start IV Vancomycin based on sensitivities. Chronic maxillary sinusitis  Continue Flonase 2 sprays daily and cetirizine 10 mg every afternoon  Likely not acute as patient does not have an actual clinical symptomology from the CT head findings.     Mildly elevated troponins  Likely secondary to demand ischemia based on acute infections. Cardiology consultation appreciated  Echo shows preserved EF and no diastolic dysfunction  Noted to have history of cardiac catheterization at [place] and records show minimal obstruction no stents needed  Continue to monitor on telemetry.     Syncope  Likely secondary to acute infection as well as hypoglycemia contributing as well.     Hypoglycemia / Hyperglycemia   Initial check was 34 and improved slowly after treatment and currently glucose levels at 175 this morning and 9/20 and 251. Continues to have some hypoglycemia with a.m. blood sugars this afternoon of 39 noted. Holding long-acting insulin as well as mealtime insulin.    Poor appetite most likely secondary to increased stress and loss of  on 11/30/2020 as well as issues with vomiting possibly secondary to esophageal stricture  On 9/21 with current steroids glucose levels in the 300 range restarted home Lantus but at lower dose of 20 units and will continue to monitor closely  9/22 patient glucose levels continue to be uncontrolled and will increase patient to 35 units Lantus daily and new on sliding scale  On 9/23 glucose levels initially improved but again again in 300 and will restart insulin lispro at 12 units prior to meals and change Lantus to 20 units twice daily  9/25 -  patient received 30 units of insulin last night and was increased to 15 units with meals but sugar this morning was 77 so change patient to 25 units twice daily Lantus and 12 units with meals. Patient continues to have good p.o. intake and will need additional 24 hours of close monitoring and adjustments of long-acting and short acting insulin. 9/26 - Will hold all Lantus and intermediate acting insulin after meals due to hypoglycemia.     Antral gastritis/esophagitis  As noted on EGD and will continue Protonix 40 mg daily  Continue to avoid NSAIDs     Vomiting  Most likely secondary to esophageal stricture  Pending speech therapy to evaluate swallow and GI to evaluate EGD performed and esophageal dilation performed and EGD also shows esophagitis as well as evidence of gastroparesis so we will start Reglan 5 mg prior to meals and at bedtime     KANIKA on CKD  Nephrology consult appreciated  9/25 creatinine had been continued upward trend on 9/24 but then improved to 2.68 on 9/25.   Nephrology evaluated patient and sent labs for ANCA as patient has combination of sinusitis and interstitial pneumonia along with proteinuria and will need close monitoring with  nephrologist in [facility] on outpatient basis.     Acute on chronic COPD exacerbation   Presents with shortness of breath and cough for the past 3 days along with wheezing and no history of tobacco use  Chest x-ray negative for any acute pneumonic process  Place patient on nebulizer DuoNeb 4 times daily  On 9/21 noted to have increasing upper airway stridor and a racemic epinephrine was given and Solu-Medrol increased to 60 mg IV every 8 hours and decreased to every 12 hours  CT of the chest shows small to moderate pulmonary edema bilaterally along with interstitial disease with noted possible atypical infection and so azithromycin 500 mg IV daily will be started  9/23 decrease steroids down to 40 mg IV daily  9/25 discontinued steroids altogether secondary to hyperglycemia and will need follow-up with pulmonologist.  Significantly improved in terms of bronchospasm.     Acute encephalopathy  Continues to have cough confusion possibly secondary to underlying dementia/psychiatric and poor acute hospitalization  ABG performed shows pH of 7.37 PCO2 of 37 with PO2 of 85 on 3 L  Patient started on Seroquel 25 mg every afternoon and as needed Ativan 0.5 mg IV every 6 hours as needed for agitation  Discussed with patient sister and for a year now has been having off-and-on hallucinations and has been placed on Effexor extended release 150 mg and has not been started since admission so we will go ahead and restart  Patient has marked improvement in mentation is awake alert oriented no further hallucinations will discontinue Seroquel continue as needed Ativan and continue patient's venlafaxine home dose.     Iron Def Anemia  Stable during hospitalization but iron profile done on 9/20 showing iron level at 19 with saturation of 7%. Continue with Ferrous sulfate 325mg oral TID WM   Colace daily      DVT prophylaxis  Lovenox     CODE STATUS: Full code  Patient designates her sister Toshia Salvage her medical decision-maker if for some reason she is unable to make decisions for herself      Care Plan discussed with: Patient/Family, nursing. Total time spent with patient: 40 minutes. With greater than 50% spent in coordination of care and counseling.     Alexis Garcia MD

## 2021-09-27 VITALS
HEART RATE: 72 BPM | OXYGEN SATURATION: 98 % | WEIGHT: 222 LBS | BODY MASS INDEX: 40.85 KG/M2 | SYSTOLIC BLOOD PRESSURE: 168 MMHG | HEIGHT: 62 IN | TEMPERATURE: 98.3 F | RESPIRATION RATE: 18 BRPM | DIASTOLIC BLOOD PRESSURE: 72 MMHG

## 2021-09-27 LAB
ANA SER QL: NEGATIVE
ANION GAP SERPL CALC-SCNC: 8 MMOL/L (ref 5–15)
ATRIAL RATE: 100 BPM
BASOPHILS # BLD: 0 K/UL (ref 0–0.2)
BASOPHILS NFR BLD: 0 % (ref 0–2.5)
BUN SERPL-MCNC: 54 MG/DL (ref 6–20)
BUN/CREAT SERPL: 25 (ref 12–20)
CA-I BLD-MCNC: 8.7 MG/DL (ref 8.5–10.1)
CALCULATED P AXIS, ECG09: 75 DEGREES
CALCULATED R AXIS, ECG10: -44 DEGREES
CALCULATED T AXIS, ECG11: 109 DEGREES
CHLORIDE SERPL-SCNC: 113 MMOL/L (ref 97–108)
CO2 SERPL-SCNC: 28 MMOL/L (ref 21–32)
CREAT SERPL-MCNC: 2.16 MG/DL (ref 0.55–1.02)
DIAGNOSIS, 93000: NORMAL
EOSINOPHIL # BLD: 0.8 K/UL (ref 0–0.7)
EOSINOPHIL NFR BLD: 6 % (ref 0.9–2.9)
ERYTHROCYTE [DISTWIDTH] IN BLOOD BY AUTOMATED COUNT: 17.1 % (ref 11.5–14.5)
GLUCOSE BLD STRIP.AUTO-MCNC: 112 MG/DL (ref 65–117)
GLUCOSE BLD STRIP.AUTO-MCNC: 139 MG/DL (ref 65–117)
GLUCOSE BLD STRIP.AUTO-MCNC: 71 MG/DL (ref 65–117)
GLUCOSE SERPL-MCNC: 51 MG/DL (ref 65–100)
HCT VFR BLD AUTO: 32.6 % (ref 36–46)
HGB BLD-MCNC: 10.3 G/DL (ref 13.5–17.5)
LYMPHOCYTES # BLD: 0.9 K/UL (ref 1–4.8)
LYMPHOCYTES NFR BLD: 7 % (ref 20.5–51.1)
MCH RBC QN AUTO: 25.5 PG (ref 31–34)
MCHC RBC AUTO-ENTMCNC: 31.6 G/DL (ref 31–36)
MCV RBC AUTO: 80.6 FL (ref 80–100)
MONOCYTES # BLD: 1.2 K/UL (ref 0.2–2.4)
MONOCYTES NFR BLD: 8 % (ref 1.7–9.3)
NEUTS SEG # BLD: 10.9 K/UL (ref 1.8–7.7)
NEUTS SEG NFR BLD: 79 % (ref 42–75)
NRBC # BLD: 0.01 K/UL
NRBC BLD-RTO: 0.1 PER 100 WBC
P-R INTERVAL, ECG05: 155 MS
PERFORMED BY, TECHID: ABNORMAL
PERFORMED BY, TECHID: NORMAL
PERFORMED BY, TECHID: NORMAL
PLATELET # BLD AUTO: 398 K/UL (ref 150–400)
PMV BLD AUTO: 7.7 FL (ref 6.5–11.5)
POTASSIUM SERPL-SCNC: 5 MMOL/L (ref 3.5–5.1)
Q-T INTERVAL, ECG07: 379 MS
QRS DURATION, ECG06: 130 MS
QTC CALCULATION (BEZET), ECG08: 489 MS
RBC # BLD AUTO: 4.04 M/UL (ref 4.5–5.9)
SODIUM SERPL-SCNC: 149 MMOL/L (ref 136–145)
VENTRICULAR RATE, ECG03: 100 BPM
WBC # BLD AUTO: 13.8 K/UL (ref 4.4–11.3)

## 2021-09-27 PROCEDURE — 74011000250 HC RX REV CODE- 250: Performed by: INTERNAL MEDICINE

## 2021-09-27 PROCEDURE — 36415 COLL VENOUS BLD VENIPUNCTURE: CPT

## 2021-09-27 PROCEDURE — 82962 GLUCOSE BLOOD TEST: CPT

## 2021-09-27 PROCEDURE — 77010033678 HC OXYGEN DAILY

## 2021-09-27 PROCEDURE — 74011250636 HC RX REV CODE- 250/636: Performed by: EMERGENCY MEDICINE

## 2021-09-27 PROCEDURE — 74011250637 HC RX REV CODE- 250/637: Performed by: NURSE PRACTITIONER

## 2021-09-27 PROCEDURE — 94640 AIRWAY INHALATION TREATMENT: CPT

## 2021-09-27 PROCEDURE — 85025 COMPLETE CBC W/AUTO DIFF WBC: CPT

## 2021-09-27 PROCEDURE — 74011000258 HC RX REV CODE- 258: Performed by: HOSPITALIST

## 2021-09-27 PROCEDURE — 80048 BASIC METABOLIC PNL TOTAL CA: CPT

## 2021-09-27 PROCEDURE — 74011000250 HC RX REV CODE- 250: Performed by: HOSPITALIST

## 2021-09-27 PROCEDURE — 74011250636 HC RX REV CODE- 250/636: Performed by: HOSPITALIST

## 2021-09-27 PROCEDURE — 74011250637 HC RX REV CODE- 250/637: Performed by: HOSPITALIST

## 2021-09-27 PROCEDURE — 94760 N-INVAS EAR/PLS OXIMETRY 1: CPT

## 2021-09-27 RX ORDER — METOPROLOL SUCCINATE 25 MG/1
25 TABLET, EXTENDED RELEASE ORAL DAILY
Qty: 30 TABLET | Refills: 0 | Status: SHIPPED | OUTPATIENT
Start: 2021-09-28 | End: 2021-12-06

## 2021-09-27 RX ORDER — ASPIRIN 81 MG/1
81 TABLET ORAL DAILY
Qty: 30 TABLET | Refills: 0 | Status: SHIPPED | OUTPATIENT
Start: 2021-09-28

## 2021-09-27 RX ORDER — PANTOPRAZOLE SODIUM 40 MG/1
40 TABLET, DELAYED RELEASE ORAL
Qty: 30 TABLET | Refills: 0 | Status: SHIPPED | OUTPATIENT
Start: 2021-09-28 | End: 2021-12-05

## 2021-09-27 RX ORDER — LINEZOLID 600 MG/1
600 TABLET, FILM COATED ORAL 2 TIMES DAILY
Qty: 14 TABLET | Refills: 0 | Status: SHIPPED | OUTPATIENT
Start: 2021-09-27 | End: 2021-12-05

## 2021-09-27 RX ADMIN — PANTOPRAZOLE SODIUM 40 MG: 40 TABLET, DELAYED RELEASE ORAL at 07:55

## 2021-09-27 RX ADMIN — ASPIRIN 81 MG: 81 TABLET, COATED ORAL at 08:02

## 2021-09-27 RX ADMIN — Medication 10 ML: at 06:06

## 2021-09-27 RX ADMIN — AZITHROMYCIN MONOHYDRATE 500 MG: 250 TABLET ORAL at 09:00

## 2021-09-27 RX ADMIN — IPRATROPIUM BROMIDE AND ALBUTEROL SULFATE 3 ML: .5; 2.5 SOLUTION RESPIRATORY (INHALATION) at 07:44

## 2021-09-27 RX ADMIN — FERROUS SULFATE TAB 325 MG (65 MG ELEMENTAL FE) 325 MG: 325 (65 FE) TAB at 13:46

## 2021-09-27 RX ADMIN — DEXTROSE MONOHYDRATE 100 ML: 100 INJECTION, SOLUTION INTRAVENOUS at 06:55

## 2021-09-27 RX ADMIN — GUAIFENESIN 600 MG: 600 TABLET, EXTENDED RELEASE ORAL at 09:00

## 2021-09-27 RX ADMIN — ENOXAPARIN SODIUM 30 MG: 30 INJECTION SUBCUTANEOUS at 08:03

## 2021-09-27 RX ADMIN — GUAIFENESIN 600 MG: 600 TABLET, EXTENDED RELEASE ORAL at 07:58

## 2021-09-27 RX ADMIN — AZITHROMYCIN MONOHYDRATE 500 MG: 250 TABLET ORAL at 07:57

## 2021-09-27 RX ADMIN — FERROUS SULFATE TAB 325 MG (65 MG ELEMENTAL FE) 325 MG: 325 (65 FE) TAB at 08:01

## 2021-09-27 RX ADMIN — GABAPENTIN 300 MG: 300 CAPSULE ORAL at 08:53

## 2021-09-27 RX ADMIN — PIPERACILLIN AND TAZOBACTAM 3.38 G: 3; .375 INJECTION, POWDER, LYOPHILIZED, FOR SOLUTION INTRAVENOUS at 01:10

## 2021-09-27 RX ADMIN — DOCUSATE SODIUM 100 MG: 100 CAPSULE, LIQUID FILLED ORAL at 08:01

## 2021-09-27 RX ADMIN — METOCLOPRAMIDE 5 MG: 5 TABLET ORAL at 07:54

## 2021-09-27 RX ADMIN — METOCLOPRAMIDE 5 MG: 5 TABLET ORAL at 13:45

## 2021-09-27 RX ADMIN — AMLODIPINE BESYLATE 10 MG: 10 TABLET ORAL at 08:00

## 2021-09-27 RX ADMIN — IPRATROPIUM BROMIDE AND ALBUTEROL SULFATE 3 ML: .5; 2.5 SOLUTION RESPIRATORY (INHALATION) at 11:41

## 2021-09-27 RX ADMIN — HYDRALAZINE HYDROCHLORIDE 25 MG: 25 TABLET, FILM COATED ORAL at 07:59

## 2021-09-27 RX ADMIN — VENLAFAXINE HYDROCHLORIDE 150 MG: 150 CAPSULE, EXTENDED RELEASE ORAL at 08:01

## 2021-09-27 RX ADMIN — METOPROLOL SUCCINATE 25 MG: 25 TABLET, EXTENDED RELEASE ORAL at 08:02

## 2021-09-27 RX ADMIN — FLUTICASONE PROPIONATE 2 SPRAY: 50 SPRAY, METERED NASAL at 09:00

## 2021-09-27 RX ADMIN — HYDRALAZINE HYDROCHLORIDE 25 MG: 25 TABLET, FILM COATED ORAL at 09:00

## 2021-09-27 NOTE — DISCHARGE SUMMARY
Discharge Summary       PATIENT ID: Cristina Gomez  MRN: [id number]   YOB: 1955    DATE OF ADMISSION: 9/19/2021  1:39 PM    DATE OF DISCHARGE: 09/27/2021   PRIMARY CARE PROVIDER: Zfaar Oswald MD     ATTENDING PHYSICIAN: Temo Gutierrez  DISCHARGING PROVIDER: Gali Conde MD    To contact this individual call [phone] and ask the  to page. If unavailable ask to be transferred the Adult Hospitalist Department. CONSULTATIONS: IP CONSULT TO NEPHROLOGY  IP CONSULT TO GASTROENTEROLOGY  IP CONSULT TO PSYCHIATRY  IP CONSULT TO CARDIOLOGY    PROCEDURES/SURGERIES: Procedure(s):  ESOPHAGOGASTRODUODENOSCOPY (EGD) with esophageal dilation     ADMITTING DIAGNOSES & HOSPITAL COURSE:     HPI  Patient unsure what happened but was noted to have passed out and on evaluation gluocose of 34 . Similar event happened about 1 year ago. Patient is on lantus high dose as well as 10 units TID WM. Patient notes decrease in apepitie for about 1 year now and mostly notes it is secondary to increased stress and and recent passing of her  in 11/30/2020. Currently patient lives with her sister America Lehman and able to ambulate without walker. Patient states her glucose level which she checks after breakfast at 10:30 usually is in 140 range and says on recheck at lunch and dinner glucose at times does get below 100. Patient also notes that for the past 3 days has been more sob with + wheeze and does use nebulizer twice daily but no relief. Denies any sick contacts. Patient relates chest pain and initial troponin x 2 was at 0.12 and no change. Repeat this AM slightly higher at 0.21. denies any GERD like symptoms but patient has been vomiting and has hx of esophageal stricture for which had dilation done at [facility] about 1 1/2 year ago. CXR shows mild cardiac enlargement. Echo done and shows EF 60-65% and no evidence of diastolic dysfunction.   With patients current presentation was admitting on telemetry for syncope, severy hypoglycemia, KANIKA on CKD, chest pain , elevated tropoinin and Copd exacerbation. Hospital Course  1. Syncope  Patient presents with syncope. Work-up including CT head and echo were unremarkable. Likely syncope related to hypoglycemia and sepsis. No recurrence of syncopal episodes during this admission. 2.  Elevated troponin  Patient was evaluated by cardiology. Her troponin elevation is likely due to demand ischemia. Patient's previous cardiac catheterization 2018 showed normal coronaries other than small lesion in RCA. Echocardiogram grossly normal without wall motion abnormalities. Recommend nuclear stress test as outpatient. 3.  Sepsis  Patient presents with sepsis due to pneumonia and UTI. Patient with bilateral infiltrate on chest CT. Patient was treated with Zosyn and azithromycin. Patient also with UTI and urine culture grew multidrug-resistant Enterococcus. Vancomycin was later added based on urine culture and sensitivity result. Patient will be going home on Zyvox. 4.  KANIKA  Patient also with KANIKA on CKD. Nephrology evaluated the patient. Diuretics were held. Overall kidney function is improving. Patient does have appointment with nephrology as outpatient. Patient to continue to hold diuretics upon discharge. 5.  COPD exacerbation  Patient with acute on chronic respiratory failure due to COPD exacerbation. Patient was treated with IV steroid and course completed. Patient's oxygenation has improved and back to 3 L which patient uses at home. 6.  Diabetes  Patient with hypoglycemic episodes during this admission and all her diabetic medications and insulin were held. Blood sugar stable. Patient to continue to hold all the medications for her diabetes. Follow-up with PCP in 1 week for reevaluation. 7.  Gastritis  Patient with vomiting, evaluated by GI.   Went for EGD that shows antral gastritis, duodenitis, esophageal stricture, esophagitis and gastroparesis. Patient had esophageal dilatation during that EGD. GI recommended starting Protonix 40 mg daily. Patient symptoms have improved and tolerating diet. DISCHARGE DIAGNOSES / PLAN:      1. Syncope  2. Elevated troponin  3. Sepsis  4. Pneumonia  5. UTI  6. KANIKA  7. COPD exacerbation  8. Diabetes  9. Gastritis     ADDITIONAL CARE RECOMMENDATIONS:     Follow-up with PCP in 1 week. Follow-up with nephrology as scheduled. PENDING TEST RESULTS:   At the time of discharge the following test results are still pending: None    FOLLOW UP APPOINTMENTS:    Follow-up Information     Follow up With Specialties Details Why Contact Info    Ty Umaña MD Family Medicine On 9/28/2021 @ 9:40 [address] [place] [address]  [phone]               DIET: Diabetic Diet  Oral Nutritional Supplements: No Oral Supplement prescribed    ACTIVITY: Activity as tolerated    WOUND CARE: None    EQUIPMENT needed: None      DISCHARGE MEDICATIONS:  Current Discharge Medication List      START taking these medications    Details   metoprolol succinate (TOPROL-XL) 25 mg XL tablet Take 1 Tablet by mouth daily. Qty: 30 Tablet, Refills: 0  Start date: 9/28/2021      aspirin delayed-release 81 mg tablet Take 1 Tablet by mouth daily. Qty: 30 Tablet, Refills: 0  Start date: 9/28/2021      linezolid (Zyvox) 600 mg tablet Take 1 Tablet by mouth two (2) times a day. Qty: 14 Tablet, Refills: 0  Start date: 9/27/2021         CONTINUE these medications which have NOT CHANGED    Details   FeroSuL 325 mg (65 mg iron) tablet take 1 tablet by mouth three times a day  Qty: 90 Tablet, Refills: 1      Venlafaxine-ER 24 HR (EFFEXOR-ER) 150 mg tr24 tablet Take 150 mg by mouth daily. cyclobenzaprine (FLEXERIL) 10 mg tablet Take 1 Tablet by mouth three (3) times daily as needed for Muscle Spasm(s).   Qty: 15 Tablet, Refills: 0      famotidine (PEPCID) 20 mg tablet take 1 tablet by mouth twice a day      gabapentin (NEURONTIN) 300 mg capsule Take 300 mg by mouth two (2) times a day. tamsulosin (FLOMAX) 0.4 mg capsule take 1 capsule by mouth once daily  Qty: 30 Cap, Refills: 3      LORazepam (ATIVAN) 0.5 mg tablet Take 0.5 mg by mouth as needed for Anxiety. atorvastatin (LIPITOR) 40 mg tablet Take  by mouth nightly. amLODIPine (NORVASC) 10 mg tablet Take 10 mg by mouth daily. Refills: 0      Vitamin D2 1,250 mcg (50,000 unit) capsule take 1 capsule by mouth every week  Qty: 16 Cap, Refills: 0      OneTouch Ultra Blue Test Strip strip       melatonin 3 mg tablet Take 3 mg by mouth nightly. OneTouch UltraSoft Lancets misc       polyethylene glycol (ClearLax) 17 gram/dose powder Take 17 g by mouth daily. albuterol (PROVENTIL HFA, VENTOLIN HFA, PROAIR HFA) 90 mcg/actuation inhaler 2 puffs three to four times a day for 5 days then every 6 hours as needed for shortness of breath  Qty: 1 Inhaler, Refills: 0      NOVOFINE 32 32 gauge x 1/4\" ndle Refills: 0         STOP taking these medications       exenatide microspheres (Bydureon BCise) 2 mg/0.85 mL atIn Comments:   Reason for Stopping:         HumaLOG KwikPen Insulin 100 unit/mL kwikpen Comments:   Reason for Stopping:         furosemide (LASIX) 40 mg tablet Comments:   Reason for Stopping:         venlafaxine (EFFEXOR) 75 mg tablet Comments:   Reason for Stopping:         ketorolac (TORADOL) 10 mg tablet Comments:   Reason for Stopping:         LANTUS SOLOSTAR U-100 INSULIN 100 unit/mL (3 mL) inpn Comments:   Reason for Stopping:         lisinopriL (PRINIVIL, ZESTRIL) 20 mg tablet Comments:   Reason for Stopping:         rosuvastatin (CRESTOR) 10 mg tablet Comments:   Reason for Stopping:         TRADJENTA 5 mg tablet Comments:   Reason for Stopping:           Additional discharge medication  -Protonix 40 mg p.o. daily      NOTIFY YOUR PHYSICIAN FOR ANY OF THE FOLLOWING:   Fever over 101 degrees for 24 hours.    Chest pain, shortness of breath, fever, chills, nausea, vomiting, diarrhea, change in mentation, falling, weakness, bleeding. Severe pain or pain not relieved by medications. Or, any other signs or symptoms that you may have questions about. DISPOSITION:    Home With:   OT  PT  HH  RN       Long term SNF/Inpatient Rehab    Independent/assisted living    Hospice    Other:       PATIENT CONDITION AT DISCHARGE:     Functional status    Poor     Deconditioned     Independent      Cognition     Lucid     Forgetful     Dementia      Catheters/lines (plus indication)    Herrera     PICC     PEG     None      Code status     Full code     DNR      PHYSICAL EXAMINATION AT DISCHARGE:  General:          Alert, cooperative, no distress, appears stated age. HEENT:           Atraumatic, anicteric sclerae, pink conjunctivae                          No oral ulcers, mucosa moist, throat clear, dentition fair  Neck:               Supple, symmetrical  Lungs:             Clear to auscultation bilaterally. No Wheezing or Rhonchi. No rales. Chest wall:      No tenderness  No Accessory muscle use. Heart:              Regular  rhythm,  No  murmur   No edema  Abdomen:        Soft, non-tender. Not distended. Bowel sounds normal  Extremities:     No cyanosis. No clubbing,                            Skin turgor normal, Capillary refill normal  Skin:                Not pale. Not Jaundiced  No rashes   Psych:             Not anxious or agitated. Neurologic:      Alert, moves all extremities, answers questions appropriately and responds to commands       CHRONIC MEDICAL DIAGNOSES:  Problem List as of 9/27/2021 Date Reviewed: 2/3/2021        Codes Class Noted - Resolved    CKD (chronic kidney disease) ICD-10-CM: N18.9  ICD-9-CM: 585.9  9/24/2021 - Present    Overview Signed 9/24/2021  3:57 PM by Zuri Mireles MD     In the setting of severe, uncontrolled and prolonged diabetes, with diabetic retinopathy. Protienuria to 2.5g/g of creatinine.    Would consider workup for acute GN given inconsistent history and rising creatinine.  Though clinical hx appears consistent with aggressive diabetic nephropathy             Sepsis ([facility] ICD-10-CM: A41.9  ICD-9-CM: 038.9, 995.91  9/22/2021 - Present        Esophagitis with gastritis ICD-10-CM: K29.70, K20.90  ICD-9-CM: 530.19  9/22/2021 - Present        Elevated troponin ICD-10-CM: R77.8  ICD-9-CM: 790.6  9/20/2021 - Present        Syncope ICD-10-CM: R55  ICD-9-CM: 780.2  9/20/2021 - Present        Hypoglycemia ICD-10-CM: E16.2  ICD-9-CM: 251.2  9/20/2021 - Present        Chronic respiratory failure with hypoxia (HCC) ICD-10-CM: J96.11  ICD-9-CM: 518.83, 799.02  9/20/2021 - Present        Chest pain ICD-10-CM: R07.9  ICD-9-CM: 786.50  9/19/2021 - Present        Anxiety ICD-10-CM: F41.9  ICD-9-CM: 300.00  2/9/2021 - Present        Depression ICD-10-CM: F32.9  ICD-9-CM: 410  2/9/2021 - Present        Dyslipidemia ICD-10-CM: E78.5  ICD-9-CM: 272.4  2/9/2021 - Present        Hypertensive cardiovascular disease ICD-10-CM: I11.9  ICD-9-CM: 402.90  2/9/2021 - Present        Knee osteoarthritis ICD-10-CM: M17.10  ICD-9-CM: 715.36  1/25/2021 - Present        COPD with acute exacerbation ([facility] ICD-10-CM: J44.1  ICD-9-CM: 491.21  11/29/2020 - Present        Person under investigation for COVID-19 ICD-10-CM: Z20.822  ICD-9-CM: V01.79  11/29/2020 - Present        COPD (chronic obstructive pulmonary disease) ([facility] ICD-10-CM: J44.9  ICD-9-CM: 792  11/29/2020 - Present        DM (diabetes mellitus) ([place] [id number].) ICD-10-CM: E11.9  ICD-9-CM: 250.00  11/29/2020 - Present        COVID-19 ICD-10-CM: U07.1  ICD-9-CM: 079.89  10/1/2020 - Present        HTN (hypertension) ICD-10-CM: I10  ICD-9-CM: 401.9  10/1/2020 - Present        Diabetic gastroparesis ([place] [id number].) ICD-10-CM: E11.43, K31.84  ICD-9-CM: 250.60, 536.3  10/1/2020 - Present        DVT prophylaxis ICD-10-CM: Z29.9  ICD-9-CM: V07.9  10/1/2020 - Present        * (Principal) PNA (pneumonia) ICD-10-CM: J18.9  ICD-9-CM: 407 9/30/2020 - Present        Acute respiratory failure with hypoxia [facility]) ICD-10-CM: J96.01  ICD-9-CM: 518.81  9/30/2020 - Present        Acute kidney injury [facility]) ICD-10-CM: N17.9  ICD-9-CM: 584.9  9/30/2020 - Present        Type 2 diabetes mellitus with hyperglycemia, with long-term current use of insulin (HCC) ICD-10-CM: E11.65, Z79.4  ICD-9-CM: 250.00, 790.29, V58.67  9/30/2020 - Present        Ureteral stone ICD-10-CM: N20.1  ICD-9-CM: 592.1  2/15/2019 - Present        S/P lumbar fusion ICD-10-CM: Z98.1  ICD-9-CM: V45.4  10/16/2018 - Present        CAP (community acquired pneumonia) ICD-10-CM: J18.9  ICD-9-CM: 253  9/1/2018 - Present        Shortness of breath ICD-10-CM: R06.02  ICD-9-CM: 786.05  9/1/2018 - Present        Fever and chills ICD-10-CM: R50.9  ICD-9-CM: 780.60  9/1/2018 - Present        Spinal stenosis of lumbar region at multiple levels ICD-10-CM: M48.061  ICD-9-CM: 724.02  8/30/2018 - Present        Scoliosis ICD-10-CM: M41.9  ICD-9-CM: 737.30  8/30/2018 - Present        Spinal stenosis ICD-10-CM: M48.00  ICD-9-CM: 724.00  8/30/2018 - Present        Pre-op testing ICD-10-CM: A56.601  ICD-9-CM: V72.84  8/27/2018 - Present        Severe obesity (BMI 35.0-39. 9) ICD-10-CM: E66.01  ICD-9-CM: 278.01  6/20/2018 - Present        Spinal stenosis, lumbar region without neurogenic claudication ICD-10-CM: M48.061  ICD-9-CM: 724.02  5/16/2018 - Present        Lumbosacral spondylosis without myelopathy ICD-10-CM: M47.817  ICD-9-CM: 721.3  4/25/2018 - Present        DDD (degenerative disc disease), lumbar ICD-10-CM: M51.36  ICD-9-CM: 722.52  4/25/2018 - Present        Malignant neoplasm of female breast [facility]) ICD-10-CM: C50.919  ICD-9-CM: 174.9  4/25/2018 - Present              Greater than 60 minutes were spent with the patient on counseling and coordination of care    Signed:   Sri Hillman MD  9/27/2021  10:55 AM

## 2021-09-27 NOTE — PROGRESS NOTES
[facility] Pharmacokinetic Monitoring Service - Vancomycin     Keyona Londono is a 61 y.o. female starting on vancomycin therapy for an Enterococcus UTI (also has bilateral pneumonia, on Zithromax + Zosyn). Pharmacy consulted by Dr. Lincoln Hamm for monitoring and adjustment. Target Concentration: Dosing based on anticipated concentration <15 mg/L due to renal impairment/insufficiency    Additional Antimicrobials: Azithromycin, Zosyn    Pertinent Laboratory Values: Wt Readings from Last 1 Encounters:   09/19/21 100.7 kg (222 lb)     Temp Readings from Last 1 Encounters:   09/27/21 97.7 °F (36.5 °C)     No components found for: PROCAL  Estimated Creatinine Clearance: 29.2 mL/min (A) (based on SCr of 2.16 mg/dL (H)). Recent Labs     09/27/21  0520 09/26/21  0950   WBC 13.8* 14.4*       Pertinent Cultures:  Culture Date Source Results   9/22/2021 9/22/2021 Blood  urine Negative  E. faecalis   MRSA Nasal Swab: Order placed by pharmacy for 0915 9/28/2021. Plan:  1. Concentration-guided dosing due to renal impairment/insufficiency\"}  2. Start vancomycin 1 gm (received 9/26/2021 at 1507, then 500 mg IV q24h    a. Loading dose: 1000 mg  Regimen: 500 mg IV every 24 hours. Start time: 1000 on 09/27/2021  Exposure target: AUC24 (range)400-600 mg/L.hr   AUC24,ss: 403 mg/L.hr  Probability of AUC24 > 400: 51 %  Ctrough,ss: 13.4 mg/L  Probability of Ctrough,ss > 20: 20 %  Probability of nephrotoxicity (Lodise GLADIS 2009): 9 %  3. Renal labs as indicated   4. Vancomycin random concentration ordered for 9/27/2021 at 1400   5.  Pharmacy will continue to monitor patient and adjust therapy as indicated    Thank you for the consult,  Negro Montgomery, [facility]  9/27/2021 9:06 AM

## 2021-09-27 NOTE — PROGRESS NOTES
Patients case reviewed during interdisciplinary team meeting in [address] [place], [place] [address]Acute Care Unit. Rev.  Franky Schneider 75, [address]

## 2021-09-27 NOTE — PROGRESS NOTES
Progress Note    Patient: Jennifer Gallardo MRN: [id number]  SSN: [id number]    YOB: 1955  Age: 61 y.o. Sex: female      Admit Date: 9/19/2021    LOS: 7 days     Subjective:      Patient seen and examined. Jennifer Gallardo is a 61y.o. year-old female with past medical history significant for HTN, HLD, DM, COPD with Continuous supplemental oxygen required, Left Breast CA s/p Radiation/Chemo Therapy, Esophageal strictures s/p dilatation procedure/EGD, obesity, and ? Cardiomyopathy who is followed for Elevated Troponin and atypical chest pain. This am patient reports that she is feeling much better, rested well overnight, and has no new complaints. Renal function has improved over the weekend. BP remains above goal intermittently. Telemetry Review: SR/ST with occasional PVC    Review of Symptoms:   Review of Systems   Constitutional: Negative. HENT:        Difficulty swallowing   Eyes: Negative. Cardiovascular: Positive for dyspnea on exertion. Respiratory: Negative. Endocrine: Negative. Hematologic/Lymphatic: Negative. Skin: Negative. Musculoskeletal: Negative. Gastrointestinal: Negative. Genitourinary: Negative. Neurological: Negative. Psychiatric/Behavioral: Negative. Allergic/Immunologic: Negative. Objective:     Vitals:    09/26/21 2349 09/27/21 0510 09/27/21 0728 09/27/21 0744   BP: (!) 147/61 (!) 166/71 (!) 166/79    Pulse: 67 61 71    Resp: 18 18 18    Temp: 98 °F (36.7 °C) 97 °F (36.1 °C) 97.7 °F (36.5 °C)    SpO2: 94% 96% 98% 98%   Weight:       Height:            Intake and Output:  Current Shift: No intake/output data recorded.   Last three shifts: 09/25 1901 - 09/27 0700  In: 1080 [P.O.:120]  Out: -     Physical Exam:   General: Well nourished female sitting up in bed watching TV, NAD, A&O  HEENT: Normocephalic, PERRL, no drainage  Neck: Supple, Trachea midline, No JVD  RESP: CTA bilaterally, diminished in bases posteriorly.  + Symmetrical chest movement. No SOB or distress. On O2 via NC. Cardiovascular: RRR no RG. + murmur. PVS: No rubor, cyanosis, no edema, Radial, DP, PT pulses equal bilaterally  ABD: obese, soft, NT, Normoactive BS  Derm: Warm/Dry/Intact with no lesions, poor turgor  Neuro: A&O PPTS, cranial nerves II- XII grossly intact via interaction with patient.  No focal deficits  PSYCH: No anxiety or agitation      Lab/Data Review:  BMP:   Lab Results   Component Value Date/Time     (H) 09/27/2021 05:20 AM    K 5.0 09/27/2021 05:20 AM     (H) 09/27/2021 05:20 AM    CO2 28 09/27/2021 05:20 AM    AGAP 8 09/27/2021 05:20 AM    GLU 51 (LL) 09/27/2021 05:20 AM    BUN 54 (H) 09/27/2021 05:20 AM    CREA 2.16 (H) 09/27/2021 05:20 AM    GFRAA 28 (L) 09/27/2021 05:20 AM    GFRNA 23 (L) 09/27/2021 05:20 AM            Current Facility-Administered Medications:     vancomycin (VANCOCIN) 500 mg in 0.9% sodium chloride (MBP/ADV) 100 mL MBP, 500 mg, IntraVENous, Q24H, Lianne Odonnell MD    VANCOMYCIN INFORMATION NOTE, , Other, PRN, Lianne Odonnell MD    piperacillin-tazobactam (ZOSYN) 3.375 g in 0.9% sodium chloride (MBP/ADV) 100 mL MBP, 3.375 g, IntraVENous, Q8H, Edward Jennings MD, Last Rate: 25 mL/hr at 09/27/21 0110, 3.375 g at 09/27/21 0110    dextrose 10% infusion 100 mL, 100 mL, IntraVENous, PRN, Lianne Odonnell MD, Last Rate: 100 mL/hr at 09/27/21 0655, 100 mL at 09/27/21 0655    [Held by provider] insulin glargine (LANTUS) injection 25 Units, 25 Units, SubCUTAneous, ACB/HS, Edward Jennings MD, 25 Units at 09/26/21 0817    [Held by provider] insulin lispro (HUMALOG) injection 12 Units, 12 Units, SubCUTAneous, TIDAC, Edward Jennings MD, 12 Units at 09/26/21 0819    azithromycin (ZITHROMAX) tablet 500 mg, 500 mg, Oral, DAILY, Edward Jennings MD, 500 mg at 09/27/21 0900    docusate sodium (COLACE) capsule 100 mg, 100 mg, Oral, BID, Edward Jennings MD, 100 mg at 09/27/21 0801    metoprolol succinate (TOPROL-XL) XL tablet 25 mg, 25 mg, Oral, DAILY, Page, Merlin Ensign B, NP, 25 mg at 09/27/21 0802    metoclopramide HCl (REGLAN) tablet 5 mg, 5 mg, Oral, TIDAC, Edward Jennings MD, 5 mg at 09/27/21 0754    pantoprazole (PROTONIX) tablet 40 mg, 40 mg, Oral, ACB, Edward Jennings MD, 40 mg at 09/27/21 0755    gabapentin (NEURONTIN) capsule 300 mg, 300 mg, Oral, BID, Edward Jennings MD, 300 mg at 09/27/21 0853    cyclobenzaprine (FLEXERIL) tablet 10 mg, 10 mg, Oral, TID PRN, Edward Jennings MD, 10 mg at 09/22/21 1211    melatonin (rapid dissolve) tablet 5 mg, 5 mg, Oral, QHS, Edward Jennings MD, 5 mg at 09/26/21 2153    venlafaxine-SR (EFFEXOR-XR) capsule 150 mg, 150 mg, Oral, DAILY, Edward Jennings MD, 150 mg at 09/27/21 0801    fluticasone propionate (FLONASE) 50 mcg/actuation nasal spray 2 Spray, 2 Spray, Both Nostrils, DAILY, Edward Jennings MD, 2 [place] at 09/27/21 0900    cetirizine (ZYRTEC) tablet 10 mg, 10 mg, Oral, QPM, Edward Jennings MD, 10 mg at 09/26/21 1726    hydrALAZINE (APRESOLINE) tablet 25 mg, 25 mg, Oral, TID, Edward Jennings MD, 25 mg at 09/27/21 0900    ferrous sulfate tablet 325 mg, 1 Tablet, Oral, TID WITH MEALS, Edward Jennings MD, 325 mg at 09/27/21 0801    amLODIPine (NORVASC) tablet 10 mg, 10 mg, Oral, DAILY, Page, Merlin Ensign B, NP, 10 mg at 09/27/21 0800    atorvastatin (LIPITOR) tablet 80 mg, 80 mg, Oral, QHS, Page, Merlin Ensign B, NP, 80 mg at 09/26/21 2152    LORazepam (ATIVAN) injection 0.5 mg, 0.5 mg, IntraVENous, Q6H PRN, Edward Jennings MD, 0.5 mg at 09/21/21 1632    glucose chewable tablet 16 g, 4 Tablet, Oral, PRN, Edward Jennings MD, 16 g at 09/26/21 1731    dextrose (D50W) injection syrg 12.5-25 g, 25-50 mL, IntraVENous, PRN, Edward Jennings MD, 25 g at 09/26/21 1218    glucagon (GLUCAGEN) injection 1 mg, 1 mg, IntraMUSCular, PRN, Edward Jennings MD    insulin lispro (HUMALOG) injection, , SubCUTAneous, AC&HS, Dick, Sita Biswas MD, 3 Units at 09/24/21 2124    albuterol-ipratropium (DUO-NEB) 2.5 MG-0.5 MG/3 ML, 3 mL, Nebulization, QID RT, Edward Jennings MD, 3 mL at 09/27/21 0744    guaiFENesin ER (MUCINEX) tablet 600 mg, 600 mg, Oral, Q12H, Edward Jennings MD, 600 mg at 09/27/21 0900    aspirin delayed-release tablet 81 mg, 81 mg, Oral, DAILY, Edward Jennings MD, 81 mg at 09/27/21 0802    sodium chloride (NS) flush 5-40 mL, 5-40 mL, IntraVENous, Q8H, Jacqueline Travis MD, 10 mL at 09/27/21 0606    sodium chloride (NS) flush 5-40 mL, 5-40 mL, IntraVENous, PRN, Jacqueline Travis MD    acetaminophen (TYLENOL) tablet 650 mg, 650 mg, Oral, Q6H PRN, 650 mg at 09/24/21 1606 **OR** acetaminophen (TYLENOL) suppository 650 mg, 650 mg, Rectal, Q6H PRN, Jacqueline Travis MD    polyethylene glycol (MIRALAX) packet 17 g, 17 g, Oral, DAILY PRN, Jacqueline Travis MD    ondansetron (ZOFRAN ODT) tablet 4 mg, 4 mg, Oral, Q8H PRN **OR** ondansetron (ZOFRAN) injection 4 mg, 4 mg, IntraVENous, Q6H PRN, Jacqueline Travis MD    enoxaparin (LOVENOX) injection 30 mg, 30 mg, SubCUTAneous, DAILY, Jacqueline Travis MD, 30 mg at 09/27/21 0803      Assessment:     Principal Problem:    PNA (pneumonia) (9/30/2020)    Active Problems:    Acute kidney injury ([address] (9/30/2020)      Diabetic gastroparesis (HCC) (10/1/2020)      Chest pain (9/19/2021)      Elevated troponin (9/20/2021)      Syncope (9/20/2021)      Hypoglycemia (9/20/2021)      Chronic respiratory failure with hypoxia (HCC) (9/20/2021)      Sepsis ([facility] [address] (9/22/2021)      Esophagitis with gastritis (9/22/2021)      CKD (chronic kidney disease) (9/24/2021)      Overview: In the setting of severe, uncontrolled and prolonged diabetes, with       diabetic retinopathy. Protienuria to 2.5g/g of creatinine. Would consider workup for acute GN given inconsistent history and rising       creatinine.  Though clinical hx appears consistent with aggressive diabetic       nephropathy        Plan:     Case discussed with Collaborating physician Dr. Janie Ann and our recommendations are as follows:     1. Elevated Troponin - Flat in trends on first checks @ 0.12. Note that 9/20 am Trop was 0.21, this was peak and it down-trended from there. No ACS per EKG. [facility] records reviewed and patient had Cath 2018 that showed normal coronaries other than small lesion RCA. Sx were atypical and associated with food intake, and have resolved since dilatation of esophagus  last week. TTE grossly normal with no wall motion abnormalities. Continue risk factor modification and medication regimen. If sx return can consider NST in OP setting. 2. KANIKA on CKD - appreciate nephrology input. Hold home Lisinopril for now. Creat was 1.6 early 2021 and 1.9 on 9/15/21 per PCP records. Note ?edema on CT Chest with pleural effusions 9/21. IV Lasix given x2 doses 9/21 & 9/22 with noted worsening renal function. Would avoid further diuresis attempts at this time. 3. HTN -BP above goal.  Continue home regimen Amlodipine 10mg daily. Hold Lisinopril home dosing. Appreciate Nephrology input. No diuesis. Agree with initiation of Hydralazine 9/22 pm.  Given tachycardia and ventricular ectopy started Metoprolol Succinate May up titrate hydralazine if needed. 4. HLD: LDL @ 70 in setting of A1C of 7.6. Increased Atorvastatin to 80mg QHS effective 9/21/21. 5. DM: A1C 7.6 on labs. This is significant improvement from previous when reviewing PCP notes. Continue aggressive glycemic control. 6. EKG monitoring:  Given simultaneous use of Seroquel and Reglan monitor QTc intervals intermittently. QTc on 9/23 was 489. Thank you for involving us in the care of this patient. Please do not hesitate to call if additional questions arise.  If after hours please call [phone]    Signed By: Emiliano Mora NP     September 27, 2021

## 2021-09-27 NOTE — DISCHARGE INSTRUCTIONS
Patient Education        Learning About COPD and How to Prevent Lung Infections  How do lung infections affect COPD? Lung infections like pneumonia and acute bronchitis are common causes of COPD flare-ups. And people who have COPD are more likely to get these lung infections, especially if they smoke. When you have COPD, it is important to know the symptoms of pneumonia and acute bronchitis and call your doctor if you have them. Symptoms include:  · A cough that brings up more mucus than usual.  · Fever. · Shortness of breath. What can you do to prevent these infections? Stay healthy   · If you must be around people with colds or the flu, wash your hands often. · Get the flu vaccine every year. · Get a pneumococcal vaccine shot. If you have had one before, ask your doctor whether you need another dose. Two different types of pneumococcal vaccines are recommended for people ages 72 and older. · Make sure you are current on your whooping cough (pertussis) vaccine to help prevent whooping cough. · Do not smoke. This is the most important step you can take to prevent more damage to your lungs. If you need help quitting, talk to your doctor about stop-smoking programs and medicines. These may increase your chances of quitting for good. · Avoid secondhand smoke and air pollution. Try to stay inside with your windows closed when air pollution is bad. Exercise and eat well   · If your doctor recommends it, get more exercise. Walking is a good choice. Bit by bit, increase the amount you walk every day. Try for at least 30 minutes on most days of the week. · Eat regular, well-balanced meals. Eating right keeps your energy levels up and helps your body fight infection. · Get plenty of rest and sleep. Follow-up care is a key part of your treatment and safety. Be sure to make and go to all appointments, and call your doctor if you are having problems.  It's also a good idea to know your test results and keep a list of the medicines you take. Where can you learn more? Go to http://www.gray.com/  Enter U021 in the search box to learn more about \"Learning About COPD and How to Prevent Lung Infections. \"  Current as of: July 6, 2021               Content Version: 13.0  © 2150-7616 Healthwise, [employer]. Care instructions adapted under license by [employer] (which disclaims liability or warranty for this information). If you have questions about a medical condition or this instruction, always ask your healthcare professional. [address] any warranty or liability for your use of this information.

## 2021-09-28 LAB
C-ANCA TITR SER IF: NORMAL TITER
MYELOPEROXIDASE AB SER IA-ACNC: <9 U/ML (ref 0–9)
P-ANCA ATYPICAL TITR SER IF: NORMAL TITER
P-ANCA TITR SER IF: NORMAL TITER
PROTEINASE3 AB SER IA-ACNC: <3.5 U/ML (ref 0–3.5)

## 2021-09-29 LAB
BACTERIA SPEC CULT: NORMAL
SPECIAL REQUESTS,SREQ: NORMAL

## 2021-10-01 ENCOUNTER — HOSPITAL ENCOUNTER (EMERGENCY)
Age: 66
Discharge: HOME | End: 2021-10-01
Attending: INTERNAL MEDICINE
Payer: MEDICAID

## 2021-10-01 VITALS
HEIGHT: 62 IN | SYSTOLIC BLOOD PRESSURE: 140 MMHG | TEMPERATURE: 98.2 F | OXYGEN SATURATION: 99 % | HEART RATE: 74 BPM | BODY MASS INDEX: 40.48 KG/M2 | RESPIRATION RATE: 18 BRPM | WEIGHT: 220 LBS | DIASTOLIC BLOOD PRESSURE: 82 MMHG

## 2021-10-01 DIAGNOSIS — Z99.81 DEPENDENCE ON SUPPLEMENTAL OXYGEN: Primary | ICD-10-CM

## 2021-10-01 PROCEDURE — 99284 EMERGENCY DEPT VISIT MOD MDM: CPT

## 2021-10-01 NOTE — ED PROVIDER NOTES
EMERGENCY DEPARTMENT HISTORY AND PHYSICAL EXAM      Date: 10/1/2021  Patient Name: Jaiden Sinclair    History of Presenting Illness     Chief Complaint   Patient presents with    Other       History Provided By: Patient    HPI: Jaiden Sinclair, 61 y.o. female with a past medical history significant for hypertension, diabetes, breast cancer '17, hypercholesterolemia, myocardial infarction, restless leg syndrome that presents to the ED with cc of her oxygen machine has not been working the last 2 days and she is getting SOB. EMS found O2 sat 90% and put her on Oxygen and she improved. Pt states that she called the O2 company and her sister in the house waiting for her machine to be fixed. She feels well now and has no complaints x she is taking bactrim and macrobid for a UTI and feels that one of them is making her sick, Denies chest pain; fever; sob; n/v    There are no other complaints, changes, or physical findings at this time. PCP: Casimer Fabry, MD    No current facility-administered medications on file prior to encounter. Current Outpatient Medications on File Prior to Encounter   Medication Sig Dispense Refill    metoprolol succinate (TOPROL-XL) 25 mg XL tablet Take 1 Tablet by mouth daily. 30 Tablet 0    aspirin delayed-release 81 mg tablet Take 1 Tablet by mouth daily. 30 Tablet 0    linezolid (Zyvox) 600 mg tablet Take 1 Tablet by mouth two (2) times a day. 14 Tablet 0    pantoprazole (PROTONIX) 40 mg tablet Take 1 Tablet by mouth Daily (before breakfast). 30 Tablet 0    FeroSuL 325 mg (65 mg iron) tablet take 1 tablet by mouth three times a day 90 Tablet 1    Venlafaxine-ER 24 HR (EFFEXOR-ER) 150 mg tr24 tablet Take 150 mg by mouth daily.  cyclobenzaprine (FLEXERIL) 10 mg tablet Take 1 Tablet by mouth three (3) times daily as needed for Muscle Spasm(s).  15 Tablet 0    Vitamin D2 1,250 mcg (50,000 unit) capsule take 1 capsule by mouth every week 16 Cap 0    OneTouch Ultra Blue Test Strip strip       melatonin 3 mg tablet Take 3 mg by mouth nightly.  famotidine (PEPCID) 20 mg tablet take 1 tablet by mouth twice a day      OneTouch UltraSoft Lancets misc       gabapentin (NEURONTIN) 300 mg capsule Take 300 mg by mouth two (2) times a day.  polyethylene glycol (ClearLax) 17 gram/dose powder Take 17 g by mouth daily.  tamsulosin (FLOMAX) 0.4 mg capsule take 1 capsule by mouth once daily 30 Cap 3    albuterol (PROVENTIL HFA, VENTOLIN HFA, PROAIR HFA) 90 mcg/actuation inhaler 2 puffs three to four times a day for 5 days then every 6 hours as needed for shortness of breath 1 Inhaler 0    LORazepam (ATIVAN) 0.5 mg tablet Take 0.5 mg by mouth as needed for Anxiety.  atorvastatin (LIPITOR) 40 mg tablet Take  by mouth nightly.  amLODIPine (NORVASC) 10 mg tablet Take 10 mg by mouth daily.   0    NOVOFINE 26 35 gauge x 1/4\" ndle   0       Past History     Past Medical History:  Past Medical History:   Diagnosis Date    Arthritis     spinal stenosis    Breast CA ([facility] [address] 2017    Left Breast (chemo/radiation)    Diabetes ([facility] [address]     Type 2    Edema     legs and ankles    GERD (gastroesophageal reflux disease)     High cholesterol     Hypertension     Ill-defined condition     patient states hemorrhage behind Left eye-following Dr. Isha Duff MI (myocardial infarction) ([facility] [address] 06/2018    per patient    Neuropathy     Restless leg        Past Surgical History:  Past Surgical History:   Procedure Laterality Date    HX BACK SURGERY  08/30/2018    HX BREAST BIOPSY Left 02/03/2017    BREAST CANCER    HX BREAST LUMPECTOMY Left     HX CATARACT REMOVAL Right     HX HEART CATHETERIZATION  07/2018    no stents    HX KNEE REPLACEMENT         Family History:  Family History   Problem Relation Age of Onset    Hypertension Mother     Heart Failure Father     Heart Disease Sister        Social History:  Social History     Tobacco Use    Smoking status: Never Smoker    Smokeless tobacco: Never Used   Vaping Use    Vaping Use: Never used   Substance Use Topics    Alcohol use: No    Drug use: No       Allergies: Allergies   Allergen Reactions    Ciprofloxacin Hives    Keflex [Cephalexin] Nausea and Vomiting    Metformin Other (comments)     GI DISTRESS    Soliqua 100/33 [Insulin Glargine-Lixisenatide] Nausea Only         Review of Systems     Review of Systems   Constitutional: Negative for chills and fever. HENT: Negative for trouble swallowing. Respiratory: Negative for cough and shortness of breath. Gastrointestinal: Negative for abdominal pain, diarrhea, nausea and vomiting. Genitourinary: Negative for dysuria and flank pain. Neurological: Negative for headaches. Psychiatric/Behavioral: Negative for confusion. Physical Exam     Physical Exam  Vitals and nursing note reviewed. Constitutional:       General: She is not in acute distress. Appearance: Normal appearance. She is obese. She is not ill-appearing. HENT:      Head: Normocephalic. Mouth/Throat:      Pharynx: Oropharynx is clear. Eyes:      Pupils: Pupils are equal, round, and reactive to light. Cardiovascular:      Rate and Rhythm: Regular rhythm. Heart sounds: Normal heart sounds. Pulmonary:      Effort: Pulmonary effort is normal. No respiratory distress. Breath sounds: Normal breath sounds. Abdominal:      Palpations: Abdomen is soft. Tenderness: There is no abdominal tenderness. Musculoskeletal:      Cervical back: Neck supple. Skin:     General: Skin is warm and dry. Neurological:      Mental Status: She is alert and oriented to person, place, and time. Psychiatric:         Behavior: Behavior normal.         Lab and Diagnostic Study Results     Labs -   No results found for this or any previous visit (from the past 12 hour(s)).     Radiologic Studies -   @lastxrresult@  CT Results  (Last 48 hours)    None        CXR Results  (Last 48 hours)    None Medical Decision Making   - I am the first provider for this patient. - I reviewed the vital signs, available nursing notes, past medical history, past surgical history, family history and social history. - Initial assessment performed. The patients presenting problems have been discussed, and they are in agreement with the care plan formulated and outlined with them. I have encouraged them to ask questions as they arise throughout their visit. Vital Signs-Reviewed the patient's vital signs. Patient Vitals for the past 12 hrs:   Temp Pulse Resp BP SpO2   10/01/21 1444 98.2 °F (36.8 °C) 74 18 (!) 140/82 99 %       Records Reviewed: Nursing Notes      ED Course:   Per Nurse; Tiffany Alan; Case management contacted [address], [address] who walked pt's sister through trouble shooting the patient's oxygen machine and found to be operating well. I advised pt to stop taking the Bactrim and to take the Macrobid only for her UTI. Provider Notes (Medical Decision Making):     Procedures   M    Disposition   Disposition:     DISCHARGE PLAN:  1. Current Discharge Medication List      CONTINUE these medications which have NOT CHANGED    Details   metoprolol succinate (TOPROL-XL) 25 mg XL tablet Take 1 Tablet by mouth daily. Qty: 30 Tablet, Refills: 0      aspirin delayed-release 81 mg tablet Take 1 Tablet by mouth daily. Qty: 30 Tablet, Refills: 0      linezolid (Zyvox) 600 mg tablet Take 1 Tablet by mouth two (2) times a day. Qty: 14 Tablet, Refills: 0      pantoprazole (PROTONIX) 40 mg tablet Take 1 Tablet by mouth Daily (before breakfast). Qty: 30 Tablet, Refills: 0      FeroSuL 325 mg (65 mg iron) tablet take 1 tablet by mouth three times a day  Qty: 90 Tablet, Refills: 1      Venlafaxine-ER 24 HR (EFFEXOR-ER) 150 mg tr24 tablet Take 150 mg by mouth daily. cyclobenzaprine (FLEXERIL) 10 mg tablet Take 1 Tablet by mouth three (3) times daily as needed for Muscle Spasm(s).   Qty: 15 Tablet, Refills: 0      Vitamin D2 1,250 mcg (50,000 unit) capsule take 1 capsule by mouth every week  Qty: 16 Cap, Refills: 0      OneTouch Ultra Blue Test Strip strip       melatonin 3 mg tablet Take 3 mg by mouth nightly. famotidine (PEPCID) 20 mg tablet take 1 tablet by mouth twice a day      OneTouch UltraSoft Lancets misc       gabapentin (NEURONTIN) 300 mg capsule Take 300 mg by mouth two (2) times a day. polyethylene glycol (ClearLax) 17 gram/dose powder Take 17 g by mouth daily. tamsulosin (FLOMAX) 0.4 mg capsule take 1 capsule by mouth once daily  Qty: 30 Cap, Refills: 3      albuterol (PROVENTIL HFA, VENTOLIN HFA, PROAIR HFA) 90 mcg/actuation inhaler 2 puffs three to four times a day for 5 days then every 6 hours as needed for shortness of breath  Qty: 1 Inhaler, Refills: 0      LORazepam (ATIVAN) 0.5 mg tablet Take 0.5 mg by mouth as needed for Anxiety. atorvastatin (LIPITOR) 40 mg tablet Take  by mouth nightly. amLODIPine (NORVASC) 10 mg tablet Take 10 mg by mouth daily. Refills: 0      NOVOFINE 32 32 gauge x 1/4\" ndle Refills: 0           2. Follow-up Information     Follow up With Specialties Details Why Contact Info    Deshawn Patricio MD Family Medicine Schedule an appointment as soon as possible for a visit in 3 days  [address] [facility]  [address] [place], [place] [id number] [phone]          3. Return to ED if worse   4. Current Discharge Medication List            Diagnosis     Clinical Impression:   1. Dependence on supplemental oxygen        Attestations:    Jose Parker MD    Please note that this dictation was completed with [employer], the [employer] voice recognition software. Quite often unanticipated grammatical, syntax, homophones, and other interpretive errors are inadvertently transcribed by the computer software. Please disregard these errors. Please excuse any errors that have escaped final proofreading. Thank you.

## 2021-10-01 NOTE — PROGRESS NOTES
Received a call from ER stating pt's Home 02 was broke. Spoke with Elias Loyola from [facility]. She called pt's home and the person that answered the phone states pt's Home 02 was working find. They said she came to the ER due to a reaction from a medication. ER nurse made aware.

## 2021-10-01 NOTE — ED TRIAGE NOTES
Pt arrived via ems due to being out of home oxygen at home and was short of breath without having any home O2.

## 2021-10-04 ENCOUNTER — PATIENT OUTREACH (OUTPATIENT)
Dept: CASE MANAGEMENT | Age: 66
End: 2021-10-04

## 2021-10-04 NOTE — PROGRESS NOTES
No call made - no viral symptoms reported subsequent to ER visit of 10-1-21.     COVID Test of 9-21-21 negative

## 2021-12-03 ENCOUNTER — APPOINTMENT (OUTPATIENT)
Dept: GENERAL RADIOLOGY | Age: 66
End: 2021-12-03
Attending: INTERNAL MEDICINE
Payer: MEDICAID

## 2021-12-03 ENCOUNTER — HOSPITAL ENCOUNTER (EMERGENCY)
Age: 66
Discharge: HOME | End: 2021-12-04
Attending: INTERNAL MEDICINE
Payer: MEDICAID

## 2021-12-03 ENCOUNTER — APPOINTMENT (OUTPATIENT)
Dept: CT IMAGING | Age: 66
End: 2021-12-03
Attending: INTERNAL MEDICINE
Payer: MEDICAID

## 2021-12-03 DIAGNOSIS — E16.2 HYPOGLYCEMIA: Primary | ICD-10-CM

## 2021-12-03 DIAGNOSIS — S16.1XXA STRAIN OF NECK MUSCLE, INITIAL ENCOUNTER: ICD-10-CM
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
## 2021-12-03 LAB
ALBUMIN SERPL-MCNC: 3.4 G/DL (ref 3.5–4.7)
ALBUMIN/GLOB SERPL: 0.9 {RATIO}
ALP SERPL-CCNC: 64 U/L (ref 38–126)
ALT SERPL-CCNC: 17 U/L (ref 3–52)
ANION GAP SERPL CALC-SCNC: 7 MMOL/L
APPEARANCE UR: ABNORMAL
AST SERPL W P-5'-P-CCNC: 19 U/L (ref 14–74)
BACTERIA URNS QL MICRO: ABNORMAL /HPF
BASOPHILS # BLD: 0.1 K/UL (ref 0–0.1)
BASOPHILS NFR BLD: 1 % (ref 0–2)
BILIRUB SERPL-MCNC: 0.5 MG/DL (ref 0.2–1)
BILIRUB UR QL: NEGATIVE
BNP SERPL-MCNC: 60 PG/ML (ref 0–100)
BUN SERPL-MCNC: 20 MG/DL (ref 9–21)
BUN/CREAT SERPL: 11
CA-I BLD-MCNC: 9.6 MG/DL (ref 8.5–10.5)
CHLORIDE SERPL-SCNC: 106 MMOL/L (ref 94–111)
CO2 SERPL-SCNC: 29 MMOL/L (ref 21–33)
COLOR UR: YELLOW
CREAT SERPL-MCNC: 1.8 MG/DL (ref 0.7–1.2)
DIFFERENTIAL METHOD BLD: ABNORMAL
EOSINOPHIL # BLD: 0.3 K/UL (ref 0–0.4)
EOSINOPHIL NFR BLD: 2 % (ref 0–5)
EPITH CASTS URNS QL MICRO: ABNORMAL /LPF (ref 0–20)
ERYTHROCYTE [DISTWIDTH] IN BLOOD BY AUTOMATED COUNT: 16.2 % (ref 11.6–14.5)
GLOBULIN SER CALC-MCNC: 3.8 G/DL
GLUCOSE BLD STRIP.AUTO-MCNC: 109 MG/DL (ref 70–110)
GLUCOSE BLD STRIP.AUTO-MCNC: 119 MG/DL (ref 70–110)
GLUCOSE BLD STRIP.AUTO-MCNC: 140 MG/DL (ref 70–110)
GLUCOSE BLD STRIP.AUTO-MCNC: 31 MG/DL (ref 70–110)
GLUCOSE BLD STRIP.AUTO-MCNC: 80 MG/DL (ref 70–110)
GLUCOSE SERPL-MCNC: 29 MG/DL (ref 70–110)
GLUCOSE UR STRIP.AUTO-MCNC: 100 MG/DL
HCT VFR BLD AUTO: 39.5 % (ref 35–45)
HGB BLD-MCNC: 11.9 G/DL (ref 12–16)
HGB UR QL STRIP: ABNORMAL
HYALINE CASTS URNS QL MICRO: ABNORMAL /LPF
IMM GRANULOCYTES # BLD AUTO: 0.1 K/UL (ref 0–0.04)
IMM GRANULOCYTES NFR BLD AUTO: 1 % (ref 0–0.5)
KETONES UR QL STRIP.AUTO: ABNORMAL MG/DL
LACTATE SERPL-SCNC: 1.3 MMOL/L (ref 0.5–2)
LEUKOCYTE ESTERASE UR QL STRIP.AUTO: NEGATIVE
LYMPHOCYTES # BLD: 1.1 K/UL (ref 0.9–3.6)
LYMPHOCYTES NFR BLD: 10 % (ref 21–52)
MAGNESIUM SERPL-MCNC: 2.1 MG/DL (ref 1.7–2.8)
MCH RBC QN AUTO: 25.8 PG (ref 24–34)
MCHC RBC AUTO-ENTMCNC: 30.1 G/DL (ref 31–37)
MCV RBC AUTO: 85.7 FL (ref 78–100)
MONOCYTES # BLD: 0.8 K/UL (ref 0.05–1.2)
MONOCYTES NFR BLD: 7 % (ref 3–10)
NEUTS SEG # BLD: 9.3 K/UL (ref 1.8–8)
NEUTS SEG NFR BLD: 79 % (ref 40–73)
NITRITE UR QL STRIP.AUTO: NEGATIVE
NRBC # BLD: 0 K/UL (ref 0–0.01)
NRBC BLD-RTO: 0 PER 100 WBC
PERFORMED BY, TECHID: ABNORMAL
PERFORMED BY, TECHID: NORMAL
PERFORMED BY, TECHID: NORMAL
PH UR STRIP: 5 [PH] (ref 5–8)
PLATELET # BLD AUTO: 474 K/UL (ref 135–420)
PMV BLD AUTO: 9.2 FL (ref 9.2–11.8)
POTASSIUM SERPL-SCNC: 4 MMOL/L (ref 3.2–5.1)
PROT SERPL-MCNC: 7.2 G/DL (ref 6.1–8.4)
PROT UR STRIP-MCNC: >300 MG/DL
RBC # BLD AUTO: 4.61 M/UL (ref 4.2–5.3)
RBC #/AREA URNS HPF: ABNORMAL /HPF (ref 0–2)
SODIUM SERPL-SCNC: 142 MMOL/L (ref 135–145)
SP GR UR REFRACTOMETRY: 1.02 (ref 1–1.03)
TROPONIN I SERPL-MCNC: 0.02 NG/ML (ref 0.02–0.05)
UROBILINOGEN UR QL STRIP.AUTO: 0.2 EU/DL (ref 0.2–1)
WBC # BLD AUTO: 11.6 K/UL (ref 4.6–13.2)
WBC URNS QL MICRO: ABNORMAL /HPF (ref 0–4)

## 2021-12-03 PROCEDURE — 36415 COLL VENOUS BLD VENIPUNCTURE: CPT

## 2021-12-03 PROCEDURE — 72125 CT NECK SPINE W/O DYE: CPT

## 2021-12-03 PROCEDURE — 83735 ASSAY OF MAGNESIUM: CPT

## 2021-12-03 PROCEDURE — 83880 ASSAY OF NATRIURETIC PEPTIDE: CPT

## 2021-12-03 PROCEDURE — 99285 EMERGENCY DEPT VISIT HI MDM: CPT

## 2021-12-03 PROCEDURE — 74011000250 HC RX REV CODE- 250: Performed by: EMERGENCY MEDICINE

## 2021-12-03 PROCEDURE — 84484 ASSAY OF TROPONIN QUANT: CPT

## 2021-12-03 PROCEDURE — 81001 URINALYSIS AUTO W/SCOPE: CPT

## 2021-12-03 PROCEDURE — 85025 COMPLETE CBC W/AUTO DIFF WBC: CPT

## 2021-12-03 PROCEDURE — 82962 GLUCOSE BLOOD TEST: CPT

## 2021-12-03 PROCEDURE — 70450 CT HEAD/BRAIN W/O DYE: CPT

## 2021-12-03 PROCEDURE — 73080 X-RAY EXAM OF ELBOW: CPT

## 2021-12-03 PROCEDURE — 80053 COMPREHEN METABOLIC PANEL: CPT

## 2021-12-03 PROCEDURE — 73502 X-RAY EXAM HIP UNI 2-3 VIEWS: CPT

## 2021-12-03 PROCEDURE — 93005 ELECTROCARDIOGRAM TRACING: CPT

## 2021-12-03 PROCEDURE — 71045 X-RAY EXAM CHEST 1 VIEW: CPT

## 2021-12-03 PROCEDURE — 83605 ASSAY OF LACTIC ACID: CPT

## 2021-12-03 RX ADMIN — DEXTROSE MONOHYDRATE 250 ML: 100 INJECTION, SOLUTION INTRAVENOUS at 18:48

## 2021-12-03 NOTE — ED TRIAGE NOTES
Zay Olvera last week, bumped back of head, loss consciousness, did not seek medical attention last week. Tonight while sitting on toilet patients family found patient sitting on toilet with altered mental status. Per ems fsbs was 23 upon arrival, patient eating snacks and insulin today, no regular meals.  d10 by EMS fsbs 152

## 2021-12-04 VITALS
RESPIRATION RATE: 20 BRPM | WEIGHT: 220 LBS | OXYGEN SATURATION: 97 % | BODY MASS INDEX: 31.5 KG/M2 | HEIGHT: 70 IN | SYSTOLIC BLOOD PRESSURE: 149 MMHG | HEART RATE: 83 BPM | DIASTOLIC BLOOD PRESSURE: 75 MMHG

## 2021-12-04 LAB
ATRIAL RATE: 82 BPM
CALCULATED P AXIS, ECG09: 61 DEGREES
CALCULATED R AXIS, ECG10: -42 DEGREES
CALCULATED T AXIS, ECG11: 94 DEGREES
DIAGNOSIS, 93000: NORMAL
P-R INTERVAL, ECG05: 157 MS
Q-T INTERVAL, ECG07: 415 MS
QRS DURATION, ECG06: 130 MS
QTC CALCULATION (BEZET), ECG08: 482 MS
VENTRICULAR RATE, ECG03: 81 BPM

## 2021-12-04 NOTE — ED PROVIDER NOTES
EMERGENCY DEPARTMENT HISTORY AND PHYSICAL EXAM      Date: 12/3/2021  Patient Name: Karri Montgomery    History of Presenting Illness     Chief Complaint   Patient presents with    Altered mental status    Low Blood Sugar       History Provided By: Patient    HPI: Karri Montgomery, 61 y.o. female with a past medical history significant diabetes, hypertension and hyperlipidemia presents to the ED with cc of unresponsiveness and hypoglycemic episode. She was sitting on the commode while at home and lost consciousness. EMS arrived and discovered a blood glucose of 23. She was started on D10 drip and prior to arrival blood glucose was 152. Patient states she takes 100 units of lantus in the morning, and no other insulin during the day. She states she was eating very little today, because she feels under the weather. She also fell 1 week ago, and has some residual neck pain, head pain, right elbow and left hip pain. She states she has a intermittent productive cough, mild belly pain which comes and goes that is left sided. No vomiting, diarrhea, fever or chills. No shortness of breath. There are no other complaints, changes, or physical findings at this time. PCP: Isadore Rinne, MD    No current facility-administered medications on file prior to encounter. Current Outpatient Medications on File Prior to Encounter   Medication Sig Dispense Refill    metoprolol succinate (TOPROL-XL) 25 mg XL tablet Take 1 Tablet by mouth daily. 30 Tablet 0    aspirin delayed-release 81 mg tablet Take 1 Tablet by mouth daily. 30 Tablet 0    linezolid (Zyvox) 600 mg tablet Take 1 Tablet by mouth two (2) times a day. 14 Tablet 0    pantoprazole (PROTONIX) 40 mg tablet Take 1 Tablet by mouth Daily (before breakfast). 30 Tablet 0    FeroSuL 325 mg (65 mg iron) tablet take 1 tablet by mouth three times a day 90 Tablet 1    Venlafaxine-ER 24 HR (EFFEXOR-ER) 150 mg tr24 tablet Take 150 mg by mouth daily.       cyclobenzaprine (FLEXERIL) 10 mg tablet Take 1 Tablet by mouth three (3) times daily as needed for Muscle Spasm(s). 15 Tablet 0    Vitamin D2 1,250 mcg (50,000 unit) capsule take 1 capsule by mouth every week 16 Cap 0    OneTouch Ultra Blue Test Strip strip       melatonin 3 mg tablet Take 3 mg by mouth nightly.  famotidine (PEPCID) 20 mg tablet take 1 tablet by mouth twice a day      OneTouch UltraSoft Lancets misc       gabapentin (NEURONTIN) 300 mg capsule Take 300 mg by mouth two (2) times a day.  polyethylene glycol (ClearLax) 17 gram/dose powder Take 17 g by mouth daily.  tamsulosin (FLOMAX) 0.4 mg capsule take 1 capsule by mouth once daily 30 Cap 3    albuterol (PROVENTIL HFA, VENTOLIN HFA, PROAIR HFA) 90 mcg/actuation inhaler 2 puffs three to four times a day for 5 days then every 6 hours as needed for shortness of breath 1 Inhaler 0    LORazepam (ATIVAN) 0.5 mg tablet Take 0.5 mg by mouth as needed for Anxiety.  atorvastatin (LIPITOR) 40 mg tablet Take  by mouth nightly.  amLODIPine (NORVASC) 10 mg tablet Take 10 mg by mouth daily.   0    NOVOFINE 26 35 gauge x 1/4\" ndle   0       Past History     Past Medical History:  Past Medical History:   Diagnosis Date    Arthritis     spinal stenosis    Breast CA ([facility] [address] 2017    Left Breast (chemo/radiation)    Diabetes ([facility] [address]     Type 2    Edema     legs and ankles    GERD (gastroesophageal reflux disease)     High cholesterol     Hypertension     Ill-defined condition     patient states hemorrhage behind Left eye-following Dr. Kera Henao MI (myocardial infarction) ([facility] [address] 06/2018    per patient    Neuropathy     Restless leg        Past Surgical History:  Past Surgical History:   Procedure Laterality Date    HX BACK SURGERY  08/30/2018    HX BREAST BIOPSY Left 02/03/2017    BREAST CANCER    HX BREAST LUMPECTOMY Left     HX CATARACT REMOVAL Right     HX HEART CATHETERIZATION  07/2018    no stents    HX KNEE REPLACEMENT         Family History:  Family History   Problem Relation Age of Onset    Hypertension Mother     Heart Failure Father     Heart Disease Sister        Social History:  Social History     Tobacco Use    Smoking status: Never Smoker    Smokeless tobacco: Never Used   Vaping Use    Vaping Use: Never used   Substance Use Topics    Alcohol use: No    Drug use: No       Allergies: Allergies   Allergen Reactions    Bactrim [Sulfamethoprim] Rash    Ciprofloxacin Hives    Keflex [Cephalexin] Nausea and Vomiting    Metformin Other (comments)     GI DISTRESS    Soliqua 100/33 [Insulin Glargine-Lixisenatide] Nausea Only         Review of Systems     Review of Systems   Constitutional: Positive for activity change and appetite change. Negative for fatigue. HENT: Negative for hearing loss. Respiratory: Positive for cough. Negative for apnea and shortness of breath. Cardiovascular: Negative for chest pain and palpitations. Gastrointestinal: Negative for abdominal distention, abdominal pain and vomiting. Abdominal \"discomfort\" not pain   Genitourinary: Negative for dysuria and frequency. Musculoskeletal: Negative for back pain and neck pain. Right elbow, left hip pain, neck pain   Skin: Negative for rash. Neurological: Negative for dizziness and headaches. Psychiatric/Behavioral: Negative for confusion and decreased concentration. Physical Exam     Physical Exam  Vitals and nursing note reviewed. Constitutional:       Appearance: Normal appearance. HENT:      Head: Normocephalic and atraumatic. Nose: Nose normal. No congestion or rhinorrhea. Mouth/Throat:      Mouth: Mucous membranes are moist.      Pharynx: No oropharyngeal exudate. Eyes:      Extraocular Movements: Extraocular movements intact. Pupils: Pupils are equal, round, and reactive to light. Cardiovascular:      Rate and Rhythm: Normal rate and regular rhythm. Heart sounds: No murmur heard. No gallop. Pulmonary:      Effort: Pulmonary effort is normal. No respiratory distress. Breath sounds: No wheezing or rales. Abdominal:      General: Abdomen is flat. Palpations: Abdomen is soft. Tenderness: There is no abdominal tenderness. Musculoskeletal:         General: No swelling, deformity or signs of injury. Cervical back: Normal range of motion and neck supple. No rigidity. No muscular tenderness. Right lower leg: No edema. Left lower leg: No edema. Lymphadenopathy:      Cervical: No cervical adenopathy. Skin:     General: Skin is warm and dry. Capillary Refill: Capillary refill takes less than 2 seconds. Neurological:      General: No focal deficit present. Mental Status: She is alert and oriented to person, place, and time. Mental status is at baseline. Psychiatric:         Mood and Affect: Mood normal.         Behavior: Behavior normal.         Lab and Diagnostic Study Results     Labs -     Recent Results (from the past 12 hour(s))   CBC WITH AUTOMATED DIFF    Collection Time: 12/03/21  6:40 PM   Result Value Ref Range    WBC 11.6 4.6 - 13.2 K/uL    RBC 4.61 4.20 - 5.30 M/uL    HGB 11.9 (L) 12.0 - 16.0 g/dL    HCT 39.5 35.0 - 45.0 %    MCV 85.7 78.0 - 100.0 FL    MCH 25.8 24.0 - 34.0 PG    MCHC 30.1 (L) 31.0 - 37.0 g/dL    RDW 16.2 (H) 11.6 - 14.5 %    PLATELET 783 (H) 197 - 420 K/uL    MPV 9.2 9.2 - 11.8 FL    NRBC 0.0 0.0  WBC    ABSOLUTE NRBC 0.00 0.00 - 0.01 K/uL    NEUTROPHILS 79 (H) 40 - 73 %    LYMPHOCYTES 10 (L) 21 - 52 %    MONOCYTES 7 3 - 10 %    EOSINOPHILS 2 0 - 5 %    BASOPHILS 1 0 - 2 %    IMMATURE GRANULOCYTES 1 (H) 0 - 0.5 %    ABS. NEUTROPHILS 9.3 (H) 1.8 - 8.0 K/UL    ABS. LYMPHOCYTES 1.1 0.9 - 3.6 K/UL    ABS. MONOCYTES 0.8 0.05 - 1.2 K/UL    ABS. EOSINOPHILS 0.3 0.0 - 0.4 K/UL    ABS. BASOPHILS 0.1 0.0 - 0.1 K/UL    ABS. IMM.  GRANS. 0.1 (H) 0.00 - 0.04 K/UL    DF AUTOMATED     URINALYSIS W/ RFLX MICROSCOPIC    Collection Time: 12/03/21  6:40 PM   Result Value Ref Range    Color Yellow      Appearance Cloudy      Specific gravity 1.023 1.005 - 1.030      pH (UA) 5.0 5.0 - 8.0      Protein >300 (A) Negative mg/dL    Glucose 100 (A) Negative mg/dL    Ketone Trace (A) Negative mg/dL    Bilirubin Negative Negative      Blood Trace (A) Negative      Urobilinogen 0.2 0.2 - 1.0 EU/dL    Nitrites Negative Negative      Leukocyte Esterase Negative Negative     METABOLIC PANEL, COMPREHENSIVE    Collection Time: 12/03/21  6:40 PM   Result Value Ref Range    Sodium 142 135 - 145 mmol/L    Potassium 4.0 3.2 - 5.1 mmol/L    Chloride 106 94 - 111 mmol/L    CO2 29 21 - 33 mmol/L    Anion gap 7 mmol/L    Glucose 29 (LL) 70 - 110 mg/dL    BUN 20 9 - 21 mg/dL    Creatinine 1.80 (H) 0.70 - 1.20 mg/dL    BUN/Creatinine ratio 11      GFR est AA 34 ml/min/1.73m2    GFR est non-AA 28 ml/min/1.73m2    Calcium 9.6 8.5 - 10.5 mg/dL    Bilirubin, total 0.5 0.2 - 1.0 mg/dL    AST (SGOT) 19 14 - 74 U/L    ALT (SGPT) 17 3 - 52 U/L    Alk.  phosphatase 64 38 - 126 U/L    Protein, total 7.2 6.1 - 8.4 g/dL    Albumin 3.4 (L) 3.5 - 4.7 g/dL    Globulin 3.8 g/dL    A-G Ratio 0.9     TROPONIN I    Collection Time: 12/03/21  6:40 PM   Result Value Ref Range    Troponin-I, Qt. 0.02 0.02 - 0.05 ng/mL   MAGNESIUM    Collection Time: 12/03/21  6:40 PM   Result Value Ref Range    Magnesium 2.1 1.7 - 2.8 mg/dL   URINE MICROSCOPIC    Collection Time: 12/03/21  6:40 PM   Result Value Ref Range    WBC 0-4 0 - 4 /hpf    RBC 5-10 0 - 2 /hpf    Epithelial cells Few 0 - 20 /lpf    Bacteria 3+ (A) None /hpf    Hyaline cast 5-10 /lpf   GLUCOSE, POC    Collection Time: 12/03/21  6:41 PM   Result Value Ref Range    Glucose (POC) 31 (LL) 70 - 110 mg/dL    Performed by Warren Patterson    BNP    Collection Time: 12/03/21  6:50 PM   Result Value Ref Range    BNP 60 0 - 100 pg/mL   GLUCOSE, POC    Collection Time: 12/03/21  6:53 PM   Result Value Ref Range    Glucose (POC) 80 70 - 110 mg/dL Performed by Cadence Reyes    GLUCOSE, POC    Collection Time: 12/03/21  7:51 PM   Result Value Ref Range    Glucose (POC) 119 (H) 70 - 110 mg/dL    Performed by Felipa Hernandez    LACTIC ACID    Collection Time: 12/03/21  8:51 PM   Result Value Ref Range    Lactic acid 1.3 0.5 - 2.0 mmol/L   GLUCOSE, POC    Collection Time: 12/03/21  9:02 PM   Result Value Ref Range    Glucose (POC) 109 70 - 110 mg/dL    Performed by [address] [place] [address], POC    Collection Time: 12/03/21 10:10 PM   Result Value Ref Range    Glucose (POC) 140 (H) 70 - 110 mg/dL    Performed by Felipa Hernandez        Radiologic Studies -   @lastxrresult@  CT Results  (Last 48 hours)               12/03/21 2007  CT SPINE CERV WO CONT Final result    Impression:  1. Nonspecific cervical spine straightening. 2. There may be some very subtle subluxation anteriorly of C3 on C4 and C4 on   C5.   3. No acute fracture. 4. Mild degenerative changes. Narrative:  History: Heri aMn down a week ago. COMPARISON: None       Axial CT performed through the cervical spine with multiplanar reconstruction   without IV contrast material.       Digital Imaging and Communications in Medicine (DICOM) format image data are   available to nonaffiliated external healthcare facilities or entities on a   secured, media free, reciprocally searchable basis with patient authorization   for at least a 12-month period after this study. Skeleton: There is loss of normal lordosis which is nonspecific but can be seen   in ligamentous or muscular injury. There is subtle anterior subluxation of less   than 2 mm of C3 on C4 and C4 on C5. There is degenerative disc space narrowing   at C6-7. Again is seen in the left maxillary mucosal disease, similar to the   head CT of earlier today. No acute fracture. Posterior osteophytic ridging is   seen at C5 and lesser degree at C6 without significant spinal stenosis. No   significant foraminal stenosis.        Soft tissues: Unremarkable       Lung apices: Unremarkable           12/03/21 2007  CT HEAD WO CONT Final result    Impression:  1. Nonspecific periventricular deep white matter hypodensities could represent   areas of ischemic demyelination appear about the same. No mass effect. No   intracranial hemorrhage. 2. Left maxillary sinusitis appears about the same. Narrative:  History: Rosa Nelson one week ago. COMPARISON: 9/22/2021 head CT       Axial CT performed through the head with multiplanar reconstruction without IV   contrast.       Digital Imaging and Communications in Medicine (DICOM) format image data are   available to nonaffiliated external healthcare facilities or entities on a   secured, media free, reciprocally searchable basis with patient authorization   for at least a 12-month period after this study. There are patchy areas of decreased density in the periventricular deep white   matter bilaterally similar to prior. No midline shift, mass effect,   hydrocephalus or intracranial hemorrhage. Posterior fossa appears unremarkable. No skull fracture. Mucosal disease in the left maxillary sinus remains. Other   paranasal sinuses appear clear. CXR Results  (Last 48 hours)               12/03/21 2038  XR CHEST PORT Final result    Impression:  1. No acute pulmonary disease. Narrative:  History: Rosa Nelson down last week. COMPARISON: 9/24/2021 PA lateral chest.       AP chest x-ray performed. Heart size appears normal. No focal infiltrate,   pneumothorax or effusion. Pulmonary vascularity normal. No skeletal   abnormalities. Medical Decision Making   - I am the first provider for this patient. - I reviewed the vital signs, available nursing notes, past medical history, past surgical history, family history and social history. - Initial assessment performed.  The patients presenting problems have been discussed, and they are in agreement with the care plan formulated and outlined with them. I have encouraged them to ask questions as they arise throughout their visit. Vital Signs-Reviewed the patient's vital signs. Patient Vitals for the past 12 hrs:   Pulse Resp BP SpO2   12/03/21 1842 82 16 (!) 182/93 97 %       Records Reviewed: Nursing Notes and Old Medical Records    ED Course:          Provider Notes (Medical Decision Making):     MDM  Number of Diagnoses or Management Options  Diagnosis management comments: DDx- hypoglycemia d/t infection, metabolic, cardiac causes vs iatrogenic    2215 - she is much improved, and her latest accucheck is 140. She ate a meal including peanut butter, crackers and cola. She was advised to hold her long acting insulin until she can see her doctor Monday or Tuesday. Procedures   Medical Decision Makingedical Decision Making  Performed by: Shanell Galeana MD  PROCEDURES:  EKG    Date/Time: 12/3/2021 9:20 PM  Performed by: Anuradha Cabrera MD  Authorized by: Anuradha Cabrera MD     ECG reviewed by ED Physician in the absence of a cardiologist: yes    Previous ECG:     Previous ECG:  Compared to current    Similarity:  No change  Interpretation:     Interpretation: abnormal    Rate:     ECG rate:  83    ECG rate assessment: normal    Rhythm:     Rhythm: sinus rhythm    Ectopy:     Ectopy: none    QRS:     QRS axis:  Left  Conduction:     Conduction: abnormal      Abnormal conduction: complete LBBB    ST segments:     ST segments:  Normal  T waves:     T waves: normal    Other findings:     Other findings: prolonged qTc interval    Comments:      No acute findings, unchanged from 9/23/21           Disposition   Disposition: DC-The patient was given verbal follow-up instructions        DISCHARGE PLAN:  1. Current Discharge Medication List      CONTINUE these medications which have NOT CHANGED    Details   metoprolol succinate (TOPROL-XL) 25 mg XL tablet Take 1 Tablet by mouth daily.   Qty: 30 Tablet, Refills: 0      aspirin delayed-release 81 mg tablet Take 1 Tablet by mouth daily. Qty: 30 Tablet, Refills: 0      linezolid (Zyvox) 600 mg tablet Take 1 Tablet by mouth two (2) times a day. Qty: 14 Tablet, Refills: 0      pantoprazole (PROTONIX) 40 mg tablet Take 1 Tablet by mouth Daily (before breakfast). Qty: 30 Tablet, Refills: 0      FeroSuL 325 mg (65 mg iron) tablet take 1 tablet by mouth three times a day  Qty: 90 Tablet, Refills: 1      Venlafaxine-ER 24 HR (EFFEXOR-ER) 150 mg tr24 tablet Take 150 mg by mouth daily. cyclobenzaprine (FLEXERIL) 10 mg tablet Take 1 Tablet by mouth three (3) times daily as needed for Muscle Spasm(s). Qty: 15 Tablet, Refills: 0      Vitamin D2 1,250 mcg (50,000 unit) capsule take 1 capsule by mouth every week  Qty: 16 Cap, Refills: 0      OneTouch Ultra Blue Test Strip strip       melatonin 3 mg tablet Take 3 mg by mouth nightly. famotidine (PEPCID) 20 mg tablet take 1 tablet by mouth twice a day      OneTouch UltraSoft Lancets misc       gabapentin (NEURONTIN) 300 mg capsule Take 300 mg by mouth two (2) times a day. polyethylene glycol (ClearLax) 17 gram/dose powder Take 17 g by mouth daily. tamsulosin (FLOMAX) 0.4 mg capsule take 1 capsule by mouth once daily  Qty: 30 Cap, Refills: 3      albuterol (PROVENTIL HFA, VENTOLIN HFA, PROAIR HFA) 90 mcg/actuation inhaler 2 puffs three to four times a day for 5 days then every 6 hours as needed for shortness of breath  Qty: 1 Inhaler, Refills: 0      LORazepam (ATIVAN) 0.5 mg tablet Take 0.5 mg by mouth as needed for Anxiety. atorvastatin (LIPITOR) 40 mg tablet Take  by mouth nightly. amLODIPine (NORVASC) 10 mg tablet Take 10 mg by mouth daily. Refills: 0      NOVOFINE 32 32 gauge x 1/4\" ndle Refills: 0           2.    Follow-up Information     Follow up With Specialties Details Why Contact Info    Héctor Grissom MD Family Medicine In 2 days for reevaluation of today's complaint [address] [facility] [address] [id number]  [phone]          3. Return to ED if worse   4. Current Discharge Medication List            Diagnosis     Clinical Impression:   1. Hypoglycemia    2. Fall from ground level    3. Strain of neck muscle, initial encounter        Attestations:    Arpita Talbert MD    Please note that this dictation was completed with [employer], the computer voice recognition software. Quite often unanticipated grammatical, syntax, homophones, and other interpretive errors are inadvertently transcribed by the computer software. Please disregard these errors. Please excuse any errors that have escaped final proofreading. Thank you.

## 2021-12-04 NOTE — DISCHARGE INSTRUCTIONS
Do not take your insulin next 24 hours. Please consult your primary care physician on Monday for further instructions. Return to the ER if getting worse in any way.

## 2021-12-05 ENCOUNTER — APPOINTMENT (OUTPATIENT)
Dept: GENERAL RADIOLOGY | Age: 66
End: 2021-12-05
Attending: FAMILY MEDICINE
Payer: MEDICAID

## 2021-12-05 ENCOUNTER — APPOINTMENT (OUTPATIENT)
Dept: CT IMAGING | Age: 66
End: 2021-12-05
Attending: FAMILY MEDICINE
Payer: MEDICAID

## 2021-12-05 ENCOUNTER — HOSPITAL ENCOUNTER (INPATIENT)
Age: 66
LOS: 17 days | Discharge: NURSING, REHAB OR LONG-TERM CARE | DRG: 420 | End: 2021-12-22
Attending: EMERGENCY MEDICINE | Admitting: HOSPITALIST
Payer: MEDICAID

## 2021-12-05 ENCOUNTER — HOSPITAL ENCOUNTER (EMERGENCY)
Age: 66
Discharge: ANOTHER HOSPITAL | End: 2021-12-05
Attending: FAMILY MEDICINE
Payer: MEDICAID

## 2021-12-05 VITALS
BODY MASS INDEX: 36.65 KG/M2 | DIASTOLIC BLOOD PRESSURE: 94 MMHG | TEMPERATURE: 98.2 F | RESPIRATION RATE: 20 BRPM | SYSTOLIC BLOOD PRESSURE: 187 MMHG | OXYGEN SATURATION: 98 % | HEART RATE: 98 BPM | WEIGHT: 220 LBS | HEIGHT: 65 IN

## 2021-12-05 DIAGNOSIS — R41.82 ALTERED MENTAL STATUS, UNSPECIFIED ALTERED MENTAL STATUS TYPE: Primary | ICD-10-CM

## 2021-12-05 DIAGNOSIS — E16.2 HYPOGLYCEMIA: Primary | ICD-10-CM

## 2021-12-05 DIAGNOSIS — E16.2 HYPOGLYCEMIA: ICD-10-CM

## 2021-12-05 DIAGNOSIS — J18.9 PNEUMONIA OF LEFT LOWER LOBE DUE TO INFECTIOUS ORGANISM: ICD-10-CM

## 2021-12-05 PROBLEM — G93.41 METABOLIC ENCEPHALOPATHY: Status: ACTIVE | Noted: 2021-12-05

## 2021-12-05 LAB
ALBUMIN SERPL-MCNC: 2.3 G/DL (ref 3.5–5)
ALBUMIN SERPL-MCNC: 3.4 G/DL (ref 3.5–4.7)
ALBUMIN/GLOB SERPL: 0.5 {RATIO} (ref 1.1–2.2)
ALBUMIN/GLOB SERPL: 0.8 {RATIO}
ALP SERPL-CCNC: 75 U/L (ref 38–126)
ALP SERPL-CCNC: 75 U/L (ref 45–117)
ALT SERPL-CCNC: 20 U/L (ref 3–52)
ALT SERPL-CCNC: 23 U/L (ref 12–78)
AMMONIA PLAS-SCNC: 29 UMOL/L
AMORPH CRY URNS QL MICRO: ABNORMAL
AMPHET UR QL SCN: NEGATIVE
ANION GAP SERPL CALC-SCNC: 3 MMOL/L (ref 5–15)
ANION GAP SERPL CALC-SCNC: 8 MMOL/L
APPEARANCE UR: CLEAR
ARTERIAL PATENCY WRIST A: ABNORMAL
AST SERPL W P-5'-P-CCNC: 42 U/L (ref 14–74)
AST SERPL W P-5'-P-CCNC: 52 U/L (ref 15–37)
ATRIAL RATE: 96 BPM
BACTERIA URNS QL MICRO: ABNORMAL /HPF
BARBITURATES UR QL SCN: NEGATIVE
BASE EXCESS BLDA CALC-SCNC: 4.4 MMOL/L (ref 0–2.5)
BASOPHILS # BLD: 0 K/UL (ref 0–0.1)
BASOPHILS # BLD: 0.1 K/UL (ref 0–0.1)
BASOPHILS NFR BLD: 0 % (ref 0–1)
BASOPHILS NFR BLD: 0 % (ref 0–2)
BDY SITE: ABNORMAL
BENZODIAZ UR QL: NEGATIVE
BILIRUB SERPL-MCNC: 0.2 MG/DL (ref 0.2–1)
BILIRUB SERPL-MCNC: 0.4 MG/DL (ref 0.2–1)
BILIRUB UR QL: NEGATIVE
BUN SERPL-MCNC: 16 MG/DL (ref 6–20)
BUN SERPL-MCNC: 17 MG/DL (ref 9–21)
BUN/CREAT SERPL: 11
BUN/CREAT SERPL: 9 (ref 12–20)
CA-I BLD-MCNC: 8.9 MG/DL (ref 8.5–10.1)
CA-I BLD-MCNC: 9.6 MG/DL (ref 8.5–10.5)
CALCULATED P AXIS, ECG09: 64 DEGREES
CALCULATED R AXIS, ECG10: -44 DEGREES
CALCULATED T AXIS, ECG11: 101 DEGREES
CANNABINOIDS UR QL SCN: NEGATIVE
CHLORIDE SERPL-SCNC: 104 MMOL/L (ref 94–111)
CHLORIDE SERPL-SCNC: 111 MMOL/L (ref 97–108)
CO2 SERPL-SCNC: 28 MMOL/L (ref 21–32)
CO2 SERPL-SCNC: 28 MMOL/L (ref 21–33)
COCAINE UR QL SCN: NEGATIVE
COHGB MFR BLD: 0.4 % (ref 0–3)
COLOR UR: YELLOW
COVID-19 RAPID TEST, COVR: NOT DETECTED
CREAT SERPL-MCNC: 1.5 MG/DL (ref 0.7–1.2)
CREAT SERPL-MCNC: 1.78 MG/DL (ref 0.55–1.02)
DIAGNOSIS, 93000: NORMAL
DIFFERENTIAL METHOD BLD: ABNORMAL
DIFFERENTIAL METHOD BLD: ABNORMAL
DRUG SCRN COMMENT,DRGCM: NORMAL
EOSINOPHIL # BLD: 0 K/UL (ref 0–0.4)
EOSINOPHIL # BLD: 0 K/UL (ref 0–0.4)
EOSINOPHIL NFR BLD: 0 % (ref 0–5)
EOSINOPHIL NFR BLD: 0 % (ref 0–7)
EPITH CASTS URNS QL MICRO: ABNORMAL /LPF (ref 0–20)
ERYTHROCYTE [DISTWIDTH] IN BLOOD BY AUTOMATED COUNT: 16.2 % (ref 11.6–14.5)
ERYTHROCYTE [DISTWIDTH] IN BLOOD BY AUTOMATED COUNT: 16.7 % (ref 11.5–14.5)
FIO2 ON VENT: 21 %
GLOBULIN SER CALC-MCNC: 4.4 G/DL
GLOBULIN SER CALC-MCNC: 4.4 G/DL (ref 2–4)
GLUCOSE BLD STRIP.AUTO-MCNC: 101 MG/DL (ref 65–117)
GLUCOSE BLD STRIP.AUTO-MCNC: 24 MG/DL (ref 70–110)
GLUCOSE BLD STRIP.AUTO-MCNC: 24 MG/DL (ref 70–110)
GLUCOSE BLD STRIP.AUTO-MCNC: 52 MG/DL (ref 65–117)
GLUCOSE BLD STRIP.AUTO-MCNC: 66 MG/DL (ref 65–117)
GLUCOSE BLD STRIP.AUTO-MCNC: 68 MG/DL (ref 70–110)
GLUCOSE BLD STRIP.AUTO-MCNC: 72 MG/DL (ref 65–117)
GLUCOSE BLD STRIP.AUTO-MCNC: 83 MG/DL (ref 70–110)
GLUCOSE BLD STRIP.AUTO-MCNC: 86 MG/DL (ref 65–117)
GLUCOSE BLD STRIP.AUTO-MCNC: 88 MG/DL (ref 70–110)
GLUCOSE SERPL-MCNC: 166 MG/DL (ref 65–100)
GLUCOSE SERPL-MCNC: 73 MG/DL (ref 70–110)
GLUCOSE UR STRIP.AUTO-MCNC: NEGATIVE MG/DL
GRAN CASTS URNS QL MICRO: ABNORMAL /LPF
HCO3 BLDA-SCNC: 30 MMOL/L (ref 23–27)
HCT VFR BLD AUTO: 36.2 % (ref 35–47)
HCT VFR BLD AUTO: 40.2 % (ref 35–45)
HGB BLD OXIMETRY-MCNC: 12.5 G/DL (ref 12–16)
HGB BLD-MCNC: 11.3 G/DL (ref 11.5–16)
HGB BLD-MCNC: 12.5 G/DL (ref 12–16)
HGB UR QL STRIP: ABNORMAL
HYALINE CASTS URNS QL MICRO: ABNORMAL /LPF
IMM GRANULOCYTES # BLD AUTO: 0 K/UL (ref 0–0.04)
IMM GRANULOCYTES # BLD AUTO: 0.1 K/UL (ref 0–0.04)
IMM GRANULOCYTES NFR BLD AUTO: 0 % (ref 0–0.5)
IMM GRANULOCYTES NFR BLD AUTO: 0 % (ref 0–0.5)
KETONES UR QL STRIP.AUTO: NEGATIVE MG/DL
LACTATE SERPL-SCNC: 0.8 MMOL/L (ref 0.5–2)
LACTATE SERPL-SCNC: 1.1 MMOL/L (ref 0.4–2)
LEUKOCYTE ESTERASE UR QL STRIP.AUTO: NEGATIVE
LYMPHOCYTES # BLD: 0.6 K/UL (ref 0.9–3.6)
LYMPHOCYTES # BLD: 0.7 K/UL (ref 0.8–3.5)
LYMPHOCYTES NFR BLD: 4 % (ref 12–49)
LYMPHOCYTES NFR BLD: 4 % (ref 21–52)
MCH RBC QN AUTO: 26.1 PG (ref 26–34)
MCH RBC QN AUTO: 26.2 PG (ref 24–34)
MCHC RBC AUTO-ENTMCNC: 31.1 G/DL (ref 31–37)
MCHC RBC AUTO-ENTMCNC: 31.2 G/DL (ref 30–36.5)
MCV RBC AUTO: 83.6 FL (ref 80–99)
MCV RBC AUTO: 84.1 FL (ref 78–100)
METHADONE UR QL: NEGATIVE
METHGB MFR BLD: 0.3 % (ref 0–3)
MONOCYTES # BLD: 0.5 K/UL (ref 0.05–1.2)
MONOCYTES # BLD: 0.6 K/UL (ref 0–1)
MONOCYTES NFR BLD: 3 % (ref 3–10)
MONOCYTES NFR BLD: 4 % (ref 5–13)
NEUTS SEG # BLD: 13.4 K/UL (ref 1.8–8)
NEUTS SEG # BLD: 14.3 K/UL (ref 1.8–8)
NEUTS SEG NFR BLD: 92 % (ref 32–75)
NEUTS SEG NFR BLD: 93 % (ref 40–73)
NITRITE UR QL STRIP.AUTO: NEGATIVE
NRBC # BLD: 0 K/UL (ref 0–0.01)
NRBC # BLD: 0 K/UL (ref 0–0.01)
NRBC BLD-RTO: 0 PER 100 WBC
NRBC BLD-RTO: 0 PER 100 WBC
OPIATES UR QL: NEGATIVE
OXYCODONE UR QL SCN: NEGATIVE
OXYHGB MFR BLD: 95.5 % (ref 90–100)
P-R INTERVAL, ECG05: 157 MS
PCO2 BLDA: 49 MMHG (ref 35–45)
PCP UR QL: NEGATIVE
PERFORMED BY, TECHID: ABNORMAL
PERFORMED BY, TECHID: NORMAL
PH BLDA: 7.41 [PH] (ref 7.37–7.43)
PH UR STRIP: 6.5 [PH] (ref 5–8)
PLATELET # BLD AUTO: 485 K/UL (ref 150–400)
PLATELET # BLD AUTO: 530 K/UL (ref 135–420)
PMV BLD AUTO: 9.1 FL (ref 9.2–11.8)
PMV BLD AUTO: 9.4 FL (ref 8.9–12.9)
PO2 BLDA: 83 MMHG (ref 84–98)
POTASSIUM SERPL-SCNC: 5 MMOL/L (ref 3.2–5.1)
POTASSIUM SERPL-SCNC: 5.1 MMOL/L (ref 3.5–5.1)
PROPOXYPH UR QL: NEGATIVE
PROT SERPL-MCNC: 6.7 G/DL (ref 6.4–8.2)
PROT SERPL-MCNC: 7.8 G/DL (ref 6.1–8.4)
PROT UR STRIP-MCNC: >300 MG/DL
Q-T INTERVAL, ECG07: 390 MS
QRS DURATION, ECG06: 126 MS
QTC CALCULATION (BEZET), ECG08: 499 MS
RBC # BLD AUTO: 4.33 M/UL (ref 3.8–5.2)
RBC # BLD AUTO: 4.78 M/UL (ref 4.2–5.3)
RBC #/AREA URNS HPF: ABNORMAL /HPF (ref 0–2)
SAO2 % BLD: 96 % (ref 94–100)
SAO2% DEVICE SAO2% SENSOR NAME: ABNORMAL
SODIUM SERPL-SCNC: 140 MMOL/L (ref 135–145)
SODIUM SERPL-SCNC: 142 MMOL/L (ref 136–145)
SP GR UR REFRACTOMETRY: 1.02 (ref 1–1.03)
SPECIMEN SITE: ABNORMAL
SPECIMEN SOURCE: NORMAL
TRICYCLICS UR QL: NEGATIVE
TROPONIN I SERPL-MCNC: 0.02 NG/ML (ref 0.02–0.05)
UROBILINOGEN UR QL STRIP.AUTO: 0.2 EU/DL (ref 0.2–1)
VENTRICULAR RATE, ECG03: 98 BPM
WBC # BLD AUTO: 14.8 K/UL (ref 3.6–11)
WBC # BLD AUTO: 15.6 K/UL (ref 4.6–13.2)
WBC CASTS URNS QL MICRO: ABNORMAL /LPF
WBC URNS QL MICRO: ABNORMAL /HPF (ref 0–4)

## 2021-12-05 PROCEDURE — 84484 ASSAY OF TROPONIN QUANT: CPT

## 2021-12-05 PROCEDURE — 74011000250 HC RX REV CODE- 250: Performed by: EMERGENCY MEDICINE

## 2021-12-05 PROCEDURE — 74011000258 HC RX REV CODE- 258: Performed by: FAMILY MEDICINE

## 2021-12-05 PROCEDURE — 74011000250 HC RX REV CODE- 250: Performed by: FAMILY MEDICINE

## 2021-12-05 PROCEDURE — 82962 GLUCOSE BLOOD TEST: CPT

## 2021-12-05 PROCEDURE — 36415 COLL VENOUS BLD VENIPUNCTURE: CPT

## 2021-12-05 PROCEDURE — 36600 WITHDRAWAL OF ARTERIAL BLOOD: CPT

## 2021-12-05 PROCEDURE — 82306 VITAMIN D 25 HYDROXY: CPT

## 2021-12-05 PROCEDURE — 96374 THER/PROPH/DIAG INJ IV PUSH: CPT

## 2021-12-05 PROCEDURE — 85025 COMPLETE CBC W/AUTO DIFF WBC: CPT

## 2021-12-05 PROCEDURE — 96376 TX/PRO/DX INJ SAME DRUG ADON: CPT

## 2021-12-05 PROCEDURE — 96375 TX/PRO/DX INJ NEW DRUG ADDON: CPT

## 2021-12-05 PROCEDURE — 74011250636 HC RX REV CODE- 250/636: Performed by: EMERGENCY MEDICINE

## 2021-12-05 PROCEDURE — 83605 ASSAY OF LACTIC ACID: CPT

## 2021-12-05 PROCEDURE — 74011250637 HC RX REV CODE- 250/637: Performed by: HOSPITALIST

## 2021-12-05 PROCEDURE — 84443 ASSAY THYROID STIM HORMONE: CPT

## 2021-12-05 PROCEDURE — 65270000029 HC RM PRIVATE

## 2021-12-05 PROCEDURE — 96365 THER/PROPH/DIAG IV INF INIT: CPT

## 2021-12-05 PROCEDURE — 87635 SARS-COV-2 COVID-19 AMP PRB: CPT

## 2021-12-05 PROCEDURE — 82140 ASSAY OF AMMONIA: CPT

## 2021-12-05 PROCEDURE — 80069 RENAL FUNCTION PANEL: CPT

## 2021-12-05 PROCEDURE — 70450 CT HEAD/BRAIN W/O DYE: CPT

## 2021-12-05 PROCEDURE — 80053 COMPREHEN METABOLIC PANEL: CPT

## 2021-12-05 PROCEDURE — 87040 BLOOD CULTURE FOR BACTERIA: CPT

## 2021-12-05 PROCEDURE — 83036 HEMOGLOBIN GLYCOSYLATED A1C: CPT

## 2021-12-05 PROCEDURE — 80307 DRUG TEST PRSMV CHEM ANLYZR: CPT

## 2021-12-05 PROCEDURE — 99285 EMERGENCY DEPT VISIT HI MDM: CPT

## 2021-12-05 PROCEDURE — 74011250636 HC RX REV CODE- 250/636: Performed by: FAMILY MEDICINE

## 2021-12-05 PROCEDURE — 81001 URINALYSIS AUTO W/SCOPE: CPT

## 2021-12-05 PROCEDURE — 82803 BLOOD GASES ANY COMBINATION: CPT

## 2021-12-05 PROCEDURE — 71045 X-RAY EXAM CHEST 1 VIEW: CPT

## 2021-12-05 PROCEDURE — 93005 ELECTROCARDIOGRAM TRACING: CPT

## 2021-12-05 PROCEDURE — 74011000250 HC RX REV CODE- 250: Performed by: HOSPITALIST

## 2021-12-05 RX ORDER — DEXTROSE 50 % IN WATER (D50W) INTRAVENOUS SYRINGE
25 ONCE
Status: COMPLETED | OUTPATIENT
Start: 2021-12-05 | End: 2021-12-05

## 2021-12-05 RX ORDER — ALBUTEROL SULFATE 90 UG/1
2 AEROSOL, METERED RESPIRATORY (INHALATION)
Status: DISCONTINUED | OUTPATIENT
Start: 2021-12-05 | End: 2021-12-22 | Stop reason: HOSPADM

## 2021-12-05 RX ORDER — MAGNESIUM SULFATE 100 %
4 CRYSTALS MISCELLANEOUS AS NEEDED
Status: DISCONTINUED | OUTPATIENT
Start: 2021-12-05 | End: 2021-12-22 | Stop reason: HOSPADM

## 2021-12-05 RX ORDER — METOPROLOL TARTRATE 5 MG/5ML
5 INJECTION INTRAVENOUS EVERY 4 HOURS
Status: DISCONTINUED | OUTPATIENT
Start: 2021-12-05 | End: 2021-12-18

## 2021-12-05 RX ORDER — DEXTROSE MONOHYDRATE AND SODIUM CHLORIDE 5; .45 G/100ML; G/100ML
100 INJECTION, SOLUTION INTRAVENOUS CONTINUOUS
Status: DISPENSED | OUTPATIENT
Start: 2021-12-05 | End: 2021-12-06

## 2021-12-05 RX ORDER — LANOLIN ALCOHOL/MO/W.PET/CERES
1 CREAM (GRAM) TOPICAL 3 TIMES DAILY
Status: DISCONTINUED | OUTPATIENT
Start: 2021-12-05 | End: 2021-12-22 | Stop reason: HOSPADM

## 2021-12-05 RX ORDER — DEXTROSE 50 % IN WATER (D50W) INTRAVENOUS SYRINGE
25-50 AS NEEDED
Status: DISCONTINUED | OUTPATIENT
Start: 2021-12-05 | End: 2021-12-22 | Stop reason: HOSPADM

## 2021-12-05 RX ORDER — FAMOTIDINE 20 MG/1
20 TABLET, FILM COATED ORAL 2 TIMES DAILY
Status: DISCONTINUED | OUTPATIENT
Start: 2021-12-05 | End: 2021-12-05

## 2021-12-05 RX ORDER — LABETALOL HCL 20 MG/4 ML
10 SYRINGE (ML) INTRAVENOUS ONCE
Status: COMPLETED | OUTPATIENT
Start: 2021-12-05 | End: 2021-12-05

## 2021-12-05 RX ORDER — SODIUM CHLORIDE 0.9 % (FLUSH) 0.9 %
5-40 SYRINGE (ML) INJECTION EVERY 8 HOURS
Status: DISCONTINUED | OUTPATIENT
Start: 2021-12-05 | End: 2021-12-22 | Stop reason: HOSPADM

## 2021-12-05 RX ORDER — DEXTROSE 50 % IN WATER (D50W) INTRAVENOUS SYRINGE
25
Status: COMPLETED | OUTPATIENT
Start: 2021-12-05 | End: 2021-12-05

## 2021-12-05 RX ORDER — ACETAMINOPHEN 325 MG/1
650 TABLET ORAL
Status: DISCONTINUED | OUTPATIENT
Start: 2021-12-05 | End: 2021-12-22 | Stop reason: HOSPADM

## 2021-12-05 RX ORDER — SODIUM CHLORIDE 0.9 % (FLUSH) 0.9 %
5-40 SYRINGE (ML) INJECTION AS NEEDED
Status: DISCONTINUED | OUTPATIENT
Start: 2021-12-05 | End: 2021-12-22 | Stop reason: HOSPADM

## 2021-12-05 RX ORDER — VENLAFAXINE HYDROCHLORIDE 150 MG/1
150 CAPSULE, EXTENDED RELEASE ORAL DAILY
COMMUNITY
Start: 2021-09-07 | End: 2021-12-05 | Stop reason: SDUPTHER

## 2021-12-05 RX ORDER — CEFTRIAXONE 2 G/1
INJECTION, POWDER, FOR SOLUTION INTRAMUSCULAR; INTRAVENOUS
Status: DISCONTINUED
Start: 2021-12-05 | End: 2021-12-05 | Stop reason: HOSPADM

## 2021-12-05 RX ORDER — METOPROLOL SUCCINATE 25 MG/1
50 TABLET, EXTENDED RELEASE ORAL DAILY
Status: DISCONTINUED | OUTPATIENT
Start: 2021-12-06 | End: 2021-12-18

## 2021-12-05 RX ORDER — SODIUM CHLORIDE 900 MG/100ML
INJECTION INTRAVENOUS
Status: DISCONTINUED
Start: 2021-12-05 | End: 2021-12-05 | Stop reason: HOSPADM

## 2021-12-05 RX ORDER — TAMSULOSIN HYDROCHLORIDE 0.4 MG/1
0.4 CAPSULE ORAL DAILY
Status: DISCONTINUED | OUTPATIENT
Start: 2021-12-06 | End: 2021-12-22 | Stop reason: HOSPADM

## 2021-12-05 RX ORDER — ATORVASTATIN CALCIUM 40 MG/1
40 TABLET, FILM COATED ORAL
Status: DISCONTINUED | OUTPATIENT
Start: 2021-12-05 | End: 2021-12-22 | Stop reason: HOSPADM

## 2021-12-05 RX ORDER — LANOLIN ALCOHOL/MO/W.PET/CERES
3 CREAM (GRAM) TOPICAL
Status: DISCONTINUED | OUTPATIENT
Start: 2021-12-05 | End: 2021-12-22 | Stop reason: HOSPADM

## 2021-12-05 RX ORDER — GABAPENTIN 300 MG/1
300 CAPSULE ORAL 2 TIMES DAILY
Status: DISCONTINUED | OUTPATIENT
Start: 2021-12-05 | End: 2021-12-22 | Stop reason: HOSPADM

## 2021-12-05 RX ORDER — ASPIRIN 81 MG/1
81 TABLET ORAL DAILY
Status: DISCONTINUED | OUTPATIENT
Start: 2021-12-06 | End: 2021-12-22 | Stop reason: HOSPADM

## 2021-12-05 RX ORDER — ONDANSETRON 2 MG/ML
4 INJECTION INTRAMUSCULAR; INTRAVENOUS
Status: DISCONTINUED | OUTPATIENT
Start: 2021-12-05 | End: 2021-12-22 | Stop reason: HOSPADM

## 2021-12-05 RX ORDER — ONDANSETRON 4 MG/1
4 TABLET, ORALLY DISINTEGRATING ORAL
Status: DISCONTINUED | OUTPATIENT
Start: 2021-12-05 | End: 2021-12-22 | Stop reason: HOSPADM

## 2021-12-05 RX ORDER — VENLAFAXINE HYDROCHLORIDE 75 MG/1
150 CAPSULE, EXTENDED RELEASE ORAL
Status: DISCONTINUED | OUTPATIENT
Start: 2021-12-06 | End: 2021-12-22 | Stop reason: HOSPADM

## 2021-12-05 RX ORDER — ACETAMINOPHEN 650 MG/1
650 SUPPOSITORY RECTAL
Status: DISCONTINUED | OUTPATIENT
Start: 2021-12-05 | End: 2021-12-22 | Stop reason: HOSPADM

## 2021-12-05 RX ORDER — FAMOTIDINE 20 MG/1
20 TABLET, FILM COATED ORAL DAILY
Status: DISCONTINUED | OUTPATIENT
Start: 2021-12-05 | End: 2021-12-22 | Stop reason: HOSPADM

## 2021-12-05 RX ORDER — AMLODIPINE BESYLATE 5 MG/1
10 TABLET ORAL DAILY
Status: DISCONTINUED | OUTPATIENT
Start: 2021-12-06 | End: 2021-12-22 | Stop reason: HOSPADM

## 2021-12-05 RX ORDER — METOPROLOL SUCCINATE 25 MG/1
25 TABLET, EXTENDED RELEASE ORAL DAILY
Status: DISCONTINUED | OUTPATIENT
Start: 2021-12-06 | End: 2021-12-05

## 2021-12-05 RX ADMIN — AZITHROMYCIN 500 MG: 500 INJECTION, POWDER, LYOPHILIZED, FOR SOLUTION INTRAVENOUS at 19:10

## 2021-12-05 RX ADMIN — DEXTROSE MONOHYDRATE 25 G: 25 INJECTION, SOLUTION INTRAVENOUS at 19:08

## 2021-12-05 RX ADMIN — NITROGLYCERIN 0.5 INCH: 20 OINTMENT TOPICAL at 22:56

## 2021-12-05 RX ADMIN — METOPROLOL TARTRATE 5 MG: 5 INJECTION INTRAVENOUS at 21:56

## 2021-12-05 RX ADMIN — DEXTROSE AND SODIUM CHLORIDE 100 ML/HR: 5; 450 INJECTION, SOLUTION INTRAVENOUS at 22:57

## 2021-12-05 RX ADMIN — LABETALOL HYDROCHLORIDE 10 MG: 5 INJECTION, SOLUTION INTRAVENOUS at 17:33

## 2021-12-05 RX ADMIN — CEFTRIAXONE SODIUM 2 G: 2 INJECTION, POWDER, FOR SOLUTION INTRAMUSCULAR; INTRAVENOUS at 14:41

## 2021-12-05 RX ADMIN — DEXTROSE MONOHYDRATE 100 ML/HR: 100 INJECTION, SOLUTION INTRAVENOUS at 17:05

## 2021-12-05 RX ADMIN — DEXTROSE MONOHYDRATE 25 G: 500 INJECTION PARENTERAL at 13:16

## 2021-12-05 RX ADMIN — SODIUM CHLORIDE 500 ML: 9 INJECTION, SOLUTION INTRAVENOUS at 13:15

## 2021-12-05 RX ADMIN — Medication 10 ML: at 23:58

## 2021-12-05 RX ADMIN — DEXTROSE MONOHYDRATE 25 G: 25 INJECTION, SOLUTION INTRAVENOUS at 21:42

## 2021-12-05 RX ADMIN — SODIUM CHLORIDE 1000 ML: 9 INJECTION, SOLUTION INTRAVENOUS at 14:40

## 2021-12-05 RX ADMIN — DEXTROSE MONOHYDRATE 25 G: 500 INJECTION PARENTERAL at 17:02

## 2021-12-05 NOTE — ED TRIAGE NOTES
Seen in ED Friday night for same thing, low blood sugar, sent home. Per EMS fsbs 56. Unresponsive, adm D10, disoriented. Wears oxygen at home as needed. Per family patient has been like this since last night. Patient holding left side of head and moaning upon arrival    Patient is nonverbal at this time, responds to sternal rub, incontinent of urine per EMS that is normal for her. Unable to answer questions for assessment at this time.

## 2021-12-05 NOTE — ED TRIAGE NOTES
Patient is a transfer from [facility] sent over for altered mental status, admit. Patient was diagnosed with pneumonia and has had multiple episodes of hypoglycemic. Glucose dropped to 20s upon arrival at [email or web address]. Currently has d 10 and 100 going.

## 2021-12-05 NOTE — Clinical Note
Status[de-identified] INPATIENT [101]   Type of Bed: Medical [8]   Cardiac Monitoring Required?: No   Inpatient Hospitalization Certified Necessary for the Following Reasons: 3. Patient receiving treatment that can only be provided in an inpatient setting (further clarification in H&P documentation)   Admitting Diagnosis: Metabolic encephalopathy [994.32. ICD-9-CM]   Admitting Diagnosis: Hypoglycemia [754199]   Admitting Physician: Theodora Treadwell [[id number]]   Attending Physician: Theodora Treadwell [[id number]]   Estimated Length of Stay: 2 Midnights   Discharge Plan[de-identified] [facility]

## 2021-12-05 NOTE — ED PROVIDER NOTES
EMERGENCY DEPARTMENT HISTORY AND PHYSICAL EXAM      Date: 12/5/2021  Patient Name: Onel Martinez    History of Presenting Illness     Chief Complaint   Patient presents with    Low Blood Sugar    Altered mental status       History Provided By: EMS    HPI: Onel Martinez, 61 y.o. female with a past medical history significant Multiple medical problems presents to the ED with cc of altered mental status. Patient was last known well last night. Her family was unable to arouse her this morning. EMS was called. They noted that her glucose was 51. They gave D10 but she had no improvement in her symptoms. She is not answering questions. She is groaning. There is no fall. There are no other complaints, changes, or physical findings at this time. PCP: Nydia Elias MD    No current facility-administered medications on file prior to encounter. Current Outpatient Medications on File Prior to Encounter   Medication Sig Dispense Refill    metoprolol succinate (TOPROL-XL) 25 mg XL tablet Take 1 Tablet by mouth daily. 30 Tablet 0    aspirin delayed-release 81 mg tablet Take 1 Tablet by mouth daily. 30 Tablet 0    linezolid (Zyvox) 600 mg tablet Take 1 Tablet by mouth two (2) times a day. 14 Tablet 0    pantoprazole (PROTONIX) 40 mg tablet Take 1 Tablet by mouth Daily (before breakfast). 30 Tablet 0    FeroSuL 325 mg (65 mg iron) tablet take 1 tablet by mouth three times a day 90 Tablet 1    Venlafaxine-ER 24 HR (EFFEXOR-ER) 150 mg tr24 tablet Take 150 mg by mouth daily.  cyclobenzaprine (FLEXERIL) 10 mg tablet Take 1 Tablet by mouth three (3) times daily as needed for Muscle Spasm(s). 15 Tablet 0    Vitamin D2 1,250 mcg (50,000 unit) capsule take 1 capsule by mouth every week 16 Cap 0    OneTouch Ultra Blue Test Strip strip       melatonin 3 mg tablet Take 3 mg by mouth nightly.       famotidine (PEPCID) 20 mg tablet take 1 tablet by mouth twice a day      OneTouch UltraSoft Lancets misc       gabapentin (NEURONTIN) 300 mg capsule Take 300 mg by mouth two (2) times a day.  polyethylene glycol (ClearLax) 17 gram/dose powder Take 17 g by mouth daily.  tamsulosin (FLOMAX) 0.4 mg capsule take 1 capsule by mouth once daily 30 Cap 3    albuterol (PROVENTIL HFA, VENTOLIN HFA, PROAIR HFA) 90 mcg/actuation inhaler 2 puffs three to four times a day for 5 days then every 6 hours as needed for shortness of breath 1 Inhaler 0    LORazepam (ATIVAN) 0.5 mg tablet Take 0.5 mg by mouth as needed for Anxiety.  atorvastatin (LIPITOR) 40 mg tablet Take  by mouth nightly.  amLODIPine (NORVASC) 10 mg tablet Take 10 mg by mouth daily. 0    NOVOFINE 26 35 gauge x 1/4\" ndle   0       Past History     Past Medical History:  Past Medical History:   Diagnosis Date    Arthritis     spinal stenosis    Breast CA ([facility] [address] 2017    Left Breast (chemo/radiation)    Diabetes (HCC)     Type 2    Edema     legs and ankles    GERD (gastroesophageal reflux disease)     High cholesterol     Hypertension     Ill-defined condition     patient states hemorrhage behind Left eye-following Dr. Florencio Pressley MI (myocardial infarction) ([facility] [address] 06/2018    per patient    Neuropathy     Restless leg        Past Surgical History:  Past Surgical History:   Procedure Laterality Date    HX BACK SURGERY  08/30/2018    HX BREAST BIOPSY Left 02/03/2017    BREAST CANCER    HX BREAST LUMPECTOMY Left     HX CATARACT REMOVAL Right     HX HEART CATHETERIZATION  07/2018    no stents    HX KNEE REPLACEMENT         Family History:  Family History   Problem Relation Age of Onset    Hypertension Mother     Heart Failure Father     Heart Disease Sister        Social History:  Social History     Tobacco Use    Smoking status: Never Smoker    Smokeless tobacco: Never Used   Vaping Use    Vaping Use: Never used   Substance Use Topics    Alcohol use: No    Drug use: No       Allergies:   Allergies   Allergen Reactions    Bactrim [Sulfamethoprim] Rash    Ciprofloxacin Hives    Keflex [Cephalexin] Nausea and Vomiting    Metformin Other (comments)     GI DISTRESS    Soliqua 100/33 [Insulin Glargine-Lixisenatide] Nausea Only         Review of Systems     Review of Systems   Unable to perform ROS: Mental status change       Physical Exam     Physical Exam  Vitals and nursing note reviewed. Constitutional:       General: She is awake. She is in acute distress. Appearance: Normal appearance. She is well-developed. She is obese. She is not ill-appearing, toxic-appearing or diaphoretic. Interventions: Face mask in place. HENT:      Head: Normocephalic and atraumatic. Eyes:      Conjunctiva/sclera: Conjunctivae normal.      Pupils: Pupils are equal, round, and reactive to light. Cardiovascular:      Rate and Rhythm: Normal rate and regular rhythm. Pulses: Normal pulses. Heart sounds: Normal heart sounds. Pulmonary:      Effort: Pulmonary effort is normal.      Breath sounds: Normal breath sounds. Abdominal:      General: Abdomen is flat. Palpations: Abdomen is soft. Tenderness: There is no abdominal tenderness. Musculoskeletal:         General: No swelling or tenderness. Normal range of motion. Cervical back: Normal range of motion and neck supple. Skin:     General: Skin is warm and dry. Neurological:      Mental Status: She is lethargic. Comments: Moves her extremities spontaneously. She is moaning. She is not answering questions. Psychiatric:         Mood and Affect: Affect normal.         Behavior: Behavior is cooperative. Thought Content:  Thought content normal.      Comments: Unable to assess due to her mental status change         Lab and Diagnostic Study Results     Labs -     Recent Results (from the past 12 hour(s))   CBC WITH AUTOMATED DIFF    Collection Time: 12/05/21 12:30 PM   Result Value Ref Range    WBC 15.6 (H) 4.6 - 13.2 K/uL    RBC 4.78 4.20 - 5.30 M/uL    HGB 12.5 12.0 - 16.0 g/dL    HCT 40.2 35.0 - 45.0 %    MCV 84.1 78.0 - 100.0 FL    MCH 26.2 24.0 - 34.0 PG    MCHC 31.1 31.0 - 37.0 g/dL    RDW 16.2 (H) 11.6 - 14.5 %    PLATELET 963 (H) 773 - 420 K/uL    MPV 9.1 (L) 9.2 - 11.8 FL    NRBC 0.0 0.0  WBC    ABSOLUTE NRBC 0.00 0.00 - 0.01 K/uL    NEUTROPHILS 93 (H) 40 - 73 %    LYMPHOCYTES 4 (L) 21 - 52 %    MONOCYTES 3 3 - 10 %    EOSINOPHILS 0 0 - 5 %    BASOPHILS 0 0 - 2 %    IMMATURE GRANULOCYTES 0 0 - 0.5 %    ABS. NEUTROPHILS 14.3 (H) 1.8 - 8.0 K/UL    ABS. LYMPHOCYTES 0.6 (L) 0.9 - 3.6 K/UL    ABS. MONOCYTES 0.5 0.05 - 1.2 K/UL    ABS. EOSINOPHILS 0.0 0.0 - 0.4 K/UL    ABS. BASOPHILS 0.1 0.0 - 0.1 K/UL    ABS. IMM. GRANS. 0.1 (H) 0.00 - 0.04 K/UL    DF AUTOMATED     METABOLIC PANEL, COMPREHENSIVE    Collection Time: 12/05/21 12:30 PM   Result Value Ref Range    Sodium 140 135 - 145 mmol/L    Potassium 5.0 3.2 - 5.1 mmol/L    Chloride 104 94 - 111 mmol/L    CO2 28 21 - 33 mmol/L    Anion gap 8 mmol/L    Glucose 73 70 - 110 mg/dL    BUN 17 9 - 21 mg/dL    Creatinine 1.50 (H) 0.70 - 1.20 mg/dL    BUN/Creatinine ratio 11      GFR est AA 42 ml/min/1.73m2    GFR est non-AA 35 ml/min/1.73m2    Calcium 9.6 8.5 - 10.5 mg/dL    Bilirubin, total 0.4 0.2 - 1.0 mg/dL    AST (SGOT) 42 14 - 74 U/L    ALT (SGPT) 20 3 - 52 U/L    Alk.  phosphatase 75 38 - 126 U/L    Protein, total 7.8 6.1 - 8.4 g/dL    Albumin 3.4 (L) 3.5 - 4.7 g/dL    Globulin 4.4 g/dL    A-G Ratio 0.8     TROPONIN I    Collection Time: 12/05/21 12:30 PM   Result Value Ref Range    Troponin-I, Qt. 0.02 0.02 - 0.05 ng/mL   GLUCOSE, POC    Collection Time: 12/05/21 12:36 PM   Result Value Ref Range    Glucose (POC) 68 (L) 70 - 110 mg/dL    Performed by Leatha Bourgeois    EKG, 12 LEAD, INITIAL    Collection Time: 12/05/21 12:40 PM   Result Value Ref Range    Ventricular Rate 98 BPM    Atrial Rate 96 BPM    P-R Interval 157 ms    QRS Duration 126 ms    Q-T Interval 390 ms    QTC Calculation (Bezet) 499 ms    Calculated P Axis 64 degrees    Calculated R Axis -44 degrees    Calculated T Axis 101 degrees    Diagnosis       Sinus rhythm  Probable left atrial enlargement  Left bundle branch block  Artifact in lead(s) I,II,III,aVF,V1,V2,V3,V4,V5,V6 and baseline wander in   lead(s) V1     BLOOD GAS, ARTERIAL    Collection Time: 12/05/21 12:45 PM   Result Value Ref Range    pH 7.41 7.37 - 7.43      PCO2 49 (H) 35.0 - 45.0 mmHg    PO2 83 (L) 84 - 98 mmHg    O2 SAT 96 94 - 100 %    BICARBONATE 30 (H) 23.0 - 27.0 mmol/L    BASE EXCESS 4.4 (H) 0.0 - 2.5 mmol/L    O2 METHOD Room air      FIO2 21.0 %    Sample source Arterial      SITE Left Radial      ANDREA'S TEST PASS      Carboxy-Hgb 0.4 0 - 3 %    Methemoglobin 0.3 0 - 3 %    tHb 12.5 12.0 - 16.0 g/dL    Oxyhemoglobin 95.5 90 - 100 %   URINALYSIS W/ RFLX MICROSCOPIC    Collection Time: 12/05/21 12:45 PM   Result Value Ref Range    Color Yellow      Appearance Clear      Specific gravity 1.020 1.005 - 1.030      pH (UA) 6.5 5.0 - 8.0      Protein >300 (A) Negative mg/dL    Glucose Negative Negative mg/dL    Ketone Negative Negative mg/dL    Bilirubin Negative Negative      Blood Moderate (A) Negative      Urobilinogen 0.2 0.2 - 1.0 EU/dL    Nitrites Negative Negative      Leukocyte Esterase Negative Negative     URINE MICROSCOPIC    Collection Time: 12/05/21 12:45 PM   Result Value Ref Range    WBC 0-4 0 - 4 /hpf    RBC 0-5 0 - 2 /hpf    Epithelial cells Few 0 - 20 /lpf    Bacteria 1+ (A) None /hpf    Amorphous Crystals 1+     Hyaline cast 0-2 /lpf    Granular cast 0-2 /lpf    WBC cast 0-2 /lpf   GLUCOSE, POC    Collection Time: 12/05/21  1:55 PM   Result Value Ref Range    Glucose (POC) 88 70 - 110 mg/dL    Performed by Kristen Hill    LACTIC ACID    Collection Time: 12/05/21  2:16 PM   Result Value Ref Range    Lactic acid 0.8 0.5 - 2.0 mmol/L   COVID-19 RAPID TEST    Collection Time: 12/05/21  2:18 PM   Result Value Ref Range    Specimen source Nasopharyngeal COVID-19 rapid test Not Detected Not Detected         Radiologic Studies -   @[email or web address]@  CT Results  (Last 48 hours)               12/05/21 1245  CT HEAD WO CONT Final result    Impression:      1. No acute intracranial abnormalities. 2. Atrophy and likely chronic small vessel ischemic changes. Narrative:  EXAM: CT head       CLINICAL INDICATION/HISTORY: Altered mental status       COMPARISON: 12/3/2021       TECHNIQUE: Axial CT imaging of the head was performed without intravenous   contrast. One or more dose reduction techniques were used on this CT: automated   exposure control, adjustment of the mAs and/or kVp according to patient size,   and iterative reconstruction techniques. The specific techniques used on this   CT exam have been documented in the patient's electronic medical record. Digital   Imaging and Communications in Medicine (DICOM) format image data are available   to nonaffiliated external healthcare facilities or entities on a secured, media   free, reciprocally searchable basis with patient authorization for at least a   12-month period after this study. _______________       FINDINGS:       BRAIN AND EXTRA-AXIAL SPACES: Diffuse atrophy identified. There is no   intracranial hemorrhage, mass effect, or midline shift. Periventricular and   subcortical white matter hypodensity identified. There are no abnormal   extra-axial fluid collections. CALVARIUM: Intact. SINUSES: Mucosal thickening involving the left maxillary sinus. OTHER: None.       _______________           12/03/21 2007  CT HEAD WO CONT Final result    Impression:  1. Nonspecific periventricular deep white matter hypodensities could represent   areas of ischemic demyelination appear about the same. No mass effect. No   intracranial hemorrhage. 2. Left maxillary sinusitis appears about the same. Narrative:  History: Demetrio Fortune one week ago.        COMPARISON: 9/22/2021 head CT       Axial CT performed through the head with multiplanar reconstruction without IV   contrast.       Digital Imaging and Communications in Medicine (DICOM) format image data are   available to nonaffiliated external healthcare facilities or entities on a   secured, media free, reciprocally searchable basis with patient authorization   for at least a 12-month period after this study. There are patchy areas of decreased density in the periventricular deep white   matter bilaterally similar to prior. No midline shift, mass effect,   hydrocephalus or intracranial hemorrhage. Posterior fossa appears unremarkable. No skull fracture. Mucosal disease in the left maxillary sinus remains. Other   paranasal sinuses appear clear. 12/03/21 2007  CT SPINE CERV WO CONT Final result    Impression:  1. Nonspecific cervical spine straightening. 2. There may be some very subtle subluxation anteriorly of C3 on C4 and C4 on   C5.   3. No acute fracture. 4. Mild degenerative changes. Narrative:  History: Atkinson Schimke down a week ago. COMPARISON: None       Axial CT performed through the cervical spine with multiplanar reconstruction   without IV contrast material.       Digital Imaging and Communications in Medicine (DICOM) format image data are   available to nonaffiliated external healthcare facilities or entities on a   secured, media free, reciprocally searchable basis with patient authorization   for at least a 12-month period after this study. Skeleton: There is loss of normal lordosis which is nonspecific but can be seen   in ligamentous or muscular injury. There is subtle anterior subluxation of less   than 2 mm of C3 on C4 and C4 on C5. There is degenerative disc space narrowing   at C6-7. Again is seen in the left maxillary mucosal disease, similar to the   head CT of earlier today. No acute fracture.  Posterior osteophytic ridging is   seen at C5 and lesser degree at C6 without significant spinal stenosis. No   significant foraminal stenosis. Soft tissues: Unremarkable       Lung apices: Unremarkable               CXR Results  (Last 48 hours)               12/05/21 1302  XR CHEST PORT Final result    Impression:      Questionable left basilar retrocardiac atelectasis and/or infiltrate. No other   focal consolidation. Narrative:  EXAM: CHEST RADIOGRAPH       CLINICAL INDICATION/HISTORY: ams     > Additional: Altered mental status       COMPARISON: 12/3/2021       TECHNIQUE: Portable frontal view of the chest       _______________       FINDINGS:       SUPPORT DEVICES: None. HEART AND MEDIASTINUM: Heart size is normal.       LUNGS AND PLEURAL SPACES: Questionable left basilar retrocardiac atelectasis   and/or infiltrate. No other focal consolidation, effusion, or pneumothorax. BONES AND SOFT TISSUES: Unremarkable.       _______________           12/03/21 2038  XR CHEST PORT Final result    Impression:  1. No acute pulmonary disease. Narrative:  History: Suzie Isa down last week. COMPARISON: 9/24/2021 PA lateral chest.       AP chest x-ray performed. Heart size appears normal. No focal infiltrate,   pneumothorax or effusion. Pulmonary vascularity normal. No skeletal   abnormalities. EKG -warm 12:40 PM.  Read 12:45 PM.  Rate 98. Normal sinus rhythm. Left bundle branch block. Artifact noted. Normal QTC. Medical Decision Making   - I am the first provider for this patient. - I reviewed the vital signs, available nursing notes, past medical history, past surgical history, family history and social history. - Initial assessment performed. The patients presenting problems have been discussed, and they are in agreement with the care plan formulated and outlined with them. I have encouraged them to ask questions as they arise throughout their visit. Vital Signs-Reviewed the patient's vital signs.   Patient Vitals for the past 12 hrs:   Temp Pulse Resp BP SpO2 12/05/21 1232 98.2 °F (36.8 °C) 97 22 (!) 166/90 91 %       Records Reviewed: Nursing Notes and Old Medical Records    The patient presents with altered mental status with a differential diagnosis of  cerebral hemorrhage, CVA, encephalopathy, hypernatremia, hyponatremia, hypoxia and UTI      ED Course:          Provider Notes (Medical Decision Making):     Patient presented via EMS secondary to altered mental status. CT of the head was negative. Vitals were stable except for some mild hypoxia. She is on oxygen at home. White count was 15. Lactic was negative. Chest x-ray showed left lower lobe infiltrate. Urine was clear. ABG was fairly stable. Rapid Covid was negative. We do not have any telemetry or ICU beds and so the patient is going to be transferred to [facility].  I talked to the ER physician there Rasta Self. He is excepted the patient in transfer. 3:17 PM  I have spent 30 minutes of critical care time involved in lab review, consultations with specialist, family decision-making, and documentation. During this entire length of time I was immediately available to the patient. Critical Care: The reason for providing this level of medical care for this critically ill patient was due a critical illness that impaired one or more vital organ systems such that there was a high probability of imminent or life threatening deterioration in the patients condition. This care involved high complexity decision making to assess, manipulate, and support vital system functions, to treat this degreee vital organ system failure and to prevent further life threatening deterioration of the patients condition. 1702 -patient's nurse checked her blood glucose and it was less than 30. Patient being given D50 and will be started on a D10 infusion.     MDM       Procedures   Medical Decision Makingedical Decision Making  Performed by: Tanika Beach MD  PROCEDURES:  Procedures Disposition   Disposition:     Transferred to Another Facility        Diagnosis     Clinical Impression:   1. Altered mental status, unspecified altered mental status type    2. Pneumonia of left lower lobe due to infectious organism        Attestations:    Curtis Alford MD    Please note that this dictation was completed with OpenRoute, the computer voice recognition software. Quite often unanticipated grammatical, syntax, homophones, and other interpretive errors are inadvertently transcribed by the computer software. Please disregard these errors. Please excuse any errors that have escaped final proofreading. Thank you.

## 2021-12-06 LAB
25(OH)D3 SERPL-MCNC: 16.1 NG/ML (ref 30–100)
ALBUMIN SERPL-MCNC: 2.4 G/DL (ref 3.5–5)
ALBUMIN SERPL-MCNC: 2.4 G/DL (ref 3.5–5)
ANION GAP SERPL CALC-SCNC: 2 MMOL/L (ref 5–15)
ANION GAP SERPL CALC-SCNC: 4 MMOL/L (ref 5–15)
ATRIAL RATE: 82 BPM
BUN SERPL-MCNC: 17 MG/DL (ref 6–20)
BUN SERPL-MCNC: 18 MG/DL (ref 6–20)
BUN/CREAT SERPL: 10 (ref 12–20)
BUN/CREAT SERPL: 9 (ref 12–20)
CA-I BLD-MCNC: 8.8 MG/DL (ref 8.5–10.1)
CA-I BLD-MCNC: 8.8 MG/DL (ref 8.5–10.1)
CALCULATED P AXIS, ECG09: 39 DEGREES
CALCULATED R AXIS, ECG10: -47 DEGREES
CALCULATED T AXIS, ECG11: 91 DEGREES
CHLORIDE SERPL-SCNC: 108 MMOL/L (ref 97–108)
CHLORIDE SERPL-SCNC: 112 MMOL/L (ref 97–108)
CO2 SERPL-SCNC: 28 MMOL/L (ref 21–32)
CO2 SERPL-SCNC: 28 MMOL/L (ref 21–32)
CREAT SERPL-MCNC: 1.8 MG/DL (ref 0.55–1.02)
CREAT SERPL-MCNC: 1.8 MG/DL (ref 0.55–1.02)
DIAGNOSIS, 93000: NORMAL
EST. AVERAGE GLUCOSE BLD GHB EST-MCNC: 160 MG/DL
GLUCOSE BLD STRIP.AUTO-MCNC: 102 MG/DL (ref 65–117)
GLUCOSE BLD STRIP.AUTO-MCNC: 109 MG/DL (ref 65–117)
GLUCOSE BLD STRIP.AUTO-MCNC: 109 MG/DL (ref 65–117)
GLUCOSE BLD STRIP.AUTO-MCNC: 117 MG/DL (ref 65–117)
GLUCOSE BLD STRIP.AUTO-MCNC: 119 MG/DL (ref 65–117)
GLUCOSE BLD STRIP.AUTO-MCNC: 43 MG/DL (ref 65–117)
GLUCOSE BLD STRIP.AUTO-MCNC: 52 MG/DL (ref 65–117)
GLUCOSE BLD STRIP.AUTO-MCNC: 55 MG/DL (ref 65–117)
GLUCOSE BLD STRIP.AUTO-MCNC: 62 MG/DL (ref 65–117)
GLUCOSE BLD STRIP.AUTO-MCNC: 63 MG/DL (ref 65–117)
GLUCOSE BLD STRIP.AUTO-MCNC: 64 MG/DL (ref 65–117)
GLUCOSE BLD STRIP.AUTO-MCNC: 69 MG/DL (ref 65–117)
GLUCOSE BLD STRIP.AUTO-MCNC: 71 MG/DL (ref 65–117)
GLUCOSE BLD STRIP.AUTO-MCNC: 72 MG/DL (ref 65–117)
GLUCOSE BLD STRIP.AUTO-MCNC: 73 MG/DL (ref 65–117)
GLUCOSE BLD STRIP.AUTO-MCNC: 75 MG/DL (ref 65–117)
GLUCOSE BLD STRIP.AUTO-MCNC: 75 MG/DL (ref 65–117)
GLUCOSE BLD STRIP.AUTO-MCNC: 78 MG/DL (ref 65–117)
GLUCOSE BLD STRIP.AUTO-MCNC: 80 MG/DL (ref 65–117)
GLUCOSE BLD STRIP.AUTO-MCNC: 87 MG/DL (ref 65–117)
GLUCOSE BLD STRIP.AUTO-MCNC: 94 MG/DL (ref 65–117)
GLUCOSE BLD STRIP.AUTO-MCNC: 94 MG/DL (ref 65–117)
GLUCOSE SERPL-MCNC: 166 MG/DL (ref 65–100)
GLUCOSE SERPL-MCNC: 63 MG/DL (ref 65–100)
HBA1C MFR BLD: 7.2 % (ref 4–5.6)
P-R INTERVAL, ECG05: 146 MS
PERFORMED BY, TECHID: ABNORMAL
PERFORMED BY, TECHID: NORMAL
PHOSPHATE SERPL-MCNC: 3.7 MG/DL (ref 2.6–4.7)
PHOSPHATE SERPL-MCNC: 3.8 MG/DL (ref 2.6–4.7)
POTASSIUM SERPL-SCNC: 4.6 MMOL/L (ref 3.5–5.1)
POTASSIUM SERPL-SCNC: 5 MMOL/L (ref 3.5–5.1)
Q-T INTERVAL, ECG07: 420 MS
QRS DURATION, ECG06: 122 MS
QTC CALCULATION (BEZET), ECG08: 490 MS
SODIUM SERPL-SCNC: 140 MMOL/L (ref 136–145)
SODIUM SERPL-SCNC: 142 MMOL/L (ref 136–145)
TSH SERPL DL<=0.05 MIU/L-ACNC: 2.7 UIU/ML (ref 0.36–3.74)
VENTRICULAR RATE, ECG03: 82 BPM

## 2021-12-06 PROCEDURE — 65270000029 HC RM PRIVATE

## 2021-12-06 PROCEDURE — 74011250637 HC RX REV CODE- 250/637: Performed by: HOSPITALIST

## 2021-12-06 PROCEDURE — 80069 RENAL FUNCTION PANEL: CPT

## 2021-12-06 PROCEDURE — 74011000250 HC RX REV CODE- 250: Performed by: HOSPITALIST

## 2021-12-06 PROCEDURE — 82962 GLUCOSE BLOOD TEST: CPT

## 2021-12-06 RX ORDER — DEXTROSE MONOHYDRATE AND SODIUM CHLORIDE 5; .45 G/100ML; G/100ML
100 INJECTION, SOLUTION INTRAVENOUS CONTINUOUS
Status: DISPENSED | OUTPATIENT
Start: 2021-12-06 | End: 2021-12-07

## 2021-12-06 RX ORDER — LABETALOL HCL 20 MG/4 ML
10 SYRINGE (ML) INTRAVENOUS
Status: DISCONTINUED | OUTPATIENT
Start: 2021-12-06 | End: 2021-12-22 | Stop reason: HOSPADM

## 2021-12-06 RX ADMIN — Medication 10 ML: at 05:17

## 2021-12-06 RX ADMIN — DEXTROSE AND SODIUM CHLORIDE 100 ML/HR: 5; 450 INJECTION, SOLUTION INTRAVENOUS at 18:41

## 2021-12-06 RX ADMIN — Medication 10 ML: at 16:14

## 2021-12-06 RX ADMIN — METOPROLOL TARTRATE 5 MG: 5 INJECTION INTRAVENOUS at 02:41

## 2021-12-06 RX ADMIN — DEXTROSE MONOHYDRATE 25 G: 25 INJECTION, SOLUTION INTRAVENOUS at 01:59

## 2021-12-06 RX ADMIN — DEXTROSE AND SODIUM CHLORIDE 100 ML/HR: 5; 450 INJECTION, SOLUTION INTRAVENOUS at 23:00

## 2021-12-06 RX ADMIN — METOPROLOL TARTRATE 5 MG: 5 INJECTION INTRAVENOUS at 07:39

## 2021-12-06 RX ADMIN — DEXTROSE MONOHYDRATE 25 G: 25 INJECTION, SOLUTION INTRAVENOUS at 08:22

## 2021-12-06 RX ADMIN — METOPROLOL TARTRATE 5 MG: 5 INJECTION INTRAVENOUS at 21:01

## 2021-12-06 RX ADMIN — METOPROLOL TARTRATE 5 MG: 5 INJECTION INTRAVENOUS at 16:14

## 2021-12-06 RX ADMIN — DEXTROSE MONOHYDRATE 25 G: 25 INJECTION, SOLUTION INTRAVENOUS at 18:50

## 2021-12-06 RX ADMIN — METOPROLOL TARTRATE 5 MG: 5 INJECTION INTRAVENOUS at 13:10

## 2021-12-06 RX ADMIN — NITROGLYCERIN 1 INCH: 20 OINTMENT TOPICAL at 07:48

## 2021-12-06 RX ADMIN — DEXTROSE MONOHYDRATE 25 G: 25 INJECTION, SOLUTION INTRAVENOUS at 00:38

## 2021-12-06 RX ADMIN — DEXTROSE MONOHYDRATE 12.5 G: 25 INJECTION, SOLUTION INTRAVENOUS at 02:47

## 2021-12-06 RX ADMIN — Medication 10 ML: at 21:02

## 2021-12-06 RX ADMIN — DEXTROSE MONOHYDRATE 100 ML/HR: 100 INJECTION, SOLUTION INTRAVENOUS at 13:33

## 2021-12-06 RX ADMIN — DEXTROSE MONOHYDRATE 100 ML/HR: 100 INJECTION, SOLUTION INTRAVENOUS at 03:32

## 2021-12-06 RX ADMIN — NITROGLYCERIN 1 INCH: 20 OINTMENT TOPICAL at 13:10

## 2021-12-06 RX ADMIN — NITROGLYCERIN 1 INCH: 20 OINTMENT TOPICAL at 18:50

## 2021-12-06 RX ADMIN — DEXTROSE MONOHYDRATE 25 G: 25 INJECTION, SOLUTION INTRAVENOUS at 12:31

## 2021-12-06 RX ADMIN — DEXTROSE MONOHYDRATE 25 G: 25 INJECTION, SOLUTION INTRAVENOUS at 05:17

## 2021-12-06 NOTE — PROGRESS NOTES
Reason for Admission: Hypoglycemia/Encephalopathy                   RUR Score:20%           PCP: First and Last name:  Carol Lundy MD     Name of Practice:   Are you a current patient: Yes/No:Yes   Approximate date of last visit: Seen in November. Can you do a virtual visit with your PCP: Yes/Call             Resources/supports as identified by patient/family:                 Sister/Home O2. Top Challenges facing patient (as identified by patient/family and CM): Finances/Medication cost?  No                  Transportation? No              Support system or lack thereof? No                     Living arrangements? No               Self-care/ADLs/Cognition? No           Current Advanced Directive/Advance Care Plan:  Full Code      Healthcare Decision Maker:     Primary Decision Maker: Kayode Luu Sister - [phone]    Payor Source Payor: [facility] MEDICAID / Plan: [facility] COMMUNITY PLAN [employer] / Product Type: Managed Care Medicaid /                             Plan for utilizing home health:  None @ this time/uses cane/walker. Sister Wanda Britt) consented to Choice Letter to continue home O2 via [facility]. Referral sent via [place]. Transition of Care Plan:   D/C Plan home with sister  & family will transport home upon discharge.

## 2021-12-06 NOTE — ED NOTES
Dr Jerome Burris notified about patient's persistent hypoglycemia, orders given for D10 @ 100 ml hr.  Admission changed to ICU for Q1hr finger sticks

## 2021-12-06 NOTE — PROGRESS NOTES
Progress Note    Patient: Stephanie Cartwright MRN: [id number]  SSN: [id number]    YOB: 1955  Age: 61 y.o. Sex: female      Admit Date: 12/5/2021    LOS: 1 day     Subjective:       66F,h/o breast cancer s/t radiation and in remission since 1 year, DMII on insulin last insulin was Friday with acute encephalopathy s/t hypoglycemia    On d10 gtt. Spoke to sister Dayanna Oneal, she liver with her, informed that ordering A1c to determine if she needs insulin, and it if her sugars continued to drop then will order C-peptide for insulinoma. Sister Dayanna Oneal did mentioned that she took insulin and metformin on Friday night        Review of Systems:  Unable to determine s/t mental status      Objective:     Vitals:    12/06/21 0517 12/06/21 0707 12/06/21 0716 12/06/21 0750   BP: (!) 188/85 (!) 186/97  (!) 174/96   Pulse: 82 86  77   Resp: 17 17     Temp:       SpO2: 97% 98% 99% 98%   Weight:       Height:            Intake and Output:  Current Shift: No intake/output data recorded. Last three shifts: 12/04 1901 - 12/06 0700  In: 250 [I.V.:250]  Out: -       Physical Exam:   General : Lethargic, grunting in the bed but not waking up. HEENT : atraumatic normocephalic, Normal ear and nose. Neck : Supple, no JVD, no masses noted, no carotid bruit. Lungs : Breath sounds with moderate air entry bilaterally, no wheezes or rales, no accessory muscle use. No distress noted. CVS : Rhythm rate regular, S1+, S2+, no murmur or gallop. Abdomen : Soft, nontender,  bowel sounds active. Extremities : No edema noted,  pedal pulses palpable. Musculoskeletal :  no joint swelling or effusion, muscle tone and power appears decreased. Skin : Dry, poor skin turgor no pathological rash. Lymphatic : No cervical lymphadenopathy.   Neurological : With altered mental status  Psychiatric : Unable to evaluate      Lab/Data Review:  Recent Results (from the past 24 hour(s))   CBC WITH AUTOMATED DIFF    Collection Time: 12/05/21 12:30 PM   Result Value Ref Range    WBC 15.6 (H) 4.6 - 13.2 K/uL    RBC 4.78 4.20 - 5.30 M/uL    HGB 12.5 12.0 - 16.0 g/dL    HCT 40.2 35.0 - 45.0 %    MCV 84.1 78.0 - 100.0 FL    MCH 26.2 24.0 - 34.0 PG    MCHC 31.1 31.0 - 37.0 g/dL    RDW 16.2 (H) 11.6 - 14.5 %    PLATELET 295 (H) 258 - 420 K/uL    MPV 9.1 (L) 9.2 - 11.8 FL    NRBC 0.0 0.0  WBC    ABSOLUTE NRBC 0.00 0.00 - 0.01 K/uL    NEUTROPHILS 93 (H) 40 - 73 %    LYMPHOCYTES 4 (L) 21 - 52 %    MONOCYTES 3 3 - 10 %    EOSINOPHILS 0 0 - 5 %    BASOPHILS 0 0 - 2 %    IMMATURE GRANULOCYTES 0 0 - 0.5 %    ABS. NEUTROPHILS 14.3 (H) 1.8 - 8.0 K/UL    ABS. LYMPHOCYTES 0.6 (L) 0.9 - 3.6 K/UL    ABS. MONOCYTES 0.5 0.05 - 1.2 K/UL    ABS. EOSINOPHILS 0.0 0.0 - 0.4 K/UL    ABS. BASOPHILS 0.1 0.0 - 0.1 K/UL    ABS. IMM. GRANS. 0.1 (H) 0.00 - 0.04 K/UL    DF AUTOMATED     METABOLIC PANEL, COMPREHENSIVE    Collection Time: 12/05/21 12:30 PM   Result Value Ref Range    Sodium 140 135 - 145 mmol/L    Potassium 5.0 3.2 - 5.1 mmol/L    Chloride 104 94 - 111 mmol/L    CO2 28 21 - 33 mmol/L    Anion gap 8 mmol/L    Glucose 73 70 - 110 mg/dL    BUN 17 9 - 21 mg/dL    Creatinine 1.50 (H) 0.70 - 1.20 mg/dL    BUN/Creatinine ratio 11      GFR est AA 42 ml/min/1.73m2    GFR est non-AA 35 ml/min/1.73m2    Calcium 9.6 8.5 - 10.5 mg/dL    Bilirubin, total 0.4 0.2 - 1.0 mg/dL    AST (SGOT) 42 14 - 74 U/L    ALT (SGPT) 20 3 - 52 U/L    Alk.  phosphatase 75 38 - 126 U/L    Protein, total 7.8 6.1 - 8.4 g/dL    Albumin 3.4 (L) 3.5 - 4.7 g/dL    Globulin 4.4 g/dL    A-G Ratio 0.8     TROPONIN I    Collection Time: 12/05/21 12:30 PM   Result Value Ref Range    Troponin-I, Qt. 0.02 0.02 - 0.05 ng/mL   GLUCOSE, POC    Collection Time: 12/05/21 12:36 PM   Result Value Ref Range    Glucose (POC) 68 (L) 70 - 110 mg/dL    Performed by Mari Miller    EKG, 12 LEAD, INITIAL    Collection Time: 12/05/21 12:40 PM   Result Value Ref Range    Ventricular Rate 98 BPM    Atrial Rate 96 BPM    P-R Interval 157 ms    QRS Duration 126 ms    Q-T Interval 390 ms    QTC Calculation (Bezet) 499 ms    Calculated P Axis 64 degrees    Calculated R Axis -44 degrees    Calculated T Axis 101 degrees    Diagnosis       Sinus rhythm  Probable left atrial enlargement  Left bundle branch block  Artifact in lead(s) I,II,III,aVF,V1,V2,V3,V4,V5,V6 and baseline wander in   lead(s) V1    Confirmed by ELEN MONROE ([id number]) on 12/5/2021 6:00:46 PM     BLOOD GAS, ARTERIAL    Collection Time: 12/05/21 12:45 PM   Result Value Ref Range    pH 7.41 7.37 - 7.43      PCO2 49 (H) 35.0 - 45.0 mmHg    PO2 83 (L) 84 - 98 mmHg    O2 SAT 96 94 - 100 %    BICARBONATE 30 (H) 23.0 - 27.0 mmol/L    BASE EXCESS 4.4 (H) 0.0 - 2.5 mmol/L    O2 METHOD Room air      FIO2 21.0 %    Sample source Arterial      SITE Left Radial      ANDREA'S TEST PASS      Carboxy-Hgb 0.4 0 - 3 %    Methemoglobin 0.3 0 - 3 %    tHb 12.5 12.0 - 16.0 g/dL    Oxyhemoglobin 95.5 90 - 100 %   URINALYSIS W/ RFLX MICROSCOPIC    Collection Time: 12/05/21 12:45 PM   Result Value Ref Range    Color Yellow      Appearance Clear      Specific gravity 1.020 1.005 - 1.030      pH (UA) 6.5 5.0 - 8.0      Protein >300 (A) Negative mg/dL    Glucose Negative Negative mg/dL    Ketone Negative Negative mg/dL    Bilirubin Negative Negative      Blood Moderate (A) Negative      Urobilinogen 0.2 0.2 - 1.0 EU/dL    Nitrites Negative Negative      Leukocyte Esterase Negative Negative     URINE MICROSCOPIC    Collection Time: 12/05/21 12:45 PM   Result Value Ref Range    WBC 0-4 0 - 4 /hpf    RBC 0-5 0 - 2 /hpf    Epithelial cells Few 0 - 20 /lpf    Bacteria 1+ (A) None /hpf    Amorphous Crystals 1+     Hyaline cast 0-2 /lpf    Granular cast 0-2 /lpf    WBC cast 0-2 /lpf   DRUG SCREEN, URINE    Collection Time: 12/05/21  1:20 PM   Result Value Ref Range    AMPHETAMINES Negative Negative      BARBITURATES Negative Negative      BENZODIAZEPINES Negative Negative      COCAINE Negative Negative METHADONE Negative Negative      OPIATES Negative Negative      OXYCODONE SCREEN Negative Negative      PCP(PHENCYCLIDINE) Negative Negative      PROPOXYPHENE Negative Negative      THC (TH-CANNABINOL) Negative Negative      TRICYCLICS Negative Negative      Drug screen comment        This test is a screen for drugs of abuse in a medical setting only (i.e., they are unconfirmed results and as such must not be used for non-medical purposes, e.g.,employment testing, legal testing). Due to its inherent nature, false positive (FP) and false negative (FN) results may be obtained. Therefore, if necessary for medical care, recommend confirmation of positive findings by GC/MS.    GLUCOSE, POC    Collection Time: 12/05/21  1:55 PM   Result Value Ref Range    Glucose (POC) 88 70 - 110 mg/dL    Performed by [address] [place] [address] ACID    Collection Time: 12/05/21  2:16 PM   Result Value Ref Range    Lactic acid 0.8 0.5 - 2.0 mmol/L   COVID-19 RAPID TEST    Collection Time: 12/05/21  2:18 PM   Result Value Ref Range    Specimen source Nasopharyngeal      COVID-19 rapid test Not Detected Not Detected     CULTURE, BLOOD    Collection Time: 12/05/21  2:40 PM    Specimen: Blood   Result Value Ref Range    Special Requests: No Special Requests      Culture result: No growth after 15 hours     CULTURE, BLOOD    Collection Time: 12/05/21  2:50 PM    Specimen: Blood   Result Value Ref Range    Special Requests: No Special Requests      Culture result: No growth after 15 hours     GLUCOSE, POC    Collection Time: 12/05/21  4:58 PM   Result Value Ref Range    Glucose (POC) 24 (LL) 70 - 110 mg/dL    Performed by Aarti Milan, POC    Collection Time: 12/05/21  5:01 PM   Result Value Ref Range    Glucose (POC) 24 (LL) 70 - 110 mg/dL    Performed by Aarti Milan, POC    Collection Time: 12/05/21  5:33 PM   Result Value Ref Range    Glucose (POC) 83 70 - 110 mg/dL    Performed by Aarti Milan, POC Collection Time: 12/05/21  6:56 PM   Result Value Ref Range    Glucose (POC) 52 (LL) 65 - 117 mg/dL    Performed by Stacy Otto    AMMONIA    Collection Time: 12/05/21  7:19 PM   Result Value Ref Range    Ammonia 29 <32 umol/L   CBC WITH AUTOMATED DIFF    Collection Time: 12/05/21  7:19 PM   Result Value Ref Range    WBC 14.8 (H) 3.6 - 11.0 K/uL    RBC 4.33 3.80 - 5.20 M/uL    HGB 11.3 (L) 11.5 - 16.0 g/dL    HCT 36.2 35.0 - 47.0 %    MCV 83.6 80.0 - 99.0 FL    MCH 26.1 26.0 - 34.0 PG    MCHC 31.2 30.0 - 36.5 g/dL    RDW 16.7 (H) 11.5 - 14.5 %    PLATELET 871 (H) 807 - 400 K/uL    MPV 9.4 8.9 - 12.9 FL    NRBC 0.0 0.0  WBC    ABSOLUTE NRBC 0.00 0.00 - 0.01 K/uL    NEUTROPHILS 92 (H) 32 - 75 %    LYMPHOCYTES 4 (L) 12 - 49 %    MONOCYTES 4 (L) 5 - 13 %    EOSINOPHILS 0 0 - 7 %    BASOPHILS 0 0 - 1 %    IMMATURE GRANULOCYTES 0 0 - 0.5 %    ABS. NEUTROPHILS 13.4 (H) 1.8 - 8.0 K/UL    ABS. LYMPHOCYTES 0.7 (L) 0.8 - 3.5 K/UL    ABS. MONOCYTES 0.6 0.0 - 1.0 K/UL    ABS. EOSINOPHILS 0.0 0.0 - 0.4 K/UL    ABS. BASOPHILS 0.0 0.0 - 0.1 K/UL    ABS. IMM. GRANS. 0.0 0.00 - 0.04 K/UL    DF AUTOMATED     METABOLIC PANEL, COMPREHENSIVE    Collection Time: 12/05/21  7:19 PM   Result Value Ref Range    Sodium 142 136 - 145 mmol/L    Potassium 5.1 3.5 - 5.1 mmol/L    Chloride 111 (H) 97 - 108 mmol/L    CO2 28 21 - 32 mmol/L    Anion gap 3 (L) 5 - 15 mmol/L    Glucose 166 (H) 65 - 100 mg/dL    BUN 16 6 - 20 mg/dL    Creatinine 1.78 (H) 0.55 - 1.02 mg/dL    BUN/Creatinine ratio 9 (L) 12 - 20      GFR est AA 35 (L) >60 ml/min/1.73m2    GFR est non-AA 29 (L) >60 ml/min/1.73m2    Calcium 8.9 8.5 - 10.1 mg/dL    Bilirubin, total 0.2 0.2 - 1.0 mg/dL    AST (SGOT) 52 (H) 15 - 37 U/L    ALT (SGPT) 23 12 - 78 U/L    Alk.  phosphatase 75 45 - 117 U/L    Protein, total 6.7 6.4 - 8.2 g/dL    Albumin 2.3 (L) 3.5 - 5.0 g/dL    Globulin 4.4 (H) 2.0 - 4.0 g/dL    A-G Ratio 0.5 (L) 1.1 - 2.2     LACTIC ACID    Collection Time: 12/05/21 7:19 PM   Result Value Ref Range    Lactic acid 1.1 0.4 - 2.0 mmol/L   RENAL FUNCTION PANEL    Collection Time: 12/05/21  7:25 PM   Result Value Ref Range    Sodium 140 136 - 145 mmol/L    Potassium 5.0 3.5 - 5.1 mmol/L    Chloride 108 97 - 108 mmol/L    CO2 28 21 - 32 mmol/L    Anion gap 4 (L) 5 - 15 mmol/L    Glucose 166 (H) 65 - 100 mg/dL    BUN 18 6 - 20 mg/dL    Creatinine 1.80 (H) 0.55 - 1.02 mg/dL    BUN/Creatinine ratio 10 (L) 12 - 20      GFR est AA 34 (L) >60 ml/min/1.73m2    GFR est non-AA 28 (L) >60 ml/min/1.73m2    Calcium 8.8 8.5 - 10.1 mg/dL    Phosphorus 3.8 2.6 - 4.7 mg/dL    Albumin 2.4 (L) 3.5 - 5.0 g/dL   TSH 3RD GENERATION    Collection Time: 12/05/21  7:25 PM   Result Value Ref Range    TSH 2.70 0.36 - 3.74 uIU/mL   GLUCOSE, POC    Collection Time: 12/05/21  7:55 PM   Result Value Ref Range    Glucose (POC) 101 65 - 117 mg/dL    Performed by Liam Mancera, POC    Collection Time: 12/05/21  8:35 PM   Result Value Ref Range    Glucose (POC) 72 65 - 117 mg/dL    Performed by Liam Mancera, POC    Collection Time: 12/05/21  9:22 PM   Result Value Ref Range    Glucose (POC) 66 65 - 117 mg/dL    Performed by Liam Mancera, POC    Collection Time: 12/05/21 10:51 PM   Result Value Ref Range    Glucose (POC) 86 65 - 117 mg/dL    Performed by Liam Mancera, POC    Collection Time: 12/06/21 12:36 AM   Result Value Ref Range    Glucose (POC) 43 (LL) 65 - 117 mg/dL    Performed by Liam Mancera, POC    Collection Time: 12/06/21  1:18 AM   Result Value Ref Range    Glucose (POC) 78 65 - 117 mg/dL    Performed by Estefany Olmos    GLUCOSE, POC    Collection Time: 12/06/21  1:55 AM   Result Value Ref Range    Glucose (POC) 52 (LL) 65 - 117 mg/dL    Performed by Liam Mancera, POC    Collection Time: 12/06/21  2:16 AM   Result Value Ref Range    Glucose (POC) 109 65 - 117 mg/dL    Performed by Estefany Olmos GLUCOSE, POC    Collection Time: 12/06/21  2:43 AM   Result Value Ref Range    Glucose (POC) 64 (L) 65 - 117 mg/dL    Performed by Joelle James 94, POC    Collection Time: 12/06/21  3:25 AM   Result Value Ref Range    Glucose (POC) 69 65 - 117 mg/dL    Performed by Liam Mancera, POC    Collection Time: 12/06/21  4:05 AM   Result Value Ref Range    Glucose (POC) 73 65 - 117 mg/dL    Performed by Reyes Barnard    RENAL FUNCTION PANEL    Collection Time: 12/06/21  5:01 AM   Result Value Ref Range    Sodium 142 136 - 145 mmol/L    Potassium 4.6 3.5 - 5.1 mmol/L    Chloride 112 (H) 97 - 108 mmol/L    CO2 28 21 - 32 mmol/L    Anion gap 2 (L) 5 - 15 mmol/L    Glucose 63 (L) 65 - 100 mg/dL    BUN 17 6 - 20 mg/dL    Creatinine 1.80 (H) 0.55 - 1.02 mg/dL    BUN/Creatinine ratio 9 (L) 12 - 20      GFR est AA 34 (L) >60 ml/min/1.73m2    GFR est non-AA 28 (L) >60 ml/min/1.73m2    Calcium 8.8 8.5 - 10.1 mg/dL    Phosphorus 3.7 2.6 - 4.7 mg/dL    Albumin 2.4 (L) 3.5 - 5.0 g/dL   GLUCOSE, POC    Collection Time: 12/06/21  5:08 AM   Result Value Ref Range    Glucose (POC) 55 (L) 65 - 117 mg/dL    Performed by Shweta Trinidad, POC    Collection Time: 12/06/21  5:44 AM   Result Value Ref Range    Glucose (POC) 117 65 - 117 mg/dL    Performed by Liam Mancera, POC    Collection Time: 12/06/21  6:32 AM   Result Value Ref Range    Glucose (POC) 80 65 - 117 mg/dL    Performed by Liam Mancera, POC    Collection Time: 12/06/21  7:11 AM   Result Value Ref Range    Glucose (POC) 72 65 - 117 mg/dL    Performed by Gavino, POC    Collection Time: 12/06/21  7:56 AM   Result Value Ref Range    Glucose (POC) 63 (L) 65 - 117 mg/dL    Performed by Elige Gosselin          Assessment and plan:      (1) symptomatic hypoglycemia: D10, ACCU Q4H and PRN dextrose 50%. Stop gtt if 2 consecutive readings > 200. NPO, until mental status better.  If sugars continued to frop then C-peptide and abdominal CT to assess pancreas. (2) Acute encephalopathy : s/t #1. CT head no IC bleed or stroke.  Hold gabapentin, melatonin,     (3) HTN: PRN labetalol for BP > 380 systolic    (4) CAD non obstructive: aspirin, statin, metoprolol    (5) anxiety and depression: hold melatonin until mental status better    DVT ppx: lovenox     DISPO; PT once alert, likely 2 days    Signed By: Tiffani Estevez MD     December 6, 2021

## 2021-12-06 NOTE — ED NOTES
Talked to Dr. Jaylene Wayne regarding patients drop in BS this morning. Pt BS dropped to 63 and I gave 25mL 50% dextrose.  Will continue to observe this patient closely

## 2021-12-06 NOTE — ACP (ADVANCE CARE PLANNING)
Advance Care Planning   Healthcare Decision Maker:       Primary Decision Maker: Juan Weinstein [facility] - [phone]

## 2021-12-06 NOTE — H&P
History and Physical              Subjective :   Chief Complaint : Altered mental status with hypoglycemia    Source of information : Mostly from the records and ED provider. Patient unable to give any information. History of present illness:   61 y.o. female with a history of diabetes mellitus but recently discontinued insulin and medications due to hypoglycemia, carcinoma of breast, hypertension is brought to the emergency room by the family as she was unresponsive since night before. She was in the emergency room the day before also found with low blood sugar sent home after treating in the emergency room. In September when she was admitted found with hypoglycemia and discontinued medications. She was on Lantus and insulin before that. Unable to obtain information who gives her medications whether she takes by herself or somebody administers. No family members are here. Transferred from [facility] as patient is continued to be disoriented also suspected of pneumonia. Patient was given multiple D50's and D10 with not much improvement. It comes up a little bit whenever they gave her D50 but it is continued to drift down. Patient continued to be with altered mental status with not much improvement, admitted for further management. She is also with accelerated hypertension not much improved with treatment, unable to give any oral medications so started on Nitropaste and IV Lopressor.   Past Medical History:   Diagnosis Date    Arthritis     spinal stenosis    Breast CA ([facility] 2017    Left Breast (chemo/radiation)    Diabetes (HCC)     Type 2    Edema     legs and ankles    GERD (gastroesophageal reflux disease)     High cholesterol     Hypertension     Ill-defined condition     patient states hemorrhage behind Left eye-following Dr. Rosanna Alvarez MI (myocardial infarction) ([facility] 06/2018    per patient    Neuropathy     Restless leg      Past Surgical History:   Procedure Laterality Date    HX BACK SURGERY  08/30/2018    HX BREAST BIOPSY Left 02/03/2017    BREAST CANCER    HX BREAST LUMPECTOMY Left     HX CATARACT REMOVAL Right     HX HEART CATHETERIZATION  07/2018    no stents    HX KNEE REPLACEMENT       Family History   Problem Relation Age of Onset    Hypertension Mother     Heart Failure Father     Heart Disease Sister       Social History     Tobacco Use    Smoking status: Never Smoker    Smokeless tobacco: Never Used   Substance Use Topics    Alcohol use: No       Prior to Admission medications    Medication Sig Start Date End Date Taking? Authorizing Provider   metoprolol succinate (TOPROL-XL) 25 mg XL tablet Take 1 Tablet by mouth daily. 9/28/21   Elvis Milian MD   aspirin delayed-release 81 mg tablet Take 1 Tablet by mouth daily. 9/28/21   Elvis Milian MD   FeroSuL 325 mg (65 mg iron) tablet take 1 tablet by mouth three times a day 9/14/21   Jodee Salcido MD   Venlafaxine-ER 24 HR (EFFEXOR-ER) 150 mg tr24 tablet Take 150 mg by mouth daily. Other, MD Alma Delia   melatonin 3 mg tablet Take 3 mg by mouth nightly. Provider, Historical   famotidine (PEPCID) 20 mg tablet take 1 tablet by mouth twice a day 11/2/20   Provider, Historical   gabapentin (NEURONTIN) 300 mg capsule Take 300 mg by mouth two (2) times a day. Provider, Historical   tamsulosin (FLOMAX) 0.4 mg capsule take 1 capsule by mouth once daily 12/28/18   Jaki LESLIE NP   albuterol (PROVENTIL HFA, VENTOLIN HFA, PROAIR HFA) 90 mcg/actuation inhaler 2 puffs three to four times a day for 5 days then every 6 hours as needed for shortness of breath 9/4/18   Christie Burrell MD   atorvastatin (LIPITOR) 40 mg tablet Take  by mouth nightly. Provider, Historical   amLODIPine (NORVASC) 10 mg tablet Take 10 mg by mouth daily.  4/6/18   Provider, Historical     Allergies   Allergen Reactions    Bactrim [Sulfamethoprim] Rash    Ciprofloxacin Hives    Keflex [Cephalexin] Nausea and Vomiting    Metformin Other (comments) GI DISTRESS    Soliqua 100/33 [Insulin Glargine-Lixisenatide] Nausea Only             Review of Systems: Unable to obtain any information from the patient due to altered mental status. Vitals:     Patient Vitals for the past 12 hrs:   Temp Pulse Resp BP SpO2   12/05/21 2028 -- 87 16 (!) 172/93 99 %   12/05/21 1948 -- -- -- -- 97 %   12/05/21 1900 98.6 °F (37 °C) 82 16 (!) 183/87 99 %       Physical Exam:   General : Lethargic, grunting in the bed but not waking up. HEENT : atraumatic normocephalic, Normal ear and nose. Neck : Supple, no JVD, no masses noted, no carotid bruit. Lungs : Breath sounds with moderate air entry bilaterally, no wheezes or rales, no accessory muscle use. No distress noted. CVS : Rhythm rate regular, S1+, S2+, no murmur or gallop. Abdomen : Soft, nontender,  bowel sounds active. Extremities : No edema noted,  pedal pulses palpable. Musculoskeletal :  no joint swelling or effusion, muscle tone and power appears decreased. Skin : Dry, poor skin turgor no pathological rash. Lymphatic : No cervical lymphadenopathy. Neurological : With altered mental status  Psychiatric : Unable to evaluate        Data Review:   Recent Results (from the past 24 hour(s))   CBC WITH AUTOMATED DIFF    Collection Time: 12/05/21 12:30 PM   Result Value Ref Range    WBC 15.6 (H) 4.6 - 13.2 K/uL    RBC 4.78 4.20 - 5.30 M/uL    HGB 12.5 12.0 - 16.0 g/dL    HCT 40.2 35.0 - 45.0 %    MCV 84.1 78.0 - 100.0 FL    MCH 26.2 24.0 - 34.0 PG    MCHC 31.1 31.0 - 37.0 g/dL    RDW 16.2 (H) 11.6 - 14.5 %    PLATELET 388 (H) 364 - 420 K/uL    MPV 9.1 (L) 9.2 - 11.8 FL    NRBC 0.0 0.0  WBC    ABSOLUTE NRBC 0.00 0.00 - 0.01 K/uL    NEUTROPHILS 93 (H) 40 - 73 %    LYMPHOCYTES 4 (L) 21 - 52 %    MONOCYTES 3 3 - 10 %    EOSINOPHILS 0 0 - 5 %    BASOPHILS 0 0 - 2 %    IMMATURE GRANULOCYTES 0 0 - 0.5 %    ABS. NEUTROPHILS 14.3 (H) 1.8 - 8.0 K/UL    ABS. LYMPHOCYTES 0.6 (L) 0.9 - 3.6 K/UL    ABS.  MONOCYTES 0.5 0.05 - 1.2 K/UL    ABS. EOSINOPHILS 0.0 0.0 - 0.4 K/UL    ABS. BASOPHILS 0.1 0.0 - 0.1 K/UL    ABS. IMM. GRANS. 0.1 (H) 0.00 - 0.04 K/UL    DF AUTOMATED     METABOLIC PANEL, COMPREHENSIVE    Collection Time: 12/05/21 12:30 PM   Result Value Ref Range    Sodium 140 135 - 145 mmol/L    Potassium 5.0 3.2 - 5.1 mmol/L    Chloride 104 94 - 111 mmol/L    CO2 28 21 - 33 mmol/L    Anion gap 8 mmol/L    Glucose 73 70 - 110 mg/dL    BUN 17 9 - 21 mg/dL    Creatinine 1.50 (H) 0.70 - 1.20 mg/dL    BUN/Creatinine ratio 11      GFR est AA 42 ml/min/1.73m2    GFR est non-AA 35 ml/min/1.73m2    Calcium 9.6 8.5 - 10.5 mg/dL    Bilirubin, total 0.4 0.2 - 1.0 mg/dL    AST (SGOT) 42 14 - 74 U/L    ALT (SGPT) 20 3 - 52 U/L    Alk.  phosphatase 75 38 - 126 U/L    Protein, total 7.8 6.1 - 8.4 g/dL    Albumin 3.4 (L) 3.5 - 4.7 g/dL    Globulin 4.4 g/dL    A-G Ratio 0.8     TROPONIN I    Collection Time: 12/05/21 12:30 PM   Result Value Ref Range    Troponin-I, Qt. 0.02 0.02 - 0.05 ng/mL   GLUCOSE, POC    Collection Time: 12/05/21 12:36 PM   Result Value Ref Range    Glucose (POC) 68 (L) 70 - 110 mg/dL    Performed by Aleta Louis    EKG, 12 LEAD, INITIAL    Collection Time: 12/05/21 12:40 PM   Result Value Ref Range    Ventricular Rate 98 BPM    Atrial Rate 96 BPM    P-R Interval 157 ms    QRS Duration 126 ms    Q-T Interval 390 ms    QTC Calculation (Bezet) 499 ms    Calculated P Axis 64 degrees    Calculated R Axis -44 degrees    Calculated T Axis 101 degrees    Diagnosis       Sinus rhythm  Probable left atrial enlargement  Left bundle branch block  Artifact in lead(s) I,II,III,aVF,V1,V2,V3,V4,V5,V6 and baseline wander in   lead(s) V1    Confirmed by ELEN MONROE ([id number]) on 12/5/2021 6:00:46 PM     BLOOD GAS, ARTERIAL    Collection Time: 12/05/21 12:45 PM   Result Value Ref Range    pH 7.41 7.37 - 7.43      PCO2 49 (H) 35.0 - 45.0 mmHg    PO2 83 (L) 84 - 98 mmHg    O2 SAT 96 94 - 100 %    BICARBONATE 30 (H) 23.0 - 27.0 mmol/L    BASE EXCESS 4.4 (H) 0.0 - 2.5 mmol/L    O2 METHOD Room air      FIO2 21.0 %    Sample source Arterial      SITE Left Radial      ANDREA'S TEST PASS      Carboxy-Hgb 0.4 0 - 3 %    Methemoglobin 0.3 0 - 3 %    tHb 12.5 12.0 - 16.0 g/dL    Oxyhemoglobin 95.5 90 - 100 %   URINALYSIS W/ RFLX MICROSCOPIC    Collection Time: 12/05/21 12:45 PM   Result Value Ref Range    Color Yellow      Appearance Clear      Specific gravity 1.020 1.005 - 1.030      pH (UA) 6.5 5.0 - 8.0      Protein >300 (A) Negative mg/dL    Glucose Negative Negative mg/dL    Ketone Negative Negative mg/dL    Bilirubin Negative Negative      Blood Moderate (A) Negative      Urobilinogen 0.2 0.2 - 1.0 EU/dL    Nitrites Negative Negative      Leukocyte Esterase Negative Negative     URINE MICROSCOPIC    Collection Time: 12/05/21 12:45 PM   Result Value Ref Range    WBC 0-4 0 - 4 /hpf    RBC 0-5 0 - 2 /hpf    Epithelial cells Few 0 - 20 /lpf    Bacteria 1+ (A) None /hpf    Amorphous Crystals 1+     Hyaline cast 0-2 /lpf    Granular cast 0-2 /lpf    WBC cast 0-2 /lpf   DRUG SCREEN, URINE    Collection Time: 12/05/21  1:20 PM   Result Value Ref Range    AMPHETAMINES Negative Negative      BARBITURATES Negative Negative      BENZODIAZEPINES Negative Negative      COCAINE Negative Negative      METHADONE Negative Negative      OPIATES Negative Negative      OXYCODONE SCREEN Negative Negative      PCP(PHENCYCLIDINE) Negative Negative      PROPOXYPHENE Negative Negative      THC (TH-CANNABINOL) Negative Negative      TRICYCLICS Negative Negative      Drug screen comment        This test is a screen for drugs of abuse in a medical setting only (i.e., they are unconfirmed results and as such must not be used for non-medical purposes, e.g.,employment testing, legal testing). Due to its inherent nature, false positive (FP) and false negative (FN) results may be obtained. Therefore, if necessary for medical care, recommend confirmation of positive findings by GC/MS.    GLUCOSE, POC Collection Time: 12/05/21  1:55 PM   Result Value Ref Range    Glucose (POC) 88 70 - 110 mg/dL    Performed by [address] [place] [address] ACID    Collection Time: 12/05/21  2:16 PM   Result Value Ref Range    Lactic acid 0.8 0.5 - 2.0 mmol/L   COVID-19 RAPID TEST    Collection Time: 12/05/21  2:18 PM   Result Value Ref Range    Specimen source Nasopharyngeal      COVID-19 rapid test Not Detected Not Detected     GLUCOSE, POC    Collection Time: 12/05/21  4:58 PM   Result Value Ref Range    Glucose (POC) 24 (LL) 70 - 110 mg/dL    Performed by [email or web address] , POC    Collection Time: 12/05/21  5:01 PM   Result Value Ref Range    Glucose (POC) 24 (LL) 70 - 110 mg/dL    Performed by [email or web address] , POC    Collection Time: 12/05/21  5:33 PM   Result Value Ref Range    Glucose (POC) 83 70 - 110 mg/dL    Performed by [email or web address] , POC    Collection Time: 12/05/21  6:56 PM   Result Value Ref Range    Glucose (POC) 52 (LL) 65 - 117 mg/dL    Performed by Stacy Otto    AMMONIA    Collection Time: 12/05/21  7:19 PM   Result Value Ref Range    Ammonia 29 <32 umol/L   CBC WITH AUTOMATED DIFF    Collection Time: 12/05/21  7:19 PM   Result Value Ref Range    WBC 14.8 (H) 3.6 - 11.0 K/uL    RBC 4.33 3.80 - 5.20 M/uL    HGB 11.3 (L) 11.5 - 16.0 g/dL    HCT 36.2 35.0 - 47.0 %    MCV 83.6 80.0 - 99.0 FL    MCH 26.1 26.0 - 34.0 PG    MCHC 31.2 30.0 - 36.5 g/dL    RDW 16.7 (H) 11.5 - 14.5 %    PLATELET 458 (H) 504 - 400 K/uL    MPV 9.4 8.9 - 12.9 FL    NRBC 0.0 0.0  WBC    ABSOLUTE NRBC 0.00 0.00 - 0.01 K/uL    NEUTROPHILS 92 (H) 32 - 75 %    LYMPHOCYTES 4 (L) 12 - 49 %    MONOCYTES 4 (L) 5 - 13 %    EOSINOPHILS 0 0 - 7 %    BASOPHILS 0 0 - 1 %    IMMATURE GRANULOCYTES 0 0 - 0.5 %    ABS. NEUTROPHILS 13.4 (H) 1.8 - 8.0 K/UL    ABS. LYMPHOCYTES 0.7 (L) 0.8 - 3.5 K/UL    ABS. MONOCYTES 0.6 0.0 - 1.0 K/UL    ABS. EOSINOPHILS 0.0 0.0 - 0.4 K/UL    ABS. BASOPHILS 0.0 0.0 - 0.1 K/UL    ABS. IMM. Cochran Rebel. 0.0 0.00 - 0.04 K/UL    DF AUTOMATED     METABOLIC PANEL, COMPREHENSIVE    Collection Time: 12/05/21  7:19 PM   Result Value Ref Range    Sodium 142 136 - 145 mmol/L    Potassium 5.1 3.5 - 5.1 mmol/L    Chloride 111 (H) 97 - 108 mmol/L    CO2 28 21 - 32 mmol/L    Anion gap 3 (L) 5 - 15 mmol/L    Glucose 166 (H) 65 - 100 mg/dL    BUN 16 6 - 20 mg/dL    Creatinine 1.78 (H) 0.55 - 1.02 mg/dL    BUN/Creatinine ratio 9 (L) 12 - 20      GFR est AA 35 (L) >60 ml/min/1.73m2    GFR est non-AA 29 (L) >60 ml/min/1.73m2    Calcium 8.9 8.5 - 10.1 mg/dL    Bilirubin, total 0.2 0.2 - 1.0 mg/dL    AST (SGOT) 52 (H) 15 - 37 U/L    ALT (SGPT) 23 12 - 78 U/L    Alk. phosphatase 75 45 - 117 U/L    Protein, total 6.7 6.4 - 8.2 g/dL    Albumin 2.3 (L) 3.5 - 5.0 g/dL    Globulin 4.4 (H) 2.0 - 4.0 g/dL    A-G Ratio 0.5 (L) 1.1 - 2.2     LACTIC ACID    Collection Time: 12/05/21  7:19 PM   Result Value Ref Range    Lactic acid 1.1 0.4 - 2.0 mmol/L   GLUCOSE, POC    Collection Time: 12/05/21  7:55 PM   Result Value Ref Range    Glucose (POC) 101 65 - 117 mg/dL    Performed by Liam Mancera, POC    Collection Time: 12/05/21  8:35 PM   Result Value Ref Range    Glucose (POC) 72 65 - 117 mg/dL    Performed by Nadira Stevenson        Radiologic Studies :   CT Results  (Last 48 hours)               12/05/21 1245  CT HEAD WO CONT Final result    Impression:      1. No acute intracranial abnormalities. 2. Atrophy and likely chronic small vessel ischemic changes. Narrative:  EXAM: CT head       CLINICAL INDICATION/HISTORY: Altered mental status       COMPARISON: 12/3/2021       TECHNIQUE: Axial CT imaging of the head was performed without intravenous   contrast. One or more dose reduction techniques were used on this CT: automated   exposure control, adjustment of the mAs and/or kVp according to patient size,   and iterative reconstruction techniques.   The specific techniques used on this   CT exam have been documented in the patient's electronic medical record. Digital   Imaging and Communications in Medicine (DICOM) format image data are available   to nonaffiliated external healthcare facilities or entities on a secured, media   free, reciprocally searchable basis with patient authorization for at least a   12-month period after this study. _______________       FINDINGS:       BRAIN AND EXTRA-AXIAL SPACES: Diffuse atrophy identified. There is no   intracranial hemorrhage, mass effect, or midline shift. Periventricular and   subcortical white matter hypodensity identified. There are no abnormal   extra-axial fluid collections. CALVARIUM: Intact. SINUSES: Mucosal thickening involving the left maxillary sinus. OTHER: None.       _______________               CXR Results  (Last 48 hours)               12/05/21 1302  XR CHEST PORT Final result    Impression:      Questionable left basilar retrocardiac atelectasis and/or infiltrate. No other   focal consolidation. Narrative:  EXAM: CHEST RADIOGRAPH       CLINICAL INDICATION/HISTORY: ams     > Additional: Altered mental status       COMPARISON: 12/3/2021       TECHNIQUE: Portable frontal view of the chest       _______________       FINDINGS:       SUPPORT DEVICES: None. HEART AND MEDIASTINUM: Heart size is normal.       LUNGS AND PLEURAL SPACES: Questionable left basilar retrocardiac atelectasis   and/or infiltrate. No other focal consolidation, effusion, or pneumothorax. BONES AND SOFT TISSUES: Unremarkable.       _______________                   Assessment and Plan :     Metabolic encephalopathy: Secondary to hypoglycemia. Need to further evaluate if there is any other etiology. Accelerated hypertension: Unable to get any medications by mouth, ordered IV Lopressor every 4 hours, Nitropaste 1 inch every 6 hours.     Diabetes mellitus type 2 with hyperglycemia: Has a history, was being treated with medications and insulin, but all of them were discontinued. Not sure if she is taking insulin by mistake at home that is causing the hypoglycemia. Severe gastritis: And history of gastroparesis confirmed by EGD. Also had esophageal dilation in September of this year. On Protonix, and she is able to tolerate by mouth we will restart it. Admitted to ICU due to requirement of frequent Accu-Cheks with coverage, patient is still with altered mental status. She is full CODE STATUS, has advanced medical directives on chart. Home medications reviewed with the previous admission records and external Rx history but unable to verify, need to call family at home in the morning. CC : Deshawn Ferguson MD  Signed By: Dominic Marte MD     December 5, 2021      This dictation was done by dragon, computer voice recognition software. Often unanticipated grammatical, syntax, [place] phones and other interpretive errors are inadvertently transcribed. Please excuse errors that have escaped final proofreading.

## 2021-12-06 NOTE — CONSULTS
NEUROLOGY CONSULT    Name Kimmie Doyle Age 61 y.o. MRN [id number]  1955     Referring Physician: Dc Guzmán MD      Chief Complaint: Encephalopathy     This is a 61 y.o.  female who was admitted through the ED because of decline in the mental status. She has been running low sugars which have been treated with D50 is on D10 without much improvement in the sugar Going down and down. She was recently discontinued on insulin because of the low sugars. Patient has also been running high blood pressure which has not much better with the treatment. At the time of my evaluation this morning the patient is still not communicating. She moans in response to tactile and painful stimuli, moves all her extremities but no communication or conversation. Assessment and Plan:  1. Metabolic/hypoglycemic encephalopathy: Observe from neurology standpoint. No additional work-up is indicated. 2.  Hypoglycemia: Her sugar kept going down in spite of being treated with D50's and D10. There may be hypoglycemic effect from the medicines because of the long half-life. 3.  Diabetes mellitus type 2.  4.  Left breast cancer status post chemo and radiation. 5.  Hypertension. 6.  Hyperlipidemia. 7.  Peripheral neuropathy, diabetic. 8.  Restless leg syndrome.     Allergies   Allergen Reactions    Bactrim [Sulfamethoprim] Rash    Ciprofloxacin Hives    Keflex [Cephalexin] Nausea and Vomiting    Metformin Other (comments)     GI DISTRESS    Soliqua 100/33 [Insulin Glargine-Lixisenatide] Nausea Only           Current Facility-Administered Medications   Medication Dose Route Frequency    dextrose 10 % infusion  100 mL/hr IntraVENous CONTINUOUS    nitroglycerin (NITROBID) 2 % ointment 1 Inch  1 Inch Topical Q6H    labetaloL (NORMODYNE;TRANDATE) 20 mg/4 mL (5 mg/mL) injection 10 mg  10 mg IntraVENous Q4H PRN    [Held by provider] amLODIPine (NORVASC) tablet 10 mg  10 mg Oral DAILY    atorvastatin (LIPITOR) tablet 40 mg  40 mg Oral QHS    albuterol (PROVENTIL HFA, VENTOLIN HFA, PROAIR HFA) inhaler 2 Puff  2 Puff Inhalation Q4H PRN    [Held by provider] tamsulosin (FLOMAX) capsule 0.4 mg  0.4 mg Oral DAILY    [Held by provider] gabapentin (NEURONTIN) capsule 300 mg  300 mg Oral BID    [Held by provider] melatonin tablet 3 mg  3 mg Oral QHS    venlafaxine-SR (EFFEXOR-XR) capsule 150 mg  150 mg Oral DAILY WITH BREAKFAST    ferrous sulfate tablet 325 mg  1 Tablet Oral TID    aspirin delayed-release tablet 81 mg  81 mg Oral DAILY    glucose chewable tablet 16 g  4 Tablet Oral PRN    dextrose (D50W) injection syrg 12.5-25 g  25-50 mL IntraVENous PRN    glucagon (GLUCAGEN) injection 1 mg  1 mg IntraMUSCular PRN    sodium chloride (NS) flush 5-40 mL  5-40 mL IntraVENous Q8H    sodium chloride (NS) flush 5-40 mL  5-40 mL IntraVENous PRN    acetaminophen (TYLENOL) tablet 650 mg  650 mg Oral Q6H PRN    Or    acetaminophen (TYLENOL) suppository 650 mg  650 mg Rectal Q6H PRN    ondansetron (ZOFRAN ODT) tablet 4 mg  4 mg Oral Q6H PRN    Or    ondansetron (ZOFRAN) injection 4 mg  4 mg IntraVENous Q6H PRN    famotidine (PEPCID) tablet 20 mg  20 mg Oral DAILY    metoprolol (LOPRESSOR) injection 5 mg  5 mg IntraVENous Q4H    metoprolol succinate (TOPROL-XL) XL tablet 50 mg  50 mg Oral DAILY    dextrose 5 % - 0.45% NaCl infusion  100 mL/hr IntraVENous CONTINUOUS     Current Outpatient Medications   Medication Sig    metoprolol succinate (TOPROL-XL) 25 mg XL tablet Take 1 Tablet by mouth daily.  aspirin delayed-release 81 mg tablet Take 1 Tablet by mouth daily.  FeroSuL 325 mg (65 mg iron) tablet take 1 tablet by mouth three times a day    Venlafaxine-ER 24 HR (EFFEXOR-ER) 150 mg tr24 tablet Take 150 mg by mouth daily.  melatonin 3 mg tablet Take 3 mg by mouth nightly.     famotidine (PEPCID) 20 mg tablet take 1 tablet by mouth twice a day    gabapentin (NEURONTIN) 300 mg capsule Take 300 mg by mouth two (2) times a day.  tamsulosin (FLOMAX) 0.4 mg capsule take 1 capsule by mouth once daily    albuterol (PROVENTIL HFA, VENTOLIN HFA, PROAIR HFA) 90 mcg/actuation inhaler 2 puffs three to four times a day for 5 days then every 6 hours as needed for shortness of breath    atorvastatin (LIPITOR) 40 mg tablet Take  by mouth nightly.  amLODIPine (NORVASC) 10 mg tablet Take 10 mg by mouth daily. Past Medical History:   Diagnosis Date    Arthritis     spinal stenosis    Breast CA ([facility] 2017    Left Breast (chemo/radiation)    Diabetes (HCC)     Type 2    Edema     legs and ankles    GERD (gastroesophageal reflux disease)     High cholesterol     Hypertension     Ill-defined condition     patient states hemorrhage behind Left eye-following Dr. Toño ARAGON (myocardial infarction) ([facility] 06/2018    per patient    Neuropathy     Restless leg         Social History     Tobacco Use    Smoking status: Never Smoker    Smokeless tobacco: Never Used   Vaping Use    Vaping Use: Never used   Substance Use Topics    Alcohol use: No    Drug use: No            Exam  Visit Vitals  BP (!) 169/88 (BP 1 Location: Right upper arm, BP Patient Position: Semi fowlers)   Pulse 84   Temp 98 °F (36.7 °C)   Resp 19   Ht 5' 5\" (1.651 m)   Wt 99.8 kg (220 lb)   SpO2 96%   BMI 36.61 kg/m²         General: Well developed, well nourished. Patient in no distress   Head: Normocephalic, atraumatic, anicteric sclera   Neck Normal ROM, No thyromegally   Lungs:  Clear to auscultation    Cardiac: Regular rate and rhythm with no murmurs. Abd: Bowel sounds were audible   Ext: No pedal edema   Skin: Supple no rash     NeurologicExam:  Mental Status:  Lethargic and not communicating or following any commands.    Speech:    Cranial Nerves:   Pupils are equal round and reactive to light,  no nystagmus noted, face is symmetric, no other cranial nerves could be tested at this time   Motor:   Moves all the extremities to deep pain symmetrically. Reflexes:   Deep tendon reflexes trace/4 and symmetric. Sensory:    Withdraws all extremities to pain symmetrically   Gait:  Gait could not be tested   Tremor:   No tremor noted. Cerebellar:  Coordination could not be tested. Neurovascular: No carotid bruits.  No JVD      Lab Review  Lab Results   Component Value Date/Time    WBC 14.8 (H) 12/05/2021 07:19 PM    HCT 36.2 12/05/2021 07:19 PM    HGB 11.3 (L) 12/05/2021 07:19 PM    PLATELET 780 (H) 33/89/6967 07:19 PM     Lab Results   Component Value Date/Time    Sodium 142 12/06/2021 05:01 AM    Potassium 4.6 12/06/2021 05:01 AM    Chloride 112 (H) 12/06/2021 05:01 AM    CO2 28 12/06/2021 05:01 AM    Glucose 63 (L) 12/06/2021 05:01 AM    BUN 17 12/06/2021 05:01 AM    Creatinine 1.80 (H) 12/06/2021 05:01 AM    Calcium 8.8 12/06/2021 05:01 AM     No results found for: B12LT, FOL, RBCF  Lab Results   Component Value Date/Time    LDL, calculated 70.2 09/20/2021 09:25 AM     Lab Results   Component Value Date/Time    Hemoglobin A1c 7.6 (H) 09/20/2021 09:25 AM    Hemoglobin A1c, External 10.3 04/20/2021 12:00 AM     No components found for: TROPQUANT  No results found for: NANCY

## 2021-12-06 NOTE — ED NOTES
Talked to Dr. Tristan Mack at bedside and he stated to give the additional 25mL of 50% dextrose to the patient regarding 66 BS recheck

## 2021-12-06 NOTE — ED NOTES
Assumed care of patient. Pt moaning with eyes closed, responds to painful stimuli. Will open eyes to verbal stimuli. Pt keeps yawning. Pt on cardiac monitor, pulse ox, and BP cuff. Blood sugar 52. Dr Jose Juan Yin notified, orders received for D50 bolus.

## 2021-12-06 NOTE — ED NOTES
TRANSFER - OUT REPORT:    Verbal report given to Sonia Sandra on Herb Brice  being transferred to [facility] for routine progression of care       Report consisted of patients Situation, Background, Assessment and   Recommendations(SBAR). Information from the following report(s) SBAR, ED Summary and MAR was reviewed with the receiving nurse. Lines:   Peripheral IV 12/05/21 Left; Posterior Hand (Active)   Site Assessment Clean, dry, & intact 12/05/21 1928   Phlebitis Assessment 0 12/05/21 1928   Infiltration Assessment 0 12/05/21 1928   Dressing Status Clean, dry, & intact 12/05/21 1928   Dressing Type Tape; Transparent 12/05/21 1928   Hub Color/Line Status Pink; Flushed; Patent 12/05/21 1928   Action Taken Blood drawn; Dressing reinforced 12/05/21 1928       Peripheral IV 12/05/21 Right Antecubital (Active)   Site Assessment Clean, dry, & intact 12/05/21 2202   Phlebitis Assessment 0 12/05/21 2202   Infiltration Assessment 0 12/05/21 2202   Dressing Status Clean, dry, & intact 12/05/21 2202   Dressing Type Tape; Transparent 12/05/21 2202   Hub Color/Line Status Green; Flushed; Patent 12/05/21 2202        Opportunity for questions and clarification was provided.       Patient transported with:   Monitor  Tech

## 2021-12-06 NOTE — ED PROVIDER NOTES
EMERGENCY DEPARTMENT HISTORY AND PHYSICAL EXAM      Date: 12/5/2021  Patient Name: Karri Montgomery    History of Presenting Illness     Chief Complaint   Patient presents with    Altered mental status       History Provided By: EMS and outside records    HPI: Karri Montgomery, 61 y.o. female with a past medical history significant diabetes, hypertension, hyperlipidemia and myocardial infarction presents to the ED with cc of altered mental status and refractory hypoglycemia. Patient was seen at an outside facility when she was discovered altered by her family this morning. Patient's blood glucose noted to be 51. Patient underwent full work-up at local site facility without significant abnormalities noted other than a leukocytosis. There is questionable associated pulmonary infiltrate. There are no other complaints, changes, or physical findings at this time.     PCP: Isadore Rinne, MD    Current Facility-Administered Medications on File Prior to Encounter   Medication Dose Route Frequency Provider Last Rate Last Admin    [COMPLETED] sodium chloride 0.9 % bolus infusion 500 mL  500 mL IntraVENous Toya Yousif MD   IV Completed at 12/05/21 1444    [COMPLETED] dextrose (D50W) injection syrg 25 g  25 g IntraVENous NOW Josh Fernández MD   25 g at 12/05/21 1316    [COMPLETED] cefTRIAXone (ROCEPHIN) 2 g in 0.9% sodium chloride (MBP/ADV) 50 mL MBP  2 g IntraVENous NOW Josh Fernández MD   IV Completed at 12/05/21 1545    [COMPLETED] sodium chloride 0.9 % bolus infusion 1,000 mL  1,000 mL IntraVENous Toya Yousif MD   IV Completed at 12/05/21 1635    [COMPLETED] dextrose (D50W) injection syrg 25 g  25 g IntraVENous NOW Josh Fernández MD   25 g at 12/05/21 1702    [COMPLETED] dextrose 10 % infusion  100 mL/hr IntraVENous Toya Yousif  mL/hr at 12/05/21 1705 100 mL/hr at 12/05/21 1705    [COMPLETED] labetaloL (NORMODYNE;TRANDATE) 20 mg/4 mL (5 mg/mL) injection 10 mg  10 mg IntraVENous Hazel Sears MD   10 mg at 12/05/21 1733    [DISCONTINUED] cefTRIAXone (ROCEPHIN) 2 gram injection             [DISCONTINUED] 0.9% sodium chloride (MBP/ADV) infusion              Current Outpatient Medications on File Prior to Encounter   Medication Sig Dispense Refill    metoprolol succinate (TOPROL-XL) 25 mg XL tablet Take 1 Tablet by mouth daily. 30 Tablet 0    aspirin delayed-release 81 mg tablet Take 1 Tablet by mouth daily. 30 Tablet 0    FeroSuL 325 mg (65 mg iron) tablet take 1 tablet by mouth three times a day 90 Tablet 1    Venlafaxine-ER 24 HR (EFFEXOR-ER) 150 mg tr24 tablet Take 150 mg by mouth daily.  melatonin 3 mg tablet Take 3 mg by mouth nightly.  famotidine (PEPCID) 20 mg tablet take 1 tablet by mouth twice a day      gabapentin (NEURONTIN) 300 mg capsule Take 300 mg by mouth two (2) times a day.  tamsulosin (FLOMAX) 0.4 mg capsule take 1 capsule by mouth once daily 30 Cap 3    albuterol (PROVENTIL HFA, VENTOLIN HFA, PROAIR HFA) 90 mcg/actuation inhaler 2 puffs three to four times a day for 5 days then every 6 hours as needed for shortness of breath 1 Inhaler 0    atorvastatin (LIPITOR) 40 mg tablet Take  by mouth nightly.  amLODIPine (NORVASC) 10 mg tablet Take 10 mg by mouth daily.   0       Past History     Past Medical History:  Past Medical History:   Diagnosis Date    Arthritis     spinal stenosis    [facility]    Left Breast (chemo/radiation)    Diabetes [facility]     Type 2    Edema     legs and ankles    GERD (gastroesophageal reflux disease)     High cholesterol     Hypertension     Ill-defined condition     patient states hemorrhage behind Left eye-following Dr. Rosanna Alvarez MI (myocardial infarction) ([facility] 06/2018    per patient    Neuropathy     Restless leg        Past Surgical History:  Past Surgical History:   Procedure Laterality Date    HX BACK SURGERY  08/30/2018    HX BREAST BIOPSY Left 02/03/2017    BREAST CANCER    HX BREAST LUMPECTOMY Left     HX CATARACT REMOVAL Right     HX HEART CATHETERIZATION  07/2018    no stents    HX KNEE REPLACEMENT         Family History:  Family History   Problem Relation Age of Onset    Hypertension Mother     Heart Failure Father     Heart Disease Sister        Social History:  Social History     Tobacco Use    Smoking status: Never Smoker    Smokeless tobacco: Never Used   Vaping Use    Vaping Use: Never used   Substance Use Topics    Alcohol use: No    Drug use: No       Allergies: Allergies   Allergen Reactions    Bactrim [Sulfamethoprim] Rash    Ciprofloxacin Hives    Keflex [Cephalexin] Nausea and Vomiting    Metformin Other (comments)     GI DISTRESS    Soliqua 100/33 [Insulin Glargine-Lixisenatide] Nausea Only         Review of Systems     Review of Systems   Unable to perform ROS: Mental status change       Physical Exam     Physical Exam  Constitutional:       Appearance: She is ill-appearing. HENT:      Head: Normocephalic and atraumatic. Mouth/Throat:      Mouth: Mucous membranes are moist.   Eyes:      Pupils: Pupils are equal, round, and reactive to light. Pupils are equal.   Cardiovascular:      Rate and Rhythm: Normal rate and regular rhythm. Heart sounds: Normal heart sounds. Pulmonary:      Effort: Pulmonary effort is normal.   Abdominal:      General: There is distension. Palpations: Abdomen is soft. Skin:     General: Skin is warm and dry. Neurological:      Mental Status: She is lethargic.          Lab and Diagnostic Study Results     Labs -     Recent Results (from the past 12 hour(s))   GLUCOSE, POC    Collection Time: 12/05/21  1:55 PM   Result Value Ref Range    Glucose (POC) 88 70 - 110 mg/dL    Performed by [address] [place] [address] ACID    Collection Time: 12/05/21  2:16 PM   Result Value Ref Range    Lactic acid 0.8 0.5 - 2.0 mmol/L   COVID-19 RAPID TEST    Collection Time: 12/05/21  2:18 PM Result Value Ref Range    Specimen source Nasopharyngeal      COVID-19 rapid test Not Detected Not Detected     GLUCOSE, POC    Collection Time: 12/05/21  4:58 PM   Result Value Ref Range    Glucose (POC) 24 (LL) 70 - 110 mg/dL    Performed by [address], POC    Collection Time: 12/05/21  5:01 PM   Result Value Ref Range    Glucose (POC) 24 (LL) 70 - 110 mg/dL    Performed by [address], POC    Collection Time: 12/05/21  5:33 PM   Result Value Ref Range    Glucose (POC) 83 70 - 110 mg/dL    Performed by [address], POC    Collection Time: 12/05/21  6:56 PM   Result Value Ref Range    Glucose (POC) 52 (LL) 65 - 117 mg/dL    Performed by Stacy Otto    AMMONIA    Collection Time: 12/05/21  7:19 PM   Result Value Ref Range    Ammonia 29 <32 umol/L   CBC WITH AUTOMATED DIFF    Collection Time: 12/05/21  7:19 PM   Result Value Ref Range    WBC 14.8 (H) 3.6 - 11.0 K/uL    RBC 4.33 3.80 - 5.20 M/uL    HGB 11.3 (L) 11.5 - 16.0 g/dL    HCT 36.2 35.0 - 47.0 %    MCV 83.6 80.0 - 99.0 FL    MCH 26.1 26.0 - 34.0 PG    MCHC 31.2 30.0 - 36.5 g/dL    RDW 16.7 (H) 11.5 - 14.5 %    PLATELET 950 (H) 934 - 400 K/uL    MPV 9.4 8.9 - 12.9 FL    NRBC 0.0 0.0  WBC    ABSOLUTE NRBC 0.00 0.00 - 0.01 K/uL    NEUTROPHILS 92 (H) 32 - 75 %    LYMPHOCYTES 4 (L) 12 - 49 %    MONOCYTES 4 (L) 5 - 13 %    EOSINOPHILS 0 0 - 7 %    BASOPHILS 0 0 - 1 %    IMMATURE GRANULOCYTES 0 0 - 0.5 %    ABS. NEUTROPHILS 13.4 (H) 1.8 - 8.0 K/UL    ABS. LYMPHOCYTES 0.7 (L) 0.8 - 3.5 K/UL    ABS. MONOCYTES 0.6 0.0 - 1.0 K/UL    ABS. EOSINOPHILS 0.0 0.0 - 0.4 K/UL    ABS. BASOPHILS 0.0 0.0 - 0.1 K/UL    ABS. IMM.  GRANS. 0.0 0.00 - 0.04 K/UL    DF AUTOMATED     METABOLIC PANEL, COMPREHENSIVE    Collection Time: 12/05/21  7:19 PM   Result Value Ref Range    Sodium 142 136 - 145 mmol/L    Potassium 5.1 3.5 - 5.1 mmol/L    Chloride 111 (H) 97 - 108 mmol/L    CO2 28 21 - 32 mmol/L    Anion gap 3 (L) 5 - 15 mmol/L Glucose 166 (H) 65 - 100 mg/dL    BUN 16 6 - 20 mg/dL    Creatinine 1.78 (H) 0.55 - 1.02 mg/dL    BUN/Creatinine ratio 9 (L) 12 - 20      GFR est AA 35 (L) >60 ml/min/1.73m2    GFR est non-AA 29 (L) >60 ml/min/1.73m2    Calcium 8.9 8.5 - 10.1 mg/dL    Bilirubin, total 0.2 0.2 - 1.0 mg/dL    AST (SGOT) 52 (H) 15 - 37 U/L    ALT (SGPT) 23 12 - 78 U/L    Alk.  phosphatase 75 45 - 117 U/L    Protein, total 6.7 6.4 - 8.2 g/dL    Albumin 2.3 (L) 3.5 - 5.0 g/dL    Globulin 4.4 (H) 2.0 - 4.0 g/dL    A-G Ratio 0.5 (L) 1.1 - 2.2     LACTIC ACID    Collection Time: 12/05/21  7:19 PM   Result Value Ref Range    Lactic acid 1.1 0.4 - 2.0 mmol/L   RENAL FUNCTION PANEL    Collection Time: 12/05/21  7:25 PM   Result Value Ref Range    Sodium 140 136 - 145 mmol/L    Potassium 5.0 3.5 - 5.1 mmol/L    Chloride 108 97 - 108 mmol/L    CO2 28 21 - 32 mmol/L    Anion gap 4 (L) 5 - 15 mmol/L    Glucose 166 (H) 65 - 100 mg/dL    BUN 18 6 - 20 mg/dL    Creatinine 1.80 (H) 0.55 - 1.02 mg/dL    BUN/Creatinine ratio 10 (L) 12 - 20      GFR est AA 34 (L) >60 ml/min/1.73m2    GFR est non-AA 28 (L) >60 ml/min/1.73m2    Calcium 8.8 8.5 - 10.1 mg/dL    Phosphorus 3.8 2.6 - 4.7 mg/dL    Albumin 2.4 (L) 3.5 - 5.0 g/dL   TSH 3RD GENERATION    Collection Time: 12/05/21  7:25 PM   Result Value Ref Range    TSH 2.70 0.36 - 3.74 uIU/mL   GLUCOSE, POC    Collection Time: 12/05/21  7:55 PM   Result Value Ref Range    Glucose (POC) 101 65 - 117 mg/dL    Performed by Liam Mancera, POC    Collection Time: 12/05/21  8:35 PM   Result Value Ref Range    Glucose (POC) 72 65 - 117 mg/dL    Performed by Liam Mancera, POC    Collection Time: 12/05/21  9:22 PM   Result Value Ref Range    Glucose (POC) 66 65 - 117 mg/dL    Performed by Liam Mancera, POC    Collection Time: 12/05/21 10:51 PM   Result Value Ref Range    Glucose (POC) 86 65 - 117 mg/dL    Performed by Liam Mancera, POC    Collection Time: 12/06/21 12:36 AM   Result Value Ref Range    Glucose (POC) 43 (LL) 65 - 117 mg/dL    Performed by Liam Mancera, POC    Collection Time: 12/06/21  1:18 AM   Result Value Ref Range    Glucose (POC) 78 65 - 117 mg/dL    Performed by Kev Henry        Radiologic Studies -   @lastxrresult@  CT Results  (Last 48 hours)               12/05/21 1245  CT HEAD WO CONT Final result    Impression:      1. No acute intracranial abnormalities. 2. Atrophy and likely chronic small vessel ischemic changes. Narrative:  EXAM: CT head       CLINICAL INDICATION/HISTORY: Altered mental status       COMPARISON: 12/3/2021       TECHNIQUE: Axial CT imaging of the head was performed without intravenous   contrast. One or more dose reduction techniques were used on this CT: automated   exposure control, adjustment of the mAs and/or kVp according to patient size,   and iterative reconstruction techniques. The specific techniques used on this   CT exam have been documented in the patient's electronic medical record. Digital   Imaging and Communications in Medicine (DICOM) format image data are available   to nonaffiliated external healthcare facilities or entities on a secured, media   free, reciprocally searchable basis with patient authorization for at least a   12-month period after this study. _______________       FINDINGS:       BRAIN AND EXTRA-AXIAL SPACES: Diffuse atrophy identified. There is no   intracranial hemorrhage, mass effect, or midline shift. Periventricular and   subcortical white matter hypodensity identified. There are no abnormal   extra-axial fluid collections. CALVARIUM: Intact. SINUSES: Mucosal thickening involving the left maxillary sinus.        OTHER: None.       _______________               CXR Results  (Last 48 hours)               12/05/21 1302  XR CHEST PORT Final result    Impression:      Questionable left basilar retrocardiac atelectasis and/or infiltrate. No other   focal consolidation. Narrative:  EXAM: CHEST RADIOGRAPH       CLINICAL INDICATION/HISTORY: ams     > Additional: Altered mental status       COMPARISON: 12/3/2021       TECHNIQUE: Portable frontal view of the chest       _______________       FINDINGS:       SUPPORT DEVICES: None. HEART AND MEDIASTINUM: Heart size is normal.       LUNGS AND PLEURAL SPACES: Questionable left basilar retrocardiac atelectasis   and/or infiltrate. No other focal consolidation, effusion, or pneumothorax. BONES AND SOFT TISSUES: Unremarkable.       _______________                   Medical Decision Making   - I am the first provider for this patient. - I reviewed the vital signs, available nursing notes, past medical history, past surgical history, family history and social history. - Initial assessment performed. The patients presenting problems have been discussed, and they are in agreement with the care plan formulated and outlined with them. I have encouraged them to ask questions as they arise throughout their visit. Vital Signs-Reviewed the patient's vital signs.   Patient Vitals for the past 12 hrs:   Temp Pulse Resp BP SpO2   12/06/21 0118 -- 84 -- (!) 173/89 98 %   12/05/21 2340 -- 81 -- (!) 181/87 98 %   12/05/21 2302 99.8 °F (37.7 °C) 78 -- (!) 175/83 98 %   12/05/21 2256 -- 78 -- (!) 184/92 --   12/05/21 2156 -- 85 -- (!) 173/88 --   12/05/21 2123 -- 89 18 (!) 187/88 99 %   12/05/21 2028 -- 87 16 (!) 172/93 99 %   12/05/21 1948 -- -- -- -- 97 %   12/05/21 1900 98.6 °F (37 °C) 82 16 (!) 183/87 99 %       Records Reviewed: Nursing Notes, Old Medical Records, Previous Radiology Studies and Previous Laboratory Studies    The patient presents with altered mental status with a differential diagnosis of  cerebral hemorrhage, chronic dementia, CVA, encephalopathy, hepatic encephalopathy, hypernatremia, hyponatremia, insulin reaction and UTI      ED Course:          Provider Notes (Medical Decision Making):   Patient is a 62-year-old female who presents for evaluation of altered mental status and persistent hypoglycemia. She presents as a transfer from outside facility. Work-up there demonstrating a leukocytosis of 15k and questionable retrocardiac infiltrate. Patient persistently hypoglycemic despite D50. Record review demonstrates hypoglycemia into the 20s. Upon arrival to the ED, patient's blood sugar in the 50s. On physical examination, patient is protecting her airway. She is moaning but unable to provide any history due to mental status change. Patient signed out to Dr. Alondra Barragan for admission to the hospital  MDM       Procedures   Medical Decision Makingedical Decision Making  Performed by: Shelly Rios DO  PROCEDURES:  Procedures       Disposition   Disposition: Admitted to Floor Medical Floor the case was discussed with the admitting physician Elizabeth    Admitted    Diagnosis     Clinical Impression:   1. Hypoglycemia        Attestations:    Shelly Rios DO    Please note that this dictation was completed with [employer], the computer voice recognition software. Quite often unanticipated grammatical, syntax, homophones, and other interpretive errors are inadvertently transcribed by the computer software. Please disregard these errors. Please excuse any errors that have escaped final proofreading. Thank you.

## 2021-12-06 NOTE — PROGRESS NOTES
12/6/21. D/C Plan is home with sister Venkata Redmond @ [phone]) & a family member will transport pt home upon discharge. Pt uses cane/walker. Sister consented to Choice Letter to continue home O2 via [facility]. Referral sent via [employer].

## 2021-12-07 ENCOUNTER — APPOINTMENT (OUTPATIENT)
Dept: MRI IMAGING | Age: 66
DRG: 420 | End: 2021-12-07
Attending: INTERNAL MEDICINE
Payer: MEDICAID

## 2021-12-07 LAB
ANION GAP SERPL CALC-SCNC: 4 MMOL/L (ref 5–15)
ARTERIAL PATENCY WRIST A: POSITIVE
BASE EXCESS BLDA CALC-SCNC: 2.5 MMOL/L (ref 0–2)
BASOPHILS # BLD: 0.1 K/UL (ref 0–0.1)
BASOPHILS NFR BLD: 0 % (ref 0–1)
BDY SITE: ABNORMAL
BUN SERPL-MCNC: 12 MG/DL (ref 6–20)
BUN/CREAT SERPL: 7 (ref 12–20)
CA-I BLD-MCNC: 8.7 MG/DL (ref 8.5–10.1)
CHLORIDE SERPL-SCNC: 107 MMOL/L (ref 97–108)
CO2 SERPL-SCNC: 27 MMOL/L (ref 21–32)
CREAT SERPL-MCNC: 1.69 MG/DL (ref 0.55–1.02)
DIFFERENTIAL METHOD BLD: ABNORMAL
EOSINOPHIL # BLD: 0.6 K/UL (ref 0–0.4)
EOSINOPHIL NFR BLD: 4 % (ref 0–7)
ERYTHROCYTE [DISTWIDTH] IN BLOOD BY AUTOMATED COUNT: 16 % (ref 11.5–14.5)
GLUCOSE BLD STRIP.AUTO-MCNC: 114 MG/DL (ref 65–117)
GLUCOSE BLD STRIP.AUTO-MCNC: 129 MG/DL (ref 65–117)
GLUCOSE BLD STRIP.AUTO-MCNC: 150 MG/DL (ref 65–117)
GLUCOSE BLD STRIP.AUTO-MCNC: 161 MG/DL (ref 65–117)
GLUCOSE BLD STRIP.AUTO-MCNC: 169 MG/DL (ref 65–117)
GLUCOSE BLD STRIP.AUTO-MCNC: 178 MG/DL (ref 65–117)
GLUCOSE BLD STRIP.AUTO-MCNC: 187 MG/DL (ref 65–117)
GLUCOSE BLD STRIP.AUTO-MCNC: 190 MG/DL (ref 65–117)
GLUCOSE BLD STRIP.AUTO-MCNC: 195 MG/DL (ref 65–117)
GLUCOSE SERPL-MCNC: 184 MG/DL (ref 65–100)
HCO3 BLDA-SCNC: 27 MMOL/L (ref 22–26)
HCT VFR BLD AUTO: 35.9 % (ref 35–47)
HGB BLD-MCNC: 11.2 G/DL (ref 11.5–16)
IMM GRANULOCYTES # BLD AUTO: 0.1 K/UL (ref 0–0.04)
IMM GRANULOCYTES NFR BLD AUTO: 0 % (ref 0–0.5)
LYMPHOCYTES # BLD: 1.3 K/UL (ref 0.8–3.5)
LYMPHOCYTES NFR BLD: 9 % (ref 12–49)
MCH RBC QN AUTO: 25.9 PG (ref 26–34)
MCHC RBC AUTO-ENTMCNC: 31.2 G/DL (ref 30–36.5)
MCV RBC AUTO: 82.9 FL (ref 80–99)
MONOCYTES # BLD: 1.2 K/UL (ref 0–1)
MONOCYTES NFR BLD: 9 % (ref 5–13)
NEUTS SEG # BLD: 10.4 K/UL (ref 1.8–8)
NEUTS SEG NFR BLD: 78 % (ref 32–75)
NRBC # BLD: 0 K/UL (ref 0–0.01)
NRBC BLD-RTO: 0 PER 100 WBC
PCO2 BLDA: 43 MMHG (ref 35–45)
PERFORMED BY, TECHID: ABNORMAL
PERFORMED BY, TECHID: NORMAL
PH BLDA: 7.41 [PH] (ref 7.35–7.45)
PLATELET # BLD AUTO: 424 K/UL (ref 150–400)
PMV BLD AUTO: 8.8 FL (ref 8.9–12.9)
PO2 BLDA: 133 MMHG (ref 75–100)
POTASSIUM SERPL-SCNC: 4.3 MMOL/L (ref 3.5–5.1)
PROCALCITONIN SERPL-MCNC: <0.05 NG/ML
RBC # BLD AUTO: 4.33 M/UL (ref 3.8–5.2)
SAO2 % BLD: 99 %
SAO2% DEVICE SAO2% SENSOR NAME: ABNORMAL
SODIUM SERPL-SCNC: 138 MMOL/L (ref 136–145)
SPECIMEN SITE: ABNORMAL
WBC # BLD AUTO: 13.5 K/UL (ref 3.6–11)

## 2021-12-07 PROCEDURE — 84145 PROCALCITONIN (PCT): CPT

## 2021-12-07 PROCEDURE — 70551 MRI BRAIN STEM W/O DYE: CPT

## 2021-12-07 PROCEDURE — 36415 COLL VENOUS BLD VENIPUNCTURE: CPT

## 2021-12-07 PROCEDURE — 74011000258 HC RX REV CODE- 258: Performed by: INTERNAL MEDICINE

## 2021-12-07 PROCEDURE — 74011250636 HC RX REV CODE- 250/636: Performed by: INTERNAL MEDICINE

## 2021-12-07 PROCEDURE — 74011000250 HC RX REV CODE- 250: Performed by: HOSPITALIST

## 2021-12-07 PROCEDURE — 82962 GLUCOSE BLOOD TEST: CPT

## 2021-12-07 PROCEDURE — 74011250636 HC RX REV CODE- 250/636: Performed by: HOSPITALIST

## 2021-12-07 PROCEDURE — 85025 COMPLETE CBC W/AUTO DIFF WBC: CPT

## 2021-12-07 PROCEDURE — 65270000029 HC RM PRIVATE

## 2021-12-07 PROCEDURE — 82803 BLOOD GASES ANY COMBINATION: CPT

## 2021-12-07 PROCEDURE — 74011250637 HC RX REV CODE- 250/637: Performed by: HOSPITALIST

## 2021-12-07 PROCEDURE — 36600 WITHDRAWAL OF ARTERIAL BLOOD: CPT

## 2021-12-07 PROCEDURE — 80048 BASIC METABOLIC PNL TOTAL CA: CPT

## 2021-12-07 PROCEDURE — 93005 ELECTROCARDIOGRAM TRACING: CPT

## 2021-12-07 RX ORDER — HYDROMORPHONE HYDROCHLORIDE 1 MG/ML
0.5 INJECTION, SOLUTION INTRAMUSCULAR; INTRAVENOUS; SUBCUTANEOUS ONCE
Status: COMPLETED | OUTPATIENT
Start: 2021-12-07 | End: 2021-12-07

## 2021-12-07 RX ADMIN — DEXTROSE MONOHYDRATE 100 ML/HR: 100 INJECTION, SOLUTION INTRAVENOUS at 08:12

## 2021-12-07 RX ADMIN — METOPROLOL TARTRATE 5 MG: 5 INJECTION INTRAVENOUS at 08:11

## 2021-12-07 RX ADMIN — ASPIRIN 81 MG: 81 TABLET, COATED ORAL at 16:14

## 2021-12-07 RX ADMIN — Medication 10 ML: at 23:17

## 2021-12-07 RX ADMIN — METOPROLOL TARTRATE 5 MG: 5 INJECTION INTRAVENOUS at 16:27

## 2021-12-07 RX ADMIN — METOPROLOL TARTRATE 5 MG: 5 INJECTION INTRAVENOUS at 00:22

## 2021-12-07 RX ADMIN — PIPERACILLIN SODIUM AND TAZOBACTAM SODIUM 3.38 G: 3; .375 INJECTION, POWDER, LYOPHILIZED, FOR SOLUTION INTRAVENOUS at 16:20

## 2021-12-07 RX ADMIN — ATORVASTATIN CALCIUM 40 MG: 40 TABLET, FILM COATED ORAL at 21:36

## 2021-12-07 RX ADMIN — METOPROLOL TARTRATE 5 MG: 5 INJECTION INTRAVENOUS at 19:45

## 2021-12-07 RX ADMIN — METOPROLOL TARTRATE 5 MG: 5 INJECTION INTRAVENOUS at 04:24

## 2021-12-07 RX ADMIN — FERROUS SULFATE TAB 325 MG (65 MG ELEMENTAL FE) 325 MG: 325 (65 FE) TAB at 21:36

## 2021-12-07 RX ADMIN — METOPROLOL SUCCINATE 50 MG: 25 TABLET, EXTENDED RELEASE ORAL at 16:13

## 2021-12-07 RX ADMIN — NITROGLYCERIN 1 INCH: 20 OINTMENT TOPICAL at 16:29

## 2021-12-07 RX ADMIN — DEXTROSE AND SODIUM CHLORIDE 100 ML/HR: 5; 450 INJECTION, SOLUTION INTRAVENOUS at 06:47

## 2021-12-07 RX ADMIN — Medication 10 ML: at 06:37

## 2021-12-07 RX ADMIN — HYDROMORPHONE HYDROCHLORIDE 0.5 MG: 1 INJECTION, SOLUTION INTRAMUSCULAR; INTRAVENOUS; SUBCUTANEOUS at 03:22

## 2021-12-07 RX ADMIN — LABETALOL HYDROCHLORIDE 10 MG: 5 INJECTION, SOLUTION INTRAVENOUS at 03:10

## 2021-12-07 RX ADMIN — FERROUS SULFATE TAB 325 MG (65 MG ELEMENTAL FE) 325 MG: 325 (65 FE) TAB at 16:14

## 2021-12-07 RX ADMIN — FAMOTIDINE 20 MG: 20 TABLET, FILM COATED ORAL at 16:14

## 2021-12-07 RX ADMIN — DEXTROSE AND SODIUM CHLORIDE 100 ML/HR: 5; 450 INJECTION, SOLUTION INTRAVENOUS at 16:37

## 2021-12-07 RX ADMIN — NITROGLYCERIN 1 INCH: 20 OINTMENT TOPICAL at 06:37

## 2021-12-07 RX ADMIN — NITROGLYCERIN 1 INCH: 20 OINTMENT TOPICAL at 00:23

## 2021-12-07 NOTE — ED NOTES
Attempted to call sister at [phone] to obtain information for MRI checklist.  No one answered and the mailbox was full. . will call back later to get questions answered.

## 2021-12-07 NOTE — PROGRESS NOTES
Hospitalist Progress Note               Daily Progress Note: 12/7/2021      Subjective:   Hospital course to date: Patient is a 79-year-old female with history of diabetes, breast cancer and hypertension who was brought to the ED on 12/5 after being found by family unresponsive the night before. She had been in the ED the day before that with hypoglycemia. He was noted have a blood sugar of 51 at home and was given D10 but no significant improvement. Chest x-ray showed left lower lobe pneumonia. [facility]. Blood sugar on arrival here was 73. She was started on D10. All of patient's diabetic medications were supposed to have been previously discontinued although unclear if she had been inadvertently taking them. According to her sister the last time she took insulin was Friday of last week.  ------    Patient seen this morning for follow-up. She remains obtunded, does show some response to painful stimuli. Blood sugar this morning is 178. Hemoglobin A1c is 7.2    I do not see that antibiotics were ever started for her suspected aspiration pneumonia    Problem List:  Problem List as of 12/7/2021 Date Reviewed: 12/5/2021          Codes Class Noted - Resolved    Metabolic encephalopathy ROP-32-ZB: G93.41  ICD-9-CM: 348.31  12/5/2021 - Present        CKD (chronic kidney disease) ICD-10-CM: N18.9  ICD-9-CM: 585.9  9/24/2021 - Present    Overview Signed 9/24/2021  3:57 PM by Adrienne Colunga MD     In the setting of severe, uncontrolled and prolonged diabetes, with diabetic retinopathy. Protienuria to 2.5g/g of creatinine. Would consider workup for acute GN given inconsistent history and rising creatinine.  Though clinical hx appears consistent with aggressive diabetic nephropathy             Sepsis ([facility] ICD-10-CM: A41.9  ICD-9-CM: 038.9, 995.91  9/22/2021 - Present        Esophagitis with gastritis ICD-10-CM: K29.70, K20.90  ICD-9-CM: 530.19  9/22/2021 - Present Elevated troponin ICD-10-CM: R77.8  ICD-9-CM: 790.6  9/20/2021 - Present        Syncope ICD-10-CM: R55  ICD-9-CM: 780.2  9/20/2021 - Present        Hypoglycemia ICD-10-CM: E16.2  ICD-9-CM: 251.2  9/20/2021 - Present        Chronic respiratory failure with hypoxia (HCC) ICD-10-CM: J96.11  ICD-9-CM: 518.83, 799.02  9/20/2021 - Present        Chest pain ICD-10-CM: R07.9  ICD-9-CM: 786.50  9/19/2021 - Present        Anxiety ICD-10-CM: F41.9  ICD-9-CM: 300.00  2/9/2021 - Present        Depression ICD-10-CM: F32. A  ICD-9-CM: 293  2/9/2021 - Present        Dyslipidemia ICD-10-CM: E78.5  ICD-9-CM: 272.4  2/9/2021 - Present        Hypertensive cardiovascular disease ICD-10-CM: I11.9  ICD-9-CM: 402.90  2/9/2021 - Present        Knee osteoarthritis ICD-10-CM: M17.10  ICD-9-CM: 715.36  1/25/2021 - Present        COPD with acute exacerbation ([facility] ICD-10-CM: J44.1  ICD-9-CM: 491.21  11/29/2020 - Present        Person under investigation for COVID-19 ICD-10-CM: Z20.822  ICD-9-CM: V01.79  11/29/2020 - Present        COPD (chronic obstructive pulmonary disease) ([facility] ICD-10-CM: J44.9  ICD-9-CM: 630  11/29/2020 - Present        DM (diabetes mellitus) ([facility] ICD-10-CM: E11.9  ICD-9-CM: 250.00  11/29/2020 - Present        COVID-19 ICD-10-CM: U07.1  ICD-9-CM: 079.89  10/1/2020 - Present        HTN (hypertension) ICD-10-CM: I10  ICD-9-CM: 401.9  10/1/2020 - Present        Diabetic gastroparesis ([facility] ICD-10-CM: E11.43, K31.84  ICD-9-CM: 250.60, 536.3  10/1/2020 - Present        DVT prophylaxis ICD-10-CM: Z29.9  ICD-9-CM: V07.9  10/1/2020 - Present        PNA (pneumonia) ICD-10-CM: J18.9  ICD-9-CM: 940  9/30/2020 - Present        Acute respiratory failure with hypoxia (HCC) ICD-10-CM: J96.01  ICD-9-CM: 518.81  9/30/2020 - Present        Acute kidney injury ([facility] ICD-10-CM: N17.9  ICD-9-CM: 584.9  9/30/2020 - Present        Type 2 diabetes mellitus with hyperglycemia, with long-term current use of insulin ([facility] ICD-10-CM: E11.65, Z79.4  ICD-9-CM: 250.00, 790.29, V58.67  9/30/2020 - Present        Ureteral stone ICD-10-CM: N20.1  ICD-9-CM: 592.1  2/15/2019 - Present        S/P lumbar fusion ICD-10-CM: Z98.1  ICD-9-CM: V45.4  10/16/2018 - Present        CAP (community acquired pneumonia) ICD-10-CM: J18.9  ICD-9-CM: 861  9/1/2018 - Present        Shortness of breath ICD-10-CM: R06.02  ICD-9-CM: 786.05  9/1/2018 - Present        Fever and chills ICD-10-CM: R50.9  ICD-9-CM: 780.60  9/1/2018 - Present        Spinal stenosis of lumbar region at multiple levels ICD-10-CM: M48.061  ICD-9-CM: 724.02  8/30/2018 - Present        Scoliosis ICD-10-CM: M41.9  ICD-9-CM: 737.30  8/30/2018 - Present        Spinal stenosis ICD-10-CM: M48.00  ICD-9-CM: 724.00  8/30/2018 - Present        Pre-op testing ICD-10-CM: T67.986  ICD-9-CM: V72.84  8/27/2018 - Present        Severe obesity (BMI 35.0-39. 9) ICD-10-CM: E66.01  ICD-9-CM: 278.01  6/20/2018 - Present        Spinal stenosis, lumbar region without neurogenic claudication ICD-10-CM: M48.061  ICD-9-CM: 724.02  5/16/2018 - Present        Lumbosacral spondylosis without myelopathy ICD-10-CM: M47.817  ICD-9-CM: 721.3  4/25/2018 - Present        DDD (degenerative disc disease), lumbar ICD-10-CM: M51.36  ICD-9-CM: 722.52  4/25/2018 - Present        Malignant neoplasm of female breast [facility]) ICD-10-CM: C50.919  ICD-9-CM: 174.9  4/25/2018 - Present              Medications reviewed  Current Facility-Administered Medications   Medication Dose Route Frequency    dextrose 10 % infusion  100 mL/hr IntraVENous CONTINUOUS    nitroglycerin (NITROBID) 2 % ointment 1 Inch  1 Inch Topical Q6H    labetaloL (NORMODYNE;TRANDATE) 20 mg/4 mL (5 mg/mL) injection 10 mg  10 mg IntraVENous Q4H PRN    dextrose 5 % - 0.45% NaCl infusion  100 mL/hr IntraVENous CONTINUOUS    [Held by provider] amLODIPine (NORVASC) tablet 10 mg  10 mg Oral DAILY    atorvastatin (LIPITOR) tablet 40 mg  40 mg Oral QHS    albuterol (PROVENTIL HFA, VENTOLIN HFA, PROAIR HFA) inhaler 2 Puff  2 Puff Inhalation Q4H PRN    [Held by provider] tamsulosin (FLOMAX) capsule 0.4 mg  0.4 mg Oral DAILY    [Held by provider] gabapentin (NEURONTIN) capsule 300 mg  300 mg Oral BID    [Held by provider] melatonin tablet 3 mg  3 mg Oral QHS    venlafaxine-SR (EFFEXOR-XR) capsule 150 mg  150 mg Oral DAILY WITH BREAKFAST    ferrous sulfate tablet 325 mg  1 Tablet Oral TID    aspirin delayed-release tablet 81 mg  81 mg Oral DAILY    glucose chewable tablet 16 g  4 Tablet Oral PRN    dextrose (D50W) injection syrg 12.5-25 g  25-50 mL IntraVENous PRN    glucagon (GLUCAGEN) injection 1 mg  1 mg IntraMUSCular PRN    sodium chloride (NS) flush 5-40 mL  5-40 mL IntraVENous Q8H    sodium chloride (NS) flush 5-40 mL  5-40 mL IntraVENous PRN    acetaminophen (TYLENOL) tablet 650 mg  650 mg Oral Q6H PRN    Or    acetaminophen (TYLENOL) suppository 650 mg  650 mg Rectal Q6H PRN    ondansetron (ZOFRAN ODT) tablet 4 mg  4 mg Oral Q6H PRN    Or    ondansetron (ZOFRAN) injection 4 mg  4 mg IntraVENous Q6H PRN    famotidine (PEPCID) tablet 20 mg  20 mg Oral DAILY    metoprolol (LOPRESSOR) injection 5 mg  5 mg IntraVENous Q4H    metoprolol succinate (TOPROL-XL) XL tablet 50 mg  50 mg Oral DAILY     Current Outpatient Medications   Medication Sig    aspirin delayed-release 81 mg tablet Take 1 Tablet by mouth daily.  FeroSuL 325 mg (65 mg iron) tablet take 1 tablet by mouth three times a day    Venlafaxine-ER 24 HR (EFFEXOR-ER) 150 mg tr24 tablet Take 150 mg by mouth daily.  melatonin 3 mg tablet Take 3 mg by mouth nightly.     famotidine (PEPCID) 20 mg tablet take 1 tablet by mouth twice a day    tamsulosin (FLOMAX) 0.4 mg capsule take 1 capsule by mouth once daily    albuterol (PROVENTIL HFA, VENTOLIN HFA, PROAIR HFA) 90 mcg/actuation inhaler 2 puffs three to four times a day for 5 days then every 6 hours as needed for shortness of breath    atorvastatin (LIPITOR) 40 mg tablet Take 40 mg by mouth nightly.  amLODIPine (NORVASC) 10 mg tablet Take 10 mg by mouth daily. Review of Systems:   Review of systems not obtained due to patient factors. Objective:   Physical Exam:     Visit Vitals  BP (!) 172/79   Pulse 82   Temp 98.8 °F (37.1 °C)   Resp 18   Ht 5' 5\" (1.651 m)   Wt 99.8 kg (220 lb)   SpO2 96%   BMI 36.61 kg/m²    O2 Flow Rate (L/min): 2 l/min O2 Device: Nasal cannula    Temp (24hrs), Av.4 °F (36.9 °C), Min:98 °F (36.7 °C), Max:98.8 °F (37.1 °C)    No intake/output data recorded. 1901 -  0700  In: 3653.3 [I.V.:3653.3]  Out: 3800 [Urine:3800]    General:   Obtunded, some response to pain   Lungs:   Clear to auscultation bilaterally. Chest wall:  No tenderness or deformity. Heart:  Regular rate and rhythm, S1, S2 normal, no murmur, click, rub or gallop. Abdomen:   Soft, non-tender. Bowel sounds normal. No masses,  No organomegaly. Extremities: Extremities normal, atraumatic, no cyanosis or edema. Pulses: 2+ and symmetric all extremities. Skin: Skin color, texture, turgor normal. No rashes or lesions   Neurologic: CNII-XII intact. Unable to fully assess         Data Review:       Recent Days:  Recent Labs     21  1230   WBC 14.8* 15.6*   HGB 11.3* 12.5   HCT 36.2 40.2   * 530*     Recent Labs     21  0501 21  1925 21  19121  1230 21  1230    140 142   < > 140   K 4.6 5.0 5.1   < > 5.0   * 108 111*   < > 104   CO2 28 28 28   < > 28   GLU 63* 166* 166*   < > 73   BUN 17 18 16   < > 17   CREA 1.80* 1.80* 1.78*   < > 1.50*   CA 8.8 8.8 8.9   < > 9.6   PHOS 3.7 3.8  --   --   --    ALB 2.4* 2.4* 2.3*   < > 3.4*   TBILI  --   --  0.2  --  0.4   ALT  --   --  23  --  20    < > = values in this interval not displayed.      Recent Labs     21  1245   PH 7.41   PCO2 49*   PO2 83*   HCO3 30*   FIO2 21.0       24 Hour Results:  Recent Results (from the past 24 hour(s))   GLUCOSE, POC    Collection Time: 12/06/21 10:07 AM   Result Value Ref Range    Glucose (POC) 87 65 - 117 mg/dL    Performed by [place], POC    Collection Time: 12/06/21 10:17 AM   Result Value Ref Range    Glucose (POC) 78 65 - 117 mg/dL    Performed by Rebecca Denton    GLUCOSE, POC    Collection Time: 12/06/21 11:25 AM   Result Value Ref Range    Glucose (POC) 75 65 - 117 mg/dL    Performed by An Chris Schütt 56, POC    Collection Time: 12/06/21 12:26 PM   Result Value Ref Range    Glucose (POC) 62 (L) 65 - 117 mg/dL    Performed by Nadine Alvarez RN (Traveler)    GLUCOSE, POC    Collection Time: 12/06/21  1:10 PM   Result Value Ref Range    Glucose (POC) 119 (H) 65 - 117 mg/dL    Performed by An Chris Schütt [address], POC    Collection Time: 12/06/21  2:06 PM   Result Value Ref Range    Glucose (POC) 94 65 - 117 mg/dL    Performed by Princess Sink    GLUCOSE, POC    Collection Time: 12/06/21  3:58 PM   Result Value Ref Range    Glucose (POC) 94 65 - 117 mg/dL    Performed by Princess Sink    GLUCOSE, POC    Collection Time: 12/06/21  5:49 PM   Result Value Ref Range    Glucose (POC) 75 65 - 117 mg/dL    Performed by An Chris Schütt 56, POC    Collection Time: 12/06/21  6:44 PM   Result Value Ref Range    Glucose (POC) 71 65 - 117 mg/dL    Performed by An Chris Schütt 56, POC    Collection Time: 12/06/21  8:37 PM   Result Value Ref Range    Glucose (POC) 109 65 - 117 mg/dL    Performed by Shweta Coker, POC    Collection Time: 12/06/21 10:17 PM   Result Value Ref Range    Glucose (POC) 102 65 - 117 mg/dL    Performed by Shanika Laughlin, POC    Collection Time: 12/07/21 12:16 AM   Result Value Ref Range    Glucose (POC) 114 65 - 117 mg/dL    Performed by Shanika Laughlin, POC    Collection Time: 12/07/21  2:57 AM   Result Value Ref Range    Glucose (POC) 129 (H) 65 - 117 mg/dL    Performed by Jimbo, POC    Collection Time: 12/07/21  6:51 AM Result Value Ref Range    Glucose (POC) 169 (H) 65 - 117 mg/dL    Performed by Esther Hassan POC    Collection Time: 12/07/21  8:11 AM   Result Value Ref Range    Glucose (POC) 178 (H) 65 - 117 mg/dL    Performed by Annabelle Nava        No orders to display        Assessment:  Hypoglycemia and diabetes mellitus type 2, despite not taking insulin or Metformin for greater than 72 hours    Hypoglycemic encephalopathy, persistent    Possible left lower lobe pneumonia, likely aspiration    Chronic kidney disease stage III    History of breast cancer    COPD without exacerbation    Chronic respiratory failure with hypoxia    History of gastritis/esophagitis/gastroparesis      Plan:  Change to D5 half-normal saline  Continue to monitor blood sugars  Start Zosyn  Check procalcitonin  Recheck labs     Place NG tube and start tube feeds    Obtain MRI of brain    I updated her sister-in-law Kirk Aldana by phone, who is the family       Care Plan discussed with: Patient/Family    Disposition: Awaiting bed in ICU    Total time spent with patient: 30 minutes.     Jose Guadalupe Espinoza MD

## 2021-12-07 NOTE — ED NOTES
Pt placed on a hospital bed, iv pain meds were given, pt appears much more comfortable, sleeping at present, no moaning in pain currently

## 2021-12-08 LAB
ATRIAL RATE: 70 BPM
CALCULATED P AXIS, ECG09: 70 DEGREES
CALCULATED R AXIS, ECG10: -58 DEGREES
CALCULATED T AXIS, ECG11: 81 DEGREES
DIAGNOSIS, 93000: NORMAL
GLUCOSE BLD STRIP.AUTO-MCNC: 115 MG/DL (ref 65–117)
GLUCOSE BLD STRIP.AUTO-MCNC: 121 MG/DL (ref 65–117)
GLUCOSE BLD STRIP.AUTO-MCNC: 123 MG/DL (ref 65–117)
GLUCOSE BLD STRIP.AUTO-MCNC: 125 MG/DL (ref 65–117)
GLUCOSE BLD STRIP.AUTO-MCNC: 134 MG/DL (ref 65–117)
GLUCOSE BLD STRIP.AUTO-MCNC: 136 MG/DL (ref 65–117)
GLUCOSE BLD STRIP.AUTO-MCNC: 140 MG/DL (ref 65–117)
GLUCOSE BLD STRIP.AUTO-MCNC: 141 MG/DL (ref 65–117)
GLUCOSE BLD STRIP.AUTO-MCNC: 141 MG/DL (ref 65–117)
GLUCOSE BLD STRIP.AUTO-MCNC: 143 MG/DL (ref 65–117)
GLUCOSE BLD STRIP.AUTO-MCNC: 144 MG/DL (ref 65–117)
GLUCOSE BLD STRIP.AUTO-MCNC: 157 MG/DL (ref 65–117)
GLUCOSE BLD STRIP.AUTO-MCNC: 160 MG/DL (ref 65–117)
GLUCOSE BLD STRIP.AUTO-MCNC: 161 MG/DL (ref 65–117)
GLUCOSE BLD STRIP.AUTO-MCNC: 168 MG/DL (ref 65–117)
GLUCOSE BLD STRIP.AUTO-MCNC: 171 MG/DL (ref 65–117)
GLUCOSE BLD STRIP.AUTO-MCNC: 183 MG/DL (ref 65–117)
GLUCOSE BLD STRIP.AUTO-MCNC: 189 MG/DL (ref 65–117)
GLUCOSE BLD STRIP.AUTO-MCNC: 196 MG/DL (ref 65–117)
P-R INTERVAL, ECG05: 156 MS
PERFORMED BY, TECHID: ABNORMAL
PERFORMED BY, TECHID: NORMAL
Q-T INTERVAL, ECG07: 430 MS
QRS DURATION, ECG06: 124 MS
QTC CALCULATION (BEZET), ECG08: 464 MS
VENTRICULAR RATE, ECG03: 70 BPM

## 2021-12-08 PROCEDURE — 74011000250 HC RX REV CODE- 250: Performed by: STUDENT IN AN ORGANIZED HEALTH CARE EDUCATION/TRAINING PROGRAM

## 2021-12-08 PROCEDURE — 74011000258 HC RX REV CODE- 258: Performed by: INTERNAL MEDICINE

## 2021-12-08 PROCEDURE — 74011250636 HC RX REV CODE- 250/636: Performed by: HOSPITALIST

## 2021-12-08 PROCEDURE — 82962 GLUCOSE BLOOD TEST: CPT

## 2021-12-08 PROCEDURE — 74011250636 HC RX REV CODE- 250/636: Performed by: INTERNAL MEDICINE

## 2021-12-08 PROCEDURE — 65610000006 HC RM INTENSIVE CARE

## 2021-12-08 PROCEDURE — 74011000250 HC RX REV CODE- 250: Performed by: HOSPITALIST

## 2021-12-08 PROCEDURE — 74011250637 HC RX REV CODE- 250/637: Performed by: HOSPITALIST

## 2021-12-08 RX ORDER — HYDRALAZINE HYDROCHLORIDE 20 MG/ML
10 INJECTION INTRAMUSCULAR; INTRAVENOUS
Status: DISCONTINUED | OUTPATIENT
Start: 2021-12-08 | End: 2021-12-22 | Stop reason: HOSPADM

## 2021-12-08 RX ORDER — DEXTROSE MONOHYDRATE AND SODIUM CHLORIDE 5; .45 G/100ML; G/100ML
50 INJECTION, SOLUTION INTRAVENOUS CONTINUOUS
Status: DISCONTINUED | OUTPATIENT
Start: 2021-12-08 | End: 2021-12-08

## 2021-12-08 RX ADMIN — VENLAFAXINE HYDROCHLORIDE 150 MG: 75 CAPSULE, EXTENDED RELEASE ORAL at 07:43

## 2021-12-08 RX ADMIN — FAMOTIDINE 20 MG: 20 TABLET, FILM COATED ORAL at 08:00

## 2021-12-08 RX ADMIN — PIPERACILLIN SODIUM AND TAZOBACTAM SODIUM 3.38 G: 3; .375 INJECTION, POWDER, LYOPHILIZED, FOR SOLUTION INTRAVENOUS at 10:51

## 2021-12-08 RX ADMIN — METOPROLOL TARTRATE 5 MG: 5 INJECTION INTRAVENOUS at 13:11

## 2021-12-08 RX ADMIN — FERROUS SULFATE TAB 325 MG (65 MG ELEMENTAL FE) 325 MG: 325 (65 FE) TAB at 17:05

## 2021-12-08 RX ADMIN — METOPROLOL SUCCINATE 50 MG: 25 TABLET, EXTENDED RELEASE ORAL at 08:00

## 2021-12-08 RX ADMIN — Medication 10 ML: at 14:10

## 2021-12-08 RX ADMIN — LABETALOL HYDROCHLORIDE 10 MG: 5 INJECTION, SOLUTION INTRAVENOUS at 06:10

## 2021-12-08 RX ADMIN — NITROGLYCERIN 1 INCH: 20 OINTMENT TOPICAL at 00:47

## 2021-12-08 RX ADMIN — HYDRALAZINE HYDROCHLORIDE 10 MG: 20 INJECTION INTRAMUSCULAR; INTRAVENOUS at 21:19

## 2021-12-08 RX ADMIN — DEXTROSE AND SODIUM CHLORIDE 50 ML/HR: 5; 450 INJECTION, SOLUTION INTRAVENOUS at 08:50

## 2021-12-08 RX ADMIN — PIPERACILLIN SODIUM AND TAZOBACTAM SODIUM 3.38 G: 3; .375 INJECTION, POWDER, LYOPHILIZED, FOR SOLUTION INTRAVENOUS at 00:32

## 2021-12-08 RX ADMIN — METOPROLOL TARTRATE 5 MG: 5 INJECTION INTRAVENOUS at 07:40

## 2021-12-08 RX ADMIN — METOPROLOL TARTRATE 5 MG: 5 INJECTION INTRAVENOUS at 17:05

## 2021-12-08 RX ADMIN — PIPERACILLIN SODIUM AND TAZOBACTAM SODIUM 3.38 G: 3; .375 INJECTION, POWDER, LYOPHILIZED, FOR SOLUTION INTRAVENOUS at 18:12

## 2021-12-08 RX ADMIN — ASPIRIN 81 MG: 81 TABLET, COATED ORAL at 08:00

## 2021-12-08 RX ADMIN — DEXTROSE AND SODIUM CHLORIDE 50 ML/HR: 5; 450 INJECTION, SOLUTION INTRAVENOUS at 04:35

## 2021-12-08 RX ADMIN — NITROGLYCERIN 1 INCH: 20 OINTMENT TOPICAL at 13:11

## 2021-12-08 RX ADMIN — NITROGLYCERIN 1 INCH: 20 OINTMENT TOPICAL at 07:41

## 2021-12-08 RX ADMIN — NITROGLYCERIN 1 INCH: 20 OINTMENT TOPICAL at 18:11

## 2021-12-08 RX ADMIN — Medication 10 ML: at 21:24

## 2021-12-08 RX ADMIN — FERROUS SULFATE TAB 325 MG (65 MG ELEMENTAL FE) 325 MG: 325 (65 FE) TAB at 08:00

## 2021-12-08 NOTE — ED NOTES
Assumed care of patient. Responds to painful stimuli. Patient on cardiac monitor and continuous pulse ox.

## 2021-12-08 NOTE — PROGRESS NOTES
12/8/21. Pt remains in ED awaiting admission to the floor with DX: AMS/Hypoglycemis. D/C Plan remains home with sister & home O2.

## 2021-12-08 NOTE — PROGRESS NOTES
Hospitalist Progress Note               Daily Progress Note: 12/8/2021      Subjective:   Hospital course to date: Patient is a 80-year-old female with history of diabetes, breast cancer and hypertension who was brought to the ED on 12/5 after being found by family unresponsive the night before. She had been in the ED the day before that with hypoglycemia. He was noted have a blood sugar of 51 at home and was given D10 but no significant improvement. Chest x-ray showed left lower lobe pneumonia. She was transferred from [facility] ED. Blood sugar on arrival here was 73. She was started on D10. All of patient's diabetic medications were supposed to have been previously discontinued although unclear if she had been inadvertently taking them. According to her sister the last time she took insulin was Friday of last week.  ------    Patient seen this morning for follow-up. She remains essentially unresponsive. With vigorous stimulation she seemed to start laughing. NG tube is in place and tube feeds started. With adjustment of the NG tube she does grimace and starts to grab at it. Her blood sugars dropped when D5 was stopped yesterday    Procalcitonin was negative    Problem List:  Problem List as of 12/8/2021 Date Reviewed: 12/5/2021          Codes Class Noted - Resolved    Metabolic encephalopathy JPN-27-FO: G93.41  ICD-9-CM: 348.31  12/5/2021 - Present        CKD (chronic kidney disease) ICD-10-CM: N18.9  ICD-9-CM: 585.9  9/24/2021 - Present    Overview Signed 9/24/2021  3:57 PM by Cole Matthew MD     In the setting of severe, uncontrolled and prolonged diabetes, with diabetic retinopathy. Protienuria to 2.5g/g of creatinine. Would consider workup for acute GN given inconsistent history and rising creatinine.  Though clinical hx appears consistent with aggressive diabetic nephropathy             Sepsis ([facility] ICD-10-CM: A41.9  ICD-9-CM: 038.9, 995.91  9/22/2021 - Present Esophagitis with gastritis ICD-10-CM: K29.70, K20.90  ICD-9-CM: 530.19  9/22/2021 - Present        Elevated troponin ICD-10-CM: R77.8  ICD-9-CM: 790.6  9/20/2021 - Present        Syncope ICD-10-CM: R55  ICD-9-CM: 780.2  9/20/2021 - Present        Hypoglycemia ICD-10-CM: E16.2  ICD-9-CM: 251.2  9/20/2021 - Present        Chronic respiratory failure with hypoxia (HCC) ICD-10-CM: J96.11  ICD-9-CM: 518.83, 799.02  9/20/2021 - Present        Chest pain ICD-10-CM: R07.9  ICD-9-CM: 786.50  9/19/2021 - Present        Anxiety ICD-10-CM: F41.9  ICD-9-CM: 300.00  2/9/2021 - Present        Depression ICD-10-CM: F32. A  ICD-9-CM: 855  2/9/2021 - Present        Dyslipidemia ICD-10-CM: E78.5  ICD-9-CM: 272.4  2/9/2021 - Present        Hypertensive cardiovascular disease ICD-10-CM: I11.9  ICD-9-CM: 402.90  2/9/2021 - Present        Knee osteoarthritis ICD-10-CM: M17.10  ICD-9-CM: 715.36  1/25/2021 - Present        COPD with acute exacerbation ([facility] ICD-10-CM: J44.1  ICD-9-CM: 491.21  11/29/2020 - Present        Person under investigation for COVID-19 ICD-10-CM: Z20.822  ICD-9-CM: V01.79  11/29/2020 - Present        COPD (chronic obstructive pulmonary disease) ([facility] ICD-10-CM: J44.9  ICD-9-CM: 508  11/29/2020 - Present        DM (diabetes mellitus) ([facility] ICD-10-CM: E11.9  ICD-9-CM: 250.00  11/29/2020 - Present        COVID-19 ICD-10-CM: U07.1  ICD-9-CM: 079.89  10/1/2020 - Present        HTN (hypertension) ICD-10-CM: I10  ICD-9-CM: 401.9  10/1/2020 - Present        Diabetic gastroparesis ([facility] ICD-10-CM: E11.43, K31.84  ICD-9-CM: 250.60, 536.3  10/1/2020 - Present        DVT prophylaxis ICD-10-CM: Z29.9  ICD-9-CM: V07.9  10/1/2020 - Present        PNA (pneumonia) ICD-10-CM: J18.9  ICD-9-CM: 246  9/30/2020 - Present        Acute respiratory failure with hypoxia ([facility] ICD-10-CM: J96.01  ICD-9-CM: 518.81  9/30/2020 - Present        Acute kidney injury ([facility] ICD-10-CM: N17.9  ICD-9-CM: 584.9  9/30/2020 - Present        Type 2 diabetes mellitus with hyperglycemia, with long-term current use of insulin (HCC) ICD-10-CM: E11.65, Z79.4  ICD-9-CM: 250.00, 790.29, V58.67  9/30/2020 - Present        Ureteral stone ICD-10-CM: N20.1  ICD-9-CM: 592.1  2/15/2019 - Present        S/P lumbar fusion ICD-10-CM: Z98.1  ICD-9-CM: V45.4  10/16/2018 - Present        CAP (community acquired pneumonia) ICD-10-CM: J18.9  ICD-9-CM: 486  9/1/2018 - Present        Shortness of breath ICD-10-CM: R06.02  ICD-9-CM: 786.05  9/1/2018 - Present        Fever and chills ICD-10-CM: R50.9  ICD-9-CM: 780.60  9/1/2018 - Present        Spinal stenosis of lumbar region at multiple levels ICD-10-CM: M48.061  ICD-9-CM: 724.02  8/30/2018 - Present        Scoliosis ICD-10-CM: M41.9  ICD-9-CM: 737.30  8/30/2018 - Present        Spinal stenosis ICD-10-CM: M48.00  ICD-9-CM: 724.00  8/30/2018 - Present        Pre-op testing ICD-10-CM: X42.001  ICD-9-CM: V72.84  8/27/2018 - Present        Severe obesity (BMI 35.0-39. 9) ICD-10-CM: E66.01  ICD-9-CM: 278.01  6/20/2018 - Present        Spinal stenosis, lumbar region without neurogenic claudication ICD-10-CM: M48.061  ICD-9-CM: 724.02  5/16/2018 - Present        Lumbosacral spondylosis without myelopathy ICD-10-CM: M47.817  ICD-9-CM: 721.3  4/25/2018 - Present        DDD (degenerative disc disease), lumbar ICD-10-CM: M51.36  ICD-9-CM: 722.52  4/25/2018 - Present        Malignant neoplasm of female breast [facility]) ICD-10-CM: C50.919  ICD-9-CM: 174.9  4/25/2018 - Present              Medications reviewed  Current Facility-Administered Medications   Medication Dose Route Frequency    dextrose 5 % - 0.45% NaCl infusion  50 mL/hr IntraVENous CONTINUOUS    piperacillin-tazobactam (ZOSYN) 3.375 g in 0.9% sodium chloride (MBP/ADV) 100 mL MBP  3.375 g IntraVENous Q8H    nitroglycerin (NITROBID) 2 % ointment 1 Inch  1 Inch Topical Q6H    labetaloL (NORMODYNE;TRANDATE) 20 mg/4 mL (5 mg/mL) injection 10 mg  10 mg IntraVENous Q4H PRN    [Held by provider] amLODIPine (NORVASC) tablet 10 mg  10 mg Oral DAILY    atorvastatin (LIPITOR) tablet 40 mg  40 mg Oral QHS    albuterol (PROVENTIL HFA, VENTOLIN HFA, PROAIR HFA) inhaler 2 Puff  2 Puff Inhalation Q4H PRN    [Held by provider] tamsulosin (FLOMAX) capsule 0.4 mg  0.4 mg Oral DAILY    [Held by provider] gabapentin (NEURONTIN) capsule 300 mg  300 mg Oral BID    [Held by provider] melatonin tablet 3 mg  3 mg Oral QHS    venlafaxine-SR (EFFEXOR-XR) capsule 150 mg  150 mg Oral DAILY WITH BREAKFAST    ferrous sulfate tablet 325 mg  1 Tablet Oral TID    aspirin delayed-release tablet 81 mg  81 mg Oral DAILY    glucose chewable tablet 16 g  4 Tablet Oral PRN    dextrose (D50W) injection syrg 12.5-25 g  25-50 mL IntraVENous PRN    glucagon (GLUCAGEN) injection 1 mg  1 mg IntraMUSCular PRN    sodium chloride (NS) flush 5-40 mL  5-40 mL IntraVENous Q8H    sodium chloride (NS) flush 5-40 mL  5-40 mL IntraVENous PRN    acetaminophen (TYLENOL) tablet 650 mg  650 mg Oral Q6H PRN    Or    acetaminophen (TYLENOL) suppository 650 mg  650 mg Rectal Q6H PRN    ondansetron (ZOFRAN ODT) tablet 4 mg  4 mg Oral Q6H PRN    Or    ondansetron (ZOFRAN) injection 4 mg  4 mg IntraVENous Q6H PRN    famotidine (PEPCID) tablet 20 mg  20 mg Oral DAILY    metoprolol (LOPRESSOR) injection 5 mg  5 mg IntraVENous Q4H    metoprolol succinate (TOPROL-XL) XL tablet 50 mg  50 mg Oral DAILY     Current Outpatient Medications   Medication Sig    aspirin delayed-release 81 mg tablet Take 1 Tablet by mouth daily.  FeroSuL 325 mg (65 mg iron) tablet take 1 tablet by mouth three times a day    Venlafaxine-ER 24 HR (EFFEXOR-ER) 150 mg tr24 tablet Take 150 mg by mouth daily.  melatonin 3 mg tablet Take 3 mg by mouth nightly.     famotidine (PEPCID) 20 mg tablet take 1 tablet by mouth twice a day    tamsulosin (FLOMAX) 0.4 mg capsule take 1 capsule by mouth once daily    albuterol (PROVENTIL HFA, VENTOLIN HFA, PROAIR HFA) 90 mcg/actuation inhaler 2 puffs three to four times a day for 5 days then every 6 hours as needed for shortness of breath    atorvastatin (LIPITOR) 40 mg tablet Take 40 mg by mouth nightly.  amLODIPine (NORVASC) 10 mg tablet Take 10 mg by mouth daily. Review of Systems:   Review of systems not obtained due to patient factors. Objective:   Physical Exam:     Visit Vitals  BP (!) 181/76   Pulse 63   Temp 99.1 °F (37.3 °C)   Resp 18   Ht 5' 5\" (1.651 m)   Wt 99.8 kg (220 lb)   SpO2 98%   BMI 36.61 kg/m²    O2 Flow Rate (L/min): 2 l/min O2 Device: Nasal cannula    Temp (24hrs), Av.7 °F (37.1 °C), Min:98.3 °F (36.8 °C), Max:99.1 °F (37.3 °C)    701 - 1900  In: -   Out: 800 [Urine:800]   1901 - 700  In: 1171 [I.V.:2405]  Out: 2800 [Urine:2800]    General:   Obtunded, grimaces to pain   Lungs:   Clear to auscultation bilaterally. Chest wall:  No tenderness or deformity. Heart:  Regular rate and rhythm, S1, S2 normal, no murmur, click, rub or gallop. Abdomen:   Soft, non-tender. Bowel sounds normal. No masses,  No organomegaly. Extremities: Extremities normal, atraumatic, no cyanosis or edema. Pulses: 2+ and symmetric all extremities. Skin: Skin color, texture, turgor normal. No rashes or lesions   Neurologic: CNII-XII intact.    Unable to fully assess         Data Review:       Recent Days:  Recent Labs     21  1048 21  1230   WBC 13.5* 14.8* 15.6*   HGB 11.2* 11.3* 12.5   HCT 35.9 36.2 40.2   * 485* 530*     Recent Labs     21  1048 21  0501 21  1925 21  19121  19121  1230 21  1230    142 140   < > 142   < > 140   K 4.3 4.6 5.0   < > 5.1   < > 5.0    112* 108   < > 111*   < > 104   CO2 27 28 28   < > 28   < > 28   * 63* 166*   < > 166*   < > 73   BUN 12 17 18   < > 16   < > 17   CREA 1.69* 1.80* 1.80*   < > 1.78*   < > 1.50*   CA 8.7 8.8 8.8   < > 8.9   < > 9.6   PHOS  --  3.7 3.8  -- --   --   --    ALB  --  2.4* 2.4*  --  2.3*   < > 3.4*   TBILI  --   --   --   --  0.2  --  0.4   ALT  --   --   --   --  23  --  20    < > = values in this interval not displayed. Recent Labs     12/07/21 2000 12/05/21  1245   PH 7.41 7.41   PCO2 43 49*   PO2 133* 83*   HCO3 27* 30*   FIO2  --  21.0       24 Hour Results:  Recent Results (from the past 24 hour(s))   PROCALCITONIN    Collection Time: 12/07/21 10:48 AM   Result Value Ref Range    Procalcitonin <0.05 (H) 0 ng/mL   CBC WITH AUTOMATED DIFF    Collection Time: 12/07/21 10:48 AM   Result Value Ref Range    WBC 13.5 (H) 3.6 - 11.0 K/uL    RBC 4.33 3.80 - 5.20 M/uL    HGB 11.2 (L) 11.5 - 16.0 g/dL    HCT 35.9 35.0 - 47.0 %    MCV 82.9 80.0 - 99.0 FL    MCH 25.9 (L) 26.0 - 34.0 PG    MCHC 31.2 30.0 - 36.5 g/dL    RDW 16.0 (H) 11.5 - 14.5 %    PLATELET 193 (H) 343 - 400 K/uL    MPV 8.8 (L) 8.9 - 12.9 FL    NRBC 0.0 0.0  WBC    ABSOLUTE NRBC 0.00 0.00 - 0.01 K/uL    NEUTROPHILS 78 (H) 32 - 75 %    LYMPHOCYTES 9 (L) 12 - 49 %    MONOCYTES 9 5 - 13 %    EOSINOPHILS 4 0 - 7 %    BASOPHILS 0 0 - 1 %    IMMATURE GRANULOCYTES 0 0 - 0.5 %    ABS. NEUTROPHILS 10.4 (H) 1.8 - 8.0 K/UL    ABS. LYMPHOCYTES 1.3 0.8 - 3.5 K/UL    ABS. MONOCYTES 1.2 (H) 0.0 - 1.0 K/UL    ABS. EOSINOPHILS 0.6 (H) 0.0 - 0.4 K/UL    ABS. BASOPHILS 0.1 0.0 - 0.1 K/UL    ABS. IMM.  GRANS. 0.1 (H) 0.00 - 0.04 K/UL    DF AUTOMATED     METABOLIC PANEL, BASIC    Collection Time: 12/07/21 10:48 AM   Result Value Ref Range    Sodium 138 136 - 145 mmol/L    Potassium 4.3 3.5 - 5.1 mmol/L    Chloride 107 97 - 108 mmol/L    CO2 27 21 - 32 mmol/L    Anion gap 4 (L) 5 - 15 mmol/L    Glucose 184 (H) 65 - 100 mg/dL    BUN 12 6 - 20 mg/dL    Creatinine 1.69 (H) 0.55 - 1.02 mg/dL    BUN/Creatinine ratio 7 (L) 12 - 20      GFR est AA 37 (L) >60 ml/min/1.73m2    GFR est non-AA 30 (L) >60 ml/min/1.73m2    Calcium 8.7 8.5 - 10.1 mg/dL   GLUCOSE, POC    Collection Time: 12/07/21 12:23 PM   Result Value Ref Range    Glucose (POC) 190 (H) 65 - 117 mg/dL    Performed by Jaden Collier, POC    Collection Time: 12/07/21  4:09 PM   Result Value Ref Range    Glucose (POC) 150 (H) 65 - 117 mg/dL    Performed by Jaden Collier, POC    Collection Time: 12/07/21  6:47 PM   Result Value Ref Range    Glucose (POC) 161 (H) 65 - 117 mg/dL    Performed by Merissa Richardson    EKG, 12 LEAD, INITIAL    Collection Time: 12/07/21  7:34 PM   Result Value Ref Range    Ventricular Rate 70 BPM    Atrial Rate 70 BPM    P-R Interval 156 ms    QRS Duration 124 ms    Q-T Interval 430 ms    QTC Calculation (Bezet) 464 ms    Calculated P Axis 70 degrees    Calculated R Axis -58 degrees    Calculated T Axis 81 degrees    Diagnosis       Normal sinus rhythm  Left bundle branch block  Abnormal ECG  When compared with ECG of 05-DEC-2021 18:58,  Minimal criteria for Septal infarct are no longer Present  Confirmed by Omar Garrison ([id number]) on 12/8/2021 7:02:25 AM     BLOOD GAS, ARTERIAL    Collection Time: 12/07/21  8:00 PM   Result Value Ref Range    pH 7.41 7.35 - 7.45      PCO2 43 35 - 45 mmHg    PO2 133 (H) 75 - 100 mmHg    O2 SAT 99 >95 %    BICARBONATE 27 (H) 22 - 26 mmol/L    BASE EXCESS 2.5 (H) 0 - 2 mmol/L    O2 METHOD RA      Sample source Arterial      SITE Left Radial      ANDREA'S TEST Positive     GLUCOSE, POC    Collection Time: 12/07/21  8:54 PM   Result Value Ref Range    Glucose (POC) 195 (H) 65 - 117 mg/dL    Performed by [address]gy [address]96, POC    Collection Time: 12/07/21 10:54 PM   Result Value Ref Range    Glucose (POC) 187 (H) 65 - 117 mg/dL    Performed by Joelle WOO [address], POC    Collection Time: 12/08/21 12:00 AM   Result Value Ref Range    Glucose (POC) 141 (H) 65 - 117 mg/dL    Performed by [address] 996, POC    Collection Time: 12/08/21 12:54 AM   Result Value Ref Range    Glucose (POC) 141 (H) 65 - 117 mg/dL    Performed by [address]96, POC Collection Time: 12/08/21  2:01 AM   Result Value Ref Range    Glucose (POC) 140 (H) 65 - 117 mg/dL    Performed by Belgica Santanae, POC    Collection Time: 12/08/21  3:16 AM   Result Value Ref Range    Glucose (POC) 123 (H) 65 - 117 mg/dL    Performed by Belgica Santanae, POC    Collection Time: 12/08/21  4:20 AM   Result Value Ref Range    Glucose (POC) 115 65 - 117 mg/dL    Performed by [address], POC    Collection Time: 12/08/21  5:44 AM   Result Value Ref Range    Glucose (POC) 121 (H) 65 - 117 mg/dL    Performed by [address], POC    Collection Time: 12/08/21  7:25 AM   Result Value Ref Range    Glucose (POC) 134 (H) 65 - 117 mg/dL    Performed by ANASTACIA Barbozao [address], POC    Collection Time: 12/08/21  8:06 AM   Result Value Ref Range    Glucose (POC) 143 (H) 65 - 117 mg/dL    Performed by R Sarmento [address], POC    Collection Time: 12/08/21  9:00 AM   Result Value Ref Range    Glucose (POC) 160 (H) 65 - 117 mg/dL    Performed by bizsols PCT    GLUCOSE, POC    Collection Time: 12/08/21  9:59 AM   Result Value Ref Range    Glucose (POC) 125 (H) 65 - 117 mg/dL    Performed by Angelique Lass PCT        MRI BRAIN WO CONT   Final Result   1. No evidence of acute intracranial ischemia, mass or hemorrhage. 2.  Moderate chronic small vessel ischemic changes. 3.  Mild diffuse parenchymal volume loss.               Assessment:  Hypoglycemia and diabetes mellitus type 2, despite not taking insulin or Metformin for greater than 72 hours    Hypoglycemic encephalopathy, persistent with perhaps slight interval improvement    Possible left lower lobe pneumonia, likely aspiration    Chronic kidney disease stage III    History of breast cancer    COPD without exacerbation    Chronic respiratory failure with hypoxia    History of gastritis/esophagitis/gastroparesis      Plan:  Continue D5W    Await MRI of brain    I updated her sister-in-law Kirk Aldana by phone, who is the family       Care Plan discussed with: Patient/Family    Disposition: Awaiting ICU bed    Total time spent with patient: 30 minutes.     Shelly Beach MD

## 2021-12-08 NOTE — ROUTINE PROCESS
TRANSFER - OUT REPORT:    Verbal report given to Bety Jiang on Aurea Longoria  being transferred to PACU Overflow for routine progression of care       Report consisted of patients Situation, Background, Assessment and   Recommendations(SBAR). Information from the following report(s) SBAR, ED Summary, Accordion and Med Rec Status was reviewed with the receiving nurse. Lines:   Peripheral IV 12/05/21 Left; Posterior Hand (Active)   Site Assessment Clean, dry, & intact 12/05/21 1928   Phlebitis Assessment 0 12/05/21 1928   Infiltration Assessment 0 12/05/21 1928   Dressing Status Clean, dry, & intact 12/05/21 1928   Dressing Type Tape; Transparent 12/05/21 1928   Hub Color/Line Status Pink; Flushed; Patent 12/05/21 1928   Action Taken Blood drawn; Dressing reinforced 12/05/21 1928       Peripheral IV 12/05/21 Right Antecubital (Active)   Site Assessment Clean, dry, & intact 12/05/21 2202   Phlebitis Assessment 0 12/05/21 2202   Infiltration Assessment 0 12/05/21 2202   Dressing Status Clean, dry, & intact 12/05/21 2202   Dressing Type Tape; Transparent 12/05/21 2202   Hub Color/Line Status Green; Flushed; Patent 12/05/21 2202        Opportunity for questions and clarification was provided.       Patient transported with:   Registered Nurse

## 2021-12-09 LAB
ALBUMIN SERPL-MCNC: 1.9 G/DL (ref 3.5–5)
ANION GAP SERPL CALC-SCNC: 3 MMOL/L (ref 5–15)
BASOPHILS # BLD: 0.1 K/UL (ref 0–0.1)
BASOPHILS NFR BLD: 1 % (ref 0–1)
BUN SERPL-MCNC: 21 MG/DL (ref 6–20)
BUN/CREAT SERPL: 13 (ref 12–20)
CA-I BLD-MCNC: 8.7 MG/DL (ref 8.5–10.1)
CHLORIDE SERPL-SCNC: 108 MMOL/L (ref 97–108)
CO2 SERPL-SCNC: 29 MMOL/L (ref 21–32)
CREAT SERPL-MCNC: 1.65 MG/DL (ref 0.55–1.02)
DIFFERENTIAL METHOD BLD: ABNORMAL
EOSINOPHIL # BLD: 0.8 K/UL (ref 0–0.4)
EOSINOPHIL NFR BLD: 6 % (ref 0–7)
ERYTHROCYTE [DISTWIDTH] IN BLOOD BY AUTOMATED COUNT: 15.8 % (ref 11.5–14.5)
GLUCOSE BLD STRIP.AUTO-MCNC: 135 MG/DL (ref 65–117)
GLUCOSE BLD STRIP.AUTO-MCNC: 158 MG/DL (ref 65–117)
GLUCOSE BLD STRIP.AUTO-MCNC: 159 MG/DL (ref 65–117)
GLUCOSE BLD STRIP.AUTO-MCNC: 160 MG/DL (ref 65–117)
GLUCOSE SERPL-MCNC: 158 MG/DL (ref 65–100)
HCT VFR BLD AUTO: 34.5 % (ref 35–47)
HGB BLD-MCNC: 10.6 G/DL (ref 11.5–16)
IMM GRANULOCYTES # BLD AUTO: 0.1 K/UL (ref 0–0.04)
IMM GRANULOCYTES NFR BLD AUTO: 0 % (ref 0–0.5)
LYMPHOCYTES # BLD: 0.7 K/UL (ref 0.8–3.5)
LYMPHOCYTES NFR BLD: 5 % (ref 12–49)
MCH RBC QN AUTO: 25.7 PG (ref 26–34)
MCHC RBC AUTO-ENTMCNC: 30.7 G/DL (ref 30–36.5)
MCV RBC AUTO: 83.5 FL (ref 80–99)
MONOCYTES # BLD: 1.1 K/UL (ref 0–1)
MONOCYTES NFR BLD: 8 % (ref 5–13)
NEUTS SEG # BLD: 11.1 K/UL (ref 1.8–8)
NEUTS SEG NFR BLD: 80 % (ref 32–75)
NRBC # BLD: 0 K/UL (ref 0–0.01)
NRBC BLD-RTO: 0 PER 100 WBC
PERFORMED BY, TECHID: ABNORMAL
PHOSPHATE SERPL-MCNC: 3.3 MG/DL (ref 2.6–4.7)
PLATELET # BLD AUTO: 419 K/UL (ref 150–400)
PMV BLD AUTO: 9.5 FL (ref 8.9–12.9)
POTASSIUM SERPL-SCNC: 4 MMOL/L (ref 3.5–5.1)
RBC # BLD AUTO: 4.13 M/UL (ref 3.8–5.2)
SODIUM SERPL-SCNC: 140 MMOL/L (ref 136–145)
WBC # BLD AUTO: 13.9 K/UL (ref 3.6–11)

## 2021-12-09 PROCEDURE — 82962 GLUCOSE BLOOD TEST: CPT

## 2021-12-09 PROCEDURE — 65270000029 HC RM PRIVATE

## 2021-12-09 PROCEDURE — 80069 RENAL FUNCTION PANEL: CPT

## 2021-12-09 PROCEDURE — 74011000250 HC RX REV CODE- 250: Performed by: HOSPITALIST

## 2021-12-09 PROCEDURE — 74011250636 HC RX REV CODE- 250/636: Performed by: HOSPITALIST

## 2021-12-09 PROCEDURE — 85025 COMPLETE CBC W/AUTO DIFF WBC: CPT

## 2021-12-09 PROCEDURE — 36415 COLL VENOUS BLD VENIPUNCTURE: CPT

## 2021-12-09 PROCEDURE — 74011000258 HC RX REV CODE- 258: Performed by: INTERNAL MEDICINE

## 2021-12-09 PROCEDURE — 74011250636 HC RX REV CODE- 250/636: Performed by: INTERNAL MEDICINE

## 2021-12-09 PROCEDURE — 74011250637 HC RX REV CODE- 250/637: Performed by: HOSPITALIST

## 2021-12-09 RX ADMIN — FAMOTIDINE 20 MG: 20 TABLET, FILM COATED ORAL at 09:22

## 2021-12-09 RX ADMIN — FERROUS SULFATE TAB 325 MG (65 MG ELEMENTAL FE) 325 MG: 325 (65 FE) TAB at 22:37

## 2021-12-09 RX ADMIN — NITROGLYCERIN 1 INCH: 20 OINTMENT TOPICAL at 01:06

## 2021-12-09 RX ADMIN — METOPROLOL TARTRATE 5 MG: 5 INJECTION INTRAVENOUS at 09:22

## 2021-12-09 RX ADMIN — PIPERACILLIN SODIUM AND TAZOBACTAM SODIUM 3.38 G: 3; .375 INJECTION, POWDER, LYOPHILIZED, FOR SOLUTION INTRAVENOUS at 19:19

## 2021-12-09 RX ADMIN — PIPERACILLIN SODIUM AND TAZOBACTAM SODIUM 3.38 G: 3; .375 INJECTION, POWDER, LYOPHILIZED, FOR SOLUTION INTRAVENOUS at 09:22

## 2021-12-09 RX ADMIN — NITROGLYCERIN 1 INCH: 20 OINTMENT TOPICAL at 19:19

## 2021-12-09 RX ADMIN — HYDRALAZINE HYDROCHLORIDE 10 MG: 20 INJECTION INTRAMUSCULAR; INTRAVENOUS at 06:06

## 2021-12-09 RX ADMIN — Medication 10 ML: at 22:51

## 2021-12-09 RX ADMIN — METOPROLOL TARTRATE 5 MG: 5 INJECTION INTRAVENOUS at 05:11

## 2021-12-09 RX ADMIN — ATORVASTATIN CALCIUM 40 MG: 40 TABLET, FILM COATED ORAL at 22:37

## 2021-12-09 RX ADMIN — NITROGLYCERIN 1 INCH: 20 OINTMENT TOPICAL at 14:29

## 2021-12-09 RX ADMIN — Medication 10 ML: at 07:37

## 2021-12-09 RX ADMIN — HYDRALAZINE HYDROCHLORIDE 10 MG: 20 INJECTION INTRAMUSCULAR; INTRAVENOUS at 01:05

## 2021-12-09 RX ADMIN — FERROUS SULFATE TAB 325 MG (65 MG ELEMENTAL FE) 325 MG: 325 (65 FE) TAB at 09:22

## 2021-12-09 RX ADMIN — ASPIRIN 81 MG: 81 TABLET, COATED ORAL at 09:22

## 2021-12-09 RX ADMIN — FERROUS SULFATE TAB 325 MG (65 MG ELEMENTAL FE) 325 MG: 325 (65 FE) TAB at 14:30

## 2021-12-09 RX ADMIN — PIPERACILLIN SODIUM AND TAZOBACTAM SODIUM 3.38 G: 3; .375 INJECTION, POWDER, LYOPHILIZED, FOR SOLUTION INTRAVENOUS at 02:16

## 2021-12-09 RX ADMIN — METOPROLOL TARTRATE 5 MG: 5 INJECTION INTRAVENOUS at 00:10

## 2021-12-09 RX ADMIN — NITROGLYCERIN 1 INCH: 20 OINTMENT TOPICAL at 06:06

## 2021-12-09 RX ADMIN — METOPROLOL TARTRATE 5 MG: 5 INJECTION INTRAVENOUS at 19:19

## 2021-12-09 RX ADMIN — VENLAFAXINE HYDROCHLORIDE 150 MG: 75 CAPSULE, EXTENDED RELEASE ORAL at 10:08

## 2021-12-09 NOTE — PROGRESS NOTES
CM reviewed chart. Patient could possibly need PEG tube placement. Current Dispo is home/self with sister. Patient already has oxygen at home through [facility].  Patient is currently lethargic and unable to participate in any type of therapy. CM will continue to follow for discharge needs to include therapy and possible tube feedings at home.

## 2021-12-09 NOTE — PROGRESS NOTES
Hospitalist Progress Note               Daily Progress Note: 12/9/2021      Subjective:   Hospital course to date: Patient is a 44-year-old female with history of diabetes, breast cancer and hypertension who was brought to the ED on 12/5 after being found by family unresponsive the night before. She had been in the ED the day before that with hypoglycemia. He was noted have a blood sugar of 51 at home and was given D10 but no significant improvement. Chest x-ray showed left lower lobe pneumonia. She was transferred from [facility]. Blood sugar on arrival here was 73. She was started on D10. All of patient's diabetic medications were supposed to have been previously discontinued although unclear if she had been inadvertently taking them. According to her sister the last time she took insulin was Friday of last week.  ------    Patient seen this morning for follow-up. She remains lethargic essentially unresponsive this morning. She does grimace and groan to vigorous stimulation. Tube feeds have been initiated. Blood sugars have improved and we have stopped the D5    MRI was unremarkable for acute pathology      Problem List:  Problem List as of 12/9/2021 Date Reviewed: 12/5/2021          Codes Class Noted - Resolved    Metabolic encephalopathy [id number]: G93.41  ICD-9-CM: 348.31  12/5/2021 - Present        CKD (chronic kidney disease) ICD-10-CM: N18.9  ICD-9-CM: 585.9  9/24/2021 - Present    Overview Signed 9/24/2021  3:57 PM by Masoud Mc MD     In the setting of severe, uncontrolled and prolonged diabetes, with diabetic retinopathy. Protienuria to 2.5g/g of creatinine. Would consider workup for acute GN given inconsistent history and rising creatinine.  Though clinical hx appears consistent with aggressive diabetic nephropathy             Sepsis ([facility] ICD-10-CM: A41.9  ICD-9-CM: 038.9, 995.91  9/22/2021 - Present        Esophagitis with gastritis ICD-10-CM: K29.70, K20.90  ICD-9-CM: 530.19  9/22/2021 - Present        Elevated troponin ICD-10-CM: R77.8  ICD-9-CM: 790.6  9/20/2021 - Present        Syncope ICD-10-CM: R55  ICD-9-CM: 780.2  9/20/2021 - Present        Hypoglycemia ICD-10-CM: E16.2  ICD-9-CM: 251.2  9/20/2021 - Present        Chronic respiratory failure with hypoxia (HCC) ICD-10-CM: J96.11  ICD-9-CM: 518.83, 799.02  9/20/2021 - Present        Chest pain ICD-10-CM: R07.9  ICD-9-CM: 786.50  9/19/2021 - Present        Anxiety ICD-10-CM: F41.9  ICD-9-CM: 300.00  2/9/2021 - Present        Depression ICD-10-CM: F32. A  ICD-9-CM: 078  2/9/2021 - Present        Dyslipidemia ICD-10-CM: E78.5  ICD-9-CM: 272.4  2/9/2021 - Present        Hypertensive cardiovascular disease ICD-10-CM: I11.9  ICD-9-CM: 402.90  2/9/2021 - Present        Knee osteoarthritis ICD-10-CM: M17.10  ICD-9-CM: 715.36  1/25/2021 - Present        COPD with acute exacerbation ([facility] ICD-10-CM: J44.1  ICD-9-CM: 491.21  11/29/2020 - Present        Person under investigation for COVID-19 ICD-10-CM: Z20.822  ICD-9-CM: V01.79  11/29/2020 - Present        COPD (chronic obstructive pulmonary disease) ([facility] ICD-10-CM: J44.9  ICD-9-CM: 802  11/29/2020 - Present        DM (diabetes mellitus) ([facility] ICD-10-CM: E11.9  ICD-9-CM: 250.00  11/29/2020 - Present        COVID-19 ICD-10-CM: U07.1  ICD-9-CM: 079.89  10/1/2020 - Present        HTN (hypertension) ICD-10-CM: I10  ICD-9-CM: 401.9  10/1/2020 - Present        Diabetic gastroparesis ([facility] ICD-10-CM: E11.43, K31.84  ICD-9-CM: 250.60, 536.3  10/1/2020 - Present        DVT prophylaxis ICD-10-CM: Z29.9  ICD-9-CM: V07.9  10/1/2020 - Present        PNA (pneumonia) ICD-10-CM: J18.9  ICD-9-CM: 990  9/30/2020 - Present        Acute respiratory failure with hypoxia (HCC) ICD-10-CM: J96.01  ICD-9-CM: 518.81  9/30/2020 - Present        Acute kidney injury ([facility] ICD-10-CM: N17.9  ICD-9-CM: 584.9  9/30/2020 - Present        Type 2 diabetes mellitus with hyperglycemia, with long-term current use of insulin ([facility] ICD-10-CM: E11.65, Z79.4  ICD-9-CM: 250.00, 790.29, V58.67  9/30/2020 - Present        Ureteral stone ICD-10-CM: N20.1  ICD-9-CM: 592.1  2/15/2019 - Present        S/P lumbar fusion ICD-10-CM: Z98.1  ICD-9-CM: V45.4  10/16/2018 - Present        CAP (community acquired pneumonia) ICD-10-CM: J18.9  ICD-9-CM: 668  9/1/2018 - Present        Shortness of breath ICD-10-CM: R06.02  ICD-9-CM: 786.05  9/1/2018 - Present        Fever and chills ICD-10-CM: R50.9  ICD-9-CM: 780.60  9/1/2018 - Present        Spinal stenosis of lumbar region at multiple levels ICD-10-CM: M48.061  ICD-9-CM: 724.02  8/30/2018 - Present        Scoliosis ICD-10-CM: M41.9  ICD-9-CM: 737.30  8/30/2018 - Present        Spinal stenosis ICD-10-CM: M48.00  ICD-9-CM: 724.00  8/30/2018 - Present        Pre-op testing ICD-10-CM: N10.275  ICD-9-CM: V72.84  8/27/2018 - Present        Severe obesity (BMI 35.0-39. 9) ICD-10-CM: E66.01  ICD-9-CM: 278.01  6/20/2018 - Present        Spinal stenosis, lumbar region without neurogenic claudication ICD-10-CM: M48.061  ICD-9-CM: 724.02  5/16/2018 - Present        Lumbosacral spondylosis without myelopathy ICD-10-CM: M47.817  ICD-9-CM: 721.3  4/25/2018 - Present        DDD (degenerative disc disease), lumbar ICD-10-CM: M51.36  ICD-9-CM: 722.52  4/25/2018 - Present        Malignant neoplasm of female breast [facility]) ICD-10-CM: C50.919  ICD-9-CM: 174.9  4/25/2018 - Present              Medications reviewed  Current Facility-Administered Medications   Medication Dose Route Frequency    hydrALAZINE (APRESOLINE) 20 mg/mL injection 10 mg  10 mg IntraVENous Q4H PRN    piperacillin-tazobactam (ZOSYN) 3.375 g in 0.9% sodium chloride (MBP/ADV) 100 mL MBP  3.375 g IntraVENous Q8H    nitroglycerin (NITROBID) 2 % ointment 1 Inch  1 Inch Topical Q6H    labetaloL (NORMODYNE;TRANDATE) 20 mg/4 mL (5 mg/mL) injection 10 mg  10 mg IntraVENous Q4H PRN    [Held by provider] amLODIPine (NORVASC) tablet 10 mg  10 mg Oral DAILY    atorvastatin (LIPITOR) tablet 40 mg  40 mg Oral QHS    albuterol (PROVENTIL HFA, VENTOLIN HFA, PROAIR HFA) inhaler 2 Puff  2 Puff Inhalation Q4H PRN    [Held by provider] tamsulosin (FLOMAX) capsule 0.4 mg  0.4 mg Oral DAILY    [Held by provider] gabapentin (NEURONTIN) capsule 300 mg  300 mg Oral BID    [Held by provider] melatonin tablet 3 mg  3 mg Oral QHS    venlafaxine-SR (EFFEXOR-XR) capsule 150 mg  150 mg Oral DAILY WITH BREAKFAST    ferrous sulfate tablet 325 mg  1 Tablet Oral TID    aspirin delayed-release tablet 81 mg  81 mg Oral DAILY    glucose chewable tablet 16 g  4 Tablet Oral PRN    dextrose (D50W) injection syrg 12.5-25 g  25-50 mL IntraVENous PRN    glucagon (GLUCAGEN) injection 1 mg  1 mg IntraMUSCular PRN    sodium chloride (NS) flush 5-40 mL  5-40 mL IntraVENous Q8H    sodium chloride (NS) flush 5-40 mL  5-40 mL IntraVENous PRN    acetaminophen (TYLENOL) tablet 650 mg  650 mg Oral Q6H PRN    Or    acetaminophen (TYLENOL) suppository 650 mg  650 mg Rectal Q6H PRN    ondansetron (ZOFRAN ODT) tablet 4 mg  4 mg Oral Q6H PRN    Or    ondansetron (ZOFRAN) injection 4 mg  4 mg IntraVENous Q6H PRN    famotidine (PEPCID) tablet 20 mg  20 mg Oral DAILY    metoprolol (LOPRESSOR) injection 5 mg  5 mg IntraVENous Q4H    metoprolol succinate (TOPROL-XL) XL tablet 50 mg  50 mg Oral DAILY       Review of Systems:   Review of systems not obtained due to patient factors. Objective:   Physical Exam:     Visit Vitals  BP (!) 155/65 (BP 1 Location: Left upper arm, BP Patient Position: At rest;Supine)   Pulse 72   Temp 98.6 °F (37 °C)   Resp 18   Ht 5' 5\" (1.651 m)   Wt 99.8 kg (220 lb)   SpO2 98%   BMI 36.61 kg/m²    O2 Flow Rate (L/min): 2 l/min O2 Device: Nasal cannula    Temp (24hrs), Av.3 °F (36.8 °C), Min:97.6 °F (36.4 °C), Max:99 °F (37.2 °C)    No intake/output data recorded.     1901 -  0700  In: 1250 [I.V.:500]  Out: 4000 [Urine:4000]    General: Obtunded, grimaces to pain   Lungs:   Clear to auscultation bilaterally. Chest wall:  No tenderness or deformity. Heart:  Regular rate and rhythm, S1, S2 normal, no murmur, click, rub or gallop. Abdomen:   Soft, non-tender. Bowel sounds normal. No masses,  No organomegaly. Extremities: Extremities normal, atraumatic, no cyanosis or edema. Pulses: 2+ and symmetric all extremities. Skin: Skin color, texture, turgor normal. No rashes or lesions   Neurologic: CNII-XII intact.    Unable to fully assess         Data Review:       Recent Days:  Recent Labs     12/09/21  0356 12/07/21  1048   WBC 13.9* 13.5*   HGB 10.6* 11.2*   HCT 34.5* 35.9   * 424*     Recent Labs     12/09/21  0305 12/07/21  1048    138   K 4.0 4.3    107   CO2 29 27   * 184*   BUN 21* 12   CREA 1.65* 1.69*   CA 8.7 8.7   PHOS 3.3  --    ALB 1.9*  --      Recent Labs     12/07/21 2000   PH 7.41   PCO2 43   PO2 133*   HCO3 27*       24 Hour Results:  Recent Results (from the past 24 hour(s))   GLUCOSE, POC    Collection Time: 12/08/21  9:00 AM   Result Value Ref Range    Glucose (POC) 160 (H) 65 - 117 mg/dL    Performed by [facility]    GLUCOSE, POC    Collection Time: 12/08/21  9:59 AM   Result Value Ref Range    Glucose (POC) 125 (H) 65 - 117 mg/dL    Performed by [facility]    GLUCOSE, POC    Collection Time: 12/08/21 11:04 AM   Result Value Ref Range    Glucose (POC) 157 (H) 65 - 117 mg/dL    Performed by [facility]    GLUCOSE, POC    Collection Time: 12/08/21 12:50 PM   Result Value Ref Range    Glucose (POC) 136 (H) 65 - 117 mg/dL    Performed by [employer], POC    Collection Time: 12/08/21  2:07 PM   Result Value Ref Range    Glucose (POC) 168 (H) 65 - 117 mg/dL    Performed by [employer], POC    Collection Time: 12/08/21  3:08 PM   Result Value Ref Range    Glucose (POC) 196 (H) 65 - 117 mg/dL    Performed by Candy Weldon, NIESHA    Collection Time: 12/08/21  4:05 PM   Result Value Ref Range    Glucose (POC) 189 (H) 65 - 117 mg/dL    Performed by [address], POC    Collection Time: 12/08/21  5:15 PM   Result Value Ref Range    Glucose (POC) 171 (H) 65 - 117 mg/dL    Performed by [address], POC    Collection Time: 12/08/21  6:16 PM   Result Value Ref Range    Glucose (POC) 183 (H) 65 - 117 mg/dL    Performed by [address], POC    Collection Time: 12/08/21  8:50 PM   Result Value Ref Range    Glucose (POC) 161 (H) 65 - 117 mg/dL    Performed by Ajit Guardado ([facility])    GLUCOSE, POC    Collection Time: 12/08/21 10:45 PM   Result Value Ref Range    Glucose (POC) 144 (H) 65 - 117 mg/dL    Performed by Ajit Guardado ([address])    GLUCOSE, POC    Collection Time: 12/09/21  1:32 AM   Result Value Ref Range    Glucose (POC) 159 (H) 65 - 117 mg/dL    Performed by Ajit Guardado [facility])    RENAL FUNCTION PANEL    Collection Time: 12/09/21  3:05 AM   Result Value Ref Range    Sodium 140 136 - 145 mmol/L    Potassium 4.0 3.5 - 5.1 mmol/L    Chloride 108 97 - 108 mmol/L    CO2 29 21 - 32 mmol/L    Anion gap 3 (L) 5 - 15 mmol/L    Glucose 158 (H) 65 - 100 mg/dL    BUN 21 (H) 6 - 20 mg/dL    Creatinine 1.65 (H) 0.55 - 1.02 mg/dL    BUN/Creatinine ratio 13 12 - 20      GFR est AA 38 (L) >60 ml/min/1.73m2    GFR est non-AA 31 (L) >60 ml/min/1.73m2    Calcium 8.7 8.5 - 10.1 mg/dL    Phosphorus 3.3 2.6 - 4.7 mg/dL    Albumin 1.9 (L) 3.5 - 5.0 g/dL   CBC WITH AUTOMATED DIFF    Collection Time: 12/09/21  3:56 AM   Result Value Ref Range    WBC 13.9 (H) 3.6 - 11.0 K/uL    RBC 4.13 3.80 - 5.20 M/uL    HGB 10.6 (L) 11.5 - 16.0 g/dL    HCT 34.5 (L) 35.0 - 47.0 %    MCV 83.5 80.0 - 99.0 FL    MCH 25.7 (L) 26.0 - 34.0 PG    MCHC 30.7 30.0 - 36.5 g/dL    RDW 15.8 (H) 11.5 - 14.5 %    PLATELET 906 (H) 584 - 400 K/uL    MPV 9.5 8.9 - 12.9 FL    NRBC 0.0 0.0  WBC    ABSOLUTE NRBC 0.00 0.00 - 0.01 K/uL    NEUTROPHILS 80 (H) 32 - 75 % LYMPHOCYTES 5 (L) 12 - 49 %    MONOCYTES 8 5 - 13 %    EOSINOPHILS 6 0 - 7 %    BASOPHILS 1 0 - 1 %    IMMATURE GRANULOCYTES 0 0 - 0.5 %    ABS. NEUTROPHILS 11.1 (H) 1.8 - 8.0 K/UL    ABS. LYMPHOCYTES 0.7 (L) 0.8 - 3.5 K/UL    ABS. MONOCYTES 1.1 (H) 0.0 - 1.0 K/UL    ABS. EOSINOPHILS 0.8 (H) 0.0 - 0.4 K/UL    ABS. BASOPHILS 0.1 0.0 - 0.1 K/UL    ABS. IMM. GRANS. 0.1 (H) 0.00 - 0.04 K/UL    DF AUTOMATED     GLUCOSE, POC    Collection Time: 12/09/21  5:18 AM   Result Value Ref Range    Glucose (POC) 160 (H) 65 - 117 mg/dL    Performed by Claudeen Canes ([facility])    GLUCOSE, POC    Collection Time: 12/09/21  7:35 AM   Result Value Ref Range    Glucose (POC) 158 (H) 65 - 117 mg/dL    Performed by Bianka Fernando        MRI BRAIN WO CONT   Final Result   1. No evidence of acute intracranial ischemia, mass or hemorrhage. 2.  Moderate chronic small vessel ischemic changes. 3.  Mild diffuse parenchymal volume loss. Assessment:  Hypoglycemia in diabetes mellitus type 2, improved. Suspect she had a prolonged episode of severe hypoglycemia at home    Hypoglycemic encephalopathy, possibly with permanent acquired brain injury    Possible left lower lobe pneumonia, likely aspiration    Chronic kidney disease stage III    History of breast cancer    COPD without exacerbation    Chronic respiratory failure with hypoxia    History of gastritis/esophagitis/gastroparesis      Plan:  Continue tube feeds  Transfer to medical telemetry  Accu-Cheks every 4 hours  Continue IV Zosyn for suspected aspiration  Recheck chest x-ray in a.m. If patient has not improved by Monday we will consult GI for PEG tube    I updated her sister-in-law Dalia Tavera by phone, who is the family       Care Plan discussed with: Patient/Family    Disposition: Transfer to medical telemetry    Total time spent with patient: 30 minutes.     Enrike Guzman MD

## 2021-12-09 NOTE — ROUTINE PROCESS
TRANSFER - OUT REPORT:    Verbal report given to nurse GERMAINE BOSS [facility] (name) on Lieutenant Don  being transferred to [facility] [phone] ([phone]) for routine progression of care       Report consisted of patients Situation, Background, Assessment and   Recommendations(SBAR). Information from the following report(s) SBAR, Kardex, Recent Results and Cardiac Rhythm sinus rhythm was reviewed with the receiving nurse. Lines:   Peripheral IV 12/05/21 Right Antecubital (Active)   Site Assessment Clean, dry, & intact 12/09/21 0400   Phlebitis Assessment 0 12/09/21 0400   Infiltration Assessment 0 12/09/21 0400   Dressing Status Clean, dry, & intact 12/09/21 0400   Dressing Type Transparent 12/09/21 0400   Hub Color/Line Status Green 12/08/21 1700   Alcohol Cap Used Yes 12/08/21 2000       Peripheral IV 12/08/21 Posterior; Right Hand (Active)   Site Assessment Clean, dry, & intact 12/09/21 0400   Phlebitis Assessment 0 12/09/21 0400   Infiltration Assessment 0 12/09/21 0400   Dressing Status Clean, dry, & intact 12/09/21 0400   Dressing Type Transparent 12/09/21 0400   Hub Color/Line Status Purple; Infusing 12/08/21 1700   Alcohol Cap Used Yes 12/08/21 2000        Opportunity for questions and clarification was provided. Patient transported with:   O2 @ 2 liters       1040 Patient transported to room 576. Report given to [facility].

## 2021-12-10 ENCOUNTER — APPOINTMENT (OUTPATIENT)
Dept: GENERAL RADIOLOGY | Age: 66
DRG: 420 | End: 2021-12-10
Attending: INTERNAL MEDICINE
Payer: MEDICAID

## 2021-12-10 LAB
ALBUMIN SERPL-MCNC: 2 G/DL (ref 3.5–5)
ANION GAP SERPL CALC-SCNC: 2 MMOL/L (ref 5–15)
BASOPHILS # BLD: 0.1 K/UL (ref 0–0.1)
BASOPHILS NFR BLD: 1 % (ref 0–1)
BUN SERPL-MCNC: 26 MG/DL (ref 6–20)
BUN/CREAT SERPL: 15 (ref 12–20)
CA-I BLD-MCNC: 8.8 MG/DL (ref 8.5–10.1)
CHLORIDE SERPL-SCNC: 109 MMOL/L (ref 97–108)
CO2 SERPL-SCNC: 31 MMOL/L (ref 21–32)
CREAT SERPL-MCNC: 1.71 MG/DL (ref 0.55–1.02)
DIFFERENTIAL METHOD BLD: ABNORMAL
EOSINOPHIL # BLD: 0.6 K/UL (ref 0–0.4)
EOSINOPHIL NFR BLD: 6 % (ref 0–7)
ERYTHROCYTE [DISTWIDTH] IN BLOOD BY AUTOMATED COUNT: 15.9 % (ref 11.5–14.5)
GLUCOSE BLD STRIP.AUTO-MCNC: 102 MG/DL (ref 65–117)
GLUCOSE BLD STRIP.AUTO-MCNC: 109 MG/DL (ref 65–117)
GLUCOSE BLD STRIP.AUTO-MCNC: 111 MG/DL (ref 65–117)
GLUCOSE BLD STRIP.AUTO-MCNC: 129 MG/DL (ref 65–117)
GLUCOSE SERPL-MCNC: 113 MG/DL (ref 65–100)
HCT VFR BLD AUTO: 33.9 % (ref 35–47)
HGB BLD-MCNC: 10.4 G/DL (ref 11.5–16)
IMM GRANULOCYTES # BLD AUTO: 0.1 K/UL (ref 0–0.04)
IMM GRANULOCYTES NFR BLD AUTO: 0 % (ref 0–0.5)
LYMPHOCYTES # BLD: 0.8 K/UL (ref 0.8–3.5)
LYMPHOCYTES NFR BLD: 7 % (ref 12–49)
MCH RBC QN AUTO: 26.1 PG (ref 26–34)
MCHC RBC AUTO-ENTMCNC: 30.7 G/DL (ref 30–36.5)
MCV RBC AUTO: 85 FL (ref 80–99)
MONOCYTES # BLD: 1 K/UL (ref 0–1)
MONOCYTES NFR BLD: 9 % (ref 5–13)
NEUTS SEG # BLD: 8.8 K/UL (ref 1.8–8)
NEUTS SEG NFR BLD: 77 % (ref 32–75)
NRBC # BLD: 0 K/UL (ref 0–0.01)
NRBC BLD-RTO: 0 PER 100 WBC
PERFORMED BY, TECHID: ABNORMAL
PERFORMED BY, TECHID: NORMAL
PHOSPHATE SERPL-MCNC: 3.6 MG/DL (ref 2.6–4.7)
PLATELET # BLD AUTO: 388 K/UL (ref 150–400)
PMV BLD AUTO: 9.5 FL (ref 8.9–12.9)
POTASSIUM SERPL-SCNC: 3.9 MMOL/L (ref 3.5–5.1)
RBC # BLD AUTO: 3.99 M/UL (ref 3.8–5.2)
SODIUM SERPL-SCNC: 142 MMOL/L (ref 136–145)
WBC # BLD AUTO: 11.3 K/UL (ref 3.6–11)

## 2021-12-10 PROCEDURE — 74011250637 HC RX REV CODE- 250/637: Performed by: HOSPITALIST

## 2021-12-10 PROCEDURE — 74011000250 HC RX REV CODE- 250: Performed by: HOSPITALIST

## 2021-12-10 PROCEDURE — 74011000258 HC RX REV CODE- 258: Performed by: INTERNAL MEDICINE

## 2021-12-10 PROCEDURE — 82962 GLUCOSE BLOOD TEST: CPT

## 2021-12-10 PROCEDURE — 94760 N-INVAS EAR/PLS OXIMETRY 1: CPT

## 2021-12-10 PROCEDURE — 80069 RENAL FUNCTION PANEL: CPT

## 2021-12-10 PROCEDURE — 65270000029 HC RM PRIVATE

## 2021-12-10 PROCEDURE — 85025 COMPLETE CBC W/AUTO DIFF WBC: CPT

## 2021-12-10 PROCEDURE — 77010033678 HC OXYGEN DAILY

## 2021-12-10 PROCEDURE — 71045 X-RAY EXAM CHEST 1 VIEW: CPT

## 2021-12-10 PROCEDURE — 36415 COLL VENOUS BLD VENIPUNCTURE: CPT

## 2021-12-10 PROCEDURE — 74011250636 HC RX REV CODE- 250/636: Performed by: INTERNAL MEDICINE

## 2021-12-10 RX ADMIN — METOPROLOL TARTRATE 5 MG: 5 INJECTION INTRAVENOUS at 11:41

## 2021-12-10 RX ADMIN — METOPROLOL SUCCINATE 50 MG: 25 TABLET, EXTENDED RELEASE ORAL at 08:27

## 2021-12-10 RX ADMIN — METOPROLOL TARTRATE 5 MG: 5 INJECTION INTRAVENOUS at 08:27

## 2021-12-10 RX ADMIN — ATORVASTATIN CALCIUM 40 MG: 40 TABLET, FILM COATED ORAL at 21:22

## 2021-12-10 RX ADMIN — METOPROLOL TARTRATE 5 MG: 5 INJECTION INTRAVENOUS at 03:47

## 2021-12-10 RX ADMIN — NITROGLYCERIN 1 INCH: 20 OINTMENT TOPICAL at 11:41

## 2021-12-10 RX ADMIN — METOPROLOL TARTRATE 5 MG: 5 INJECTION INTRAVENOUS at 15:21

## 2021-12-10 RX ADMIN — FAMOTIDINE 20 MG: 20 TABLET, FILM COATED ORAL at 08:27

## 2021-12-10 RX ADMIN — NITROGLYCERIN 1 INCH: 20 OINTMENT TOPICAL at 17:52

## 2021-12-10 RX ADMIN — FERROUS SULFATE TAB 325 MG (65 MG ELEMENTAL FE) 325 MG: 325 (65 FE) TAB at 21:22

## 2021-12-10 RX ADMIN — NITROGLYCERIN 1 INCH: 20 OINTMENT TOPICAL at 01:05

## 2021-12-10 RX ADMIN — METOPROLOL TARTRATE 5 MG: 5 INJECTION INTRAVENOUS at 21:22

## 2021-12-10 RX ADMIN — NITROGLYCERIN 1 INCH: 20 OINTMENT TOPICAL at 05:41

## 2021-12-10 RX ADMIN — PIPERACILLIN SODIUM AND TAZOBACTAM SODIUM 3.38 G: 3; .375 INJECTION, POWDER, LYOPHILIZED, FOR SOLUTION INTRAVENOUS at 09:40

## 2021-12-10 RX ADMIN — Medication 10 ML: at 21:23

## 2021-12-10 RX ADMIN — FERROUS SULFATE TAB 325 MG (65 MG ELEMENTAL FE) 325 MG: 325 (65 FE) TAB at 08:27

## 2021-12-10 RX ADMIN — PIPERACILLIN SODIUM AND TAZOBACTAM SODIUM 3.38 G: 3; .375 INJECTION, POWDER, LYOPHILIZED, FOR SOLUTION INTRAVENOUS at 01:15

## 2021-12-10 RX ADMIN — ASPIRIN 81 MG: 81 TABLET, COATED ORAL at 08:27

## 2021-12-10 RX ADMIN — FERROUS SULFATE TAB 325 MG (65 MG ELEMENTAL FE) 325 MG: 325 (65 FE) TAB at 15:21

## 2021-12-10 RX ADMIN — PIPERACILLIN SODIUM AND TAZOBACTAM SODIUM 3.38 G: 3; .375 INJECTION, POWDER, LYOPHILIZED, FOR SOLUTION INTRAVENOUS at 17:53

## 2021-12-10 RX ADMIN — METOPROLOL TARTRATE 5 MG: 5 INJECTION INTRAVENOUS at 01:06

## 2021-12-10 RX ADMIN — Medication 10 ML: at 15:25

## 2021-12-10 RX ADMIN — Medication 10 ML: at 05:54

## 2021-12-10 RX ADMIN — VENLAFAXINE HYDROCHLORIDE 150 MG: 75 CAPSULE, EXTENDED RELEASE ORAL at 08:27

## 2021-12-10 NOTE — PROGRESS NOTES
Comprehensive Nutrition Assessment    Type and Reason for Visit: Initial (Poor PO)    Nutrition Recommendations/Plan:   Continue regular diet    Add ensure HP BID  If BG levels drop <60 give 15-30g CHO (juice, crackers, cookie)    OP RD to f/u with DM edu on this date    Do not rec'd PEG placement, as hypoglycemia is not an indication for EN  Would rec'd meeting with an RD IP and OP to prevent hypoglycemic episodes in the future    Document % intakes and BM in I/O's    Nutrition Assessment:  Admitted for metabolic encephalopathy. Given D50, but pt continue with AMS and unable to have oral PO per MD. NG tube placed started on Glucerna 1.5 at 50ml/hr by MD, but then pulled out last night. Pt ordered regular diet, RD visited pt bedside, consumed bites of applesauce for B (25kcal). Pt not interactive at time of visit. Per chart review pt could possibly need PEG if intakes do not improve over weekend. RD would not rec'd PEG placement. Pt without wt loss and no muscle/fat wasting. If irregular BG levels are a concern would rec'd first taking lifestyle changes approach before PEG placement. Pt can be seen by home DM mgmt team and an RD to prevent severe hypoglycemia episodes in the future. Labs: Glu -135, H/H 10.6/34.5, Cr 1.65, . Meds: statin, pepcid, FeSu, lopressor, zosyn, IVF     Malnutrition Assessment:  Malnutrition Status:  Mild malnutrition    Context:  Acute illness     Findings of the 6 clinical characteristics of malnutrition:   Energy Intake:  7 - 50% or less of est energy requirements for 5 or more days  Weight Loss:  No significant weight loss     Body Fat Loss:  No significant body fat loss,     Muscle Mass Loss:   ,    Fluid Accumulation:  No significant fluid accumulation,      Estimated Daily Nutrient Needs:  Energy (kcal): 1700kcal (25kcal/kg); Weight Used for Energy Requirements: Adjusted (68kg)  Protein (g): 68g (1.0g/kg);  Weight Used for Protein Requirements: Adjusted (68kg)  Fluid (ml/day): 1700ml; Method Used for Fluid Requirements: 1 ml/kcal    Nutrition Related Findings:  NFPE without acute findings. No h/o dysphagia. No n/v. Loose BM 12/9. No edema. Wounds:    None       Current Nutrition Therapies:  ADULT DIET Regular  ADULT TUBE FEEDING Nasogastric; Diabetic; Delivery Method: Continuous; Continuous Initial Rate (mL/hr): 20; Continuous Advance Tube Feeding: Yes; Advancement Volume (mL/hr): 10; Advancement Frequency: Q 4 hours; Continuous Goal Rate (mL/hr): 50; W... Anthropometric Measures:  · Height:  5' 5\" (165.1 cm)  · Current Body Wt:  101 kg (222 lb 10.6 oz)   · Usual Body Wt:  99.8 kg (220 lb)     · Ideal Body Wt:  125 lbs:  178.1 %   · Adjusted Body Weight:   ; Weight Adjustment for:  (AdjBW 68kg)   · BMI Category:  Obese class 2 (BMI 35.0-39. 9)    Wt Readings from Last 10 Encounters:   12/10/21 101.2 kg (223 lb)   12/05/21 99.8 kg (220 lb)   12/03/21 99.8 kg (220 lb)   10/01/21 99.8 kg (220 lb)   09/19/21 100.7 kg (222 lb)   08/11/21 99.8 kg (220 lb)   05/13/21 99.8 kg (220 lb)   01/14/21 102.1 kg (225 lb)   01/21/21 99.8 kg (220 lb)   01/11/21 102.1 kg (225 lb)     Nutrition Diagnosis:   · Inadequate oral intake related to cognitive or neurological impairment (AMS) as evidenced by intake 0-25%    Nutrition Interventions:   Food and/or Nutrient Delivery: Continue current diet, Start oral nutrition supplement  Nutrition Education and Counseling: Education needed (DM edu needed)  Coordination of Nutrition Care: Continue to monitor while inpatient    Goals:  >75% of EENs, glucose levels 60< and <180       Nutrition Monitoring and Evaluation:   Behavioral-Environmental Outcomes: None identified  Food/Nutrient Intake Outcomes: Food and nutrient intake  Physical Signs/Symptoms Outcomes: Biochemical data, Weight, Meal time behavior    Discharge Planning:    Recommend pursue outpatient diabetes education     Electronically signed by Rosemary Olvera RD on 12/10/2021 at 10:01 AM    Contact: [phone]

## 2021-12-10 NOTE — PROGRESS NOTES
Hospitalist Progress Note               Daily Progress Note: 12/10/2021      Subjective:   Hospital course to date: Patient is a 42-year-old female with history of diabetes, breast cancer and hypertension who was brought to the ED on 12/5 after being found by family unresponsive the night before. She had been in the ED the day before that with hypoglycemia. He was noted have a blood sugar of 51 at home and was given D10 but no significant improvement. Chest x-ray showed left lower lobe pneumonia. She was transferred from [facility]. Blood sugar on arrival here was 73. She was started on D10. All of patient's diabetic medications were supposed to have been previously discontinued although unclear if she had been inadvertently taking them. According to her sister the last time she took insulin was Friday of last week.  ------    Patient seen this morning for follow-up. Patient is much more alert today, eyes are open. She is able to eat a few bites of applesauce with her sister-in-law feeding her. She is not following commands. She laughs inappropriately at times    Patient pulled her NG tube out    Blood sugars are running 102-158    Problem List:  Problem List as of 12/10/2021 Date Reviewed: 12/5/2021          Codes Class Noted - Resolved    Metabolic encephalopathy [id number]: G93.41  ICD-9-CM: 348.31  12/5/2021 - Present        CKD (chronic kidney disease) ICD-10-CM: N18.9  ICD-9-CM: 585.9  9/24/2021 - Present    Overview Signed 9/24/2021  3:57 PM by Cole Matthew MD     In the setting of severe, uncontrolled and prolonged diabetes, with diabetic retinopathy. Protienuria to 2.5g/g of creatinine. Would consider workup for acute GN given inconsistent history and rising creatinine.  Though clinical hx appears consistent with aggressive diabetic nephropathy             Sepsis ([facility] ICD-10-CM: A41.9  ICD-9-CM: 038.9, 995.91  9/22/2021 - Present        Esophagitis with gastritis ICD-10-CM: K29.70, K20.90  ICD-9-CM: 530.19  9/22/2021 - Present        Elevated troponin ICD-10-CM: R77.8  ICD-9-CM: 790.6  9/20/2021 - Present        Syncope ICD-10-CM: R55  ICD-9-CM: 780.2  9/20/2021 - Present        Hypoglycemia ICD-10-CM: E16.2  ICD-9-CM: 251.2  9/20/2021 - Present        Chronic respiratory failure with hypoxia (HCC) ICD-10-CM: J96.11  ICD-9-CM: 518.83, 799.02  9/20/2021 - Present        Chest pain ICD-10-CM: R07.9  ICD-9-CM: 786.50  9/19/2021 - Present        Anxiety ICD-10-CM: F41.9  ICD-9-CM: 300.00  2/9/2021 - Present        Depression ICD-10-CM: F32. A  ICD-9-CM: 141  2/9/2021 - Present        Dyslipidemia ICD-10-CM: E78.5  ICD-9-CM: 272.4  2/9/2021 - Present        Hypertensive cardiovascular disease ICD-10-CM: I11.9  ICD-9-CM: 402.90  2/9/2021 - Present        Knee osteoarthritis ICD-10-CM: M17.10  ICD-9-CM: 715.36  1/25/2021 - Present        COPD with acute exacerbation ([facility] ICD-10-CM: J44.1  ICD-9-CM: 491.21  11/29/2020 - Present        Person under investigation for COVID-19 ICD-10-CM: Z20.822  ICD-9-CM: V01.79  11/29/2020 - Present        COPD (chronic obstructive pulmonary disease) ([facility] ICD-10-CM: J44.9  ICD-9-CM: 139  11/29/2020 - Present        DM (diabetes mellitus) ([facility] ICD-10-CM: E11.9  ICD-9-CM: 250.00  11/29/2020 - Present        COVID-19 ICD-10-CM: U07.1  ICD-9-CM: 079.89  10/1/2020 - Present        HTN (hypertension) ICD-10-CM: I10  ICD-9-CM: 401.9  10/1/2020 - Present        Diabetic gastroparesis ([facility] ICD-10-CM: E11.43, K31.84  ICD-9-CM: 250.60, 536.3  10/1/2020 - Present        DVT prophylaxis ICD-10-CM: Z29.9  ICD-9-CM: V07.9  10/1/2020 - Present        PNA (pneumonia) ICD-10-CM: J18.9  ICD-9-CM: 705  9/30/2020 - Present        Acute respiratory failure with hypoxia (HCC) ICD-10-CM: J96.01  ICD-9-CM: 518.81  9/30/2020 - Present        Acute kidney injury ([facility] ICD-10-CM: N17.9  ICD-9-CM: 584.9  9/30/2020 - Present        Type 2 diabetes mellitus with hyperglycemia, with long-term current use of insulin (HCC) ICD-10-CM: E11.65, Z79.4  ICD-9-CM: 250.00, 790.29, V58.67  9/30/2020 - Present        Ureteral stone ICD-10-CM: N20.1  ICD-9-CM: 592.1  2/15/2019 - Present        S/P lumbar fusion ICD-10-CM: Z98.1  ICD-9-CM: V45.4  10/16/2018 - Present        CAP (community acquired pneumonia) ICD-10-CM: J18.9  ICD-9-CM: 615  9/1/2018 - Present        Shortness of breath ICD-10-CM: R06.02  ICD-9-CM: 786.05  9/1/2018 - Present        Fever and chills ICD-10-CM: R50.9  ICD-9-CM: 780.60  9/1/2018 - Present        Spinal stenosis of lumbar region at multiple levels ICD-10-CM: M48.061  ICD-9-CM: 724.02  8/30/2018 - Present        Scoliosis ICD-10-CM: M41.9  ICD-9-CM: 737.30  8/30/2018 - Present        Spinal stenosis ICD-10-CM: M48.00  ICD-9-CM: 724.00  8/30/2018 - Present        Pre-op testing ICD-10-CM: F47.993  ICD-9-CM: V72.84  8/27/2018 - Present        Severe obesity (BMI 35.0-39. 9) ICD-10-CM: E66.01  ICD-9-CM: 278.01  6/20/2018 - Present        Spinal stenosis, lumbar region without neurogenic claudication ICD-10-CM: M48.061  ICD-9-CM: 724.02  5/16/2018 - Present        Lumbosacral spondylosis without myelopathy ICD-10-CM: M47.817  ICD-9-CM: 721.3  4/25/2018 - Present        DDD (degenerative disc disease), lumbar ICD-10-CM: M51.36  ICD-9-CM: 722.52  4/25/2018 - Present        Malignant neoplasm of female breast [facility]) ICD-10-CM: C50.919  ICD-9-CM: 174.9  4/25/2018 - Present              Medications reviewed  Current Facility-Administered Medications   Medication Dose Route Frequency    hydrALAZINE (APRESOLINE) 20 mg/mL injection 10 mg  10 mg IntraVENous Q4H PRN    piperacillin-tazobactam (ZOSYN) 3.375 g in 0.9% sodium chloride (MBP/ADV) 100 mL MBP  3.375 g IntraVENous Q8H    nitroglycerin (NITROBID) 2 % ointment 1 Inch  1 Inch Topical Q6H    labetaloL (NORMODYNE;TRANDATE) 20 mg/4 mL (5 mg/mL) injection 10 mg  10 mg IntraVENous Q4H PRN    [Held by provider] amLODIPine (NORVASC) tablet 10 mg  10 mg Oral DAILY    atorvastatin (LIPITOR) tablet 40 mg  40 mg Oral QHS    albuterol (PROVENTIL HFA, VENTOLIN HFA, PROAIR HFA) inhaler 2 Puff  2 Puff Inhalation Q4H PRN    [Held by provider] tamsulosin (FLOMAX) capsule 0.4 mg  0.4 mg Oral DAILY    [Held by provider] gabapentin (NEURONTIN) capsule 300 mg  300 mg Oral BID    [Held by provider] melatonin tablet 3 mg  3 mg Oral QHS    venlafaxine-SR (EFFEXOR-XR) capsule 150 mg  150 mg Oral DAILY WITH BREAKFAST    ferrous sulfate tablet 325 mg  1 Tablet Oral TID    aspirin delayed-release tablet 81 mg  81 mg Oral DAILY    glucose chewable tablet 16 g  4 Tablet Oral PRN    dextrose (D50W) injection syrg 12.5-25 g  25-50 mL IntraVENous PRN    glucagon (GLUCAGEN) injection 1 mg  1 mg IntraMUSCular PRN    sodium chloride (NS) flush 5-40 mL  5-40 mL IntraVENous Q8H    sodium chloride (NS) flush 5-40 mL  5-40 mL IntraVENous PRN    acetaminophen (TYLENOL) tablet 650 mg  650 mg Oral Q6H PRN    Or    acetaminophen (TYLENOL) suppository 650 mg  650 mg Rectal Q6H PRN    ondansetron (ZOFRAN ODT) tablet 4 mg  4 mg Oral Q6H PRN    Or    ondansetron (ZOFRAN) injection 4 mg  4 mg IntraVENous Q6H PRN    famotidine (PEPCID) tablet 20 mg  20 mg Oral DAILY    metoprolol (LOPRESSOR) injection 5 mg  5 mg IntraVENous Q4H    metoprolol succinate (TOPROL-XL) XL tablet 50 mg  50 mg Oral DAILY       Review of Systems:   Review of systems not obtained due to patient factors.     Objective:   Physical Exam:     Visit Vitals  BP (!) 156/58   Pulse 91   Temp 97.4 °F (36.3 °C)   Resp 18   Ht 5' 5\" (1.651 m)   Wt 99.8 kg (220 lb)   SpO2 98%   BMI 36.61 kg/m²    O2 Flow Rate (L/min): 1 l/min O2 Device: Nasal cannula    Temp (24hrs), Av.1 °F (36.7 °C), Min:97.4 °F (36.3 °C), Max:99.1 °F (37.3 °C)    12/10 0701 - 12/10 1900  In: -   Out: 650 [Urine:650]   1901 - 12/10 0700  In: 1150 [I.V.:500]  Out: 600 [Urine:600]    General:   Obtunded, grimaces to pain   Lungs: Clear to auscultation bilaterally. Chest wall:  No tenderness or deformity. Heart:  Regular rate and rhythm, S1, S2 normal, no murmur, click, rub or gallop. Abdomen:   Soft, non-tender. Bowel sounds normal. No masses,  No organomegaly. Extremities: Extremities normal, atraumatic, no cyanosis or edema. Pulses: 2+ and symmetric all extremities. Skin: Skin color, texture, turgor normal. No rashes or lesions   Neurologic: CNII-XII intact. Unable to fully assess         Data Review:       Recent Days:  Recent Labs     12/10/21  0926 12/09/21  0356   WBC 11.3* 13.9*   HGB 10.4* 10.6*   HCT 33.9* 34.5*    419*     Recent Labs     12/10/21  0926 12/09/21  0305    140   K 3.9 4.0   * 108   CO2 31 29   * 158*   BUN 26* 21*   CREA 1.71* 1.65*   CA 8.8 8.7   PHOS 3.6 3.3   ALB 2.0* 1.9*     Recent Labs     12/07/21 2000   PH 7.41   PCO2 43   PO2 133*   HCO3 27*       24 Hour Results:  Recent Results (from the past 24 hour(s))   GLUCOSE, POC    Collection Time: 12/09/21  8:36 PM   Result Value Ref Range    Glucose (POC) 135 (H) 65 - 117 mg/dL    Performed by Claritza Malone 10, POC    Collection Time: 12/10/21  8:34 AM   Result Value Ref Range    Glucose (POC) 109 65 - 117 mg/dL    Performed by Tatiana Silva (Float Pool)    CBC WITH AUTOMATED DIFF    Collection Time: 12/10/21  9:26 AM   Result Value Ref Range    WBC 11.3 (H) 3.6 - 11.0 K/uL    RBC 3.99 3.80 - 5.20 M/uL    HGB 10.4 (L) 11.5 - 16.0 g/dL    HCT 33.9 (L) 35.0 - 47.0 %    MCV 85.0 80.0 - 99.0 FL    MCH 26.1 26.0 - 34.0 PG    MCHC 30.7 30.0 - 36.5 g/dL    RDW 15.9 (H) 11.5 - 14.5 %    PLATELET 439 705 - 282 K/uL    MPV 9.5 8.9 - 12.9 FL    NRBC 0.0 0.0  WBC    ABSOLUTE NRBC 0.00 0.00 - 0.01 K/uL    NEUTROPHILS 77 (H) 32 - 75 %    LYMPHOCYTES 7 (L) 12 - 49 %    MONOCYTES 9 5 - 13 %    EOSINOPHILS 6 0 - 7 %    BASOPHILS 1 0 - 1 %    IMMATURE GRANULOCYTES 0 0 - 0.5 %    ABS.  NEUTROPHILS 8.8 (H) 1.8 - 8.0 K/UL    ABS. LYMPHOCYTES 0.8 0.8 - 3.5 K/UL    ABS. MONOCYTES 1.0 0.0 - 1.0 K/UL    ABS. EOSINOPHILS 0.6 (H) 0.0 - 0.4 K/UL    ABS. BASOPHILS 0.1 0.0 - 0.1 K/UL    ABS. IMM. GRANS. 0.1 (H) 0.00 - 0.04 K/UL    DF AUTOMATED     RENAL FUNCTION PANEL    Collection Time: 12/10/21  9:26 AM   Result Value Ref Range    Sodium 142 136 - 145 mmol/L    Potassium 3.9 3.5 - 5.1 mmol/L    Chloride 109 (H) 97 - 108 mmol/L    CO2 31 21 - 32 mmol/L    Anion gap 2 (L) 5 - 15 mmol/L    Glucose 113 (H) 65 - 100 mg/dL    BUN 26 (H) 6 - 20 mg/dL    Creatinine 1.71 (H) 0.55 - 1.02 mg/dL    BUN/Creatinine ratio 15 12 - 20      GFR est AA 36 (L) >60 ml/min/1.73m2    GFR est non-AA 30 (L) >60 ml/min/1.73m2    Calcium 8.8 8.5 - 10.1 mg/dL    Phosphorus 3.6 2.6 - 4.7 mg/dL    Albumin 2.0 (L) 3.5 - 5.0 g/dL   GLUCOSE, POC    Collection Time: 12/10/21 11:46 AM   Result Value Ref Range    Glucose (POC) 102 65 - 117 mg/dL    Performed by Tabitha Atkins        XR CHEST PORT   Final Result   No significant interval change. MRI BRAIN WO CONT   Final Result   1. No evidence of acute intracranial ischemia, mass or hemorrhage. 2.  Moderate chronic small vessel ischemic changes. 3.  Mild diffuse parenchymal volume loss. Assessment:  Hypoglycemia in diabetes mellitus type 2, improved. Suspect she had a prolonged episode of severe hypoglycemia at home    Hypoglycemic encephalopathy, possibly with permanent acquired brain injury    Possible left lower lobe pneumonia, likely aspiration    Chronic kidney disease stage III    History of breast cancer    COPD without exacerbation    Chronic respiratory failure with hypoxia    History of gastritis/esophagitis/gastroparesis      Plan:   We will see how she does over the weekend in terms of nutritional intake and improvement in mentation    Discussed with her family at the bedside that regardless of her improving she will still likely need SNF for rehab temporarily at the minimum    If oral intake very poor over weekend she still may need a PEG tube    Care Plan discussed with: Patient/Family    Disposition: Transfer to medical telemetry    Total time spent with patient: 30 minutes.     Lily Kraft MD

## 2021-12-10 NOTE — PROGRESS NOTES
Nutrition Education    · Verbally reviewed information with  in law  · Educated on DM MNT  · Written educational materials provided. · Contact name and number provided. · Refer to Patient Education activity for more details. RD spoke to pt sister in law regarding DM MNT. Sister in law reports pt is non-verbal and understands very little. Family reports pt is not appropriate for edu. RD discussed importance of following DM diet to manage BG and reduce risk of neuropathy. RD reviewed cho foods and BG impacts. Reviewed protein impacts on BG and sources. Discussed importance of pairing protein and cho with all meals for glucose management. Encouraged pt to look into CGM and check BG daily once d/c. Reviewed importance of listening to body's cues to for signs of hypo/hyperglycemia. Encouraged pt to have OJ w/ hypoglycemia and wait to have protein rich foods until BG improves. RD reviewed foods to increase and limit for optimal BG. Additionally advised pt to have more lean proteins (reviewed sources), low fat dairy, healthy fats (fish, nuts/seeds, olive oil, avocados), variety of f/v etc. Reviewed foods to limit including: whole fat dairy, SSB to no more than 8oz/d (preferably sugar free), processed and refined goods, cured/deli meats etc. Encouraged water intake of 64 oz/d. Reviewed balanced meals as evidenced by [employer]. Discussed having each meal contain half plate filled with f/v and other half lean proteins and complex chos. Told pt to increase whole grains for fullness and optimal BG control, discussed food sources. Encouraged pt to limit fruit juice and pick whole fruit/foods instead. RD gave pt contact info and encouraged to reach out with further questions or concerns.       Electronically signed by Boaz Dobbs RD on 12/10/2021 at 1:58 PM    Contact Number: Ext [phone]

## 2021-12-10 NOTE — PROGRESS NOTES
CM spoke with patient's sister and discuss options for long term care as a possibility depending on patient's clinical progress. Patient's sister is open to this option, but wants to wait to see how patient progresses. Patient to possibly have PEG tube placement after the weekend depending on condition. [facility] has completed a [facility] for Nursing facility long term placement. [facility] will continue to follow.

## 2021-12-10 NOTE — PROGRESS NOTES
Problem: Diabetes Self-Management  Goal: *Disease process and treatment process  Description: Define diabetes and identify own type of diabetes; list 3 options for treating diabetes. Outcome: Progressing Towards Goal  Goal: *Incorporating nutritional management into lifestyle  Description: Describe effect of type, amount and timing of food on blood glucose; list 3 methods for planning meals. Outcome: Progressing Towards Goal  Goal: *Incorporating physical activity into lifestyle  Description: State effect of exercise on blood glucose levels. Outcome: Progressing Towards Goal  Goal: *Developing strategies to promote health/change behavior  Description: Define the ABC's of diabetes; identify appropriate screenings, schedule and personal plan for screenings. Outcome: Progressing Towards Goal  Goal: *Using medications safely  Description: State effect of diabetes medications on diabetes; name diabetes medication taking, action and side effects. Outcome: Progressing Towards Goal  Goal: *Monitoring blood glucose, interpreting and using results  Description: Identify recommended blood glucose targets  and personal targets. Outcome: Progressing Towards Goal  Goal: *Prevention, detection, treatment of acute complications  Description: List symptoms of hyper- and hypoglycemia; describe how to treat low blood sugar and actions for lowering  high blood glucose level. Outcome: Progressing Towards Goal  Goal: *Prevention, detection and treatment of chronic complications  Description: Define the natural course of diabetes and describe the relationship of blood glucose levels to long term complications of diabetes.   Outcome: Progressing Towards Goal  Goal: *Developing strategies to address psychosocial issues  Description: Describe feelings about living with diabetes; identify support needed and support network  Outcome: Progressing Towards Goal  Goal: *Insulin pump training  Outcome: Progressing Towards Goal  Goal: *Sick day guidelines  Outcome: Progressing Towards Goal  Goal: *Patient Specific Goal (EDIT GOAL, INSERT TEXT)  Outcome: Progressing Towards Goal   Pt lethargic, responds to voice by opening her eyes spontaneously. No evidence of pain at this time.  Will continue POC

## 2021-12-11 LAB
BACTERIA SPEC CULT: NORMAL
BACTERIA SPEC CULT: NORMAL
GLUCOSE BLD STRIP.AUTO-MCNC: 100 MG/DL (ref 65–117)
GLUCOSE BLD STRIP.AUTO-MCNC: 103 MG/DL (ref 65–117)
GLUCOSE BLD STRIP.AUTO-MCNC: 122 MG/DL (ref 65–117)
GLUCOSE BLD STRIP.AUTO-MCNC: 145 MG/DL (ref 65–117)
GLUCOSE BLD STRIP.AUTO-MCNC: 192 MG/DL (ref 65–117)
PERFORMED BY, TECHID: ABNORMAL
PERFORMED BY, TECHID: NORMAL
PERFORMED BY, TECHID: NORMAL
SPECIAL REQUESTS,SREQ: NORMAL
SPECIAL REQUESTS,SREQ: NORMAL

## 2021-12-11 PROCEDURE — 74011000258 HC RX REV CODE- 258: Performed by: INTERNAL MEDICINE

## 2021-12-11 PROCEDURE — 74011250637 HC RX REV CODE- 250/637: Performed by: HOSPITALIST

## 2021-12-11 PROCEDURE — 74011250636 HC RX REV CODE- 250/636: Performed by: HOSPITALIST

## 2021-12-11 PROCEDURE — 74011250636 HC RX REV CODE- 250/636: Performed by: INTERNAL MEDICINE

## 2021-12-11 PROCEDURE — 65270000029 HC RM PRIVATE

## 2021-12-11 PROCEDURE — 74011000250 HC RX REV CODE- 250: Performed by: HOSPITALIST

## 2021-12-11 PROCEDURE — 82962 GLUCOSE BLOOD TEST: CPT

## 2021-12-11 RX ORDER — DEXTROSE MONOHYDRATE 50 MG/ML
75 INJECTION, SOLUTION INTRAVENOUS CONTINUOUS
Status: DISCONTINUED | OUTPATIENT
Start: 2021-12-11 | End: 2021-12-18

## 2021-12-11 RX ADMIN — NITROGLYCERIN 1 INCH: 20 OINTMENT TOPICAL at 13:47

## 2021-12-11 RX ADMIN — FERROUS SULFATE TAB 325 MG (65 MG ELEMENTAL FE) 325 MG: 325 (65 FE) TAB at 21:27

## 2021-12-11 RX ADMIN — METOPROLOL SUCCINATE 50 MG: 25 TABLET, EXTENDED RELEASE ORAL at 11:17

## 2021-12-11 RX ADMIN — HYDRALAZINE HYDROCHLORIDE 10 MG: 20 INJECTION INTRAMUSCULAR; INTRAVENOUS at 17:26

## 2021-12-11 RX ADMIN — VENLAFAXINE HYDROCHLORIDE 150 MG: 75 CAPSULE, EXTENDED RELEASE ORAL at 11:01

## 2021-12-11 RX ADMIN — METOPROLOL TARTRATE 5 MG: 5 INJECTION INTRAVENOUS at 23:26

## 2021-12-11 RX ADMIN — FAMOTIDINE 20 MG: 20 TABLET, FILM COATED ORAL at 11:01

## 2021-12-11 RX ADMIN — NITROGLYCERIN 1 INCH: 20 OINTMENT TOPICAL at 23:25

## 2021-12-11 RX ADMIN — METOPROLOL TARTRATE 5 MG: 5 INJECTION INTRAVENOUS at 13:47

## 2021-12-11 RX ADMIN — LABETALOL HYDROCHLORIDE 10 MG: 5 INJECTION, SOLUTION INTRAVENOUS at 14:01

## 2021-12-11 RX ADMIN — NITROGLYCERIN 1 INCH: 20 OINTMENT TOPICAL at 05:12

## 2021-12-11 RX ADMIN — LABETALOL HYDROCHLORIDE 10 MG: 5 INJECTION, SOLUTION INTRAVENOUS at 10:54

## 2021-12-11 RX ADMIN — PIPERACILLIN SODIUM AND TAZOBACTAM SODIUM 3.38 G: 3; .375 INJECTION, POWDER, LYOPHILIZED, FOR SOLUTION INTRAVENOUS at 00:59

## 2021-12-11 RX ADMIN — METOPROLOL TARTRATE 5 MG: 5 INJECTION INTRAVENOUS at 10:54

## 2021-12-11 RX ADMIN — METOPROLOL TARTRATE 5 MG: 5 INJECTION INTRAVENOUS at 21:27

## 2021-12-11 RX ADMIN — Medication 10 ML: at 22:35

## 2021-12-11 RX ADMIN — PIPERACILLIN SODIUM AND TAZOBACTAM SODIUM 3.38 G: 3; .375 INJECTION, POWDER, LYOPHILIZED, FOR SOLUTION INTRAVENOUS at 17:33

## 2021-12-11 RX ADMIN — NITROGLYCERIN 1 INCH: 20 OINTMENT TOPICAL at 18:17

## 2021-12-11 RX ADMIN — PIPERACILLIN SODIUM AND TAZOBACTAM SODIUM 3.38 G: 3; .375 INJECTION, POWDER, LYOPHILIZED, FOR SOLUTION INTRAVENOUS at 11:13

## 2021-12-11 RX ADMIN — DEXTROSE MONOHYDRATE 75 ML/HR: 50 INJECTION, SOLUTION INTRAVENOUS at 11:13

## 2021-12-11 RX ADMIN — Medication 10 ML: at 13:58

## 2021-12-11 RX ADMIN — ASPIRIN 81 MG: 81 TABLET, COATED ORAL at 11:01

## 2021-12-11 RX ADMIN — NITROGLYCERIN 1 INCH: 20 OINTMENT TOPICAL at 00:59

## 2021-12-11 RX ADMIN — ATORVASTATIN CALCIUM 40 MG: 40 TABLET, FILM COATED ORAL at 21:27

## 2021-12-11 RX ADMIN — METOPROLOL TARTRATE 5 MG: 5 INJECTION INTRAVENOUS at 17:26

## 2021-12-11 RX ADMIN — HYDRALAZINE HYDROCHLORIDE 10 MG: 20 INJECTION INTRAMUSCULAR; INTRAVENOUS at 23:43

## 2021-12-11 RX ADMIN — METOPROLOL TARTRATE 5 MG: 5 INJECTION INTRAVENOUS at 01:00

## 2021-12-11 RX ADMIN — FERROUS SULFATE TAB 325 MG (65 MG ELEMENTAL FE) 325 MG: 325 (65 FE) TAB at 17:26

## 2021-12-11 RX ADMIN — FERROUS SULFATE TAB 325 MG (65 MG ELEMENTAL FE) 325 MG: 325 (65 FE) TAB at 11:01

## 2021-12-11 RX ADMIN — Medication 10 ML: at 05:13

## 2021-12-11 RX ADMIN — LABETALOL HYDROCHLORIDE 10 MG: 5 INJECTION, SOLUTION INTRAVENOUS at 22:34

## 2021-12-11 NOTE — PROGRESS NOTES
Hospitalist Progress Note               Daily Progress Note: 12/11/2021      Subjective:   Hospital course to date: Patient is a 29-year-old female with history of diabetes, breast cancer and hypertension who was brought to the ED on 12/5 after being found by family unresponsive the night before. She had been in the ED the day before that with hypoglycemia. He was noted have a blood sugar of 51 at home and was given D10 but no significant improvement. Chest x-ray showed left lower lobe pneumonia. She was transferred from [facility]. Blood sugar on arrival here was 73. She was started on D10. All of patient's diabetic medications were supposed to have been previously discontinued although unclear if she had been inadvertently taking them. According to her sister the last time she took insulin was Friday of last week.    ------  Patient seen this morning for follow-up. Patient is alert, is able to open her eyes. Per nurse report, she declines oral intake. D50 will be started and close blood sugar monitoring. She laughs inappropriately to voice but seems to be somewhat able to communicate. Patient pulled her NG tube out yesterday. Blood sugars are running 102-158. Blood culture negative for 5 days. 12/10/2021 labs showed WBC decreased to 11,300, HGB slowly decreasing at 10.4, Creatinine remains elevated at 1.71, Albumin decreased at 2.0. No significant findings on CXR. Blood pressure was elevated at 188/84. Problem List:  Problem List as of 12/11/2021 Date Reviewed: 12/5/2021          Codes Class Noted - Resolved    Metabolic encephalopathy ESW-31-XE: G93.41  ICD-9-CM: 348.31  12/5/2021 - Present        CKD (chronic kidney disease) ICD-10-CM: N18.9  ICD-9-CM: 585.9  9/24/2021 - Present    Overview Signed 9/24/2021  3:57 PM by Luis Lopez MD     In the setting of severe, uncontrolled and prolonged diabetes, with diabetic retinopathy. Protienuria to 2.5g/g of creatinine.    Would consider workup for acute GN given inconsistent history and rising creatinine. Though clinical hx appears consistent with aggressive diabetic nephropathy             Sepsis ([facility] ICD-10-CM: A41.9  ICD-9-CM: 038.9, 995.91  9/22/2021 - Present        Esophagitis with gastritis ICD-10-CM: K29.70, K20.90  ICD-9-CM: 530.19  9/22/2021 - Present        Elevated troponin ICD-10-CM: R77.8  ICD-9-CM: 790.6  9/20/2021 - Present        Syncope ICD-10-CM: R55  ICD-9-CM: 780.2  9/20/2021 - Present        Hypoglycemia ICD-10-CM: E16.2  ICD-9-CM: 251.2  9/20/2021 - Present        Chronic respiratory failure with hypoxia (HCC) ICD-10-CM: J96.11  ICD-9-CM: 518.83, 799.02  9/20/2021 - Present        Chest pain ICD-10-CM: R07.9  ICD-9-CM: 786.50  9/19/2021 - Present        Anxiety ICD-10-CM: F41.9  ICD-9-CM: 300.00  2/9/2021 - Present        Depression ICD-10-CM: F32. A  ICD-9-CM: 915  2/9/2021 - Present        Dyslipidemia ICD-10-CM: E78.5  ICD-9-CM: 272.4  2/9/2021 - Present        Hypertensive cardiovascular disease ICD-10-CM: I11.9  ICD-9-CM: 402.90  2/9/2021 - Present        Knee osteoarthritis ICD-10-CM: M17.10  ICD-9-CM: 715.36  1/25/2021 - Present        COPD with acute exacerbation ([facility].) ICD-10-CM: J44.1  ICD-9-CM: 491.21  11/29/2020 - Present        Person under investigation for COVID-19 ICD-10-CM: Z20.822  ICD-9-CM: V01.79  11/29/2020 - Present        COPD (chronic obstructive pulmonary disease) ([facility].) ICD-10-CM: J44.9  ICD-9-CM: 618  11/29/2020 - Present        DM (diabetes mellitus) ([facility].) ICD-10-CM: E11.9  ICD-9-CM: 250.00  11/29/2020 - Present        COVID-19 ICD-10-CM: U07.1  ICD-9-CM: 079.89  10/1/2020 - Present        HTN (hypertension) ICD-10-CM: I10  ICD-9-CM: 401.9  10/1/2020 - Present        Diabetic gastroparesis ([place] [address].) ICD-10-CM: E11.43, K31.84  ICD-9-CM: 250.60, 536.3  10/1/2020 - Present        DVT prophylaxis ICD-10-CM: Z29.9  ICD-9-CM: V07.9  10/1/2020 - Present        PNA (pneumonia) ICD-10-CM: J18.9  ICD-9-CM: 147  9/30/2020 - Present        Acute respiratory failure with hypoxia [facility]) ICD-10-CM: J96.01  ICD-9-CM: 518.81  9/30/2020 - Present        Acute kidney injury [facility]) ICD-10-CM: N17.9  ICD-9-CM: 584.9  9/30/2020 - Present        Type 2 diabetes mellitus with hyperglycemia, with long-term current use of insulin (HCC) ICD-10-CM: E11.65, Z79.4  ICD-9-CM: 250.00, 790.29, V58.67  9/30/2020 - Present        Ureteral stone ICD-10-CM: N20.1  ICD-9-CM: 592.1  2/15/2019 - Present        S/P lumbar fusion ICD-10-CM: Z98.1  ICD-9-CM: V45.4  10/16/2018 - Present        CAP (community acquired pneumonia) ICD-10-CM: J18.9  ICD-9-CM: 576  9/1/2018 - Present        Shortness of breath ICD-10-CM: R06.02  ICD-9-CM: 786.05  9/1/2018 - Present        Fever and chills ICD-10-CM: R50.9  ICD-9-CM: 780.60  9/1/2018 - Present        Spinal stenosis of lumbar region at multiple levels ICD-10-CM: M48.061  ICD-9-CM: 724.02  8/30/2018 - Present        Scoliosis ICD-10-CM: M41.9  ICD-9-CM: 737.30  8/30/2018 - Present        Spinal stenosis ICD-10-CM: M48.00  ICD-9-CM: 724.00  8/30/2018 - Present        Pre-op testing ICD-10-CM: J21.399  ICD-9-CM: V72.84  8/27/2018 - Present        Severe obesity (BMI 35.0-39. 9) ICD-10-CM: E66.01  ICD-9-CM: 278.01  6/20/2018 - Present        Spinal stenosis, lumbar region without neurogenic claudication ICD-10-CM: M48.061  ICD-9-CM: 724.02  5/16/2018 - Present        Lumbosacral spondylosis without myelopathy ICD-10-CM: M47.817  ICD-9-CM: 721.3  4/25/2018 - Present        DDD (degenerative disc disease), lumbar ICD-10-CM: M51.36  ICD-9-CM: 722.52  4/25/2018 - Present        Malignant neoplasm of female breast [facility]) ICD-10-CM: C50.919  ICD-9-CM: 174.9  4/25/2018 - Present              Medications reviewed  Current Facility-Administered Medications   Medication Dose Route Frequency    dextrose 5% infusion  75 mL/hr IntraVENous CONTINUOUS    hydrALAZINE (APRESOLINE) 20 mg/mL injection 10 mg  10 mg IntraVENous Q4H PRN    piperacillin-tazobactam (ZOSYN) 3.375 g in 0.9% sodium chloride (MBP/ADV) 100 mL MBP  3.375 g IntraVENous Q8H    nitroglycerin (NITROBID) 2 % ointment 1 Inch  1 Inch Topical Q6H    labetaloL (NORMODYNE;TRANDATE) 20 mg/4 mL (5 mg/mL) injection 10 mg  10 mg IntraVENous Q4H PRN    [Held by provider] amLODIPine (NORVASC) tablet 10 mg  10 mg Oral DAILY    atorvastatin (LIPITOR) tablet 40 mg  40 mg Oral QHS    albuterol (PROVENTIL HFA, VENTOLIN HFA, PROAIR HFA) inhaler 2 Puff  2 Puff Inhalation Q4H PRN    [Held by provider] tamsulosin (FLOMAX) capsule 0.4 mg  0.4 mg Oral DAILY    [Held by provider] gabapentin (NEURONTIN) capsule 300 mg  300 mg Oral BID    [Held by provider] melatonin tablet 3 mg  3 mg Oral QHS    venlafaxine-SR (EFFEXOR-XR) capsule 150 mg  150 mg Oral DAILY WITH BREAKFAST    ferrous sulfate tablet 325 mg  1 Tablet Oral TID    aspirin delayed-release tablet 81 mg  81 mg Oral DAILY    glucose chewable tablet 16 g  4 Tablet Oral PRN    dextrose (D50W) injection syrg 12.5-25 g  25-50 mL IntraVENous PRN    glucagon (GLUCAGEN) injection 1 mg  1 mg IntraMUSCular PRN    sodium chloride (NS) flush 5-40 mL  5-40 mL IntraVENous Q8H    sodium chloride (NS) flush 5-40 mL  5-40 mL IntraVENous PRN    acetaminophen (TYLENOL) tablet 650 mg  650 mg Oral Q6H PRN    Or    acetaminophen (TYLENOL) suppository 650 mg  650 mg Rectal Q6H PRN    ondansetron (ZOFRAN ODT) tablet 4 mg  4 mg Oral Q6H PRN    Or    ondansetron (ZOFRAN) injection 4 mg  4 mg IntraVENous Q6H PRN    famotidine (PEPCID) tablet 20 mg  20 mg Oral DAILY    metoprolol (LOPRESSOR) injection 5 mg  5 mg IntraVENous Q4H    metoprolol succinate (TOPROL-XL) XL tablet 50 mg  50 mg Oral DAILY       Review of Systems:   Review of systems not obtained due to patient factors.     Objective:   Physical Exam:     Visit Vitals  /80   Pulse 62   Temp 98.9 °F (37.2 °C)   Resp 14   Ht 5' 5\" (1.651 m)   Wt 223 lb (101.2 kg) SpO2 98%   BMI 37.11 kg/m²    O2 Flow Rate (L/min): 1 l/min O2 Device: Nasal cannula    Temp (24hrs), Av.7 °F (37.1 °C), Min:98.4 °F (36.9 °C), Max:98.9 °F (37.2 °C)    No intake/output data recorded.  1901 -  0700  In: 500 [I.V.:500]  Out:  [Urine:]    General:   Obtunded, grimaces to pain   Lungs:   Clear to auscultation bilaterally. Chest wall:  No tenderness or deformity. Heart:  Regular rate and rhythm, S1, S2 normal, no murmur, click, rub or gallop. Abdomen:   Soft, non-tender. Bowel sounds normal. No masses,  No organomegaly. Extremities: Extremities normal, atraumatic, no cyanosis or edema. Pulses: 2+ and symmetric all extremities. Skin: Skin color, texture, turgor normal. No rashes or lesions   Neurologic: CNII-XII intact. Unable to fully assess         Data Review:       Recent Days:  Recent Labs     12/10/21  0926 21  0356   WBC 11.3* 13.9*   HGB 10.4* 10.6*   HCT 33.9* 34.5*    419*     Recent Labs     12/10/21  0926 21  0305    140   K 3.9 4.0   * 108   CO2 31 29   * 158*   BUN 26* 21*   CREA 1.71* 1.65*   CA 8.8 8.7   PHOS 3.6 3.3   ALB 2.0* 1.9*     No results for input(s): PH, PCO2, PO2, HCO3, FIO2 in the last 72 hours.     24 Hour Results:  Recent Results (from the past 24 hour(s))   GLUCOSE, POC    Collection Time: 12/10/21 11:46 AM   Result Value Ref Range    Glucose (POC) 102 65 - 117 mg/dL    Performed by [address], POC    Collection Time: 12/10/21  5:49 PM   Result Value Ref Range    Glucose (POC) 129 (H) 65 - 117 mg/dL    Performed by Star Cochran [facility])    GLUCOSE, POC    Collection Time: 12/10/21  9:03 PM   Result Value Ref Range    Glucose (POC) 111 65 - 117 mg/dL    Performed by Liz Lyons [id number], POC    Collection Time: 21  1:13 AM   Result Value Ref Range    Glucose (POC) 100 65 - 117 mg/dL    Performed by Elizabeth Villela (TRV RN)    GLUCOSE, POC    Collection Time: 21 10:02 AM   Result Value Ref Range    Glucose (POC) 103 65 - 117 mg/dL    Performed by [employer]        XR CHEST PORT   Final Result   No significant interval change. MRI BRAIN WO CONT   Final Result   1. No evidence of acute intracranial ischemia, mass or hemorrhage. 2.  Moderate chronic small vessel ischemic changes. 3.  Mild diffuse parenchymal volume loss. Assessment:  Hypoglycemia in diabetes mellitus type 2, improved. Suspect she had a prolonged episode of severe hypoglycemia at home    Hypoglycemic encephalopathy, possibly with permanent acquired brain injury    Possible left lower lobe pneumonia, likely aspiration    Chronic kidney disease stage III    History of breast cancer    COPD without exacerbation    Chronic respiratory failure with hypoxia    History of gastritis/esophagitis/gastroparesis    Essential hypertension      Plan:  Monitor she does over the weekend in terms of improvement in mentation    Discussed with her family at the bedside that regardless of her improving she will still likely need SNF for rehab temporarily at the minimum    If oral intake very poor over weekend she still may need a PEG tube    Increase metoprolol succinate tablet to 100 mg, oral, daily    Care Plan discussed with: Patient/Family    Disposition: Transfer to medical telemetry    Total time spent with patient: 30 minutes.     Emily Bone

## 2021-12-11 NOTE — PROGRESS NOTES
Patient care assumed overnight. Right hand buffy remains in place. Does not follow commands well. Does not respond verbally, just laughs. Has issues taking meds PO, even in crushed applesauce. Bathed and purwick replaced, urine output noted. No acute issues overnight. Tele in place NSR. Not able to use call bell, frequent rounding in place.

## 2021-12-11 NOTE — PROGRESS NOTES
Hospitalist Progress Note               Daily Progress Note: 12/11/2021      Subjective:   Hospital course to date: Patient is a 62-year-old female with history of diabetes, breast cancer and hypertension who was brought to the ED on 12/5 after being found by family unresponsive the night before. She had been in the ED the day before that with hypoglycemia. He was noted have a blood sugar of 51 at home and was given D10 but no significant improvement. Chest x-ray showed left lower lobe pneumonia. She was transferred from [facility]. Blood sugar on arrival here was 73. She was started on D10. All of patient's diabetic medications were supposed to have been previously discontinued although unclear if she had been inadvertently taking them. According to her sister the last time she took insulin was Friday of last week.  ------    Patient seen this morning for follow-up. Patient is much more alert today, eyes are open. She is able to eat a few bites of applesauce with her sister-in-law feeding her. She is not following commands. She laughs inappropriately at times    Patient pulled her NG tube out    Blood sugars are running 102-158    Problem List:  Problem List as of 12/11/2021 Date Reviewed: 12/5/2021          Codes Class Noted - Resolved    Metabolic encephalopathy NEK-94-AE: G93.41  ICD-9-CM: 348.31  12/5/2021 - Present        CKD (chronic kidney disease) ICD-10-CM: N18.9  ICD-9-CM: 585.9  9/24/2021 - Present    Overview Signed 9/24/2021  3:57 PM by Paulina Mcgee MD     In the setting of severe, uncontrolled and prolonged diabetes, with diabetic retinopathy. Protienuria to 2.5g/g of creatinine. Would consider workup for acute GN given inconsistent history and rising creatinine.  Though clinical hx appears consistent with aggressive diabetic nephropathy             Sepsis ([facility] ICD-10-CM: A41.9  ICD-9-CM: 038.9, 995.91  9/22/2021 - Present        Esophagitis with gastritis ICD-10-CM: K29.70, K20.90  ICD-9-CM: 530.19  9/22/2021 - Present        Elevated troponin ICD-10-CM: R77.8  ICD-9-CM: 790.6  9/20/2021 - Present        Syncope ICD-10-CM: R55  ICD-9-CM: 780.2  9/20/2021 - Present        Hypoglycemia ICD-10-CM: E16.2  ICD-9-CM: 251.2  9/20/2021 - Present        Chronic respiratory failure with hypoxia (HCC) ICD-10-CM: J96.11  ICD-9-CM: 518.83, 799.02  9/20/2021 - Present        Chest pain ICD-10-CM: R07.9  ICD-9-CM: 786.50  9/19/2021 - Present        Anxiety ICD-10-CM: F41.9  ICD-9-CM: 300.00  2/9/2021 - Present        Depression ICD-10-CM: F32. A  ICD-9-CM: 558  2/9/2021 - Present        Dyslipidemia ICD-10-CM: E78.5  ICD-9-CM: 272.4  2/9/2021 - Present        Hypertensive cardiovascular disease ICD-10-CM: I11.9  ICD-9-CM: 402.90  2/9/2021 - Present        Knee osteoarthritis ICD-10-CM: M17.10  ICD-9-CM: 715.36  1/25/2021 - Present        COPD with acute exacerbation ([facility] ICD-10-CM: J44.1  ICD-9-CM: 491.21  11/29/2020 - Present        Person under investigation for COVID-19 ICD-10-CM: Z20.822  ICD-9-CM: V01.79  11/29/2020 - Present        COPD (chronic obstructive pulmonary disease) ([facility].) ICD-10-CM: J44.9  ICD-9-CM: 375  11/29/2020 - Present        DM (diabetes mellitus) ([facility].) ICD-10-CM: E11.9  ICD-9-CM: 250.00  11/29/2020 - Present        COVID-19 ICD-10-CM: U07.1  ICD-9-CM: 079.89  10/1/2020 - Present        HTN (hypertension) ICD-10-CM: I10  ICD-9-CM: 401.9  10/1/2020 - Present        Diabetic gastroparesis ([facility].) ICD-10-CM: E11.43, K31.84  ICD-9-CM: 250.60, 536.3  10/1/2020 - Present        DVT prophylaxis ICD-10-CM: Z29.9  ICD-9-CM: V07.9  10/1/2020 - Present        PNA (pneumonia) ICD-10-CM: J18.9  ICD-9-CM: 353  9/30/2020 - Present        Acute respiratory failure with hypoxia (HCC) ICD-10-CM: J96.01  ICD-9-CM: 518.81  9/30/2020 - Present        Acute kidney injury ([facility].) ICD-10-CM: N17.9  ICD-9-CM: 584.9  9/30/2020 - Present        Type 2 diabetes mellitus with hyperglycemia, with long-term current use of insulin (HCC) ICD-10-CM: E11.65, Z79.4  ICD-9-CM: 250.00, 790.29, V58.67  9/30/2020 - Present        Ureteral stone ICD-10-CM: N20.1  ICD-9-CM: 592.1  2/15/2019 - Present        S/P lumbar fusion ICD-10-CM: Z98.1  ICD-9-CM: V45.4  10/16/2018 - Present        CAP (community acquired pneumonia) ICD-10-CM: J18.9  ICD-9-CM: 721  9/1/2018 - Present        Shortness of breath ICD-10-CM: R06.02  ICD-9-CM: 786.05  9/1/2018 - Present        Fever and chills ICD-10-CM: R50.9  ICD-9-CM: 780.60  9/1/2018 - Present        Spinal stenosis of lumbar region at multiple levels ICD-10-CM: M48.061  ICD-9-CM: 724.02  8/30/2018 - Present        Scoliosis ICD-10-CM: M41.9  ICD-9-CM: 737.30  8/30/2018 - Present        Spinal stenosis ICD-10-CM: M48.00  ICD-9-CM: 724.00  8/30/2018 - Present        Pre-op testing ICD-10-CM: R85.524  ICD-9-CM: V72.84  8/27/2018 - Present        Severe obesity (BMI 35.0-39. 9) ICD-10-CM: E66.01  ICD-9-CM: 278.01  6/20/2018 - Present        Spinal stenosis, lumbar region without neurogenic claudication ICD-10-CM: M48.061  ICD-9-CM: 724.02  5/16/2018 - Present        Lumbosacral spondylosis without myelopathy ICD-10-CM: M47.817  ICD-9-CM: 721.3  4/25/2018 - Present        DDD (degenerative disc disease), lumbar ICD-10-CM: M51.36  ICD-9-CM: 722.52  4/25/2018 - Present        Malignant neoplasm of female breast [facility]) ICD-10-CM: C50.919  ICD-9-CM: 174.9  4/25/2018 - Present              Medications reviewed  Current Facility-Administered Medications   Medication Dose Route Frequency    dextrose 5% infusion  75 mL/hr IntraVENous CONTINUOUS    hydrALAZINE (APRESOLINE) 20 mg/mL injection 10 mg  10 mg IntraVENous Q4H PRN    piperacillin-tazobactam (ZOSYN) 3.375 g in 0.9% sodium chloride (MBP/ADV) 100 mL MBP  3.375 g IntraVENous Q8H    nitroglycerin (NITROBID) 2 % ointment 1 Inch  1 Inch Topical Q6H    labetaloL (NORMODYNE;TRANDATE) 20 mg/4 mL (5 mg/mL) injection 10 mg  10 mg IntraVENous Q4H PRN    [Held by provider] amLODIPine (NORVASC) tablet 10 mg  10 mg Oral DAILY    atorvastatin (LIPITOR) tablet 40 mg  40 mg Oral QHS    albuterol (PROVENTIL HFA, VENTOLIN HFA, PROAIR HFA) inhaler 2 Puff  2 Puff Inhalation Q4H PRN    [Held by provider] tamsulosin (FLOMAX) capsule 0.4 mg  0.4 mg Oral DAILY    [Held by provider] gabapentin (NEURONTIN) capsule 300 mg  300 mg Oral BID    [Held by provider] melatonin tablet 3 mg  3 mg Oral QHS    venlafaxine-SR (EFFEXOR-XR) capsule 150 mg  150 mg Oral DAILY WITH BREAKFAST    ferrous sulfate tablet 325 mg  1 Tablet Oral TID    aspirin delayed-release tablet 81 mg  81 mg Oral DAILY    glucose chewable tablet 16 g  4 Tablet Oral PRN    dextrose (D50W) injection syrg 12.5-25 g  25-50 mL IntraVENous PRN    glucagon (GLUCAGEN) injection 1 mg  1 mg IntraMUSCular PRN    sodium chloride (NS) flush 5-40 mL  5-40 mL IntraVENous Q8H    sodium chloride (NS) flush 5-40 mL  5-40 mL IntraVENous PRN    acetaminophen (TYLENOL) tablet 650 mg  650 mg Oral Q6H PRN    Or    acetaminophen (TYLENOL) suppository 650 mg  650 mg Rectal Q6H PRN    ondansetron (ZOFRAN ODT) tablet 4 mg  4 mg Oral Q6H PRN    Or    ondansetron (ZOFRAN) injection 4 mg  4 mg IntraVENous Q6H PRN    famotidine (PEPCID) tablet 20 mg  20 mg Oral DAILY    metoprolol (LOPRESSOR) injection 5 mg  5 mg IntraVENous Q4H    metoprolol succinate (TOPROL-XL) XL tablet 50 mg  50 mg Oral DAILY       Review of Systems:   Review of systems not obtained due to patient factors.     Objective:   Physical Exam:     Visit Vitals  BP (!) 185/70 (BP 1 Location: Left upper arm, BP Patient Position: At rest) Comment: will administer PRN hydralazine   Pulse 68   Temp 97.5 °F (36.4 °C)   Resp 16   Ht 5' 5\" (1.651 m)   Wt 101.2 kg (223 lb)   SpO2 96%   BMI 37.11 kg/m²    O2 Flow Rate (L/min): 1 l/min O2 Device: Nasal cannula    Temp (24hrs), Av.4 °F (36.9 °C), Min:97.5 °F (36.4 °C), Max:98.9 °F (37.2 °C)    No intake/output data recorded. 12/09 1901 - 12/11 0700  In: 500 [I.V.:500]  Out: 2010 [Urine:2010]    General:   Obtunded, grimaces to pain   Lungs:   Clear to auscultation bilaterally. Chest wall:  No tenderness or deformity. Heart:  Regular rate and rhythm, S1, S2 normal, no murmur, click, rub or gallop. Abdomen:   Soft, non-tender. Bowel sounds normal. No masses,  No organomegaly. Extremities: Extremities normal, atraumatic, no cyanosis or edema. Pulses: 2+ and symmetric all extremities. Skin: Skin color, texture, turgor normal. No rashes or lesions   Neurologic: CNII-XII intact. Unable to fully assess         Data Review:       Recent Days:  Recent Labs     12/10/21  0926 12/09/21  0356   WBC 11.3* 13.9*   HGB 10.4* 10.6*   HCT 33.9* 34.5*    419*     Recent Labs     12/10/21  0926 12/09/21  0305    140   K 3.9 4.0   * 108   CO2 31 29   * 158*   BUN 26* 21*   CREA 1.71* 1.65*   CA 8.8 8.7   PHOS 3.6 3.3   ALB 2.0* 1.9*     No results for input(s): PH, PCO2, PO2, HCO3, FIO2 in the last 72 hours. 24 Hour Results:  Recent Results (from the past 24 hour(s))   GLUCOSE, POC    Collection Time: 12/10/21  5:49 PM   Result Value Ref Range    Glucose (POC) 129 (H) 65 - 117 mg/dL    Performed by Heather Spence [facility] [place])    GLUCOSE, POC    Collection Time: 12/10/21  9:03 PM   Result Value Ref Range    Glucose (POC) 111 65 - 117 mg/dL    Performed by Liz Lyons [id number], POC    Collection Time: 12/11/21  1:13 AM   Result Value Ref Range    Glucose (POC) 100 65 - 117 mg/dL    Performed by Maribell Corral (JEISON RN)    GLUCOSE, POC    Collection Time: 12/11/21 10:02 AM   Result Value Ref Range    Glucose (POC) 103 65 - 117 mg/dL    Performed by [facility] CHEST PORT   Final Result   No significant interval change. MRI BRAIN WO CONT   Final Result   1. No evidence of acute intracranial ischemia, mass or hemorrhage.    2.  Moderate chronic small vessel ischemic changes. 3.  Mild diffuse parenchymal volume loss. Assessment:  Hypoglycemia in diabetes mellitus type 2, improved. Suspect she had a prolonged episode of severe hypoglycemia at home    Hypoglycemic encephalopathy, possibly with permanent acquired brain injury    Possible left lower lobe pneumonia, likely aspiration    Chronic kidney disease stage III    History of breast cancer    COPD without exacerbation    Chronic respiratory failure with hypoxia    History of gastritis/esophagitis/gastroparesis      Plan: We will see how she does over the weekend in terms of nutritional intake and improvement in mentation  Begin D5 at 75 cc an hour  If oral intake very poor over weekend she still may need a PEG tube    Care Plan discussed with: Patient/Family    Disposition: Transfer to medical telemetry    Total time spent with patient: 30 minutes.     Marcela Roque MD

## 2021-12-12 LAB
GLUCOSE BLD STRIP.AUTO-MCNC: 128 MG/DL (ref 65–117)
GLUCOSE BLD STRIP.AUTO-MCNC: 147 MG/DL (ref 65–117)
GLUCOSE BLD STRIP.AUTO-MCNC: 162 MG/DL (ref 65–117)
GLUCOSE BLD STRIP.AUTO-MCNC: 175 MG/DL (ref 65–117)
GLUCOSE BLD STRIP.AUTO-MCNC: 179 MG/DL (ref 65–117)
GLUCOSE BLD STRIP.AUTO-MCNC: 219 MG/DL (ref 65–117)
GLUCOSE BLD STRIP.AUTO-MCNC: 221 MG/DL (ref 65–117)
PERFORMED BY, TECHID: ABNORMAL

## 2021-12-12 PROCEDURE — 74011250636 HC RX REV CODE- 250/636: Performed by: INTERNAL MEDICINE

## 2021-12-12 PROCEDURE — 74011250637 HC RX REV CODE- 250/637: Performed by: HOSPITALIST

## 2021-12-12 PROCEDURE — 77010033678 HC OXYGEN DAILY

## 2021-12-12 PROCEDURE — 94760 N-INVAS EAR/PLS OXIMETRY 1: CPT

## 2021-12-12 PROCEDURE — 82962 GLUCOSE BLOOD TEST: CPT

## 2021-12-12 PROCEDURE — 74011000258 HC RX REV CODE- 258: Performed by: INTERNAL MEDICINE

## 2021-12-12 PROCEDURE — 74011000250 HC RX REV CODE- 250: Performed by: HOSPITALIST

## 2021-12-12 PROCEDURE — 65270000029 HC RM PRIVATE

## 2021-12-12 PROCEDURE — 74011250636 HC RX REV CODE- 250/636: Performed by: HOSPITALIST

## 2021-12-12 RX ADMIN — METOPROLOL SUCCINATE 50 MG: 25 TABLET, EXTENDED RELEASE ORAL at 10:43

## 2021-12-12 RX ADMIN — METOPROLOL TARTRATE 5 MG: 5 INJECTION INTRAVENOUS at 16:49

## 2021-12-12 RX ADMIN — METOPROLOL TARTRATE 5 MG: 5 INJECTION INTRAVENOUS at 04:03

## 2021-12-12 RX ADMIN — PIPERACILLIN SODIUM AND TAZOBACTAM SODIUM 3.38 G: 3; .375 INJECTION, POWDER, LYOPHILIZED, FOR SOLUTION INTRAVENOUS at 10:42

## 2021-12-12 RX ADMIN — FERROUS SULFATE TAB 325 MG (65 MG ELEMENTAL FE) 325 MG: 325 (65 FE) TAB at 16:50

## 2021-12-12 RX ADMIN — NITROGLYCERIN 1 INCH: 20 OINTMENT TOPICAL at 12:26

## 2021-12-12 RX ADMIN — HYDRALAZINE HYDROCHLORIDE 10 MG: 20 INJECTION INTRAMUSCULAR; INTRAVENOUS at 12:26

## 2021-12-12 RX ADMIN — ASPIRIN 81 MG: 81 TABLET, COATED ORAL at 10:43

## 2021-12-12 RX ADMIN — METOPROLOL TARTRATE 5 MG: 5 INJECTION INTRAVENOUS at 19:31

## 2021-12-12 RX ADMIN — METOPROLOL TARTRATE 5 MG: 5 INJECTION INTRAVENOUS at 10:42

## 2021-12-12 RX ADMIN — LABETALOL HYDROCHLORIDE 10 MG: 5 INJECTION, SOLUTION INTRAVENOUS at 15:23

## 2021-12-12 RX ADMIN — FAMOTIDINE 20 MG: 20 TABLET, FILM COATED ORAL at 10:42

## 2021-12-12 RX ADMIN — Medication 10 ML: at 13:25

## 2021-12-12 RX ADMIN — VENLAFAXINE HYDROCHLORIDE 150 MG: 75 CAPSULE, EXTENDED RELEASE ORAL at 10:43

## 2021-12-12 RX ADMIN — METOPROLOL TARTRATE 5 MG: 5 INJECTION INTRAVENOUS at 13:21

## 2021-12-12 RX ADMIN — NITROGLYCERIN 1 INCH: 20 OINTMENT TOPICAL at 19:17

## 2021-12-12 RX ADMIN — HYDRALAZINE HYDROCHLORIDE 10 MG: 20 INJECTION INTRAMUSCULAR; INTRAVENOUS at 22:17

## 2021-12-12 RX ADMIN — Medication 10 ML: at 06:11

## 2021-12-12 RX ADMIN — LABETALOL HYDROCHLORIDE 10 MG: 5 INJECTION, SOLUTION INTRAVENOUS at 10:42

## 2021-12-12 RX ADMIN — PIPERACILLIN SODIUM AND TAZOBACTAM SODIUM 3.38 G: 3; .375 INJECTION, POWDER, LYOPHILIZED, FOR SOLUTION INTRAVENOUS at 01:37

## 2021-12-12 RX ADMIN — FERROUS SULFATE TAB 325 MG (65 MG ELEMENTAL FE) 325 MG: 325 (65 FE) TAB at 10:42

## 2021-12-12 RX ADMIN — Medication 10 ML: at 19:23

## 2021-12-12 RX ADMIN — PIPERACILLIN SODIUM AND TAZOBACTAM SODIUM 3.38 G: 3; .375 INJECTION, POWDER, LYOPHILIZED, FOR SOLUTION INTRAVENOUS at 19:17

## 2021-12-12 RX ADMIN — NITROGLYCERIN 1 INCH: 20 OINTMENT TOPICAL at 06:00

## 2021-12-12 RX ADMIN — HYDRALAZINE HYDROCHLORIDE 10 MG: 20 INJECTION INTRAMUSCULAR; INTRAVENOUS at 16:49

## 2021-12-12 RX ADMIN — DEXTROSE MONOHYDRATE 75 ML/HR: 50 INJECTION, SOLUTION INTRAVENOUS at 01:38

## 2021-12-12 NOTE — PROGRESS NOTES
Hospitalist Progress Note               Daily Progress Note: 12/12/2021      Subjective:   Hospital course to date: Patient is a 28-year-old female with history of diabetes, breast cancer and hypertension who was brought to the ED on 12/5 after being found by family unresponsive the night before. She had been in the ED the day before that with hypoglycemia. He was noted have a blood sugar of 51 at home and was given D10 but no significant improvement. Chest x-ray showed left lower lobe pneumonia. She was transferred from [facility]. Blood sugar on arrival here was 73. She was started on D10. All of patient's diabetic medications were supposed to have been previously discontinued although unclear if she had been inadvertently taking them. According to her sister the last time she took insulin was Friday of last week.    ------  Patient seen this morning for follow-up. Patient is alert and awake. She is not able to verbalize at this time. She had breakfast at her side untouched, she did seem to have some applesauce. She communicated that she does not want to eat when asked. She is better able to communicate, seems to understand, and does not have inappropriate laughing episodes. She is able to move her legs but does not move her arms when asked. She is hypertensive at 165/80 but improving. She offers no other complaints today. Problem List:  Problem List as of 12/12/2021 Date Reviewed: 12/5/2021          Codes Class Noted - Resolved    Metabolic encephalopathy [id number]: G93.41  ICD-9-CM: 348.31  12/5/2021 - Present        CKD (chronic kidney disease) ICD-10-CM: N18.9  ICD-9-CM: 585.9  9/24/2021 - Present    Overview Signed 9/24/2021  3:57 PM by Ronan Donald MD     In the setting of severe, uncontrolled and prolonged diabetes, with diabetic retinopathy. Protienuria to 2.5g/g of creatinine. Would consider workup for acute GN given inconsistent history and rising creatinine.  Though clinical hx appears consistent with aggressive diabetic nephropathy             Sepsis ([facility].) ICD-10-CM: A41.9  ICD-9-CM: 038.9, 995.91  9/22/2021 - Present        Esophagitis with gastritis ICD-10-CM: K29.70, K20.90  ICD-9-CM: 530.19  9/22/2021 - Present        Elevated troponin ICD-10-CM: R77.8  ICD-9-CM: 790.6  9/20/2021 - Present        Syncope ICD-10-CM: R55  ICD-9-CM: 780.2  9/20/2021 - Present        Hypoglycemia ICD-10-CM: E16.2  ICD-9-CM: 251.2  9/20/2021 - Present        Chronic respiratory failure with hypoxia (HCC) ICD-10-CM: J96.11  ICD-9-CM: 518.83, 799.02  9/20/2021 - Present        Chest pain ICD-10-CM: R07.9  ICD-9-CM: 786.50  9/19/2021 - Present        Anxiety ICD-10-CM: F41.9  ICD-9-CM: 300.00  2/9/2021 - Present        Depression ICD-10-CM: F32. A  ICD-9-CM: 691  2/9/2021 - Present        Dyslipidemia ICD-10-CM: E78.5  ICD-9-CM: 272.4  2/9/2021 - Present        Hypertensive cardiovascular disease ICD-10-CM: I11.9  ICD-9-CM: 402.90  2/9/2021 - Present        Knee osteoarthritis ICD-10-CM: M17.10  ICD-9-CM: 715.36  1/25/2021 - Present        COPD with acute exacerbation ([facility] ICD-10-CM: J44.1  ICD-9-CM: 491.21  11/29/2020 - Present        Person under investigation for COVID-19 ICD-10-CM: Z20.822  ICD-9-CM: V01.79  11/29/2020 - Present        COPD (chronic obstructive pulmonary disease) ([facility] ICD-10-CM: J44.9  ICD-9-CM: 028  11/29/2020 - Present        DM (diabetes mellitus) ([place] [address].) ICD-10-CM: E11.9  ICD-9-CM: 250.00  11/29/2020 - Present        COVID-19 ICD-10-CM: U07.1  ICD-9-CM: 079.89  10/1/2020 - Present        HTN (hypertension) ICD-10-CM: I10  ICD-9-CM: 401.9  10/1/2020 - Present        Diabetic gastroparesis ([place] [address].) ICD-10-CM: E11.43, K31.84  ICD-9-CM: 250.60, 536.3  10/1/2020 - Present        DVT prophylaxis ICD-10-CM: Z29.9  ICD-9-CM: V07.9  10/1/2020 - Present        PNA (pneumonia) ICD-10-CM: J18.9  ICD-9-CM: 324  9/30/2020 - Present        Acute respiratory failure with hypoxia ([place] [address].) ICD-10-CM: J96.01  ICD-9-CM: 518.81  9/30/2020 - Present        Acute kidney injury [facility]) ICD-10-CM: N17.9  ICD-9-CM: 584.9  9/30/2020 - Present        Type 2 diabetes mellitus with hyperglycemia, with long-term current use of insulin (HCC) ICD-10-CM: E11.65, Z79.4  ICD-9-CM: 250.00, 790.29, V58.67  9/30/2020 - Present        Ureteral stone ICD-10-CM: N20.1  ICD-9-CM: 592.1  2/15/2019 - Present        S/P lumbar fusion ICD-10-CM: Z98.1  ICD-9-CM: V45.4  10/16/2018 - Present        CAP (community acquired pneumonia) ICD-10-CM: J18.9  ICD-9-CM: 071  9/1/2018 - Present        Shortness of breath ICD-10-CM: R06.02  ICD-9-CM: 786.05  9/1/2018 - Present        Fever and chills ICD-10-CM: R50.9  ICD-9-CM: 780.60  9/1/2018 - Present        Spinal stenosis of lumbar region at multiple levels ICD-10-CM: M48.061  ICD-9-CM: 724.02  8/30/2018 - Present        Scoliosis ICD-10-CM: M41.9  ICD-9-CM: 737.30  8/30/2018 - Present        Spinal stenosis ICD-10-CM: M48.00  ICD-9-CM: 724.00  8/30/2018 - Present        Pre-op testing ICD-10-CM: E15.970  ICD-9-CM: V72.84  8/27/2018 - Present        Severe obesity (BMI 35.0-39. 9) ICD-10-CM: E66.01  ICD-9-CM: 278.01  6/20/2018 - Present        Spinal stenosis, lumbar region without neurogenic claudication ICD-10-CM: M48.061  ICD-9-CM: 724.02  5/16/2018 - Present        Lumbosacral spondylosis without myelopathy ICD-10-CM: M47.817  ICD-9-CM: 721.3  4/25/2018 - Present        DDD (degenerative disc disease), lumbar ICD-10-CM: M51.36  ICD-9-CM: 722.52  4/25/2018 - Present        Malignant neoplasm of female breast [facility]) ICD-10-CM: C50.919  ICD-9-CM: 174.9  4/25/2018 - Present              Medications reviewed  Current Facility-Administered Medications   Medication Dose Route Frequency    dextrose 5% infusion  75 mL/hr IntraVENous CONTINUOUS    hydrALAZINE (APRESOLINE) 20 mg/mL injection 10 mg  10 mg IntraVENous Q4H PRN    piperacillin-tazobactam (ZOSYN) 3.375 g in 0.9% sodium chloride (MBP/ADV) 100 mL MBP  3.375 g IntraVENous Q8H    nitroglycerin (NITROBID) 2 % ointment 1 Inch  1 Inch Topical Q6H    labetaloL (NORMODYNE;TRANDATE) 20 mg/4 mL (5 mg/mL) injection 10 mg  10 mg IntraVENous Q4H PRN    [Held by provider] amLODIPine (NORVASC) tablet 10 mg  10 mg Oral DAILY    atorvastatin (LIPITOR) tablet 40 mg  40 mg Oral QHS    albuterol (PROVENTIL HFA, VENTOLIN HFA, PROAIR HFA) inhaler 2 Puff  2 Puff Inhalation Q4H PRN    [Held by provider] tamsulosin (FLOMAX) capsule 0.4 mg  0.4 mg Oral DAILY    [Held by provider] gabapentin (NEURONTIN) capsule 300 mg  300 mg Oral BID    [Held by provider] melatonin tablet 3 mg  3 mg Oral QHS    venlafaxine-SR (EFFEXOR-XR) capsule 150 mg  150 mg Oral DAILY WITH BREAKFAST    ferrous sulfate tablet 325 mg  1 Tablet Oral TID    aspirin delayed-release tablet 81 mg  81 mg Oral DAILY    glucose chewable tablet 16 g  4 Tablet Oral PRN    dextrose (D50W) injection syrg 12.5-25 g  25-50 mL IntraVENous PRN    glucagon (GLUCAGEN) injection 1 mg  1 mg IntraMUSCular PRN    sodium chloride (NS) flush 5-40 mL  5-40 mL IntraVENous Q8H    sodium chloride (NS) flush 5-40 mL  5-40 mL IntraVENous PRN    acetaminophen (TYLENOL) tablet 650 mg  650 mg Oral Q6H PRN    Or    acetaminophen (TYLENOL) suppository 650 mg  650 mg Rectal Q6H PRN    ondansetron (ZOFRAN ODT) tablet 4 mg  4 mg Oral Q6H PRN    Or    ondansetron (ZOFRAN) injection 4 mg  4 mg IntraVENous Q6H PRN    famotidine (PEPCID) tablet 20 mg  20 mg Oral DAILY    metoprolol (LOPRESSOR) injection 5 mg  5 mg IntraVENous Q4H    metoprolol succinate (TOPROL-XL) XL tablet 50 mg  50 mg Oral DAILY       Review of Systems:   Review of systems not obtained due to patient factors.     Objective:   Physical Exam:     Visit Vitals  BP (!) 165/80   Pulse 66   Temp 98.1 °F (36.7 °C)   Resp 20   Ht 5' 5\" (1.651 m)   Wt 223 lb (101.2 kg)   SpO2 95%   BMI 37.11 kg/m²    O2 Flow Rate (L/min): 1 l/min O2 Device: Nasal cannula    Temp (24hrs), Av.7 °F (36.5 °C), Min:97.3 °F (36.3 °C), Max:98.1 °F (36.7 °C)    No intake/output data recorded. 12/10 1901 -  0700  In: 4046 [I.V.:2205]  Out: 2160 [Urine:2160]    General:   Obtunded, grimaces to pain   Lungs:   Clear to auscultation bilaterally. Chest wall:  No tenderness or deformity. Heart:  Regular rate and rhythm, S1, S2 normal, no murmur, click, rub or gallop. Abdomen:   Soft, non-tender. Bowel sounds normal. No masses,  No organomegaly. Extremities: Extremities normal, atraumatic, no cyanosis or edema. Pulses: 2+ and symmetric all extremities. Skin: Skin color, texture, turgor normal. No rashes or lesions   Neurologic: CNII-XII intact. Unable to fully assess         Data Review:       Recent Days:  Recent Labs     12/10/21  0926   WBC 11.3*   HGB 10.4*   HCT 33.9*        Recent Labs     12/10/21  0926      K 3.9   *   CO2 31   *   BUN 26*   CREA 1.71*   CA 8.8   PHOS 3.6   ALB 2.0*     No results for input(s): PH, PCO2, PO2, HCO3, FIO2 in the last 72 hours.     24 Hour Results:  Recent Results (from the past 24 hour(s))   GLUCOSE, POC    Collection Time: 21 10:02 AM   Result Value Ref Range    Glucose (POC) 103 65 - 117 mg/dL    Performed by "[employer]", POC    Collection Time: 21  1:31 PM   Result Value Ref Range    Glucose (POC) 122 (H) 65 - 117 mg/dL    Performed by "[employer]", POC    Collection Time: 21  5:19 PM   Result Value Ref Range    Glucose (POC) 145 (H) 65 - 117 mg/dL    Performed by "[employer]", POC    Collection Time: 21  7:13 PM   Result Value Ref Range    Glucose (POC) 192 (H) 65 - 117 mg/dL    Performed by [address] [place] [address], POC    Collection Time: 21 12:08 AM   Result Value Ref Range    Glucose (POC) 162 (H) 65 - 117 mg/dL    Performed by Leti Flowers    GLUCOSE, POC    Collection Time: 21  5:10 AM   Result Value Ref Range    Glucose (POC) 179 (H) 65 - 117 mg/dL    Performed by Akanksha Nielsen    GLUCOSE, POC    Collection Time: 12/12/21  8:19 AM   Result Value Ref Range    Glucose (POC) 219 (H) 65 - 117 mg/dL    Performed by Ginny Mukherjee        XR CHEST PORT   Final Result   No significant interval change. MRI BRAIN WO CONT   Final Result   1. No evidence of acute intracranial ischemia, mass or hemorrhage. 2.  Moderate chronic small vessel ischemic changes. 3.  Mild diffuse parenchymal volume loss. Assessment:  Hypoglycemia in diabetes mellitus type 2, improved. Suspect she had a prolonged episode of severe hypoglycemia at home     Hypoglycemic encephalopathy, possibly with permanent acquired brain injury     Possible left lower lobe pneumonia, likely aspiration     Chronic kidney disease stage III     History of breast cancer     COPD without exacerbation     Chronic respiratory failure with hypoxia     History of gastritis/esophagitis/gastroparesis        Plan: We will see how she does over the weekend in terms of nutritional intake and improvement in mentation  If oral intake very poor over weekend she still may need a PEG tube  She is improving overall with mentation, seemed to have a bit of oral intake with applesauce but continues to decline food at this time.     Care Plan discussed with: Patient/Family     Disposition: Transfer to medical telemetry     Total time spent with patient: 30 minutes.     Nazia Dyson

## 2021-12-12 NOTE — PROGRESS NOTES
Hospitalist Progress Note               Daily Progress Note: 12/12/2021      Subjective:   Hospital course to date: Patient is a 53-year-old female with history of diabetes, breast cancer and hypertension who was brought to the ED on 12/5 after being found by family unresponsive the night before. She had been in the ED the day before that with hypoglycemia. He was noted have a blood sugar of 51 at home and was given D10 but no significant improvement. Chest x-ray showed left lower lobe pneumonia. She was transferred from [facility]. Blood sugar on arrival here was 73. She was started on D10. All of patient's diabetic medications were supposed to have been previously discontinued although unclear if she had been inadvertently taking them. According to her sister the last time she took insulin was Friday of last week.     ------  Patient seen this morning for follow-up. Patient is alert and awake. She is not able to verbalize at this time. She had breakfast at her side untouched, she did seem to have some applesauce. She communicated that she does not want to eat when asked. She is better able to communicate, seems to understand, and does not have inappropriate laughing episodes. She is able to move her legs but does not move her arms when asked. She is hypertensive at 165/80 but improving. She offers no other complaints today. Problem List:  Problem List as of 12/12/2021 Date Reviewed: 12/5/2021          Codes Class Noted - Resolved    Metabolic encephalopathy [facility]: G93.41  ICD-9-CM: 348.31  12/5/2021 - Present        CKD (chronic kidney disease) ICD-10-CM: N18.9  ICD-9-CM: 585.9  9/24/2021 - Present    Overview Signed 9/24/2021  3:57 PM by Terrill Mcburney, MD     In the setting of severe, uncontrolled and prolonged diabetes, with diabetic retinopathy. Protienuria to 2.5g/g of creatinine. Would consider workup for acute GN given inconsistent history and rising creatinine.  Though clinical hx appears consistent with aggressive diabetic nephropathy             Sepsis ([facility] ICD-10-CM: A41.9  ICD-9-CM: 038.9, 995.91  9/22/2021 - Present        Esophagitis with gastritis ICD-10-CM: K29.70, K20.90  ICD-9-CM: 530.19  9/22/2021 - Present        Elevated troponin ICD-10-CM: R77.8  ICD-9-CM: 790.6  9/20/2021 - Present        Syncope ICD-10-CM: R55  ICD-9-CM: 780.2  9/20/2021 - Present        Hypoglycemia ICD-10-CM: E16.2  ICD-9-CM: 251.2  9/20/2021 - Present        Chronic respiratory failure with hypoxia (HCC) ICD-10-CM: J96.11  ICD-9-CM: 518.83, 799.02  9/20/2021 - Present        Chest pain ICD-10-CM: R07.9  ICD-9-CM: 786.50  9/19/2021 - Present        Anxiety ICD-10-CM: F41.9  ICD-9-CM: 300.00  2/9/2021 - Present        Depression ICD-10-CM: F32. A  ICD-9-CM: 114  2/9/2021 - Present        Dyslipidemia ICD-10-CM: E78.5  ICD-9-CM: 272.4  2/9/2021 - Present        Hypertensive cardiovascular disease ICD-10-CM: I11.9  ICD-9-CM: 402.90  2/9/2021 - Present        Knee osteoarthritis ICD-10-CM: M17.10  ICD-9-CM: 715.36  1/25/2021 - Present        COPD with acute exacerbation ([facility] ICD-10-CM: J44.1  ICD-9-CM: 491.21  11/29/2020 - Present        Person under investigation for COVID-19 ICD-10-CM: Z20.822  ICD-9-CM: V01.79  11/29/2020 - Present        COPD (chronic obstructive pulmonary disease) ([facility] ICD-10-CM: J44.9  ICD-9-CM: 038  11/29/2020 - Present        DM (diabetes mellitus) ([place] [address] ICD-10-CM: E11.9  ICD-9-CM: 250.00  11/29/2020 - Present        COVID-19 ICD-10-CM: U07.1  ICD-9-CM: 079.89  10/1/2020 - Present        HTN (hypertension) ICD-10-CM: I10  ICD-9-CM: 401.9  10/1/2020 - Present        Diabetic gastroparesis ([place] [address].) ICD-10-CM: E11.43, K31.84  ICD-9-CM: 250.60, 536.3  10/1/2020 - Present        DVT prophylaxis ICD-10-CM: Z29.9  ICD-9-CM: V07.9  10/1/2020 - Present        PNA (pneumonia) ICD-10-CM: J18.9  ICD-9-CM: 714  9/30/2020 - Present        Acute respiratory failure with hypoxia ([place] [address].) ICD-10-CM: J96.01  ICD-9-CM: 518.81  9/30/2020 - Present        Acute kidney injury [facility]) ICD-10-CM: N17.9  ICD-9-CM: 584.9  9/30/2020 - Present        Type 2 diabetes mellitus with hyperglycemia, with long-term current use of insulin (HCC) ICD-10-CM: E11.65, Z79.4  ICD-9-CM: 250.00, 790.29, V58.67  9/30/2020 - Present        Ureteral stone ICD-10-CM: N20.1  ICD-9-CM: 592.1  2/15/2019 - Present        S/P lumbar fusion ICD-10-CM: Z98.1  ICD-9-CM: V45.4  10/16/2018 - Present        CAP (community acquired pneumonia) ICD-10-CM: J18.9  ICD-9-CM: 046  9/1/2018 - Present        Shortness of breath ICD-10-CM: R06.02  ICD-9-CM: 786.05  9/1/2018 - Present        Fever and chills ICD-10-CM: R50.9  ICD-9-CM: 780.60  9/1/2018 - Present        Spinal stenosis of lumbar region at multiple levels ICD-10-CM: M48.061  ICD-9-CM: 724.02  8/30/2018 - Present        Scoliosis ICD-10-CM: M41.9  ICD-9-CM: 737.30  8/30/2018 - Present        Spinal stenosis ICD-10-CM: M48.00  ICD-9-CM: 724.00  8/30/2018 - Present        Pre-op testing ICD-10-CM: A83.071  ICD-9-CM: V72.84  8/27/2018 - Present        Severe obesity (BMI 35.0-39. 9) ICD-10-CM: E66.01  ICD-9-CM: 278.01  6/20/2018 - Present        Spinal stenosis, lumbar region without neurogenic claudication ICD-10-CM: M48.061  ICD-9-CM: 724.02  5/16/2018 - Present        Lumbosacral spondylosis without myelopathy ICD-10-CM: M47.817  ICD-9-CM: 721.3  4/25/2018 - Present        DDD (degenerative disc disease), lumbar ICD-10-CM: M51.36  ICD-9-CM: 722.52  4/25/2018 - Present        Malignant neoplasm of female breast [facility]) ICD-10-CM: C50.919  ICD-9-CM: 174.9  4/25/2018 - Present              Medications reviewed  Current Facility-Administered Medications   Medication Dose Route Frequency    dextrose 5% infusion  75 mL/hr IntraVENous CONTINUOUS    hydrALAZINE (APRESOLINE) 20 mg/mL injection 10 mg  10 mg IntraVENous Q4H PRN    piperacillin-tazobactam (ZOSYN) 3.375 g in 0.9% sodium chloride (MBP/ADV) 100 mL MBP  3.375 g IntraVENous Q8H    nitroglycerin (NITROBID) 2 % ointment 1 Inch  1 Inch Topical Q6H    labetaloL (NORMODYNE;TRANDATE) 20 mg/4 mL (5 mg/mL) injection 10 mg  10 mg IntraVENous Q4H PRN    [Held by provider] amLODIPine (NORVASC) tablet 10 mg  10 mg Oral DAILY    atorvastatin (LIPITOR) tablet 40 mg  40 mg Oral QHS    albuterol (PROVENTIL HFA, VENTOLIN HFA, PROAIR HFA) inhaler 2 Puff  2 Puff Inhalation Q4H PRN    [Held by provider] tamsulosin (FLOMAX) capsule 0.4 mg  0.4 mg Oral DAILY    [Held by provider] gabapentin (NEURONTIN) capsule 300 mg  300 mg Oral BID    [Held by provider] melatonin tablet 3 mg  3 mg Oral QHS    venlafaxine-SR (EFFEXOR-XR) capsule 150 mg  150 mg Oral DAILY WITH BREAKFAST    ferrous sulfate tablet 325 mg  1 Tablet Oral TID    aspirin delayed-release tablet 81 mg  81 mg Oral DAILY    glucose chewable tablet 16 g  4 Tablet Oral PRN    dextrose (D50W) injection syrg 12.5-25 g  25-50 mL IntraVENous PRN    glucagon (GLUCAGEN) injection 1 mg  1 mg IntraMUSCular PRN    sodium chloride (NS) flush 5-40 mL  5-40 mL IntraVENous Q8H    sodium chloride (NS) flush 5-40 mL  5-40 mL IntraVENous PRN    acetaminophen (TYLENOL) tablet 650 mg  650 mg Oral Q6H PRN    Or    acetaminophen (TYLENOL) suppository 650 mg  650 mg Rectal Q6H PRN    ondansetron (ZOFRAN ODT) tablet 4 mg  4 mg Oral Q6H PRN    Or    ondansetron (ZOFRAN) injection 4 mg  4 mg IntraVENous Q6H PRN    famotidine (PEPCID) tablet 20 mg  20 mg Oral DAILY    metoprolol (LOPRESSOR) injection 5 mg  5 mg IntraVENous Q4H    metoprolol succinate (TOPROL-XL) XL tablet 50 mg  50 mg Oral DAILY       Review of Systems:   Review of systems not obtained due to patient factors.     Objective:   Physical Exam:     Visit Vitals  BP (!) 187/68 (BP 1 Location: Right upper arm, BP Patient Position: At rest)   Pulse 70   Temp 98.3 °F (36.8 °C)   Resp 18   Ht 5' 5\" (1.651 m)   Wt 101.2 kg (223 lb)   SpO2 97%   BMI 37.11 kg/m²    O2 Flow Rate (L/min): 1 l/min O2 Device: Nasal cannula    Temp (24hrs), Av.8 °F (36.6 °C), Min:97.3 °F (36.3 °C), Max:98.3 °F (36.8 °C)    No intake/output data recorded. 12/10 1901 -  0700  In: 2205 [I.V.:2205]  Out: 2160 [Urine:2160]    General:  Awake and alert    Lungs:   Clear to auscultation bilaterally. Chest wall:  No tenderness or deformity. Heart:  Regular rate and rhythm, S1, S2 normal, no murmur, click, rub or gallop. Abdomen:   Soft, non-tender. Bowel sounds normal. No masses,  No organomegaly. Extremities: Extremities normal, atraumatic, no cyanosis or edema. Pulses: 2+ and symmetric all extremities. Skin: Skin color, texture, turgor normal. No rashes or lesions   Neurologic: CNII-XII intact. Unable to fully assess         Data Review:       Recent Days:  Recent Labs     12/10/21  0926   WBC 11.3*   HGB 10.4*   HCT 33.9*        Recent Labs     12/10/21  0926      K 3.9   *   CO2 31   *   BUN 26*   CREA 1.71*   CA 8.8   PHOS 3.6   ALB 2.0*     No results for input(s): PH, PCO2, PO2, HCO3, FIO2 in the last 72 hours.     24 Hour Results:  Recent Results (from the past 24 hour(s))   GLUCOSE, POC    Collection Time: 21  1:31 PM   Result Value Ref Range    Glucose (POC) 122 (H) 65 - 117 mg/dL    Performed by Elliott Vines, POC    Collection Time: 21  5:19 PM   Result Value Ref Range    Glucose (POC) 145 (H) 65 - 117 mg/dL    Performed by Elliott Vines, POC    Collection Time: 21  7:13 PM   Result Value Ref Range    Glucose (POC) 192 (H) 65 - 117 mg/dL    Performed by [address] [place] [address], POC    Collection Time: 21 12:08 AM   Result Value Ref Range    Glucose (POC) 162 (H) 65 - 117 mg/dL    Performed by Mariusz Tafoya    GLUCOSE, POC    Collection Time: 21  5:10 AM   Result Value Ref Range    Glucose (POC) 179 (H) 65 - 117 mg/dL    [facility] by [facility], POC    Collection Time: 12/12/21  8:19 AM   Result Value Ref Range    Glucose (POC) 219 (H) 65 - 117 mg/dL    Performed by Hugo Los        XR CHEST PORT   Final Result   No significant interval change. MRI BRAIN WO CONT   Final Result   1. No evidence of acute intracranial ischemia, mass or hemorrhage. 2.  Moderate chronic small vessel ischemic changes. 3.  Mild diffuse parenchymal volume loss. Assessment:  Hypoglycemia in diabetes mellitus type 2, improved. Suspect she had a prolonged episode of severe hypoglycemia at home    Hypoglycemic encephalopathy, possibly with permanent acquired brain injury    Possible left lower lobe pneumonia, likely aspiration    Chronic kidney disease stage III    History of breast cancer    COPD without exacerbation    Chronic respiratory failure with hypoxia    History of gastritis/esophagitis/gastroparesis      Plan:  Continue supportive care including assistance with all meals  Expect gradual improvement over the course of time  PT OT evaluation      Care Plan discussed with: Patient/Family    Disposition: Transfer to medical telemetry    Total time spent with patient: 30 minutes.     Drew Luna MD

## 2021-12-13 LAB
GLUCOSE BLD STRIP.AUTO-MCNC: 132 MG/DL (ref 65–117)
GLUCOSE BLD STRIP.AUTO-MCNC: 158 MG/DL (ref 65–117)
GLUCOSE BLD STRIP.AUTO-MCNC: 161 MG/DL (ref 65–117)
GLUCOSE BLD STRIP.AUTO-MCNC: 199 MG/DL (ref 65–117)
GLUCOSE BLD STRIP.AUTO-MCNC: 240 MG/DL (ref 65–117)
PERFORMED BY, TECHID: ABNORMAL

## 2021-12-13 PROCEDURE — 74011000250 HC RX REV CODE- 250: Performed by: HOSPITALIST

## 2021-12-13 PROCEDURE — 74011000258 HC RX REV CODE- 258: Performed by: INTERNAL MEDICINE

## 2021-12-13 PROCEDURE — 82962 GLUCOSE BLOOD TEST: CPT

## 2021-12-13 PROCEDURE — 74011250636 HC RX REV CODE- 250/636: Performed by: INTERNAL MEDICINE

## 2021-12-13 PROCEDURE — 74011250637 HC RX REV CODE- 250/637: Performed by: HOSPITALIST

## 2021-12-13 PROCEDURE — 65270000029 HC RM PRIVATE

## 2021-12-13 RX ADMIN — PIPERACILLIN SODIUM AND TAZOBACTAM SODIUM 3.38 G: 3; .375 INJECTION, POWDER, LYOPHILIZED, FOR SOLUTION INTRAVENOUS at 15:13

## 2021-12-13 RX ADMIN — METOPROLOL TARTRATE 5 MG: 5 INJECTION INTRAVENOUS at 21:23

## 2021-12-13 RX ADMIN — Medication 10 ML: at 21:24

## 2021-12-13 RX ADMIN — Medication 10 ML: at 06:00

## 2021-12-13 RX ADMIN — ATORVASTATIN CALCIUM 40 MG: 40 TABLET, FILM COATED ORAL at 21:23

## 2021-12-13 RX ADMIN — METOPROLOL TARTRATE 5 MG: 5 INJECTION INTRAVENOUS at 00:08

## 2021-12-13 RX ADMIN — FERROUS SULFATE TAB 325 MG (65 MG ELEMENTAL FE) 325 MG: 325 (65 FE) TAB at 14:53

## 2021-12-13 RX ADMIN — PIPERACILLIN SODIUM AND TAZOBACTAM SODIUM 3.38 G: 3; .375 INJECTION, POWDER, LYOPHILIZED, FOR SOLUTION INTRAVENOUS at 01:51

## 2021-12-13 RX ADMIN — NITROGLYCERIN 1 INCH: 20 OINTMENT TOPICAL at 00:08

## 2021-12-13 RX ADMIN — FERROUS SULFATE TAB 325 MG (65 MG ELEMENTAL FE) 325 MG: 325 (65 FE) TAB at 21:23

## 2021-12-13 RX ADMIN — METOPROLOL TARTRATE 5 MG: 5 INJECTION INTRAVENOUS at 14:53

## 2021-12-13 RX ADMIN — NITROGLYCERIN 1 INCH: 20 OINTMENT TOPICAL at 06:08

## 2021-12-13 RX ADMIN — Medication 10 ML: at 06:08

## 2021-12-13 RX ADMIN — FAMOTIDINE 20 MG: 20 TABLET, FILM COATED ORAL at 14:53

## 2021-12-13 RX ADMIN — Medication 10 ML: at 15:04

## 2021-12-13 RX ADMIN — ASPIRIN 81 MG: 81 TABLET, COATED ORAL at 14:53

## 2021-12-13 RX ADMIN — METOPROLOL TARTRATE 5 MG: 5 INJECTION INTRAVENOUS at 04:07

## 2021-12-13 RX ADMIN — PIPERACILLIN SODIUM AND TAZOBACTAM SODIUM 3.38 G: 3; .375 INJECTION, POWDER, LYOPHILIZED, FOR SOLUTION INTRAVENOUS at 18:30

## 2021-12-13 RX ADMIN — NITROGLYCERIN 1 INCH: 20 OINTMENT TOPICAL at 18:40

## 2021-12-13 NOTE — PROGRESS NOTES
OT evaluation order received and acknowledged. OT evaluation attempted at 9:40 however pt lethargic, non-verbal and opens eyes only briefly with multiple tactile cues/sternal rub. Pt unable to remain alert enough to participate in skilled OT evaluation at this time. Will continue to follow and re-attempt OT eval at a later time. Thank you.

## 2021-12-13 NOTE — PROGRESS NOTES
Hospitalist Progress Note               Daily Progress Note: 12/13/2021      Subjective:   Hospital course to date: Patient is a 24-year-old female with history of diabetes, breast cancer and hypertension who was brought to the ED on 12/5 after being found by family unresponsive the night before. She had been in the ED the day before that with hypoglycemia. He was noted have a blood sugar of 51 at home and was given D10 but no significant improvement. Chest x-ray showed left lower lobe pneumonia. She was transferred from [facility]. Blood sugar on arrival here was 73. She was started on D10. All of patient's diabetic medications were supposed to have been previously discontinued although unclear if she had been inadvertently taking them. According to her sister the last time she took insulin was Friday of last week.     ------  Patient seen this morning for follow-up. Patient was sleeping. She was noted to be awake during glucose check this morning. Per nurse report, she was still non-verbal. She was reported to have laughing without reason or purpose. She is still hypertensive with systolic up to 150. She is on NC 1 lpm.      Problem List:  Problem List as of 12/13/2021 Date Reviewed: 12/5/2021          Codes Class Noted - Resolved    Metabolic encephalopathy WVL-53-BL: G93.41  ICD-9-CM: 348.31  12/5/2021 - Present        CKD (chronic kidney disease) ICD-10-CM: N18.9  ICD-9-CM: 585.9  9/24/2021 - Present    Overview Signed 9/24/2021  3:57 PM by Vlad Hess MD     In the setting of severe, uncontrolled and prolonged diabetes, with diabetic retinopathy. Protienuria to 2.5g/g of creatinine. Would consider workup for acute GN given inconsistent history and rising creatinine.  Though clinical hx appears consistent with aggressive diabetic nephropathy             Sepsis ([facility] ICD-10-CM: A41.9  ICD-9-CM: 038.9, 995.91  9/22/2021 - Present        Esophagitis with gastritis ICD-10-CM: K29.70, K20.90  ICD-9-CM: 530.19  9/22/2021 - Present        Elevated troponin ICD-10-CM: R77.8  ICD-9-CM: 790.6  9/20/2021 - Present        Syncope ICD-10-CM: R55  ICD-9-CM: 780.2  9/20/2021 - Present        Hypoglycemia ICD-10-CM: E16.2  ICD-9-CM: 251.2  9/20/2021 - Present        Chronic respiratory failure with hypoxia (HCC) ICD-10-CM: J96.11  ICD-9-CM: 518.83, 799.02  9/20/2021 - Present        Chest pain ICD-10-CM: R07.9  ICD-9-CM: 786.50  9/19/2021 - Present        Anxiety ICD-10-CM: F41.9  ICD-9-CM: 300.00  2/9/2021 - Present        Depression ICD-10-CM: F32. A  ICD-9-CM: 334  2/9/2021 - Present        Dyslipidemia ICD-10-CM: E78.5  ICD-9-CM: 272.4  2/9/2021 - Present        Hypertensive cardiovascular disease ICD-10-CM: I11.9  ICD-9-CM: 402.90  2/9/2021 - Present        Knee osteoarthritis ICD-10-CM: M17.10  ICD-9-CM: 715.36  1/25/2021 - Present        COPD with acute exacerbation ([facility] ICD-10-CM: J44.1  ICD-9-CM: 491.21  11/29/2020 - Present        Person under investigation for COVID-19 ICD-10-CM: Z20.822  ICD-9-CM: V01.79  11/29/2020 - Present        COPD (chronic obstructive pulmonary disease) ([facility] ICD-10-CM: J44.9  ICD-9-CM: 308  11/29/2020 - Present        DM (diabetes mellitus) ([facility] ICD-10-CM: E11.9  ICD-9-CM: 250.00  11/29/2020 - Present        COVID-19 ICD-10-CM: U07.1  ICD-9-CM: 079.89  10/1/2020 - Present        HTN (hypertension) ICD-10-CM: I10  ICD-9-CM: 401.9  10/1/2020 - Present        Diabetic gastroparesis ([facility] ICD-10-CM: E11.43, K31.84  ICD-9-CM: 250.60, 536.3  10/1/2020 - Present        DVT prophylaxis ICD-10-CM: Z29.9  ICD-9-CM: V07.9  10/1/2020 - Present        PNA (pneumonia) ICD-10-CM: J18.9  ICD-9-CM: 154  9/30/2020 - Present        Acute respiratory failure with hypoxia (HCC) ICD-10-CM: J96.01  ICD-9-CM: 518.81  9/30/2020 - Present        Acute kidney injury ([facility] ICD-10-CM: N17.9  ICD-9-CM: 584.9  9/30/2020 - Present        Type 2 diabetes mellitus with hyperglycemia, with long-term current use of insulin ([facility] ICD-10-CM: E11.65, Z79.4  ICD-9-CM: 250.00, 790.29, V58.67  9/30/2020 - Present        Ureteral stone ICD-10-CM: N20.1  ICD-9-CM: 592.1  2/15/2019 - Present        S/P lumbar fusion ICD-10-CM: Z98.1  ICD-9-CM: V45.4  10/16/2018 - Present        CAP (community acquired pneumonia) ICD-10-CM: J18.9  ICD-9-CM: 787  9/1/2018 - Present        Shortness of breath ICD-10-CM: R06.02  ICD-9-CM: 786.05  9/1/2018 - Present        Fever and chills ICD-10-CM: R50.9  ICD-9-CM: 780.60  9/1/2018 - Present        Spinal stenosis of lumbar region at multiple levels ICD-10-CM: M48.061  ICD-9-CM: 724.02  8/30/2018 - Present        Scoliosis ICD-10-CM: M41.9  ICD-9-CM: 737.30  8/30/2018 - Present        Spinal stenosis ICD-10-CM: M48.00  ICD-9-CM: 724.00  8/30/2018 - Present        Pre-op testing ICD-10-CM: Z31.934  ICD-9-CM: V72.84  8/27/2018 - Present        Severe obesity (BMI 35.0-39. 9) ICD-10-CM: E66.01  ICD-9-CM: 278.01  6/20/2018 - Present        Spinal stenosis, lumbar region without neurogenic claudication ICD-10-CM: M48.061  ICD-9-CM: 724.02  5/16/2018 - Present        Lumbosacral spondylosis without myelopathy ICD-10-CM: M47.817  ICD-9-CM: 721.3  4/25/2018 - Present        DDD (degenerative disc disease), lumbar ICD-10-CM: M51.36  ICD-9-CM: 722.52  4/25/2018 - Present        Malignant neoplasm of female breast [facility]) ICD-10-CM: C50.919  ICD-9-CM: 174.9  4/25/2018 - Present              Medications reviewed  Current Facility-Administered Medications   Medication Dose Route Frequency    dextrose 5% infusion  75 mL/hr IntraVENous CONTINUOUS    hydrALAZINE (APRESOLINE) 20 mg/mL injection 10 mg  10 mg IntraVENous Q4H PRN    piperacillin-tazobactam (ZOSYN) 3.375 g in 0.9% sodium chloride (MBP/ADV) 100 mL MBP  3.375 g IntraVENous Q8H    nitroglycerin (NITROBID) 2 % ointment 1 Inch  1 Inch Topical Q6H    labetaloL (NORMODYNE;TRANDATE) 20 mg/4 mL (5 mg/mL) injection 10 mg  10 mg IntraVENous Q4H PRN    [Held by provider] amLODIPine (NORVASC) tablet 10 mg  10 mg Oral DAILY    atorvastatin (LIPITOR) tablet 40 mg  40 mg Oral QHS    albuterol (PROVENTIL HFA, VENTOLIN HFA, PROAIR HFA) inhaler 2 Puff  2 Puff Inhalation Q4H PRN    [Held by provider] tamsulosin (FLOMAX) capsule 0.4 mg  0.4 mg Oral DAILY    [Held by provider] gabapentin (NEURONTIN) capsule 300 mg  300 mg Oral BID    [Held by provider] melatonin tablet 3 mg  3 mg Oral QHS    venlafaxine-SR (EFFEXOR-XR) capsule 150 mg  150 mg Oral DAILY WITH BREAKFAST    ferrous sulfate tablet 325 mg  1 Tablet Oral TID    aspirin delayed-release tablet 81 mg  81 mg Oral DAILY    glucose chewable tablet 16 g  4 Tablet Oral PRN    dextrose (D50W) injection syrg 12.5-25 g  25-50 mL IntraVENous PRN    glucagon (GLUCAGEN) injection 1 mg  1 mg IntraMUSCular PRN    sodium chloride (NS) flush 5-40 mL  5-40 mL IntraVENous Q8H    sodium chloride (NS) flush 5-40 mL  5-40 mL IntraVENous PRN    acetaminophen (TYLENOL) tablet 650 mg  650 mg Oral Q6H PRN    Or    acetaminophen (TYLENOL) suppository 650 mg  650 mg Rectal Q6H PRN    ondansetron (ZOFRAN ODT) tablet 4 mg  4 mg Oral Q6H PRN    Or    ondansetron (ZOFRAN) injection 4 mg  4 mg IntraVENous Q6H PRN    famotidine (PEPCID) tablet 20 mg  20 mg Oral DAILY    metoprolol (LOPRESSOR) injection 5 mg  5 mg IntraVENous Q4H    metoprolol succinate (TOPROL-XL) XL tablet 50 mg  50 mg Oral DAILY       Review of Systems:   Review of systems not obtained due to patient factors. Objective:   Physical Exam:     Visit Vitals  BP (!) 190/71 (BP 1 Location: Right upper arm, BP Patient Position: At rest)   Pulse 67   Temp 98.1 °F (36.7 °C)   Resp 20   Ht 5' 5\" (1.651 m)   Wt 101.2 kg (223 lb)   SpO2 90%   BMI 37.11 kg/m²    O2 Flow Rate (L/min): 1 l/min O2 Device: Nasal cannula    Temp (24hrs), Av.2 °F (36.8 °C), Min:97.5 °F (36.4 °C), Max:98.7 °F (37.1 °C)    No intake/output data recorded.    1901 - 700  In: 3573 [I.V.:1705]  Out: 1200 [Urine:1200]    General:  Awake and alert    Lungs:   Clear to auscultation bilaterally. Chest wall:  No tenderness or deformity. Heart:  Regular rate and rhythm, S1, S2 normal, no murmur, click, rub or gallop. Abdomen:   Soft, non-tender. Bowel sounds normal. No masses,  No organomegaly. Extremities: Extremities normal, atraumatic, no cyanosis or edema. Pulses: 2+ and symmetric all extremities. Skin: Skin color, texture, turgor normal. No rashes or lesions   Neurologic: CNII-XII intact. Unable to fully assess         Data Review:       Recent Days:  No results for input(s): WBC, HGB, HCT, PLT, HGBEXT, HCTEXT, PLTEXT, HGBEXT, HCTEXT, PLTEXT in the last 72 hours. No results for input(s): NA, K, CL, CO2, GLU, BUN, CREA, CA, MG, PHOS, ALB, TBIL, TBILI, ALT, INR, INREXT, INREXT in the last 72 hours. No lab exists for component: SGOT  No results for input(s): PH, PCO2, PO2, HCO3, FIO2 in the last 72 hours.     24 Hour Results:  Recent Results (from the past 24 hour(s))   GLUCOSE, POC    Collection Time: 12/12/21 12:38 PM   Result Value Ref Range    Glucose (POC) 175 (H) 65 - 117 mg/dL    Performed by Karlee Ort, POC    Collection Time: 12/12/21  4:57 PM   Result Value Ref Range    Glucose (POC) 147 (H) 65 - 117 mg/dL    Performed by Karlee Ort, POC    Collection Time: 12/12/21  7:30 PM   Result Value Ref Range    Glucose (POC) 221 (H) 65 - 117 mg/dL    Performed by [place] Ala    GLUCOSE, POC    Collection Time: 12/12/21 11:36 PM   Result Value Ref Range    Glucose (POC) 128 (H) 65 - 117 mg/dL    Performed by [place] Ala    GLUCOSE, POC    Collection Time: 12/13/21  4:40 AM   Result Value Ref Range    Glucose (POC) 161 (H) 65 - 117 mg/dL    Performed by [place] Ala    GLUCOSE, POC    Collection Time: 12/13/21  7:45 AM   Result Value Ref Range    Glucose (POC) 158 (H) 65 - 117 mg/dL    Performed by Nash Barajas        XR CHEST PORT   Final Result No significant interval change. MRI BRAIN WO CONT   Final Result   1. No evidence of acute intracranial ischemia, mass or hemorrhage. 2.  Moderate chronic small vessel ischemic changes. 3.  Mild diffuse parenchymal volume loss. Assessment:  Hypoglycemia in diabetes mellitus type 2, improved. Suspect she had a prolonged episode of severe hypoglycemia at home    Hypoglycemic encephalopathy, possibly with permanent acquired brain injury    Possible left lower lobe pneumonia, likely aspiration    Chronic kidney disease stage III    History of breast cancer    COPD without exacerbation    Chronic respiratory failure with hypoxia    History of gastritis/esophagitis/gastroparesis      Plan:  Continue supportive care including assistance with all meals  Expect gradual improvement over the course of time  PT OT evaluation  Anticipate discharge to skilled care facility when bed available      Care Plan discussed with: Patient/Family    Disposition: Transfer to medical telemetry    Total time spent with patient: 30 minutes.     Cesar Persaud MD

## 2021-12-13 NOTE — PROGRESS NOTES
PT evaluation order received and acknowledged. PT evaluation attempted, however pt lethargic, non-verbal and opens eyes only briefly with multiple tactile cues/sternal rub. Pt unable to remain alert enough to participate in skilled PT evaluation at this time. Will continue to follow and re-attempt PT eval at a later time. Thank you.

## 2021-12-13 NOTE — PROGRESS NOTES
Hospitalist Progress Note               Daily Progress Note: 12/13/2021      Subjective:   Hospital course to date: Patient is a 82-year-old female with history of diabetes, breast cancer and hypertension who was brought to the ED on 12/5 after being found by family unresponsive the night before. She had been in the ED the day before that with hypoglycemia. He was noted have a blood sugar of 51 at home and was given D10 but no significant improvement. Chest x-ray showed left lower lobe pneumonia. She was transferred from [facility]. Blood sugar on arrival here was 73. She was started on D10. All of patient's diabetic medications were supposed to have been previously discontinued although unclear if she had been inadvertently taking them. According to her sister the last time she took insulin was Friday of last week.    ------  Patient seen this morning for follow-up. Patient was sleeping. She was noted to be awake during glucose check this morning. Per nurse report, she was still non-verbal. She was reported to have laughing without reason or purpose. She is still hypertensive with systolic up to 867. She is on NC 1 lpm.    Problem List:  Problem List as of 12/13/2021 Date Reviewed: 12/5/2021          Codes Class Noted - Resolved    Metabolic encephalopathy [facility]-VS: G93.41  ICD-9-CM: 348.31  12/5/2021 - Present        CKD (chronic kidney disease) ICD-10-CM: N18.9  ICD-9-CM: 585.9  9/24/2021 - Present    Overview Signed 9/24/2021  3:57 PM by Enmanuel Escamilla MD     In the setting of severe, uncontrolled and prolonged diabetes, with diabetic retinopathy. Protienuria to 2.5g/g of creatinine. Would consider workup for acute GN given inconsistent history and rising creatinine.  Though clinical hx appears consistent with aggressive diabetic nephropathy             Sepsis ([facility] ICD-10-CM: A41.9  ICD-9-CM: 038.9, 995.91  9/22/2021 - Present        Esophagitis with gastritis ICD-10-CM: K29.70, K20.90  ICD-9-CM: 530.19  9/22/2021 - Present        Elevated troponin ICD-10-CM: R77.8  ICD-9-CM: 790.6  9/20/2021 - Present        Syncope ICD-10-CM: R55  ICD-9-CM: 780.2  9/20/2021 - Present        Hypoglycemia ICD-10-CM: E16.2  ICD-9-CM: 251.2  9/20/2021 - Present        Chronic respiratory failure with hypoxia (HCC) ICD-10-CM: J96.11  ICD-9-CM: 518.83, 799.02  9/20/2021 - Present        Chest pain ICD-10-CM: R07.9  ICD-9-CM: 786.50  9/19/2021 - Present        Anxiety ICD-10-CM: F41.9  ICD-9-CM: 300.00  2/9/2021 - Present        Depression ICD-10-CM: F32. A  ICD-9-CM: 993  2/9/2021 - Present        Dyslipidemia ICD-10-CM: E78.5  ICD-9-CM: 272.4  2/9/2021 - Present        Hypertensive cardiovascular disease ICD-10-CM: I11.9  ICD-9-CM: 402.90  2/9/2021 - Present        Knee osteoarthritis ICD-10-CM: M17.10  ICD-9-CM: 715.36  1/25/2021 - Present        COPD with acute exacerbation ([facility] ICD-10-CM: J44.1  ICD-9-CM: 491.21  11/29/2020 - Present        Person under investigation for COVID-19 ICD-10-CM: Z20.822  ICD-9-CM: V01.79  11/29/2020 - Present        COPD (chronic obstructive pulmonary disease) ([facility] ICD-10-CM: J44.9  ICD-9-CM: 013  11/29/2020 - Present        DM (diabetes mellitus) ([facility] ICD-10-CM: E11.9  ICD-9-CM: 250.00  11/29/2020 - Present        COVID-19 ICD-10-CM: U07.1  ICD-9-CM: 079.89  10/1/2020 - Present        HTN (hypertension) ICD-10-CM: I10  ICD-9-CM: 401.9  10/1/2020 - Present        Diabetic gastroparesis ([facility] ICD-10-CM: E11.43, K31.84  ICD-9-CM: 250.60, 536.3  10/1/2020 - Present        DVT prophylaxis ICD-10-CM: Z29.9  ICD-9-CM: V07.9  10/1/2020 - Present        PNA (pneumonia) ICD-10-CM: J18.9  ICD-9-CM: 601  9/30/2020 - Present        Acute respiratory failure with hypoxia (HCC) ICD-10-CM: J96.01  ICD-9-CM: 518.81  9/30/2020 - Present        Acute kidney injury ([facility] ICD-10-CM: N17.9  ICD-9-CM: 584.9  9/30/2020 - Present        Type 2 diabetes mellitus with hyperglycemia, with long-term current use of insulin ([facility] ICD-10-CM: E11.65, Z79.4  ICD-9-CM: 250.00, 790.29, V58.67  9/30/2020 - Present        Ureteral stone ICD-10-CM: N20.1  ICD-9-CM: 592.1  2/15/2019 - Present        S/P lumbar fusion ICD-10-CM: Z98.1  ICD-9-CM: V45.4  10/16/2018 - Present        CAP (community acquired pneumonia) ICD-10-CM: J18.9  ICD-9-CM: 814  9/1/2018 - Present        Shortness of breath ICD-10-CM: R06.02  ICD-9-CM: 786.05  9/1/2018 - Present        Fever and chills ICD-10-CM: R50.9  ICD-9-CM: 780.60  9/1/2018 - Present        Spinal stenosis of lumbar region at multiple levels ICD-10-CM: M48.061  ICD-9-CM: 724.02  8/30/2018 - Present        Scoliosis ICD-10-CM: M41.9  ICD-9-CM: 737.30  8/30/2018 - Present        Spinal stenosis ICD-10-CM: M48.00  ICD-9-CM: 724.00  8/30/2018 - Present        Pre-op testing ICD-10-CM: R12.632  ICD-9-CM: V72.84  8/27/2018 - Present        Severe obesity (BMI 35.0-39. 9) ICD-10-CM: E66.01  ICD-9-CM: 278.01  6/20/2018 - Present        Spinal stenosis, lumbar region without neurogenic claudication ICD-10-CM: M48.061  ICD-9-CM: 724.02  5/16/2018 - Present        Lumbosacral spondylosis without myelopathy ICD-10-CM: M47.817  ICD-9-CM: 721.3  4/25/2018 - Present        DDD (degenerative disc disease), lumbar ICD-10-CM: M51.36  ICD-9-CM: 722.52  4/25/2018 - Present        Malignant neoplasm of female breast [facility]) ICD-10-CM: C50.919  ICD-9-CM: 174.9  4/25/2018 - Present              Medications reviewed  Current Facility-Administered Medications   Medication Dose Route Frequency    dextrose 5% infusion  75 mL/hr IntraVENous CONTINUOUS    hydrALAZINE (APRESOLINE) 20 mg/mL injection 10 mg  10 mg IntraVENous Q4H PRN    piperacillin-tazobactam (ZOSYN) 3.375 g in 0.9% sodium chloride (MBP/ADV) 100 mL MBP  3.375 g IntraVENous Q8H    nitroglycerin (NITROBID) 2 % ointment 1 Inch  1 Inch Topical Q6H    labetaloL (NORMODYNE;TRANDATE) 20 mg/4 mL (5 mg/mL) injection 10 mg  10 mg IntraVENous Q4H PRN    [Held by provider] amLODIPine (NORVASC) tablet 10 mg  10 mg Oral DAILY    atorvastatin (LIPITOR) tablet 40 mg  40 mg Oral QHS    albuterol (PROVENTIL HFA, VENTOLIN HFA, PROAIR HFA) inhaler 2 Puff  2 Puff Inhalation Q4H PRN    [Held by provider] tamsulosin (FLOMAX) capsule 0.4 mg  0.4 mg Oral DAILY    [Held by provider] gabapentin (NEURONTIN) capsule 300 mg  300 mg Oral BID    [Held by provider] melatonin tablet 3 mg  3 mg Oral QHS    venlafaxine-SR (EFFEXOR-XR) capsule 150 mg  150 mg Oral DAILY WITH BREAKFAST    ferrous sulfate tablet 325 mg  1 Tablet Oral TID    aspirin delayed-release tablet 81 mg  81 mg Oral DAILY    glucose chewable tablet 16 g  4 Tablet Oral PRN    dextrose (D50W) injection syrg 12.5-25 g  25-50 mL IntraVENous PRN    glucagon (GLUCAGEN) injection 1 mg  1 mg IntraMUSCular PRN    sodium chloride (NS) flush 5-40 mL  5-40 mL IntraVENous Q8H    sodium chloride (NS) flush 5-40 mL  5-40 mL IntraVENous PRN    acetaminophen (TYLENOL) tablet 650 mg  650 mg Oral Q6H PRN    Or    acetaminophen (TYLENOL) suppository 650 mg  650 mg Rectal Q6H PRN    ondansetron (ZOFRAN ODT) tablet 4 mg  4 mg Oral Q6H PRN    Or    ondansetron (ZOFRAN) injection 4 mg  4 mg IntraVENous Q6H PRN    famotidine (PEPCID) tablet 20 mg  20 mg Oral DAILY    metoprolol (LOPRESSOR) injection 5 mg  5 mg IntraVENous Q4H    metoprolol succinate (TOPROL-XL) XL tablet 50 mg  50 mg Oral DAILY       Review of Systems:   Review of systems not obtained due to patient factors. Objective:   Physical Exam:     Visit Vitals  BP (!) 168/62   Pulse 68   Temp 98.5 °F (36.9 °C)   Resp 18   Ht 5' 5\" (1.651 m)   Wt 223 lb (101.2 kg)   SpO2 94%   BMI 37.11 kg/m²    O2 Flow Rate (L/min): 1 l/min O2 Device: Nasal cannula    Temp (24hrs), Av.3 °F (36.8 °C), Min:97.5 °F (36.4 °C), Max:98.7 °F (37.1 °C)    No intake/output data recorded.    1901 -  0700  In: 6548 [I.V.:1705]  Out: 1200 [Urine:1200]    General:   Obtunded, grimaces to pain   Lungs:   Clear to auscultation bilaterally. Chest wall:  No tenderness or deformity. Heart:  Regular rate and rhythm, S1, S2 normal, no murmur, click, rub or gallop. Abdomen:   Soft, non-tender. Bowel sounds normal. No masses,  No organomegaly. Extremities: Extremities normal, atraumatic, no cyanosis or edema. Pulses: 2+ and symmetric all extremities. Skin: Skin color, texture, turgor normal. No rashes or lesions   Neurologic: CNII-XII intact. Unable to fully assess         Data Review:       Recent Days:  Recent Labs     12/10/21  0926   WBC 11.3*   HGB 10.4*   HCT 33.9*        Recent Labs     12/10/21  0926      K 3.9   *   CO2 31   *   BUN 26*   CREA 1.71*   CA 8.8   PHOS 3.6   ALB 2.0*     No results for input(s): PH, PCO2, PO2, HCO3, FIO2 in the last 72 hours.     24 Hour Results:  Recent Results (from the past 24 hour(s))   GLUCOSE, POC    Collection Time: 12/12/21  8:19 AM   Result Value Ref Range    Glucose (POC) 219 (H) 65 - 117 mg/dL    Performed by [address] [place] [address], POC    Collection Time: 12/12/21 12:38 PM   Result Value Ref Range    Glucose (POC) 175 (H) 65 - 117 mg/dL    Performed by Maru Sultana, POC    Collection Time: 12/12/21  4:57 PM   Result Value Ref Range    Glucose (POC) 147 (H) 65 - 117 mg/dL    Performed by Maru Sultana, POC    Collection Time: 12/12/21  7:30 PM   Result Value Ref Range    Glucose (POC) 221 (H) 65 - 117 mg/dL    Performed by [employer] [facility]    GLUCOSE, POC    Collection Time: 12/12/21 11:36 PM   Result Value Ref Range    Glucose (POC) 128 (H) 65 - 117 mg/dL    Performed by [employer] [facility]    GLUCOSE, POC    Collection Time: 12/13/21  4:40 AM   Result Value Ref Range    Glucose (POC) 161 (H) 65 - 117 mg/dL    Performed by [facility]    GLUCOSE, POC    Collection Time: 12/13/21  7:45 AM   Result Value Ref Range    Glucose (POC) 158 (H) 65 - 117 mg/dL    Performed by Mars Starkey XR CHEST PORT   Final Result   No significant interval change. MRI BRAIN WO CONT   Final Result   1. No evidence of acute intracranial ischemia, mass or hemorrhage. 2.  Moderate chronic small vessel ischemic changes. 3.  Mild diffuse parenchymal volume loss. Assessment:  Hypoglycemia in diabetes mellitus type 2, improved. Suspect she had a prolonged episode of severe hypoglycemia at home     Hypoglycemic encephalopathy, possibly with permanent acquired brain injury     Possible left lower lobe pneumonia, likely aspiration     Chronic kidney disease stage III     History of breast cancer     COPD without exacerbation     Chronic respiratory failure with hypoxia     History of gastritis/esophagitis/gastroparesis        Plan:  Continue supportive care including assistance with all meals  PT OT evaluation    Care Plan discussed with: Patient/Family     Disposition: Transfer to medical telemetry     Total time spent with patient: 30 minutes.     Mary Whatley

## 2021-12-13 NOTE — PROGRESS NOTES
CM spoke with patient's sister. [facility] and [facility]. Choice letter signed and placed on chart. CM has sent referrals. [facility] will continue to follow.

## 2021-12-13 NOTE — PROGRESS NOTES
CM attempted to reach patient's sister to discuss LTC placement. CM attempted to leave message but voicemail was full. CM will continue to reach out to patient's sister.

## 2021-12-13 NOTE — PROGRESS NOTES
Patient care assumed overnight, patient remains alert but confused and non verbal laughing without reason or purpose. Pure wick in place, and patient is tolerating well, good output. No new skin issues, encouraged to turn and assisted of same. Patient likes to lay on left side and stay in a fetal position from the abdomen down. PIV remains in place and mitt on right hand to prevent pulling. Bed alarm in place to prevent and alert for elopement. Patient on RA now and tolerating fine. Glucose remains stable overnight without dextrose fluids. Would not tolerate PO medications overnight crushed in applesauce, seen in providers note that may be obtaining a PEG this week. Will continue to monitor. Bed in low position. Unable to use call bell.

## 2021-12-14 LAB
GLUCOSE BLD STRIP.AUTO-MCNC: 180 MG/DL (ref 65–117)
GLUCOSE BLD STRIP.AUTO-MCNC: 183 MG/DL (ref 65–117)
GLUCOSE BLD STRIP.AUTO-MCNC: 194 MG/DL (ref 65–117)
GLUCOSE BLD STRIP.AUTO-MCNC: 208 MG/DL (ref 65–117)
GLUCOSE BLD STRIP.AUTO-MCNC: 229 MG/DL (ref 65–117)
PERFORMED BY, TECHID: ABNORMAL

## 2021-12-14 PROCEDURE — 77010033678 HC OXYGEN DAILY

## 2021-12-14 PROCEDURE — 97530 THERAPEUTIC ACTIVITIES: CPT

## 2021-12-14 PROCEDURE — 65270000029 HC RM PRIVATE

## 2021-12-14 PROCEDURE — 97165 OT EVAL LOW COMPLEX 30 MIN: CPT

## 2021-12-14 PROCEDURE — 97161 PT EVAL LOW COMPLEX 20 MIN: CPT

## 2021-12-14 PROCEDURE — 94760 N-INVAS EAR/PLS OXIMETRY 1: CPT

## 2021-12-14 PROCEDURE — 74011250636 HC RX REV CODE- 250/636: Performed by: HOSPITALIST

## 2021-12-14 PROCEDURE — 74011250637 HC RX REV CODE- 250/637: Performed by: HOSPITALIST

## 2021-12-14 PROCEDURE — 74011000258 HC RX REV CODE- 258: Performed by: INTERNAL MEDICINE

## 2021-12-14 PROCEDURE — 74011000250 HC RX REV CODE- 250: Performed by: HOSPITALIST

## 2021-12-14 PROCEDURE — 82962 GLUCOSE BLOOD TEST: CPT

## 2021-12-14 PROCEDURE — 97162 PT EVAL MOD COMPLEX 30 MIN: CPT

## 2021-12-14 PROCEDURE — 74011250636 HC RX REV CODE- 250/636: Performed by: INTERNAL MEDICINE

## 2021-12-14 RX ADMIN — METOPROLOL TARTRATE 5 MG: 5 INJECTION INTRAVENOUS at 04:34

## 2021-12-14 RX ADMIN — Medication 10 ML: at 23:45

## 2021-12-14 RX ADMIN — HYDRALAZINE HYDROCHLORIDE 10 MG: 20 INJECTION INTRAMUSCULAR; INTRAVENOUS at 10:39

## 2021-12-14 RX ADMIN — FERROUS SULFATE TAB 325 MG (65 MG ELEMENTAL FE) 325 MG: 325 (65 FE) TAB at 22:05

## 2021-12-14 RX ADMIN — METOPROLOL TARTRATE 5 MG: 5 INJECTION INTRAVENOUS at 18:14

## 2021-12-14 RX ADMIN — METOPROLOL TARTRATE 5 MG: 5 INJECTION INTRAVENOUS at 10:39

## 2021-12-14 RX ADMIN — METOPROLOL SUCCINATE 50 MG: 25 TABLET, EXTENDED RELEASE ORAL at 10:39

## 2021-12-14 RX ADMIN — PIPERACILLIN SODIUM AND TAZOBACTAM SODIUM 3.38 G: 3; .375 INJECTION, POWDER, LYOPHILIZED, FOR SOLUTION INTRAVENOUS at 18:14

## 2021-12-14 RX ADMIN — Medication 10 ML: at 14:03

## 2021-12-14 RX ADMIN — PIPERACILLIN SODIUM AND TAZOBACTAM SODIUM 3.38 G: 3; .375 INJECTION, POWDER, LYOPHILIZED, FOR SOLUTION INTRAVENOUS at 01:25

## 2021-12-14 RX ADMIN — FERROUS SULFATE TAB 325 MG (65 MG ELEMENTAL FE) 325 MG: 325 (65 FE) TAB at 18:14

## 2021-12-14 RX ADMIN — VENLAFAXINE HYDROCHLORIDE 150 MG: 75 CAPSULE, EXTENDED RELEASE ORAL at 10:39

## 2021-12-14 RX ADMIN — NITROGLYCERIN 1 INCH: 20 OINTMENT TOPICAL at 05:55

## 2021-12-14 RX ADMIN — NITROGLYCERIN 1 INCH: 20 OINTMENT TOPICAL at 14:03

## 2021-12-14 RX ADMIN — NITROGLYCERIN 1 INCH: 20 OINTMENT TOPICAL at 18:15

## 2021-12-14 RX ADMIN — ASPIRIN 81 MG: 81 TABLET, COATED ORAL at 10:39

## 2021-12-14 RX ADMIN — LABETALOL HYDROCHLORIDE 10 MG: 5 INJECTION, SOLUTION INTRAVENOUS at 05:55

## 2021-12-14 RX ADMIN — ATORVASTATIN CALCIUM 40 MG: 40 TABLET, FILM COATED ORAL at 22:05

## 2021-12-14 RX ADMIN — NITROGLYCERIN 1 INCH: 20 OINTMENT TOPICAL at 00:31

## 2021-12-14 RX ADMIN — FAMOTIDINE 20 MG: 20 TABLET, FILM COATED ORAL at 10:39

## 2021-12-14 RX ADMIN — Medication 10 ML: at 05:56

## 2021-12-14 RX ADMIN — AMLODIPINE BESYLATE 10 MG: 5 TABLET ORAL at 10:39

## 2021-12-14 RX ADMIN — FERROUS SULFATE TAB 325 MG (65 MG ELEMENTAL FE) 325 MG: 325 (65 FE) TAB at 10:39

## 2021-12-14 RX ADMIN — PIPERACILLIN SODIUM AND TAZOBACTAM SODIUM 3.38 G: 3; .375 INJECTION, POWDER, LYOPHILIZED, FOR SOLUTION INTRAVENOUS at 10:38

## 2021-12-14 NOTE — PROGRESS NOTES
CM has attempted to reach patient's sister again to confirm discharge plan to [facility] and also receive vaccination status. CM will continue to reach out.

## 2021-12-14 NOTE — PROGRESS NOTES
OCCUPATIONAL THERAPY EVALUATION  Patient: Chucky Fernando (89 y.o. female)  Date: 12/14/2021  Primary Diagnosis: Metabolic encephalopathy [R16.55]  Hypoglycemia [E16.2]        Precautions: Fall risk       ASSESSMENT  Pt is a 60 y/o F with PMH of arthritis, breast cancer, DM II, edema, GERD, high cholesterol, hypertension, MI, neuropathy, and restless leg syndrome presenting to [facility] with c/o low blood sugar and AMS, admitted 12/5/21  and being treated for hypoglycemia, hypoglycemic encephalopathy, and pneumonia. Pt received semi-supine in bed upon arrival, AXO x2 with verbal cuing, disoriented to time and situation and agreeable to OT evaluation at this time. Pt is a poor historian due to being mostly non-verbal at this time. Per pt report, pt lives in a one-story home with 5 SILVIA and B HR, was IND with ADLs and Mod I using Rollator for mobility at [facility]. Based on current observations, pt presents with deficits in generalized strength/AROM, balance, functional activity tolerance, coordination, and cognition impacting overall performance of ADLs and functional transfers/mobility. Pt currently requires min A for rolling and sit EOB to sup. Pt requires mod A x2 for sup to sit EOB and scooting. Pt performed sit to stand and stand to sit transfers from EOB with mod A x2. Pt able to stand with mod A x2 and take three steps to [facility] to reposition. Overall, pt tolerates session fair. Pt would benefit from continued skilled OT services to address current impairments and improve IND and safety with self cares and functional transfers/mobility. Recommend discharge to SNF once medically appropriate. Other factors to consider for discharge: PLOF, severity of deficits     Patient will benefit from skilled therapy intervention to address the above noted impairments.        PLAN :  Recommendations and Planned Interventions: self care training, functional mobility training, therapeutic exercise, therapeutic activities, endurance activities and patient education    Frequency/Duration: Patient will be followed by occupational therapy:  3-5x/week to address goals. Recommendation for discharge: (in order for the patient to meet his/her long term goals)  Baudilio Stacy    This discharge recommendation:  Has been made in collaboration with the attending provider and/or case management    IF patient discharges home will need the following DME: shower chair and walker: rolling       SUBJECTIVE:   Pt nonverbal at this time. Pt appeared content with therapy session.     OBJECTIVE DATA SUMMARY:   HISTORY:   Past Medical History:   Diagnosis Date    Arthritis     spinal stenosis    Breast CA ([facility] 2017    Left Breast (chemo/radiation)    Diabetes (HCC)     Type 2    Edema     legs and ankles    GERD (gastroesophageal reflux disease)     High cholesterol     Hypertension     Ill-defined condition     patient states hemorrhage behind Left eye-following Dr. Saul Chavez [facility]    per patient    Neuropathy     Restless leg      Past Surgical History:   Procedure Laterality Date    HX BACK SURGERY  08/30/2018    HX BREAST BIOPSY Left 02/03/2017    BREAST CANCER    HX BREAST LUMPECTOMY Left     HX CATARACT REMOVAL Right     HX HEART CATHETERIZATION  07/2018    no stents    HX KNEE REPLACEMENT         Expanded or extensive additional review of patient history:     Home Situation  Home Environment: Trailer/mobile home  # Steps to Enter: 5  Rails to Enter: Yes  Hand Rails : Bilateral  Wheelchair Ramp: No  One/Two Story Residence: One story  Living Alone: Yes  Support Systems: Other Family Member(s)  Patient Expects to be Discharged to[de-identified] Other (comment) (unable to answer)  Current DME Used/Available at Home: jens Cabrera    Hand dominance: Right    EXAMINATION OF PERFORMANCE DEFICITS:  Cognitive/Behavioral Status:  Neurologic State: Alert; Confused  Orientation Level: Oriented to person; Oriented to place; Disoriented to time; Disoriented to situation (Oriented to self and place with cues)  Cognition: Decreased command following; Decreased attention/concentration        Safety/Judgement: Decreased insight into deficits; Decreased awareness of need for safety; Decreased awareness of need for assistance; Decreased awareness of environment      Hearing: Auditory  Auditory Impairment: None           Range of Motion:  AROM: Generally decreased, functional                         Strength:  Strength: Generally decreased, functional                Coordination:  Coordination: Generally decreased, functional  Fine Motor Skills-Upper: Left Impaired; Right Impaired    Gross Motor Skills-Upper: Left Impaired; Right Impaired    Tone & Sensation:  Tone: Normal  Sensation: Intact                      Balance:  Sitting: Impaired; Without support  Sitting - Static: Good (unsupported)  Sitting - Dynamic: Fair (occasional)  Standing: Impaired; Pull to stand; With support  Standing - Static: Fair; Constant support  Standing - Dynamic : Poor; Constant support    Functional Mobility and Transfers for ADLs:  Bed Mobility:  Rolling: Minimum assistance  Supine to Sit: Moderate assistance; Assist x2  Sit to Supine: Minimum assistance  Scooting: Moderate assistance; Assist x2    Transfers:  Sit to Stand: Moderate assistance; Assist x2  Stand to Sit: Moderate assistance; Assist x2      ADL Intervention and task modifications:         Cognitive Retraining  Safety/Judgement: Decreased insight into deficits; Decreased awareness of need for safety; Decreased awareness of need for assistance; Decreased awareness of environment      Therapeutic Exercise:  Pt would benefit from UE HEP initiated at next session.      Functional Measure:    [address] [facility] [address] [address] AM-PACTM \"6 Clicks\"                                                       Daily Activity Inpatient Short Form  How much help from another person does the patient currently need... Total; A Lot A Little None   1. Putting on and taking off regular lower body clothing? []  1 [x]  2 []  3 []  4   2. Bathing (including washing, rinsing, drying)? []  1 [x]  2 []  3 []  4   3. Toileting, which includes using toilet, bedpan or urinal? [] 1 [x]  2 []  3 []  4   4. Putting on and taking off regular upper body clothing? []  1 [x]  2 []  3 []  4   5. Taking care of personal grooming such as brushing teeth? []  1 [x]  2 []  3 []  4   6. Eating meals? []  1 [x]  2 []  3 []  4   © , Trustees of [facility] [employer], under license to [employer]. All rights reserved     Score:      Interpretation of Tool:  Represents clinically-significant functional categories (i.e. Activities of daily living). Percentage of Impairment CH    0%   CI    1-19% CJ    20-39% CK    40-59% CL    60-79% CM    80-99% CN     100%   [facility]  Score 6-24 24 23 20-22 15-19 10-14 7-9 6         Occupational Therapy Evaluation Charge Determination   History Examination Decision-Making   LOW Complexity : Brief history review  LOW Complexity : 1-3 performance deficits relating to physical, cognitive , or psychosocial skils that result in activity limitations and / or participation restrictions  LOW Complexity : No comorbidities that affect functional and no verbal or physical assistance needed to complete eval tasks       Based on the above components, the patient evaluation is determined to be of the following complexity level: LOW   Pain Ratin/10    Activity Tolerance:   Fair  Please refer to the flowsheet for vital signs taken during this treatment. After treatment patient left in no apparent distress:    Supine in bed, Call bell within reach, Bed / chair alarm activated and Side rails x 3    COMMUNICATION/EDUCATION:   The patients plan of care was discussed with: Physical therapist.     Patient/family agree to work toward stated goals and plan of care.     This patients plan of care is appropriate for delegation to MICKY. PT/OT sessions occurred together for increased safety of pt and clinician. Thank you for this referral.  Ann Oakes OT  Time Calculation: 35 mins    Problem: Self Care Deficits Care Plan (Adult)  Goal: *Acute Goals and Plan of Care (Insert Text)  Description: 1. Pt will be min A sup <> sit in prep for EOB ADLs  2. Pt will be min A grooming sitting EOB  3. Pt will be min A LE dressing sitting EOB/long sit  4. Pt will be min A sit <>  prep for toileting LRAD  5. Pt will be min A toileting/toilet transfer/cloth mgmt LRAD  6.  Pt will be IND following UE HEP in prep for self care tasks      Outcome: Not Met

## 2021-12-14 NOTE — PROGRESS NOTES
Hospitalist Progress Note               Daily Progress Note: 12/14/2021      Subjective:   Hospital course to date: Patient is a 46-year-old female with history of diabetes, breast cancer and hypertension who was brought to the ED on 12/5 after being found by family unresponsive the night before. She had been in the ED the day before that with hypoglycemia. He was noted have a blood sugar of 51 at home and was given D10 but no significant improvement. Chest x-ray showed left lower lobe pneumonia. She was transferred from [facility]. Blood sugar on arrival here was 73. She was started on D10. All of patient's diabetic medications were supposed to have been previously discontinued although unclear if she had been inadvertently taking them. According to her sister the last time she took insulin was Friday of last week.     ------  Patient seen this morning for follow-up. Patient showing marked improvement in her mentation. She is verbal and is able to communicate. She has difficulty ambulating, was concentrating to drink her orange juice. She does laugh but now more pleasant to have conversation with. She was reported to be unable to remain alert with PT to continue evaluation, but she had no issues with communication when I saw her this morning. Patient blood pressure markedly elevated to 205/84 this morning at 0700, patient is on hydralazine PRN. She offers no other complaints. Problem List:  Problem List as of 12/14/2021 Date Reviewed: 12/5/2021          Codes Class Noted - Resolved    Metabolic encephalopathy VBC-58-MJ: G93.41  ICD-9-CM: 348.31  12/5/2021 - Present        CKD (chronic kidney disease) ICD-10-CM: N18.9  ICD-9-CM: 585.9  9/24/2021 - Present    Overview Signed 9/24/2021  3:57 PM by Jason March MD     In the setting of severe, uncontrolled and prolonged diabetes, with diabetic retinopathy. Protienuria to 2.5g/g of creatinine.    Would consider workup for acute GN given inconsistent history and rising creatinine. Though clinical hx appears consistent with aggressive diabetic nephropathy             Sepsis ([facility] ICD-10-CM: A41.9  ICD-9-CM: 038.9, 995.91  9/22/2021 - Present        Esophagitis with gastritis ICD-10-CM: K29.70, K20.90  ICD-9-CM: 530.19  9/22/2021 - Present        Elevated troponin ICD-10-CM: R77.8  ICD-9-CM: 790.6  9/20/2021 - Present        Syncope ICD-10-CM: R55  ICD-9-CM: 780.2  9/20/2021 - Present        Hypoglycemia ICD-10-CM: E16.2  ICD-9-CM: 251.2  9/20/2021 - Present        Chronic respiratory failure with hypoxia (HCC) ICD-10-CM: J96.11  ICD-9-CM: 518.83, 799.02  9/20/2021 - Present        Chest pain ICD-10-CM: R07.9  ICD-9-CM: 786.50  9/19/2021 - Present        Anxiety ICD-10-CM: F41.9  ICD-9-CM: 300.00  2/9/2021 - Present        Depression ICD-10-CM: F32. A  ICD-9-CM: 177  2/9/2021 - Present        Dyslipidemia ICD-10-CM: E78.5  ICD-9-CM: 272.4  2/9/2021 - Present        Hypertensive cardiovascular disease ICD-10-CM: I11.9  ICD-9-CM: 402.90  2/9/2021 - Present        Knee osteoarthritis ICD-10-CM: M17.10  ICD-9-CM: 715.36  1/25/2021 - Present        COPD with acute exacerbation ([facility] ICD-10-CM: J44.1  ICD-9-CM: 491.21  11/29/2020 - Present        Person under investigation for COVID-19 ICD-10-CM: Z20.822  ICD-9-CM: V01.79  11/29/2020 - Present        COPD (chronic obstructive pulmonary disease) ([facility] ICD-10-CM: J44.9  ICD-9-CM: 096  11/29/2020 - Present        DM (diabetes mellitus) ([place] [address].) ICD-10-CM: E11.9  ICD-9-CM: 250.00  11/29/2020 - Present        COVID-19 ICD-10-CM: U07.1  ICD-9-CM: 079.89  10/1/2020 - Present        HTN (hypertension) ICD-10-CM: I10  ICD-9-CM: 401.9  10/1/2020 - Present        Diabetic gastroparesis ([place] [address].) ICD-10-CM: E11.43, K31.84  ICD-9-CM: 250.60, 536.3  10/1/2020 - Present        DVT prophylaxis ICD-10-CM: Z29.9  ICD-9-CM: V07.9  10/1/2020 - Present        PNA (pneumonia) ICD-10-CM: J18.9  ICD-9-CM: 937  9/30/2020 - Present Acute respiratory failure with hypoxia [facility]) ICD-10-CM: J96.01  ICD-9-CM: 518.81  9/30/2020 - Present        Acute kidney injury [facility]) ICD-10-CM: N17.9  ICD-9-CM: 584.9  9/30/2020 - Present        Type 2 diabetes mellitus with hyperglycemia, with long-term current use of insulin (HCC) ICD-10-CM: E11.65, Z79.4  ICD-9-CM: 250.00, 790.29, V58.67  9/30/2020 - Present        Ureteral stone ICD-10-CM: N20.1  ICD-9-CM: 592.1  2/15/2019 - Present        S/P lumbar fusion ICD-10-CM: Z98.1  ICD-9-CM: V45.4  10/16/2018 - Present        CAP (community acquired pneumonia) ICD-10-CM: J18.9  ICD-9-CM: 663  9/1/2018 - Present        Shortness of breath ICD-10-CM: R06.02  ICD-9-CM: 786.05  9/1/2018 - Present        Fever and chills ICD-10-CM: R50.9  ICD-9-CM: 780.60  9/1/2018 - Present        Spinal stenosis of lumbar region at multiple levels ICD-10-CM: M48.061  ICD-9-CM: 724.02  8/30/2018 - Present        Scoliosis ICD-10-CM: M41.9  ICD-9-CM: 737.30  8/30/2018 - Present        Spinal stenosis ICD-10-CM: M48.00  ICD-9-CM: 724.00  8/30/2018 - Present        Pre-op testing ICD-10-CM: I08.651  ICD-9-CM: V72.84  8/27/2018 - Present        Severe obesity (BMI 35.0-39. 9) ICD-10-CM: E66.01  ICD-9-CM: 278.01  6/20/2018 - Present        Spinal stenosis, lumbar region without neurogenic claudication ICD-10-CM: M48.061  ICD-9-CM: 724.02  5/16/2018 - Present        Lumbosacral spondylosis without myelopathy ICD-10-CM: M47.817  ICD-9-CM: 721.3  4/25/2018 - Present        DDD (degenerative disc disease), lumbar ICD-10-CM: M51.36  ICD-9-CM: 722.52  4/25/2018 - Present        Malignant neoplasm of female breast [facility]) ICD-10-CM: C50.919  ICD-9-CM: 174.9  4/25/2018 - Present              Medications reviewed  Current Facility-Administered Medications   Medication Dose Route Frequency    dextrose 5% infusion  75 mL/hr IntraVENous CONTINUOUS    hydrALAZINE (APRESOLINE) 20 mg/mL injection 10 mg  10 mg IntraVENous Q4H PRN    piperacillin-tazobactam (ZOSYN) 3.375 g in 0.9% sodium chloride (MBP/ADV) 100 mL MBP  3.375 g IntraVENous Q8H    nitroglycerin (NITROBID) 2 % ointment 1 Inch  1 Inch Topical Q6H    labetaloL (NORMODYNE;TRANDATE) 20 mg/4 mL (5 mg/mL) injection 10 mg  10 mg IntraVENous Q4H PRN    amLODIPine (NORVASC) tablet 10 mg  10 mg Oral DAILY    atorvastatin (LIPITOR) tablet 40 mg  40 mg Oral QHS    albuterol (PROVENTIL HFA, VENTOLIN HFA, PROAIR HFA) inhaler 2 Puff  2 Puff Inhalation Q4H PRN    [Held by provider] tamsulosin (FLOMAX) capsule 0.4 mg  0.4 mg Oral DAILY    [Held by provider] gabapentin (NEURONTIN) capsule 300 mg  300 mg Oral BID    [Held by provider] melatonin tablet 3 mg  3 mg Oral QHS    venlafaxine-SR (EFFEXOR-XR) capsule 150 mg  150 mg Oral DAILY WITH BREAKFAST    ferrous sulfate tablet 325 mg  1 Tablet Oral TID    aspirin delayed-release tablet 81 mg  81 mg Oral DAILY    glucose chewable tablet 16 g  4 Tablet Oral PRN    dextrose (D50W) injection syrg 12.5-25 g  25-50 mL IntraVENous PRN    glucagon (GLUCAGEN) injection 1 mg  1 mg IntraMUSCular PRN    sodium chloride (NS) flush 5-40 mL  5-40 mL IntraVENous Q8H    sodium chloride (NS) flush 5-40 mL  5-40 mL IntraVENous PRN    acetaminophen (TYLENOL) tablet 650 mg  650 mg Oral Q6H PRN    Or    acetaminophen (TYLENOL) suppository 650 mg  650 mg Rectal Q6H PRN    ondansetron (ZOFRAN ODT) tablet 4 mg  4 mg Oral Q6H PRN    Or    ondansetron (ZOFRAN) injection 4 mg  4 mg IntraVENous Q6H PRN    famotidine (PEPCID) tablet 20 mg  20 mg Oral DAILY    metoprolol (LOPRESSOR) injection 5 mg  5 mg IntraVENous Q4H    metoprolol succinate (TOPROL-XL) XL tablet 50 mg  50 mg Oral DAILY       Review of Systems:   Review of systems not obtained due to patient factors.     Objective:   Physical Exam:     Visit Vitals  BP (!) 205/84   Pulse 62   Temp 98.1 °F (36.7 °C)   Resp 16   Ht 5' 5\" (1.651 m)   Wt 101.2 kg (223 lb)   SpO2 96%   BMI 37.11 kg/m²    O2 Flow Rate (L/min): 3 l/min O2 Device: Nasal cannula    Temp (24hrs), Av.4 °F (36.9 °C), Min:98.1 °F (36.7 °C), Max:98.9 °F (37.2 °C)    No intake/output data recorded.  1901 -  0700  In: -   Out: 1150 [Urine:1150]    General:  Awake and alert    Lungs:   Clear to auscultation bilaterally. Chest wall:  No tenderness or deformity. Heart:  Regular rate and rhythm, S1, S2 normal, no murmur, click, rub or gallop. Abdomen:   Soft, non-tender. Bowel sounds normal. No masses,  No organomegaly. Extremities: Extremities normal, atraumatic, no cyanosis or edema. Pulses: 2+ and symmetric all extremities. Skin: Skin color, texture, turgor normal. No rashes or lesions   Neurologic: CNII-XII intact. Unable to fully assess         Data Review:       Recent Days:  No results for input(s): WBC, HGB, HCT, PLT, HGBEXT, HCTEXT, PLTEXT, HGBEXT, HCTEXT, PLTEXT in the last 72 hours. No results for input(s): NA, K, CL, CO2, GLU, BUN, CREA, CA, MG, PHOS, ALB, TBIL, TBILI, ALT, INR, INREXT, INREXT in the last 72 hours. No lab exists for component: SGOT  No results for input(s): PH, PCO2, PO2, HCO3, FIO2 in the last 72 hours.     24 Hour Results:  Recent Results (from the past 24 hour(s))   GLUCOSE, POC    Collection Time: 21  4:22 PM   Result Value Ref Range    Glucose (POC) 240 (H) 65 - 117 mg/dL    Performed by Mary Oakes    GLUCOSE, POC    Collection Time: 21  8:04 PM   Result Value Ref Range    Glucose (POC) 199 (H) 65 - 117 mg/dL    Performed by Chente Giron, POC    Collection Time: 21 12:26 AM   Result Value Ref Range    Glucose (POC) 180 (H) 65 - 117 mg/dL    Performed by Dorian Penaloza    GLUCOSE, POC    Collection Time: 21  4:25 AM   Result Value Ref Range    Glucose (POC) 183 (H) 65 - 117 mg/dL    Performed by Gia Samuel 149, POC    Collection Time: 21  8:50 AM   Result Value Ref Range    Glucose (POC) 194 (H) 65 - 117 mg/dL    Performed by Clement 150, POC Collection Time: 12/14/21 11:27 AM   Result Value Ref Range    Glucose (POC) 229 (H) 65 - 117 mg/dL    Performed by HERMOSILLO LAY        XR CHEST PORT   Final Result   No significant interval change. MRI BRAIN WO CONT   Final Result   1. No evidence of acute intracranial ischemia, mass or hemorrhage. 2.  Moderate chronic small vessel ischemic changes. 3.  Mild diffuse parenchymal volume loss. Assessment:  Hypoglycemia in diabetes mellitus type 2, improved. Suspect she had a prolonged episode of severe hypoglycemia at home    Hypoglycemic encephalopathy, possibly with permanent acquired brain injury    Possible left lower lobe pneumonia, likely aspiration    Chronic kidney disease stage III    History of breast cancer    COPD without exacerbation    Chronic respiratory failure with hypoxia    History of gastritis/esophagitis/gastroparesis      Plan:  Continue supportive care including assistance with all meals  Expect gradual improvement over the course of time  PT OT evaluation  Anticipate discharge to skilled care facility when bed available      Care Plan discussed with: Patient/Family    Disposition: Transfer to medical telemetry    Total time spent with patient: 30 minutes.     Santa Pringle MD

## 2021-12-14 NOTE — PROGRESS NOTES
CM attempted to reach out to patient's sister again to confirm whether she is looking for LT care for patient and also to confirm vaccination status. CM unable to leave message. CM will continue to try to reach out. [facility] and [place] [id number] is also reaching out to patient's sister to try to discuss wishes. CM will continue to follow.

## 2021-12-14 NOTE — PROGRESS NOTES
PHYSICAL THERAPY EVALUATION  Patient: Aurea Longoria (85 y.o. female)  Date: 12/14/2021  Primary Diagnosis: Metabolic encephalopathy [V87.34]  Hypoglycemia [E16.2]        Precautions: falls    ASSESSMENT  This is a 62 y/o female who came to  [facility]  ED with c/o altered mental status , found unresponsive by family, admitted on 20/4/18 for metabolic encephalopathy and hypoglycemia. Pt with PMH of diabetes mellitus, carcinoma of breast, and hypertension. Pt received semi-supine in bed with mitten on R hand , agreeable for PT/OT eval/tx . Pt is A& O x self and place with cues, disoriented to time and situation ,  per pt report , she lives alone in a mobile home with 3 steps to enter , HR on bilateral sides,  was IND with ADL's and mod I for functional transfers/mobility with rollator prior to admission. Pt reports to be on 2L O2 at baseline. Based on the objective data described below, currently pt on 2.5 L O2,  presents with decreased command following, decreased insight into deficits, needs additional time for processing information, decreased strength , -3/5 grossly in  b/l LE , balance deficits , generalized weakness , decreased safety awareness , decreased activity tolerance and increased need for assist with functional mobility ( needs Payal for rolling from side to side  , modA x 2 for supine <>sit transfers , good static sitting balance, modA x 2 for sit <>stand x 3 trials with cues for hand and feet placement. Pt found with soiled pad, placed clean chux pad and provided pt with perineal care in standing , fair static  standing balance with constant support , is able to take few side steps towards the [facility] - 2' with RW, mod Ax 2 and with cues for safety and sequencing. Pt would benefit from continued skilled PT services to address current impairments and improve overall IND and safety with  functional transfers/mobility. Recommend d/c to SNF when medically appropriate .     Current Level of Function Impacting Discharge (mobility/balance): Requires mod Ax 2 for transfers/mobility. Functional Outcome Measure: The patient scored 12 on the AM PAC basic mobility outcome measure which is indicative of medium complexity. Other factors to consider for discharge: lives alone, decreased safety awareness, severity of deficits         PLAN :  Recommendations and Planned Interventions: bed mobility training, transfer training, gait training, therapeutic exercises, neuromuscular re-education, patient and family training/education and therapeutic activities      Frequency/Duration: Patient will be followed by physical therapy:  3-5x/week to address goals.     Recommendation for discharge: (in order for the patient to meet his/her long term goals)  Baudilio Stacy    This discharge recommendation:  Has been made in collaboration with the attending provider and/or case management    IF patient discharges home will need the following DME: rolling walker         SUBJECTIVE:   Patient stated I could walk at home    OBJECTIVE DATA SUMMARY:   HISTORY:    Past Medical History:   Diagnosis Date    Arthritis     spinal stenosis    Breast CA ([facility] 2017    Left Breast (chemo/radiation)    Diabetes ([facility]     Type 2    Edema     legs and ankles    GERD (gastroesophageal reflux disease)     High cholesterol     Hypertension     Ill-defined condition     patient states hemorrhage behind Left eye-following Dr. Majo Green MI (myocardial infarction) ([facility] 06/2018    per patient    Neuropathy     Restless leg      Past Surgical History:   Procedure Laterality Date    HX BACK SURGERY  08/30/2018    HX BREAST BIOPSY Left 02/03/2017    BREAST CANCER    HX BREAST LUMPECTOMY Left     HX CATARACT REMOVAL Right     HX HEART CATHETERIZATION  07/2018    no stents    HX KNEE REPLACEMENT         Personal factors and/or comorbidities impacting plan of care:     Home Situation  Home Environment: Trailer/mobile home  # Steps to Enter: 5  Rails to Enter: Yes  Hand Rails : Bilateral  Wheelchair Ramp: No  One/Two Story Residence: One story  Living Alone: Yes  Support Systems: Other Family Member(s)  Patient Expects to be Discharged to[de-identified] Other (comment) (unable to answer)  Current DME Used/Available at Home: Walker, rollator    PLOF: Pt IND for ADLS/IADLS, IND with mobility prior to admission. EXAMINATION/PRESENTATION/DECISION MAKING:   Critical Behavior:  Neurologic State: Alert, Confused  Orientation Level: Oriented to person, Oriented to place, Disoriented to time, Disoriented to situation (Oriented to self and place with cues)  Cognition: Decreased command following, Decreased attention/concentration  Safety/Judgement: Decreased insight into deficits, Decreased awareness of need for safety, Decreased awareness of need for assistance, Decreased awareness of environment  Hearing: Auditory  Auditory Impairment: None  Skin:  Intact where exposed    Range Of Motion:  AROM: Generally decreased, functional  Strength:    Strength: Generally decreased, functional (-3/5 grossly for b/l LE)  Tone & Sensation:   Tone: Normal  Sensation: Intact    Coordination:  Coordination: Generally decreased, functional  Functional Mobility:  Bed Mobility:  Rolling: Minimum assistance  Supine to Sit: Moderate assistance; Assist x2  Sit to Supine: Minimum assistance  Scooting: Moderate assistance; Assist x2  Transfers:  Sit to Stand: Moderate assistance; Assist x2  Stand to Sit: Moderate assistance; Assist x2  Balance:   Sitting: Impaired; Without support  Sitting - Static: Good (unsupported)  Sitting - Dynamic: Fair (occasional)  Standing: Impaired; Pull to stand; With support  Standing - Static: Fair; Constant support  Standing - Dynamic : Poor; Constant support  Ambulation/Gait Training:  Distance (ft): 2 Feet (ft) (able to take few side steps towards the [facility])  Assistive Device: Gait belt; Walker, rolling  Ambulation - Level of Assistance:  Moderate assistance; Assist x2  Interventions: Verbal cues; Tactile cues    Functional Measure:    [employer] AM-PAC 6 Clicks         Basic Mobility Inpatient Short Form  How much difficulty does the patient currently have. .. Unable A Lot A Little None   1. Turning over in bed (including adjusting bedclothes, sheets and blankets)? [] 1   [] 2   [x] 3   [] 4   2. Sitting down on and standing up from a chair with arms ( e.g., wheelchair, bedside commode, etc.)   [] 1   [x] 2   [] 3   [] 4   3. Moving from lying on back to sitting on the side of the bed? [] 1   [x] 2   [] 3   [] 4          How much help from another person does the patient currently need. .. Total A Lot A Little None   4. Moving to and from a bed to a chair (including a wheelchair)? [] 1   [x] 2   [] 3   [] 4   5. Need to walk in hospital room? [] 1   [x] 2   [] 3   [] 4   6. Climbing 3-5 steps with a railing? [x] 1   [] 2   [] 3   [] 4   © , Trustees of [facility] [employer], under license to [employer] All rights reserved     Score:  Initial: 12 Most Recent: X (Date: -21- )   Interpretation of Tool:  Represents activities that are increasingly more difficult (i.e. Bed mobility, Transfers, Gait).   Score 24 23 22-20 19-15 14-10 9-7 6   Modifier CH CI CJ CK CL CM CN          Physical Therapy Evaluation Charge Determination   History Examination Presentation Decision-Making   MEDIUM  Complexity : 1-2 comorbidities / personal factors will impact the outcome/ POC  MEDIUM Complexity : 3 Standardized tests and measures addressing body structure, function, activity limitation and / or participation in recreation  MEDIUM Complexity : Evolving with changing characteristics  Other Functional Measure [facility] 6 medium complexity      Based on the above components, the patient evaluation is determined to be of the following complexity level: MEDIUM    Pain Ratin/10    Activity Tolerance:   Fair  Please refer to the flowsheet for vital signs taken during this treatment. After treatment patient left in no apparent distress:   Supine in bed, Call bell within reach, Bed / chair alarm activated and mitten on R hand    COMMUNICATION/EDUCATION:   The patients plan of care was discussed with: Occupational therapist and Registered nurse. Fall prevention education was provided and the patient/caregiver indicated understanding. and Patient/family agree to work toward stated goals and plan of care. Problem: Mobility Impaired (Adult and Pediatric)  Goal: *Acute Goals and Plan of Care (Insert Text)  Description: Pt will be I with LE HEP in 7 days. Pt will perform bed mobility with mod I in 7 days. Pt will perform transfers with CGA in 7 days. Pt will amb- 50 feet with LRAD safely with CGA in 7 days. Outcome: Not Met   PT/OT session occurred together for increased pt's safety and maximum functional outcome.     Thank you for this referral.  Lalo Torres   Time Calculation: 34 mins

## 2021-12-14 NOTE — PROGRESS NOTES
Hospitalist Progress Note               Daily Progress Note: 12/14/2021      Subjective:   Hospital course to date: Patient is a 53-year-old female with history of diabetes, breast cancer and hypertension who was brought to the ED on 12/5 after being found by family unresponsive the night before. She had been in the ED the day before that with hypoglycemia. He was noted have a blood sugar of 51 at home and was given D10 but no significant improvement. Chest x-ray showed left lower lobe pneumonia. She was transferred from [facility]. Blood sugar on arrival here was 73. She was started on D10. All of patient's diabetic medications were supposed to have been previously discontinued although unclear if she had been inadvertently taking them. According to her sister the last time she took insulin was Friday of last week.     ------  Patient seen this morning for follow-up. Patient showing marked improvement in her mentation. She is verbal and is able to communicate. She has difficulty ambulating, was concentrating to drink her orange juice. She does laugh but now more pleasant to have conversation with. She was reported to be unable to remain alert with PT to continue evaluation, but she had no issues with communication when I saw her this morning. Patient blood pressure markedly elevated to 205/84 this morning at 0700, patient is on hydralazine PRN. She offers no other complaints. Problem List:  Problem List as of 12/14/2021 Date Reviewed: 12/5/2021          Codes Class Noted - Resolved    Metabolic encephalopathy [id number]-[id number]: G93.41  ICD-9-CM: 348.31  12/5/2021 - Present        CKD (chronic kidney disease) ICD-10-CM: N18.9  ICD-9-CM: 585.9  9/24/2021 - Present    Overview Signed 9/24/2021  3:57 PM by Rock Chelsea MD     In the setting of severe, uncontrolled and prolonged diabetes, with diabetic retinopathy. Protienuria to 2.5g/g of creatinine.    Would consider workup for acute GN given inconsistent history and rising creatinine. Though clinical hx appears consistent with aggressive diabetic nephropathy             Sepsis ([facility] ICD-10-CM: A41.9  ICD-9-CM: 038.9, 995.91  9/22/2021 - Present        Esophagitis with gastritis ICD-10-CM: K29.70, K20.90  ICD-9-CM: 530.19  9/22/2021 - Present        Elevated troponin ICD-10-CM: R77.8  ICD-9-CM: 790.6  9/20/2021 - Present        Syncope ICD-10-CM: R55  ICD-9-CM: 780.2  9/20/2021 - Present        Hypoglycemia ICD-10-CM: E16.2  ICD-9-CM: 251.2  9/20/2021 - Present        Chronic respiratory failure with hypoxia (HCC) ICD-10-CM: J96.11  ICD-9-CM: 518.83, 799.02  9/20/2021 - Present        Chest pain ICD-10-CM: R07.9  ICD-9-CM: 786.50  9/19/2021 - Present        Anxiety ICD-10-CM: F41.9  ICD-9-CM: 300.00  2/9/2021 - Present        Depression ICD-10-CM: F32. A  ICD-9-CM: 796  2/9/2021 - Present        Dyslipidemia ICD-10-CM: E78.5  ICD-9-CM: 272.4  2/9/2021 - Present        Hypertensive cardiovascular disease ICD-10-CM: I11.9  ICD-9-CM: 402.90  2/9/2021 - Present        Knee osteoarthritis ICD-10-CM: M17.10  ICD-9-CM: 715.36  1/25/2021 - Present        COPD with acute exacerbation ([facility] ICD-10-CM: J44.1  ICD-9-CM: 491.21  11/29/2020 - Present        Person under investigation for COVID-19 ICD-10-CM: Z20.822  ICD-9-CM: V01.79  11/29/2020 - Present        COPD (chronic obstructive pulmonary disease) ([facility] ICD-10-CM: J44.9  ICD-9-CM: 690  11/29/2020 - Present        DM (diabetes mellitus) ([place] [id number].) ICD-10-CM: E11.9  ICD-9-CM: 250.00  11/29/2020 - Present        COVID-19 ICD-10-CM: U07.1  ICD-9-CM: 079.89  10/1/2020 - Present        HTN (hypertension) ICD-10-CM: I10  ICD-9-CM: 401.9  10/1/2020 - Present        Diabetic gastroparesis ([place] [id number].) ICD-10-CM: E11.43, K31.84  ICD-9-CM: 250.60, 536.3  10/1/2020 - Present        DVT prophylaxis ICD-10-CM: Z29.9  ICD-9-CM: V07.9  10/1/2020 - Present        PNA (pneumonia) ICD-10-CM: J18.9  ICD-9-CM: 316  9/30/2020 - Present Acute respiratory failure with hypoxia [facility]) ICD-10-CM: J96.01  ICD-9-CM: 518.81  9/30/2020 - Present        Acute kidney injury [facility]) ICD-10-CM: N17.9  ICD-9-CM: 584.9  9/30/2020 - Present        Type 2 diabetes mellitus with hyperglycemia, with long-term current use of insulin (HCC) ICD-10-CM: E11.65, Z79.4  ICD-9-CM: 250.00, 790.29, V58.67  9/30/2020 - Present        Ureteral stone ICD-10-CM: N20.1  ICD-9-CM: 592.1  2/15/2019 - Present        S/P lumbar fusion ICD-10-CM: Z98.1  ICD-9-CM: V45.4  10/16/2018 - Present        CAP (community acquired pneumonia) ICD-10-CM: J18.9  ICD-9-CM: 693  9/1/2018 - Present        Shortness of breath ICD-10-CM: R06.02  ICD-9-CM: 786.05  9/1/2018 - Present        Fever and chills ICD-10-CM: R50.9  ICD-9-CM: 780.60  9/1/2018 - Present        Spinal stenosis of lumbar region at multiple levels ICD-10-CM: M48.061  ICD-9-CM: 724.02  8/30/2018 - Present        Scoliosis ICD-10-CM: M41.9  ICD-9-CM: 737.30  8/30/2018 - Present        Spinal stenosis ICD-10-CM: M48.00  ICD-9-CM: 724.00  8/30/2018 - Present        Pre-op testing ICD-10-CM: A76.202  ICD-9-CM: V72.84  8/27/2018 - Present        Severe obesity (BMI 35.0-39. 9) ICD-10-CM: E66.01  ICD-9-CM: 278.01  6/20/2018 - Present        Spinal stenosis, lumbar region without neurogenic claudication ICD-10-CM: M48.061  ICD-9-CM: 724.02  5/16/2018 - Present        Lumbosacral spondylosis without myelopathy ICD-10-CM: M47.817  ICD-9-CM: 721.3  4/25/2018 - Present        DDD (degenerative disc disease), lumbar ICD-10-CM: M51.36  ICD-9-CM: 722.52  4/25/2018 - Present        Malignant neoplasm of female breast [facility]) ICD-10-CM: C50.919  ICD-9-CM: 174.9  4/25/2018 - Present              Medications reviewed  Current Facility-Administered Medications   Medication Dose Route Frequency    dextrose 5% infusion  75 mL/hr IntraVENous CONTINUOUS    hydrALAZINE (APRESOLINE) 20 mg/mL injection 10 mg  10 mg IntraVENous Q4H PRN    piperacillin-tazobactam (ZOSYN) 3.375 g in 0.9% sodium chloride (MBP/ADV) 100 mL MBP  3.375 g IntraVENous Q8H    nitroglycerin (NITROBID) 2 % ointment 1 Inch  1 Inch Topical Q6H    labetaloL (NORMODYNE;TRANDATE) 20 mg/4 mL (5 mg/mL) injection 10 mg  10 mg IntraVENous Q4H PRN    [Held by provider] amLODIPine (NORVASC) tablet 10 mg  10 mg Oral DAILY    atorvastatin (LIPITOR) tablet 40 mg  40 mg Oral QHS    albuterol (PROVENTIL HFA, VENTOLIN HFA, PROAIR HFA) inhaler 2 Puff  2 Puff Inhalation Q4H PRN    [Held by provider] tamsulosin (FLOMAX) capsule 0.4 mg  0.4 mg Oral DAILY    [Held by provider] gabapentin (NEURONTIN) capsule 300 mg  300 mg Oral BID    [Held by provider] melatonin tablet 3 mg  3 mg Oral QHS    venlafaxine-SR (EFFEXOR-XR) capsule 150 mg  150 mg Oral DAILY WITH BREAKFAST    ferrous sulfate tablet 325 mg  1 Tablet Oral TID    aspirin delayed-release tablet 81 mg  81 mg Oral DAILY    glucose chewable tablet 16 g  4 Tablet Oral PRN    dextrose (D50W) injection syrg 12.5-25 g  25-50 mL IntraVENous PRN    glucagon (GLUCAGEN) injection 1 mg  1 mg IntraMUSCular PRN    sodium chloride (NS) flush 5-40 mL  5-40 mL IntraVENous Q8H    sodium chloride (NS) flush 5-40 mL  5-40 mL IntraVENous PRN    acetaminophen (TYLENOL) tablet 650 mg  650 mg Oral Q6H PRN    Or    acetaminophen (TYLENOL) suppository 650 mg  650 mg Rectal Q6H PRN    ondansetron (ZOFRAN ODT) tablet 4 mg  4 mg Oral Q6H PRN    Or    ondansetron (ZOFRAN) injection 4 mg  4 mg IntraVENous Q6H PRN    famotidine (PEPCID) tablet 20 mg  20 mg Oral DAILY    metoprolol (LOPRESSOR) injection 5 mg  5 mg IntraVENous Q4H    metoprolol succinate (TOPROL-XL) XL tablet 50 mg  50 mg Oral DAILY       Review of Systems:   Review of systems not obtained due to patient factors.     Objective:   Physical Exam:     Visit Vitals  BP (!) 205/84   Pulse 62   Temp 98.1 °F (36.7 °C)   Resp 16   Ht 5' 5\" (1.651 m)   Wt 223 lb (101.2 kg)   SpO2 96%   BMI 37.11 kg/m²    O2 Flow Rate (L/min): 3 l/min O2 Device: Nasal cannula    Temp (24hrs), Av.3 °F (36.8 °C), Min:98.1 °F (36.7 °C), Max:98.9 °F (37.2 °C)    No intake/output data recorded.  1901 -  0700  In: -   Out: 1150 [Urine:1150]    General:  Awake and alert    Lungs:   Clear to auscultation bilaterally. Chest wall:  No tenderness or deformity. Heart:  Regular rate and rhythm, S1, S2 normal, no murmur, click, rub or gallop. Abdomen:   Soft, non-tender. Bowel sounds normal. No masses,  No organomegaly. Extremities: Extremities normal, atraumatic, no cyanosis or edema. Pulses: 2+ and symmetric all extremities. Skin: Skin color, texture, turgor normal. No rashes or lesions   Neurologic: CNII-XII intact. Unable to fully assess         Data Review:       Recent Days:  No results for input(s): WBC, HGB, HCT, PLT, HGBEXT, HCTEXT, PLTEXT, HGBEXT, HCTEXT, PLTEXT in the last 72 hours. No results for input(s): NA, K, CL, CO2, GLU, BUN, CREA, CA, MG, PHOS, ALB, TBIL, TBILI, ALT, INR, INREXT, INREXT in the last 72 hours. No lab exists for component: SGOT  No results for input(s): PH, PCO2, PO2, HCO3, FIO2 in the last 72 hours.     24 Hour Results:  Recent Results (from the past 24 hour(s))   GLUCOSE, POC    Collection Time: 21  7:45 AM   Result Value Ref Range    Glucose (POC) 158 (H) 65 - 117 mg/dL    Performed by Mars Starkey    GLUCOSE, POC    Collection Time: 21 11:43 AM   Result Value Ref Range    Glucose (POC) 132 (H) 65 - 117 mg/dL    Performed by Mars Starkey    GLUCOSE, POC    Collection Time: 21  4:22 PM   Result Value Ref Range    Glucose (POC) 240 (H) 65 - 117 mg/dL    Performed by Radha Lucas    GLUCOSE, POC    Collection Time: 21  8:04 PM   Result Value Ref Range    Glucose (POC) 199 (H) 65 - 117 mg/dL    Performed by Chente Giron, POC    Collection Time: 21 12:26 AM   Result Value Ref Range    Glucose (POC) 180 (H) 65 - 117 mg/dL    Performed by Khris Farnsworth MAGUI    GLUCOSE, POC    Collection Time: 12/14/21  4:25 AM   Result Value Ref Range    Glucose (POC) 183 (H) 65 - 117 mg/dL    Performed by BRONWYN KILGORE        XR CHEST PORT   Final Result   No significant interval change. MRI BRAIN WO CONT   Final Result   1. No evidence of acute intracranial ischemia, mass or hemorrhage. 2.  Moderate chronic small vessel ischemic changes. 3.  Mild diffuse parenchymal volume loss. Assessment:  Hypoglycemia in diabetes mellitus type 2, improved. Suspect she had a prolonged episode of severe hypoglycemia at home    Hypoglycemic encephalopathy, possibly with permanent acquired brain injury    Possible left lower lobe pneumonia, likely aspiration    Chronic kidney disease stage III    History of breast cancer    COPD without exacerbation    Chronic respiratory failure with hypoxia    History of gastritis/esophagitis/gastroparesis      Plan:  Continue supportive care including assistance with all meals  Close monitoring of labs, vitals  Showing marked improvement in alertness and speech  Expect gradual improvement over the course of time  PT OT evaluation  Anticipate discharge to skilled care facility when bed available      Care Plan discussed with: Patient/Family    Disposition: Transfer to medical telemetry    Total time spent with patient: 30 minutes.     Negro Casas

## 2021-12-15 LAB
GLUCOSE BLD STRIP.AUTO-MCNC: 185 MG/DL (ref 65–117)
GLUCOSE BLD STRIP.AUTO-MCNC: 190 MG/DL (ref 65–117)
GLUCOSE BLD STRIP.AUTO-MCNC: 209 MG/DL (ref 65–117)
GLUCOSE BLD STRIP.AUTO-MCNC: 216 MG/DL (ref 65–117)
PERFORMED BY, TECHID: ABNORMAL

## 2021-12-15 PROCEDURE — 82962 GLUCOSE BLOOD TEST: CPT

## 2021-12-15 PROCEDURE — 74011000258 HC RX REV CODE- 258: Performed by: INTERNAL MEDICINE

## 2021-12-15 PROCEDURE — 74011250636 HC RX REV CODE- 250/636: Performed by: INTERNAL MEDICINE

## 2021-12-15 PROCEDURE — 97530 THERAPEUTIC ACTIVITIES: CPT

## 2021-12-15 PROCEDURE — 74011000250 HC RX REV CODE- 250: Performed by: HOSPITALIST

## 2021-12-15 PROCEDURE — 65270000029 HC RM PRIVATE

## 2021-12-15 PROCEDURE — 74011250637 HC RX REV CODE- 250/637: Performed by: HOSPITALIST

## 2021-12-15 PROCEDURE — 77010033678 HC OXYGEN DAILY

## 2021-12-15 RX ADMIN — METOPROLOL TARTRATE 5 MG: 5 INJECTION INTRAVENOUS at 04:41

## 2021-12-15 RX ADMIN — PIPERACILLIN SODIUM AND TAZOBACTAM SODIUM 3.38 G: 3; .375 INJECTION, POWDER, LYOPHILIZED, FOR SOLUTION INTRAVENOUS at 03:17

## 2021-12-15 RX ADMIN — ASPIRIN 81 MG: 81 TABLET, COATED ORAL at 10:16

## 2021-12-15 RX ADMIN — FERROUS SULFATE TAB 325 MG (65 MG ELEMENTAL FE) 325 MG: 325 (65 FE) TAB at 10:16

## 2021-12-15 RX ADMIN — Medication 10 ML: at 22:16

## 2021-12-15 RX ADMIN — VENLAFAXINE HYDROCHLORIDE 150 MG: 75 CAPSULE, EXTENDED RELEASE ORAL at 10:16

## 2021-12-15 RX ADMIN — NITROGLYCERIN 1 INCH: 20 OINTMENT TOPICAL at 12:34

## 2021-12-15 RX ADMIN — ATORVASTATIN CALCIUM 40 MG: 40 TABLET, FILM COATED ORAL at 21:00

## 2021-12-15 RX ADMIN — Medication 10 ML: at 06:12

## 2021-12-15 RX ADMIN — METOPROLOL TARTRATE 5 MG: 5 INJECTION INTRAVENOUS at 00:07

## 2021-12-15 RX ADMIN — Medication 10 ML: at 15:30

## 2021-12-15 RX ADMIN — NITROGLYCERIN 1 INCH: 20 OINTMENT TOPICAL at 00:03

## 2021-12-15 RX ADMIN — AMLODIPINE BESYLATE 10 MG: 5 TABLET ORAL at 10:16

## 2021-12-15 RX ADMIN — NITROGLYCERIN 1 INCH: 20 OINTMENT TOPICAL at 06:10

## 2021-12-15 RX ADMIN — FAMOTIDINE 20 MG: 20 TABLET, FILM COATED ORAL at 10:15

## 2021-12-15 RX ADMIN — PIPERACILLIN SODIUM AND TAZOBACTAM SODIUM 3.38 G: 3; .375 INJECTION, POWDER, LYOPHILIZED, FOR SOLUTION INTRAVENOUS at 10:16

## 2021-12-15 RX ADMIN — FERROUS SULFATE TAB 325 MG (65 MG ELEMENTAL FE) 325 MG: 325 (65 FE) TAB at 21:01

## 2021-12-15 RX ADMIN — PIPERACILLIN SODIUM AND TAZOBACTAM SODIUM 3.38 G: 3; .375 INJECTION, POWDER, LYOPHILIZED, FOR SOLUTION INTRAVENOUS at 19:05

## 2021-12-15 RX ADMIN — METOPROLOL TARTRATE 5 MG: 5 INJECTION INTRAVENOUS at 10:15

## 2021-12-15 RX ADMIN — METOPROLOL SUCCINATE 50 MG: 25 TABLET, EXTENDED RELEASE ORAL at 10:15

## 2021-12-15 RX ADMIN — FERROUS SULFATE TAB 325 MG (65 MG ELEMENTAL FE) 325 MG: 325 (65 FE) TAB at 15:31

## 2021-12-15 RX ADMIN — METOPROLOL TARTRATE 5 MG: 5 INJECTION INTRAVENOUS at 20:44

## 2021-12-15 RX ADMIN — METOPROLOL TARTRATE 5 MG: 5 INJECTION INTRAVENOUS at 15:31

## 2021-12-15 NOTE — PROGRESS NOTES
Hospitalist Progress Note               Daily Progress Note: 12/15/2021      Subjective:   Hospital course to date: Patient is a 30-year-old female with history of diabetes, breast cancer and hypertension who was brought to the ED on 12/5 after being found by family unresponsive the night before. She had been in the ED the day before that with hypoglycemia. He was noted have a blood sugar of 51 at home and was given D10 but no significant improvement. Chest x-ray showed left lower lobe pneumonia. She was transferred from [facility]. Blood sugar on arrival here was 73. She was started on D10. All of patient's diabetic medications were supposed to have been previously discontinued although unclear if she had been inadvertently taking them. According to her sister the last time she took insulin was Friday of last week.     ------  Patient seen this morning for follow-up. Patient showing marked improvement in her mentation. She is verbal and is able to communicate. She has difficulty ambulating, was concentrating to drink her orange juice. She does laugh but now more pleasant to have conversation with. She was reported to be unable to remain alert with PT to continue evaluation, but she had no issues with communication when I saw her this morning. Patient blood pressure markedly elevated to 205/84 this morning at 0700, patient is on hydralazine PRN. She offers no other complaints. Problem List:  Problem List as of 12/15/2021 Date Reviewed: 12/5/2021          Codes Class Noted - Resolved    Metabolic encephalopathy QWF-09-RC: G93.41  ICD-9-CM: 348.31  12/5/2021 - Present        CKD (chronic kidney disease) ICD-10-CM: N18.9  ICD-9-CM: 585.9  9/24/2021 - Present    Overview Signed 9/24/2021  3:57 PM by Samina Pan MD     In the setting of severe, uncontrolled and prolonged diabetes, with diabetic retinopathy. Protienuria to 2.5g/g of creatinine.    Would consider workup for acute GN given inconsistent history and rising creatinine. Though clinical hx appears consistent with aggressive diabetic nephropathy             Sepsis ([facility] ICD-10-CM: A41.9  ICD-9-CM: 038.9, 995.91  9/22/2021 - Present        Esophagitis with gastritis ICD-10-CM: K29.70, K20.90  ICD-9-CM: 530.19  9/22/2021 - Present        Elevated troponin ICD-10-CM: R77.8  ICD-9-CM: 790.6  9/20/2021 - Present        Syncope ICD-10-CM: R55  ICD-9-CM: 780.2  9/20/2021 - Present        Hypoglycemia ICD-10-CM: E16.2  ICD-9-CM: 251.2  9/20/2021 - Present        Chronic respiratory failure with hypoxia (HCC) ICD-10-CM: J96.11  ICD-9-CM: 518.83, 799.02  9/20/2021 - Present        Chest pain ICD-10-CM: R07.9  ICD-9-CM: 786.50  9/19/2021 - Present        Anxiety ICD-10-CM: F41.9  ICD-9-CM: 300.00  2/9/2021 - Present        Depression ICD-10-CM: F32. A  ICD-9-CM: 836  2/9/2021 - Present        Dyslipidemia ICD-10-CM: E78.5  ICD-9-CM: 272.4  2/9/2021 - Present        Hypertensive cardiovascular disease ICD-10-CM: I11.9  ICD-9-CM: 402.90  2/9/2021 - Present        Knee osteoarthritis ICD-10-CM: M17.10  ICD-9-CM: 715.36  1/25/2021 - Present        COPD with acute exacerbation ([facility] ICD-10-CM: J44.1  ICD-9-CM: 491.21  11/29/2020 - Present        Person under investigation for COVID-19 ICD-10-CM: Z20.822  ICD-9-CM: V01.79  11/29/2020 - Present        COPD (chronic obstructive pulmonary disease) ([facility] ICD-10-CM: J44.9  ICD-9-CM: 888  11/29/2020 - Present        DM (diabetes mellitus) ([place] [id number].) ICD-10-CM: E11.9  ICD-9-CM: 250.00  11/29/2020 - Present        COVID-19 ICD-10-CM: U07.1  ICD-9-CM: 079.89  10/1/2020 - Present        HTN (hypertension) ICD-10-CM: I10  ICD-9-CM: 401.9  10/1/2020 - Present        Diabetic gastroparesis ([place] [address].) ICD-10-CM: E11.43, K31.84  ICD-9-CM: 250.60, 536.3  10/1/2020 - Present        DVT prophylaxis ICD-10-CM: Z29.9  ICD-9-CM: V07.9  10/1/2020 - Present        PNA (pneumonia) ICD-10-CM: J18.9  ICD-9-CM: 360  9/30/2020 - Present Acute respiratory failure with hypoxia [facility]) ICD-10-CM: J96.01  ICD-9-CM: 518.81  9/30/2020 - Present        Acute kidney injury [facility]) ICD-10-CM: N17.9  ICD-9-CM: 584.9  9/30/2020 - Present        Type 2 diabetes mellitus with hyperglycemia, with long-term current use of insulin (HCC) ICD-10-CM: E11.65, Z79.4  ICD-9-CM: 250.00, 790.29, V58.67  9/30/2020 - Present        Ureteral stone ICD-10-CM: N20.1  ICD-9-CM: 592.1  2/15/2019 - Present        S/P lumbar fusion ICD-10-CM: Z98.1  ICD-9-CM: V45.4  10/16/2018 - Present        CAP (community acquired pneumonia) ICD-10-CM: J18.9  ICD-9-CM: 004  9/1/2018 - Present        Shortness of breath ICD-10-CM: R06.02  ICD-9-CM: 786.05  9/1/2018 - Present        Fever and chills ICD-10-CM: R50.9  ICD-9-CM: 780.60  9/1/2018 - Present        Spinal stenosis of lumbar region at multiple levels ICD-10-CM: M48.061  ICD-9-CM: 724.02  8/30/2018 - Present        Scoliosis ICD-10-CM: M41.9  ICD-9-CM: 737.30  8/30/2018 - Present        Spinal stenosis ICD-10-CM: M48.00  ICD-9-CM: 724.00  8/30/2018 - Present        Pre-op testing ICD-10-CM: R55.144  ICD-9-CM: V72.84  8/27/2018 - Present        Severe obesity (BMI 35.0-39. 9) ICD-10-CM: E66.01  ICD-9-CM: 278.01  6/20/2018 - Present        Spinal stenosis, lumbar region without neurogenic claudication ICD-10-CM: M48.061  ICD-9-CM: 724.02  5/16/2018 - Present        Lumbosacral spondylosis without myelopathy ICD-10-CM: M47.817  ICD-9-CM: 721.3  4/25/2018 - Present        DDD (degenerative disc disease), lumbar ICD-10-CM: M51.36  ICD-9-CM: 722.52  4/25/2018 - Present        Malignant neoplasm of female breast [facility]) ICD-10-CM: C50.919  ICD-9-CM: 174.9  4/25/2018 - Present              Medications reviewed  Current Facility-Administered Medications   Medication Dose Route Frequency    dextrose 5% infusion  75 mL/hr IntraVENous CONTINUOUS    hydrALAZINE (APRESOLINE) 20 mg/mL injection 10 mg  10 mg IntraVENous Q4H PRN    piperacillin-tazobactam (ZOSYN) 3.375 g in 0.9% sodium chloride (MBP/ADV) 100 mL MBP  3.375 g IntraVENous Q8H    nitroglycerin (NITROBID) 2 % ointment 1 Inch  1 Inch Topical Q6H    labetaloL (NORMODYNE;TRANDATE) 20 mg/4 mL (5 mg/mL) injection 10 mg  10 mg IntraVENous Q4H PRN    amLODIPine (NORVASC) tablet 10 mg  10 mg Oral DAILY    atorvastatin (LIPITOR) tablet 40 mg  40 mg Oral QHS    albuterol (PROVENTIL HFA, VENTOLIN HFA, PROAIR HFA) inhaler 2 Puff  2 Puff Inhalation Q4H PRN    [Held by provider] tamsulosin (FLOMAX) capsule 0.4 mg  0.4 mg Oral DAILY    [Held by provider] gabapentin (NEURONTIN) capsule 300 mg  300 mg Oral BID    [Held by provider] melatonin tablet 3 mg  3 mg Oral QHS    venlafaxine-SR (EFFEXOR-XR) capsule 150 mg  150 mg Oral DAILY WITH BREAKFAST    ferrous sulfate tablet 325 mg  1 Tablet Oral TID    aspirin delayed-release tablet 81 mg  81 mg Oral DAILY    glucose chewable tablet 16 g  4 Tablet Oral PRN    dextrose (D50W) injection syrg 12.5-25 g  25-50 mL IntraVENous PRN    glucagon (GLUCAGEN) injection 1 mg  1 mg IntraMUSCular PRN    sodium chloride (NS) flush 5-40 mL  5-40 mL IntraVENous Q8H    sodium chloride (NS) flush 5-40 mL  5-40 mL IntraVENous PRN    acetaminophen (TYLENOL) tablet 650 mg  650 mg Oral Q6H PRN    Or    acetaminophen (TYLENOL) suppository 650 mg  650 mg Rectal Q6H PRN    ondansetron (ZOFRAN ODT) tablet 4 mg  4 mg Oral Q6H PRN    Or    ondansetron (ZOFRAN) injection 4 mg  4 mg IntraVENous Q6H PRN    famotidine (PEPCID) tablet 20 mg  20 mg Oral DAILY    metoprolol (LOPRESSOR) injection 5 mg  5 mg IntraVENous Q4H    metoprolol succinate (TOPROL-XL) XL tablet 50 mg  50 mg Oral DAILY       Review of Systems:   Review of systems not obtained due to patient factors.     Objective:   Physical Exam:     Visit Vitals  BP (P) 130/64 (BP 1 Location: Left upper arm)   Pulse (P) 67   Temp (P) 98 °F (36.7 °C)   Resp (P) 18   Ht 5' 5\" (1.651 m)   Wt 101.2 kg (223 lb)   SpO2 (P) 95%   BMI 37.11 kg/m²    O2 Flow Rate (L/min): 2 l/min O2 Device: Nasal cannula    Temp (24hrs), Av.3 °F (36.8 °C), Min:97.9 °F (36.6 °C), Max:98.7 °F (37.1 °C)    No intake/output data recorded.  1901 - 12/15 0700  In: -   Out: 6153 [Urine:1450]    General:  Awake and alert    Lungs:   Clear to auscultation bilaterally. Chest wall:  No tenderness or deformity. Heart:  Regular rate and rhythm, S1, S2 normal, no murmur, click, rub or gallop. Abdomen:   Soft, non-tender. Bowel sounds normal. No masses,  No organomegaly. Extremities: Extremities normal, atraumatic, no cyanosis or edema. Pulses: 2+ and symmetric all extremities. Skin: Skin color, texture, turgor normal. No rashes or lesions   Neurologic: CNII-XII intact. Unable to fully assess         Data Review:       Recent Days:  No results for input(s): WBC, HGB, HCT, PLT, HGBEXT, HCTEXT, PLTEXT, HGBEXT, HCTEXT, PLTEXT in the last 72 hours. No results for input(s): NA, K, CL, CO2, GLU, BUN, CREA, CA, MG, PHOS, ALB, TBIL, TBILI, ALT, INR, INREXT, INREXT in the last 72 hours. No lab exists for component: SGOT  No results for input(s): PH, PCO2, PO2, HCO3, FIO2 in the last 72 hours. 24 Hour Results:  Recent Results (from the past 24 hour(s))   GLUCOSE, POC    Collection Time: 21  4:20 PM   Result Value Ref Range    Glucose (POC) 208 (H) 65 - 117 mg/dL    Performed by BAY CHUN    GLUCOSE, POC    Collection Time: 12/15/21 11:17 AM   Result Value Ref Range    Glucose (POC) 216 (H) 65 - 117 mg/dL    Performed by Porter Whittington    GLUCOSE, POC    Collection Time: 12/15/21 12:20 PM   Result Value Ref Range    Glucose (POC) 185 (H) 65 - 117 mg/dL    Performed by Michelle Liriano        XR CHEST PORT   Final Result   No significant interval change. MRI BRAIN WO CONT   Final Result   1. No evidence of acute intracranial ischemia, mass or hemorrhage. 2.  Moderate chronic small vessel ischemic changes.    3.  Mild diffuse parenchymal volume loss.              Assessment:  Hypoglycemia in diabetes mellitus type 2, improved. Suspect she had a prolonged episode of severe hypoglycemia at home    Hypoglycemic encephalopathy, possibly with permanent acquired brain injury    Possible left lower lobe pneumonia, likely aspiration    Chronic kidney disease stage III    History of breast cancer    COPD without exacerbation    Chronic respiratory failure with hypoxia    History of gastritis/esophagitis/gastroparesis      Plan:  Continue supportive care including assistance with all meals  Expect gradual improvement over the course of time  PT OT evaluation  Anticipate discharge to LTC when bed available      Care Plan discussed with: Patient/Family    Disposition: Transfer to medical telemetry    Total time spent with patient: 30 minutes.     Rahat Herrera MD

## 2021-12-15 NOTE — PROGRESS NOTES
OCCUPATIONAL THERAPY TREATMENT  Patient: Aurea Longoria (46 y.o. female)  Date: 12/15/2021  Diagnosis: Metabolic encephalopathy [M47.73]  Hypoglycemia [E16.2] <principal problem not specified>       Precautions:    Chart, occupational therapy assessment, plan of care, and goals were reviewed. ASSESSMENT  Patient continues with skilled OT services and is progressing towards goals. Upon VALENCIA/PTA arrival, pt semi supine in bed and agreeable to tx session. Pt completed rolling with Osmin, pt noted to be soiled in BM and urine. Bowel/bladder hygiene completed with total A. Pt completed supine>sit with ModA x2 and CGA for scooting to EOB. Pt able to sit at EOB ~4 minutes while taking medication with RN, pt noted with good static sitting balance while sitting EOB only required close SBA. Pt completed sit>stand from EOB with Osmin x2 and able to ambulate around bed with HHA x2 and Osmin x2. Pt would benefit from use of RW next session. Pt required constant cueing throughout ambulation to continue taking steps. Hair brushing completed at EOB with additional time for knot removal.  Pt returned to supine with CGA and additional time. Shower cap placed on pt and massaged into scalp. Hair brushing completed with MaxA due to pt decreased cognition and command following. Pt noted with impaired gross and fine motor coordination when attempting to complete hair brushing. Pt left semi supine in bed with call bell within reach. Will continue to follow pt throughout remainder of stay and progress towards OT goals. Recommending SNF at discharge when medically appropriate.     Current Level of Function Impacting Discharge (ADLs): Osmin x2 sit>stand, total A toileting, MaxA grooming    Other factors to consider for discharge: PLOF, severity of deficits         PLAN :  Patient continues to benefit from skilled intervention to address the above impairments. Continue treatment per established plan of care.   to address goals. Recommend next OT session: EOB grooming, simple command following    Recommendation for discharge: (in order for the patient to meet his/her long term goals)  Baudilio Stacy    This discharge recommendation:  Has been made in collaboration with the attending provider and/or case management    IF patient discharges home will need the following DME: TBD       SUBJECTIVE:   Patient stated you ain't kiddin and stated it multiple times. OBJECTIVE DATA SUMMARY:   Cognitive/Behavioral Status:  Neurologic State: Alert     Cognition: Decreased command following    Functional Mobility and Transfers for ADLs:  Bed Mobility:  Rolling: Minimum assistance  Supine to Sit: Moderate assistance;Assist x2  Sit to Supine: Contact guard assistance  Scooting: Minimum assistance    Transfers:  Sit to Stand: Minimum assistance;Assist x2    Balance:  Sitting: Impaired; Without support  Sitting - Static: Good (unsupported)  Sitting - Dynamic: Fair (occasional)  Standing: Impaired; With support  Standing - Static: Constant support; Fair  Standing - Dynamic : Constant support; Fair    ADL Intervention:  Grooming  Position Performed: Long sitting on bed;Seated edge of bed  Brushing/Combing Hair: Maximum assistance (2/2 cognition and command following)    Toileting  Bladder Hygiene: Total assistance (dependent)  Bowel Hygiene: Total assistance (dependent)    Pain:  0/10    Activity Tolerance:   Fair and requires rest breaks  Please refer to the flowsheet for vital signs taken during this treatment. After treatment patient left in no apparent distress:   Supine in bed, Call bell within reach, Bed / chair alarm activated, and Side rails x 3    COMMUNICATION/COLLABORATION:   The patients plan of care was discussed with: Physical therapy assistant and Registered nurse. [place] with PT for increased safety for pt and clinician.     ANNIE Vann  Time Calculation: 28 mins    Problem: Self Care Deficits Care Plan (Adult)  Goal: *Acute Goals and Plan of Care (Insert Text)  Description: 1. Pt will be min A sup <> sit in prep for EOB ADLs  2. Pt will be min A grooming sitting EOB  3. Pt will be min A LE dressing sitting EOB/long sit  4. Pt will be min A sit <>  prep for toileting LRAD  5. Pt will be min A toileting/toilet transfer/cloth mgmt LRAD  6.   Pt will be IND following UE HEP in prep for self care tasks      Outcome: Progressing Towards Goal

## 2021-12-15 NOTE — PROGRESS NOTES
CM spoke with patient's sister yesterday, 12/14/21, afternoon. Patient's sister is still looking for LTC for patient. She also stated that patient received COVID vaccination at [facility].  This information was passed on to [place] [id number] and [facility]. CM will continue to follow for progress on placement.

## 2021-12-15 NOTE — PROGRESS NOTES
Problem: Diabetes Self-Management  Goal: *Disease process and treatment process  Description: Define diabetes and identify own type of diabetes; list 3 options for treating diabetes. Outcome: Progressing Towards Goal  Goal: *Incorporating nutritional management into lifestyle  Description: Describe effect of type, amount and timing of food on blood glucose; list 3 methods for planning meals. Outcome: Progressing Towards Goal  Goal: *Incorporating physical activity into lifestyle  Description: State effect of exercise on blood glucose levels. Outcome: Progressing Towards Goal  Goal: *Developing strategies to promote health/change behavior  Description: Define the ABC's of diabetes; identify appropriate screenings, schedule and personal plan for screenings. Outcome: Progressing Towards Goal  Goal: *Using medications safely  Description: State effect of diabetes medications on diabetes; name diabetes medication taking, action and side effects. Outcome: Progressing Towards Goal  Goal: *Monitoring blood glucose, interpreting and using results  Description: Identify recommended blood glucose targets  and personal targets. Outcome: Progressing Towards Goal  Goal: *Prevention, detection, treatment of acute complications  Description: List symptoms of hyper- and hypoglycemia; describe how to treat low blood sugar and actions for lowering  high blood glucose level. Outcome: Progressing Towards Goal  Goal: *Prevention, detection and treatment of chronic complications  Description: Define the natural course of diabetes and describe the relationship of blood glucose levels to long term complications of diabetes.   Outcome: Progressing Towards Goal  Goal: *Developing strategies to address psychosocial issues  Description: Describe feelings about living with diabetes; identify support needed and support network  Outcome: Progressing Towards Goal  Goal: *Insulin pump training  Outcome: Progressing Towards Goal  Goal: *Sick day guidelines  Outcome: Progressing Towards Goal  Goal: *Patient Specific Goal (EDIT GOAL, INSERT TEXT)  Outcome: Progressing Towards Goal     Problem: Patient Education: Go to Patient Education Activity  Goal: Patient/Family Education  Outcome: Progressing Towards Goal     Problem: Pressure Injury - Risk of  Goal: *Prevention of pressure injury  Description: Document Michele Scale and appropriate interventions in the flowsheet. Outcome: Progressing Towards Goal  Note: Pressure Injury Interventions:  Sensory Interventions: Assess changes in LOC    Moisture Interventions: Absorbent underpads    Activity Interventions: Assess need for specialty bed    Mobility Interventions: Assess need for specialty bed    Nutrition Interventions: Document food/fluid/supplement intake    Friction and Shear Interventions: HOB 30 degrees or less,Lift sheet,Minimize layers,Transferring/repositioning devices                Problem: Patient Education: Go to Patient Education Activity  Goal: Patient/Family Education  Outcome: Progressing Towards Goal     Problem: Falls - Risk of  Goal: *Absence of Falls  Description: Document Karin Fall Risk and appropriate interventions in the flowsheet.   Outcome: Progressing Towards Goal  Note: Fall Risk Interventions:  Mobility Interventions: Bed/chair exit alarm,OT consult for ADLs,PT Consult for mobility concerns    Mentation Interventions: Bed/chair exit alarm    Medication Interventions: Bed/chair exit alarm    Elimination Interventions: Bed/chair exit alarm    History of Falls Interventions: Bed/chair exit alarm         Problem: Patient Education: Go to Patient Education Activity  Goal: Patient/Family Education  Outcome: Progressing Towards Goal     Problem: Patient Education: Go to Patient Education Activity  Goal: Patient/Family Education  Outcome: Progressing Towards Goal     Problem: Patient Education: Go to Patient Education Activity  Goal: Patient/Family Education  Outcome: Progressing Towards Goal   No changes in pt's status. Will continue POC.

## 2021-12-15 NOTE — PROGRESS NOTES
Hospitalist Progress Note               Daily Progress Note: 12/15/2021      Subjective:   Hospital course to date: Patient is a 29-year-old female with history of diabetes, breast cancer and hypertension who was brought to the ED on 12/5 after being found by family unresponsive the night before. She had been in the ED the day before that with hypoglycemia. He was noted have a blood sugar of 51 at home and was given D10 but no significant improvement. Chest x-ray showed left lower lobe pneumonia. She was transferred from [facility]. Blood sugar on arrival here was 73. She was started on D10. All of patient's diabetic medications were supposed to have been previously discontinued although unclear if she had been inadvertently taking them. According to her sister the last time she took insulin was Friday of last week.     ------  Patient seen this morning for follow-up. Patient showing marked improvement in her mentation. She is verbal and is able to communicate. She is lift her upper and lower extremities when asked but weak on left leg. Blood pressure remains elevated at 178/79. Problem List:  Problem List as of 12/15/2021 Date Reviewed: 12/5/2021          Codes Class Noted - Resolved    Metabolic encephalopathy [id number]: G93.41  ICD-9-CM: 348.31  12/5/2021 - Present        CKD (chronic kidney disease) ICD-10-CM: N18.9  ICD-9-CM: 585.9  9/24/2021 - Present    Overview Signed 9/24/2021  3:57 PM by Masoud Mc MD     In the setting of severe, uncontrolled and prolonged diabetes, with diabetic retinopathy. Protienuria to 2.5g/g of creatinine. Would consider workup for acute GN given inconsistent history and rising creatinine.  Though clinical hx appears consistent with aggressive diabetic nephropathy             Sepsis ([facility] [facility] ICD-10-CM: A41.9  ICD-9-CM: 038.9, 995.91  9/22/2021 - Present        Esophagitis with gastritis ICD-10-CM: K29.70, K20.90  ICD-9-CM: 530.19  9/22/2021 - Present Elevated troponin ICD-10-CM: R77.8  ICD-9-CM: 790.6  9/20/2021 - Present        Syncope ICD-10-CM: R55  ICD-9-CM: 780.2  9/20/2021 - Present        Hypoglycemia ICD-10-CM: E16.2  ICD-9-CM: 251.2  9/20/2021 - Present        Chronic respiratory failure with hypoxia (HCC) ICD-10-CM: J96.11  ICD-9-CM: 518.83, 799.02  9/20/2021 - Present        Chest pain ICD-10-CM: R07.9  ICD-9-CM: 786.50  9/19/2021 - Present        Anxiety ICD-10-CM: F41.9  ICD-9-CM: 300.00  2/9/2021 - Present        Depression ICD-10-CM: F32. A  ICD-9-CM: 002  2/9/2021 - Present        Dyslipidemia ICD-10-CM: E78.5  ICD-9-CM: 272.4  2/9/2021 - Present        Hypertensive cardiovascular disease ICD-10-CM: I11.9  ICD-9-CM: 402.90  2/9/2021 - Present        Knee osteoarthritis ICD-10-CM: M17.10  ICD-9-CM: 715.36  1/25/2021 - Present        COPD with acute exacerbation ([facility] ICD-10-CM: J44.1  ICD-9-CM: 491.21  11/29/2020 - Present        Person under investigation for COVID-19 ICD-10-CM: Z20.822  ICD-9-CM: V01.79  11/29/2020 - Present        COPD (chronic obstructive pulmonary disease) ([facility] ICD-10-CM: J44.9  ICD-9-CM: 242  11/29/2020 - Present        DM (diabetes mellitus) ([facility] ICD-10-CM: E11.9  ICD-9-CM: 250.00  11/29/2020 - Present        COVID-19 ICD-10-CM: U07.1  ICD-9-CM: 079.89  10/1/2020 - Present        HTN (hypertension) ICD-10-CM: I10  ICD-9-CM: 401.9  10/1/2020 - Present        Diabetic gastroparesis ([facility] ICD-10-CM: E11.43, K31.84  ICD-9-CM: 250.60, 536.3  10/1/2020 - Present        DVT prophylaxis ICD-10-CM: Z29.9  ICD-9-CM: V07.9  10/1/2020 - Present        PNA (pneumonia) ICD-10-CM: J18.9  ICD-9-CM: 950  9/30/2020 - Present        Acute respiratory failure with hypoxia (HCC) ICD-10-CM: J96.01  ICD-9-CM: 518.81  9/30/2020 - Present        Acute kidney injury ([facility] ICD-10-CM: N17.9  ICD-9-CM: 584.9  9/30/2020 - Present        Type 2 diabetes mellitus with hyperglycemia, with long-term current use of insulin ([facility] ICD-10-CM: E11.65, Z79.4  ICD-9-CM: 250.00, 790.29, V58.67  9/30/2020 - Present        Ureteral stone ICD-10-CM: N20.1  ICD-9-CM: 592.1  2/15/2019 - Present        S/P lumbar fusion ICD-10-CM: Z98.1  ICD-9-CM: V45.4  10/16/2018 - Present        CAP (community acquired pneumonia) ICD-10-CM: J18.9  ICD-9-CM: 687  9/1/2018 - Present        Shortness of breath ICD-10-CM: R06.02  ICD-9-CM: 786.05  9/1/2018 - Present        Fever and chills ICD-10-CM: R50.9  ICD-9-CM: 780.60  9/1/2018 - Present        Spinal stenosis of lumbar region at multiple levels ICD-10-CM: M48.061  ICD-9-CM: 724.02  8/30/2018 - Present        Scoliosis ICD-10-CM: M41.9  ICD-9-CM: 737.30  8/30/2018 - Present        Spinal stenosis ICD-10-CM: M48.00  ICD-9-CM: 724.00  8/30/2018 - Present        Pre-op testing ICD-10-CM: I15.150  ICD-9-CM: V72.84  8/27/2018 - Present        Severe obesity (BMI 35.0-39. 9) ICD-10-CM: E66.01  ICD-9-CM: 278.01  6/20/2018 - Present        Spinal stenosis, lumbar region without neurogenic claudication ICD-10-CM: M48.061  ICD-9-CM: 724.02  5/16/2018 - Present        Lumbosacral spondylosis without myelopathy ICD-10-CM: M47.817  ICD-9-CM: 721.3  4/25/2018 - Present        DDD (degenerative disc disease), lumbar ICD-10-CM: M51.36  ICD-9-CM: 722.52  4/25/2018 - Present        Malignant neoplasm of female breast [facility]) ICD-10-CM: C50.919  ICD-9-CM: 174.9  4/25/2018 - Present              Medications reviewed  Current Facility-Administered Medications   Medication Dose Route Frequency    dextrose 5% infusion  75 mL/hr IntraVENous CONTINUOUS    hydrALAZINE (APRESOLINE) 20 mg/mL injection 10 mg  10 mg IntraVENous Q4H PRN    piperacillin-tazobactam (ZOSYN) 3.375 g in 0.9% sodium chloride (MBP/ADV) 100 mL MBP  3.375 g IntraVENous Q8H    nitroglycerin (NITROBID) 2 % ointment 1 Inch  1 Inch Topical Q6H    labetaloL (NORMODYNE;TRANDATE) 20 mg/4 mL (5 mg/mL) injection 10 mg  10 mg IntraVENous Q4H PRN    amLODIPine (NORVASC) tablet 10 mg  10 mg Oral DAILY    atorvastatin (LIPITOR) tablet 40 mg  40 mg Oral QHS    albuterol (PROVENTIL HFA, VENTOLIN HFA, PROAIR HFA) inhaler 2 Puff  2 Puff Inhalation Q4H PRN    [Held by provider] tamsulosin (FLOMAX) capsule 0.4 mg  0.4 mg Oral DAILY    [Held by provider] gabapentin (NEURONTIN) capsule 300 mg  300 mg Oral BID    [Held by provider] melatonin tablet 3 mg  3 mg Oral QHS    venlafaxine-SR (EFFEXOR-XR) capsule 150 mg  150 mg Oral DAILY WITH BREAKFAST    ferrous sulfate tablet 325 mg  1 Tablet Oral TID    aspirin delayed-release tablet 81 mg  81 mg Oral DAILY    glucose chewable tablet 16 g  4 Tablet Oral PRN    dextrose (D50W) injection syrg 12.5-25 g  25-50 mL IntraVENous PRN    glucagon (GLUCAGEN) injection 1 mg  1 mg IntraMUSCular PRN    sodium chloride (NS) flush 5-40 mL  5-40 mL IntraVENous Q8H    sodium chloride (NS) flush 5-40 mL  5-40 mL IntraVENous PRN    acetaminophen (TYLENOL) tablet 650 mg  650 mg Oral Q6H PRN    Or    acetaminophen (TYLENOL) suppository 650 mg  650 mg Rectal Q6H PRN    ondansetron (ZOFRAN ODT) tablet 4 mg  4 mg Oral Q6H PRN    Or    ondansetron (ZOFRAN) injection 4 mg  4 mg IntraVENous Q6H PRN    famotidine (PEPCID) tablet 20 mg  20 mg Oral DAILY    metoprolol (LOPRESSOR) injection 5 mg  5 mg IntraVENous Q4H    metoprolol succinate (TOPROL-XL) XL tablet 50 mg  50 mg Oral DAILY       Review of Systems:   Review of systems not obtained due to patient factors. Objective:   Physical Exam:     Visit Vitals  BP (!) 169/78   Pulse 65   Temp 98.4 °F (36.9 °C)   Resp 18   Ht 5' 5\" (1.651 m)   Wt 223 lb (101.2 kg)   SpO2 96%   BMI 37.11 kg/m²    O2 Flow Rate (L/min): 2 l/min O2 Device: Nasal cannula    Temp (24hrs), Av.3 °F (36.8 °C), Min:97.9 °F (36.6 °C), Max:98.7 °F (37.1 °C)    No intake/output data recorded.  1901 - 12/15 0700  In: -   Out: 2734 [Urine:1450]    General:  Awake and alert    Lungs:   Clear to auscultation bilaterally.    Chest wall:  No tenderness or deformity. Heart:  Regular rate and rhythm, S1, S2 normal, no murmur, click, rub or gallop. Abdomen:   Soft, non-tender. Bowel sounds normal. No masses,  No organomegaly. Extremities: Extremities normal, atraumatic, no cyanosis or edema. Pulses: 2+ and symmetric all extremities. Skin: Skin color, texture, turgor normal. No rashes or lesions   Neurologic: CNII-XII intact. Unable to fully assess         Data Review:       Recent Days:  No results for input(s): WBC, HGB, HCT, PLT, HGBEXT, HCTEXT, PLTEXT, HGBEXT, HCTEXT, PLTEXT in the last 72 hours. No results for input(s): NA, K, CL, CO2, GLU, BUN, CREA, CA, MG, PHOS, ALB, TBIL, TBILI, ALT, INR, INREXT, INREXT in the last 72 hours. No lab exists for component: SGOT  No results for input(s): PH, PCO2, PO2, HCO3, FIO2 in the last 72 hours. 24 Hour Results:  Recent Results (from the past 24 hour(s))   GLUCOSE, POC    Collection Time: 12/14/21  8:50 AM   Result Value Ref Range    Glucose (POC) 194 (H) 65 - 117 mg/dL    Performed by Heriberto Mauricio, POC    Collection Time: 12/14/21 11:27 AM   Result Value Ref Range    Glucose (POC) 229 (H) 65 - 117 mg/dL    Performed by BAY CHUN    GLUCOSE, POC    Collection Time: 12/14/21  4:20 PM   Result Value Ref Range    Glucose (POC) 208 (H) 65 - 117 mg/dL    Performed by HERMOSILLO LAY        XR CHEST PORT   Final Result   No significant interval change. MRI BRAIN WO CONT   Final Result   1. No evidence of acute intracranial ischemia, mass or hemorrhage. 2.  Moderate chronic small vessel ischemic changes. 3.  Mild diffuse parenchymal volume loss. Assessment:  Hypoglycemia in diabetes mellitus type 2, improved.   Suspect she had a prolonged episode of severe hypoglycemia at home    Hypoglycemic encephalopathy, possibly with permanent acquired brain injury    Possible left lower lobe pneumonia, likely aspiration    Chronic kidney disease stage III    History of breast cancer    COPD without exacerbation    Chronic respiratory failure with hypoxia    History of gastritis/esophagitis/gastroparesis      Plan:  Continue supportive care including assistance with all meals  Expect gradual improvement over the course of time  PT OT evaluation  Anticipate discharge to skilled care facility when bed available      Care Plan discussed with: Patient/Family    Disposition: Transfer to medical telemetry    Total time spent with patient: 30 minutes.     Nazia Dyson

## 2021-12-15 NOTE — PROGRESS NOTES
CM spoke with patient's family and [facility]. Authorization was started 12/14/21. Family aware of progress and CM will continue to follow.

## 2021-12-15 NOTE — PROGRESS NOTES
PHYSICAL THERAPY TREATMENT  Patient: Hung Crenshaw (3 y.o. female)  Date: 12/15/2021  Diagnosis: Metabolic encephalopathy [R24.12]  Hypoglycemia [E16.2] <principal problem not specified>       Precautions:    Chart, physical therapy assessment, plan of care and goals were reviewed. ASSESSMENT  Patient continues with skilled PT services and is progressing towards goals. Pt semi supine in bed upon arrival and agreeable to session. Completed rolling with min A, noted soiled alexandra, bowel hygiene completed. Performed sup>sit with mod A x 2 and CGA for scooting to EOB. Pt sat EOB ~4 minutes while taking medication with RN, noted good static sitting balance, close SBA for safety. Performed STS with min A x 2. Able to ambulate around bed perimeter (~8') with hha x 2 and min A x 2, no RW readily available, would benefit. Pt requires constant vc and tactile cues to continue ambulating. Pt returned to semi supine position with CGA and additional time. Pt left with call bell in reach and needs met. Recommending d/c to SNF once medically appropriate. Current Level of Function Impacting Discharge (mobility/balance): assistance required for safety    Other factors to consider for discharge: PLOF, time since onset, severity of deficits, decrease command following         PLAN :  Patient continues to benefit from skilled intervention to address the above impairments. Continue treatment per established plan of care. to address goals. Recommendation for discharge: (in order for the patient to meet his/her long term goals)  Baudilio Stacy    This discharge recommendation:  Has been made in collaboration with the attending provider and/or case management    IF patient discharges home will need the following DME: to be determined (TBD)       SUBJECTIVE:   Patient stated You ain't that kidding.  pt stated this multiple time     OBJECTIVE DATA SUMMARY:   Critical Behavior:  Neurologic State: Alert  Orientation Level: Oriented to person  Cognition: Decreased command following  Safety/Judgement: Decreased insight into deficits,Decreased awareness of need for safety,Decreased awareness of need for assistance,Decreased awareness of environment  Functional Mobility Training:  Bed Mobility:  Rolling: Minimum assistance  Supine to Sit: Moderate assistance;Assist x2  Sit to Supine: Contact guard assistance  Scooting: Minimum assistance    Transfers:  Sit to Stand: Minimum assistance;Assist x2  Stand to Sit: Minimum assistance;Assist x2    Balance:  Sitting: Impaired; Without support  Sitting - Static: Good (unsupported)  Sitting - Dynamic: Fair (occasional)  Standing: Impaired; With support  Standing - Static: Constant support; Fair  Standing - Dynamic : Constant support; Fair    Ambulation/Gait Training:  Distance (ft): 8 Feet (ft)  Assistive Device: Gait belt; Other (comment) (HHA x 2, no RW in room)  Ambulation - Level of Assistance: Minimal assistance;Assist x2    Pain Ratin/10    Activity Tolerance:   Fair and requires rest breaks  Please refer to the flowsheet for vital signs taken during this treatment. After treatment patient left in no apparent distress:   Supine in bed, Call bell within reach, Bed / chair alarm activated, and Side rails x 3    COMMUNICATION/COLLABORATION:   The patients plan of care was discussed with: Occupational therapy assistant and Rehabilitation technician. OT/PT sessions occurred together for increased patient and clinician safety    Problem: Mobility Impaired (Adult and Pediatric)  Goal: *Acute Goals and Plan of Care (Insert Text)  Description: Pt will be I with LE HEP in 7 days. Pt will perform bed mobility with mod I in 7 days. Pt will perform transfers with CGA in 7 days. Pt will amb- 50 feet with LRAD safely with CGA in 7 days.    Outcome: Progressing Towards Goal       Ashok Harris PTA   Time Calculation: 27 mins

## 2021-12-16 LAB
GLUCOSE BLD STRIP.AUTO-MCNC: 165 MG/DL (ref 65–117)
GLUCOSE BLD STRIP.AUTO-MCNC: 193 MG/DL (ref 65–117)
GLUCOSE BLD STRIP.AUTO-MCNC: 212 MG/DL (ref 65–117)
GLUCOSE BLD STRIP.AUTO-MCNC: 258 MG/DL (ref 65–117)
GLUCOSE BLD STRIP.AUTO-MCNC: 261 MG/DL (ref 65–117)
PERFORMED BY, TECHID: ABNORMAL

## 2021-12-16 PROCEDURE — 82962 GLUCOSE BLOOD TEST: CPT

## 2021-12-16 PROCEDURE — 74011250636 HC RX REV CODE- 250/636: Performed by: INTERNAL MEDICINE

## 2021-12-16 PROCEDURE — 65270000029 HC RM PRIVATE

## 2021-12-16 PROCEDURE — 74011250636 HC RX REV CODE- 250/636: Performed by: HOSPITALIST

## 2021-12-16 PROCEDURE — 77010033678 HC OXYGEN DAILY

## 2021-12-16 PROCEDURE — 74011250637 HC RX REV CODE- 250/637: Performed by: INTERNAL MEDICINE

## 2021-12-16 PROCEDURE — 97530 THERAPEUTIC ACTIVITIES: CPT

## 2021-12-16 PROCEDURE — 94760 N-INVAS EAR/PLS OXIMETRY 1: CPT

## 2021-12-16 PROCEDURE — 74011250637 HC RX REV CODE- 250/637: Performed by: HOSPITALIST

## 2021-12-16 PROCEDURE — 74011000258 HC RX REV CODE- 258: Performed by: INTERNAL MEDICINE

## 2021-12-16 PROCEDURE — 97116 GAIT TRAINING THERAPY: CPT

## 2021-12-16 PROCEDURE — 74011000250 HC RX REV CODE- 250: Performed by: HOSPITALIST

## 2021-12-16 RX ORDER — ISOSORBIDE MONONITRATE 60 MG/1
60 TABLET, EXTENDED RELEASE ORAL DAILY
Status: DISCONTINUED | OUTPATIENT
Start: 2021-12-16 | End: 2021-12-22 | Stop reason: HOSPADM

## 2021-12-16 RX ADMIN — VENLAFAXINE HYDROCHLORIDE 150 MG: 75 CAPSULE, EXTENDED RELEASE ORAL at 08:50

## 2021-12-16 RX ADMIN — METOPROLOL TARTRATE 5 MG: 5 INJECTION INTRAVENOUS at 00:14

## 2021-12-16 RX ADMIN — ASPIRIN 81 MG: 81 TABLET, COATED ORAL at 08:51

## 2021-12-16 RX ADMIN — PIPERACILLIN SODIUM AND TAZOBACTAM SODIUM 3.38 G: 3; .375 INJECTION, POWDER, LYOPHILIZED, FOR SOLUTION INTRAVENOUS at 01:58

## 2021-12-16 RX ADMIN — FERROUS SULFATE TAB 325 MG (65 MG ELEMENTAL FE) 325 MG: 325 (65 FE) TAB at 22:02

## 2021-12-16 RX ADMIN — NITROGLYCERIN 1 INCH: 20 OINTMENT TOPICAL at 00:23

## 2021-12-16 RX ADMIN — NITROGLYCERIN 1 INCH: 20 OINTMENT TOPICAL at 13:01

## 2021-12-16 RX ADMIN — METOPROLOL TARTRATE 5 MG: 5 INJECTION INTRAVENOUS at 18:34

## 2021-12-16 RX ADMIN — Medication 10 ML: at 22:03

## 2021-12-16 RX ADMIN — FERROUS SULFATE TAB 325 MG (65 MG ELEMENTAL FE) 325 MG: 325 (65 FE) TAB at 08:51

## 2021-12-16 RX ADMIN — METOPROLOL TARTRATE 5 MG: 5 INJECTION INTRAVENOUS at 04:33

## 2021-12-16 RX ADMIN — Medication 10 ML: at 13:08

## 2021-12-16 RX ADMIN — LABETALOL HYDROCHLORIDE 10 MG: 5 INJECTION, SOLUTION INTRAVENOUS at 08:49

## 2021-12-16 RX ADMIN — Medication 10 ML: at 05:01

## 2021-12-16 RX ADMIN — PIPERACILLIN SODIUM AND TAZOBACTAM SODIUM 3.38 G: 3; .375 INJECTION, POWDER, LYOPHILIZED, FOR SOLUTION INTRAVENOUS at 18:34

## 2021-12-16 RX ADMIN — NITROGLYCERIN 1 INCH: 20 OINTMENT TOPICAL at 18:34

## 2021-12-16 RX ADMIN — NITROGLYCERIN 1 INCH: 20 OINTMENT TOPICAL at 05:00

## 2021-12-16 RX ADMIN — ISOSORBIDE MONONITRATE 60 MG: 60 TABLET, EXTENDED RELEASE ORAL at 13:01

## 2021-12-16 RX ADMIN — HYDRALAZINE HYDROCHLORIDE 10 MG: 20 INJECTION INTRAMUSCULAR; INTRAVENOUS at 08:49

## 2021-12-16 RX ADMIN — FAMOTIDINE 20 MG: 20 TABLET, FILM COATED ORAL at 08:51

## 2021-12-16 RX ADMIN — FERROUS SULFATE TAB 325 MG (65 MG ELEMENTAL FE) 325 MG: 325 (65 FE) TAB at 18:34

## 2021-12-16 RX ADMIN — METOPROLOL TARTRATE 5 MG: 5 INJECTION INTRAVENOUS at 22:02

## 2021-12-16 RX ADMIN — PIPERACILLIN SODIUM AND TAZOBACTAM SODIUM 3.38 G: 3; .375 INJECTION, POWDER, LYOPHILIZED, FOR SOLUTION INTRAVENOUS at 09:01

## 2021-12-16 RX ADMIN — METOPROLOL TARTRATE 5 MG: 5 INJECTION INTRAVENOUS at 13:01

## 2021-12-16 RX ADMIN — ATORVASTATIN CALCIUM 40 MG: 40 TABLET, FILM COATED ORAL at 22:02

## 2021-12-16 RX ADMIN — AMLODIPINE BESYLATE 10 MG: 5 TABLET ORAL at 08:50

## 2021-12-16 RX ADMIN — METOPROLOL SUCCINATE 50 MG: 25 TABLET, EXTENDED RELEASE ORAL at 08:50

## 2021-12-16 NOTE — PROGRESS NOTES
OCCUPATIONAL THERAPY TREATMENT  Patient: Onel Martinez (7 y.o. female)  Date: 12/16/2021  Diagnosis: Metabolic encephalopathy [P25.88]  Hypoglycemia [E16.2] <principal problem not specified>       Precautions:    Chart, occupational therapy assessment, plan of care, and goals were reviewed. ASSESSMENT  Patient continues with skilled OT services and is slowly progressing towards goals. Upon VALENCIA arrival, pt semi supine in bed sleeping. Woke pt and pt agreeable to tx session. Pt noted with eyes closed throughout session. Supine face washing completed with ModA. Pt completed rolling with ModA, supine>sit with ModA, and scooting to EOB with CGA. Pt completed oral hygiene at EOB with [facility]. Pt required maximum cueing for initiation of tasks, once pt began task she then required maximum cueing for termination of tasks. Pt agreed to spit or swallow toothpaste/water, however would not complete. Pt returned to supine with ModA and scooted to [facility] with total A x2. Pt became agitated with RN and PCT when attempting to get pt to spit. Pt left semi supine in bed with call bell within reach and bed alarm activated, PCT in room. Pt demonstrating decreased to no command following throughout session. Pt eyes remained closed throughout entire session. Will continue to follow pt throughout remainder of stay and progress towards OT goals. Recommending SNF at discharge when medically appropriate. Current Level of Function Impacting Discharge (ADLs): ModA bed mobility, Mod-MaxA grooming    Other factors to consider for discharge: PLOF, severity of deficits         PLAN :  Patient continues to benefit from skilled intervention to address the above impairments. Continue treatment per established plan of care. to address goals.     Recommendation for discharge: (in order for the patient to meet his/her long term goals)  Baudilio Stacy    This discharge recommendation:  Has been made in collaboration with the attending provider and/or case management    IF patient discharges home will need the following DME:TBD       SUBJECTIVE:   Patient stated no, \"mhm\", and shook head throughout session. OBJECTIVE DATA SUMMARY:   Cognitive/Behavioral Status:  Neurologic State: Confused;Drowsy  Orientation Level: Oriented to person  Cognition: Decreased attention/concentration;Decreased command following; Impaired decision making    Functional Mobility and Transfers for ADLs:  Bed Mobility:  Rolling: Moderate assistance  Supine to Sit: Moderate assistance  Sit to Supine: Moderate assistance  Scooting: Contact guard assistance    Transfers:  Sit to Stand: Minimum assistance    Balance:  Sitting: Impaired; Without support  Sitting - Static: Good (unsupported)  Sitting - Dynamic: Fair (occasional)  Standing: Impaired; With support  Standing - Static: Constant support; Fair  Standing - Dynamic : Constant support; Fair    ADL Intervention:    Grooming  Position Performed: Long sitting on bed;Seated edge of bed  Washing Face: Moderate assistance  Brushing Teeth: Moderate assistance  Brushing/Combing Hair: Total assistance (dependent)    Pain:  No pain reported during session    Activity Tolerance:   Fair  Please refer to the flowsheet for vital signs taken during this treatment. After treatment patient left in no apparent distress:   Supine in bed, Call bell within reach, Bed / chair alarm activated, and Side rails x 3    COMMUNICATION/COLLABORATION:   The patients plan of care was discussed with: Registered nurse and Certified nursing assistant/patient care technician. ANNIE Vann  Time Calculation: 29 mins    Problem: Self Care Deficits Care Plan (Adult)  Goal: *Acute Goals and Plan of Care (Insert Text)  Description: 1. Pt will be min A sup <> sit in prep for EOB ADLs  2. Pt will be min A grooming sitting EOB  3. Pt will be min A LE dressing sitting EOB/long sit  4.   Pt will be min A sit <>  prep for toileting LRAD  5. Pt will be min A toileting/toilet transfer/cloth mgmt LRAD  6.   Pt will be IND following UE HEP in prep for self care tasks      Outcome: Progressing Towards Goal

## 2021-12-16 NOTE — PROGRESS NOTES
Problem: Diabetes Self-Management  Goal: *Disease process and treatment process  Description: Define diabetes and identify own type of diabetes; list 3 options for treating diabetes. Outcome: Progressing Towards Goal  Goal: *Incorporating nutritional management into lifestyle  Description: Describe effect of type, amount and timing of food on blood glucose; list 3 methods for planning meals. Outcome: Progressing Towards Goal  Goal: *Incorporating physical activity into lifestyle  Description: State effect of exercise on blood glucose levels. Outcome: Progressing Towards Goal  Goal: *Developing strategies to promote health/change behavior  Description: Define the ABC's of diabetes; identify appropriate screenings, schedule and personal plan for screenings. Outcome: Progressing Towards Goal  Goal: *Using medications safely  Description: State effect of diabetes medications on diabetes; name diabetes medication taking, action and side effects. Outcome: Progressing Towards Goal  Goal: *Monitoring blood glucose, interpreting and using results  Description: Identify recommended blood glucose targets  and personal targets. Outcome: Progressing Towards Goal  Goal: *Prevention, detection, treatment of acute complications  Description: List symptoms of hyper- and hypoglycemia; describe how to treat low blood sugar and actions for lowering  high blood glucose level. Outcome: Progressing Towards Goal  Goal: *Prevention, detection and treatment of chronic complications  Description: Define the natural course of diabetes and describe the relationship of blood glucose levels to long term complications of diabetes.   Outcome: Progressing Towards Goal  Goal: *Developing strategies to address psychosocial issues  Description: Describe feelings about living with diabetes; identify support needed and support network  Outcome: Progressing Towards Goal  Goal: *Insulin pump training  Outcome: Progressing Towards Goal  Goal: *Sick day guidelines  Outcome: Progressing Towards Goal  Goal: *Patient Specific Goal (EDIT GOAL, INSERT TEXT)  Outcome: Progressing Towards Goal     Problem: Patient Education: Go to Patient Education Activity  Goal: Patient/Family Education  Outcome: Progressing Towards Goal     Problem: Pressure Injury - Risk of  Goal: *Prevention of pressure injury  Description: Document Michele Scale and appropriate interventions in the flowsheet. Outcome: Progressing Towards Goal  Note: Pressure Injury Interventions:  Sensory Interventions: Assess changes in LOC    Moisture Interventions: Check for incontinence Q2 hours and as needed    Activity Interventions: Pressure redistribution bed/mattress(bed type)    Mobility Interventions: Pressure redistribution bed/mattress (bed type)    Nutrition Interventions: Document food/fluid/supplement intake    Friction and Shear Interventions: HOB 30 degrees or less                Problem: Patient Education: Go to Patient Education Activity  Goal: Patient/Family Education  Outcome: Progressing Towards Goal     Problem: Falls - Risk of  Goal: *Absence of Falls  Description: Document Karin Fall Risk and appropriate interventions in the flowsheet.   Outcome: Progressing Towards Goal  Note: Fall Risk Interventions:  Mobility Interventions: Bed/chair exit alarm    Mentation Interventions: Bed/chair exit alarm    Medication Interventions: Bed/chair exit alarm    Elimination Interventions: Bed/chair exit alarm    History of Falls Interventions: Bed/chair exit alarm,Room close to nurse's station,Vital signs minimum Q4HRs X 24 hrs (comment for end date)         Problem: Patient Education: Go to Patient Education Activity  Goal: Patient/Family Education  Outcome: Progressing Towards Goal     Problem: Patient Education: Go to Patient Education Activity  Goal: Patient/Family Education  Outcome: Progressing Towards Goal     Problem: Patient Education: Go to Patient Education Activity  Goal: Patient/Family Education  Outcome: Progressing Towards Goal

## 2021-12-16 NOTE — PROGRESS NOTES
PHYSICAL THERAPY TREATMENT  Patient: Chucky Fernando (3 y.o. female)  Date: 12/16/2021  Diagnosis: Metabolic encephalopathy [A22.36]  Hypoglycemia [E16.2] <principal problem not specified>       Precautions:    Chart, physical therapy assessment, plan of care and goals were reviewed. ASSESSMENT  Patient continues with skilled PT services and is progressing towards goals. Pt semi supine in bed upon arrival and agreeable to session. Pt able to completed bed mob with mod A. Able to increase amb distance with RW and CGA, requires constant  vc, tactile cues, and manual cues. Pt has poor to no command following throughout session. Pt non-verbal throughout session as well. Attempted seated therex but unable to due to decreased command following. Pt returned to semi supine position with CGA. Left with call bell in reach and needs met. Recommending d/c to SNF once medically appropriate. Current Level of Function Impacting Discharge (mobility/balance): assistance required for all mobility     Other factors to consider for discharge: PLOF, time since onset, severity of deficits          PLAN :  Patient continues to benefit from skilled intervention to address the above impairments. Continue treatment per established plan of care. to address goals. Recommendation for discharge: (in order for the patient to meet his/her long term goals)  Baudilio Stacy    This discharge recommendation:  Has been made in collaboration with the attending provider and/or case management    IF patient discharges home will need the following DME: to be determined (TBD)       SUBJECTIVE:   Patient stated mhm.  pt not verbalizing during session.     OBJECTIVE DATA SUMMARY:   Critical Behavior:  Neurologic State: Alert  Orientation Level: Oriented to person  Cognition: Decreased command following,Decreased attention/concentration  Safety/Judgement: Decreased insight into deficits,Decreased awareness of need for safety,Decreased awareness of need for assistance,Decreased awareness of environment    Functional Mobility Training:  Bed Mobility:  Rolling: Minimum assistance  Supine to Sit: Moderate assistance  Sit to Supine: Contact guard assistance  Scooting: Contact guard assistance      Transfers:  Sit to Stand: Minimum assistance  Stand to Sit: Minimum assistance    Balance:  Sitting: Impaired; Without support  Sitting - Static: Good (unsupported)  Sitting - Dynamic: Fair (occasional)  Standing: Impaired; With support  Standing - Static: Constant support; Fair  Standing - Dynamic : Constant support; Fair    Ambulation/Gait Training:  Distance (ft): 20 Feet (ft)  Assistive Device: Gait belt;Walker, rolling  Ambulation - Level of Assistance: Minimal assistance    Therapeutic Exercises:   1 x 10 LAQ on RLE. Attempted LLE, however pt not following commands and having difficulty performing on LLE. Pain Ratin/10    Activity Tolerance:   Fair  Please refer to the flowsheet for vital signs taken during this treatment. After treatment patient left in no apparent distress:   Supine in bed, Call bell within reach, Bed / chair alarm activated, and Side rails x 3      Problem: Mobility Impaired (Adult and Pediatric)  Goal: *Acute Goals and Plan of Care (Insert Text)  Description: Pt will be I with LE HEP in 7 days. Pt will perform bed mobility with mod I in 7 days. Pt will perform transfers with CGA in 7 days. Pt will amb- 50 feet with LRAD safely with CGA in 7 days.    Outcome: Progressing Towards Goal       Scar Naranjo PTA   Time Calculation: 27 mins

## 2021-12-16 NOTE — PROGRESS NOTES
Hospitalist Progress Note               Daily Progress Note: 12/16/2021      Subjective:   Hospital course to date: Patient is a 62-year-old female with history of diabetes, breast cancer and hypertension who was brought to the ED on 12/5 after being found by family unresponsive the night before. She had been in the ED the day before that with hypoglycemia. He was noted have a blood sugar of 51 at home and was given D10 but no significant improvement. Chest x-ray showed left lower lobe pneumonia. She was transferred from [facility]. Blood sugar on arrival here was 73. She was started on D10. All of patient's diabetic medications were supposed to have been previously discontinued although unclear if she had been inadvertently taking them. According to her sister the last time she took insulin was Friday of last week.     ------  Patient seen this morning for follow-up.  No reports of fevers overnight. Blood pressure high this morning  Problem List:  Problem List as of 12/16/2021 Date Reviewed: 12/5/2021          Codes Class Noted - Resolved    Metabolic encephalopathy [facility]-06-VW: G93.41  ICD-9-CM: 348.31  12/5/2021 - Present        CKD (chronic kidney disease) ICD-10-CM: N18.9  ICD-9-CM: 585.9  9/24/2021 - Present    Overview Signed 9/24/2021  3:57 PM by Burton Silverio MD     In the setting of severe, uncontrolled and prolonged diabetes, with diabetic retinopathy. Protienuria to 2.5g/g of creatinine. Would consider workup for acute GN given inconsistent history and rising creatinine.  Though clinical hx appears consistent with aggressive diabetic nephropathy             Sepsis ([facility] ICD-10-CM: A41.9  ICD-9-CM: 038.9, 995.91  9/22/2021 - Present        Esophagitis with gastritis ICD-10-CM: K29.70, K20.90  ICD-9-CM: 530.19  9/22/2021 - Present        Elevated troponin ICD-10-CM: R77.8  ICD-9-CM: 790.6  9/20/2021 - Present        Syncope ICD-10-CM: R55  ICD-9-CM: 780.2  9/20/2021 - Present Hypoglycemia ICD-10-CM: E16.2  ICD-9-CM: 251.2  9/20/2021 - Present        Chronic respiratory failure with hypoxia [facility]) ICD-10-CM: J96.11  ICD-9-CM: 518.83, 799.02  9/20/2021 - Present        Chest pain ICD-10-CM: R07.9  ICD-9-CM: 786.50  9/19/2021 - Present        Anxiety ICD-10-CM: F41.9  ICD-9-CM: 300.00  2/9/2021 - Present        Depression ICD-10-CM: F32. A  ICD-9-CM: 190  2/9/2021 - Present        Dyslipidemia ICD-10-CM: E78.5  ICD-9-CM: 272.4  2/9/2021 - Present        Hypertensive cardiovascular disease ICD-10-CM: I11.9  ICD-9-CM: 402.90  2/9/2021 - Present        Knee osteoarthritis ICD-10-CM: M17.10  ICD-9-CM: 715.36  1/25/2021 - Present        COPD with acute exacerbation ([facility] ICD-10-CM: J44.1  ICD-9-CM: 491.21  11/29/2020 - Present        Person under investigation for COVID-19 ICD-10-CM: Z20.822  ICD-9-CM: V01.79  11/29/2020 - Present        COPD (chronic obstructive pulmonary disease) ([facility] ICD-10-CM: J44.9  ICD-9-CM: 230  11/29/2020 - Present        DM (diabetes mellitus) ([facility] ICD-10-CM: E11.9  ICD-9-CM: 250.00  11/29/2020 - Present        COVID-19 ICD-10-CM: U07.1  ICD-9-CM: 079.89  10/1/2020 - Present        HTN (hypertension) ICD-10-CM: I10  ICD-9-CM: 401.9  10/1/2020 - Present        Diabetic gastroparesis ([facility] ICD-10-CM: E11.43, K31.84  ICD-9-CM: 250.60, 536.3  10/1/2020 - Present        DVT prophylaxis ICD-10-CM: Z29.9  ICD-9-CM: V07.9  10/1/2020 - Present        PNA (pneumonia) ICD-10-CM: J18.9  ICD-9-CM: 210  9/30/2020 - Present        Acute respiratory failure with hypoxia ([facility] ICD-10-CM: J96.01  ICD-9-CM: 518.81  9/30/2020 - Present        Acute kidney injury ([facility] ICD-10-CM: N17.9  ICD-9-CM: 584.9  9/30/2020 - Present        Type 2 diabetes mellitus with hyperglycemia, with long-term current use of insulin (HCC) ICD-10-CM: E11.65, Z79.4  ICD-9-CM: 250.00, 790.29, V58.67  9/30/2020 - Present        Ureteral stone ICD-10-CM: N20.1  ICD-9-CM: 592.1  2/15/2019 - Present        S/P lumbar fusion ICD-10-CM: Z98.1  ICD-9-CM: V45.4  10/16/2018 - Present        CAP (community acquired pneumonia) ICD-10-CM: J18.9  ICD-9-CM: 121  9/1/2018 - Present        Shortness of breath ICD-10-CM: R06.02  ICD-9-CM: 786.05  9/1/2018 - Present        Fever and chills ICD-10-CM: R50.9  ICD-9-CM: 780.60  9/1/2018 - Present        Spinal stenosis of lumbar region at multiple levels ICD-10-CM: M48.061  ICD-9-CM: 724.02  8/30/2018 - Present        Scoliosis ICD-10-CM: M41.9  ICD-9-CM: 737.30  8/30/2018 - Present        Spinal stenosis ICD-10-CM: M48.00  ICD-9-CM: 724.00  8/30/2018 - Present        Pre-op testing ICD-10-CM: V77.768  ICD-9-CM: V72.84  8/27/2018 - Present        Severe obesity (BMI 35.0-39. 9) ICD-10-CM: E66.01  ICD-9-CM: 278.01  6/20/2018 - Present        Spinal stenosis, lumbar region without neurogenic claudication ICD-10-CM: M48.061  ICD-9-CM: 724.02  5/16/2018 - Present        Lumbosacral spondylosis without myelopathy ICD-10-CM: M47.817  ICD-9-CM: 721.3  4/25/2018 - Present        DDD (degenerative disc disease), lumbar ICD-10-CM: M51.36  ICD-9-CM: 722.52  4/25/2018 - Present        Malignant neoplasm of female breast [facility]) ICD-10-CM: C50.919  ICD-9-CM: 174.9  4/25/2018 - Present              Medications reviewed  Current Facility-Administered Medications   Medication Dose Route Frequency    dextrose 5% infusion  75 mL/hr IntraVENous CONTINUOUS    hydrALAZINE (APRESOLINE) 20 mg/mL injection 10 mg  10 mg IntraVENous Q4H PRN    piperacillin-tazobactam (ZOSYN) 3.375 g in 0.9% sodium chloride (MBP/ADV) 100 mL MBP  3.375 g IntraVENous Q8H    nitroglycerin (NITROBID) 2 % ointment 1 Inch  1 Inch Topical Q6H    labetaloL (NORMODYNE;TRANDATE) 20 mg/4 mL (5 mg/mL) injection 10 mg  10 mg IntraVENous Q4H PRN    amLODIPine (NORVASC) tablet 10 mg  10 mg Oral DAILY    atorvastatin (LIPITOR) tablet 40 mg  40 mg Oral QHS    albuterol (PROVENTIL HFA, VENTOLIN HFA, PROAIR HFA) inhaler 2 Puff  2 Puff Inhalation Q4H PRN    [Held by provider] tamsulosin (FLOMAX) capsule 0.4 mg  0.4 mg Oral DAILY    [Held by provider] gabapentin (NEURONTIN) capsule 300 mg  300 mg Oral BID    [Held by provider] melatonin tablet 3 mg  3 mg Oral QHS    venlafaxine-SR (EFFEXOR-XR) capsule 150 mg  150 mg Oral DAILY WITH BREAKFAST    ferrous sulfate tablet 325 mg  1 Tablet Oral TID    aspirin delayed-release tablet 81 mg  81 mg Oral DAILY    glucose chewable tablet 16 g  4 Tablet Oral PRN    dextrose (D50W) injection syrg 12.5-25 g  25-50 mL IntraVENous PRN    glucagon (GLUCAGEN) injection 1 mg  1 mg IntraMUSCular PRN    sodium chloride (NS) flush 5-40 mL  5-40 mL IntraVENous Q8H    sodium chloride (NS) flush 5-40 mL  5-40 mL IntraVENous PRN    acetaminophen (TYLENOL) tablet 650 mg  650 mg Oral Q6H PRN    Or    acetaminophen (TYLENOL) suppository 650 mg  650 mg Rectal Q6H PRN    ondansetron (ZOFRAN ODT) tablet 4 mg  4 mg Oral Q6H PRN    Or    ondansetron (ZOFRAN) injection 4 mg  4 mg IntraVENous Q6H PRN    famotidine (PEPCID) tablet 20 mg  20 mg Oral DAILY    metoprolol (LOPRESSOR) injection 5 mg  5 mg IntraVENous Q4H    metoprolol succinate (TOPROL-XL) XL tablet 50 mg  50 mg Oral DAILY       Review of Systems:   Review of systems not obtained due to patient factors. Objective:   Physical Exam:     Visit Vitals  BP (!) 201/94 (BP 1 Location: Left upper arm, BP Patient Position: At rest;Supine)   Pulse (!) 54   Temp 97.7 °F (36.5 °C)   Resp 18   Ht 5' 5\" (1.651 m)   Wt 101.2 kg (223 lb)   SpO2 98%   BMI 37.11 kg/m²    O2 Flow Rate (L/min): 2 l/min O2 Device: Nasal cannula    Temp (24hrs), Av.2 °F (36.8 °C), Min:97.7 °F (36.5 °C), Max:98.6 °F (37 °C)    No intake/output data recorded.  1901 -  0700  In: -   Out: 476 [Urine:475]    General:  Awake and alert    Lungs:   Clear to auscultation bilaterally. Chest wall:  No tenderness or deformity.    Heart:  Regular rate and rhythm, S1, S2 normal, no murmur, click, rub or gallop. Abdomen:   Soft, non-tender. Bowel sounds normal. No masses,  No organomegaly. Extremities: Extremities normal, atraumatic, no cyanosis or edema. Pulses: 2+ and symmetric all extremities. Skin: Skin color, texture, turgor normal. No rashes or lesions   Neurologic: CNII-XII intact. Unable to fully assess         Data Review:       Recent Days:  No results for input(s): WBC, HGB, HCT, PLT, HGBEXT, HCTEXT, PLTEXT, HGBEXT, HCTEXT, PLTEXT in the last 72 hours. No results for input(s): NA, K, CL, CO2, GLU, BUN, CREA, CA, MG, PHOS, ALB, TBIL, TBILI, ALT, INR, INREXT, INREXT in the last 72 hours. No lab exists for component: SGOT  No results for input(s): PH, PCO2, PO2, HCO3, FIO2 in the last 72 hours. 24 Hour Results:  Recent Results (from the past 24 hour(s))   GLUCOSE, POC    Collection Time: 12/15/21 12:20 PM   Result Value Ref Range    Glucose (POC) 185 (H) 65 - 117 mg/dL    Performed by Lou Morrison, POC    Collection Time: 12/15/21  4:38 PM   Result Value Ref Range    Glucose (POC) 209 (H) 65 - 117 mg/dL    Performed by Lou Morrison, POC    Collection Time: 12/15/21  8:17 PM   Result Value Ref Range    Glucose (POC) 190 (H) 65 - 117 mg/dL    Performed by Lynn De La Paz    GLUCOSE, POC    Collection Time: 12/16/21  8:06 AM   Result Value Ref Range    Glucose (POC) 193 (H) 65 - 117 mg/dL    Performed by Nadege Saliva    GLUCOSE, POC    Collection Time: 12/16/21 11:17 AM   Result Value Ref Range    Glucose (POC) 165 (H) 65 - 117 mg/dL    Performed by ASHUTOSH JANSEN        XR CHEST PORT   Final Result   No significant interval change. MRI BRAIN WO CONT   Final Result   1. No evidence of acute intracranial ischemia, mass or hemorrhage. 2.  Moderate chronic small vessel ischemic changes. 3.  Mild diffuse parenchymal volume loss. Assessment:  Hypoglycemia in diabetes mellitus type 2, improved.   Suspect she had a prolonged episode of severe hypoglycemia at home    Hypoglycemic encephalopathy, possibly with permanent acquired brain injury    Possible left lower lobe pneumonia, likely aspiration    Chronic kidney disease stage III    History of breast cancer    COPD without exacerbation    Chronic respiratory failure with hypoxia    History of gastritis/esophagitis/gastroparesis  Uncontrolled hypertension    Plan:  Continue supportive care including assistance with all meals  And Imdur 60 mg oral daily  Expect gradual improvement over the course of time  PT OT evaluation  Anticipate discharge to LTC when bed available      Care Plan discussed with: Patient/Family    Disposition: Transfer to medical telemetry    Total time spent with patient: 30 minutes.     Suzanne Devlin MD

## 2021-12-16 NOTE — PROGRESS NOTES
Pt's sister Juan Ramon Francis wants her brother Dmitriy Self and his spouse Dalia Tavera to be notified if and when her sister is accepted at the SNF, if approval happens 12/16/21 or 12/17/21, otherwise Juan Ramon Francis can be notified on Saturday or Sunday.  Dmitriy Self and Mere Santiago contact number [phone]

## 2021-12-17 LAB
GLUCOSE BLD STRIP.AUTO-MCNC: 175 MG/DL (ref 65–117)
GLUCOSE BLD STRIP.AUTO-MCNC: 182 MG/DL (ref 65–117)
GLUCOSE BLD STRIP.AUTO-MCNC: 204 MG/DL (ref 65–117)
GLUCOSE BLD STRIP.AUTO-MCNC: 223 MG/DL (ref 65–117)
GLUCOSE BLD STRIP.AUTO-MCNC: 241 MG/DL (ref 65–117)
GLUCOSE BLD STRIP.AUTO-MCNC: 243 MG/DL (ref 65–117)
PERFORMED BY, TECHID: ABNORMAL

## 2021-12-17 PROCEDURE — 74011250637 HC RX REV CODE- 250/637: Performed by: INTERNAL MEDICINE

## 2021-12-17 PROCEDURE — 74011250636 HC RX REV CODE- 250/636: Performed by: INTERNAL MEDICINE

## 2021-12-17 PROCEDURE — 74011000250 HC RX REV CODE- 250: Performed by: HOSPITALIST

## 2021-12-17 PROCEDURE — 97530 THERAPEUTIC ACTIVITIES: CPT

## 2021-12-17 PROCEDURE — 74011000258 HC RX REV CODE- 258: Performed by: INTERNAL MEDICINE

## 2021-12-17 PROCEDURE — 94760 N-INVAS EAR/PLS OXIMETRY 1: CPT

## 2021-12-17 PROCEDURE — 65270000029 HC RM PRIVATE

## 2021-12-17 PROCEDURE — 74011250637 HC RX REV CODE- 250/637: Performed by: HOSPITALIST

## 2021-12-17 PROCEDURE — 77010033678 HC OXYGEN DAILY

## 2021-12-17 PROCEDURE — 82962 GLUCOSE BLOOD TEST: CPT

## 2021-12-17 RX ADMIN — PIPERACILLIN SODIUM AND TAZOBACTAM SODIUM 3.38 G: 3; .375 INJECTION, POWDER, LYOPHILIZED, FOR SOLUTION INTRAVENOUS at 18:15

## 2021-12-17 RX ADMIN — NITROGLYCERIN 1 INCH: 20 OINTMENT TOPICAL at 18:15

## 2021-12-17 RX ADMIN — VENLAFAXINE HYDROCHLORIDE 150 MG: 75 CAPSULE, EXTENDED RELEASE ORAL at 11:07

## 2021-12-17 RX ADMIN — METOPROLOL TARTRATE 5 MG: 5 INJECTION INTRAVENOUS at 18:15

## 2021-12-17 RX ADMIN — Medication 10 ML: at 13:58

## 2021-12-17 RX ADMIN — Medication 10 ML: at 05:50

## 2021-12-17 RX ADMIN — FERROUS SULFATE TAB 325 MG (65 MG ELEMENTAL FE) 325 MG: 325 (65 FE) TAB at 18:15

## 2021-12-17 RX ADMIN — ASPIRIN 81 MG: 81 TABLET, COATED ORAL at 11:07

## 2021-12-17 RX ADMIN — NITROGLYCERIN 1 INCH: 20 OINTMENT TOPICAL at 14:01

## 2021-12-17 RX ADMIN — METOPROLOL SUCCINATE 50 MG: 25 TABLET, EXTENDED RELEASE ORAL at 09:00

## 2021-12-17 RX ADMIN — FERROUS SULFATE TAB 325 MG (65 MG ELEMENTAL FE) 325 MG: 325 (65 FE) TAB at 11:07

## 2021-12-17 RX ADMIN — FAMOTIDINE 20 MG: 20 TABLET, FILM COATED ORAL at 11:07

## 2021-12-17 RX ADMIN — NITROGLYCERIN 1 INCH: 20 OINTMENT TOPICAL at 01:08

## 2021-12-17 RX ADMIN — NITROGLYCERIN 1 INCH: 20 OINTMENT TOPICAL at 06:00

## 2021-12-17 RX ADMIN — Medication 10 ML: at 22:32

## 2021-12-17 RX ADMIN — AMLODIPINE BESYLATE 10 MG: 5 TABLET ORAL at 11:07

## 2021-12-17 RX ADMIN — FERROUS SULFATE TAB 325 MG (65 MG ELEMENTAL FE) 325 MG: 325 (65 FE) TAB at 22:31

## 2021-12-17 RX ADMIN — METOPROLOL TARTRATE 5 MG: 5 INJECTION INTRAVENOUS at 01:08

## 2021-12-17 RX ADMIN — METOPROLOL TARTRATE 5 MG: 5 INJECTION INTRAVENOUS at 22:31

## 2021-12-17 RX ADMIN — ISOSORBIDE MONONITRATE 60 MG: 60 TABLET, EXTENDED RELEASE ORAL at 11:07

## 2021-12-17 RX ADMIN — PIPERACILLIN SODIUM AND TAZOBACTAM SODIUM 3.38 G: 3; .375 INJECTION, POWDER, LYOPHILIZED, FOR SOLUTION INTRAVENOUS at 01:08

## 2021-12-17 RX ADMIN — PIPERACILLIN SODIUM AND TAZOBACTAM SODIUM 3.38 G: 3; .375 INJECTION, POWDER, LYOPHILIZED, FOR SOLUTION INTRAVENOUS at 11:07

## 2021-12-17 RX ADMIN — ATORVASTATIN CALCIUM 40 MG: 40 TABLET, FILM COATED ORAL at 22:31

## 2021-12-17 RX ADMIN — METOPROLOL TARTRATE 5 MG: 5 INJECTION INTRAVENOUS at 05:45

## 2021-12-17 NOTE — PROGRESS NOTES
Comprehensive Nutrition Assessment    Type and Reason for Visit: Reassess (goals)    Nutrition Recommendations/Plan:   Continue diet as ordered  Would benefit from feeding assistance   D/c current supplement Ensure HP BID  Start Magic Cup BID      Nutrition Assessment:   PMHx DM, Breast CA, HTN. NKFA      Malnutrition Assessment:  Malnutrition Status:  Mild malnutrition    Context:  Acute illness     Findings of the 6 clinical characteristics of malnutrition:   Energy Intake:  7 - 50% or less of est energy requirements for 5 or more days  Weight Loss:  No significant weight loss     Body Fat Loss:  No significant body fat loss,      Fluid Accumulation:  No significant fluid accumulation,     Strength:  Not performed         Estimated Daily Nutrient Needs:  Energy (kcal): 1700cal (25kcal/kg); Weight Used for Energy Requirements: Current  Protein (g): 68g (1.0g/kg); Weight Used for Protein Requirements: Current  Fluid (ml/day): 1700 ml; Method Used for Fluid Requirements: 1 ml/kcal      Nutrition Related Findings:   Admitted for metabolic encephalopathy. Given D50, but pt continue with AMS and unable to have oral PO per MD. NG tube placed started on Glucerna 1.5 at 50ml/hr by MD, but then pulled out last night. Pt ordered regular diet, RD visited pt bedside with lunch meal provided and cut up for easibilty of feeding self. Limited interaction with this writer and low motivation to feed self. Would benefit from feeding assistance. Appeared sleepy. Pt reports dislike of supplement provided.  Labs: 12/17 glucose 204/175/182 Meds: statin, pepcid, FeSu, lopressor, zosyn, IVF, effexor    Wounds:    None       Current Nutrition Therapies:  ADULT DIET Regular  ADULT ORAL NUTRITION SUPPLEMENT Dinner, Breakfast; Low Calorie/High Protein    Anthropometric Measures:  · Height:  5' 5\" (165.1 cm)  · Current Body Wt:  101.2 kg (223 lb 1.7 oz)   · Usual Body Wt:  99.8 kg (220 lb)     · Ideal Body Wt:  125 lbs:  178.5 % · Adjusted Body Weight:   ; Weight Adjustment for: No adjustment    · BMI Category:  Obese class 2 (BMI 35.0-39. 9)       Nutrition Diagnosis:   · Inadequate protein-energy intake related to cognitive or neurological impairment as evidenced by intake 26-50%      Nutrition Interventions:   Food and/or Nutrient Delivery: Continue current diet,Modify oral nutrition supplement  Nutrition Education and Counseling: No recommendations at this time  Coordination of Nutrition Care: Continue to monitor while inpatient    Goals:  >75% of EENs, glucose control, weight stable       Nutrition Monitoring and Evaluation:   Behavioral-Environmental Outcomes: None identified  Food/Nutrient Intake Outcomes: Food and nutrient intake  Physical Signs/Symptoms Outcomes: Weight,Biochemical data,Meal time behavior    Discharge Planning:    No discharge needs at this time     Electronically signed by Linus Bryan RD on 12/17/2021 at 12:09 PM    Contact: [phone]

## 2021-12-17 NOTE — PROGRESS NOTES
Hospitalist Progress Note               Daily Progress Note: 12/17/2021      Subjective:   Hospital course to date: Patient is a 77-year-old female with history of diabetes, breast cancer and hypertension who was brought to the ED on 12/5 after being found by family unresponsive the night before. She had been in the ED the day before that with hypoglycemia. He was noted have a blood sugar of 51 at home and was given D10 but no significant improvement. Chest x-ray showed left lower lobe pneumonia. She was transferred from [facility] ED. Blood sugar on arrival here was 73. She was started on D10. All of patient's diabetic medications were supposed to have been previously discontinued although unclear if she had been inadvertently taking them. According to her sister the last time she took insulin was Friday of last week.     ------  Patient seen this morning for follow-up.  No reports of fevers overnight. Blood pressure remains labile. Imdur 60mg started yesterday    Problem List:  Problem List as of 12/17/2021 Date Reviewed: 12/5/2021          Codes Class Noted - Resolved    Metabolic encephalopathy [id number]: G93.41  ICD-9-CM: 348.31  12/5/2021 - Present        CKD (chronic kidney disease) ICD-10-CM: N18.9  ICD-9-CM: 585.9  9/24/2021 - Present    Overview Signed 9/24/2021  3:57 PM by Navya Tyson MD     In the setting of severe, uncontrolled and prolonged diabetes, with diabetic retinopathy. Protienuria to 2.5g/g of creatinine. Would consider workup for acute GN given inconsistent history and rising creatinine.  Though clinical hx appears consistent with aggressive diabetic nephropathy             Sepsis ([facility] [address] ICD-10-CM: A41.9  ICD-9-CM: 038.9, 995.91  9/22/2021 - Present        Esophagitis with gastritis ICD-10-CM: K29.70, K20.90  ICD-9-CM: 530.19  9/22/2021 - Present        Elevated troponin ICD-10-CM: R77.8  ICD-9-CM: 790.6  9/20/2021 - Present        Syncope ICD-10-CM: R55  ICD-9-CM: 780.2  9/20/2021 - Present        Hypoglycemia ICD-10-CM: E16.2  ICD-9-CM: 251.2  9/20/2021 - Present        Chronic respiratory failure with hypoxia [facility]) ICD-10-CM: J96.11  ICD-9-CM: 518.83, 799.02  9/20/2021 - Present        Chest pain ICD-10-CM: R07.9  ICD-9-CM: 786.50  9/19/2021 - Present        Anxiety ICD-10-CM: F41.9  ICD-9-CM: 300.00  2/9/2021 - Present        Depression ICD-10-CM: F32. A  ICD-9-CM: 018  2/9/2021 - Present        Dyslipidemia ICD-10-CM: E78.5  ICD-9-CM: 272.4  2/9/2021 - Present        Hypertensive cardiovascular disease ICD-10-CM: I11.9  ICD-9-CM: 402.90  2/9/2021 - Present        Knee osteoarthritis ICD-10-CM: M17.10  ICD-9-CM: 715.36  1/25/2021 - Present        COPD with acute exacerbation ([facility] ICD-10-CM: J44.1  ICD-9-CM: 491.21  11/29/2020 - Present        Person under investigation for COVID-19 ICD-10-CM: Z20.822  ICD-9-CM: V01.79  11/29/2020 - Present        COPD (chronic obstructive pulmonary disease) ([facility] ICD-10-CM: J44.9  ICD-9-CM: 617  11/29/2020 - Present        DM (diabetes mellitus) ([facility] ICD-10-CM: E11.9  ICD-9-CM: 250.00  11/29/2020 - Present        COVID-19 ICD-10-CM: U07.1  ICD-9-CM: 079.89  10/1/2020 - Present        HTN (hypertension) ICD-10-CM: I10  ICD-9-CM: 401.9  10/1/2020 - Present        Diabetic gastroparesis ([facility] ICD-10-CM: E11.43, K31.84  ICD-9-CM: 250.60, 536.3  10/1/2020 - Present        DVT prophylaxis ICD-10-CM: Z29.9  ICD-9-CM: V07.9  10/1/2020 - Present        PNA (pneumonia) ICD-10-CM: J18.9  ICD-9-CM: 342  9/30/2020 - Present        Acute respiratory failure with hypoxia [facility]) ICD-10-CM: J96.01  ICD-9-CM: 518.81  9/30/2020 - Present        Acute kidney injury ([facility] [facility] ICD-10-CM: N17.9  ICD-9-CM: 584.9  9/30/2020 - Present        Type 2 diabetes mellitus with hyperglycemia, with long-term current use of insulin (HCC) ICD-10-CM: E11.65, Z79.4  ICD-9-CM: 250.00, 790.29, V58.67  9/30/2020 - Present        Ureteral stone ICD-10-CM: N20.1  ICD-9-CM: 592.1 2/15/2019 - Present        S/P lumbar fusion ICD-10-CM: Z98.1  ICD-9-CM: V45.4  10/16/2018 - Present        CAP (community acquired pneumonia) ICD-10-CM: J18.9  ICD-9-CM: 799  9/1/2018 - Present        Shortness of breath ICD-10-CM: R06.02  ICD-9-CM: 786.05  9/1/2018 - Present        Fever and chills ICD-10-CM: R50.9  ICD-9-CM: 780.60  9/1/2018 - Present        Spinal stenosis of lumbar region at multiple levels ICD-10-CM: M48.061  ICD-9-CM: 724.02  8/30/2018 - Present        Scoliosis ICD-10-CM: M41.9  ICD-9-CM: 737.30  8/30/2018 - Present        Spinal stenosis ICD-10-CM: M48.00  ICD-9-CM: 724.00  8/30/2018 - Present        Pre-op testing ICD-10-CM: Y17.839  ICD-9-CM: V72.84  8/27/2018 - Present        Severe obesity (BMI 35.0-39. 9) ICD-10-CM: E66.01  ICD-9-CM: 278.01  6/20/2018 - Present        Spinal stenosis, lumbar region without neurogenic claudication ICD-10-CM: M48.061  ICD-9-CM: 724.02  5/16/2018 - Present        Lumbosacral spondylosis without myelopathy ICD-10-CM: M47.817  ICD-9-CM: 721.3  4/25/2018 - Present        DDD (degenerative disc disease), lumbar ICD-10-CM: M51.36  ICD-9-CM: 722.52  4/25/2018 - Present        Malignant neoplasm of female breast [facility]) ICD-10-CM: C50.919  ICD-9-CM: 174.9  4/25/2018 - Present              Medications reviewed  Current Facility-Administered Medications   Medication Dose Route Frequency    isosorbide mononitrate ER (IMDUR) tablet 60 mg  60 mg Oral DAILY    dextrose 5% infusion  75 mL/hr IntraVENous CONTINUOUS    hydrALAZINE (APRESOLINE) 20 mg/mL injection 10 mg  10 mg IntraVENous Q4H PRN    piperacillin-tazobactam (ZOSYN) 3.375 g in 0.9% sodium chloride (MBP/ADV) 100 mL MBP  3.375 g IntraVENous Q8H    nitroglycerin (NITROBID) 2 % ointment 1 Inch  1 Inch Topical Q6H    labetaloL (NORMODYNE;TRANDATE) 20 mg/4 mL (5 mg/mL) injection 10 mg  10 mg IntraVENous Q4H PRN    amLODIPine (NORVASC) tablet 10 mg  10 mg Oral DAILY    atorvastatin (LIPITOR) tablet 40 mg  40 mg Oral QHS    albuterol (PROVENTIL HFA, VENTOLIN HFA, PROAIR HFA) inhaler 2 Puff  2 Puff Inhalation Q4H PRN    [Held by provider] tamsulosin (FLOMAX) capsule 0.4 mg  0.4 mg Oral DAILY    [Held by provider] gabapentin (NEURONTIN) capsule 300 mg  300 mg Oral BID    [Held by provider] melatonin tablet 3 mg  3 mg Oral QHS    venlafaxine-SR (EFFEXOR-XR) capsule 150 mg  150 mg Oral DAILY WITH BREAKFAST    ferrous sulfate tablet 325 mg  1 Tablet Oral TID    aspirin delayed-release tablet 81 mg  81 mg Oral DAILY    glucose chewable tablet 16 g  4 Tablet Oral PRN    dextrose (D50W) injection syrg 12.5-25 g  25-50 mL IntraVENous PRN    glucagon (GLUCAGEN) injection 1 mg  1 mg IntraMUSCular PRN    sodium chloride (NS) flush 5-40 mL  5-40 mL IntraVENous Q8H    sodium chloride (NS) flush 5-40 mL  5-40 mL IntraVENous PRN    acetaminophen (TYLENOL) tablet 650 mg  650 mg Oral Q6H PRN    Or    acetaminophen (TYLENOL) suppository 650 mg  650 mg Rectal Q6H PRN    ondansetron (ZOFRAN ODT) tablet 4 mg  4 mg Oral Q6H PRN    Or    ondansetron (ZOFRAN) injection 4 mg  4 mg IntraVENous Q6H PRN    famotidine (PEPCID) tablet 20 mg  20 mg Oral DAILY    metoprolol (LOPRESSOR) injection 5 mg  5 mg IntraVENous Q4H    metoprolol succinate (TOPROL-XL) XL tablet 50 mg  50 mg Oral DAILY       Review of Systems:   Review of systems not obtained due to patient factors. Objective:   Physical Exam:     Visit Vitals  BP (!) 176/71 (BP 1 Location: Right upper arm)   Pulse (!) 56   Temp 97.7 °F (36.5 °C)   Resp 16   Ht 5' 5\" (1.651 m)   Wt 101.2 kg (223 lb)   SpO2 98%   BMI 37.11 kg/m²    O2 Flow Rate (L/min): 2 l/min O2 Device: Nasal cannula    Temp (24hrs), Av.8 °F (36.6 °C), Min:97 °F (36.1 °C), Max:98.2 °F (36.8 °C)    No intake/output data recorded. 12/15 1901 -  0700  In: -   Out: 5887 [Urine:1475]    General:  Awake and alert    Lungs:   Clear to auscultation bilaterally. Chest wall:  No tenderness or deformity. Heart:  Regular rate and rhythm, S1, S2 normal, no murmur, click, rub or gallop. Abdomen:   Soft, non-tender. Bowel sounds normal. No masses,  No organomegaly. Extremities: Extremities normal, atraumatic, no cyanosis or edema. Pulses: 2+ and symmetric all extremities. Skin: Skin color, texture, turgor normal. No rashes or lesions   Neurologic: CNII-XII intact. Unable to fully assess         Data Review:       Recent Days:  No results for input(s): WBC, HGB, HCT, PLT, HGBEXT, HCTEXT, PLTEXT, HGBEXT, HCTEXT, PLTEXT in the last 72 hours. No results for input(s): NA, K, CL, CO2, GLU, BUN, CREA, CA, MG, PHOS, ALB, TBIL, TBILI, ALT, INR, INREXT, INREXT in the last 72 hours. No lab exists for component: SGOT  No results for input(s): PH, PCO2, PO2, HCO3, FIO2 in the last 72 hours. 24 Hour Results:  Recent Results (from the past 24 hour(s))   GLUCOSE, POC    Collection Time: 12/16/21  4:29 PM   Result Value Ref Range    Glucose (POC) 258 (H) 65 - 117 mg/dL    Performed by Wayne Engel    GLUCOSE, POC    Collection Time: 12/16/21  8:24 PM   Result Value Ref Range    Glucose (POC) 261 (H) 65 - 117 mg/dL    Performed by Juan Guzman, POC    Collection Time: 12/16/21 11:51 PM   Result Value Ref Range    Glucose (POC) 212 (H) 65 - 117 mg/dL    Performed by Claritza Malone 10, POC    Collection Time: 12/17/21  6:34 AM   Result Value Ref Range    Glucose (POC) 182 (H) 65 - 117 mg/dL    Performed by Pod Kira 10, POC    Collection Time: 12/17/21  8:32 AM   Result Value Ref Range    Glucose (POC) 175 (H) 65 - 117 mg/dL    Performed by Wayne Engel    GLUCOSE, POC    Collection Time: 12/17/21 11:41 AM   Result Value Ref Range    Glucose (POC) 204 (H) 65 - 117 mg/dL    Performed by ASHUTOSH JANSEN        XR CHEST PORT   Final Result   No significant interval change. MRI BRAIN WO CONT   Final Result   1.   No evidence of acute intracranial ischemia, mass or hemorrhage. 2.  Moderate chronic small vessel ischemic changes. 3.  Mild diffuse parenchymal volume loss. Assessment:  Hypoglycemia in diabetes mellitus type 2, improved. Suspect she had a prolonged episode of severe hypoglycemia at home    Hypoglycemic encephalopathy, possibly with permanent acquired brain injury    Possible left lower lobe pneumonia, likely aspiration    Chronic kidney disease stage III    History of breast cancer    COPD without exacerbation    Chronic respiratory failure with hypoxia    History of gastritis/esophagitis/gastroparesis    Uncontrolled hypertension    Plan:  Continue supportive care including assistance with all meals  Continue Imdur 60 mg oral daily in addition to other BP meds  Expect gradual improvement over the course of time  PT OT evaluation  Anticipate discharge to LTC when bed available      Care Plan discussed with: Patient/Family    Disposition: Transfer to medical telemetry    Total time spent with patient: 30 minutes.     Vinicius Salazar MD

## 2021-12-17 NOTE — PROGRESS NOTES
PHYSICAL THERAPY TREATMENT  Patient: Jaiden Sinclair (13 y.o. female)  Date: 12/17/2021  Diagnosis: Metabolic encephalopathy [H34.06]  Hypoglycemia [E16.2] <principal problem not specified>       Precautions:    Chart, physical therapy assessment, plan of care and goals were reviewed. ASSESSMENT  Patient continues with skilled PT services and is progressing towards goals. Pt semi supine in bed upon arrival and agreeable to session. Completed sup>sit with mod A 2/2 decreased command following. Pt performed STS with min A. Ambulated with RW and CGA, requires constant vc, tactile cues, and manual cues 2/2 decreased attention/concentration/command following. Pt took seated rest break ~2-3 minutes while RN and PTA fixed telemetry box. Pt gait is very slow and requires constant vc and manual cues to keep stepping. Pt returned to semi supine position post gait, set up with lunch, assisted with cutting food. Left with call bell in reach and needs met. Recommending d/c to SNF once medically appropriate. Current Level of Function Impacting Discharge (mobility/balance): assistance required for all mobility     Other factors to consider for discharge: PLOF, time since onset, severity of deficits          PLAN :  Patient continues to benefit from skilled intervention to address the above impairments. Continue treatment per established plan of care. to address goals. Recommendation for discharge: (in order for the patient to meet his/her long term goals)  Baudilio Stacy    This discharge recommendation:  Has been made in collaboration with the attending provider and/or case management    IF patient discharges home will need the following DME: to be determined (TBD)       SUBJECTIVE:   Patient stated i'm having no pain.     OBJECTIVE DATA SUMMARY:   Critical Behavior:  Neurologic State: Alert  Orientation Level: Oriented to person  Cognition: Decreased attention/concentration,Decreased command following  Safety/Judgement: Decreased insight into deficits,Decreased awareness of need for safety,Decreased awareness of need for assistance,Decreased awareness of environment    Functional Mobility Training:  Bed Mobility:  Supine to Sit: Moderate assistance  Sit to Supine: Contact guard assistance  Scooting: Contact guard assistance    Transfers:  Sit to Stand: Minimum assistance  Stand to Sit: Minimum assistance    Balance:  Sitting: Impaired; Without support  Sitting - Static: Good (unsupported)  Sitting - Dynamic: Fair (occasional)  Standing: Impaired; With support  Standing - Static: Constant support;Good  Standing - Dynamic : Constant support; Fair    Ambulation/Gait Training:  Distance (ft): 25 Feet (ft)  Assistive Device: Gait belt;Walker, rolling  Ambulation - Level of Assistance: Minimal assistance      Pain Ratin/10    Activity Tolerance:   Fair and requires rest breaks  Please refer to the flowsheet for vital signs taken during this treatment. After treatment patient left in no apparent distress:   Supine in bed, Call bell within reach, Bed / chair alarm activated, and Side rails x 3    COMMUNICATION/COLLABORATION:   The patients plan of care was discussed with: Certified nursing assistant/patient care technician. Problem: Mobility Impaired (Adult and Pediatric)  Goal: *Acute Goals and Plan of Care (Insert Text)  Description: Pt will be I with LE HEP in 7 days. Pt will perform bed mobility with mod I in 7 days. Pt will perform transfers with CGA in 7 days. Pt will amb- 50 feet with LRAD safely with CGA in 7 days.    Outcome: Progressing Towards Goal       Audrey Pinedo PTA   Time Calculation: 39 mins

## 2021-12-18 LAB
GLUCOSE BLD STRIP.AUTO-MCNC: 139 MG/DL (ref 65–117)
GLUCOSE BLD STRIP.AUTO-MCNC: 177 MG/DL (ref 65–117)
GLUCOSE BLD STRIP.AUTO-MCNC: 221 MG/DL (ref 65–117)
GLUCOSE BLD STRIP.AUTO-MCNC: 239 MG/DL (ref 65–117)
PERFORMED BY, TECHID: ABNORMAL

## 2021-12-18 PROCEDURE — 74011636637 HC RX REV CODE- 636/637: Performed by: HOSPITALIST

## 2021-12-18 PROCEDURE — 74011250637 HC RX REV CODE- 250/637: Performed by: INTERNAL MEDICINE

## 2021-12-18 PROCEDURE — 74011000258 HC RX REV CODE- 258: Performed by: INTERNAL MEDICINE

## 2021-12-18 PROCEDURE — 74011250636 HC RX REV CODE- 250/636: Performed by: INTERNAL MEDICINE

## 2021-12-18 PROCEDURE — 74011250637 HC RX REV CODE- 250/637: Performed by: HOSPITALIST

## 2021-12-18 PROCEDURE — 82962 GLUCOSE BLOOD TEST: CPT

## 2021-12-18 PROCEDURE — 97530 THERAPEUTIC ACTIVITIES: CPT

## 2021-12-18 PROCEDURE — 65270000029 HC RM PRIVATE

## 2021-12-18 PROCEDURE — 74011000250 HC RX REV CODE- 250: Performed by: HOSPITALIST

## 2021-12-18 RX ORDER — HYDRALAZINE HYDROCHLORIDE 50 MG/1
50 TABLET, FILM COATED ORAL 3 TIMES DAILY
Status: DISCONTINUED | OUTPATIENT
Start: 2021-12-18 | End: 2021-12-19

## 2021-12-18 RX ORDER — CARVEDILOL 3.12 MG/1
6.25 TABLET ORAL 2 TIMES DAILY WITH MEALS
Status: DISCONTINUED | OUTPATIENT
Start: 2021-12-19 | End: 2021-12-22 | Stop reason: HOSPADM

## 2021-12-18 RX ORDER — INSULIN LISPRO 100 [IU]/ML
INJECTION, SOLUTION INTRAVENOUS; SUBCUTANEOUS
Status: DISCONTINUED | OUTPATIENT
Start: 2021-12-18 | End: 2021-12-22 | Stop reason: HOSPADM

## 2021-12-18 RX ADMIN — NITROGLYCERIN 1 INCH: 20 OINTMENT TOPICAL at 18:48

## 2021-12-18 RX ADMIN — ATORVASTATIN CALCIUM 40 MG: 40 TABLET, FILM COATED ORAL at 21:43

## 2021-12-18 RX ADMIN — NITROGLYCERIN 1 INCH: 20 OINTMENT TOPICAL at 06:14

## 2021-12-18 RX ADMIN — ASPIRIN 81 MG: 81 TABLET, COATED ORAL at 08:09

## 2021-12-18 RX ADMIN — AMLODIPINE BESYLATE 10 MG: 5 TABLET ORAL at 08:09

## 2021-12-18 RX ADMIN — METOPROLOL TARTRATE 5 MG: 5 INJECTION INTRAVENOUS at 11:02

## 2021-12-18 RX ADMIN — INSULIN LISPRO 4 UNITS: 100 INJECTION, SOLUTION INTRAVENOUS; SUBCUTANEOUS at 21:43

## 2021-12-18 RX ADMIN — ISOSORBIDE MONONITRATE 60 MG: 60 TABLET, EXTENDED RELEASE ORAL at 08:09

## 2021-12-18 RX ADMIN — FAMOTIDINE 20 MG: 20 TABLET, FILM COATED ORAL at 08:08

## 2021-12-18 RX ADMIN — Medication 10 ML: at 05:04

## 2021-12-18 RX ADMIN — METOPROLOL TARTRATE 5 MG: 5 INJECTION INTRAVENOUS at 02:12

## 2021-12-18 RX ADMIN — PIPERACILLIN SODIUM AND TAZOBACTAM SODIUM 3.38 G: 3; .375 INJECTION, POWDER, LYOPHILIZED, FOR SOLUTION INTRAVENOUS at 10:20

## 2021-12-18 RX ADMIN — PIPERACILLIN SODIUM AND TAZOBACTAM SODIUM 3.38 G: 3; .375 INJECTION, POWDER, LYOPHILIZED, FOR SOLUTION INTRAVENOUS at 02:12

## 2021-12-18 RX ADMIN — FERROUS SULFATE TAB 325 MG (65 MG ELEMENTAL FE) 325 MG: 325 (65 FE) TAB at 08:10

## 2021-12-18 RX ADMIN — NITROGLYCERIN 1 INCH: 20 OINTMENT TOPICAL at 23:28

## 2021-12-18 RX ADMIN — Medication 10 ML: at 21:44

## 2021-12-18 RX ADMIN — NITROGLYCERIN 1 INCH: 20 OINTMENT TOPICAL at 02:11

## 2021-12-18 RX ADMIN — VENLAFAXINE HYDROCHLORIDE 150 MG: 75 CAPSULE, EXTENDED RELEASE ORAL at 08:09

## 2021-12-18 RX ADMIN — FERROUS SULFATE TAB 325 MG (65 MG ELEMENTAL FE) 325 MG: 325 (65 FE) TAB at 17:28

## 2021-12-18 RX ADMIN — METOPROLOL TARTRATE 5 MG: 5 INJECTION INTRAVENOUS at 06:14

## 2021-12-18 RX ADMIN — FERROUS SULFATE TAB 325 MG (65 MG ELEMENTAL FE) 325 MG: 325 (65 FE) TAB at 21:43

## 2021-12-18 RX ADMIN — Medication 10 ML: at 17:20

## 2021-12-18 RX ADMIN — METOPROLOL SUCCINATE 50 MG: 25 TABLET, EXTENDED RELEASE ORAL at 08:09

## 2021-12-18 RX ADMIN — HYDRALAZINE HYDROCHLORIDE 50 MG: 50 TABLET, FILM COATED ORAL at 14:31

## 2021-12-18 NOTE — PROGRESS NOTES
Problem: Self Care Deficits Care Plan (Adult)  Goal: *Acute Goals and Plan of Care (Insert Text)  Description: 1. Pt will be min A sup <> sit in prep for EOB ADLs  2. Pt will be min A grooming sitting EOB  3. Pt will be min A LE dressing sitting EOB/long sit  4. Pt will be min A sit <>  prep for toileting LRAD  5. Pt will be min A toileting/toilet transfer/cloth mgmt LRAD  6. Pt will be IND following UE HEP in prep for self care tasks      Outcome: Progressing Towards Goal     Problem: Patient Education: Go to Patient Education Activity  Goal: Patient/Family Education  Outcome: Progressing Towards Goal   OCCUPATIONAL THERAPY TREATMENT  Patient: Pia Segundo (46 y.o. female)  Date: 12/18/2021  Diagnosis: Metabolic encephalopathy [I45.74]  Hypoglycemia [E16.2] <principal problem not specified>       Precautions:    Chart, occupational therapy assessment, plan of care, and goals were reviewed. ASSESSMENT  Patient continues with skilled OT services and is not progressing towards goals. Patient received supine in bed agreeable to working with therapy services but not opening eyes and demonstrating decreased command following. After several minutes, patient rolled and sat EOB with min-mod Ax2 with cueing on use of bed rail for assistance. Upon sitting, patient began combing R side of hair with set up A but then required min A to comb L side of hair due to patient not following commands. Patient then performed grooming routine washing face with set up A and then max A for UB washing due to decreased command following. While patient seated EOB, attempted seated therex with patient not following commands or opening eyes. Patient only responding to her name but otherwise seemingly not listening to therapists. Patient completed single sit to stand from EOB with min Ax2 and took two steps forward with RW min A. Patient then sat EOB and went to supine with mod Ax2.  Due to decreased command following, ended patient session at that point with patient semi supine in bed, call bell within reach, and all needs met. Current Level of Function Impacting Discharge (ADLs): command following, alertness    Other factors to consider for discharge: PLOF, support         PLAN :  Patient continues to benefit from skilled intervention to address the above impairments. Continue treatment per established plan of care. to address goals. Recommendation for discharge: (in order for the patient to meet his/her long term goals)  Baudilio Stacy    This discharge recommendation:  Has been made in collaboration with the attending provider and/or case management    IF patient discharges home will need the following DME: gait belt, hospital bed, and wheelchair       SUBJECTIVE:   Patient stated what?!. when saying patient name    OBJECTIVE DATA SUMMARY:   Cognitive/Behavioral Status:  Neurologic State: Drowsy  Orientation Level: Oriented to person  Cognition: Decreased attention/concentration;Decreased command following;Poor safety awareness    Functional Mobility and Transfers for ADLs:  Bed Mobility:  Rolling: Moderate assistance;Assist x1  Supine to Sit: Minimum assistance;Assist x2  Sit to Supine: Minimum assistance;Assist x2  Scooting: Minimum assistance;Assist x2    Transfers:  Sit to Stand: Minimum assistance;Assist x2    Balance:  Sitting: Impaired; Without support  Sitting - Static: Good (unsupported)  Sitting - Dynamic: Fair (occasional)  Standing: Impaired; With support  Standing - Static: Good;Constant support  Standing - Dynamic : Fair;Constant support    ADL Intervention:    Grooming  Washing Face: Set-up  Brushing/Combing Hair: Minimum assistance    Upper Body Bathing  Bathing Assistance: Maximum assistance  Position Performed: Seated edge of bed    Toileting  Bladder Hygiene:  Total assistance (dependent)      Pain:  0/10    Activity Tolerance:   Poor  Please refer to the flowsheet for vital signs taken during this treatment. After treatment patient left in no apparent distress:   Supine in bed, Call bell within reach, and Side rails x 3    COMMUNICATION/COLLABORATION:   The patients plan of care was discussed with: Physical therapy assistant. Co treatment provided with PTA this date for increased clinician and patient safety due to poor following of commands and safety demonstrated of patient.     Bakari Schneider  Time Calculation: 25 mins

## 2021-12-18 NOTE — PROGRESS NOTES
Problem: Diabetes Self-Management  Goal: *Disease process and treatment process  Description: Define diabetes and identify own type of diabetes; list 3 options for treating diabetes. Outcome: Progressing Towards Goal  Goal: *Incorporating nutritional management into lifestyle  Description: Describe effect of type, amount and timing of food on blood glucose; list 3 methods for planning meals. Outcome: Progressing Towards Goal  Goal: *Incorporating physical activity into lifestyle  Description: State effect of exercise on blood glucose levels. Outcome: Progressing Towards Goal  Goal: *Developing strategies to promote health/change behavior  Description: Define the ABC's of diabetes; identify appropriate screenings, schedule and personal plan for screenings. Outcome: Progressing Towards Goal  Goal: *Using medications safely  Description: State effect of diabetes medications on diabetes; name diabetes medication taking, action and side effects. Outcome: Progressing Towards Goal  Goal: *Monitoring blood glucose, interpreting and using results  Description: Identify recommended blood glucose targets  and personal targets. Outcome: Progressing Towards Goal  Goal: *Prevention, detection, treatment of acute complications  Description: List symptoms of hyper- and hypoglycemia; describe how to treat low blood sugar and actions for lowering  high blood glucose level. Outcome: Progressing Towards Goal  Goal: *Prevention, detection and treatment of chronic complications  Description: Define the natural course of diabetes and describe the relationship of blood glucose levels to long term complications of diabetes.   Outcome: Progressing Towards Goal  Goal: *Developing strategies to address psychosocial issues  Description: Describe feelings about living with diabetes; identify support needed and support network  Outcome: Progressing Towards Goal  Goal: *Insulin pump training  Outcome: Progressing Towards Goal  Goal: *Sick day guidelines  Outcome: Progressing Towards Goal  Goal: *Patient Specific Goal (EDIT GOAL, INSERT TEXT)  Outcome: Progressing Towards Goal     Problem: Patient Education: Go to Patient Education Activity  Goal: Patient/Family Education  Outcome: Progressing Towards Goal     Problem: Pressure Injury - Risk of  Goal: *Prevention of pressure injury  Description: Document Michele Scale and appropriate interventions in the flowsheet. Outcome: Progressing Towards Goal  Note: Pressure Injury Interventions:  Sensory Interventions: Chair cushion,Discuss PT/OT consult with provider,Float heels,Keep linens dry and wrinkle-free    Moisture Interventions: Absorbent underpads    Activity Interventions: Chair cushion,PT/OT evaluation    Mobility Interventions: Float heels,HOB 30 degrees or less    Nutrition Interventions: Document food/fluid/supplement intake    Friction and Shear Interventions: Minimize layers,Lift sheet                Problem: Patient Education: Go to Patient Education Activity  Goal: Patient/Family Education  Outcome: Progressing Towards Goal     Problem: Falls - Risk of  Goal: *Absence of Falls  Description: Document Karin Fall Risk and appropriate interventions in the flowsheet.   Outcome: Progressing Towards Goal  Note: Fall Risk Interventions:  Mobility Interventions: Bed/chair exit alarm    Mentation Interventions: Bed/chair exit alarm    Medication Interventions: Bed/chair exit alarm    Elimination Interventions: Bed/chair exit alarm,Call light in reach    History of Falls Interventions: Bed/chair exit alarm,Door open when patient unattended         Problem: Patient Education: Go to Patient Education Activity  Goal: Patient/Family Education  Outcome: Progressing Towards Goal     Problem: Patient Education: Go to Patient Education Activity  Goal: Patient/Family Education  Outcome: Progressing Towards Goal     Problem: Patient Education: Go to Patient Education Activity  Goal: Patient/Family Education  Outcome: Progressing Towards Goal

## 2021-12-18 NOTE — PROGRESS NOTES
Problem: Mobility Impaired (Adult and Pediatric)  Goal: *Acute Goals and Plan of Care (Insert Text)  Description: Pt will be I with LE HEP in 7 days. Pt will perform bed mobility with mod I in 7 days. Pt will perform transfers with CGA in 7 days. Pt will amb- 50 feet with LRAD safely with CGA in 7 days. Outcome: Progressing Towards Goal   PHYSICAL THERAPY TREATMENT  Patient: Karri Montgomery (89 y.o. female)  Date: 12/18/2021  Diagnosis: Metabolic encephalopathy [K93.57]  Hypoglycemia [E16.2] <principal problem not specified>       Precautions:    Chart, physical therapy assessment, plan of care and goals were reviewed. ASSESSMENT  Patient continues with skilled PT services and is progressing towards goals. A&O x 0. Co-treat with OT due to level of assist needed for transfers and OOB mobility due to generalized weakness and AMS. Upon entry into room, patient semi-supine in bed and awake but talking with eyes closed. Supplemental O2 (NC) off nose so replaced properly. Patient verbalizing agreement to work with PT but kept eyes closed and appeared to be falling back asleep. With max encouragement and tactile cues, patient came to sitting EOB, Payal x 2 and incr time to perform. She sat EOB for ~20 minutes with good sitting balance noted. Constant cues to keep head up and open eyes with poor carryover. Max cueing to get patient to participate in LE and UE seated therex. Sit<>stand with RW and Payal x 2, incr time, and cues for hand placement and sequencing. Patient was able to take a few steps away from bed and backwards to bed with Payal x 1 and assistance with AD management. Incr time to return to semi-supine, Payal x 2 and total A to scoot to [facility] and position to comfort. Poor tolerance to therapy session today and eyes closed most of session with patient mumbling nonsense at times.  She would benefit from continued skilled PT services to improve her strength for bed mobility and transfers and for practice with OOB to return to PLOF and decrease assistance from caregivers. Current Level of Function Impacting Discharge (mobility/balance): decr strength, decr endurance, decr balance, assist x 2, AD for gait     Other factors to consider for discharge: AMS, confusion, memory loss, medical hx         PLAN :  Patient continues to benefit from skilled intervention to address the above impairments. Continue treatment per established plan of care. to address goals. Recommendation for discharge: (in order for the patient to meet his/her long term goals)  Baudilio Stacy    This discharge recommendation:  Has been made in collaboration with the attending provider and/or case management    IF patient discharges home will need the following DME: to be determined (TBD)       SUBJECTIVE:   Patient screamed WHAT?!.    OBJECTIVE DATA SUMMARY:   Critical Behavior:  Neurologic State: Drowsy  Orientation Level: Oriented to person  Cognition: Decreased attention/concentration,Decreased command following,Poor safety awareness  Safety/Judgement: Decreased insight into deficits,Decreased awareness of need for safety,Decreased awareness of need for assistance,Decreased awareness of environment  Functional Mobility Training:  Bed Mobility:  Rolling: Moderate assistance;Assist x1  Supine to Sit: Minimum assistance;Assist x2  Sit to Supine: Minimum assistance;Assist x2  Scooting: Minimum assistance;Assist x2        Transfers:  Sit to Stand: Minimum assistance;Assist x2  Stand to Sit: Minimum assistance;Assist x2  Incr time to perform and max cueing for hand placement and sequencing         Balance:  Sitting: Impaired; Without support  Sitting - Static: Good (unsupported)  Sitting - Dynamic: Fair (occasional)  Standing: Impaired; With support  Standing - Static: Good;Constant support  Standing - Dynamic : Fair;Constant support  Ambulation/Gait Training:  Distance (ft): 3 Feet (ft)  Assistive Device: Gait belt;Walker, rolling  Ambulation - Level of Assistance: Minimal assistance;Assist x1    Interventions: Tactile cues; Verbal cues  Assistance with AD for gait training     Therapeutic Exercises:   10x LAQ and shoulder flexion in sitting - incr time to perform with occasional AAROM   Pain Rating:  Did not verbalize     Activity Tolerance:   Poor, desaturates with exertion and requires oxygen, and requires frequent rest breaks  Please refer to the flowsheet for vital signs taken during this treatment. After treatment patient left in no apparent distress:   Supine in bed, Call bell within reach, and Bed / chair alarm activated    COMMUNICATION/COLLABORATION:   The patients plan of care was discussed with: Physical therapist and Occupational therapy assistant.      Ann Wright   Time Calculation: 25 mins

## 2021-12-18 NOTE — PROGRESS NOTES
Hospitalist Progress Note               Daily Progress Note: 12/18/2021      Subjective:   Hospital course to date: Patient is a 57-year-old female with history of diabetes, breast cancer and hypertension who was brought to the ED on 12/5 after being found by family unresponsive the night before. She had been in the ED the day before that with hypoglycemia. He was noted have a blood sugar of 51 at home and was given D10 but no significant improvement. Chest x-ray showed left lower lobe pneumonia. She was transferred from [facility] ED. Blood sugar on arrival here was 73. She was started on D10. All of patient's diabetic medications were supposed to have been previously discontinued although unclear if she had been inadvertently taking them. According to her sister the last time she took insulin was Friday of last week.     ------  Patient seen this morning for follow-up.  No reports of fevers overnight. Blood pressure remains labile. Imdur 60mg started yesterday    Problem List:  Problem List as of 12/18/2021 Date Reviewed: 12/5/2021          Codes Class Noted - Resolved    Metabolic encephalopathy [id number]: G93.41  ICD-9-CM: 348.31  12/5/2021 - Present        CKD (chronic kidney disease) ICD-10-CM: N18.9  ICD-9-CM: 585.9  9/24/2021 - Present    Overview Signed 9/24/2021  3:57 PM by Ronan Donald MD     In the setting of severe, uncontrolled and prolonged diabetes, with diabetic retinopathy. Protienuria to 2.5g/g of creatinine. Would consider workup for acute GN given inconsistent history and rising creatinine.  Though clinical hx appears consistent with aggressive diabetic nephropathy             Sepsis ([facility] ICD-10-CM: A41.9  ICD-9-CM: 038.9, 995.91  9/22/2021 - Present        Esophagitis with gastritis ICD-10-CM: K29.70, K20.90  ICD-9-CM: 530.19  9/22/2021 - Present        Elevated troponin ICD-10-CM: R77.8  ICD-9-CM: 790.6  9/20/2021 - Present        Syncope ICD-10-CM: R55  ICD-9-CM: 780.2  9/20/2021 - Present        Hypoglycemia ICD-10-CM: E16.2  ICD-9-CM: 251.2  9/20/2021 - Present        Chronic respiratory failure with hypoxia [facility]) ICD-10-CM: J96.11  ICD-9-CM: 518.83, 799.02  9/20/2021 - Present        Chest pain ICD-10-CM: R07.9  ICD-9-CM: 786.50  9/19/2021 - Present        Anxiety ICD-10-CM: F41.9  ICD-9-CM: 300.00  2/9/2021 - Present        Depression ICD-10-CM: F32. A  ICD-9-CM: 623  2/9/2021 - Present        Dyslipidemia ICD-10-CM: E78.5  ICD-9-CM: 272.4  2/9/2021 - Present        Hypertensive cardiovascular disease ICD-10-CM: I11.9  ICD-9-CM: 402.90  2/9/2021 - Present        Knee osteoarthritis ICD-10-CM: M17.10  ICD-9-CM: 715.36  1/25/2021 - Present        COPD with acute exacerbation ([facility] ICD-10-CM: J44.1  ICD-9-CM: 491.21  11/29/2020 - Present        Person under investigation for COVID-19 ICD-10-CM: Z20.822  ICD-9-CM: V01.79  11/29/2020 - Present        COPD (chronic obstructive pulmonary disease) ([facility] ICD-10-CM: J44.9  ICD-9-CM: 341  11/29/2020 - Present        DM (diabetes mellitus) ([facility] ICD-10-CM: E11.9  ICD-9-CM: 250.00  11/29/2020 - Present        COVID-19 ICD-10-CM: U07.1  ICD-9-CM: 079.89  10/1/2020 - Present        HTN (hypertension) ICD-10-CM: I10  ICD-9-CM: 401.9  10/1/2020 - Present        Diabetic gastroparesis ([facility] ICD-10-CM: E11.43, K31.84  ICD-9-CM: 250.60, 536.3  10/1/2020 - Present        DVT prophylaxis ICD-10-CM: Z29.9  ICD-9-CM: V07.9  10/1/2020 - Present        PNA (pneumonia) ICD-10-CM: J18.9  ICD-9-CM: 927  9/30/2020 - Present        Acute respiratory failure with hypoxia [facility]) ICD-10-CM: J96.01  ICD-9-CM: 518.81  9/30/2020 - Present        Acute kidney injury ([facility] ICD-10-CM: N17.9  ICD-9-CM: 584.9  9/30/2020 - Present        Type 2 diabetes mellitus with hyperglycemia, with long-term current use of insulin (HCC) ICD-10-CM: E11.65, Z79.4  ICD-9-CM: 250.00, 790.29, V58.67  9/30/2020 - Present        Ureteral stone ICD-10-CM: N20.1  ICD-9-CM: 592.1 2/15/2019 - Present        S/P lumbar fusion ICD-10-CM: Z98.1  ICD-9-CM: V45.4  10/16/2018 - Present        CAP (community acquired pneumonia) ICD-10-CM: J18.9  ICD-9-CM: 766  9/1/2018 - Present        Shortness of breath ICD-10-CM: R06.02  ICD-9-CM: 786.05  9/1/2018 - Present        Fever and chills ICD-10-CM: R50.9  ICD-9-CM: 780.60  9/1/2018 - Present        Spinal stenosis of lumbar region at multiple levels ICD-10-CM: M48.061  ICD-9-CM: 724.02  8/30/2018 - Present        Scoliosis ICD-10-CM: M41.9  ICD-9-CM: 737.30  8/30/2018 - Present        Spinal stenosis ICD-10-CM: M48.00  ICD-9-CM: 724.00  8/30/2018 - Present        Pre-op testing ICD-10-CM: N23.780  ICD-9-CM: V72.84  8/27/2018 - Present        Severe obesity (BMI 35.0-39. 9) ICD-10-CM: E66.01  ICD-9-CM: 278.01  6/20/2018 - Present        Spinal stenosis, lumbar region without neurogenic claudication ICD-10-CM: M48.061  ICD-9-CM: 724.02  5/16/2018 - Present        Lumbosacral spondylosis without myelopathy ICD-10-CM: M47.817  ICD-9-CM: 721.3  4/25/2018 - Present        DDD (degenerative disc disease), lumbar ICD-10-CM: M51.36  ICD-9-CM: 722.52  4/25/2018 - Present        Malignant neoplasm of female breast [facility]) ICD-10-CM: C50.919  ICD-9-CM: 174.9  4/25/2018 - Present              Medications reviewed  Current Facility-Administered Medications   Medication Dose Route Frequency    hydrALAZINE (APRESOLINE) tablet 50 mg  50 mg Oral TID    isosorbide mononitrate ER (IMDUR) tablet 60 mg  60 mg Oral DAILY    hydrALAZINE (APRESOLINE) 20 mg/mL injection 10 mg  10 mg IntraVENous Q4H PRN    piperacillin-tazobactam (ZOSYN) 3.375 g in 0.9% sodium chloride (MBP/ADV) 100 mL MBP  3.375 g IntraVENous Q8H    nitroglycerin (NITROBID) 2 % ointment 1 Inch  1 Inch Topical Q6H    labetaloL (NORMODYNE;TRANDATE) 20 mg/4 mL (5 mg/mL) injection 10 mg  10 mg IntraVENous Q4H PRN    amLODIPine (NORVASC) tablet 10 mg  10 mg Oral DAILY    atorvastatin (LIPITOR) tablet 40 mg  40 mg Oral QHS    albuterol (PROVENTIL HFA, VENTOLIN HFA, PROAIR HFA) inhaler 2 Puff  2 Puff Inhalation Q4H PRN    [Held by provider] tamsulosin (FLOMAX) capsule 0.4 mg  0.4 mg Oral DAILY    [Held by provider] gabapentin (NEURONTIN) capsule 300 mg  300 mg Oral BID    [Held by provider] melatonin tablet 3 mg  3 mg Oral QHS    venlafaxine-SR (EFFEXOR-XR) capsule 150 mg  150 mg Oral DAILY WITH BREAKFAST    ferrous sulfate tablet 325 mg  1 Tablet Oral TID    aspirin delayed-release tablet 81 mg  81 mg Oral DAILY    glucose chewable tablet 16 g  4 Tablet Oral PRN    dextrose (D50W) injection syrg 12.5-25 g  25-50 mL IntraVENous PRN    glucagon (GLUCAGEN) injection 1 mg  1 mg IntraMUSCular PRN    sodium chloride (NS) flush 5-40 mL  5-40 mL IntraVENous Q8H    sodium chloride (NS) flush 5-40 mL  5-40 mL IntraVENous PRN    acetaminophen (TYLENOL) tablet 650 mg  650 mg Oral Q6H PRN    Or    acetaminophen (TYLENOL) suppository 650 mg  650 mg Rectal Q6H PRN    ondansetron (ZOFRAN ODT) tablet 4 mg  4 mg Oral Q6H PRN    Or    ondansetron (ZOFRAN) injection 4 mg  4 mg IntraVENous Q6H PRN    famotidine (PEPCID) tablet 20 mg  20 mg Oral DAILY    metoprolol succinate (TOPROL-XL) XL tablet 50 mg  50 mg Oral DAILY       Review of Systems:   Review of systems not obtained due to patient factors. Objective:   Physical Exam:     Visit Vitals  BP (!) 179/76   Pulse (!) 52   Temp 98.1 °F (36.7 °C)   Resp 16   Ht 5' 5\" (1.651 m)   Wt 101.2 kg (223 lb)   SpO2 99%   BMI 37.11 kg/m²    O2 Flow Rate (L/min): 3 l/min O2 Device: Nasal cannula    Temp (24hrs), Av.2 °F (36.8 °C), Min:97.7 °F (36.5 °C), Max:98.5 °F (36.9 °C)    No intake/output data recorded. No intake/output data recorded. General:  Awake and alert    Lungs:   Clear to auscultation bilaterally. Chest wall:  No tenderness or deformity. Heart:  Regular rate and rhythm, S1, S2 normal, no murmur, click, rub or gallop. Abdomen:   Soft, non-tender.  Bowel sounds normal. No masses,  No organomegaly. Extremities: Extremities normal, atraumatic, no cyanosis or edema. Pulses: 2+ and symmetric all extremities. Skin: Skin color, texture, turgor normal. No rashes or lesions   Neurologic: CNII-XII intact. Unable to fully assess         Data Review:       Recent Days:  No results for input(s): WBC, HGB, HCT, PLT, HGBEXT, HCTEXT, PLTEXT, HGBEXT, HCTEXT, PLTEXT in the last 72 hours. No results for input(s): NA, K, CL, CO2, GLU, BUN, CREA, CA, MG, PHOS, ALB, TBIL, TBILI, ALT, INR, INREXT, INREXT in the last 72 hours. No lab exists for component: SGOT  No results for input(s): PH, PCO2, PO2, HCO3, FIO2 in the last 72 hours. 24 Hour Results:  Recent Results (from the past 24 hour(s))   GLUCOSE, POC    Collection Time: 12/17/21  5:15 PM   Result Value Ref Range    Glucose (POC) 241 (H) 65 - 117 mg/dL    Performed by Elton Mills, POC    Collection Time: 12/17/21  9:00 PM   Result Value Ref Range    Glucose (POC) 243 (H) 65 - 117 mg/dL    Performed by [address], POC    Collection Time: 12/17/21 11:49 PM   Result Value Ref Range    Glucose (POC) 223 (H) 65 - 117 mg/dL    Performed by [address], POC    Collection Time: 12/18/21  5:18 AM   Result Value Ref Range    Glucose (POC) 221 (H) 65 - 117 mg/dL    Performed by Jeremy Cross    GLUCOSE, POC    Collection Time: 12/18/21 11:11 AM   Result Value Ref Range    Glucose (POC) 177 (H) 65 - 117 mg/dL    Performed by HERMOSILLO LAY        XR CHEST PORT   Final Result   No significant interval change. MRI BRAIN WO CONT   Final Result   1. No evidence of acute intracranial ischemia, mass or hemorrhage. 2.  Moderate chronic small vessel ischemic changes. 3.  Mild diffuse parenchymal volume loss. Assessment:  Hypoglycemia in diabetes mellitus type 2, improved.   Suspect she had a prolonged episode of severe hypoglycemia at home    Hypoglycemic encephalopathy, possibly with permanent acquired brain injury    Possible left lower lobe pneumonia, likely aspiration: DC zosyn, day 14    Chronic kidney disease stage III    History of breast cancer    COPD without exacerbation    Chronic respiratory failure with hypoxia    History of gastritis/esophagitis/gastroparesis    Uncontrolled hypertension: add hydralainze and amlodipine, and imdur, will dc metoprolol IV and change PO metoprolol coreg from tomorrow    Plan:  Continue supportive care including assistance with all meals  Continue Imdur 60 mg oral daily in addition to other BP meds  Expect gradual improvement over the course of time  PT OT evaluation  Anticipate discharge to LTC when bed available      Care Plan discussed with: Patient/Family    Disposition: Transfer to medical telemetry    Total time spent with patient: 30 minutes.     Ken Pillai MD

## 2021-12-18 NOTE — PROGRESS NOTES
Uncontrolled blood pressure- medications modified. Assistance with meals and ADLS. Problem: Diabetes Self-Management  Goal: *Disease process and treatment process  Description: Define diabetes and identify own type of diabetes; list 3 options for treating diabetes. Outcome: Progressing Towards Goal  Goal: *Incorporating nutritional management into lifestyle  Description: Describe effect of type, amount and timing of food on blood glucose; list 3 methods for planning meals. Outcome: Progressing Towards Goal  Goal: *Incorporating physical activity into lifestyle  Description: State effect of exercise on blood glucose levels.   Outcome: Progressing Towards Goal

## 2021-12-19 LAB
ANION GAP SERPL CALC-SCNC: 3 MMOL/L (ref 5–15)
BASOPHILS # BLD: 0.1 K/UL (ref 0–0.1)
BASOPHILS NFR BLD: 1 % (ref 0–1)
BUN SERPL-MCNC: 22 MG/DL (ref 6–20)
BUN/CREAT SERPL: 12 (ref 12–20)
CA-I BLD-MCNC: 9.6 MG/DL (ref 8.5–10.1)
CHLORIDE SERPL-SCNC: 112 MMOL/L (ref 97–108)
CO2 SERPL-SCNC: 32 MMOL/L (ref 21–32)
CREAT SERPL-MCNC: 1.77 MG/DL (ref 0.55–1.02)
DIFFERENTIAL METHOD BLD: ABNORMAL
EOSINOPHIL # BLD: 0.6 K/UL (ref 0–0.4)
EOSINOPHIL NFR BLD: 5 % (ref 0–7)
ERYTHROCYTE [DISTWIDTH] IN BLOOD BY AUTOMATED COUNT: 15.1 % (ref 11.5–14.5)
GLUCOSE BLD STRIP.AUTO-MCNC: 147 MG/DL (ref 65–117)
GLUCOSE BLD STRIP.AUTO-MCNC: 153 MG/DL (ref 65–117)
GLUCOSE BLD STRIP.AUTO-MCNC: 166 MG/DL (ref 65–117)
GLUCOSE BLD STRIP.AUTO-MCNC: 201 MG/DL (ref 65–117)
GLUCOSE SERPL-MCNC: 164 MG/DL (ref 65–100)
HCT VFR BLD AUTO: 36.1 % (ref 35–47)
HGB BLD-MCNC: 11 G/DL (ref 11.5–16)
IMM GRANULOCYTES # BLD AUTO: 0 K/UL (ref 0–0.04)
IMM GRANULOCYTES NFR BLD AUTO: 0 % (ref 0–0.5)
LYMPHOCYTES # BLD: 0.9 K/UL (ref 0.8–3.5)
LYMPHOCYTES NFR BLD: 8 % (ref 12–49)
MCH RBC QN AUTO: 25.7 PG (ref 26–34)
MCHC RBC AUTO-ENTMCNC: 30.5 G/DL (ref 30–36.5)
MCV RBC AUTO: 84.3 FL (ref 80–99)
MONOCYTES # BLD: 0.5 K/UL (ref 0–1)
MONOCYTES NFR BLD: 5 % (ref 5–13)
NEUTS SEG # BLD: 8.9 K/UL (ref 1.8–8)
NEUTS SEG NFR BLD: 81 % (ref 32–75)
NRBC # BLD: 0 K/UL (ref 0–0.01)
NRBC BLD-RTO: 0 PER 100 WBC
PERFORMED BY, TECHID: ABNORMAL
PLATELET # BLD AUTO: 506 K/UL (ref 150–400)
PMV BLD AUTO: 10 FL (ref 8.9–12.9)
POTASSIUM SERPL-SCNC: 3.8 MMOL/L (ref 3.5–5.1)
RBC # BLD AUTO: 4.28 M/UL (ref 3.8–5.2)
SODIUM SERPL-SCNC: 147 MMOL/L (ref 136–145)
WBC # BLD AUTO: 11 K/UL (ref 3.6–11)

## 2021-12-19 PROCEDURE — 65270000029 HC RM PRIVATE

## 2021-12-19 PROCEDURE — 74011250637 HC RX REV CODE- 250/637: Performed by: INTERNAL MEDICINE

## 2021-12-19 PROCEDURE — 94760 N-INVAS EAR/PLS OXIMETRY 1: CPT

## 2021-12-19 PROCEDURE — 77010033678 HC OXYGEN DAILY

## 2021-12-19 PROCEDURE — 36415 COLL VENOUS BLD VENIPUNCTURE: CPT

## 2021-12-19 PROCEDURE — 74011250637 HC RX REV CODE- 250/637: Performed by: HOSPITALIST

## 2021-12-19 PROCEDURE — 80048 BASIC METABOLIC PNL TOTAL CA: CPT

## 2021-12-19 PROCEDURE — 85025 COMPLETE CBC W/AUTO DIFF WBC: CPT

## 2021-12-19 PROCEDURE — 82962 GLUCOSE BLOOD TEST: CPT

## 2021-12-19 PROCEDURE — 74011636637 HC RX REV CODE- 636/637: Performed by: HOSPITALIST

## 2021-12-19 RX ORDER — HYDRALAZINE HYDROCHLORIDE 50 MG/1
100 TABLET, FILM COATED ORAL 3 TIMES DAILY
Status: DISCONTINUED | OUTPATIENT
Start: 2021-12-19 | End: 2021-12-22 | Stop reason: HOSPADM

## 2021-12-19 RX ORDER — CLONIDINE HYDROCHLORIDE 0.1 MG/1
0.1 TABLET ORAL 2 TIMES DAILY
Status: DISCONTINUED | OUTPATIENT
Start: 2021-12-19 | End: 2021-12-22 | Stop reason: HOSPADM

## 2021-12-19 RX ADMIN — CARVEDILOL 6.25 MG: 3.12 TABLET, FILM COATED ORAL at 08:30

## 2021-12-19 RX ADMIN — VENLAFAXINE HYDROCHLORIDE 150 MG: 75 CAPSULE, EXTENDED RELEASE ORAL at 08:30

## 2021-12-19 RX ADMIN — FERROUS SULFATE TAB 325 MG (65 MG ELEMENTAL FE) 325 MG: 325 (65 FE) TAB at 21:03

## 2021-12-19 RX ADMIN — INSULIN LISPRO 2 UNITS: 100 INJECTION, SOLUTION INTRAVENOUS; SUBCUTANEOUS at 10:30

## 2021-12-19 RX ADMIN — FAMOTIDINE 20 MG: 20 TABLET, FILM COATED ORAL at 08:31

## 2021-12-19 RX ADMIN — HYDRALAZINE HYDROCHLORIDE 100 MG: 50 TABLET, FILM COATED ORAL at 16:04

## 2021-12-19 RX ADMIN — NITROGLYCERIN 1 INCH: 20 OINTMENT TOPICAL at 18:05

## 2021-12-19 RX ADMIN — ISOSORBIDE MONONITRATE 60 MG: 60 TABLET, EXTENDED RELEASE ORAL at 08:31

## 2021-12-19 RX ADMIN — HYDRALAZINE HYDROCHLORIDE 100 MG: 50 TABLET, FILM COATED ORAL at 21:02

## 2021-12-19 RX ADMIN — HYDRALAZINE HYDROCHLORIDE 50 MG: 50 TABLET, FILM COATED ORAL at 10:37

## 2021-12-19 RX ADMIN — NITROGLYCERIN 1 INCH: 20 OINTMENT TOPICAL at 12:40

## 2021-12-19 RX ADMIN — FERROUS SULFATE TAB 325 MG (65 MG ELEMENTAL FE) 325 MG: 325 (65 FE) TAB at 16:04

## 2021-12-19 RX ADMIN — Medication 10 ML: at 16:08

## 2021-12-19 RX ADMIN — Medication 10 ML: at 21:03

## 2021-12-19 RX ADMIN — NITROGLYCERIN 1 INCH: 20 OINTMENT TOPICAL at 05:49

## 2021-12-19 RX ADMIN — AMLODIPINE BESYLATE 10 MG: 5 TABLET ORAL at 08:31

## 2021-12-19 RX ADMIN — INSULIN LISPRO 4 UNITS: 100 INJECTION, SOLUTION INTRAVENOUS; SUBCUTANEOUS at 17:37

## 2021-12-19 RX ADMIN — CLONIDINE HYDROCHLORIDE 0.1 MG: 0.1 TABLET ORAL at 16:05

## 2021-12-19 RX ADMIN — ATORVASTATIN CALCIUM 40 MG: 40 TABLET, FILM COATED ORAL at 21:02

## 2021-12-19 RX ADMIN — ASPIRIN 81 MG: 81 TABLET, COATED ORAL at 08:32

## 2021-12-19 RX ADMIN — Medication 10 ML: at 05:50

## 2021-12-19 RX ADMIN — CLONIDINE HYDROCHLORIDE 0.1 MG: 0.1 TABLET ORAL at 21:03

## 2021-12-19 RX ADMIN — FERROUS SULFATE TAB 325 MG (65 MG ELEMENTAL FE) 325 MG: 325 (65 FE) TAB at 08:30

## 2021-12-19 RX ADMIN — INSULIN LISPRO 2 UNITS: 100 INJECTION, SOLUTION INTRAVENOUS; SUBCUTANEOUS at 22:29

## 2021-12-19 NOTE — PROGRESS NOTES
Increased activity with family at bedside. Continue to control blood pressure with medications as per MAR        Problem: Diabetes Self-Management  Goal: *Disease process and treatment process  Description: Define diabetes and identify own type of diabetes; list 3 options for treating diabetes. Outcome: Progressing Towards Goal  Goal: *Incorporating nutritional management into lifestyle  Description: Describe effect of type, amount and timing of food on blood glucose; list 3 methods for planning meals. Outcome: Progressing Towards Goal  Goal: *Developing strategies to promote health/change behavior  Description: Define the ABC's of diabetes; identify appropriate screenings, schedule and personal plan for screenings. Outcome: Progressing Towards Goal  Goal: *Using medications safely  Description: State effect of diabetes medications on diabetes; name diabetes medication taking, action and side effects. Outcome: Progressing Towards Goal  Goal: *Monitoring blood glucose, interpreting and using results  Description: Identify recommended blood glucose targets  and personal targets. Outcome: Progressing Towards Goal  Goal: *Prevention, detection, treatment of acute complications  Description: List symptoms of hyper- and hypoglycemia; describe how to treat low blood sugar and actions for lowering  high blood glucose level. Outcome: Progressing Towards Goal  Goal: *Prevention, detection and treatment of chronic complications  Description: Define the natural course of diabetes and describe the relationship of blood glucose levels to long term complications of diabetes.   Outcome: Progressing Towards Goal

## 2021-12-19 NOTE — PROGRESS NOTES
Problem: Diabetes Self-Management  Goal: *Disease process and treatment process  Description: Define diabetes and identify own type of diabetes; list 3 options for treating diabetes. Outcome: Progressing Towards Goal     Problem: Pressure Injury - Risk of  Goal: *Prevention of pressure injury  Description: Document Michele Scale and appropriate interventions in the flowsheet.   Outcome: Progressing Towards Goal  Note: Pressure Injury Interventions:  Sensory Interventions: Chair cushion    Moisture Interventions: Absorbent underpads    Activity Interventions: Chair cushion    Mobility Interventions: Float heels    Nutrition Interventions: Document food/fluid/supplement intake    Friction and Shear Interventions: Minimize layers

## 2021-12-19 NOTE — PROGRESS NOTES
Hospitalist Progress Note               Daily Progress Note: 12/19/2021      Subjective:   Hospital course to date: Patient is a 68-year-old female with history of diabetes, breast cancer and hypertension who was brought to the ED on 12/5 after being found by family unresponsive the night before. She had been in the ED the day before that with hypoglycemia. He was noted have a blood sugar of 51 at home and was given D10 but no significant improvement. Chest x-ray showed left lower lobe pneumonia. She was transferred from [facility] ED. Blood sugar on arrival here was 73. She was started on D10. All of patient's diabetic medications were supposed to have been previously discontinued although unclear if she had been inadvertently taking them. According to her sister the last time she took insulin was Friday of last week.     66F, H/o COPD on 2L NC, HTN. DMII, with acute hypoxic respiratory failure s/t symptomatic hypoglycemia, HTn urgency and aspiration pneumonia     S/p 14 days abx,   A1c is 7, will dc only on metformin 500 once a day and f/u as OP  Added clonidine      ------  Patient seen this morning for follow-up.  No reports of fevers overnight. Blood pressure remains labile. Imdur 60mg started yesterday    Problem List:  Problem List as of 12/19/2021 Date Reviewed: 12/5/2021          Codes Class Noted - Resolved    Metabolic encephalopathy HLB-32-FO: G93.41  ICD-9-CM: 348.31  12/5/2021 - Present        CKD (chronic kidney disease) ICD-10-CM: N18.9  ICD-9-CM: 585.9  9/24/2021 - Present    Overview Signed 9/24/2021  3:57 PM by Mandy Norman MD     In the setting of severe, uncontrolled and prolonged diabetes, with diabetic retinopathy. Protienuria to 2.5g/g of creatinine. Would consider workup for acute GN given inconsistent history and rising creatinine.  Though clinical hx appears consistent with aggressive diabetic nephropathy             Sepsis ([facility] [facility] ICD-10-CM: A41.9  ICD-9-CM: 038.9, 995.91  9/22/2021 - Present        Esophagitis with gastritis ICD-10-CM: K29.70, K20.90  ICD-9-CM: 530.19  9/22/2021 - Present        Elevated troponin ICD-10-CM: R77.8  ICD-9-CM: 790.6  9/20/2021 - Present        Syncope ICD-10-CM: R55  ICD-9-CM: 780.2  9/20/2021 - Present        Hypoglycemia ICD-10-CM: E16.2  ICD-9-CM: 251.2  9/20/2021 - Present        Chronic respiratory failure with hypoxia (HCC) ICD-10-CM: J96.11  ICD-9-CM: 518.83, 799.02  9/20/2021 - Present        Chest pain ICD-10-CM: R07.9  ICD-9-CM: 786.50  9/19/2021 - Present        Anxiety ICD-10-CM: F41.9  ICD-9-CM: 300.00  2/9/2021 - Present        Depression ICD-10-CM: F32. A  ICD-9-CM: 191  2/9/2021 - Present        Dyslipidemia ICD-10-CM: E78.5  ICD-9-CM: 272.4  2/9/2021 - Present        Hypertensive cardiovascular disease ICD-10-CM: I11.9  ICD-9-CM: 402.90  2/9/2021 - Present        Knee osteoarthritis ICD-10-CM: M17.10  ICD-9-CM: 715.36  1/25/2021 - Present        COPD with acute exacerbation ([facility] ICD-10-CM: J44.1  ICD-9-CM: 491.21  11/29/2020 - Present        Person under investigation for COVID-19 ICD-10-CM: Z20.822  ICD-9-CM: V01.79  11/29/2020 - Present        COPD (chronic obstructive pulmonary disease) ([facility] ICD-10-CM: J44.9  ICD-9-CM: 800  11/29/2020 - Present        DM (diabetes mellitus) ([facility] ICD-10-CM: E11.9  ICD-9-CM: 250.00  11/29/2020 - Present        COVID-19 ICD-10-CM: U07.1  ICD-9-CM: 079.89  10/1/2020 - Present        HTN (hypertension) ICD-10-CM: I10  ICD-9-CM: 401.9  10/1/2020 - Present        Diabetic gastroparesis ([facility] ICD-10-CM: E11.43, K31.84  ICD-9-CM: 250.60, 536.3  10/1/2020 - Present        DVT prophylaxis ICD-10-CM: Z29.9  ICD-9-CM: V07.9  10/1/2020 - Present        PNA (pneumonia) ICD-10-CM: J18.9  ICD-9-CM: 727  9/30/2020 - Present        Acute respiratory failure with hypoxia ([facility] ICD-10-CM: J96.01  ICD-9-CM: 518.81  9/30/2020 - Present        Acute kidney injury ([facility] ICD-10-CM: N17.9  ICD-9-CM: 584.9 9/30/2020 - Present        Type 2 diabetes mellitus with hyperglycemia, with long-term current use of insulin (HCC) ICD-10-CM: E11.65, Z79.4  ICD-9-CM: 250.00, 790.29, V58.67  9/30/2020 - Present        Ureteral stone ICD-10-CM: N20.1  ICD-9-CM: 592.1  2/15/2019 - Present        S/P lumbar fusion ICD-10-CM: Z98.1  ICD-9-CM: V45.4  10/16/2018 - Present        CAP (community acquired pneumonia) ICD-10-CM: J18.9  ICD-9-CM: 486  9/1/2018 - Present        Shortness of breath ICD-10-CM: R06.02  ICD-9-CM: 786.05  9/1/2018 - Present        Fever and chills ICD-10-CM: R50.9  ICD-9-CM: 780.60  9/1/2018 - Present        Spinal stenosis of lumbar region at multiple levels ICD-10-CM: M48.061  ICD-9-CM: 724.02  8/30/2018 - Present        Scoliosis ICD-10-CM: M41.9  ICD-9-CM: 737.30  8/30/2018 - Present        Spinal stenosis ICD-10-CM: M48.00  ICD-9-CM: 724.00  8/30/2018 - Present        Pre-op testing ICD-10-CM: C19.056  ICD-9-CM: V72.84  8/27/2018 - Present        Severe obesity (BMI 35.0-39. 9) ICD-10-CM: E66.01  ICD-9-CM: 278.01  6/20/2018 - Present        Spinal stenosis, lumbar region without neurogenic claudication ICD-10-CM: M48.061  ICD-9-CM: 724.02  5/16/2018 - Present        Lumbosacral spondylosis without myelopathy ICD-10-CM: M47.817  ICD-9-CM: 721.3  4/25/2018 - Present        DDD (degenerative disc disease), lumbar ICD-10-CM: M51.36  ICD-9-CM: 722.52  4/25/2018 - Present        Malignant neoplasm of female breast [facility]) ICD-10-CM: C50.919  ICD-9-CM: 174.9  4/25/2018 - Present              Medications reviewed  Current Facility-Administered Medications   Medication Dose Route Frequency    hydrALAZINE (APRESOLINE) tablet 100 mg  100 mg Oral TID    cloNIDine HCL (CATAPRES) tablet 0.1 mg  0.1 mg Oral BID    [Held by provider] carvediloL (COREG) tablet 6.25 mg  6.25 mg Oral BID WITH MEALS    insulin lispro (HUMALOG) injection   SubCUTAneous AC&HS    isosorbide mononitrate ER (IMDUR) tablet 60 mg  60 mg Oral DAILY    hydrALAZINE (APRESOLINE) 20 mg/mL injection 10 mg  10 mg IntraVENous Q4H PRN    nitroglycerin (NITROBID) 2 % ointment 1 Inch  1 Inch Topical Q6H    labetaloL (NORMODYNE;TRANDATE) 20 mg/4 mL (5 mg/mL) injection 10 mg  10 mg IntraVENous Q4H PRN    amLODIPine (NORVASC) tablet 10 mg  10 mg Oral DAILY    atorvastatin (LIPITOR) tablet 40 mg  40 mg Oral QHS    albuterol (PROVENTIL HFA, VENTOLIN HFA, PROAIR HFA) inhaler 2 Puff  2 Puff Inhalation Q4H PRN    [Held by provider] tamsulosin (FLOMAX) capsule 0.4 mg  0.4 mg Oral DAILY    [Held by provider] gabapentin (NEURONTIN) capsule 300 mg  300 mg Oral BID    [Held by provider] melatonin tablet 3 mg  3 mg Oral QHS    venlafaxine-SR (EFFEXOR-XR) capsule 150 mg  150 mg Oral DAILY WITH BREAKFAST    ferrous sulfate tablet 325 mg  1 Tablet Oral TID    aspirin delayed-release tablet 81 mg  81 mg Oral DAILY    glucose chewable tablet 16 g  4 Tablet Oral PRN    dextrose (D50W) injection syrg 12.5-25 g  25-50 mL IntraVENous PRN    glucagon (GLUCAGEN) injection 1 mg  1 mg IntraMUSCular PRN    sodium chloride (NS) flush 5-40 mL  5-40 mL IntraVENous Q8H    sodium chloride (NS) flush 5-40 mL  5-40 mL IntraVENous PRN    acetaminophen (TYLENOL) tablet 650 mg  650 mg Oral Q6H PRN    Or    acetaminophen (TYLENOL) suppository 650 mg  650 mg Rectal Q6H PRN    ondansetron (ZOFRAN ODT) tablet 4 mg  4 mg Oral Q6H PRN    Or    ondansetron (ZOFRAN) injection 4 mg  4 mg IntraVENous Q6H PRN    famotidine (PEPCID) tablet 20 mg  20 mg Oral DAILY       Review of Systems:   Review of systems not obtained due to patient factors. Objective:   Physical Exam:     Visit Vitals  BP (!) 172/77   Pulse (!) 57   Temp 98.2 °F (36.8 °C)   Resp 18   Ht 5' 5\" (1.651 m)   Wt 101.2 kg (223 lb)   SpO2 98%   BMI 37.11 kg/m²    O2 Flow Rate (L/min): 3 l/min O2 Device: Nasal cannula    Temp (24hrs), Av.4 °F (36.9 °C), Min:98.2 °F (36.8 °C), Max:98.6 °F (37 °C)    No intake/output data recorded. 12/17 1901 - 12/19 0700  In: -   Out: 1000 [Urine:1000]    General:  Awake and alert    Lungs:   Clear to auscultation bilaterally. Chest wall:  No tenderness or deformity. Heart:  Regular rate and rhythm, S1, S2 normal, no murmur, click, rub or gallop. Abdomen:   Soft, non-tender. Bowel sounds normal. No masses,  No organomegaly. Extremities: Extremities normal, atraumatic, no cyanosis or edema. Pulses: 2+ and symmetric all extremities. Skin: Skin color, texture, turgor normal. No rashes or lesions   Neurologic: CNII-XII intact. Unable to fully assess         Data Review:       Recent Days:  Recent Labs     12/19/21 0924   WBC 11.0   HGB 11.0*   HCT 36.1   *     Recent Labs     12/19/21 0924   *   K 3.8   *   CO2 32   *   BUN 22*   CREA 1.77*   CA 9.6     No results for input(s): PH, PCO2, PO2, HCO3, FIO2 in the last 72 hours.     24 Hour Results:  Recent Results (from the past 24 hour(s))   GLUCOSE, POC    Collection Time: 12/18/21  4:07 PM   Result Value Ref Range    Glucose (POC) 139 (H) 65 - 117 mg/dL    Performed by [facility]    GLUCOSE, POC    Collection Time: 12/18/21  9:27 PM   Result Value Ref Range    Glucose (POC) 239 (H) 65 - 117 mg/dL    Performed by [address] [place] [id number], POC    Collection Time: 12/19/21  8:37 AM   Result Value Ref Range    Glucose (POC) 166 (H) 65 - 117 mg/dL    Performed by [facility]    METABOLIC PANEL, BASIC    Collection Time: 12/19/21  9:24 AM   Result Value Ref Range    Sodium 147 (H) 136 - 145 mmol/L    Potassium 3.8 3.5 - 5.1 mmol/L    Chloride 112 (H) 97 - 108 mmol/L    CO2 32 21 - 32 mmol/L    Anion gap 3 (L) 5 - 15 mmol/L    Glucose 164 (H) 65 - 100 mg/dL    BUN 22 (H) 6 - 20 mg/dL    Creatinine 1.77 (H) 0.55 - 1.02 mg/dL    BUN/Creatinine ratio 12 12 - 20      GFR est AA 35 (L) >60 ml/min/1.73m2    GFR est non-AA 29 (L) >60 ml/min/1.73m2    Calcium 9.6 8.5 - 10.1 mg/dL   CBC WITH AUTOMATED DIFF    Collection Time: 12/19/21  9:24 AM   Result Value Ref Range    WBC 11.0 3.6 - 11.0 K/uL    RBC 4.28 3.80 - 5.20 M/uL    HGB 11.0 (L) 11.5 - 16.0 g/dL    HCT 36.1 35.0 - 47.0 %    MCV 84.3 80.0 - 99.0 FL    MCH 25.7 (L) 26.0 - 34.0 PG    MCHC 30.5 30.0 - 36.5 g/dL    RDW 15.1 (H) 11.5 - 14.5 %    PLATELET 230 (H) 465 - 400 K/uL    MPV 10.0 8.9 - 12.9 FL    NRBC 0.0 0.0  WBC    ABSOLUTE NRBC 0.00 0.00 - 0.01 K/uL    NEUTROPHILS 81 (H) 32 - 75 %    LYMPHOCYTES 8 (L) 12 - 49 %    MONOCYTES 5 5 - 13 %    EOSINOPHILS 5 0 - 7 %    BASOPHILS 1 0 - 1 %    IMMATURE GRANULOCYTES 0 0 - 0.5 %    ABS. NEUTROPHILS 8.9 (H) 1.8 - 8.0 K/UL    ABS. LYMPHOCYTES 0.9 0.8 - 3.5 K/UL    ABS. MONOCYTES 0.5 0.0 - 1.0 K/UL    ABS. EOSINOPHILS 0.6 (H) 0.0 - 0.4 K/UL    ABS. BASOPHILS 0.1 0.0 - 0.1 K/UL    ABS. IMM. GRANS. 0.0 0.00 - 0.04 K/UL    DF AUTOMATED     GLUCOSE, POC    Collection Time: 12/19/21 11:52 AM   Result Value Ref Range    Glucose (POC) 147 (H) 65 - 117 mg/dL    Performed by [facility]        XR CHEST PORT   Final Result   No significant interval change. MRI BRAIN WO CONT   Final Result   1. No evidence of acute intracranial ischemia, mass or hemorrhage. 2.  Moderate chronic small vessel ischemic changes. 3.  Mild diffuse parenchymal volume loss. Assessment:  Hypoglycemia in diabetes mellitus type 2, improved. Suspect she had a prolonged episode of severe hypoglycemia at home    Hypoglycemic encephalopathy, possibly with permanent acquired brain injury    Possible left lower lobe pneumonia, likely aspiration: DC zosyn, day 14    Chronic kidney disease stage III    History of breast cancer    COPD without exacerbation    Chronic respiratory failure with hypoxia    History of gastritis/esophagitis/gastroparesis    Uncontrolled hypertension: add hydralainze and amlodipine, and imdur, will dc metoprolol IV and change PO metoprolol coreg from tomorrow    Plan:  66F, H/o COPD on 2L NC, HTN.  DMII, with acute hypoxic respiratory failure s/t symptomatic hypoglycemia, HTn urgency and aspiration pneumonia     S/p 14 days abx,   A1c is 7, will dc only on metformin 500 once a day and f/u as OP  Added clonidine    Medically stable when BP systolic less than 087 likely in 1-2 days, placement       Care Plan discussed with: Patient/Family    Disposition: Transfer to medical telemetry    Total time spent with patient: 30 minutes.     Fritz Mayorga MD

## 2021-12-20 LAB
GLUCOSE BLD STRIP.AUTO-MCNC: 143 MG/DL (ref 65–117)
GLUCOSE BLD STRIP.AUTO-MCNC: 147 MG/DL (ref 65–117)
GLUCOSE BLD STRIP.AUTO-MCNC: 162 MG/DL (ref 65–117)
GLUCOSE BLD STRIP.AUTO-MCNC: 203 MG/DL (ref 65–117)
PERFORMED BY, TECHID: ABNORMAL

## 2021-12-20 PROCEDURE — 97110 THERAPEUTIC EXERCISES: CPT

## 2021-12-20 PROCEDURE — 74011636637 HC RX REV CODE- 636/637: Performed by: HOSPITALIST

## 2021-12-20 PROCEDURE — 74011250637 HC RX REV CODE- 250/637: Performed by: HOSPITALIST

## 2021-12-20 PROCEDURE — 65270000029 HC RM PRIVATE

## 2021-12-20 PROCEDURE — 74011250637 HC RX REV CODE- 250/637: Performed by: INTERNAL MEDICINE

## 2021-12-20 PROCEDURE — 82962 GLUCOSE BLOOD TEST: CPT

## 2021-12-20 PROCEDURE — 97116 GAIT TRAINING THERAPY: CPT

## 2021-12-20 RX ADMIN — VENLAFAXINE HYDROCHLORIDE 150 MG: 75 CAPSULE, EXTENDED RELEASE ORAL at 09:02

## 2021-12-20 RX ADMIN — ISOSORBIDE MONONITRATE 60 MG: 60 TABLET, EXTENDED RELEASE ORAL at 09:01

## 2021-12-20 RX ADMIN — FERROUS SULFATE TAB 325 MG (65 MG ELEMENTAL FE) 325 MG: 325 (65 FE) TAB at 09:02

## 2021-12-20 RX ADMIN — NITROGLYCERIN 1 INCH: 20 OINTMENT TOPICAL at 00:38

## 2021-12-20 RX ADMIN — Medication 10 ML: at 16:57

## 2021-12-20 RX ADMIN — INSULIN LISPRO 4 UNITS: 100 INJECTION, SOLUTION INTRAVENOUS; SUBCUTANEOUS at 12:42

## 2021-12-20 RX ADMIN — Medication 10 ML: at 20:44

## 2021-12-20 RX ADMIN — Medication 10 ML: at 06:21

## 2021-12-20 RX ADMIN — HYDRALAZINE HYDROCHLORIDE 100 MG: 50 TABLET, FILM COATED ORAL at 09:01

## 2021-12-20 RX ADMIN — FERROUS SULFATE TAB 325 MG (65 MG ELEMENTAL FE) 325 MG: 325 (65 FE) TAB at 16:46

## 2021-12-20 RX ADMIN — CLONIDINE HYDROCHLORIDE 0.1 MG: 0.1 TABLET ORAL at 20:42

## 2021-12-20 RX ADMIN — FAMOTIDINE 20 MG: 20 TABLET, FILM COATED ORAL at 09:01

## 2021-12-20 RX ADMIN — HYDRALAZINE HYDROCHLORIDE 100 MG: 50 TABLET, FILM COATED ORAL at 16:46

## 2021-12-20 RX ADMIN — ASPIRIN 81 MG: 81 TABLET, COATED ORAL at 09:01

## 2021-12-20 RX ADMIN — ATORVASTATIN CALCIUM 40 MG: 40 TABLET, FILM COATED ORAL at 20:42

## 2021-12-20 RX ADMIN — HYDRALAZINE HYDROCHLORIDE 100 MG: 50 TABLET, FILM COATED ORAL at 20:42

## 2021-12-20 RX ADMIN — NITROGLYCERIN 1 INCH: 20 OINTMENT TOPICAL at 12:44

## 2021-12-20 RX ADMIN — AMLODIPINE BESYLATE 10 MG: 5 TABLET ORAL at 09:01

## 2021-12-20 RX ADMIN — INSULIN LISPRO 2 UNITS: 100 INJECTION, SOLUTION INTRAVENOUS; SUBCUTANEOUS at 21:24

## 2021-12-20 RX ADMIN — FERROUS SULFATE TAB 325 MG (65 MG ELEMENTAL FE) 325 MG: 325 (65 FE) TAB at 20:42

## 2021-12-20 RX ADMIN — NITROGLYCERIN 1 INCH: 20 OINTMENT TOPICAL at 18:26

## 2021-12-20 RX ADMIN — CLONIDINE HYDROCHLORIDE 0.1 MG: 0.1 TABLET ORAL at 09:01

## 2021-12-20 RX ADMIN — NITROGLYCERIN 1 INCH: 20 OINTMENT TOPICAL at 23:34

## 2021-12-20 RX ADMIN — NITROGLYCERIN 1 INCH: 20 OINTMENT TOPICAL at 06:21

## 2021-12-20 NOTE — PROGRESS NOTES
PHYSICAL THERAPY TREATMENT  Patient: Delpha Osgood (57 y.o. female)  Date: 12/20/2021  Diagnosis: Metabolic encephalopathy [C13.76]  Hypoglycemia [E16.2] <principal problem not specified>       Precautions:    Chart, physical therapy assessment, plan of care and goals were reviewed. ASSESSMENT  Patient continues with skilled PT services and is progressing towards goals. Pt semi supine in bed upon arrival, attempting to eat breakfast, agreeable to session. Pt completed sup>sit with min A and vc for task performance. Pt noted with intact sitting balance at EOB, assisted patient with preparing her coffee (able to state she wanted sweetener in her coffee). Performed STS from EOB with CGA and RW for balance upon standing. Pt able to increase ambulation distance with RW and CGA, no LOB or knee buckling noted. Gait speed is very slow. Noted improvement with command following during gait, did not need manual assistance maneuvering RW. Still required vc to avoid obstacles during gait. Performed seated therex at  EOB, see details below. Pt returned to semi supine position with SBA. Left semi supine in bed with call bell in reach and needs met, bed alarm on. Recommending d/c to SNF once medically appropriate. Current Level of Function Impacting Discharge (mobility/balance): assistance required for safety    Other factors to consider for discharge: decreased command following, decreased safety awareness, PLOF, severity of deficits          PLAN :  Patient continues to benefit from skilled intervention to address the above impairments. Continue treatment per established plan of care. to address goals.     Recommendation for discharge: (in order for the patient to meet his/her long term goals)  Baudilio Stacy    This discharge recommendation:  Has been made in collaboration with the attending provider and/or case management    IF patient discharges home will need the following DME: to be determined (TBD) SUBJECTIVE:   Patient stated i'm doing much better    OBJECTIVE DATA SUMMARY:   Critical Behavior:  Neurologic State: Alert  Orientation Level: Oriented to person  Cognition: Decreased command following  Safety/Judgement: Decreased insight into deficits,Decreased awareness of need for safety,Decreased awareness of need for assistance,Decreased awareness of environment    Functional Mobility Training:  Bed Mobility:  Supine to Sit: Minimum assistance  Sit to Supine: Stand-by assistance  Scooting: Contact guard assistance    Transfers:  Sit to Stand: Contact guard assistance  Stand to Sit: Contact guard assistance    Balance:  Sitting: Impaired; With support  Sitting - Static: Good (unsupported)  Sitting - Dynamic: Good (unsupported)  Standing: Impaired; With support  Standing - Static: Constant support;Good  Standing - Dynamic : Constant support; Fair    Ambulation/Gait Training:  Distance (ft): 40 Feet (ft)  Assistive Device: Gait belt;Walker, rolling  Ambulation - Level of Assistance: Contact guard assistance  Speed/Patricia: Slow  Step Length: Left shortened;Right shortened      Therapeutic Exercises:   1 x 10 LAQ  1 x 10 Marches  Requires tactile cues, visual cues, and verbal cues to completed correct movement on correct LE  Pain Ratin/10    Activity Tolerance:   Fair and requires rest breaks  Please refer to the flowsheet for vital signs taken during this treatment. After treatment patient left in no apparent distress:   Supine in bed, Call bell within reach, Bed / chair alarm activated, and Side rails x 3        Problem: Mobility Impaired (Adult and Pediatric)  Goal: *Acute Goals and Plan of Care (Insert Text)  Description: Pt will be I with LE HEP in 7 days. Pt will perform bed mobility with mod I in 7 days. Pt will perform transfers with CGA in 7 days. Pt will amb- 50 feet with LRAD safely with CGA in 7 days.    Outcome: Progressing Towards Goal       Manjula Briseno PTA   Time Calculation: 27 mins

## 2021-12-20 NOTE — PROGRESS NOTES
[facility] has messaged [facility] [place] and has left a voicemail for the DOA at the facility to get an update on authorization for this patient. [facility] will continue to follow-up.

## 2021-12-20 NOTE — PROGRESS NOTES
Hospitalist Progress Note               Daily Progress Note: 12/20/2021      Subjective:   HPI:  Patient is a 24-year-old female with history of diabetes, breast cancer and hypertension who was brought to the ED on 12/5 after being found by family unresponsive the night before. She had been in the ED the day before that with hypoglycemia. He was noted have a blood sugar of 51 at home and was given D10 but no significant improvement. Chest x-ray showed left lower lobe pneumonia. She was transferred from [facility]. Blood sugar on arrival here was 73. She was started on D10. All of patient's diabetic medications were supposed to have been previously discontinued although unclear if she had been inadvertently taking them. According to her sister the last time she took insulin was Friday of last week.     SUMMARY: 66F, H/o COPD on 2L NC, HTN. DMII, with acute hypoxic respiratory failure s/t symptomatic hypoglycemia, HTn urgency and aspiration pneumonia     UPDATE:  S/p 14 days abx, stop ABx  A1c is 7, will dc only on metformin 500 once a day and f/u as OP  Added clonidine 0.1 to BP regimen, can go up , target is to keep < 150 slowly. Stable for discharge - pending SNF auth      ------  Patient seen this morning for follow-up.  No reports of fevers overnight. Blood pressure remains labile. Imdur 60mg started yesterday    Problem List:  Problem List as of 12/20/2021 Date Reviewed: 12/5/2021          Codes Class Noted - Resolved    Metabolic encephalopathy DAF-08-JJ: G93.41  ICD-9-CM: 348.31  12/5/2021 - Present        CKD (chronic kidney disease) ICD-10-CM: N18.9  ICD-9-CM: 585.9  9/24/2021 - Present    Overview Signed 9/24/2021  3:57 PM by Maykel Estes MD     In the setting of severe, uncontrolled and prolonged diabetes, with diabetic retinopathy. Protienuria to 2.5g/g of creatinine. Would consider workup for acute GN given inconsistent history and rising creatinine.  Though clinical hx appears consistent with aggressive diabetic nephropathy             Sepsis ([facility] ICD-10-CM: A41.9  ICD-9-CM: 038.9, 995.91  9/22/2021 - Present        Esophagitis with gastritis ICD-10-CM: K29.70, K20.90  ICD-9-CM: 530.19  9/22/2021 - Present        Elevated troponin ICD-10-CM: R77.8  ICD-9-CM: 790.6  9/20/2021 - Present        Syncope ICD-10-CM: R55  ICD-9-CM: 780.2  9/20/2021 - Present        Hypoglycemia ICD-10-CM: E16.2  ICD-9-CM: 251.2  9/20/2021 - Present        Chronic respiratory failure with hypoxia (HCC) ICD-10-CM: J96.11  ICD-9-CM: 518.83, 799.02  9/20/2021 - Present        Chest pain ICD-10-CM: R07.9  ICD-9-CM: 786.50  9/19/2021 - Present        Anxiety ICD-10-CM: F41.9  ICD-9-CM: 300.00  2/9/2021 - Present        Depression ICD-10-CM: F32. A  ICD-9-CM: 908  2/9/2021 - Present        Dyslipidemia ICD-10-CM: E78.5  ICD-9-CM: 272.4  2/9/2021 - Present        Hypertensive cardiovascular disease ICD-10-CM: I11.9  ICD-9-CM: 402.90  2/9/2021 - Present        Knee osteoarthritis ICD-10-CM: M17.10  ICD-9-CM: 715.36  1/25/2021 - Present        COPD with acute exacerbation ([facility] ICD-10-CM: J44.1  ICD-9-CM: 491.21  11/29/2020 - Present        Person under investigation for COVID-19 ICD-10-CM: Z20.822  ICD-9-CM: V01.79  11/29/2020 - Present        COPD (chronic obstructive pulmonary disease) ([facility] ICD-10-CM: J44.9  ICD-9-CM: 585  11/29/2020 - Present        DM (diabetes mellitus) ([place] [address].) ICD-10-CM: E11.9  ICD-9-CM: 250.00  11/29/2020 - Present        COVID-19 ICD-10-CM: U07.1  ICD-9-CM: 079.89  10/1/2020 - Present        HTN (hypertension) ICD-10-CM: I10  ICD-9-CM: 401.9  10/1/2020 - Present        Diabetic gastroparesis ([place] [address].) ICD-10-CM: E11.43, K31.84  ICD-9-CM: 250.60, 536.3  10/1/2020 - Present        DVT prophylaxis ICD-10-CM: Z29.9  ICD-9-CM: V07.9  10/1/2020 - Present        PNA (pneumonia) ICD-10-CM: J18.9  ICD-9-CM: 795  9/30/2020 - Present        Acute respiratory failure with hypoxia ([place] [address].) ICD-10-CM: J96.01  ICD-9-CM: 518.81  9/30/2020 - Present        Acute kidney injury [facility]) ICD-10-CM: N17.9  ICD-9-CM: 584.9  9/30/2020 - Present        Type 2 diabetes mellitus with hyperglycemia, with long-term current use of insulin (HCC) ICD-10-CM: E11.65, Z79.4  ICD-9-CM: 250.00, 790.29, V58.67  9/30/2020 - Present        Ureteral stone ICD-10-CM: N20.1  ICD-9-CM: 592.1  2/15/2019 - Present        S/P lumbar fusion ICD-10-CM: Z98.1  ICD-9-CM: V45.4  10/16/2018 - Present        CAP (community acquired pneumonia) ICD-10-CM: J18.9  ICD-9-CM: 904  9/1/2018 - Present        Shortness of breath ICD-10-CM: R06.02  ICD-9-CM: 786.05  9/1/2018 - Present        Fever and chills ICD-10-CM: R50.9  ICD-9-CM: 780.60  9/1/2018 - Present        Spinal stenosis of lumbar region at multiple levels ICD-10-CM: M48.061  ICD-9-CM: 724.02  8/30/2018 - Present        Scoliosis ICD-10-CM: M41.9  ICD-9-CM: 737.30  8/30/2018 - Present        Spinal stenosis ICD-10-CM: M48.00  ICD-9-CM: 724.00  8/30/2018 - Present        Pre-op testing ICD-10-CM: I00.887  ICD-9-CM: V72.84  8/27/2018 - Present        Severe obesity (BMI 35.0-39. 9) ICD-10-CM: E66.01  ICD-9-CM: 278.01  6/20/2018 - Present        Spinal stenosis, lumbar region without neurogenic claudication ICD-10-CM: M48.061  ICD-9-CM: 724.02  5/16/2018 - Present        Lumbosacral spondylosis without myelopathy ICD-10-CM: M47.817  ICD-9-CM: 721.3  4/25/2018 - Present        DDD (degenerative disc disease), lumbar ICD-10-CM: M51.36  ICD-9-CM: 722.52  4/25/2018 - Present        Malignant neoplasm of female breast [facility]) ICD-10-CM: C50.919  ICD-9-CM: 174.9  4/25/2018 - Present              Medications reviewed  Current Facility-Administered Medications   Medication Dose Route Frequency    hydrALAZINE (APRESOLINE) tablet 100 mg  100 mg Oral TID    cloNIDine HCL (CATAPRES) tablet 0.1 mg  0.1 mg Oral BID    [Held by provider] carvediloL (COREG) tablet 6.25 mg  6.25 mg Oral BID WITH MEALS    insulin lispro (HUMALOG) injection   SubCUTAneous AC&HS    isosorbide mononitrate ER (IMDUR) tablet 60 mg  60 mg Oral DAILY    hydrALAZINE (APRESOLINE) 20 mg/mL injection 10 mg  10 mg IntraVENous Q4H PRN    nitroglycerin (NITROBID) 2 % ointment 1 Inch  1 Inch Topical Q6H    labetaloL (NORMODYNE;TRANDATE) 20 mg/4 mL (5 mg/mL) injection 10 mg  10 mg IntraVENous Q4H PRN    amLODIPine (NORVASC) tablet 10 mg  10 mg Oral DAILY    atorvastatin (LIPITOR) tablet 40 mg  40 mg Oral QHS    albuterol (PROVENTIL HFA, VENTOLIN HFA, PROAIR HFA) inhaler 2 Puff  2 Puff Inhalation Q4H PRN    [Held by provider] tamsulosin (FLOMAX) capsule 0.4 mg  0.4 mg Oral DAILY    [Held by provider] gabapentin (NEURONTIN) capsule 300 mg  300 mg Oral BID    [Held by provider] melatonin tablet 3 mg  3 mg Oral QHS    venlafaxine-SR (EFFEXOR-XR) capsule 150 mg  150 mg Oral DAILY WITH BREAKFAST    ferrous sulfate tablet 325 mg  1 Tablet Oral TID    aspirin delayed-release tablet 81 mg  81 mg Oral DAILY    glucose chewable tablet 16 g  4 Tablet Oral PRN    dextrose (D50W) injection syrg 12.5-25 g  25-50 mL IntraVENous PRN    glucagon (GLUCAGEN) injection 1 mg  1 mg IntraMUSCular PRN    sodium chloride (NS) flush 5-40 mL  5-40 mL IntraVENous Q8H    sodium chloride (NS) flush 5-40 mL  5-40 mL IntraVENous PRN    acetaminophen (TYLENOL) tablet 650 mg  650 mg Oral Q6H PRN    Or    acetaminophen (TYLENOL) suppository 650 mg  650 mg Rectal Q6H PRN    ondansetron (ZOFRAN ODT) tablet 4 mg  4 mg Oral Q6H PRN    Or    ondansetron (ZOFRAN) injection 4 mg  4 mg IntraVENous Q6H PRN    famotidine (PEPCID) tablet 20 mg  20 mg Oral DAILY       Review of Systems:   Review of systems not obtained due to patient factors.     Objective:   Physical Exam:     Visit Vitals  BP (!) 160/73   Pulse 60   Temp 97.8 °F (36.6 °C)   Resp 18   Ht 5' 5\" (1.651 m)   Wt 101.2 kg (223 lb)   SpO2 99%   BMI 37.11 kg/m²    O2 Flow Rate (L/min): 3 l/min O2 Device: Nasal cannula    Temp (24hrs), Av.4 °F (36.9 °C), Min:97.8 °F (36.6 °C), Max:99.1 °F (37.3 °C)    No intake/output data recorded.  1901 -  0700  In: -   Out: 1000 [Urine:1000]    General:  Awake and alert    Lungs:   Clear to auscultation bilaterally. Chest wall:  No tenderness or deformity. Heart:  Regular rate and rhythm, S1, S2 normal, no murmur, click, rub or gallop. Abdomen:   Soft, non-tender. Bowel sounds normal. No masses,  No organomegaly. Extremities: Extremities normal, atraumatic, no cyanosis or edema. Pulses: 2+ and symmetric all extremities. Skin: Skin color, texture, turgor normal. No rashes or lesions   Neurologic: CNII-XII intact. Unable to fully assess         Data Review:       Recent Days:  Recent Labs     21  0924   WBC 11.0   HGB 11.0*   HCT 36.1   *     Recent Labs     21  0924   *   K 3.8   *   CO2 32   *   BUN 22*   CREA 1.77*   CA 9.6     No results for input(s): PH, PCO2, PO2, HCO3, FIO2 in the last 72 hours. 24 Hour Results:  Recent Results (from the past 24 hour(s))   GLUCOSE, POC    Collection Time: 21 11:52 AM   Result Value Ref Range    Glucose (POC) 147 (H) 65 - 117 mg/dL    Performed by BAY TESFAYE, POC    Collection Time: 21  4:25 PM   Result Value Ref Range    Glucose (POC) 201 (H) 65 - 117 mg/dL    Performed by Kwame Griffin, POC    Collection Time: 21  8:27 PM   Result Value Ref Range    Glucose (POC) 153 (H) 65 - 117 mg/dL    Performed by Kwame Griffin, POC    Collection Time: 21  7:28 AM   Result Value Ref Range    Glucose (POC) 143 (H) 65 - 117 mg/dL    Performed by Isaiah DEL ROSARIO        XR CHEST PORT   Final Result   No significant interval change. MRI BRAIN WO CONT   Final Result   1. No evidence of acute intracranial ischemia, mass or hemorrhage. 2.  Moderate chronic small vessel ischemic changes. 3.  Mild diffuse parenchymal volume loss. Assessment:  Hypoglycemia in diabetes mellitus type 2, improved. Suspect she had a prolonged episode of severe hypoglycemia at home    Hypoglycemic encephalopathy, possibly with permanent acquired brain injury    Possible left lower lobe pneumonia, likely aspiration: DC zosyn, day 14    Chronic kidney disease stage III    History of breast cancer    COPD without exacerbation    Chronic respiratory failure with hypoxia    History of gastritis/esophagitis/gastroparesis    PLAN:  UPDATE:  S/p 14 days abx, stop ABx  A1c is 7, will dc only on metformin 500 once a day and f/u as OP  Added clonidine 0.1 to BP regimen, can go up , target is to keep < 150 slowly. Stable for discharge - pending SNF auth  Spoke to patient, and family at bed side    Care Plan discussed with: Patient/Family    Disposition: Transfer to medical telemetry    Total time spent with patient: 30 minutes.     Nikita Metcalf MD

## 2021-12-20 NOTE — PROGRESS NOTES
Problem: Diabetes Self-Management  Goal: *Disease process and treatment process  Description: Define diabetes and identify own type of diabetes; list 3 options for treating diabetes. Outcome: Progressing Towards Goal     Problem: Pressure Injury - Risk of  Goal: *Prevention of pressure injury  Description: Document Michele Scale and appropriate interventions in the flowsheet.   Outcome: Progressing Towards Goal  Note: Pressure Injury Interventions:  Sensory Interventions: Chair cushion    Moisture Interventions: Absorbent underpads    Activity Interventions: Pressure redistribution bed/mattress(bed type)    Mobility Interventions: Float heels    Nutrition Interventions: Document food/fluid/supplement intake    Friction and Shear Interventions: Foam dressings/transparent film/skin sealants

## 2021-12-21 LAB
ANION GAP SERPL CALC-SCNC: 5 MMOL/L (ref 5–15)
BUN SERPL-MCNC: 19 MG/DL (ref 6–20)
BUN/CREAT SERPL: 10 (ref 12–20)
CA-I BLD-MCNC: 9.4 MG/DL (ref 8.5–10.1)
CHLORIDE SERPL-SCNC: 109 MMOL/L (ref 97–108)
CO2 SERPL-SCNC: 31 MMOL/L (ref 21–32)
CREAT SERPL-MCNC: 1.94 MG/DL (ref 0.55–1.02)
GLUCOSE BLD STRIP.AUTO-MCNC: 136 MG/DL (ref 65–117)
GLUCOSE BLD STRIP.AUTO-MCNC: 166 MG/DL (ref 65–117)
GLUCOSE BLD STRIP.AUTO-MCNC: 178 MG/DL (ref 65–117)
GLUCOSE BLD STRIP.AUTO-MCNC: 208 MG/DL (ref 65–117)
GLUCOSE SERPL-MCNC: 210 MG/DL (ref 65–100)
PERFORMED BY, TECHID: ABNORMAL
POTASSIUM SERPL-SCNC: 3.7 MMOL/L (ref 3.5–5.1)
SODIUM SERPL-SCNC: 145 MMOL/L (ref 136–145)

## 2021-12-21 PROCEDURE — 74011250637 HC RX REV CODE- 250/637: Performed by: HOSPITALIST

## 2021-12-21 PROCEDURE — 74011636637 HC RX REV CODE- 636/637: Performed by: HOSPITALIST

## 2021-12-21 PROCEDURE — 97530 THERAPEUTIC ACTIVITIES: CPT

## 2021-12-21 PROCEDURE — 65270000029 HC RM PRIVATE

## 2021-12-21 PROCEDURE — 82962 GLUCOSE BLOOD TEST: CPT

## 2021-12-21 PROCEDURE — 80048 BASIC METABOLIC PNL TOTAL CA: CPT

## 2021-12-21 PROCEDURE — 74011250637 HC RX REV CODE- 250/637: Performed by: INTERNAL MEDICINE

## 2021-12-21 PROCEDURE — 36415 COLL VENOUS BLD VENIPUNCTURE: CPT

## 2021-12-21 RX ORDER — CLONIDINE HYDROCHLORIDE 0.1 MG/1
0.1 TABLET ORAL 2 TIMES DAILY
Qty: 60 TABLET | Refills: 0 | Status: SHIPPED
Start: 2021-12-21

## 2021-12-21 RX ORDER — ISOSORBIDE MONONITRATE 60 MG/1
60 TABLET, EXTENDED RELEASE ORAL DAILY
Qty: 30 TABLET | Refills: 0 | Status: SHIPPED
Start: 2021-12-22

## 2021-12-21 RX ADMIN — NITROGLYCERIN 1 INCH: 20 OINTMENT TOPICAL at 23:43

## 2021-12-21 RX ADMIN — INSULIN LISPRO 4 UNITS: 100 INJECTION, SOLUTION INTRAVENOUS; SUBCUTANEOUS at 12:58

## 2021-12-21 RX ADMIN — CLONIDINE HYDROCHLORIDE 0.1 MG: 0.1 TABLET ORAL at 20:11

## 2021-12-21 RX ADMIN — Medication 10 ML: at 05:31

## 2021-12-21 RX ADMIN — INSULIN LISPRO 2 UNITS: 100 INJECTION, SOLUTION INTRAVENOUS; SUBCUTANEOUS at 09:19

## 2021-12-21 RX ADMIN — Medication 10 ML: at 14:44

## 2021-12-21 RX ADMIN — HYDRALAZINE HYDROCHLORIDE 100 MG: 50 TABLET, FILM COATED ORAL at 17:33

## 2021-12-21 RX ADMIN — NITROGLYCERIN 1 INCH: 20 OINTMENT TOPICAL at 12:59

## 2021-12-21 RX ADMIN — HYDRALAZINE HYDROCHLORIDE 100 MG: 50 TABLET, FILM COATED ORAL at 21:04

## 2021-12-21 RX ADMIN — Medication 10 ML: at 22:44

## 2021-12-21 RX ADMIN — AMLODIPINE BESYLATE 10 MG: 5 TABLET ORAL at 08:13

## 2021-12-21 RX ADMIN — FAMOTIDINE 20 MG: 20 TABLET, FILM COATED ORAL at 08:12

## 2021-12-21 RX ADMIN — FERROUS SULFATE TAB 325 MG (65 MG ELEMENTAL FE) 325 MG: 325 (65 FE) TAB at 08:13

## 2021-12-21 RX ADMIN — FERROUS SULFATE TAB 325 MG (65 MG ELEMENTAL FE) 325 MG: 325 (65 FE) TAB at 17:33

## 2021-12-21 RX ADMIN — ISOSORBIDE MONONITRATE 60 MG: 60 TABLET, EXTENDED RELEASE ORAL at 08:12

## 2021-12-21 RX ADMIN — INSULIN LISPRO 4 UNITS: 100 INJECTION, SOLUTION INTRAVENOUS; SUBCUTANEOUS at 17:33

## 2021-12-21 RX ADMIN — FERROUS SULFATE TAB 325 MG (65 MG ELEMENTAL FE) 325 MG: 325 (65 FE) TAB at 21:05

## 2021-12-21 RX ADMIN — NITROGLYCERIN 1 INCH: 20 OINTMENT TOPICAL at 18:32

## 2021-12-21 RX ADMIN — ATORVASTATIN CALCIUM 40 MG: 40 TABLET, FILM COATED ORAL at 21:05

## 2021-12-21 RX ADMIN — VENLAFAXINE HYDROCHLORIDE 150 MG: 75 CAPSULE, EXTENDED RELEASE ORAL at 08:12

## 2021-12-21 RX ADMIN — HYDRALAZINE HYDROCHLORIDE 100 MG: 50 TABLET, FILM COATED ORAL at 08:11

## 2021-12-21 RX ADMIN — CLONIDINE HYDROCHLORIDE 0.1 MG: 0.1 TABLET ORAL at 08:12

## 2021-12-21 RX ADMIN — NITROGLYCERIN 1 INCH: 20 OINTMENT TOPICAL at 05:31

## 2021-12-21 RX ADMIN — ASPIRIN 81 MG: 81 TABLET, COATED ORAL at 08:12

## 2021-12-21 NOTE — DISCHARGE SUMMARY
Physician Discharge Summary     Patient ID:    Karri Montgomery  [id number]  30 y.o.  1955    Admit date: 12/5/2021    Discharge date : 12/21/2021    Chronic Diagnoses:    Problem List as of 12/21/2021 Date Reviewed: 12/5/2021          Codes Class Noted - Resolved    Metabolic encephalopathy EVJ-45-XL: G93.41  ICD-9-CM: 348.31  12/5/2021 - Present        CKD (chronic kidney disease) ICD-10-CM: N18.9  ICD-9-CM: 585.9  9/24/2021 - Present    Overview Signed 9/24/2021  3:57 PM by Cherelle Alvarenga MD     In the setting of severe, uncontrolled and prolonged diabetes, with diabetic retinopathy. Protienuria to 2.5g/g of creatinine. Would consider workup for acute GN given inconsistent history and rising creatinine. Though clinical hx appears consistent with aggressive diabetic nephropathy             Sepsis ([facility] ICD-10-CM: A41.9  ICD-9-CM: 038.9, 995.91  9/22/2021 - Present        Esophagitis with gastritis ICD-10-CM: K29.70, K20.90  ICD-9-CM: 530.19  9/22/2021 - Present        Elevated troponin ICD-10-CM: R77.8  ICD-9-CM: 790.6  9/20/2021 - Present        Syncope ICD-10-CM: R55  ICD-9-CM: 780.2  9/20/2021 - Present        Hypoglycemia ICD-10-CM: E16.2  ICD-9-CM: 251.2  9/20/2021 - Present        Chronic respiratory failure with hypoxia (HCC) ICD-10-CM: J96.11  ICD-9-CM: 518.83, 799.02  9/20/2021 - Present        Chest pain ICD-10-CM: R07.9  ICD-9-CM: 786.50  9/19/2021 - Present        Anxiety ICD-10-CM: F41.9  ICD-9-CM: 300.00  2/9/2021 - Present        Depression ICD-10-CM: F32. A  ICD-9-CM: 145  2/9/2021 - Present        Dyslipidemia ICD-10-CM: E78.5  ICD-9-CM: 272.4  2/9/2021 - Present        Hypertensive cardiovascular disease ICD-10-CM: I11.9  ICD-9-CM: 402.90  2/9/2021 - Present        Knee osteoarthritis ICD-10-CM: M17.10  ICD-9-CM: 715.36  1/25/2021 - Present        COPD with acute exacerbation ([facility] ICD-10-CM: J44.1  ICD-9-CM: 491.21  11/29/2020 - Present        Person under investigation for COVID-19 ICD-10-CM: Z20.822  ICD-9-CM: V01.79  11/29/2020 - Present        COPD (chronic obstructive pulmonary disease) ([place] [address] ICD-10-CM: J44.9  ICD-9-CM: 989  11/29/2020 - Present        DM (diabetes mellitus) ([place] [address] ICD-10-CM: E11.9  ICD-9-CM: 250.00  11/29/2020 - Present        COVID-19 ICD-10-CM: U07.1  ICD-9-CM: 079.89  10/1/2020 - Present        HTN (hypertension) ICD-10-CM: I10  ICD-9-CM: 401.9  10/1/2020 - Present        Diabetic gastroparesis ([place] [address] ICD-10-CM: E11.43, K31.84  ICD-9-CM: 250.60, 536.3  10/1/2020 - Present        DVT prophylaxis ICD-10-CM: Z29.9  ICD-9-CM: V07.9  10/1/2020 - Present        PNA (pneumonia) ICD-10-CM: J18.9  ICD-9-CM: 486  9/30/2020 - Present        Acute respiratory failure with hypoxia (HCC) ICD-10-CM: J96.01  ICD-9-CM: 518.81  9/30/2020 - Present        Acute kidney injury ([place] [address] ICD-10-CM: N17.9  ICD-9-CM: 584.9  9/30/2020 - Present        Type 2 diabetes mellitus with hyperglycemia, with long-term current use of insulin (HCC) ICD-10-CM: E11.65, Z79.4  ICD-9-CM: 250.00, 790.29, V58.67  9/30/2020 - Present        Ureteral stone ICD-10-CM: N20.1  ICD-9-CM: 592.1  2/15/2019 - Present        S/P lumbar fusion ICD-10-CM: Z98.1  ICD-9-CM: V45.4  10/16/2018 - Present        CAP (community acquired pneumonia) ICD-10-CM: J18.9  ICD-9-CM: 641  9/1/2018 - Present        Shortness of breath ICD-10-CM: R06.02  ICD-9-CM: 786.05  9/1/2018 - Present        Fever and chills ICD-10-CM: R50.9  ICD-9-CM: 780.60  9/1/2018 - Present        Spinal stenosis of lumbar region at multiple levels ICD-10-CM: M48.061  ICD-9-CM: 724.02  8/30/2018 - Present        Scoliosis ICD-10-CM: M41.9  ICD-9-CM: 737.30  8/30/2018 - Present        Spinal stenosis ICD-10-CM: M48.00  ICD-9-CM: 724.00  8/30/2018 - Present        Pre-op testing ICD-10-CM: E35.753  ICD-9-CM: V72.84  8/27/2018 - Present        Severe obesity (BMI 35.0-39. 9) ICD-10-CM: E66.01  ICD-9-CM: 278.01  6/20/2018 - Present        Spinal stenosis, lumbar region without neurogenic claudication ICD-10-CM: M48.061  ICD-9-CM: 724.02  5/16/2018 - Present        Lumbosacral spondylosis without myelopathy ICD-10-CM: M47.817  ICD-9-CM: 721.3  4/25/2018 - Present        DDD (degenerative disc disease), lumbar ICD-10-CM: M51.36  ICD-9-CM: 722.52  4/25/2018 - Present        Malignant neoplasm of female breast [facility]) ICD-10-CM: C50.919  ICD-9-CM: 174.9  4/25/2018 - Present          24    Final Diagnoses:   Hypoglycemic encephalopathy with likely some degree of permanent acquired brain injury  Hypoglycemia and diabetes mellitus type 2  Left lower lobe aspiration pneumonia  Chronic kidney disease stage III  COPD without exacerbation  Chronic respiratory failure with hypoxia  History of breast cancer    Reason for Hospitalization:  Patient is a 30-year-old female with history of diabetes, breast cancer and hypertension who was brought to the ED on 12/5 after being found by family unresponsive the night before.  She had been in the ED the day before that with hypoglycemia. Corey Randall was noted have a blood sugar of 51 at home and was given D10 but no significant improvement.  Chest x-ray showed left lower lobe pneumonia.  She was transferred from [facility] ED.  Blood sugar on arrival here was 68.  She was started on D10.  All of patient's diabetic medications were supposed to have been previously discontinued although unclear if she had been inadvertently taking them.  According to her sister the last time she took insulin was Friday of last week      Hospital Course:   Patient was admitted to the ICU initially.     Patient remained very poorly responsive for the first several days of admission, then very slowly started coming around    NG tube was placed and she was started on tube feeds which she pulled out on 12/9    She became awake enough to start taking a few bites of food    MRI of the brain was obtained which showed no acute acute pathology    Patient was evaluated by neurology    Patient was felt to have acquired brain injury from a prolonged episode of hypoglycemia. She showed slow but continued improvement in her mentation by the time I saw her again on 12/21 she was speaking and eating appropriately. She was felt appropriate for skilled nursing facility              Discharge Medications:   Current Discharge Medication List      START taking these medications    Details   isosorbide mononitrate ER (IMDUR) 60 mg CR tablet Take 1 Tablet by mouth daily. Qty: 30 Tablet, Refills: 0  Start date: 12/22/2021      cloNIDine HCL (CATAPRES) 0.1 mg tablet Take 1 Tablet by mouth two (2) times a day. Qty: 60 Tablet, Refills: 0  Start date: 12/21/2021         CONTINUE these medications which have NOT CHANGED    Details   aspirin delayed-release 81 mg tablet Take 1 Tablet by mouth daily. Qty: 30 Tablet, Refills: 0      Venlafaxine-ER 24 HR (EFFEXOR-ER) 150 mg tr24 tablet Take 150 mg by mouth daily. melatonin 3 mg tablet Take 3 mg by mouth nightly. famotidine (PEPCID) 20 mg tablet take 1 tablet by mouth twice a day      albuterol (PROVENTIL HFA, VENTOLIN HFA, PROAIR HFA) 90 mcg/actuation inhaler 2 puffs three to four times a day for 5 days then every 6 hours as needed for shortness of breath  Qty: 1 Inhaler, Refills: 0      amLODIPine (NORVASC) 10 mg tablet Take 10 mg by mouth daily. Refills: 0               Follow up Care:    1. Louie Martinez MD in 1-2 weeks. Please call to set up an appointment shortly after discharge. Diet:  Diabetic Diet    Disposition:  SNF. Advanced Directive:   FULL    DNR      Discharge Exam:  General:  Alert, cooperative, no distress, appears stated age. Lungs:   Clear to auscultation bilaterally. Chest wall:  No tenderness or deformity. Heart:  Regular rate and rhythm, S1, S2 normal, no murmur, click, rub or gallop. Abdomen:   Soft, non-tender. Bowel sounds normal. No masses,  No organomegaly.    Extremities: Extremities normal, atraumatic, no cyanosis or edema. Pulses: 2+ and symmetric all extremities. Skin: Skin color, texture, turgor normal. No rashes or lesions   Neurologic: CNII-XII intact. No gross sensory or motor deficits        CONSULTATIONS: Neurology    Significant Diagnostic Studies:   12/5/2021: BUN 17 mg/dL (Ref range: 9 - 21 mg/dL); BUN 16 mg/dL (Ref range: 6 - 20 mg/dL); BUN 18 mg/dL (Ref range: 6 - 20 mg/dL); Calcium 9.6 mg/dL (Ref range: 8.5 - 10.5 mg/dL); Calcium 8.9 mg/dL (Ref range: 8.5 - 10.1 mg/dL); Calcium 8.8 mg/dL (Ref range: 8.5 - 10.1 mg/dL); CO2 28 mmol/L (Ref range: 21 - 33 mmol/L); CO2 28 mmol/L (Ref range: 21 - 32 mmol/L); CO2 28 mmol/L (Ref range: 21 - 32 mmol/L); Creatinine 1.50 mg/dL (H; Ref range: 0.70 - 1.20 mg/dL); Creatinine 1.78 mg/dL (H; Ref range: 0.55 - 1.02 mg/dL); Creatinine 1.80 mg/dL (H; Ref range: 0.55 - 1.02 mg/dL); Glucose 73 mg/dL (Ref range: 70 - 110 mg/dL); Glucose 166 mg/dL (H; Ref range: 65 - 100 mg/dL); Glucose 166 mg/dL (H; Ref range: 65 - 100 mg/dL); HCT 40.2 % (Ref range: 35.0 - 45.0 %); HCT 36.2 % (Ref range: 35.0 - 47.0 %); HGB 12.5 g/dL (Ref range: 12.0 - 16.0 g/dL); HGB 11.3 g/dL (L; Ref range: 11.5 - 16.0 g/dL); Potassium 5.0 mmol/L (Ref range: 3.2 - 5.1 mmol/L); Potassium 5.1 mmol/L (Ref range: 3.5 - 5.1 mmol/L); Potassium 5.0 mmol/L (Ref range: 3.5 - 5.1 mmol/L); Sodium 140 mmol/L (Ref range: 135 - 145 mmol/L); Sodium 142 mmol/L (Ref range: 136 - 145 mmol/L); Sodium 140 mmol/L (Ref range: 136 - 145 mmol/L)  12/6/2021: BUN 17 mg/dL (Ref range: 6 - 20 mg/dL); Calcium 8.8 mg/dL (Ref range: 8.5 - 10.1 mg/dL); CO2 28 mmol/L (Ref range: 21 - 32 mmol/L); Creatinine 1.80 mg/dL (H; Ref range: 0.55 - 1.02 mg/dL); Glucose 63 mg/dL (L; Ref range: 65 - 100 mg/dL); Potassium 4.6 mmol/L (Ref range: 3.5 - 5.1 mmol/L);  Sodium 142 mmol/L (Ref range: 136 - 145 mmol/L)  Recent Labs     12/19/21  0924   WBC 11.0   HGB 11.0*   HCT 36.1   *     Recent Labs 12/19/21  0924   *   K 3.8   *   CO2 32   BUN 22*   CREA 1.77*   *   CA 9.6     No results for input(s): ALT, AP, TBIL, TBILI, TP, ALB, GLOB, GGT, AML, LPSE in the last 72 hours. No lab exists for component: SGOT, GPT, AMYP, HLPSE  No results for input(s): INR, PTP, APTT, INREXT in the last 72 hours. No results for input(s): FE, TIBC, PSAT, FERR in the last 72 hours. No results for input(s): PH, PCO2, PO2 in the last 72 hours. No results for input(s): CPK, CKMB in the last 72 hours.     No lab exists for component: TROPONINI  Lab Results   Component Value Date/Time    Glucose (POC) 166 (H) 12/21/2021 08:27 AM    Glucose (POC) 162 (H) 12/20/2021 08:28 PM    Glucose (POC) 147 (H) 12/20/2021 05:05 PM    Glucose (POC) 203 (H) 12/20/2021 11:36 AM    Glucose (POC) 143 (H) 12/20/2021 07:28 AM       Discharge time spent 35 minutes    Signed:  Mario Donnelly MD  12/21/2021  10:42 AM

## 2021-12-21 NOTE — PROGRESS NOTES
CM has messaged [facility] on [address] again to receive update. CM also called and left message for the  at SNF. CM will continue to reach out to facility. At this time family does not have second choice in facility. Patient was accepted at facility on 12/13/21. Auth was started on 12/14/21. If [facility] is denied patient has long term benefits and family wants long term care for patient.

## 2021-12-21 NOTE — PROGRESS NOTES
IKE spoke with the admissions department at [place] [address]. They stated that they no longer have any beds available. IKE has spoke with both of patient's sisters and they state that they would like for patient to go to [facility]. IKE has sent referral and will continue to follow.

## 2021-12-21 NOTE — PROGRESS NOTES
Blood pressure control with routine medications. Continue to monitor blood glucose levels. Awaiting authorization for SNF. Problem: Diabetes Self-Management  Goal: *Disease process and treatment process  Description: Define diabetes and identify own type of diabetes; list 3 options for treating diabetes. Outcome: Progressing Towards Goal  Goal: *Incorporating nutritional management into lifestyle  Description: Describe effect of type, amount and timing of food on blood glucose; list 3 methods for planning meals. Outcome: Progressing Towards Goal  Goal: *Incorporating physical activity into lifestyle  Description: State effect of exercise on blood glucose levels. Outcome: Progressing Towards Goal  Goal: *Developing strategies to promote health/change behavior  Description: Define the ABC's of diabetes; identify appropriate screenings, schedule and personal plan for screenings. Outcome: Progressing Towards Goal  Goal: *Using medications safely  Description: State effect of diabetes medications on diabetes; name diabetes medication taking, action and side effects. Outcome: Progressing Towards Goal  Goal: *Monitoring blood glucose, interpreting and using results  Description: Identify recommended blood glucose targets  and personal targets.   Outcome: Progressing Towards Goal

## 2021-12-21 NOTE — PROGRESS NOTES
OCCUPATIONAL THERAPY TREATMENT  Patient: Gino Ha (82 y.o. female)  Date: 12/21/2021  Diagnosis: Metabolic encephalopathy [L30.13]  Hypoglycemia [E16.2] <principal problem not specified>       Precautions:    Chart, occupational therapy assessment, plan of care, and goals were reviewed. ASSESSMENT  Patient continues with skilled OT services and is progressing towards goals. Upon VALENCIA arrival, pt semi supine in bed and agreeable to tx session. Pt noted to be soiled in urine. Pt completed supine>sit with ModA. [place]/donning of clean gown completed with setup A. Pt completed sit>stand from EOB with CGA using RW for balance upon standing. RN entered room to assist with changing linens and wiping down bed. Pt ambulated to bathroom and completed toilet transfer with CGA. Pt completed seated bladder hygiene with CGA. Pt ambulated to sink and completed standing hair brushing with CGA and verbal/tactile cueing for thoroughness. Pt ambulated back into room and completed transfer into recliner with CGA. Pt completed donning of socks with ModA, requiring max cueing for putting sock on second foot and not repeating the first foot as well as requiring assistance with crossing legs/maintaining crossing. At end of session, PCTs entering room and transferred pt to EOB for CHG bath. Pt noted with much improvement in verbal communication and command following this date. Will continue to follow pt throughout remainder of stay and progress towards OT goals. Recommending SNF at discharge when medically appropriate. Patient seen today by Tino Mai for treatment, discussed plan of care with supervising OT and goals reviewed and updated as appropriate. Plan of care reviewed and adjusted as necessary due to patient LOS, pt overall improving in functional status at this time, continue to progress towards goals as able.      Current Level of Function Impacting Discharge (ADLs): ModA supine>sit, CGA toileting, CGA grooming    Other factors to consider for discharge: PLOF, severity of deficits         PLAN :  Patient continues to benefit from skilled intervention to address the above impairments. Continue treatment per established plan of care. to address goals. Recommend next OT session: standing grooming, LB dressing, balance activities    Recommendation for discharge: (in order for the patient to meet his/her long term goals)  Baudilio Stacy    This discharge recommendation:  Has been made in collaboration with the attending provider and/or case management    IF patient discharges home will need the following DME: TBD       SUBJECTIVE:   Patient stated I feel much .     OBJECTIVE DATA SUMMARY:   Cognitive/Behavioral Status:  Neurologic State: Alert;Confused  Orientation Level: Oriented to place;Oriented to person  Cognition: Follows commands; Impulsive    Functional Mobility and Transfers for ADLs:  Bed Mobility:  Rolling: Contact guard assistance  Supine to Sit: Moderate assistance  Scooting: Contact guard assistance;Minimum assistance    Transfers:  Sit to Stand: Contact guard assistance  Functional Transfers  Bathroom Mobility: Contact guard assistance  Toilet Transfer : Contact guard assistance  Bed to Chair: Contact guard assistance    Balance:  Sitting: Impaired; Without support  Sitting - Static: Good (unsupported)  Sitting - Dynamic: Fair (occasional)  Standing: Impaired; With support  Standing - Static: Constant support;Good  Standing - Dynamic : Constant support; Fair    ADL Intervention:    Grooming  Position Performed: Standing  Brushing/Combing Hair: Contact guard assistance    [address]: Set-up    Lower Body Dressing Assistance  Socks:  Moderate assistance    Toileting  Bladder Hygiene: Contact guard assistance    Pain:  0/10    Activity Tolerance:   Fair and requires rest breaks  Please refer to the flowsheet for vital signs taken during this treatment. After treatment patient left in no apparent distress:   Call bell within reach and sitting EOB with PCTs    COMMUNICATION/COLLABORATION:   The patients plan of care was discussed with: Occupational therapist, Registered nurse, and Certified nursing assistant/patient care technician. ANNIE Vann  Time Calculation: 29 mins    Problem: Self Care Deficits Care Plan (Adult)  Goal: *Acute Goals and Plan of Care (Insert Text)  Description: 1. Pt will be MI sup <> sit in prep for EOB ADLs  2. Pt will be MI grooming standing sinkside  3. Pt will be MI LE dressing sitting EOB/long sit  4. Pt will be MI sit <>  prep for toileting LRAD  5. Pt will be MI toileting/toilet transfer/cloth mgmt LRAD  6.   Pt will be IND following UE HEP in prep for self care tasks      Outcome: Progressing Towards Goal

## 2021-12-22 VITALS
DIASTOLIC BLOOD PRESSURE: 68 MMHG | OXYGEN SATURATION: 98 % | RESPIRATION RATE: 18 BRPM | WEIGHT: 223 LBS | HEIGHT: 65 IN | SYSTOLIC BLOOD PRESSURE: 163 MMHG | TEMPERATURE: 98.1 F | BODY MASS INDEX: 37.15 KG/M2 | HEART RATE: 65 BPM

## 2021-12-22 LAB
GLUCOSE BLD STRIP.AUTO-MCNC: 143 MG/DL (ref 65–117)
GLUCOSE BLD STRIP.AUTO-MCNC: 220 MG/DL (ref 65–117)
PERFORMED BY, TECHID: ABNORMAL
PERFORMED BY, TECHID: ABNORMAL

## 2021-12-22 PROCEDURE — 77010033678 HC OXYGEN DAILY

## 2021-12-22 PROCEDURE — 74011250637 HC RX REV CODE- 250/637: Performed by: INTERNAL MEDICINE

## 2021-12-22 PROCEDURE — 90686 IIV4 VACC NO PRSV 0.5 ML IM: CPT | Performed by: INTERNAL MEDICINE

## 2021-12-22 PROCEDURE — 74011636637 HC RX REV CODE- 636/637: Performed by: HOSPITALIST

## 2021-12-22 PROCEDURE — 94760 N-INVAS EAR/PLS OXIMETRY 1: CPT

## 2021-12-22 PROCEDURE — 74011250637 HC RX REV CODE- 250/637: Performed by: HOSPITALIST

## 2021-12-22 PROCEDURE — 74011250636 HC RX REV CODE- 250/636: Performed by: INTERNAL MEDICINE

## 2021-12-22 PROCEDURE — 82962 GLUCOSE BLOOD TEST: CPT

## 2021-12-22 PROCEDURE — 90471 IMMUNIZATION ADMIN: CPT

## 2021-12-22 RX ADMIN — FERROUS SULFATE TAB 325 MG (65 MG ELEMENTAL FE) 325 MG: 325 (65 FE) TAB at 11:36

## 2021-12-22 RX ADMIN — INFLUENZA VIRUS VACCINE 0.5 ML: 15; 15; 15; 15 SUSPENSION INTRAMUSCULAR at 11:56

## 2021-12-22 RX ADMIN — ISOSORBIDE MONONITRATE 60 MG: 60 TABLET, EXTENDED RELEASE ORAL at 11:36

## 2021-12-22 RX ADMIN — INSULIN LISPRO 4 UNITS: 100 INJECTION, SOLUTION INTRAVENOUS; SUBCUTANEOUS at 11:56

## 2021-12-22 RX ADMIN — CLONIDINE HYDROCHLORIDE 0.1 MG: 0.1 TABLET ORAL at 11:36

## 2021-12-22 RX ADMIN — ASPIRIN 81 MG: 81 TABLET, COATED ORAL at 11:36

## 2021-12-22 RX ADMIN — FAMOTIDINE 20 MG: 20 TABLET, FILM COATED ORAL at 11:36

## 2021-12-22 RX ADMIN — AMLODIPINE BESYLATE 10 MG: 5 TABLET ORAL at 11:36

## 2021-12-22 RX ADMIN — Medication 10 ML: at 05:51

## 2021-12-22 RX ADMIN — VENLAFAXINE HYDROCHLORIDE 150 MG: 75 CAPSULE, EXTENDED RELEASE ORAL at 11:35

## 2021-12-22 RX ADMIN — HYDRALAZINE HYDROCHLORIDE 100 MG: 50 TABLET, FILM COATED ORAL at 11:36

## 2021-12-22 RX ADMIN — NITROGLYCERIN 1 INCH: 20 OINTMENT TOPICAL at 05:46

## 2021-12-22 NOTE — PROGRESS NOTES
Patient has been accepted at [facility] at [address] [facility], [place], [address]. Report can be called to [phone]. Patient will be going to Room 201. CM has spoken to patient's sister to make her aware. She is agreeable with plan. Primary RN made aware. Discharge plan of care/case management plan validated with provider discharge order.

## 2021-12-22 NOTE — PROGRESS NOTES
Problem: Diabetes Self-Management  Goal: *Disease process and treatment process  Description: Define diabetes and identify own type of diabetes; list 3 options for treating diabetes. Outcome: Progressing Towards Goal  Goal: *Incorporating nutritional management into lifestyle  Description: Describe effect of type, amount and timing of food on blood glucose; list 3 methods for planning meals. Outcome: Progressing Towards Goal  Goal: *Incorporating physical activity into lifestyle  Description: State effect of exercise on blood glucose levels. Outcome: Progressing Towards Goal  Goal: *Developing strategies to promote health/change behavior  Description: Define the ABC's of diabetes; identify appropriate screenings, schedule and personal plan for screenings. Outcome: Progressing Towards Goal  Goal: *Using medications safely  Description: State effect of diabetes medications on diabetes; name diabetes medication taking, action and side effects. Outcome: Progressing Towards Goal  Goal: *Monitoring blood glucose, interpreting and using results  Description: Identify recommended blood glucose targets  and personal targets. Outcome: Progressing Towards Goal  Goal: *Prevention, detection, treatment of acute complications  Description: List symptoms of hyper- and hypoglycemia; describe how to treat low blood sugar and actions for lowering  high blood glucose level. Outcome: Progressing Towards Goal  Goal: *Prevention, detection and treatment of chronic complications  Description: Define the natural course of diabetes and describe the relationship of blood glucose levels to long term complications of diabetes.   Outcome: Progressing Towards Goal  Goal: *Developing strategies to address psychosocial issues  Description: Describe feelings about living with diabetes; identify support needed and support network  Outcome: Progressing Towards Goal  Goal: *Insulin pump training  Outcome: Progressing Towards Goal  Goal: *Sick day guidelines  Outcome: Progressing Towards Goal  Goal: *Patient Specific Goal (EDIT GOAL, INSERT TEXT)  Outcome: Progressing Towards Goal     Problem: Patient Education: Go to Patient Education Activity  Goal: Patient/Family Education  Outcome: Progressing Towards Goal     Problem: Pressure Injury - Risk of  Goal: *Prevention of pressure injury  Description: Document Michele Scale and appropriate interventions in the flowsheet. Outcome: Progressing Towards Goal  Note: Pressure Injury Interventions:  Sensory Interventions: Chair cushion    Moisture Interventions: Absorbent underpads    Activity Interventions: Pressure redistribution bed/mattress(bed type)    Mobility Interventions: Float heels    Nutrition Interventions: Document food/fluid/supplement intake    Friction and Shear Interventions: Foam dressings/transparent film/skin sealants                Problem: Patient Education: Go to Patient Education Activity  Goal: Patient/Family Education  Outcome: Progressing Towards Goal     Problem: Falls - Risk of  Goal: *Absence of Falls  Description: Document Karin Fall Risk and appropriate interventions in the flowsheet. Outcome: Progressing Towards Goal  Note: Fall Risk Interventions:  Mobility Interventions: Bed/chair exit alarm    Mentation Interventions: Bed/chair exit alarm    Medication Interventions: Bed/chair exit alarm    Elimination Interventions: Bed/chair exit alarm    History of Falls Interventions: Bed/chair exit alarm         Problem: Patient Education: Go to Patient Education Activity  Goal: Patient/Family Education  Outcome: Progressing Towards Goal     Problem: Patient Education: Go to Patient Education Activity  Goal: Patient/Family Education  Outcome: Progressing Towards Goal     Problem: Patient Education: Go to Patient Education Activity  Goal: Patient/Family Education  Outcome: Progressing Towards Goal   Pt waiting on placement to a rehab facility.  Pt is alert with periods of confusion and is able to follow commands. No evidence of pain. Will continue plan of care.

## 2021-12-22 NOTE — DISCHARGE SUMMARY
Physician Discharge Summary     Patient ID:    Dino Landa  [id number]  73 y.o.  1955    Admit date: 12/5/2021    Discharge date : 12/22/2021    Chronic Diagnoses:    Problem List as of 12/22/2021 Date Reviewed: 12/5/2021          Codes Class Noted - Resolved    Metabolic encephalopathy CXF-01-QT: G93.41  ICD-9-CM: 348.31  12/5/2021 - Present        CKD (chronic kidney disease) ICD-10-CM: N18.9  ICD-9-CM: 585.9  9/24/2021 - Present    Overview Signed 9/24/2021  3:57 PM by Kailash Fuentes MD     In the setting of severe, uncontrolled and prolonged diabetes, with diabetic retinopathy. Protienuria to 2.5g/g of creatinine. Would consider workup for acute GN given inconsistent history and rising creatinine. Though clinical hx appears consistent with aggressive diabetic nephropathy             Sepsis ([facility] ICD-10-CM: A41.9  ICD-9-CM: 038.9, 995.91  9/22/2021 - Present        Esophagitis with gastritis ICD-10-CM: K29.70, K20.90  ICD-9-CM: 530.19  9/22/2021 - Present        Elevated troponin ICD-10-CM: R77.8  ICD-9-CM: 790.6  9/20/2021 - Present        Syncope ICD-10-CM: R55  ICD-9-CM: 780.2  9/20/2021 - Present        Hypoglycemia ICD-10-CM: E16.2  ICD-9-CM: 251.2  9/20/2021 - Present        Chronic respiratory failure with hypoxia (HCC) ICD-10-CM: J96.11  ICD-9-CM: 518.83, 799.02  9/20/2021 - Present        Chest pain ICD-10-CM: R07.9  ICD-9-CM: 786.50  9/19/2021 - Present        Anxiety ICD-10-CM: F41.9  ICD-9-CM: 300.00  2/9/2021 - Present        Depression ICD-10-CM: F32. A  ICD-9-CM: 271  2/9/2021 - Present        Dyslipidemia ICD-10-CM: E78.5  ICD-9-CM: 272.4  2/9/2021 - Present        Hypertensive cardiovascular disease ICD-10-CM: I11.9  ICD-9-CM: 402.90  2/9/2021 - Present        Knee osteoarthritis ICD-10-CM: M17.10  ICD-9-CM: 715.36  1/25/2021 - Present        COPD with acute exacerbation ([facility] ICD-10-CM: J44.1  ICD-9-CM: 491.21  11/29/2020 - Present        Person under investigation for COVID-19 ICD-10-CM: Z20.822  ICD-9-CM: V01.79  11/29/2020 - Present        COPD (chronic obstructive pulmonary disease) ([place] [address].) ICD-10-CM: J44.9  ICD-9-CM: 398  11/29/2020 - Present        DM (diabetes mellitus) ([place] [address].) ICD-10-CM: E11.9  ICD-9-CM: 250.00  11/29/2020 - Present        COVID-19 ICD-10-CM: U07.1  ICD-9-CM: 079.89  10/1/2020 - Present        HTN (hypertension) ICD-10-CM: I10  ICD-9-CM: 401.9  10/1/2020 - Present        Diabetic gastroparesis ([place] [address].) ICD-10-CM: E11.43, K31.84  ICD-9-CM: 250.60, 536.3  10/1/2020 - Present        DVT prophylaxis ICD-10-CM: Z29.9  ICD-9-CM: V07.9  10/1/2020 - Present        PNA (pneumonia) ICD-10-CM: J18.9  ICD-9-CM: 486  9/30/2020 - Present        Acute respiratory failure with hypoxia (HCC) ICD-10-CM: J96.01  ICD-9-CM: 518.81  9/30/2020 - Present        Acute kidney injury ([place] [address].) ICD-10-CM: N17.9  ICD-9-CM: 584.9  9/30/2020 - Present        Type 2 diabetes mellitus with hyperglycemia, with long-term current use of insulin (HCC) ICD-10-CM: E11.65, Z79.4  ICD-9-CM: 250.00, 790.29, V58.67  9/30/2020 - Present        Ureteral stone ICD-10-CM: N20.1  ICD-9-CM: 592.1  2/15/2019 - Present        S/P lumbar fusion ICD-10-CM: Z98.1  ICD-9-CM: V45.4  10/16/2018 - Present        CAP (community acquired pneumonia) ICD-10-CM: J18.9  ICD-9-CM: 464  9/1/2018 - Present        Shortness of breath ICD-10-CM: R06.02  ICD-9-CM: 786.05  9/1/2018 - Present        Fever and chills ICD-10-CM: R50.9  ICD-9-CM: 780.60  9/1/2018 - Present        Spinal stenosis of lumbar region at multiple levels ICD-10-CM: M48.061  ICD-9-CM: 724.02  8/30/2018 - Present        Scoliosis ICD-10-CM: M41.9  ICD-9-CM: 737.30  8/30/2018 - Present        Spinal stenosis ICD-10-CM: M48.00  ICD-9-CM: 724.00  8/30/2018 - Present        Pre-op testing ICD-10-CM: Y41.470  ICD-9-CM: V72.84  8/27/2018 - Present        Severe obesity (BMI 35.0-39. 9) ICD-10-CM: E66.01  ICD-9-CM: 278.01  6/20/2018 - Present        Spinal stenosis, lumbar region without neurogenic claudication ICD-10-CM: M48.061  ICD-9-CM: 724.02  5/16/2018 - Present        Lumbosacral spondylosis without myelopathy ICD-10-CM: M47.817  ICD-9-CM: 721.3  4/25/2018 - Present        DDD (degenerative disc disease), lumbar ICD-10-CM: M51.36  ICD-9-CM: 722.52  4/25/2018 - Present        Malignant neoplasm of female breast [facility]) ICD-10-CM: C50.919  ICD-9-CM: 174.9  4/25/2018 - Present          22    Final Diagnoses:   Hypoglycemic encephalopathy with likely some degree of permanent acquired brain injury  Hypoglycemia and diabetes mellitus type 2  Left lower lobe aspiration pneumonia  Chronic kidney disease stage III  COPD without exacerbation  Chronic respiratory failure with hypoxia  History of breast cancer    Reason for Hospitalization:  Patient is a 70-year-old female with history of diabetes, breast cancer and hypertension who was brought to the ED on 12/5 after being found by family unresponsive the night before.  She had been in the ED the day before that with hypoglycemia. Lilliana Lawrence was noted have a blood sugar of 51 at home and was given D10 but no significant improvement.  Chest x-ray showed left lower lobe pneumonia.  She was transferred from [facility] ED.  Blood sugar on arrival here was 68.  She was started on D10.  All of patient's diabetic medications were supposed to have been previously discontinued although unclear if she had been inadvertently taking them.  According to her sister the last time she took insulin was Friday of last week      Hospital Course:   Patient was admitted to the ICU initially.     Patient remained very poorly responsive for the first several days of admission, then very slowly started coming around    NG tube was placed and she was started on tube feeds which she pulled out on 12/9    She became awake enough to start taking a few bites of food    MRI of the brain was obtained which showed no acute acute pathology    Patient was evaluated by neurology    Patient was felt to have acquired brain injury from a prolonged episode of hypoglycemia. She showed slow but continued improvement in her mentation by the time I saw her again on 12/21 she was speaking and eating appropriately. She was felt appropriate for skilled nursing facility              Discharge Medications:   Current Discharge Medication List      START taking these medications    Details   isosorbide mononitrate ER (IMDUR) 60 mg CR tablet Take 1 Tablet by mouth daily. Qty: 30 Tablet, Refills: 0  Start date: 12/22/2021      cloNIDine HCL (CATAPRES) 0.1 mg tablet Take 1 Tablet by mouth two (2) times a day. Qty: 60 Tablet, Refills: 0  Start date: 12/21/2021         CONTINUE these medications which have NOT CHANGED    Details   aspirin delayed-release 81 mg tablet Take 1 Tablet by mouth daily. Qty: 30 Tablet, Refills: 0      Venlafaxine-ER 24 HR (EFFEXOR-ER) 150 mg tr24 tablet Take 150 mg by mouth daily. melatonin 3 mg tablet Take 3 mg by mouth nightly. famotidine (PEPCID) 20 mg tablet take 1 tablet by mouth twice a day      albuterol (PROVENTIL HFA, VENTOLIN HFA, PROAIR HFA) 90 mcg/actuation inhaler 2 puffs three to four times a day for 5 days then every 6 hours as needed for shortness of breath  Qty: 1 Inhaler, Refills: 0      amLODIPine (NORVASC) 10 mg tablet Take 10 mg by mouth daily. Refills: 0               Follow up Care:    1. Jj Garcia MD in 1-2 weeks. Please call to set up an appointment shortly after discharge. Diet:  Diabetic Diet    Disposition:  SNF. Advanced Directive:   FULL    DNR      Discharge Exam:  General:  Alert, cooperative, no distress, appears stated age. Lungs:   Clear to auscultation bilaterally. Chest wall:  No tenderness or deformity. Heart:  Regular rate and rhythm, S1, S2 normal, no murmur, click, rub or gallop. Abdomen:   Soft, non-tender. Bowel sounds normal. No masses,  No organomegaly.    Extremities: Extremities normal, atraumatic, no cyanosis or edema. Pulses: 2+ and symmetric all extremities. Skin: Skin color, texture, turgor normal. No rashes or lesions   Neurologic: CNII-XII intact. No gross sensory or motor deficits        CONSULTATIONS: Neurology    Significant Diagnostic Studies:   12/5/2021: BUN 17 mg/dL (Ref range: 9 - 21 mg/dL); BUN 16 mg/dL (Ref range: 6 - 20 mg/dL); BUN 18 mg/dL (Ref range: 6 - 20 mg/dL); Calcium 9.6 mg/dL (Ref range: 8.5 - 10.5 mg/dL); Calcium 8.9 mg/dL (Ref range: 8.5 - 10.1 mg/dL); Calcium 8.8 mg/dL (Ref range: 8.5 - 10.1 mg/dL); CO2 28 mmol/L (Ref range: 21 - 33 mmol/L); CO2 28 mmol/L (Ref range: 21 - 32 mmol/L); CO2 28 mmol/L (Ref range: 21 - 32 mmol/L); Creatinine 1.50 mg/dL (H; Ref range: 0.70 - 1.20 mg/dL); Creatinine 1.78 mg/dL (H; Ref range: 0.55 - 1.02 mg/dL); Creatinine 1.80 mg/dL (H; Ref range: 0.55 - 1.02 mg/dL); Glucose 73 mg/dL (Ref range: 70 - 110 mg/dL); Glucose 166 mg/dL (H; Ref range: 65 - 100 mg/dL); Glucose 166 mg/dL (H; Ref range: 65 - 100 mg/dL); HCT 40.2 % (Ref range: 35.0 - 45.0 %); HCT 36.2 % (Ref range: 35.0 - 47.0 %); HGB 12.5 g/dL (Ref range: 12.0 - 16.0 g/dL); HGB 11.3 g/dL (L; Ref range: 11.5 - 16.0 g/dL); Potassium 5.0 mmol/L (Ref range: 3.2 - 5.1 mmol/L); Potassium 5.1 mmol/L (Ref range: 3.5 - 5.1 mmol/L); Potassium 5.0 mmol/L (Ref range: 3.5 - 5.1 mmol/L); Sodium 140 mmol/L (Ref range: 135 - 145 mmol/L); Sodium 142 mmol/L (Ref range: 136 - 145 mmol/L); Sodium 140 mmol/L (Ref range: 136 - 145 mmol/L)  12/6/2021: BUN 17 mg/dL (Ref range: 6 - 20 mg/dL); Calcium 8.8 mg/dL (Ref range: 8.5 - 10.1 mg/dL); CO2 28 mmol/L (Ref range: 21 - 32 mmol/L); Creatinine 1.80 mg/dL (H; Ref range: 0.55 - 1.02 mg/dL); Glucose 63 mg/dL (L; Ref range: 65 - 100 mg/dL); Potassium 4.6 mmol/L (Ref range: 3.5 - 5.1 mmol/L);  Sodium 142 mmol/L (Ref range: 136 - 145 mmol/L)  No results for input(s): WBC, HGB, HCT, PLT, HGBEXT, HCTEXT, PLTEXT, HGBEXT, HCTEXT, PLTEXT in the last 72 hours. Recent Labs     12/21/21  1108      K 3.7   *   CO2 31   BUN 19   CREA 1.94*   *   CA 9.4     No results for input(s): ALT, AP, TBIL, TBILI, TP, ALB, GLOB, GGT, AML, LPSE in the last 72 hours. No lab exists for component: SGOT, GPT, AMYP, HLPSE  No results for input(s): INR, PTP, APTT, INREXT, INREXT in the last 72 hours. No results for input(s): FE, TIBC, PSAT, FERR in the last 72 hours. No results for input(s): PH, PCO2, PO2 in the last 72 hours. No results for input(s): CPK, CKMB in the last 72 hours.     No lab exists for component: TROPONINI  Lab Results   Component Value Date/Time    Glucose (POC) 143 (H) 12/22/2021 07:12 AM    Glucose (POC) 136 (H) 12/21/2021 07:43 PM    Glucose (POC) 178 (H) 12/21/2021 05:23 PM    Glucose (POC) 208 (H) 12/21/2021 12:03 PM    Glucose (POC) 166 (H) 12/21/2021 08:27 AM       Discharge time spent 35 minutes    Signed:  Amy Salvador MD  12/22/2021  10:42 AM

## 2021-12-22 NOTE — DISCHARGE INSTRUCTIONS
Patient Education        Learning About Metabolic Encephalopathy  What is metabolic encephalopathy? Metabolic encephalopathy is a problem in the brain. It is caused by a chemical imbalance in the blood. The imbalance is caused by an illness or organs that are not working as well as they should. It is not caused by a head injury. When the imbalance affects the brain, it can lead to personality changes. It can also make it harder to think clearly and remember things. The problems may only last a short time if you get treatment right away. But this depends on the cause. If the imbalance has been building up because you've been sick for a long time, the mental changes may be more severe. They may also last longer. What happens when you have this problem? When things are working right, your body has many ways to keep the chemicals in your blood in balance. For example, your liver and kidneys remove waste from your blood. The kidneys also help keep fluids and sodium in balance. And your pancreas makes insulin. It is a hormone that helps control the amount of sugar in your blood. But the chemicals in your blood can get out of balance and damage parts of your body because of a medical problem. This may be kidney or liver failure. Or it could be diabetes that isn't controlled well. When the imbalance affects the brain, normal thinking and behavior can change. What are the symptoms? Symptoms may include:  · Confusion. · Problems with thinking and remembering. · Being grouchy and depressed. · Feeling drowsy. · Not being able to sleep. · Passing out (fainting) now and then. How is it treated? The doctor will try to find the illness that's causing the problem. He or she may ask questions about your past health. The doctor will also do tests to find what is causing the chemical imbalance and to see how severe it is. The doctor may treat the organ system that's causing the problem.  For example, if it's a kidney problem, you may have treatment to help your kidneys work better. If you have an infection, you may need antibiotics. If the doctor can't treat the cause of the problem, he or she will treat the symptoms. The doctor will carefully watch your blood chemicals to make sure that your treatment is being done safely. Follow-up care is a key part of your treatment and safety. Be sure to make and go to all appointments, and call your doctor if you are having problems. It's also a good idea to know your test results and keep a list of the medicines you take. Where can you learn more? Go to http://www.gray.com/  Enter V895 in the search box to learn more about \"Learning About Metabolic Encephalopathy. \"  Current as of: February 10, 2021               Content Version: 13.0  © 2006-2021 [employer]. Care instructions adapted under license by [employer] (which disclaims liability or warranty for this information). If you have questions about a medical condition or this instruction, always ask your healthcare professional. [address] any warranty or liability for your use of this information.

## 2021-12-22 NOTE — PROGRESS NOTES
Verbal discharge report given to Nurse Klaus Collins at [facility]. [phone]. Facility notified of the approx. ETA of transport for the patient. Terrie French will be transporting.

## 2021-12-22 NOTE — PROGRESS NOTES
IV removed, discharge paperwork provided with transport paperwork for receiving facility. Flu vaccine given. Primary RN to call report to [facility]. Discharge plan of care/case management plan validated with provider discharge order.

## 2022-01-13 NOTE — PROGRESS NOTES
"[facility]c K[facility]ari  [facility] for concerns of  Chief Complaint   Patient presents with   • Fever   • Vomiting     Last on Sunday     Patient awake, alert, pink, and interactive with staff.  Patient cooperative with triage assessment.     Patient medicated at home with tylenol at 1630.    Patient medicated in triage with Motrin per protocol for fever.      Patient to lobby with parent in no apparent distress. Parent verbalizes understanding that patient is NPO until seen and cleared by ERP. Education provided about triage process; regarding acuities and possible wait time. Parent verbalizes understanding to inform staff of any new concerns or change in status.      BP 96/60   Pulse 140   Temp (!) 39.1 °C (102.4 °F) (Rectal)   Resp (!) 46   Ht 0.762 m (2' 6\")   Wt 10.8 kg (23 lb 13 oz)   SpO2 100%   BMI 18.60 kg/m²       " CM spoke with Terra Mcelroy at [address] [phone] and [phone] r/t ppt authorization. No update as of yet.

## 2022-03-18 PROBLEM — M47.817 LUMBOSACRAL SPONDYLOSIS WITHOUT MYELOPATHY: Status: ACTIVE | Noted: 2018-04-25

## 2022-03-18 PROBLEM — J96.01 ACUTE RESPIRATORY FAILURE WITH HYPOXIA (HCC): Status: ACTIVE | Noted: 2020-09-30

## 2022-03-18 PROBLEM — M48.061 SPINAL STENOSIS, LUMBAR REGION WITHOUT NEUROGENIC CLAUDICATION: Status: ACTIVE | Noted: 2018-05-16

## 2022-03-18 PROBLEM — N20.1 URETERAL STONE: Status: ACTIVE | Noted: 2019-02-15

## 2022-03-18 PROBLEM — K31.84 DIABETIC GASTROPARESIS (HCC): Status: ACTIVE | Noted: 2020-10-01

## 2022-03-18 PROBLEM — A41.9 SEPSIS (HCC): Status: ACTIVE | Noted: 2021-09-22

## 2022-03-18 PROBLEM — E11.43 DIABETIC GASTROPARESIS (HCC): Status: ACTIVE | Noted: 2020-10-01

## 2022-03-18 PROBLEM — U07.1 COVID-19: Status: ACTIVE | Noted: 2020-10-01

## 2022-03-18 PROBLEM — J44.9 COPD (CHRONIC OBSTRUCTIVE PULMONARY DISEASE) (HCC): Status: ACTIVE | Noted: 2020-11-29

## 2022-03-18 PROBLEM — I11.9 HYPERTENSIVE CARDIOVASCULAR DISEASE: Status: ACTIVE | Noted: 2021-02-09

## 2022-03-18 PROBLEM — R07.9 CHEST PAIN: Status: ACTIVE | Noted: 2021-09-19

## 2022-03-19 PROBLEM — N17.9 ACUTE KIDNEY INJURY (HCC): Status: ACTIVE | Noted: 2020-09-30

## 2022-03-19 PROBLEM — M51.369 DDD (DEGENERATIVE DISC DISEASE), LUMBAR: Status: ACTIVE | Noted: 2018-04-25

## 2022-03-19 PROBLEM — Z79.4 TYPE 2 DIABETES MELLITUS WITH HYPERGLYCEMIA, WITH LONG-TERM CURRENT USE OF INSULIN (HCC): Status: ACTIVE | Noted: 2020-09-30

## 2022-03-19 PROBLEM — M48.00 SPINAL STENOSIS: Status: ACTIVE | Noted: 2018-08-30

## 2022-03-19 PROBLEM — G93.41 METABOLIC ENCEPHALOPATHY: Status: ACTIVE | Noted: 2021-12-05

## 2022-03-19 PROBLEM — J18.9 PNA (PNEUMONIA): Status: ACTIVE | Noted: 2020-09-30

## 2022-03-19 PROBLEM — J44.1 COPD WITH ACUTE EXACERBATION (HCC): Status: ACTIVE | Noted: 2020-11-29

## 2022-03-19 PROBLEM — C50.919 MALIGNANT NEOPLASM OF FEMALE BREAST (HCC): Status: ACTIVE | Noted: 2018-04-25

## 2022-03-19 PROBLEM — N18.9 CKD (CHRONIC KIDNEY DISEASE): Status: ACTIVE | Noted: 2021-09-24

## 2022-03-19 PROBLEM — M48.061 SPINAL STENOSIS OF LUMBAR REGION AT MULTIPLE LEVELS: Status: ACTIVE | Noted: 2018-08-30

## 2022-03-19 PROBLEM — E16.2 HYPOGLYCEMIA: Status: ACTIVE | Noted: 2021-09-20

## 2022-03-19 PROBLEM — E11.65 TYPE 2 DIABETES MELLITUS WITH HYPERGLYCEMIA, WITH LONG-TERM CURRENT USE OF INSULIN (HCC): Status: ACTIVE | Noted: 2020-09-30

## 2022-03-19 PROBLEM — R50.9 FEVER AND CHILLS: Status: ACTIVE | Noted: 2018-09-01

## 2022-03-19 PROBLEM — J96.11 CHRONIC RESPIRATORY FAILURE WITH HYPOXIA (HCC): Status: ACTIVE | Noted: 2021-09-20

## 2022-03-19 PROBLEM — Z98.1 S/P LUMBAR FUSION: Status: ACTIVE | Noted: 2018-10-16

## 2022-03-19 PROBLEM — F41.9 ANXIETY: Status: ACTIVE | Noted: 2021-02-09

## 2022-03-19 PROBLEM — M51.36 DDD (DEGENERATIVE DISC DISEASE), LUMBAR: Status: ACTIVE | Noted: 2018-04-25

## 2022-03-19 PROBLEM — E11.9 DM (DIABETES MELLITUS) (HCC): Status: ACTIVE | Noted: 2020-11-29

## 2022-03-19 PROBLEM — R55 SYNCOPE: Status: ACTIVE | Noted: 2021-09-20

## 2022-03-20 PROBLEM — K20.90 ESOPHAGITIS WITH GASTRITIS: Status: ACTIVE | Noted: 2021-09-22

## 2022-03-20 PROBLEM — R79.89 ELEVATED TROPONIN: Status: ACTIVE | Noted: 2021-09-20

## 2022-03-20 PROBLEM — I10 HTN (HYPERTENSION): Status: ACTIVE | Noted: 2020-10-01

## 2022-03-20 PROBLEM — M41.9 SCOLIOSIS: Status: ACTIVE | Noted: 2018-08-30

## 2022-03-20 PROBLEM — E78.5 DYSLIPIDEMIA: Status: ACTIVE | Noted: 2021-02-09

## 2022-03-20 PROBLEM — F32.A DEPRESSION: Status: ACTIVE | Noted: 2021-02-09

## 2022-03-20 PROBLEM — Z01.818 PRE-OP TESTING: Status: ACTIVE | Noted: 2018-08-27

## 2022-03-20 PROBLEM — K29.70 ESOPHAGITIS WITH GASTRITIS: Status: ACTIVE | Noted: 2021-09-22

## 2022-03-20 PROBLEM — M17.9 KNEE OSTEOARTHRITIS: Status: ACTIVE | Noted: 2021-01-25

## 2022-03-20 PROBLEM — J18.9 CAP (COMMUNITY ACQUIRED PNEUMONIA): Status: ACTIVE | Noted: 2018-09-01

## 2022-03-20 PROBLEM — Z20.822 PERSON UNDER INVESTIGATION FOR COVID-19: Status: ACTIVE | Noted: 2020-11-29

## 2022-03-20 PROBLEM — R06.02 SHORTNESS OF BREATH: Status: ACTIVE | Noted: 2018-09-01

## 2022-08-03 NOTE — PROGRESS NOTES
Problem: Discharge Planning  Goal: Discharge to home or other facility with appropriate resources  Outcome: Progressing  Flowsheets (Taken 8/2/2022 2055)  Discharge to home or other facility with appropriate resources: Identify barriers to discharge with patient and caregiver     Problem: Pain  Goal: Verbalizes/displays adequate comfort level or baseline comfort level  Outcome: Progressing  Flowsheets (Taken 8/2/2022 2055)  Verbalizes/displays adequate comfort level or baseline comfort level: Encourage patient to monitor pain and request assistance     Problem: Respiratory - Adult  Goal: Achieves optimal ventilation and oxygenation  Outcome: Progressing  Flowsheets (Taken 8/2/2022 2055)  Achieves optimal ventilation and oxygenation: Assess for changes in respiratory status     Problem: Infection - Adult  Goal: Absence of infection at discharge  Outcome: Progressing  Flowsheets (Taken 8/2/2022 2055)  Absence of infection at discharge:   Assess and monitor for signs and symptoms of infection   Monitor lab/diagnostic results   Instruct and encourage patient and family to use good hand hygiene technique     Problem: Metabolic/Fluid and Electrolytes - Adult  Goal: Hemodynamic stability and optimal renal function maintained  Outcome: Progressing  Flowsheets (Taken 8/2/2022 2055)  Hemodynamic stability and optimal renal function maintained: Monitor labs and assess for signs and symptoms of volume excess or deficit     Problem: Cardiovascular - Adult  Goal: Maintains optimal cardiac output and hemodynamic stability  Outcome: Progressing  Flowsheets (Taken 8/2/2022 2055)  Maintains optimal cardiac output and hemodynamic stability: Monitor blood pressure and heart rate     Problem: Safety - Adult  Goal: Free from fall injury  Outcome: Progressing     Problem: ABCDS Injury Assessment  Goal: Absence of physical injury  Outcome: Progressing Progress Note  Date:9/21/2021       Room:203/01  Patient Nikki Piper     YOB: 1955     Age:66 y.o. Subjective    As per admititng provider:  Chriss Lenz is a 61 y.o. female followed by Jayme Boles MD and  has a past medical history of Arthritis, Breast CA ([facility] [address] (2017), Diabetes ([facility] [address] Edema, GERD (gastroesophageal reflux disease), High cholesterol, Hypertension, Ill-defined condition, MI (myocardial infarction) ([facility] [address] (06/2018), Neuropathy, and Restless leg. CKD with last creat noted in 12/2020 at 1.8 and 2.2 on 4/28 and follows with Dr. Lina [facility] in [facility]. Patient unsure what happened but was noted to have passed out and on evaluation gluocose of 34 . Similar event happened about 1 year ago. Patient is on lantus high dose as well as 10 units TID WM. Patient notes decrease in apepitie for about 1 year now and mostly notes it is secondary to increased stress and and recent passing of her  in 11/30/2020. Currently patient lives with her sister Neil Valdovinos and able to ambulate without walker. Patient states her glucose level which she checks after breakfast at 10:30 usually is in 140 range and says on recheck at lunch and dinner glucose at times does get below 100. Patient also notes that for the past 3 days has been more sob with + wheeze and does use nebulizer twice daily but no relief. Denies any sick contacts. Patient relates chest pain and initial troponin x 2 was at 0.12 and no change. Repeat this AM slightly higher at 0.21. denies any GERD like symptoms but patient has been vomiting and has hx of esophageal stricture for which had dilation done at [facility] about 1 1/2 year ago. CXR shows mild cardiac enlargement. Echo done and shows EF 60-65% and no evidence of diastolic dysfunction.   With patients current presentation was admitting on telemetry for syncope, severy hypoglycemia, KANIKA on CKD, chest pain , elevated tropoinin and Copd exacerbation    Patient seen on follow-up increasing confusion hallucinations as she thought he had snowed. Is n.p.o. for EGD which shows only mild esophageal stricture status post dilation but also showed distal esophagitis and antral gastritis and retained gastric content with decreased gastric motility gastroparesis. Upon patient getting out of bed continues to take off oxygen for which she is on chronic 3 L and has extreme shortness of breath upper airway stridor/wheeze and racemic epinephrine given and steroids increased patient glucose levels now have increased secondary to steroids    Review of Systems   Constitutional: Negative. HENT: Negative. Eyes: Negative. Respiratory: Positive for shortness of breath and wheezing. Cardiovascular: Negative. Gastrointestinal: Negative. Endocrine: Negative. Genitourinary: Negative. Musculoskeletal: Negative. Allergic/Immunologic: Negative. Neurological: Negative. Hematological: Negative. Psychiatric/Behavioral: Positive for confusion and hallucinations. Objective           Vitals Last 24 Hours:  Patient Vitals for the past 24 hrs:   Temp Pulse Resp BP SpO2   09/21/21 0422 98.5 °F (36.9 °C) (!) 102 18 (!) 149/72 92 %   09/20/21 2339 98.9 °F (37.2 °C) (!) 104 18 (!) 147/75 92 %   09/20/21 2005 -- -- -- -- 96 %   09/20/21 2004 -- -- -- -- 96 %   09/20/21 2000 -- (!) 116 -- -- --   09/20/21 1955 99.5 °F (37.5 °C) (!) 109 18 (!) 147/63 94 %   09/20/21 1704 -- 90 -- -- 96 %   09/20/21 1656 -- -- -- -- 92 %   09/20/21 1602 99.4 °F (37.4 °C) 91 20 (!) 169/90 92 %   09/20/21 1204 99.9 °F (37.7 °C) 93 20 (!) 157/76 95 %   09/20/21 1200 -- 93 -- -- --   09/20/21 0812 98.4 °F (36.9 °C) 84 20 139/72 95 %        I/O (24Hr):     Intake/Output Summary (Last 24 hours) at 9/21/2021 0720  Last data filed at 9/20/2021 9939  Gross per 24 hour   Intake 300 ml   Output --   Net 300 ml       Physical Exam:  General: Alert and awake but increasing confusion not oriented  Head:  Normocephalic, without obvious abnormality, atraumatic. Eyes:  Conjunctivae/corneas clear. Pupils equal, round, reactive to light. Extraocular movements intact. Lungs: Bilateral air entry diminished upper airway wheeze/stridor noted  Chest wall: No tenderness or deformity. Heart:  Regular rate and rhythm, S1, S2 normal, no murmur, click, rub or gallop. Abdomen:  Soft, non-tender. Bowel sounds normal. No masses,  No organomegaly. Extremities: Extremities normal, atraumatic, no cyanosis or edema. Pulses: 2+ and symmetric all extremities. Skin: Skin color, texture, turgor normal. No rashes or lesions  Neurologic: Awake, Alert, oriented.  No obvious gross sensory or motor deficits      Medications           Current Facility-Administered Medications   Medication Dose Route Frequency    pantoprazole (PROTONIX) 40 mg in 0.9% sodium chloride 10 mL injection  40 mg IntraVENous DAILY    glucose chewable tablet 16 g  4 Tablet Oral PRN    dextrose (D50W) injection syrg 12.5-25 g  25-50 mL IntraVENous PRN    glucagon (GLUCAGEN) injection 1 mg  1 mg IntraMUSCular PRN    insulin lispro (HUMALOG) injection   SubCUTAneous AC&HS    albuterol-ipratropium (DUO-NEB) 2.5 MG-0.5 MG/3 ML  3 mL Nebulization QID RT    guaiFENesin ER (MUCINEX) tablet 600 mg  600 mg Oral Q12H    [Held by provider] methylPREDNISolone (PF) (Solu-MEDROL) injection 40 mg  40 mg IntraVENous Q12H    [Held by provider] aspirin delayed-release tablet 81 mg  81 mg Oral DAILY    0.9% sodium chloride infusion  75 mL/hr IntraVENous CONTINUOUS    sodium chloride (NS) flush 5-40 mL  5-40 mL IntraVENous Q8H    sodium chloride (NS) flush 5-40 mL  5-40 mL IntraVENous PRN    acetaminophen (TYLENOL) tablet 650 mg  650 mg Oral Q6H PRN    Or    acetaminophen (TYLENOL) suppository 650 mg  650 mg Rectal Q6H PRN    polyethylene glycol (MIRALAX) packet 17 g  17 g Oral DAILY PRN    ondansetron (ZOFRAN ODT) tablet 4 mg  4 mg Oral Q8H PRN    Or    ondansetron (ZOFRAN) injection 4 mg  4 mg IntraVENous Q6H PRN    enoxaparin (LOVENOX) injection 30 mg  30 mg SubCUTAneous DAILY         Allergies         Ciprofloxacin, Keflex [cephalexin], Metformin, and Soliqua 100/33 [insulin glargine-lixisenatide]       Labs/Imaging/Diagnostics      Labs:  Recent Results (from the past 48 hour(s))   GLUCOSE, POC    Collection Time: 09/19/21  1:35 PM   Result Value Ref Range    Glucose (POC) 76 65 - 117 mg/dL    Performed by Orion Garza    GLUCOSE, POC    Collection Time: 09/19/21  1:46 PM   Result Value Ref Range    Glucose (POC) 97 65 - 117 mg/dL    Performed by DARYL CURTIS    CBC WITH AUTOMATED DIFF    Collection Time: 09/19/21  1:48 PM   Result Value Ref Range    WBC 11.7 (H) 4.4 - 11.3 K/uL    RBC 4.20 (L) 4.50 - 5.90 M/uL    HGB 10.7 (L) 13.5 - 17.5 g/dL    HCT 34.1 (L) 36 - 46 %    MCV 81.2 80 - 100 FL    MCH 25.5 (L) 31 - 34 PG    MCHC 31.4 31.0 - 36.0 g/dL    RDW 16.9 (H) 11.5 - 14.5 %    PLATELET 861 167 - 295 K/uL    MPV 7.2 6.5 - 11.5 FL    NRBC 0.1  WBC    ABSOLUTE NRBC 0.01 K/uL    NEUTROPHILS 84 (H) 42 - 75 %    LYMPHOCYTES 5 (L) 20.5 - 51.1 %    MONOCYTES 10 (H) 1.7 - 9.3 %    EOSINOPHILS 1 0.9 - 2.9 %    BASOPHILS 0 0.0 - 2.5 %    ABS. NEUTROPHILS 9.7 (H) 1.8 - 7.7 K/UL    ABS. LYMPHOCYTES 0.6 (L) 1.0 - 4.8 K/UL    ABS. MONOCYTES 1.1 0.2 - 2.4 K/UL    ABS. EOSINOPHILS 0.1 0.0 - 0.7 K/UL    ABS.  BASOPHILS 0.0 0.0 - 0.2 K/UL   PROTHROMBIN TIME + INR    Collection Time: 09/19/21  1:48 PM   Result Value Ref Range    Prothrombin time 10.3 9.0 - 11.1 sec    INR 1.0 0.9 - 1.1     TROPONIN I    Collection Time: 09/19/21  1:48 PM   Result Value Ref Range    Troponin-I, Qt. 0.12 (H) <0.99 ng/mL   METABOLIC PANEL, COMPREHENSIVE    Collection Time: 09/19/21  1:48 PM   Result Value Ref Range    Sodium 141 136 - 145 mmol/L    Potassium 4.0 3.5 - 5.1 mmol/L    Chloride 103 97 - 108 mmol/L    CO2 28 21 - 32 mmol/L    Anion gap 10 5 - 15 mmol/L    Glucose 110 (H) 65 - 100 mg/dL    BUN 37 (H) 6 - 20 mg/dL    Creatinine 2.66 (H) 0.55 - 1.02 mg/dL    BUN/Creatinine ratio 14 12 - 20      GFR est AA 22 (L) >60 ml/min/1.73m2    GFR est non-AA 18 (L) >60 ml/min/1.73m2    Calcium 9.4 8.5 - 10.1 mg/dL    Bilirubin, total 0.3 0.2 - 1.0 mg/dL    AST (SGOT) 18 15 - 37 U/L    ALT (SGPT) 27 12 - 78 U/L    Alk. phosphatase 78 45 - 117 U/L    Protein, total 7.4 6.4 - 8.2 g/dL    Albumin 3.2 (L) 3.5 - 5.0 g/dL    Globulin 4.2 (H) 2.0 - 4.0 g/dL    A-G Ratio 0.8 (L) 1.1 - 2.2     ETHYL ALCOHOL    Collection Time: 09/19/21  1:48 PM   Result Value Ref Range    ALCOHOL(ETHYL),SERUM <4 <10 mg/dL   MAGNESIUM    Collection Time: 09/19/21  1:48 PM   Result Value Ref Range    Magnesium 2.1 1.6 - 2.4 mg/dL   ACETONE/KETONE, QL    Collection Time: 09/19/21  1:48 PM   Result Value Ref Range    Acetone/Ketone serum, QL.  Negative     GLUCOSE, POC    Collection Time: 09/19/21  2:26 PM   Result Value Ref Range    Glucose (POC) 127 (H) 65 - 117 mg/dL    Performed by Jose Brick    URINALYSIS W/ RFLX MICROSCOPIC    Collection Time: 09/19/21  2:31 PM   Result Value Ref Range    Color Yellow/Straw      Appearance Clear Clear      Specific gravity 1.025 1.003 - 1.030      pH (UA) 5.5 5.0 - 8.0      Protein >300 (A) Negative mg/dL    Glucose 100 (A) Negative mg/dL    Ketone Negative Negative mg/dL    Bilirubin Negative Negative      Blood Small (A) Negative      Urobilinogen 0.2 0.2 - 1.0 EU/dL    Nitrites Negative Negative      Leukocyte Esterase Negative Negative     DRUG SCREEN, URINE    Collection Time: 09/19/21  2:31 PM   Result Value Ref Range    AMPHETAMINES Negative Negative      BARBITURATES Negative Negative      BENZODIAZEPINES Negative Negative      COCAINE Negative Negative      ECSTASY, MDMA Negative Negative      METHADONE Negative Negative      OPIATES Negative Negative      PCP(PHENCYCLIDINE) Negative Negative      THC (TH-CANNABINOL) Negative Negative      Drug screen comment PH=5.5 URINE MICROSCOPIC    Collection Time: 09/19/21  2:31 PM   Result Value Ref Range    WBC 0-4 0 - 5 /hpf    RBC 0-5 0 - 3 /hpf    Bacteria Negative Negative /hpf    Amorphous Crystals 4+ (A) Negative   GLUCOSE, POC    Collection Time: 09/19/21  3:15 PM   Result Value Ref Range    Glucose (POC) 70 65 - 117 mg/dL    Performed by Ck Maya    TROPONIN I    Collection Time: 09/19/21  3:30 PM   Result Value Ref Range    Troponin-I, Qt. 0.12 (H) <0.05 ng/mL   EKG, 12 LEAD, SUBSEQUENT    Collection Time: 09/19/21  5:29 PM   Result Value Ref Range    Ventricular Rate 67 BPM    Atrial Rate 67 BPM    P-R Interval 168 ms    QRS Duration 138 ms    Q-T Interval 439 ms    QTC Calculation (Bezet) 464 ms    Calculated P Axis 63 degrees    Calculated R Axis -41 degrees    Calculated T Axis 45 degrees    Diagnosis       Sinus Rhythm  Left bundle branch block  Abnormal ECG  When compared with ECG of 09/19/2021  Sinus Rhythm has replaced Atrial fibrillation          Confirmed by Jeffry Ramirez ([id number]) on 9/20/2021 2:06:54 PM     GLUCOSE, POC    Collection Time: 09/19/21  6:12 PM   Result Value Ref Range    Glucose (POC) 172 (H) 65 - 117 mg/dL    Performed by Nelida Albert, POC    Collection Time: 09/19/21  9:36 PM   Result Value Ref Range    Glucose (POC) 303 (H) 65 - 117 mg/dL    Performed by  [facility], POC    Collection Time: 09/20/21  2:47 AM   Result Value Ref Range    Glucose (POC) 199 (H) 65 - 117 mg/dL    Performed by  [employer], POC    Collection Time: 09/20/21  6:23 AM   Result Value Ref Range    Glucose (POC) 143 (H) 65 - 117 mg/dL    Performed by  [employer] [place], POC    Collection Time: 09/20/21  8:32 AM   Result Value Ref Range    Glucose (POC) 175 (H) 65 - 117 mg/dL    Performed by Kirstie Peña    HEMOGLOBIN A1C WITH EAG    Collection Time: 09/20/21  9:25 AM   Result Value Ref Range    Hemoglobin A1c 7.6 (H) 4.0 - 5.6 %    Est. average glucose 171 mg/dL   LIPID PANEL Collection Time: 09/20/21  9:25 AM   Result Value Ref Range    LIPID PROFILE        Cholesterol, total 150 <200 mg/dL    Triglyceride 124 <150 mg/dL    HDL Cholesterol 55 mg/dL    LDL, calculated 70.2 0 - 100 mg/dL    VLDL, calculated 24.8 mg/dL    CHOL/HDL Ratio 2.7 0.0 - 5.0     RENAL FUNCTION PANEL    Collection Time: 09/20/21  9:25 AM   Result Value Ref Range    Sodium 138 136 - 145 mmol/L    Potassium 4.9 3.5 - 5.1 mmol/L    Chloride 103 97 - 108 mmol/L    CO2 28 21 - 32 mmol/L    Anion gap 7 5 - 15 mmol/L    Glucose 196 (H) 65 - 100 mg/dL    BUN 37 (H) 6 - 20 mg/dL    Creatinine 2.54 (H) 0.55 - 1.02 mg/dL    BUN/Creatinine ratio 15 12 - 20      GFR est AA 23 (L) >60 ml/min/1.73m2    GFR est non-AA 19 (L) >60 ml/min/1.73m2    Calcium 9.0 8.5 - 10.1 mg/dL    Phosphorus 3.8 2.6 - 4.7 mg/dL    Albumin 2.8 (L) 3.5 - 5.0 g/dL   CBC WITH AUTOMATED DIFF    Collection Time: 09/20/21  9:25 AM   Result Value Ref Range    WBC 11.6 (H) 4.4 - 11.3 K/uL    RBC 3.74 (L) 4.50 - 5.90 M/uL    HGB 9.7 (L) 13.5 - 17.5 g/dL    HCT 30.3 (L) 36 - 46 %    MCV 81.0 80 - 100 FL    MCH 26.0 (L) 31 - 34 PG    MCHC 32.1 31.0 - 36.0 g/dL    RDW 16.6 (H) 11.5 - 14.5 %    PLATELET 133 353 - 195 K/uL    MPV 7.2 6.5 - 11.5 FL    NRBC 0.1  WBC    ABSOLUTE NRBC 0.01 K/uL    NEUTROPHILS 83 (H) 42 - 75 %    LYMPHOCYTES 6 (L) 20.5 - 51.1 %    MONOCYTES 7 1.7 - 9.3 %    EOSINOPHILS 3 (H) 0.9 - 2.9 %    BASOPHILS 1 0.0 - 2.5 %    ABS. NEUTROPHILS 9.8 (H) 1.8 - 7.7 K/UL    ABS. LYMPHOCYTES 0.7 (L) 1.0 - 4.8 K/UL    ABS. MONOCYTES 0.8 0.2 - 2.4 K/UL    ABS. EOSINOPHILS 0.3 0.0 - 0.7 K/UL    ABS.  BASOPHILS 0.1 0.0 - 0.2 K/UL   TROPONIN I    Collection Time: 09/20/21  9:25 AM   Result Value Ref Range    Troponin-I, Qt. 0.21 (H) <0.05 ng/mL   TSH 3RD GENERATION    Collection Time: 09/20/21  9:25 AM   Result Value Ref Range    TSH 2.78 0.36 - 3.74 uIU/mL   ECHO ADULT COMPLETE    Collection Time: 09/20/21  9:58 AM   Result Value Ref Range    LV ED Vol A2C 138. 87 mL    LV ED Vol A2C 138. 87 mL    LV ED Vol A2C 158. 72 mL    LV ED Vol A2C 158. 72 mL    IVSd 1.11 (A) 0.60 - 0.90 cm    LVIDd 5.36 (A) 3.90 - 5.30 cm    LVIDd 5.68 (A) 3.90 - 5.30 cm    LVIDs 4.13 cm    LVPWd 1.20 (A) 0.60 - 0.90 cm    LVOT .8 mL    LVOT .8 mL    BP EF 59.2 55.0 - 100.0 percent    BP EF 59.2 55.0 - 100.0 percent    LV Ejection Fraction MOD 2C 58 percent    LV Ejection Fraction MOD 2C 58 percent    LV Ejection Fraction MOD 4C 60 percent    LV Ejection Fraction MOD 4C 60 percent    LV ED Vol BP 77.16 56.0 - 104.0 mL    LV ED Vol BP 77.16 56.0 - 104.0 mL    LV ES Vol A2C 32.21 mL    LV ES Vol BP 31.51 19.0 - 49.0 mL    LV ES Vol BP 31.51 19.0 - 49.0 mL    LVOT Peak Gradient 3.09 mmHg    Left Ventricular Outflow Tract Mean Gradient 1.97 mmHg    LVOT Peak Velocity 87.87 cm/s    LVOT VTI 19.85 cm    LA Volume 50.53 22.0 - 52.0 mL    LA Volume 50.53 22.0 - 52.0 mL    LA Vol 2C 48.89 22.00 - 52.00 mL    LA Vol 4C 41.49 22.00 - 52.00 mL    AoV PG 8.92 mmHg    Aortic Valve Systolic Mean Gradient 2.39 mmHg    Aortic Valve Systolic Peak Velocity 246.14 cm/s    Aortic valve mean velocity 119.09 cm/s    AoV VTI 31.70 cm    MV A Brian 120.66 cm/s    Mitral Valve E Wave Deceleration Time 184.89 ms    MV E Brian 144.11 cm/s    MV E/A 1.19     Mitral Valve Pressure Half-time 53.62 ms    MVA (PHT) 4.10 cm2    MVA (PHT) 4.10 cm2    Ao Root D 3.58 cm    LA Vol Index 24.45 16.00 - 28.00 ml/m2    LA Vol Index 20.75 16.00 - 28.00 ml/m2    LVES Vol Index A2C 16.1 mL/m2   PHOSPHORUS    Collection Time: 09/20/21 11:00 AM   Result Value Ref Range    Phosphorus 3.8 2.6 - 4.7 mg/dL   IRON PROFILE    Collection Time: 09/20/21 11:00 AM   Result Value Ref Range    Iron 19 (L) 35 - 150 ug/dL    TIBC 263 250 - 450 ug/dL    Iron % saturation 7 (L) 20 - 50 %   GLUCOSE, POC    Collection Time: 09/20/21 11:04 AM   Result Value Ref Range    Glucose (POC) 251 (H) 65 - 117 mg/dL    Performed by Aron Acuna    GLUCOSE, POC Collection Time: 09/20/21  3:23 PM   Result Value Ref Range    Glucose (POC) 261 (H) 65 - 117 mg/dL    Performed by Wily Chase    TROPONIN I    Collection Time: 09/20/21  3:30 PM   Result Value Ref Range    Troponin-I, Qt. 0.15 (H) <0.05 ng/mL   GLUCOSE, POC    Collection Time: 09/20/21  9:01 PM   Result Value Ref Range    Glucose (POC) 467 (H) 65 - 117 mg/dL    Performed by Cali Ramos    TROPONIN I    Collection Time: 09/20/21  9:30 PM   Result Value Ref Range    Troponin-I, Qt. 0.09 (H) <0.05 ng/mL        Imaging:  XR CHEST PORT    Result Date: 9/19/2021  Mild cardiac enlargement. Exam is otherwise unremarkable. Assessment//Plan           Problem List:  Hospital Problems  Date Reviewed: 2/3/2021        Codes Class Noted POA    Elevated troponin ICD-10-CM: R77.8  ICD-9-CM: 790.6  9/20/2021 Unknown        Syncope ICD-10-CM: R55  ICD-9-CM: 780.2  9/20/2021 Unknown        Hypoglycemia ICD-10-CM: E16.2  ICD-9-CM: 251.2  9/20/2021 Unknown        Chronic respiratory failure with hypoxia [facility]) ICD-10-CM: J96.11  ICD-9-CM: 518.83, 799.02  9/20/2021 Unknown        * (Principal) Chest pain ICD-10-CM: R07.9  ICD-9-CM: 786.50  9/19/2021 Unknown            Chest pain   - possible secondary to coughing and vomiting in setting of hx of esophageal stricture.    -Initial troponin 0.12 and repeat in similar range of 0.12 drawn at 3:30 PM on 9/19 and on recheck on 9/20 slightly elevated 0.21  -Continue to cycle every 6 hours troponins x2  -Cardiology consultation appreciated  -The echo shows preserved EF and no diastolic dysfunction  -Noted to have history of cardiac catheterization at [facility] and will attempt to get records  -Monitor closely on telemetry     Syncope  -Noted to have passed out and felt to be secondary to hypoglycemia  -Monitor on telemetry for any irregular rhythms  -Initial troponins elevated 0.12x2 but repeat elevated 0.21 and will continue cycle every 6 hours x2  -2D echo shows preserved EF no diastolic dysfunction     Hypoglycemia  - was 34 on initial check and improved slowly after treatment and currently glucose levels at 175 this morning and 9/20 and 251  -We will hold long-acting insulin as well as mealtime insulin  -Continue monitor Accu-Cheks AC at bedtime  -Nutrition consultation  -Poor appetite most likely secondary to increased stress and loss of  on 11/30/2020 as well as issues with vomiting possibly secondary to esophageal stricture  -On 9/21 with current steroids glucose levels in the 300 range restarted home Lantus but at lower dose of 20 units and will continue to monitor closely    Antral gastritis/esophagitis  -As noted on EGD and will continue IV Protonix 40 mg daily  -Continue to avoid NSAIDs     Vomiting  -Had vomiting episodes periodically most likely secondary to esophageal stricture  -We will get speech therapy to evaluate swallow and GI to evaluate EGD performed and esophageal dilation performed and EGD also shows esophagitis as well as evidence of gastroparesis so we will start Reglan 5 mg prior to meals and at bedtime       KANIKA on CKD  -Creatinine slightly elevated at 2.66 with previous baseline creatinine earlier this year in April at 2.2 and prior to that was at 1.8 in December 2020  -Obtain baseline labs from Dr. Izaiah Wyatt office and patient has new appointment with Dr. Edwin Zuñiga nephrologist in [address] [facility] current IV fluids of D5 half NS at 100 cc an hour and repeat creatinine down to 2.54 on 9/20  -Nephrology consult appreciated  - hx of toradol use and will discontinue any further NSAID use.   -On 9/21 it was noted from records that patient's creatinine previously was at 1.9 and so initially continued fluids but with patient's worsening shortness of breath with exertion and CT of the chest showing extensive pulmonary edema discontinue and start Lasix of 40 mg IV twice daily  -Monitor repeat labs closely     Acute on chronic COPD exacerbation   -Presents with shortness of breath and cough for the past 3 days along with wheezing and no history of tobacco use  -Chest x-ray negative for any acute pneumonic process  -Place patient on nebulizer DuoNeb 4 times daily  -On 9/21 noted to have increasing upper airway stridor and a racemic epinephrine was given and Solu-Medrol increased to 60 mg IV every 8 hours  -CT of the chest shows extensive pulmonary edema bilaterally possible atypical infection and so azithromycin 500 mg IV daily will be started    Acute encephalopathy  -Continues to have cough confusion possibly secondary to underlying dementia and acute hospitalization  -ABG performed shows pH of 7.37 PCO2 of 37 with PO2 of 85 on 3 L  -Patient started on Seroquel 25 mg every afternoon and as needed Ativan 0.5 mg IV every 6 hours as needed for agitation    DVT prophylaxis  -Lovenox     CODE STATUS: Full code  Patient designates her sister Will Sirjulio césar as her medical decision-maker if for some reason she is unable to make decisions for herself     Spent 40 minutes evaluting and coordinating patient care of which >50% was spent coordinating and counseling.           Electronically signed by Darrel Dowling MD on 9/21/2021 at 7:20 AM

## 2022-09-06 NOTE — TELEPHONE ENCOUNTER
Patient called back in states that the pharmacy is now telling her that she needs a new rx in order to get the rest of the pills from the first rx. Patient lives in [address] [place] [address] and states she can t travel to get them. Please advise patient at [phone]. [employer] Med[employer] Medtronic

## 2022-12-25 NOTE — PROGRESS NOTES
Form completed and waiting for Dr. Rome Albright signature.        Taken to [facility] to obtain on Tuesday
Signed by Dr. Kandy Arredondo and given to [facility] for processing.
Spoke with pt mailed original ... Sent copy to scan for gregg to print at patient next appt. Nina Richey  DID NOT FAX
Yes

## 2023-10-03 PROBLEM — Z79.899 ON DEEP VEIN THROMBOSIS (DVT) PROPHYLAXIS: Status: ACTIVE | Noted: 2020-10-01

## 2023-11-08 NOTE — PROGRESS NOTES
This note has been dictated below. Patient: Thiago Chaparro                MRN: [id number]       SSN: [id number]  YOB: 1955        AGE: 63 y.o. SEX: female  Body mass index is 41.63 kg/m².     PCP: Anand Pritchett MD  03/25/20    Past Medical History:   Diagnosis Date    Arthritis     spinal stenosis    Breast CA ([facility] 2017    Left Breast (chemo/radiation)    Diabetes (HCC)     Type 2    Edema     legs and ankles    GERD (gastroesophageal reflux disease)     High cholesterol     Hypertension     Ill-defined condition     patient states hemorrhage behind Left eye-following Dr. Inez Jc MI (myocardial infarction) ([facility] 06/2018    per patient    Neuropathy     Restless leg        Past Surgical History:   Procedure Laterality Date    HX BACK SURGERY  08/30/2018    HX BREAST BIOPSY Left 02/03/2017    BREAST CANCER    HX BREAST LUMPECTOMY Left     HX CATARACT REMOVAL Right     HX HEART CATHETERIZATION  07/2018    no stents       Family History   Problem Relation Age of Onset    No Known Problems Mother     No Known Problems Father        Social History     Socioeconomic History    Marital status:      Spouse name: Not on file    Number of children: Not on file    Years of education: Not on file    Highest education level: Not on file   Occupational History    Not on file   Social Needs    Financial resource strain: Not on file    Food insecurity     Worry: Not on file     Inability: Not on file    Transportation needs     Medical: Not on file     Non-medical: Not on file   Tobacco Use    Smoking status: Never Smoker    Smokeless tobacco: Never Used   Substance and Sexual Activity    Alcohol use: No    Drug use: No    Sexual activity: Not on file   Lifestyle    Physical activity     Days per week: Not on file     Minutes per session: Not on file    Stress: Not on file   Relationships    Social connections     Talks on phone: Not on file     Gets Vaccine Information Statement(s) or the Emergency Use Authorization was given today. This has been reviewed, questions answered, and verbal consent given by Patient for injection(s) and administration of COVID-19 Immunization Moderna COVID-19 and Influenza (Inactivated).      Patient tolerated without incident. See immunization grid for documentation.   together: Not on file     Attends Congregational service: Not on file     Active member of club or organization: Not on file     Attends meetings of clubs or organizations: Not on file     Relationship status: Not on file    Intimate partner violence     Fear of current or ex partner: Not on file     Emotionally abused: Not on file     Physically abused: Not on file     Forced sexual activity: Not on file   Other Topics Concern     Service Not Asked    Blood Transfusions Not Asked    Caffeine Concern Not Asked    Occupational Exposure Not Asked   Eber Backer Hazards Not Asked    Sleep Concern Not Asked    Stress Concern Not Asked    Weight Concern Not Asked    Special Diet Not Asked    Back Care Not Asked    Exercise Not Asked    Bike Helmet Not Asked   [address] [facility] Not Asked    Self-Exams Not Asked   Social History Narrative    Not on file       Current Outpatient Medications   Medication Sig Dispense Refill    sodium hyaluronate (SUPARTZ FX/HYALGAN/GENIVSC) 10 mg/mL syrg injection 2 mL by Intra artICUlar route once for 1 dose. 2 mL 0    meloxicam (MOBIC) 7.5 mg tablet Take 1 Tab by mouth daily. 60 Tab 2    gabapentin (NEURONTIN) 300 mg capsule Take 1 Cap by mouth three (3) times daily. 90 Cap 1    tamsulosin (FLOMAX) 0.4 mg capsule take 1 capsule by mouth once daily 30 Cap 3    HYDROcodone-acetaminophen (NORCO) 5-325 mg per tablet Take 1 Tab by mouth every twelve (12) hours as needed for Pain. Max Daily Amount: 2 Tabs. 15 Tab 0    LANTUS SOLOSTAR U-100 INSULIN 100 unit/mL (3 mL) inpn 30 units subcutaneously in morning.   Can take an additional dose of 10 units subcutaneously in the evening if blood sugar is more than 200. 10 mL 0    albuterol (PROVENTIL HFA, VENTOLIN HFA, PROAIR HFA) 90 mcg/actuation inhaler 2 puffs three to four times a day for 5 days then every 6 hours as needed for shortness of breath 1 Inhaler 0    naloxone (NARCAN) 4 mg/actuation nasal spray Use 1 spray intranasally, then discard. Repeat with new spray every 2 min as needed for opioid overdose symptoms, alternating nostrils. 1 Each 0    LORazepam (ATIVAN) 0.5 mg tablet Take 0.5 mg by mouth as needed for Anxiety.  insulin lispro (HUMALOG) 100 unit/mL kwikpen by SubCUTAneous route.  atorvastatin (LIPITOR) 40 mg tablet Take  by mouth nightly.  rosuvastatin (CRESTOR) 10 mg tablet Take 10 mg by mouth daily.  furosemide (LASIX) 20 mg tablet TAKE 1 TABLET BY MOUTH ONCE DAILY AS NEEDED  0    amLODIPine (NORVASC) 10 mg tablet Take 10 mg by mouth daily. 0    esomeprazole (NEXIUM) 20 mg capsule Take 20 mg by mouth daily. 0    hydroCHLOROthiazide (MICROZIDE) 12.5 mg capsule Take 12.5 mg by mouth daily. 0    TRADJENTA 5 mg tablet TAKE 1 TABLET BY MOUTH ONCE A DAY  0    metoprolol tartrate (LOPRESSOR) 50 mg tablet Take 50 mg by mouth daily. 0    NOVOFINE 32 32 gauge x 1/4\" ndle   0    venlafaxine-SR (EFFEXOR-XR) 37.5 mg capsule Take 37.5 mg by mouth daily. 0       Allergies   Allergen Reactions    Ciprofloxacin Hives    Keflex [Cephalexin] Nausea and Vomiting    Metformin Other (comments)     GI DISTRESS    Soliqua 100/33 [Insulin Glargine-Lixisenatide] Nausea Only         REVIEW OF SYSTEMS:      Review of systems unchanged from previous visit except as noted below. PHYSICAL EXAMINATION:  Visit Vitals  /87 (BP 1 Location: Right arm, BP Patient Position: Sitting) Comment (BP 1 Location): patient preference   Pulse 81   Ht 5' 2\" (1.575 m)   Wt 227 lb 9.6 oz (103.2 kg)   SpO2 96%   BMI 41.63 kg/m²     Body mass index is 41.63 kg/m². HPI:  Raissa Scales returns today for her bilateral knee pain. Has not noted much improvement since her last visit last week. Pain 5/10 today. More pain in right knee than left today.     Knee Examination      R   L  Effusion   -   -  Warmth   -   -  Erythema   -   -  ROM   Extension  Full   Full   Flexion   Full   Full  Tenderness   Medial Joint  +   +   Lateral Joint  +   +   Posterior knee  -   -  Strength   Quad   5   5   Hamstring  5   5  Crepitus   Tibiofemoral   +   +   Patellofemoral  +   +  Instability   Anterior  -   -   Posterior  -   -   Patellofemoral  -   -  Lachman's   -   -  Anterior Drawer  -   -  Posterior Drawer  -   -  Lelia's   Pain   -   -   Locking  -   -  Calf TTP   -   -  Straight Leg Raise  -   -     A/P  Bilateral knee DJD  Euflexxa injection #3 given to both knees today  Follow up PRN    [facility] PROCEDURE PROGRESS NOTE        Chart reviewed for the following:   César De La Garza MD, have reviewed the History, Physical and updated the Allergic reactions for Krissy Figueroa performed immediately prior to start of procedure:   César De La Garza MD, have performed the following reviews on Filiberto Still prior to the start of the procedure:            * Patient was identified by name and date of birth   * Agreement on procedure being performed was verified  * Risks and Benefits explained to the patient  * Procedure site verified and marked as necessary  * Patient was positioned for comfort  * Consent was signed and verified    Time: 1:37 PM    Location: Bilateral knees    Euflexxa 1% sodium hyaluronate  2ccs    Date of procedure: 3/25/2020    Procedure performed by:  Justo Haas MD    Provider assisted by: Damaris Guzman LPN    Patient assisted by: self    How tolerated by patient: tolerated the procedure well with no complications    Post Procedural Pain Scale: 0 - No Hurt    Comments: none                  Electronically signed by: Justo Haas MD

## 2024-06-12 ENCOUNTER — HOSPITAL ENCOUNTER (OUTPATIENT)
Facility: HOSPITAL | Age: 69
Discharge: HOME | End: 2024-06-15
Attending: FAMILY MEDICINE
Payer: COMMERCIAL

## 2024-06-12 DIAGNOSIS — R79.89 ELEVATED SERUM CREATININE: ICD-10-CM

## 2024-06-12 PROCEDURE — 76770 US EXAM ABDO BACK WALL COMP: CPT

## 2025-03-02 ENCOUNTER — HOSPITAL ENCOUNTER (EMERGENCY)
Facility: HOSPITAL | Age: 70
Discharge: ANOTHER HOSPITAL | End: 2025-03-02
Payer: MEDICARE

## 2025-03-02 ENCOUNTER — APPOINTMENT (OUTPATIENT)
Facility: HOSPITAL | Age: 70
End: 2025-03-02
Payer: MEDICARE

## 2025-03-02 ENCOUNTER — APPOINTMENT (OUTPATIENT)
Facility: HOSPITAL | Age: 70
DRG: 280 | End: 2025-03-02
Attending: INTERNAL MEDICINE
Payer: MEDICARE

## 2025-03-02 ENCOUNTER — HOSPITAL ENCOUNTER (INPATIENT)
Facility: HOSPITAL | Age: 70
LOS: 9 days | Discharge: NURSING, REHAB OR LONG-TERM CARE | DRG: 280 | End: 2025-03-11
Attending: STUDENT IN AN ORGANIZED HEALTH CARE EDUCATION/TRAINING PROGRAM | Admitting: INTERNAL MEDICINE
Payer: MEDICARE

## 2025-03-02 VITALS
BODY MASS INDEX: 33.5 KG/M2 | TEMPERATURE: 98.6 F | HEART RATE: 98 BPM | WEIGHT: 201.3 LBS | DIASTOLIC BLOOD PRESSURE: 104 MMHG | OXYGEN SATURATION: 93 % | RESPIRATION RATE: 19 BRPM | SYSTOLIC BLOOD PRESSURE: 172 MMHG

## 2025-03-02 DIAGNOSIS — I10 HYPERTENSION, UNSPECIFIED TYPE: Primary | ICD-10-CM

## 2025-03-02 DIAGNOSIS — J96.01 ACUTE RESPIRATORY FAILURE WITH HYPOXIA (HCC): Primary | ICD-10-CM

## 2025-03-02 PROBLEM — J96.21 ACUTE ON CHRONIC HYPOXIC RESPIRATORY FAILURE (HCC): Status: ACTIVE | Noted: 2025-03-02

## 2025-03-02 LAB
ALBUMIN SERPL-MCNC: 2.6 G/DL (ref 3.5–5)
ALBUMIN/GLOB SERPL: 0.5 (ref 1.1–2.2)
ALP SERPL-CCNC: 78 U/L (ref 45–117)
ALT SERPL-CCNC: 16 U/L (ref 12–78)
ANION GAP SERPL CALC-SCNC: 10 MMOL/L (ref 2–12)
ANION GAP SERPL CALC-SCNC: 5 MMOL/L (ref 2–12)
ARTERIAL PATENCY WRIST A: YES
AST SERPL W P-5'-P-CCNC: 13 U/L (ref 15–37)
BASE EXCESS BLDA CALC-SCNC: 0.5 MMOL/L (ref 0–3)
BASOPHILS # BLD: 0.11 K/UL (ref 0–0.1)
BASOPHILS NFR BLD: 0.7 % (ref 0–1)
BDY SITE: ABNORMAL
BILIRUB SERPL-MCNC: 0.4 MG/DL (ref 0.2–1)
BNP SERPL-MCNC: 7367 PG/ML
BUN SERPL-MCNC: 47 MG/DL (ref 6–20)
BUN SERPL-MCNC: 47 MG/DL (ref 6–20)
BUN/CREAT SERPL: 7 (ref 12–20)
BUN/CREAT SERPL: 7 (ref 12–20)
CA-I BLD-MCNC: 9.5 MG/DL (ref 8.5–10.1)
CA-I BLD-MCNC: 9.8 MG/DL (ref 8.5–10.1)
CHLORIDE SERPL-SCNC: 101 MMOL/L (ref 97–108)
CHLORIDE SERPL-SCNC: 107 MMOL/L (ref 97–108)
CO2 SERPL-SCNC: 26 MMOL/L (ref 21–32)
CO2 SERPL-SCNC: 28 MMOL/L (ref 21–32)
COHGB MFR BLD: 0.3 % (ref 1–2)
CREAT SERPL-MCNC: 6.29 MG/DL (ref 0.55–1.02)
CREAT SERPL-MCNC: 6.32 MG/DL (ref 0.55–1.02)
CREAT UR-MCNC: 29 MG/DL
DIFFERENTIAL METHOD BLD: ABNORMAL
EKG ATRIAL RATE: 96 BPM
EKG DIAGNOSIS: NORMAL
EKG P AXIS: 86 DEGREES
EKG P-R INTERVAL: 166 MS
EKG Q-T INTERVAL: 416 MS
EKG QRS DURATION: 152 MS
EKG QTC CALCULATION (BAZETT): 525 MS
EKG R AXIS: -25 DEGREES
EKG T AXIS: 114 DEGREES
EKG VENTRICULAR RATE: 96 BPM
EOSINOPHIL # BLD: 0.63 K/UL (ref 0–0.4)
EOSINOPHIL NFR BLD: 4 % (ref 0–7)
ERYTHROCYTE [DISTWIDTH] IN BLOOD BY AUTOMATED COUNT: 14 % (ref 11.5–14.5)
EST. AVERAGE GLUCOSE BLD GHB EST-MCNC: 186 MG/DL
FLUAV RNA SPEC QL NAA+PROBE: NOT DETECTED
FLUBV RNA SPEC QL NAA+PROBE: NOT DETECTED
GAS FLOW.O2 SETTING OXYMISER: 8
GLOBULIN SER CALC-MCNC: 4.8 G/DL (ref 2–4)
GLUCOSE BLD STRIP.AUTO-MCNC: 236 MG/DL (ref 65–100)
GLUCOSE BLD STRIP.AUTO-MCNC: 247 MG/DL (ref 65–100)
GLUCOSE BLD STRIP.AUTO-MCNC: 256 MG/DL (ref 65–100)
GLUCOSE BLD STRIP.AUTO-MCNC: 278 MG/DL (ref 65–100)
GLUCOSE SERPL-MCNC: 127 MG/DL (ref 65–100)
GLUCOSE SERPL-MCNC: 240 MG/DL (ref 65–100)
HBA1C MFR BLD: 8.1 % (ref 4–5.6)
HCO3 BLDA-SCNC: 26 MMOL/L (ref 22–26)
HCT VFR BLD AUTO: 33.9 % (ref 35–47)
HGB BLD-MCNC: 10.6 G/DL (ref 11.5–16)
IMM GRANULOCYTES # BLD AUTO: 0.05 K/UL (ref 0–0.04)
IMM GRANULOCYTES NFR BLD AUTO: 0.3 % (ref 0–0.5)
IPAP/PIP: 12
LACTATE SERPL-SCNC: 1.1 MMOL/L (ref 0.4–2)
LYMPHOCYTES # BLD: 1.12 K/UL (ref 0.8–3.5)
LYMPHOCYTES NFR BLD: 7.1 % (ref 12–49)
MCH RBC QN AUTO: 26 PG (ref 26–34)
MCHC RBC AUTO-ENTMCNC: 31.3 G/DL (ref 30–36.5)
MCV RBC AUTO: 83.1 FL (ref 80–99)
METHGB MFR BLD: 0.3 % (ref 0–1.4)
MONOCYTES # BLD: 1 K/UL (ref 0–1)
MONOCYTES NFR BLD: 6.3 % (ref 5–13)
MRSA DNA SPEC QL NAA+PROBE: NOT DETECTED
NEUTS SEG # BLD: 12.86 K/UL (ref 1.8–8)
NEUTS SEG NFR BLD: 81.6 % (ref 32–75)
NRBC # BLD: 0 K/UL (ref 0–0.01)
NRBC BLD-RTO: 0 PER 100 WBC
OXYHGB MFR BLD: 92.3 % (ref 95–99)
PCO2 BLDA: 46 MMHG (ref 35–45)
PEEP RESPIRATORY: 7
PERFORMED BY:: ABNORMAL
PH BLDA: 7.37 (ref 7.35–7.45)
PLATELET # BLD AUTO: 497 K/UL (ref 150–400)
PMV BLD AUTO: 9 FL (ref 8.9–12.9)
PO2 BLDA: 66 MMHG (ref 80–100)
POTASSIUM SERPL-SCNC: 4.3 MMOL/L (ref 3.5–5.1)
POTASSIUM SERPL-SCNC: 4.4 MMOL/L (ref 3.5–5.1)
PROCALCITONIN SERPL-MCNC: 0.09 NG/ML
PROT SERPL-MCNC: 7.4 G/DL (ref 6.4–8.2)
PROT UR-MCNC: 293 MG/DL (ref 0–11.9)
PROT/CREAT UR-RTO: 10.1
RBC # BLD AUTO: 4.08 M/UL (ref 3.8–5.2)
SAO2% DEVICE SAO2% SENSOR NAME: ABNORMAL
SARS-COV-2 RNA RESP QL NAA+PROBE: NOT DETECTED
SODIUM SERPL-SCNC: 138 MMOL/L (ref 136–145)
SODIUM SERPL-SCNC: 139 MMOL/L (ref 136–145)
SPECIMEN SITE: ABNORMAL
T4 FREE SERPL-MCNC: 1.4 NG/DL (ref 0.8–1.5)
TROPONIN I SERPL HS-MCNC: 123 NG/L (ref 0–51)
TROPONIN I SERPL HS-MCNC: 131 NG/L (ref 0–51)
TROPONIN I SERPL HS-MCNC: 146 NG/L (ref 0–51)
TSH SERPL DL<=0.05 MIU/L-ACNC: 2.51 UIU/ML (ref 0.36–3.74)
WBC # BLD AUTO: 15.8 K/UL (ref 3.6–11)

## 2025-03-02 PROCEDURE — 94640 AIRWAY INHALATION TREATMENT: CPT

## 2025-03-02 PROCEDURE — 6360000002 HC RX W HCPCS: Performed by: INTERNAL MEDICINE

## 2025-03-02 PROCEDURE — 2500000003 HC RX 250 WO HCPCS: Performed by: NURSE PRACTITIONER

## 2025-03-02 PROCEDURE — 84145 PROCALCITONIN (PCT): CPT

## 2025-03-02 PROCEDURE — 2000000000 HC ICU R&B

## 2025-03-02 PROCEDURE — 87040 BLOOD CULTURE FOR BACTERIA: CPT

## 2025-03-02 PROCEDURE — 85025 COMPLETE CBC W/AUTO DIFF WBC: CPT

## 2025-03-02 PROCEDURE — 36415 COLL VENOUS BLD VENIPUNCTURE: CPT

## 2025-03-02 PROCEDURE — 2700000000 HC OXYGEN THERAPY PER DAY

## 2025-03-02 PROCEDURE — 87636 SARSCOV2 & INF A&B AMP PRB: CPT

## 2025-03-02 PROCEDURE — 96367 TX/PROPH/DG ADDL SEQ IV INF: CPT

## 2025-03-02 PROCEDURE — 94660 CPAP INITIATION&MGMT: CPT

## 2025-03-02 PROCEDURE — 6360000004 HC RX CONTRAST MEDICATION

## 2025-03-02 PROCEDURE — 6370000000 HC RX 637 (ALT 250 FOR IP)

## 2025-03-02 PROCEDURE — 82962 GLUCOSE BLOOD TEST: CPT

## 2025-03-02 PROCEDURE — 80053 COMPREHEN METABOLIC PANEL: CPT

## 2025-03-02 PROCEDURE — 84439 ASSAY OF FREE THYROXINE: CPT

## 2025-03-02 PROCEDURE — 94761 N-INVAS EAR/PLS OXIMETRY MLT: CPT

## 2025-03-02 PROCEDURE — 87641 MR-STAPH DNA AMP PROBE: CPT

## 2025-03-02 PROCEDURE — 96365 THER/PROPH/DIAG IV INF INIT: CPT

## 2025-03-02 PROCEDURE — 6370000000 HC RX 637 (ALT 250 FOR IP): Performed by: INTERNAL MEDICINE

## 2025-03-02 PROCEDURE — 93005 ELECTROCARDIOGRAM TRACING: CPT

## 2025-03-02 PROCEDURE — 83970 ASSAY OF PARATHORMONE: CPT

## 2025-03-02 PROCEDURE — 99285 EMERGENCY DEPT VISIT HI MDM: CPT

## 2025-03-02 PROCEDURE — 84156 ASSAY OF PROTEIN URINE: CPT

## 2025-03-02 PROCEDURE — 71045 X-RAY EXAM CHEST 1 VIEW: CPT

## 2025-03-02 PROCEDURE — 84484 ASSAY OF TROPONIN QUANT: CPT

## 2025-03-02 PROCEDURE — 83036 HEMOGLOBIN GLYCOSYLATED A1C: CPT

## 2025-03-02 PROCEDURE — 93005 ELECTROCARDIOGRAM TRACING: CPT | Performed by: INTERNAL MEDICINE

## 2025-03-02 PROCEDURE — 5A09457 ASSISTANCE WITH RESPIRATORY VENTILATION, 24-96 CONSECUTIVE HOURS, CONTINUOUS POSITIVE AIRWAY PRESSURE: ICD-10-PCS | Performed by: INTERNAL MEDICINE

## 2025-03-02 PROCEDURE — 82570 ASSAY OF URINE CREATININE: CPT

## 2025-03-02 PROCEDURE — 6360000002 HC RX W HCPCS

## 2025-03-02 PROCEDURE — 36600 WITHDRAWAL OF ARTERIAL BLOOD: CPT

## 2025-03-02 PROCEDURE — 84443 ASSAY THYROID STIM HORMONE: CPT

## 2025-03-02 PROCEDURE — 83880 ASSAY OF NATRIURETIC PEPTIDE: CPT

## 2025-03-02 PROCEDURE — 82803 BLOOD GASES ANY COMBINATION: CPT

## 2025-03-02 PROCEDURE — 96375 TX/PRO/DX INJ NEW DRUG ADDON: CPT

## 2025-03-02 PROCEDURE — 2580000003 HC RX 258

## 2025-03-02 PROCEDURE — 80048 BASIC METABOLIC PNL TOTAL CA: CPT

## 2025-03-02 PROCEDURE — 83605 ASSAY OF LACTIC ACID: CPT

## 2025-03-02 PROCEDURE — C8929 TTE W OR WO FOL WCON,DOPPLER: HCPCS

## 2025-03-02 RX ORDER — DEXTROSE MONOHYDRATE 100 MG/ML
INJECTION, SOLUTION INTRAVENOUS CONTINUOUS PRN
Status: DISCONTINUED | OUTPATIENT
Start: 2025-03-02 | End: 2025-03-11 | Stop reason: HOSPADM

## 2025-03-02 RX ORDER — DEXAMETHASONE SODIUM PHOSPHATE 10 MG/ML
10 INJECTION, SOLUTION INTRAMUSCULAR; INTRAVENOUS ONCE
Status: COMPLETED | OUTPATIENT
Start: 2025-03-02 | End: 2025-03-02

## 2025-03-02 RX ORDER — ENOXAPARIN SODIUM 100 MG/ML
40 INJECTION SUBCUTANEOUS DAILY
Status: DISCONTINUED | OUTPATIENT
Start: 2025-03-02 | End: 2025-03-02

## 2025-03-02 RX ORDER — ACETAMINOPHEN 325 MG/1
650 TABLET ORAL EVERY 6 HOURS PRN
Status: DISCONTINUED | OUTPATIENT
Start: 2025-03-02 | End: 2025-03-11 | Stop reason: HOSPADM

## 2025-03-02 RX ORDER — FUROSEMIDE 10 MG/ML
100 INJECTION INTRAMUSCULAR; INTRAVENOUS ONCE
Status: COMPLETED | OUTPATIENT
Start: 2025-03-02 | End: 2025-03-02

## 2025-03-02 RX ORDER — FUROSEMIDE 10 MG/ML
40 INJECTION INTRAMUSCULAR; INTRAVENOUS
Status: COMPLETED | OUTPATIENT
Start: 2025-03-02 | End: 2025-03-02

## 2025-03-02 RX ORDER — ACETAMINOPHEN 650 MG/1
650 SUPPOSITORY RECTAL EVERY 6 HOURS PRN
Status: DISCONTINUED | OUTPATIENT
Start: 2025-03-02 | End: 2025-03-11 | Stop reason: HOSPADM

## 2025-03-02 RX ORDER — IPRATROPIUM BROMIDE AND ALBUTEROL SULFATE 2.5; .5 MG/3ML; MG/3ML
1 SOLUTION RESPIRATORY (INHALATION)
Status: COMPLETED | OUTPATIENT
Start: 2025-03-02 | End: 2025-03-02

## 2025-03-02 RX ORDER — ROSUVASTATIN CALCIUM 20 MG/1
20 TABLET, COATED ORAL DAILY
Status: ON HOLD | COMMUNITY

## 2025-03-02 RX ORDER — CHLOROTHIAZIDE SODIUM 500 MG/1
500 INJECTION INTRAVENOUS ONCE
Status: COMPLETED | OUTPATIENT
Start: 2025-03-02 | End: 2025-03-02

## 2025-03-02 RX ORDER — ERGOCALCIFEROL 1.25 MG/1
50000 CAPSULE ORAL WEEKLY
Status: ON HOLD | COMMUNITY

## 2025-03-02 RX ORDER — POLYETHYLENE GLYCOL 3350 17 G/17G
17 POWDER, FOR SOLUTION ORAL DAILY PRN
Status: DISCONTINUED | OUTPATIENT
Start: 2025-03-02 | End: 2025-03-11 | Stop reason: HOSPADM

## 2025-03-02 RX ORDER — POTASSIUM CHLORIDE 29.8 MG/ML
20 INJECTION INTRAVENOUS PRN
Status: DISCONTINUED | OUTPATIENT
Start: 2025-03-02 | End: 2025-03-02

## 2025-03-02 RX ORDER — HYDRALAZINE HYDROCHLORIDE 50 MG/1
50 TABLET, FILM COATED ORAL 2 TIMES DAILY
Status: DISCONTINUED | OUTPATIENT
Start: 2025-03-02 | End: 2025-03-04

## 2025-03-02 RX ORDER — FUROSEMIDE 40 MG/1
40 TABLET ORAL EVERY OTHER DAY
Status: ON HOLD | COMMUNITY

## 2025-03-02 RX ORDER — ONDANSETRON 4 MG/1
4 TABLET, ORALLY DISINTEGRATING ORAL EVERY 8 HOURS PRN
Status: DISCONTINUED | OUTPATIENT
Start: 2025-03-02 | End: 2025-03-11 | Stop reason: HOSPADM

## 2025-03-02 RX ORDER — BUMETANIDE 0.25 MG/ML
2 INJECTION, SOLUTION INTRAMUSCULAR; INTRAVENOUS 2 TIMES DAILY
Status: DISCONTINUED | OUTPATIENT
Start: 2025-03-02 | End: 2025-03-04

## 2025-03-02 RX ORDER — SODIUM CHLORIDE 0.9 % (FLUSH) 0.9 %
5-40 SYRINGE (ML) INJECTION EVERY 12 HOURS SCHEDULED
Status: DISCONTINUED | OUTPATIENT
Start: 2025-03-02 | End: 2025-03-11 | Stop reason: HOSPADM

## 2025-03-02 RX ORDER — AMLODIPINE BESYLATE 5 MG/1
10 TABLET ORAL DAILY
Status: DISCONTINUED | OUTPATIENT
Start: 2025-03-02 | End: 2025-03-09

## 2025-03-02 RX ORDER — SODIUM CHLORIDE 0.9 % (FLUSH) 0.9 %
5-40 SYRINGE (ML) INJECTION PRN
Status: DISCONTINUED | OUTPATIENT
Start: 2025-03-02 | End: 2025-03-11 | Stop reason: HOSPADM

## 2025-03-02 RX ORDER — ONDANSETRON 2 MG/ML
4 INJECTION INTRAMUSCULAR; INTRAVENOUS EVERY 6 HOURS PRN
Status: DISCONTINUED | OUTPATIENT
Start: 2025-03-02 | End: 2025-03-11 | Stop reason: HOSPADM

## 2025-03-02 RX ORDER — HALOPERIDOL 1 MG/1
1 TABLET ORAL EVERY EVENING
Status: ON HOLD | COMMUNITY

## 2025-03-02 RX ORDER — MAGNESIUM SULFATE IN WATER 40 MG/ML
2000 INJECTION, SOLUTION INTRAVENOUS PRN
Status: DISCONTINUED | OUTPATIENT
Start: 2025-03-02 | End: 2025-03-02

## 2025-03-02 RX ORDER — SODIUM BICARBONATE 650 MG/1
650 TABLET ORAL 2 TIMES DAILY
Status: ON HOLD | COMMUNITY
End: 2025-03-03

## 2025-03-02 RX ORDER — ACETAMINOPHEN 325 MG/1
650 TABLET ORAL EVERY 4 HOURS PRN
Status: ON HOLD | COMMUNITY

## 2025-03-02 RX ORDER — HEPARIN SODIUM 5000 [USP'U]/ML
5000 INJECTION, SOLUTION INTRAVENOUS; SUBCUTANEOUS EVERY 8 HOURS SCHEDULED
Status: DISCONTINUED | OUTPATIENT
Start: 2025-03-02 | End: 2025-03-07

## 2025-03-02 RX ORDER — INSULIN LISPRO 100 [IU]/ML
0-8 INJECTION, SOLUTION INTRAVENOUS; SUBCUTANEOUS
Status: DISCONTINUED | OUTPATIENT
Start: 2025-03-02 | End: 2025-03-11 | Stop reason: HOSPADM

## 2025-03-02 RX ORDER — IPRATROPIUM BROMIDE AND ALBUTEROL SULFATE 2.5; .5 MG/3ML; MG/3ML
1 SOLUTION RESPIRATORY (INHALATION)
Status: DISCONTINUED | OUTPATIENT
Start: 2025-03-02 | End: 2025-03-04

## 2025-03-02 RX ORDER — POTASSIUM CHLORIDE 7.45 MG/ML
10 INJECTION INTRAVENOUS PRN
Status: DISCONTINUED | OUTPATIENT
Start: 2025-03-02 | End: 2025-03-02

## 2025-03-02 RX ORDER — MAGNESIUM SULFATE IN WATER 40 MG/ML
2000 INJECTION, SOLUTION INTRAVENOUS ONCE
Status: COMPLETED | OUTPATIENT
Start: 2025-03-02 | End: 2025-03-02

## 2025-03-02 RX ORDER — SODIUM CHLORIDE 9 MG/ML
INJECTION, SOLUTION INTRAVENOUS PRN
Status: DISCONTINUED | OUTPATIENT
Start: 2025-03-02 | End: 2025-03-11 | Stop reason: HOSPADM

## 2025-03-02 RX ORDER — HYDRALAZINE HYDROCHLORIDE 20 MG/ML
10 INJECTION INTRAMUSCULAR; INTRAVENOUS EVERY 6 HOURS PRN
Status: DISCONTINUED | OUTPATIENT
Start: 2025-03-02 | End: 2025-03-11 | Stop reason: HOSPADM

## 2025-03-02 RX ORDER — FUROSEMIDE 20 MG/1
20 TABLET ORAL EVERY OTHER DAY
Status: ON HOLD | COMMUNITY

## 2025-03-02 RX ORDER — GLUCAGON 1 MG/ML
1 KIT INJECTION PRN
Status: DISCONTINUED | OUTPATIENT
Start: 2025-03-02 | End: 2025-03-11 | Stop reason: HOSPADM

## 2025-03-02 RX ORDER — INSULIN GLARGINE 100 [IU]/ML
32 INJECTION, SOLUTION SUBCUTANEOUS NIGHTLY
Status: ON HOLD | COMMUNITY

## 2025-03-02 RX ADMIN — MAGNESIUM SULFATE HEPTAHYDRATE 2000 MG: 40 INJECTION, SOLUTION INTRAVENOUS at 03:37

## 2025-03-02 RX ADMIN — HYDRALAZINE HYDROCHLORIDE 10 MG: 20 INJECTION, SOLUTION INTRAMUSCULAR; INTRAVENOUS at 11:21

## 2025-03-02 RX ADMIN — HEPARIN SODIUM 5000 UNITS: 5000 INJECTION INTRAVENOUS; SUBCUTANEOUS at 13:10

## 2025-03-02 RX ADMIN — IPRATROPIUM BROMIDE AND ALBUTEROL SULFATE 1 DOSE: 2.5; .5 SOLUTION RESPIRATORY (INHALATION) at 15:16

## 2025-03-02 RX ADMIN — HYDRALAZINE HYDROCHLORIDE 50 MG: 50 TABLET ORAL at 13:12

## 2025-03-02 RX ADMIN — BUMETANIDE 2 MG: 0.25 INJECTION INTRAMUSCULAR; INTRAVENOUS at 13:08

## 2025-03-02 RX ADMIN — IPRATROPIUM BROMIDE AND ALBUTEROL SULFATE 1 DOSE: .5; 2.5 SOLUTION RESPIRATORY (INHALATION) at 03:25

## 2025-03-02 RX ADMIN — IPRATROPIUM BROMIDE AND ALBUTEROL SULFATE 1 DOSE: 2.5; .5 SOLUTION RESPIRATORY (INHALATION) at 07:59

## 2025-03-02 RX ADMIN — FUROSEMIDE 100 MG: 10 INJECTION, SOLUTION INTRAMUSCULAR; INTRAVENOUS at 09:13

## 2025-03-02 RX ADMIN — AMLODIPINE BESYLATE 10 MG: 5 TABLET ORAL at 11:21

## 2025-03-02 RX ADMIN — IPRATROPIUM BROMIDE AND ALBUTEROL SULFATE 1 DOSE: .5; 2.5 SOLUTION RESPIRATORY (INHALATION) at 03:01

## 2025-03-02 RX ADMIN — SODIUM CHLORIDE, PRESERVATIVE FREE 10 ML: 5 INJECTION INTRAVENOUS at 09:26

## 2025-03-02 RX ADMIN — SULFUR HEXAFLUORIDE 2 ML: 60.7; .19; .19 INJECTION, POWDER, LYOPHILIZED, FOR SUSPENSION INTRAVENOUS; INTRAVESICAL at 16:22

## 2025-03-02 RX ADMIN — IPRATROPIUM BROMIDE AND ALBUTEROL SULFATE 1 DOSE: 2.5; .5 SOLUTION RESPIRATORY (INHALATION) at 19:27

## 2025-03-02 RX ADMIN — PIPERACILLIN AND TAZOBACTAM 4500 MG: 4; .5 INJECTION, POWDER, LYOPHILIZED, FOR SOLUTION INTRAVENOUS at 04:05

## 2025-03-02 RX ADMIN — VANCOMYCIN HYDROCHLORIDE 1000 MG: 1 INJECTION, POWDER, LYOPHILIZED, FOR SOLUTION INTRAVENOUS at 04:44

## 2025-03-02 RX ADMIN — HEPARIN SODIUM 5000 UNITS: 5000 INJECTION INTRAVENOUS; SUBCUTANEOUS at 22:00

## 2025-03-02 RX ADMIN — RACEPINEPHRINE HYDROCHLORIDE 0.5 ML: 11.25 SOLUTION RESPIRATORY (INHALATION) at 03:10

## 2025-03-02 RX ADMIN — SODIUM CHLORIDE, PRESERVATIVE FREE 10 ML: 5 INJECTION INTRAVENOUS at 13:12

## 2025-03-02 RX ADMIN — INSULIN LISPRO 2 UNITS: 100 INJECTION, SOLUTION INTRAVENOUS; SUBCUTANEOUS at 16:54

## 2025-03-02 RX ADMIN — CHLOROTHIAZIDE SODIUM 500 MG: 500 INJECTION, POWDER, LYOPHILIZED, FOR SOLUTION INTRAVENOUS at 10:37

## 2025-03-02 RX ADMIN — INSULIN LISPRO 2 UNITS: 100 INJECTION, SOLUTION INTRAVENOUS; SUBCUTANEOUS at 21:05

## 2025-03-02 RX ADMIN — HYDRALAZINE HYDROCHLORIDE 50 MG: 50 TABLET ORAL at 21:06

## 2025-03-02 RX ADMIN — FUROSEMIDE 40 MG: 10 INJECTION, SOLUTION INTRAVENOUS at 03:27

## 2025-03-02 RX ADMIN — INSULIN LISPRO 4 UNITS: 100 INJECTION, SOLUTION INTRAVENOUS; SUBCUTANEOUS at 11:26

## 2025-03-02 RX ADMIN — BUMETANIDE 2 MG: 0.25 INJECTION INTRAMUSCULAR; INTRAVENOUS at 16:55

## 2025-03-02 RX ADMIN — DEXAMETHASONE SODIUM PHOSPHATE 10 MG: 10 INJECTION, SOLUTION INTRAMUSCULAR; INTRAVENOUS at 03:10

## 2025-03-02 RX ADMIN — IPRATROPIUM BROMIDE AND ALBUTEROL SULFATE 1 DOSE: 2.5; .5 SOLUTION RESPIRATORY (INHALATION) at 11:47

## 2025-03-02 RX ADMIN — SODIUM CHLORIDE, PRESERVATIVE FREE 10 ML: 5 INJECTION INTRAVENOUS at 21:06

## 2025-03-02 ASSESSMENT — LIFESTYLE VARIABLES
HOW MANY STANDARD DRINKS CONTAINING ALCOHOL DO YOU HAVE ON A TYPICAL DAY: PATIENT DOES NOT DRINK
HOW OFTEN DO YOU HAVE A DRINK CONTAINING ALCOHOL: NEVER

## 2025-03-02 ASSESSMENT — PAIN - FUNCTIONAL ASSESSMENT: PAIN_FUNCTIONAL_ASSESSMENT: 0-10

## 2025-03-02 ASSESSMENT — PAIN SCALES - GENERAL: PAINLEVEL_OUTOF10: 0

## 2025-03-02 NOTE — CARE COORDINATION
03/02/25 1941   Service Assessment   Patient Orientation Unable to Assess   Cognition Dementia / Early Alzheimer's   History Provided By Other (see comment)  (Sister Lissette Ocampo)   Primary Caregiver Other (Comment)  ([facility] in [place])   Accompanied By/Relationship sister Lissette ocampo   Support Systems Family Members   PCP Verified by CM Yes  (Facility dr Zoltan Law)   Last Visit to PCP Within last 3 months   Prior Functional Level Assistance with the following:;Bathing;Dressing;Toileting;Feeding;Cooking;Housework;Shopping;Mobility   Current Functional Level Assistance with the following:;Bathing;Dressing;Toileting;Feeding;Cooking;Housework;Mobility   Can patient return to prior living arrangement Yes   Ability to make needs known: Poor   Family able to assist with home care needs: No   Would you like for me to discuss the discharge plan with any other family members/significant others, and if so, who? Yes   Financial Resources Other (Comment)  [place] [employer])   Community Resources None  (Resident at SNF)     Met with the pts sister at bedside.  Reported the pt is a LTC resident of the [facility] in [place] on [address].  She is expected to return when medically stable.  The pt uses a wheelchair and wall walks <5ft from bed to toilet.  She has no community services and no other DME.    She has O2 concentrator in her room but is reported to be NOT using it currently.  Previously was at  [facility].    Ms Ocampo stated that the facility will transport at [place].    Advance Care Planning     General Advance Care Planning (ACP) Conversation    Date of Conversation: 3/2/2025  Conducted with: Legal next of kin  Other persons present: Sister      Healthcare Decision Maker:   Primary Decision Maker: Lissette Ocampo - Brother/Sister - [phone]       Content/Action Overview:  Has ACP document(s) on file - reflects the patient's care preferences  Reviewed DNR/DNI and patient elects Full Code (Attempt

## 2025-03-02 NOTE — CONSULTS
Renal Consultation Note    NAME:  Antonella Albert   :   1955   MRN:   [id number]     ATTENDING: Peewee King MD  PCP:  Zoltan Law MD    Date/Time:  3/2/2025 10:31 AM      Subjective:   REQUESTING PHYSICIAN:  REASON FOR CONSULT:    franky on ckd 5    Patient is a 70-year-old  female with history of chronic kidney disease stage IV/V, f/b nephrology in [facility], COPD, history of breast cancer, diabetes hypertension hyperlipidemia who was transferred here from [facility] ER for shortness of breath.  Apparently patient was at [facility] where she was noted to be short of breath for the past 3 days with increasing oxygen requirement.  His symptoms kept worsening to the point she required BiPAP.  She was sent here for further management.  Nephrology consultation has been placed for CKD.  Patient seen in the ICU.  She is currently on HFNC with O2 sats around 91%.  Sister and brother at bedside who provide most of the history.  According to them patient is followed by Paola Suarez NP with [facility]. Review of records show patient was referred for vascular access to Dr. Sepulveda in 2024.  Was on Lasix 40 mg daily at home.  She was given Lasix 100 mg IV on admission to the ICU.  She also received chlorothiazide 500 mg IV.  On Amlodipine for blood pressure control.  Urine output has been marginal since admission  Chest x-ray shows pulmonary edema.        Past Medical History:   Diagnosis Date    Anxiety     Arthritis     spinal stenosis    Breast CA (HCC) 2017    Left Breast (chemo/radiation)    Chronic kidney disease     stage 4    COPD (chronic obstructive pulmonary disease) (HCC)     Dementia (HCC)     Depression     Diabetes (HCC)     Type 2    Edema     legs and ankles    GERD (gastroesophageal reflux disease)     High cholesterol     Hypertension     Ill-defined condition     patient states hemorrhage behind Left eye-following

## 2025-03-02 NOTE — H&P
CRITICAL CARE ADMISSION NOTE    Name: Antonella Albert   : 1955   MRN: [id number]   Date: 3/2/2025      Diagnoses/problem list:   Acute hypoxemic respiratory failure  Pulmonary edema  Acute kidney injury  Volume overload  Uncontrolled hypertension  History of COPD, DM, CKD 3, HTN, breast cancer    HPI   This is a 70-year-old female who initially presented to [facility] ER from nursing home due to progressively worsening shortness of breath for the last 3 days.  She was reportedly started on oxygen due to hypoxia on Thursday and her symptoms continue to worsen since then.  Patient also reports decreased urine output for the last few days.  She denied any chest pain or abdominal pain.  Denied vomiting or diarrhea.  She was able to tell me her name, date of birth and current year but was disoriented otherwise.  Chest x-ray was concerning for bilateral pulmonary edema.  Lasix was given prior to transfer to us but she did not make any urine.  She was placed on BiPAP for increased work of breathing.  She is currently weaned off of BiPAP and is on HFNC at the time of my exam.  Labs are concerning for creatinine of 6.29, BUN 47, proBNP 7367, WBC count 15.8.  She is also found to be hypertensive with systolic in the 180s.    Assessment and plan:     NEUROLOGICAL:    Mental status improving  No focal deficits at this time    PULMONOLOGY:   Continue to wean HFNC  NIV as needed  Chest x-ray with pulm edema, no focal consolidation noted  Diuresis as tolerated    CARDIOVASCULAR:   Amlodipine for BP control  As needed hydralazine  Troponin peaked at 130  EKG with left bundle branch block (not new)  Obtain echocardiogram    GASTROINTESTINAL:   Advance diet as tolerated    RENAL/ELECTROLYTE:   Obtain ultrasound to rule out obstruction  Lasix and Diuril for diuresis  Strict ins and outs  Correct electrolytes  Nephrology consulted, follow recs    ENDOCRINE:   ISS for BG control  Keep -180  Check

## 2025-03-02 NOTE — ED PROVIDER NOTES
other day      sodium bicarbonate 650 MG tablet Take 1 tablet by mouth 2 times daily      acetaminophen (TYLENOL) 325 MG tablet Take 2 tablets by mouth every 4 hours as needed for Pain or Fever      albuterol sulfate HFA (PROVENTIL;VENTOLIN;PROAIR) 108 (90 Base) MCG/ACT inhaler 2 puffs three to four times a day for 5 days then every 6 hours as needed for shortness of breath (Patient not taking: Reported on 3/2/2025)      amLODIPine (NORVASC) 10 MG tablet Take 10 mg by mouth daily      aspirin 81 MG EC tablet Take 81 mg by mouth daily      atorvastatin (LIPITOR) 40 MG tablet Take 40 mg by mouth (Patient not taking: Reported on 3/2/2025)      cloNIDine (CATAPRES) 0.1 MG tablet Take 0.1 mg by mouth 2 times daily      famotidine (PEPCID) 20 MG tablet take 1 tablet by mouth twice a day      ferrous sulfate (IRON 325) 325 (65 Fe) MG tablet take 1 tablet by mouth three times a day (Patient not taking: Reported on 3/2/2025)      isosorbide mononitrate (IMDUR) 60 MG extended release tablet Take 60 mg by mouth daily      melatonin 3 MG TABS tablet Take 3 mg by mouth      tamsulosin (FLOMAX) 0.4 MG capsule take 1 capsule by mouth once daily (Patient not taking: Reported on 3/2/2025)      venlafaxine (EFFEXOR XR) 75 MG extended release capsule Take 1 capsule by mouth daily         Social Determinants of Health:   Social Determinants of Health     Tobacco Use: Low Risk  (3/2/2025)    Patient History     Smoking Tobacco Use: Never     Smokeless Tobacco Use: Never     Passive Exposure: Not on file   Alcohol Use: Not At Risk (3/2/2025)    AUDIT-C     Frequency of Alcohol Consumption: Never     Average Number of Drinks: Patient does not drink     Frequency of Binge Drinking: Never   Financial Resource Strain: Not on file   Food Insecurity: Not on file   Transportation Needs: Not on file   Physical Activity: Not on file   Stress: Not on file   Social Connections: Not on file   Intimate Partner Violence: Not on file   Depression: Not

## 2025-03-02 NOTE — ED NOTES
Patient transferred to [facility] via [facility].  Patient stable and on BiPap at time of discharge.

## 2025-03-02 NOTE — ED TRIAGE NOTES
Brought in from [facility] via [facility] for shortness of breath. Per Nurse report SPO2 decreased on Thursday and order received for O2 2LNC, nurse reports tonight that SPO2 84% on 2LNC, labored respirations, accessory muscle use and wheezing. EMS placed patient on 6LNC en route and SPO2 95%. Dr Alanis at bedside.

## 2025-03-03 ENCOUNTER — APPOINTMENT (OUTPATIENT)
Facility: HOSPITAL | Age: 70
DRG: 280 | End: 2025-03-03
Attending: STUDENT IN AN ORGANIZED HEALTH CARE EDUCATION/TRAINING PROGRAM
Payer: MEDICARE

## 2025-03-03 ENCOUNTER — APPOINTMENT (OUTPATIENT)
Facility: HOSPITAL | Age: 70
DRG: 280 | End: 2025-03-03
Attending: INTERNAL MEDICINE
Payer: MEDICARE

## 2025-03-03 LAB
25(OH)D3 SERPL-MCNC: 78.5 NG/ML (ref 30–100)
ALBUMIN SERPL-MCNC: 2.6 G/DL (ref 3.5–5)
ANION GAP SERPL CALC-SCNC: 7 MMOL/L (ref 2–12)
ANION GAP SERPL CALC-SCNC: 8 MMOL/L (ref 2–12)
APPEARANCE UR: CLEAR
BACTERIA URNS QL MICRO: NEGATIVE /HPF
BASOPHILS # BLD: 0.02 K/UL (ref 0–0.1)
BASOPHILS NFR BLD: 0.1 % (ref 0–1)
BILIRUB UR QL: NEGATIVE
BUN SERPL-MCNC: 59 MG/DL (ref 6–20)
BUN SERPL-MCNC: 59 MG/DL (ref 6–20)
BUN/CREAT SERPL: 8 (ref 12–20)
BUN/CREAT SERPL: 9 (ref 12–20)
CA-I BLD-MCNC: 9.8 MG/DL (ref 8.5–10.1)
CA-I BLD-MCNC: 9.9 MG/DL (ref 8.5–10.1)
CA-I BLD-MCNC: 9.9 MG/DL (ref 8.5–10.1)
CHLORIDE SERPL-SCNC: 105 MMOL/L (ref 97–108)
CHLORIDE SERPL-SCNC: 106 MMOL/L (ref 97–108)
CO2 SERPL-SCNC: 29 MMOL/L (ref 21–32)
CO2 SERPL-SCNC: 29 MMOL/L (ref 21–32)
COLOR UR: ABNORMAL
CREAT SERPL-MCNC: 6.91 MG/DL (ref 0.55–1.02)
CREAT SERPL-MCNC: 6.98 MG/DL (ref 0.55–1.02)
DIFFERENTIAL METHOD BLD: ABNORMAL
ECHO AO ASC DIAM: 2.9 CM
ECHO AO ASCENDING AORTA INDEX: 1.46 CM/M2
ECHO AO ROOT DIAM: 2.4 CM
ECHO AO ROOT INDEX: 1.21 CM/M2
ECHO AV AREA PEAK VELOCITY: 1.3 CM2
ECHO AV AREA VTI: 1.4 CM2
ECHO AV AREA/BSA PEAK VELOCITY: 0.7 CM2/M2
ECHO AV AREA/BSA VTI: 0.7 CM2/M2
ECHO AV MEAN GRADIENT: 8 MMHG
ECHO AV MEAN VELOCITY: 1.3 M/S
ECHO AV PEAK GRADIENT: 14 MMHG
ECHO AV PEAK VELOCITY: 1.9 M/S
ECHO AV VELOCITY RATIO: 0.53
ECHO AV VTI: 29.4 CM
ECHO IVC EXP: 2.9 CM
ECHO LA AREA 2C: 16.6 CM2
ECHO LA AREA 4C: 24.7 CM2
ECHO LA DIAMETER INDEX: 1.62 CM/M2
ECHO LA DIAMETER: 3.2 CM
ECHO LA MAJOR AXIS: 5.5 CM
ECHO LA MINOR AXIS: 5.3 CM
ECHO LA TO AORTIC ROOT RATIO: 1.33
ECHO LA VOL BP: 61 ML (ref 22–52)
ECHO LA VOL MOD A2C: 43 ML (ref 22–52)
ECHO LA VOL MOD A4C: 86 ML (ref 22–52)
ECHO LA VOL/BSA BIPLANE: 31 ML/M2 (ref 16–34)
ECHO LA VOLUME INDEX MOD A2C: 22 ML/M2 (ref 16–34)
ECHO LA VOLUME INDEX MOD A4C: 43 ML/M2 (ref 16–34)
ECHO LV E' LATERAL VELOCITY: 9.67 CM/S
ECHO LV E' SEPTAL VELOCITY: 5.55 CM/S
ECHO LV EDV 3D: 181 ML
ECHO LV EDV A2C: 38 ML
ECHO LV EDV A4C: 33 ML
ECHO LV EDV INDEX 3D: 91 ML/M2
ECHO LV EDV INDEX A4C: 17 ML/M2
ECHO LV EDV NDEX A2C: 19 ML/M2
ECHO LV EJECTION FRACTION 3D: 33 %
ECHO LV EJECTION FRACTION A2C: 19 %
ECHO LV EJECTION FRACTION A4C: 16 %
ECHO LV EJECTION FRACTION BIPLANE: 19 % (ref 55–100)
ECHO LV ESV 3D: 121 ML
ECHO LV ESV A2C: 30 ML
ECHO LV ESV A4C: 28 ML
ECHO LV ESV INDEX 3D: 61 ML/M2
ECHO LV ESV INDEX A2C: 15 ML/M2
ECHO LV ESV INDEX A4C: 14 ML/M2
ECHO LV FRACTIONAL SHORTENING: 33 % (ref 28–44)
ECHO LV INTERNAL DIMENSION DIASTOLE INDEX: 2.78 CM/M2
ECHO LV INTERNAL DIMENSION DIASTOLIC: 5.5 CM (ref 3.9–5.3)
ECHO LV INTERNAL DIMENSION SYSTOLIC INDEX: 1.87 CM/M2
ECHO LV INTERNAL DIMENSION SYSTOLIC: 3.7 CM
ECHO LV IVSD: 0.7 CM (ref 0.6–0.9)
ECHO LV MASS 2D: 172.7 G (ref 67–162)
ECHO LV MASS 3D INDEX: 103 G/M2
ECHO LV MASS 3D: 204 G
ECHO LV MASS INDEX 2D: 87.2 G/M2 (ref 43–95)
ECHO LV POSTERIOR WALL DIASTOLIC: 1 CM (ref 0.6–0.9)
ECHO LV RELATIVE WALL THICKNESS RATIO: 0.36
ECHO LVOT AREA: 2.5 CM2
ECHO LVOT AV VTI INDEX: 0.54
ECHO LVOT DIAM: 1.8 CM
ECHO LVOT MEAN GRADIENT: 2 MMHG
ECHO LVOT PEAK GRADIENT: 4 MMHG
ECHO LVOT PEAK VELOCITY: 1 M/S
ECHO LVOT STROKE VOLUME INDEX: 20.3 ML/M2
ECHO LVOT SV: 40.2 ML
ECHO LVOT VTI: 15.8 CM
ECHO MV A VELOCITY: 1.94 M/S
ECHO MV AREA VTI: 1.2 CM2
ECHO MV E DECELERATION TIME (DT): 126 MS
ECHO MV E VELOCITY: 1.63 M/S
ECHO MV E/A RATIO: 0.84
ECHO MV E/E' LATERAL: 16.86
ECHO MV E/E' RATIO (AVERAGED): 23.11
ECHO MV E/E' SEPTAL: 29.37
ECHO MV LVOT VTI INDEX: 2.16
ECHO MV MAX VELOCITY: 2.1 M/S
ECHO MV MEAN GRADIENT: 7 MMHG
ECHO MV MEAN VELOCITY: 1.3 M/S
ECHO MV PEAK GRADIENT: 17 MMHG
ECHO MV REGURGITANT PEAK GRADIENT: 4 MMHG
ECHO MV REGURGITANT PEAK VELOCITY: 1 M/S
ECHO MV REGURGITANT VTIA: 14.6 CM
ECHO MV VTI: 34.2 CM
ECHO PV MAX VELOCITY: 1.2 M/S
ECHO PV MEAN GRADIENT: 3 MMHG
ECHO PV MEAN VELOCITY: 0.9 M/S
ECHO PV PEAK GRADIENT: 6 MMHG
ECHO PV VTI: 28.8 CM
ECHO RA AREA 4C: 13.5 CM2
ECHO RA END SYSTOLIC VOLUME APICAL 4 CHAMBER INDEX BSA: 15 ML/M2
ECHO RA VOLUME: 30 ML
ECHO RV BASAL DIMENSION: 3.9 CM
ECHO RV FREE WALL PEAK S': 11.8 CM/S
ECHO RV MID DIMENSION: 3.1 CM
ECHO RV TAPSE: 3.1 CM (ref 1.7–?)
EKG ATRIAL RATE: 96 BPM
EKG DIAGNOSIS: NORMAL
EKG P AXIS: 82 DEGREES
EKG P-R INTERVAL: 162 MS
EKG Q-T INTERVAL: 405 MS
EKG QRS DURATION: 158 MS
EKG QTC CALCULATION (BAZETT): 512 MS
EKG R AXIS: 1 DEGREES
EKG T AXIS: 148 DEGREES
EKG VENTRICULAR RATE: 96 BPM
EOSINOPHIL # BLD: 0 K/UL (ref 0–0.4)
EOSINOPHIL NFR BLD: 0 % (ref 0–7)
EPITH CASTS URNS QL MICRO: ABNORMAL /LPF
ERYTHROCYTE [DISTWIDTH] IN BLOOD BY AUTOMATED COUNT: 14 % (ref 11.5–14.5)
FERRITIN SERPL-MCNC: 109 NG/ML (ref 8–252)
GLUCOSE BLD STRIP.AUTO-MCNC: 202 MG/DL (ref 65–100)
GLUCOSE BLD STRIP.AUTO-MCNC: 248 MG/DL (ref 65–100)
GLUCOSE BLD STRIP.AUTO-MCNC: 287 MG/DL (ref 65–100)
GLUCOSE BLD STRIP.AUTO-MCNC: 294 MG/DL (ref 65–100)
GLUCOSE SERPL-MCNC: 223 MG/DL (ref 65–100)
GLUCOSE SERPL-MCNC: 223 MG/DL (ref 65–100)
GLUCOSE UR STRIP.AUTO-MCNC: >500 MG/DL
HCT VFR BLD AUTO: 33 % (ref 35–47)
HGB BLD-MCNC: 10.2 G/DL (ref 11.5–16)
HGB UR QL STRIP: ABNORMAL
IMM GRANULOCYTES # BLD AUTO: 0.15 K/UL (ref 0–0.04)
IMM GRANULOCYTES NFR BLD AUTO: 0.6 % (ref 0–0.5)
IRON SATN MFR SERPL: 13 % (ref 20–50)
IRON SERPL-MCNC: 30 UG/DL (ref 35–150)
KETONES UR QL STRIP.AUTO: NEGATIVE MG/DL
LEUKOCYTE ESTERASE UR QL STRIP.AUTO: NEGATIVE
LYMPHOCYTES # BLD: 0.59 K/UL (ref 0.8–3.5)
LYMPHOCYTES NFR BLD: 2.4 % (ref 12–49)
MAGNESIUM SERPL-MCNC: 2.9 MG/DL (ref 1.6–2.4)
MCH RBC QN AUTO: 25.6 PG (ref 26–34)
MCHC RBC AUTO-ENTMCNC: 30.9 G/DL (ref 30–36.5)
MCV RBC AUTO: 82.7 FL (ref 80–99)
MONOCYTES # BLD: 0.64 K/UL (ref 0–1)
MONOCYTES NFR BLD: 2.6 % (ref 5–13)
MUCOUS THREADS URNS QL MICRO: ABNORMAL /LPF
NEUTS SEG # BLD: 23.3 K/UL (ref 1.8–8)
NEUTS SEG NFR BLD: 94.3 % (ref 32–75)
NITRITE UR QL STRIP.AUTO: NEGATIVE
NRBC # BLD: 0 K/UL (ref 0–0.01)
NRBC BLD-RTO: 0 PER 100 WBC
PERFORMED BY:: ABNORMAL
PH UR STRIP: 7 (ref 5–8)
PHOSPHATE SERPL-MCNC: 6.1 MG/DL (ref 2.6–4.7)
PHOSPHATE SERPL-MCNC: 6.1 MG/DL (ref 2.6–4.7)
PLATELET # BLD AUTO: 529 K/UL (ref 150–400)
PMV BLD AUTO: 9.6 FL (ref 8.9–12.9)
POTASSIUM SERPL-SCNC: 4.4 MMOL/L (ref 3.5–5.1)
POTASSIUM SERPL-SCNC: 4.5 MMOL/L (ref 3.5–5.1)
PROT UR STRIP-MCNC: >300 MG/DL
PTH-INTACT SERPL-MCNC: 232.2 PG/ML (ref 18.4–88)
RBC # BLD AUTO: 3.99 M/UL (ref 3.8–5.2)
RBC #/AREA URNS HPF: ABNORMAL /HPF (ref 0–5)
RBC MORPH BLD: ABNORMAL
SODIUM SERPL-SCNC: 142 MMOL/L (ref 136–145)
SODIUM SERPL-SCNC: 142 MMOL/L (ref 136–145)
SP GR UR REFRACTOMETRY: 1.01 (ref 1–1.03)
TIBC SERPL-MCNC: 237 UG/DL (ref 250–450)
UROBILINOGEN UR QL STRIP.AUTO: 0.1 EU/DL (ref 0.1–1)
WBC # BLD AUTO: 24.7 K/UL (ref 3.6–11)
WBC URNS QL MICRO: ABNORMAL /HPF (ref 0–4)

## 2025-03-03 PROCEDURE — 76770 US EXAM ABDO BACK WALL COMP: CPT

## 2025-03-03 PROCEDURE — 2500000003 HC RX 250 WO HCPCS: Performed by: STUDENT IN AN ORGANIZED HEALTH CARE EDUCATION/TRAINING PROGRAM

## 2025-03-03 PROCEDURE — 94640 AIRWAY INHALATION TREATMENT: CPT

## 2025-03-03 PROCEDURE — 6360000002 HC RX W HCPCS: Performed by: INTERNAL MEDICINE

## 2025-03-03 PROCEDURE — 82962 GLUCOSE BLOOD TEST: CPT

## 2025-03-03 PROCEDURE — 6370000000 HC RX 637 (ALT 250 FOR IP): Performed by: INTERNAL MEDICINE

## 2025-03-03 PROCEDURE — 86803 HEPATITIS C AB TEST: CPT

## 2025-03-03 PROCEDURE — 83735 ASSAY OF MAGNESIUM: CPT

## 2025-03-03 PROCEDURE — 2500000003 HC RX 250 WO HCPCS: Performed by: INTERNAL MEDICINE

## 2025-03-03 PROCEDURE — 2709999900 HC NON-CHARGEABLE SUPPLY

## 2025-03-03 PROCEDURE — 94761 N-INVAS EAR/PLS OXIMETRY MLT: CPT

## 2025-03-03 PROCEDURE — 2700000000 HC OXYGEN THERAPY PER DAY

## 2025-03-03 PROCEDURE — 2060000000 HC ICU INTERMEDIATE R&B

## 2025-03-03 PROCEDURE — 90935 HEMODIALYSIS ONE EVALUATION: CPT

## 2025-03-03 PROCEDURE — 87340 HEPATITIS B SURFACE AG IA: CPT

## 2025-03-03 PROCEDURE — 86706 HEP B SURFACE ANTIBODY: CPT

## 2025-03-03 PROCEDURE — 80069 RENAL FUNCTION PANEL: CPT

## 2025-03-03 PROCEDURE — 84100 ASSAY OF PHOSPHORUS: CPT

## 2025-03-03 PROCEDURE — 6360000002 HC RX W HCPCS: Performed by: STUDENT IN AN ORGANIZED HEALTH CARE EDUCATION/TRAINING PROGRAM

## 2025-03-03 PROCEDURE — 76937 US GUIDE VASCULAR ACCESS: CPT

## 2025-03-03 PROCEDURE — 83540 ASSAY OF IRON: CPT

## 2025-03-03 PROCEDURE — 2500000003 HC RX 250 WO HCPCS: Performed by: NURSE PRACTITIONER

## 2025-03-03 PROCEDURE — 81001 URINALYSIS AUTO W/SCOPE: CPT

## 2025-03-03 PROCEDURE — 71045 X-RAY EXAM CHEST 1 VIEW: CPT

## 2025-03-03 PROCEDURE — 02HV33Z INSERTION OF INFUSION DEVICE INTO SUPERIOR VENA CAVA, PERCUTANEOUS APPROACH: ICD-10-PCS | Performed by: STUDENT IN AN ORGANIZED HEALTH CARE EDUCATION/TRAINING PROGRAM

## 2025-03-03 PROCEDURE — 2580000003 HC RX 258: Performed by: STUDENT IN AN ORGANIZED HEALTH CARE EDUCATION/TRAINING PROGRAM

## 2025-03-03 PROCEDURE — 5A1D70Z PERFORMANCE OF URINARY FILTRATION, INTERMITTENT, LESS THAN 6 HOURS PER DAY: ICD-10-PCS | Performed by: INTERNAL MEDICINE

## 2025-03-03 PROCEDURE — 36415 COLL VENOUS BLD VENIPUNCTURE: CPT

## 2025-03-03 PROCEDURE — 80048 BASIC METABOLIC PNL TOTAL CA: CPT

## 2025-03-03 PROCEDURE — 93005 ELECTROCARDIOGRAM TRACING: CPT | Performed by: INTERNAL MEDICINE

## 2025-03-03 PROCEDURE — 82306 VITAMIN D 25 HYDROXY: CPT

## 2025-03-03 PROCEDURE — 36556 INSERT NON-TUNNEL CV CATH: CPT

## 2025-03-03 PROCEDURE — 82728 ASSAY OF FERRITIN: CPT

## 2025-03-03 PROCEDURE — 86704 HEP B CORE ANTIBODY TOTAL: CPT

## 2025-03-03 PROCEDURE — 85025 COMPLETE CBC W/AUTO DIFF WBC: CPT

## 2025-03-03 RX ORDER — METOPROLOL TARTRATE 1 MG/ML
5 INJECTION, SOLUTION INTRAVENOUS EVERY 6 HOURS PRN
Status: DISCONTINUED | OUTPATIENT
Start: 2025-03-03 | End: 2025-03-11 | Stop reason: HOSPADM

## 2025-03-03 RX ORDER — METOPROLOL TARTRATE 25 MG/1
12.5 TABLET, FILM COATED ORAL 2 TIMES DAILY
Status: DISCONTINUED | OUTPATIENT
Start: 2025-03-03 | End: 2025-03-11 | Stop reason: HOSPADM

## 2025-03-03 RX ORDER — SODIUM FERRIC GLUCONATE COMPLEX IN SUCROSE 12.5 MG/ML
125 INJECTION INTRAVENOUS DAILY
Status: DISCONTINUED | OUTPATIENT
Start: 2025-03-03 | End: 2025-03-03

## 2025-03-03 RX ORDER — HEPARIN SODIUM 1000 [USP'U]/ML
2200 INJECTION, SOLUTION INTRAVENOUS; SUBCUTANEOUS PRN
Status: DISCONTINUED | OUTPATIENT
Start: 2025-03-03 | End: 2025-03-11 | Stop reason: HOSPADM

## 2025-03-03 RX ORDER — CINACALCET 30 MG/1
30 TABLET, FILM COATED ORAL DAILY
Status: DISCONTINUED | OUTPATIENT
Start: 2025-03-03 | End: 2025-03-11 | Stop reason: HOSPADM

## 2025-03-03 RX ADMIN — BUMETANIDE 2 MG: 0.25 INJECTION INTRAMUSCULAR; INTRAVENOUS at 08:41

## 2025-03-03 RX ADMIN — IPRATROPIUM BROMIDE AND ALBUTEROL SULFATE 1 DOSE: 2.5; .5 SOLUTION RESPIRATORY (INHALATION) at 15:21

## 2025-03-03 RX ADMIN — SODIUM CHLORIDE, PRESERVATIVE FREE 10 ML: 5 INJECTION INTRAVENOUS at 20:28

## 2025-03-03 RX ADMIN — AMLODIPINE BESYLATE 10 MG: 5 TABLET ORAL at 08:41

## 2025-03-03 RX ADMIN — INSULIN LISPRO 2 UNITS: 100 INJECTION, SOLUTION INTRAVENOUS; SUBCUTANEOUS at 20:27

## 2025-03-03 RX ADMIN — HYDRALAZINE HYDROCHLORIDE 50 MG: 50 TABLET ORAL at 08:41

## 2025-03-03 RX ADMIN — HEPARIN SODIUM 2200 UNITS: 1000 INJECTION INTRAVENOUS; SUBCUTANEOUS at 18:36

## 2025-03-03 RX ADMIN — HEPARIN SODIUM 5000 UNITS: 5000 INJECTION INTRAVENOUS; SUBCUTANEOUS at 06:08

## 2025-03-03 RX ADMIN — INSULIN LISPRO 4 UNITS: 100 INJECTION, SOLUTION INTRAVENOUS; SUBCUTANEOUS at 11:22

## 2025-03-03 RX ADMIN — HEPARIN SODIUM 5000 UNITS: 5000 INJECTION INTRAVENOUS; SUBCUTANEOUS at 15:33

## 2025-03-03 RX ADMIN — IPRATROPIUM BROMIDE AND ALBUTEROL SULFATE 1 DOSE: 2.5; .5 SOLUTION RESPIRATORY (INHALATION) at 20:43

## 2025-03-03 RX ADMIN — HYDRALAZINE HYDROCHLORIDE 50 MG: 50 TABLET ORAL at 20:27

## 2025-03-03 RX ADMIN — BUMETANIDE 2 MG: 0.25 INJECTION INTRAMUSCULAR; INTRAVENOUS at 18:34

## 2025-03-03 RX ADMIN — INSULIN LISPRO 4 UNITS: 100 INJECTION, SOLUTION INTRAVENOUS; SUBCUTANEOUS at 17:31

## 2025-03-03 RX ADMIN — METOPROLOL TARTRATE 5 MG: 5 INJECTION INTRAVENOUS at 19:00

## 2025-03-03 RX ADMIN — IPRATROPIUM BROMIDE AND ALBUTEROL SULFATE 1 DOSE: 2.5; .5 SOLUTION RESPIRATORY (INHALATION) at 03:59

## 2025-03-03 RX ADMIN — METOPROLOL TARTRATE 12.5 MG: 25 TABLET, FILM COATED ORAL at 18:34

## 2025-03-03 RX ADMIN — SODIUM CHLORIDE 125 MG: 9 INJECTION, SOLUTION INTRAVENOUS at 22:54

## 2025-03-03 RX ADMIN — CEFTRIAXONE SODIUM 2000 MG: 2 INJECTION, POWDER, FOR SOLUTION INTRAMUSCULAR; INTRAVENOUS at 10:22

## 2025-03-03 RX ADMIN — IPRATROPIUM BROMIDE AND ALBUTEROL SULFATE 1 DOSE: 2.5; .5 SOLUTION RESPIRATORY (INHALATION) at 10:55

## 2025-03-03 RX ADMIN — IPRATROPIUM BROMIDE AND ALBUTEROL SULFATE 1 DOSE: 2.5; .5 SOLUTION RESPIRATORY (INHALATION) at 00:11

## 2025-03-03 RX ADMIN — IPRATROPIUM BROMIDE AND ALBUTEROL SULFATE 1 DOSE: 2.5; .5 SOLUTION RESPIRATORY (INHALATION) at 07:27

## 2025-03-03 RX ADMIN — INSULIN LISPRO 2 UNITS: 100 INJECTION, SOLUTION INTRAVENOUS; SUBCUTANEOUS at 08:40

## 2025-03-03 RX ADMIN — CINACALCET HYDROCHLORIDE 30 MG: 30 TABLET, FILM COATED ORAL at 22:52

## 2025-03-03 RX ADMIN — HEPARIN SODIUM 5000 UNITS: 5000 INJECTION INTRAVENOUS; SUBCUTANEOUS at 20:30

## 2025-03-03 RX ADMIN — SODIUM CHLORIDE, PRESERVATIVE FREE 10 ML: 5 INJECTION INTRAVENOUS at 08:41

## 2025-03-03 NOTE — CONSULTS
Hospitalist Progress Note               Daily Progress Note: 3/3/2025      Hospital Day: 2     Chief complaint: No chief complaint on file.       Hospital course to date:     This is a 70-year-old female who initially presented to [facility] ER from nursing home due to progressively worsening shortness of breath for the last 3 days. She was reportedly started on oxygen due to hypoxia on Thursday and her symptoms continue to worsen since then. Patient also reports decreased urine output for the last few days. She denied any chest pain or abdominal pain. Denied vomiting or diarrhea. She was able to tell me her name, date of birth and current year but was disoriented otherwise. Chest x-ray was concerning for bilateral pulmonary edema. Lasix was given prior to transfer to us but she did not make any urine. She was placed on BiPAP for increased work of breathing. She is currently weaned off of BiPAP and is on HFNC at the time of my exam. Labs are concerning for creatinine of 6.29, BUN 47, proBNP 7367, WBC count 15.8. She is also found to be hypertensive with systolic in the 180s.     Patient transferred to general medicine floor for further management.  Patient is currently on    Medications reviewed  Current Facility-Administered Medications   Medication Dose Route Frequency    cefTRIAXone (ROCEPHIN) 2,000 mg in sterile water 20 mL IV syringe  2,000 mg IntraVENous Q24H    heparin (porcine) injection 2,200 Units  2,200 Units IntraCATHeter PRN    sodium chloride flush 0.9 % injection 5-40 mL  5-40 mL IntraVENous 2 times per day    sodium chloride flush 0.9 % injection 5-40 mL  5-40 mL IntraVENous PRN    0.9 % sodium chloride infusion   IntraVENous PRN    ondansetron (ZOFRAN-ODT) disintegrating tablet 4 mg  4 mg Oral Q8H PRN    Or    ondansetron (ZOFRAN) injection 4 mg  4 mg IntraVENous Q6H PRN    polyethylene glycol (GLYCOLAX) packet 17 g  17 g Oral Daily PRN    acetaminophen (TYLENOL) tablet 650 mg  650 mg

## 2025-03-03 NOTE — PROGRESS NOTES
Received Order for Telemetry     Antonella Albert   1955   [id number]   Acute on chronic hypoxic respiratory failure (HCC) [J96.21]   Mike Cortes MD     Tele Box # [id number] placed on patient at  1830 pm  ER Room # 263  Admitting to Room 461  Transferring Nurse MARTHA  Verified with Primary Nurse *** at {Time:[id number]}

## 2025-03-03 NOTE — CARE COORDINATION
CM reviewed Pt medicals, Pt comes from a LTC facility in [place]. CM has sent over medicals to find out which one.    Pt uses a wheelchair, Pt walks 5ft from bed to toilet.     Per IDR  Pt will transfer to the floor.     2:10  Pt is a LTC Resident at [facility]. Pt has lived at [facility] for years and is A&Ox4    CM will send updated medicals.

## 2025-03-03 NOTE — OR NURSING
Patient to IR for Temp HD catheter  Patient identified, procedure verified  Consent obtained, site prepped  Procedure complete without complication  15 cm Temporary dialysis catheter placed using RIGHT IJV access  One suture and CHG dressing applied  Patient tolerated procedure  Post imaging ordered/Pending  Patient transported back to nursing unit

## 2025-03-03 NOTE — DIALYSIS
Patient tolerated treatment. 1000 ml of fluid was removed. Patient was alert during treatment. 40.3 liters of blood was processed. Report was given to primary nurse Dang.

## 2025-03-03 NOTE — PLAN OF CARE
Problem: Chronic Conditions and Co-morbidities  Goal: Patient's chronic conditions and co-morbidity symptoms are monitored and maintained or improved  Outcome: Progressing     Problem: Skin/Tissue Integrity  Goal: Skin integrity remains intact  Description: 1.  Monitor for areas of redness and/or skin breakdown  2.  Assess vascular access sites hourly  3.  Every 4-6 hours minimum:  Change oxygen saturation probe site  4.  Every 4-6 hours:  If on nasal continuous positive airway pressure, respiratory therapy assess nares and determine need for appliance change or resting period  Outcome: Progressing  Flowsheets (Taken 3/3/2025 0800)  Skin Integrity Remains Intact: Monitor for areas of redness and/or skin breakdown

## 2025-03-03 NOTE — PROGRESS NOTES
CRITICAL CARE PROGRESS NOTE    Name: Antonella Albert   : 1955   MRN: [id number]   Date: 3/3/2025      Diagnoses/problem list:   Acute hypoxemic respiratory failure  Pulmonary edema  Acute kidney injury  Volume overload  Uncontrolled hypertension  History of COPD, DM, CKD 3, HTN, breast cancer    24-hour events:   Awake and alert.  Not in distress.  Remains disoriented.  Saturating well on HFNC 35 L 40%.  Rising creatinine noted, 6.9 this morning.  2.5 L UOP in last 24 hours.    Assessment and plan:   This is a 70-year-old female who initially presented to [facility] ER from nursing home due to progressively worsening shortness of breath for the last 3 days.  She was reportedly started on oxygen due to hypoxia on Thursday and her symptoms continue to worsen since then.  Patient also reports decreased urine output for the last few days.  She denied any chest pain or abdominal pain.  Denied vomiting or diarrhea.  She was able to tell me her name, date of birth and current year but was disoriented otherwise.  Chest x-ray was concerning for bilateral pulmonary edema.  Lasix was given prior to transfer to us but she did not make any urine.  She was placed on BiPAP for increased work of breathing.  She is currently weaned off of BiPAP and is on HFNC at the time of my exam.  Labs are concerning for creatinine of 6.29, BUN 47, proBNP 7367, WBC count 15.8.  She is also found to be hypertensive with systolic in the 180s.    NEUROLOGICAL:    Confusion improving, no focal deficits, no need for CT head  Delirium precautions   TSH and free T4 normal     PULMONOLOGY:   Continue to wean HFNC  NIV as needed  Chest x-ray with pulm edema, no focal consolidation noted  Diuresis with Bumex 2 mg IV BID     CARDIOVASCULAR:   Amlodipine for BP control  As needed hydralazine  Troponin peaked at 130  EKG with left bundle branch block (not new)  Obtain echocardiogram    GASTROINTESTINAL:   Advance diet as

## 2025-03-04 ENCOUNTER — APPOINTMENT (OUTPATIENT)
Facility: HOSPITAL | Age: 70
DRG: 280 | End: 2025-03-04
Attending: STUDENT IN AN ORGANIZED HEALTH CARE EDUCATION/TRAINING PROGRAM
Payer: MEDICARE

## 2025-03-04 LAB
ALBUMIN SERPL-MCNC: 2.3 G/DL (ref 3.5–5)
ANION GAP SERPL CALC-SCNC: 8 MMOL/L (ref 2–12)
ANION GAP SERPL CALC-SCNC: 8 MMOL/L (ref 2–12)
BASOPHILS # BLD: 0.04 K/UL (ref 0–0.1)
BASOPHILS NFR BLD: 0.2 % (ref 0–1)
BUN SERPL-MCNC: 50 MG/DL (ref 6–20)
BUN SERPL-MCNC: 50 MG/DL (ref 6–20)
BUN/CREAT SERPL: 9 (ref 12–20)
BUN/CREAT SERPL: 9 (ref 12–20)
CA-I BLD-MCNC: 8.9 MG/DL (ref 8.5–10.1)
CA-I BLD-MCNC: 9 MG/DL (ref 8.5–10.1)
CHLORIDE SERPL-SCNC: 104 MMOL/L (ref 97–108)
CHLORIDE SERPL-SCNC: 104 MMOL/L (ref 97–108)
CHOLEST SERPL-MCNC: 127 MG/DL
CO2 SERPL-SCNC: 29 MMOL/L (ref 21–32)
CO2 SERPL-SCNC: 29 MMOL/L (ref 21–32)
CREAT SERPL-MCNC: 5.56 MG/DL (ref 0.55–1.02)
CREAT SERPL-MCNC: 5.58 MG/DL (ref 0.55–1.02)
DIFFERENTIAL METHOD BLD: ABNORMAL
EKG ATRIAL RATE: 108 BPM
EKG ATRIAL RATE: 117 BPM
EKG DIAGNOSIS: NORMAL
EKG DIAGNOSIS: NORMAL
EKG P AXIS: 81 DEGREES
EKG P-R INTERVAL: 148 MS
EKG Q-T INTERVAL: 336 MS
EKG Q-T INTERVAL: 420 MS
EKG QRS DURATION: 140 MS
EKG QRS DURATION: 152 MS
EKG QTC CALCULATION (BAZETT): 450 MS
EKG QTC CALCULATION (BAZETT): 533 MS
EKG R AXIS: -15 DEGREES
EKG R AXIS: -36 DEGREES
EKG T AXIS: 108 DEGREES
EKG T AXIS: 144 DEGREES
EKG VENTRICULAR RATE: 108 BPM
EKG VENTRICULAR RATE: 97 BPM
EOSINOPHIL # BLD: 0 K/UL (ref 0–0.4)
EOSINOPHIL NFR BLD: 0 % (ref 0–7)
ERYTHROCYTE [DISTWIDTH] IN BLOOD BY AUTOMATED COUNT: 14.6 % (ref 11.5–14.5)
GLUCOSE BLD STRIP.AUTO-MCNC: 139 MG/DL (ref 65–100)
GLUCOSE BLD STRIP.AUTO-MCNC: 156 MG/DL (ref 65–100)
GLUCOSE BLD STRIP.AUTO-MCNC: 161 MG/DL (ref 65–100)
GLUCOSE SERPL-MCNC: 236 MG/DL (ref 65–100)
GLUCOSE SERPL-MCNC: 236 MG/DL (ref 65–100)
HBV SURFACE AB SER QL: NONREACTIVE
HBV SURFACE AB SER-ACNC: <3.1 MIU/ML
HBV SURFACE AG SER QL: <0.1 INDEX
HBV SURFACE AG SER QL: NEGATIVE
HCT VFR BLD AUTO: 33.3 % (ref 35–47)
HCV AB SER IA-ACNC: 0.09 INDEX
HCV AB SERPL QL IA: NONREACTIVE
HDLC SERPL-MCNC: 53 MG/DL
HDLC SERPL: 2.4 (ref 0–5)
HGB BLD-MCNC: 10.3 G/DL (ref 11.5–16)
IMM GRANULOCYTES # BLD AUTO: 0.1 K/UL (ref 0–0.04)
IMM GRANULOCYTES NFR BLD AUTO: 0.5 % (ref 0–0.5)
LDLC SERPL CALC-MCNC: 41.4 MG/DL (ref 0–100)
LIPID PANEL: ABNORMAL
LYMPHOCYTES # BLD: 0.89 K/UL (ref 0.8–3.5)
LYMPHOCYTES NFR BLD: 4.5 % (ref 12–49)
MAGNESIUM SERPL-MCNC: 2.5 MG/DL (ref 1.6–2.4)
MCH RBC QN AUTO: 25.7 PG (ref 26–34)
MCHC RBC AUTO-ENTMCNC: 30.9 G/DL (ref 30–36.5)
MCV RBC AUTO: 83 FL (ref 80–99)
MONOCYTES # BLD: 1.08 K/UL (ref 0–1)
MONOCYTES NFR BLD: 5.4 % (ref 5–13)
NEUTS SEG # BLD: 17.89 K/UL (ref 1.8–8)
NEUTS SEG NFR BLD: 89.4 % (ref 32–75)
NRBC # BLD: 0 K/UL (ref 0–0.01)
NRBC BLD-RTO: 0 PER 100 WBC
PERFORMED BY:: ABNORMAL
PHOSPHATE SERPL-MCNC: 4.9 MG/DL (ref 2.6–4.7)
PHOSPHATE SERPL-MCNC: 5.3 MG/DL (ref 2.6–4.7)
PLATELET # BLD AUTO: 518 K/UL (ref 150–400)
PMV BLD AUTO: 9.7 FL (ref 8.9–12.9)
POTASSIUM SERPL-SCNC: 4.1 MMOL/L (ref 3.5–5.1)
POTASSIUM SERPL-SCNC: 4.2 MMOL/L (ref 3.5–5.1)
RBC # BLD AUTO: 4.01 M/UL (ref 3.8–5.2)
SODIUM SERPL-SCNC: 141 MMOL/L (ref 136–145)
SODIUM SERPL-SCNC: 141 MMOL/L (ref 136–145)
TRIGL SERPL-MCNC: 163 MG/DL
VLDLC SERPL CALC-MCNC: 32.6 MG/DL
WBC # BLD AUTO: 20 K/UL (ref 3.6–11)

## 2025-03-04 PROCEDURE — 94640 AIRWAY INHALATION TREATMENT: CPT

## 2025-03-04 PROCEDURE — 2700000000 HC OXYGEN THERAPY PER DAY

## 2025-03-04 PROCEDURE — 6360000002 HC RX W HCPCS: Performed by: INTERNAL MEDICINE

## 2025-03-04 PROCEDURE — 93005 ELECTROCARDIOGRAM TRACING: CPT | Performed by: INTERNAL MEDICINE

## 2025-03-04 PROCEDURE — 6360000002 HC RX W HCPCS: Performed by: STUDENT IN AN ORGANIZED HEALTH CARE EDUCATION/TRAINING PROGRAM

## 2025-03-04 PROCEDURE — 36415 COLL VENOUS BLD VENIPUNCTURE: CPT

## 2025-03-04 PROCEDURE — 83735 ASSAY OF MAGNESIUM: CPT

## 2025-03-04 PROCEDURE — 2580000003 HC RX 258: Performed by: STUDENT IN AN ORGANIZED HEALTH CARE EDUCATION/TRAINING PROGRAM

## 2025-03-04 PROCEDURE — 90935 HEMODIALYSIS ONE EVALUATION: CPT

## 2025-03-04 PROCEDURE — 2709999900 HC NON-CHARGEABLE SUPPLY

## 2025-03-04 PROCEDURE — 2500000003 HC RX 250 WO HCPCS

## 2025-03-04 PROCEDURE — 85025 COMPLETE CBC W/AUTO DIFF WBC: CPT

## 2025-03-04 PROCEDURE — 80069 RENAL FUNCTION PANEL: CPT

## 2025-03-04 PROCEDURE — 2500000003 HC RX 250 WO HCPCS: Performed by: NURSE PRACTITIONER

## 2025-03-04 PROCEDURE — 94761 N-INVAS EAR/PLS OXIMETRY MLT: CPT

## 2025-03-04 PROCEDURE — 82962 GLUCOSE BLOOD TEST: CPT

## 2025-03-04 PROCEDURE — 80048 BASIC METABOLIC PNL TOTAL CA: CPT

## 2025-03-04 PROCEDURE — 6360000002 HC RX W HCPCS

## 2025-03-04 PROCEDURE — 2500000003 HC RX 250 WO HCPCS: Performed by: INTERNAL MEDICINE

## 2025-03-04 PROCEDURE — 6370000000 HC RX 637 (ALT 250 FOR IP)

## 2025-03-04 PROCEDURE — 70450 CT HEAD/BRAIN W/O DYE: CPT

## 2025-03-04 PROCEDURE — 84100 ASSAY OF PHOSPHORUS: CPT

## 2025-03-04 PROCEDURE — 2060000000 HC ICU INTERMEDIATE R&B

## 2025-03-04 PROCEDURE — 80061 LIPID PANEL: CPT

## 2025-03-04 PROCEDURE — 6370000000 HC RX 637 (ALT 250 FOR IP): Performed by: INTERNAL MEDICINE

## 2025-03-04 RX ORDER — IPRATROPIUM BROMIDE AND ALBUTEROL SULFATE 2.5; .5 MG/3ML; MG/3ML
1 SOLUTION RESPIRATORY (INHALATION)
Status: DISCONTINUED | OUTPATIENT
Start: 2025-03-05 | End: 2025-03-10

## 2025-03-04 RX ORDER — HYDRALAZINE HYDROCHLORIDE 50 MG/1
50 TABLET, FILM COATED ORAL EVERY 8 HOURS SCHEDULED
Status: DISCONTINUED | OUTPATIENT
Start: 2025-03-04 | End: 2025-03-09

## 2025-03-04 RX ORDER — ASPIRIN 81 MG/1
81 TABLET ORAL DAILY
Status: DISCONTINUED | OUTPATIENT
Start: 2025-03-04 | End: 2025-03-11 | Stop reason: HOSPADM

## 2025-03-04 RX ORDER — HYDRALAZINE HYDROCHLORIDE 20 MG/ML
10 INJECTION INTRAMUSCULAR; INTRAVENOUS EVERY 8 HOURS
Status: DISCONTINUED | OUTPATIENT
Start: 2025-03-04 | End: 2025-03-09

## 2025-03-04 RX ORDER — METOPROLOL TARTRATE 1 MG/ML
5 INJECTION, SOLUTION INTRAVENOUS EVERY 12 HOURS
Status: DISCONTINUED | OUTPATIENT
Start: 2025-03-04 | End: 2025-03-09

## 2025-03-04 RX ORDER — IPRATROPIUM BROMIDE AND ALBUTEROL SULFATE 2.5; .5 MG/3ML; MG/3ML
1 SOLUTION RESPIRATORY (INHALATION)
Status: DISCONTINUED | OUTPATIENT
Start: 2025-03-04 | End: 2025-03-04

## 2025-03-04 RX ORDER — BUMETANIDE 0.25 MG/ML
2 INJECTION, SOLUTION INTRAMUSCULAR; INTRAVENOUS DAILY
Status: DISCONTINUED | OUTPATIENT
Start: 2025-03-05 | End: 2025-03-06

## 2025-03-04 RX ADMIN — IPRATROPIUM BROMIDE AND ALBUTEROL SULFATE 1 DOSE: 2.5; .5 SOLUTION RESPIRATORY (INHALATION) at 00:06

## 2025-03-04 RX ADMIN — SODIUM CHLORIDE, PRESERVATIVE FREE 10 ML: 5 INJECTION INTRAVENOUS at 11:39

## 2025-03-04 RX ADMIN — CEFTRIAXONE SODIUM 2000 MG: 2 INJECTION, POWDER, FOR SOLUTION INTRAMUSCULAR; INTRAVENOUS at 11:35

## 2025-03-04 RX ADMIN — METOPROLOL TARTRATE 5 MG: 5 INJECTION INTRAVENOUS at 12:38

## 2025-03-04 RX ADMIN — SODIUM CHLORIDE 125 MG: 9 INJECTION, SOLUTION INTRAVENOUS at 12:09

## 2025-03-04 RX ADMIN — HYDRALAZINE HYDROCHLORIDE 10 MG: 20 INJECTION INTRAMUSCULAR; INTRAVENOUS at 12:39

## 2025-03-04 RX ADMIN — IPRATROPIUM BROMIDE AND ALBUTEROL SULFATE 1 DOSE: 2.5; .5 SOLUTION RESPIRATORY (INHALATION) at 15:30

## 2025-03-04 RX ADMIN — HEPARIN SODIUM 5000 UNITS: 5000 INJECTION INTRAVENOUS; SUBCUTANEOUS at 06:01

## 2025-03-04 RX ADMIN — SODIUM CHLORIDE, PRESERVATIVE FREE 10 ML: 5 INJECTION INTRAVENOUS at 20:58

## 2025-03-04 RX ADMIN — IPRATROPIUM BROMIDE AND ALBUTEROL SULFATE 1 DOSE: 2.5; .5 SOLUTION RESPIRATORY (INHALATION) at 19:50

## 2025-03-04 RX ADMIN — IPRATROPIUM BROMIDE AND ALBUTEROL SULFATE 1 DOSE: 2.5; .5 SOLUTION RESPIRATORY (INHALATION) at 11:43

## 2025-03-04 RX ADMIN — HEPARIN SODIUM 2200 UNITS: 1000 INJECTION INTRAVENOUS; SUBCUTANEOUS at 11:11

## 2025-03-04 RX ADMIN — HEPARIN SODIUM 5000 UNITS: 5000 INJECTION INTRAVENOUS; SUBCUTANEOUS at 21:00

## 2025-03-04 RX ADMIN — HEPARIN SODIUM 5000 UNITS: 5000 INJECTION INTRAVENOUS; SUBCUTANEOUS at 12:38

## 2025-03-04 RX ADMIN — HYDRALAZINE HYDROCHLORIDE 10 MG: 20 INJECTION INTRAMUSCULAR; INTRAVENOUS at 20:58

## 2025-03-04 RX ADMIN — IPRATROPIUM BROMIDE AND ALBUTEROL SULFATE 1 DOSE: 2.5; .5 SOLUTION RESPIRATORY (INHALATION) at 03:44

## 2025-03-04 NOTE — PROGRESS NOTES
Patient transferred to room from ICU with primary nurse and 1:1 sitter. Telemetry called a rapid response for HR sustaining in the 160's. Patient asymptomatic eating dinner, pleasantly confused. Dr. Cortes in room and gave new orders. EKG showed Afib RVR.

## 2025-03-04 NOTE — PROGRESS NOTES
Renal Progress Note    Patient: Antonella Albert MRN: [id number]  SSN: [id number]    YOB: 1955  Age: 70 y.o.  Sex: female      Admit Date: 3/2/2025    LOS: 2 days     Subjective:   Patient seen in dialysis, tolerating treatment. Sitter at bedside. Resting comfortably, eyes closed. On 4 L NC.    Current Facility-Administered Medications   Medication Dose Route Frequency    aspirin EC tablet 81 mg  81 mg Oral Daily    cefTRIAXone (ROCEPHIN) 2,000 mg in sterile water 20 mL IV syringe  2,000 mg IntraVENous Q24H    heparin (porcine) injection 2,200 Units  2,200 Units IntraCATHeter PRN    metoprolol tartrate (LOPRESSOR) tablet 12.5 mg  12.5 mg Oral BID    metoprolol (LOPRESSOR) injection 5 mg  5 mg IntraVENous Q6H PRN    cinacalcet (SENSIPAR) tablet 30 mg  30 mg Oral Daily    ferric gluconate (FERRLECIT) 125 mg in sodium chloride 0.9 % 100 mL IVPB  125 mg IntraVENous Daily    sodium chloride flush 0.9 % injection 5-40 mL  5-40 mL IntraVENous 2 times per day    sodium chloride flush 0.9 % injection 5-40 mL  5-40 mL IntraVENous PRN    0.9 % sodium chloride infusion   IntraVENous PRN    ondansetron (ZOFRAN-ODT) disintegrating tablet 4 mg  4 mg Oral Q8H PRN    Or    ondansetron (ZOFRAN) injection 4 mg  4 mg IntraVENous Q6H PRN    polyethylene glycol (GLYCOLAX) packet 17 g  17 g Oral Daily PRN    acetaminophen (TYLENOL) tablet 650 mg  650 mg Oral Q6H PRN    Or    acetaminophen (TYLENOL) suppository 650 mg  650 mg Rectal Q6H PRN    ipratropium 0.5 mg-albuterol 2.5 mg (DUONEB) nebulizer solution 1 Dose  1 Dose Inhalation Q4H RT    insulin lispro (HUMALOG,ADMELOG) injection vial 0-8 Units  0-8 Units SubCUTAneous 4x Daily AC & HS    glucose chewable tablet 16 g  4 tablet Oral PRN    dextrose bolus 10% 125 mL  125 mL IntraVENous PRN    Or    dextrose bolus 10% 250 mL  250 mL IntraVENous PRN    glucagon injection 1 mg  1 mg SubCUTAneous PRN    dextrose 10 % infusion   IntraVENous Continuous PRN    hydrALAZINE

## 2025-03-04 NOTE — DIALYSIS
Report called to pt's primary nurse, Bebeto.  Pt dialyzed for 3 hours.  Net fluid removal of 1.5 liters.  Pt tolerated treatment well, solemn during entire treatment but able to follow commands.  Pt 1:1, sitter at bedside.  Notified Meg in tele that pt is en route to her room, 461.

## 2025-03-04 NOTE — PROGRESS NOTES
Hospitalist Progress Note               Daily Progress Note: 3/4/2025      Hospital Day: 3     Chief complaint: No chief complaint on file.       Hospital course to date:     This is a 70-year-old female who initially presented to [facility] ER from nursing home due to progressively worsening shortness of breath for the last 3 days. She was reportedly started on oxygen due to hypoxia on Thursday and her symptoms continue to worsen since then. Patient also reports decreased urine output for the last few days. She denied any chest pain or abdominal pain. Denied vomiting or diarrhea. She was able to tell me her name, date of birth and current year but was disoriented otherwise. Chest x-ray was concerning for bilateral pulmonary edema. Lasix was given prior to transfer to us but she did not make any urine. She was placed on BiPAP for increased work of breathing. She is currently weaned off of BiPAP and is on HFNC at the time of my exam. Labs are concerning for creatinine of 6.29, BUN 47, proBNP 7367, WBC count 15.8. She is also found to be hypertensive with systolic in the 180s. Transferred to floors from  3/3.    3/4 post hemodialysis patient was fast asleep, no response to voice and sternal rub, stat CT head ordered.  Spoke with family at bedside who stated that this is around patient's baseline specially when she is lethargic and tired.  Assessed with bedside RN.    Medications reviewed  Current Facility-Administered Medications   Medication Dose Route Frequency    cefTRIAXone (ROCEPHIN) 2,000 mg in sterile water 20 mL IV syringe  2,000 mg IntraVENous Q24H    heparin (porcine) injection 2,200 Units  2,200 Units IntraCATHeter PRN    metoprolol tartrate (LOPRESSOR) tablet 12.5 mg  12.5 mg Oral BID    metoprolol (LOPRESSOR) injection 5 mg  5 mg IntraVENous Q6H PRN    cinacalcet (SENSIPAR) tablet 30 mg  30 mg Oral Daily    ferric gluconate (FERRLECIT) 125 mg in sodium chloride 0.9 % 100 mL IVPB  125 mg

## 2025-03-04 NOTE — CONSULTS
Cardiology Consult    NAME: Antonella Albert   :  1955   MRN:  [id number]     Date/Time:  3/4/2025 10:33 AM    Patient PCP: Jose Eduardo Maier MD  ________________________________________________________________________     Assessment/Plan:   Heart failure with preserved LV function versus fluid overload from renal failure, on hemodialysis, denies any chest pain or shortness of breath.  Patient was seen in dialysis.  Lying flat.    Mild troponin leak, flat, likely secondary to renal failure.  Patient denies any chest pain.  Had negative cardiac cath in the past in 2017.  Will repeat EKG.  Admission EKG was with sinus tachycardia left bundle branch block.  Echo on this admission with anteroseptal hypokinesis as well as diastolic dysfunction.  With her risk factors consideration for progression of coronary artery disease should be entertained.  With her asymptomatic nature at the present time, continue medical management.  Add aspirin.  Check lipid profile.    History of COPD, diabetes, hypertension, dyslipidemia , breast cancer.             []        High complexity decision making was performed        Subjective:   CHIEF COMPLAINT:     Shortness of breath  REASON FOR CONSULT:  Heart failure with preserved LV function    HISTORY OF PRESENT ILLNESS:     Antonella Albert is a 70 y.o. White (non-) female who was admitted with shortness of breath and found to have renal failure, has started on dialysis.  Patient is feeling better.  He has mild troponin leak.  Has had prior catheterization with normal coronaries.  Normal LV function by echo on this admission.        Past Medical History:   Diagnosis Date    Anxiety     Arthritis     spinal stenosis    Breast CA (HCC) 2017    Left Breast (chemo/radiation)    Chronic kidney disease     stage 4    COPD (chronic obstructive pulmonary disease) (HCC)     Dementia (HCC)     Depression     Diabetes (HCC)     Type 2    Edema     legs and ankles    GERD  Calculation (Bazett) 450 ms    P Axis 81 degrees    R Axis -15 degrees    T Axis 144 degrees    Diagnosis       Sinus tachycardia  Left bundle branch block  Abnormal ECG  When compared with ECG of 03-MAR-2025 18:16, (Unconfirmed)  Sinus rhythm has replaced Atrial fibrillation  Confirmed by SONJA JAMES ([id number]) on 3/4/2025 8:54:44 AM     POCT Glucose    Collection Time: 03/03/25  8:07 PM   Result Value Ref Range    POC Glucose 202 (H) 65 - 100 mg/dL    Performed by: SERGIO MEJIA    Magnesium    Collection Time: 03/04/25  3:13 AM   Result Value Ref Range    Magnesium 2.5 (H) 1.6 - 2.4 mg/dL   Phosphorus    Collection Time: 03/04/25  3:13 AM   Result Value Ref Range    Phosphorus 5.3 (H) 2.6 - 4.7 mg/dL   CBC with Auto Differential    Collection Time: 03/04/25  3:13 AM   Result Value Ref Range    WBC 20.0 (H) 3.6 - 11.0 K/uL    RBC 4.01 3.80 - 5.20 M/uL    Hemoglobin 10.3 (L) 11.5 - 16.0 g/dL    Hematocrit 33.3 (L) 35.0 - 47.0 %    MCV 83.0 80.0 - 99.0 FL    MCH 25.7 (L) 26.0 - 34.0 PG    MCHC 30.9 30.0 - 36.5 g/dL    RDW 14.6 (H) 11.5 - 14.5 %    Platelets 518 (H) 150 - 400 K/uL    MPV 9.7 8.9 - 12.9 FL    Nucleated RBCs 0.0 0.0  WBC    nRBC 0.00 0.00 - 0.01 K/uL    Neutrophils % 89.4 (H) 32.0 - 75.0 %    Lymphocytes % 4.5 (L) 12.0 - 49.0 %    Monocytes % 5.4 5.0 - 13.0 %    Eosinophils % 0.0 0.0 - 7.0 %    Basophils % 0.2 0.0 - 1.0 %    Immature Granulocytes % 0.5 0 - 0.5 %    Neutrophils Absolute 17.89 (H) 1.80 - 8.00 K/UL    Lymphocytes Absolute 0.89 0.80 - 3.50 K/UL    Monocytes Absolute 1.08 (H) 0.00 - 1.00 K/UL    Eosinophils Absolute 0.00 0.00 - 0.40 K/UL    Basophils Absolute 0.04 0.00 - 0.10 K/UL    Immature Granulocytes Absolute 0.10 (H) 0.00 - 0.04 K/UL    Differential Type AUTOMATED     Basic Metabolic Panel    Collection Time: 03/04/25  3:13 AM   Result Value Ref Range    Sodium 141 136 - 145 mmol/L    Potassium 4.1 3.5 - 5.1 mmol/L    Chloride 104 97 - 108 mmol/L    CO2 29 21 - 32 mmol/L

## 2025-03-04 NOTE — PROGRESS NOTES
CM reviewed chart as patient was transfer out of ICU yesterday. Patient is LTC at [facility] where she has been accepted to return.       Patient baseline is to ambulate 5 ft to the bathroom from the bed. She failed her Monica's Egress and may need PT/OT evals completed. CM will address in IDRs this morning.     Barriers to discharge- wean oxygen, currently on 4 LPM, cardiology clearance.

## 2025-03-04 NOTE — PROGRESS NOTES
Renal Progress Note    Patient: Antonella Albert MRN: [id number]  SSN: [id number]    YOB: 1955  Age: 70 y.o.  Sex: female      Admit Date: 3/2/2025    LOS: 1 day     Subjective:   Patient seen at bedside in ICU. Alert and awake, no acute distress. On HFNC  Patient is verbally responsive but confused  Repeat labs done this morning showed an increase in creatinine to 6.9        Current Facility-Administered Medications   Medication Dose Route Frequency    cefTRIAXone (ROCEPHIN) 2,000 mg in sterile water 20 mL IV syringe  2,000 mg IntraVENous Q24H    heparin (porcine) injection 2,200 Units  2,200 Units IntraCATHeter PRN    metoprolol tartrate (LOPRESSOR) tablet 12.5 mg  12.5 mg Oral BID    metoprolol (LOPRESSOR) injection 5 mg  5 mg IntraVENous Q6H PRN    sodium chloride flush 0.9 % injection 5-40 mL  5-40 mL IntraVENous 2 times per day    sodium chloride flush 0.9 % injection 5-40 mL  5-40 mL IntraVENous PRN    0.9 % sodium chloride infusion   IntraVENous PRN    ondansetron (ZOFRAN-ODT) disintegrating tablet 4 mg  4 mg Oral Q8H PRN    Or    ondansetron (ZOFRAN) injection 4 mg  4 mg IntraVENous Q6H PRN    polyethylene glycol (GLYCOLAX) packet 17 g  17 g Oral Daily PRN    acetaminophen (TYLENOL) tablet 650 mg  650 mg Oral Q6H PRN    Or    acetaminophen (TYLENOL) suppository 650 mg  650 mg Rectal Q6H PRN    ipratropium 0.5 mg-albuterol 2.5 mg (DUONEB) nebulizer solution 1 Dose  1 Dose Inhalation Q4H RT    insulin lispro (HUMALOG,ADMELOG) injection vial 0-8 Units  0-8 Units SubCUTAneous 4x Daily AC & HS    glucose chewable tablet 16 g  4 tablet Oral PRN    dextrose bolus 10% 125 mL  125 mL IntraVENous PRN    Or    dextrose bolus 10% 250 mL  250 mL IntraVENous PRN    glucagon injection 1 mg  1 mg SubCUTAneous PRN    dextrose 10 % infusion   IntraVENous Continuous PRN    hydrALAZINE (APRESOLINE) injection 10 mg  10 mg IntraVENous Q6H PRN    heparin (porcine) injection 5,000 Units  5,000 Units SubCUTAneous 3

## 2025-03-05 LAB
ANION GAP SERPL CALC-SCNC: 5 MMOL/L (ref 2–12)
BACTERIA SPEC CULT: NORMAL
BASOPHILS # BLD: 0.07 K/UL (ref 0–0.1)
BASOPHILS NFR BLD: 0.5 % (ref 0–1)
BUN SERPL-MCNC: 38 MG/DL (ref 6–20)
BUN/CREAT SERPL: 9 (ref 12–20)
CA-I BLD-MCNC: 8.8 MG/DL (ref 8.5–10.1)
CHLORIDE SERPL-SCNC: 104 MMOL/L (ref 97–108)
CO2 SERPL-SCNC: 30 MMOL/L (ref 21–32)
CREAT SERPL-MCNC: 4.34 MG/DL (ref 0.55–1.02)
DIFFERENTIAL METHOD BLD: ABNORMAL
EOSINOPHIL # BLD: 0.14 K/UL (ref 0–0.4)
EOSINOPHIL NFR BLD: 1 % (ref 0–7)
ERYTHROCYTE [DISTWIDTH] IN BLOOD BY AUTOMATED COUNT: 14.3 % (ref 11.5–14.5)
GLUCOSE BLD STRIP.AUTO-MCNC: 110 MG/DL (ref 65–100)
GLUCOSE BLD STRIP.AUTO-MCNC: 128 MG/DL (ref 65–100)
GLUCOSE BLD STRIP.AUTO-MCNC: 160 MG/DL (ref 65–100)
GLUCOSE BLD STRIP.AUTO-MCNC: 171 MG/DL (ref 65–100)
GLUCOSE SERPL-MCNC: 118 MG/DL (ref 65–100)
HCT VFR BLD AUTO: 36 % (ref 35–47)
HGB BLD-MCNC: 11.1 G/DL (ref 11.5–16)
IMM GRANULOCYTES # BLD AUTO: 0.07 K/UL (ref 0–0.04)
IMM GRANULOCYTES NFR BLD AUTO: 0.5 % (ref 0–0.5)
LYMPHOCYTES # BLD: 1.48 K/UL (ref 0.8–3.5)
LYMPHOCYTES NFR BLD: 10.8 % (ref 12–49)
Lab: NORMAL
MAGNESIUM SERPL-MCNC: 2.4 MG/DL (ref 1.6–2.4)
MCH RBC QN AUTO: 25.6 PG (ref 26–34)
MCHC RBC AUTO-ENTMCNC: 30.8 G/DL (ref 30–36.5)
MCV RBC AUTO: 83.1 FL (ref 80–99)
MONOCYTES # BLD: 1.37 K/UL (ref 0–1)
MONOCYTES NFR BLD: 10 % (ref 5–13)
NEUTS SEG # BLD: 10.56 K/UL (ref 1.8–8)
NEUTS SEG NFR BLD: 77.2 % (ref 32–75)
NRBC # BLD: 0 K/UL (ref 0–0.01)
NRBC BLD-RTO: 0 PER 100 WBC
PERFORMED BY:: ABNORMAL
PHOSPHATE SERPL-MCNC: 4.9 MG/DL (ref 2.6–4.7)
PLATELET # BLD AUTO: 504 K/UL (ref 150–400)
PMV BLD AUTO: 9.4 FL (ref 8.9–12.9)
POTASSIUM SERPL-SCNC: 4.1 MMOL/L (ref 3.5–5.1)
RBC # BLD AUTO: 4.33 M/UL (ref 3.8–5.2)
SODIUM SERPL-SCNC: 139 MMOL/L (ref 136–145)
WBC # BLD AUTO: 13.7 K/UL (ref 3.6–11)

## 2025-03-05 PROCEDURE — 6360000002 HC RX W HCPCS: Performed by: NURSE PRACTITIONER

## 2025-03-05 PROCEDURE — 2700000000 HC OXYGEN THERAPY PER DAY

## 2025-03-05 PROCEDURE — 94640 AIRWAY INHALATION TREATMENT: CPT

## 2025-03-05 PROCEDURE — 94761 N-INVAS EAR/PLS OXIMETRY MLT: CPT

## 2025-03-05 PROCEDURE — 6360000002 HC RX W HCPCS: Performed by: INTERNAL MEDICINE

## 2025-03-05 PROCEDURE — 80048 BASIC METABOLIC PNL TOTAL CA: CPT

## 2025-03-05 PROCEDURE — 2580000003 HC RX 258: Performed by: STUDENT IN AN ORGANIZED HEALTH CARE EDUCATION/TRAINING PROGRAM

## 2025-03-05 PROCEDURE — 2500000003 HC RX 250 WO HCPCS: Performed by: NURSE PRACTITIONER

## 2025-03-05 PROCEDURE — 82962 GLUCOSE BLOOD TEST: CPT

## 2025-03-05 PROCEDURE — 83735 ASSAY OF MAGNESIUM: CPT

## 2025-03-05 PROCEDURE — 2500000003 HC RX 250 WO HCPCS: Performed by: INTERNAL MEDICINE

## 2025-03-05 PROCEDURE — 2500000003 HC RX 250 WO HCPCS

## 2025-03-05 PROCEDURE — 85025 COMPLETE CBC W/AUTO DIFF WBC: CPT

## 2025-03-05 PROCEDURE — 2500000003 HC RX 250 WO HCPCS: Performed by: STUDENT IN AN ORGANIZED HEALTH CARE EDUCATION/TRAINING PROGRAM

## 2025-03-05 PROCEDURE — 6360000002 HC RX W HCPCS

## 2025-03-05 PROCEDURE — 36415 COLL VENOUS BLD VENIPUNCTURE: CPT

## 2025-03-05 PROCEDURE — 2060000000 HC ICU INTERMEDIATE R&B

## 2025-03-05 PROCEDURE — 6360000002 HC RX W HCPCS: Performed by: STUDENT IN AN ORGANIZED HEALTH CARE EDUCATION/TRAINING PROGRAM

## 2025-03-05 PROCEDURE — 6370000000 HC RX 637 (ALT 250 FOR IP): Performed by: PHYSICIAN ASSISTANT

## 2025-03-05 PROCEDURE — 84100 ASSAY OF PHOSPHORUS: CPT

## 2025-03-05 RX ADMIN — SODIUM CHLORIDE 125 MG: 9 INJECTION, SOLUTION INTRAVENOUS at 09:38

## 2025-03-05 RX ADMIN — ONDANSETRON 4 MG: 2 INJECTION, SOLUTION INTRAMUSCULAR; INTRAVENOUS at 15:01

## 2025-03-05 RX ADMIN — SODIUM CHLORIDE, PRESERVATIVE FREE 5 ML: 5 INJECTION INTRAVENOUS at 21:04

## 2025-03-05 RX ADMIN — IPRATROPIUM BROMIDE AND ALBUTEROL SULFATE 1 DOSE: 2.5; .5 SOLUTION RESPIRATORY (INHALATION) at 07:54

## 2025-03-05 RX ADMIN — IPRATROPIUM BROMIDE AND ALBUTEROL SULFATE 1 DOSE: 2.5; .5 SOLUTION RESPIRATORY (INHALATION) at 13:00

## 2025-03-05 RX ADMIN — CEFTRIAXONE SODIUM 2000 MG: 2 INJECTION, POWDER, FOR SOLUTION INTRAMUSCULAR; INTRAVENOUS at 09:34

## 2025-03-05 RX ADMIN — HYDRALAZINE HYDROCHLORIDE 10 MG: 20 INJECTION INTRAMUSCULAR; INTRAVENOUS at 21:03

## 2025-03-05 RX ADMIN — ONDANSETRON 4 MG: 2 INJECTION, SOLUTION INTRAMUSCULAR; INTRAVENOUS at 09:33

## 2025-03-05 RX ADMIN — HEPARIN SODIUM 5000 UNITS: 5000 INJECTION INTRAVENOUS; SUBCUTANEOUS at 05:40

## 2025-03-05 RX ADMIN — METOPROLOL TARTRATE 5 MG: 5 INJECTION INTRAVENOUS at 13:33

## 2025-03-05 RX ADMIN — HEPARIN SODIUM 5000 UNITS: 5000 INJECTION INTRAVENOUS; SUBCUTANEOUS at 13:33

## 2025-03-05 RX ADMIN — SODIUM CHLORIDE, PRESERVATIVE FREE 10 ML: 5 INJECTION INTRAVENOUS at 08:08

## 2025-03-05 RX ADMIN — METOPROLOL TARTRATE 5 MG: 5 INJECTION INTRAVENOUS at 08:07

## 2025-03-05 RX ADMIN — HYDRALAZINE HYDROCHLORIDE 10 MG: 20 INJECTION INTRAMUSCULAR; INTRAVENOUS at 05:40

## 2025-03-05 RX ADMIN — HYDRALAZINE HYDROCHLORIDE 10 MG: 20 INJECTION INTRAMUSCULAR; INTRAVENOUS at 12:19

## 2025-03-05 RX ADMIN — BUMETANIDE 2 MG: 0.25 INJECTION INTRAMUSCULAR; INTRAVENOUS at 08:07

## 2025-03-05 RX ADMIN — IPRATROPIUM BROMIDE AND ALBUTEROL SULFATE 1 DOSE: 2.5; .5 SOLUTION RESPIRATORY (INHALATION) at 19:49

## 2025-03-05 RX ADMIN — HEPARIN SODIUM 5000 UNITS: 5000 INJECTION INTRAVENOUS; SUBCUTANEOUS at 21:03

## 2025-03-05 NOTE — PROGRESS NOTES
Consult received for bedside swallow evaluation.   ST greyson. PCT present in room and reported recent significant vomiting following intake of pudding. Patient reports nausea.   Will hold bedside swallow evaluation at this time due to N/V.   SLP to f/u 3/6/25 if patient is appropriate. Defer diet to MD if needed.

## 2025-03-05 NOTE — PROGRESS NOTES
Pt reassessed, more alert at this time. Spoke a few short words to Ms Kristofer - constant observer. Pt also now has eyes opening and tracks staff in room.  Dr. Coats aware of improving mental status. States if not significantly improved by tomorrow nephro states to consult neurology.

## 2025-03-05 NOTE — PROGRESS NOTES
Patient DCP will be to return to [facility] LTC.     Patient is new to hemodialysis and will need outpatient dialysis chair. [facility]     Barriers to discharge- wean oxygen, clinical improvement, outpatient dialysis chair, clearance by cardio and nephrology.     1430 pm  Patient accepted at [facility] dialysis and will be a MWF at 11:45-4 pm. Patient may start on 3/7/25.

## 2025-03-05 NOTE — PROGRESS NOTES
Pt resting in position of comfort.    Constant observer at bedside.    No complaints/needs at this time.

## 2025-03-05 NOTE — PROGRESS NOTES
[facility] at [address] [address]  Phone: [phone]      Progress Note      3/5/2025 11:54 AM  NAME: Antonella Albert   MRN:  [id number]   Admit Diagnosis: Acute on chronic hypoxic respiratory failure (HCC) [J96.21]          Assessment/Plan:     Acute HFpEF due to fluid overload with renal failure.  Patient now has cleared from CHF after dialysis.  Lying supine without any shortness of breath.  Continue home regimen of antihypertensive blood pressure control.  Mild troponin leak which appears to be due to type II non-STEMI with poor renal clearance.  Patient has history of unremarkable cardiac catheterization in 2017.  COPD  Diabetes mellitus  History of breast cancer         []       High complexity decision making was performed in this patient at high risk for decompensation with multiple organ involvement.    Subjective:     Antonella Albert denies chest pain, dyspnea.  Discussed with RN events overnight.     Review of Systems:     []         Unable to obtain  ROS due to ---   [x]         Total of 12 systems reviewed as follows:     Constitutional: negative fever, negative chills, negative weight loss  Eyes:               negative visual changes  ENT:                negative sore throat, tongue or lip swelling  Respiratory:     negative cough, negative dyspnea  Cards:             As per HPI  GI:                   negative for nausea, vomiting, diarrhea, and abdominal pain  Genitourinary: negative for frequency, dysuria  Integument:     negative for rash   Hematologic:   negative for easy bruising and gum/nose bleeding  Musculoskel:   negative for myalgias,  back pain  Neurological:   negative for headaches, dizziness, vertigo, weakness  Behavl/Psych: negative for feelings of anxiety, depression     Objective:      Physical Exam:    Last 24hrs VS reviewed since prior progress note. Most recent are:    BP (!) 145/61   Pulse 81   Temp 98.6 °F (37 °C) (Oral)

## 2025-03-05 NOTE — PROGRESS NOTES
4 Eyes Skin Assessment     NAME:  Antonella Albert  YOB: 1955  MEDICAL RECORD NUMBER:  [id number]    The patient is being assessed for  Other - per unit protocol    I agree that at least one RN has performed a thorough Head to Toe Skin Assessment on the patient. ALL assessment sites listed below have been assessed.      Areas assessed by both nurses:    Head, Face, Ears, Shoulders, Back, Chest, Arms, Elbows, Hands, Sacrum. Buttock, Coccyx, Ischium, and Legs. Feet and Heels        Does the Patient have a Wound? No noted wound(s)       John Prevention initiated by RN: Yes  Wound Care Orders initiated by RN: No    Pressure Injury (Stage 3,4, Unstageable, DTI, NWPT, and Complex wounds) if present, place Wound referral order by RN under : No    New Ostomies, if present place, Ostomy referral order under : No     Nurse 1 eSignature: Electronically signed by Suly Alejo RN on 3/5/25 at 5:52 AM EST    **SHARE this note so that the co-signing nurse can place an eSignature**    Nurse 2 eSignature: {Esignature:[id number]}

## 2025-03-05 NOTE — PROGRESS NOTES
Renal Progress Note    Patient: Antonella Albert MRN: [id number]  SSN: [id number]    YOB: 1955  Age: 70 y.o.  Sex: female      Admit Date: 3/2/2025    LOS: 3 days     Subjective:   Patient seen at bedside. She did not respond to my voice but is responsive to light sternal rub.  Sitter at bedside.   On 4 L NC.  Electrolytes stable.    Current Facility-Administered Medications   Medication Dose Route Frequency    [Held by provider] aspirin EC tablet 81 mg  81 mg Oral Daily    bumetanide (BUMEX) injection 2 mg  2 mg IntraVENous Daily    [Held by provider] hydrALAZINE (APRESOLINE) tablet 50 mg  50 mg Oral 3 times per day    hydrALAZINE (APRESOLINE) injection 10 mg  10 mg IntraVENous Q8H    metoprolol (LOPRESSOR) injection 5 mg  5 mg IntraVENous Q12H    ipratropium 0.5 mg-albuterol 2.5 mg (DUONEB) nebulizer solution 1 Dose  1 Dose Inhalation Q6H WA RT    cefTRIAXone (ROCEPHIN) 2,000 mg in sterile water 20 mL IV syringe  2,000 mg IntraVENous Q24H    heparin (porcine) injection 2,200 Units  2,200 Units IntraCATHeter PRN    [Held by provider] metoprolol tartrate (LOPRESSOR) tablet 12.5 mg  12.5 mg Oral BID    metoprolol (LOPRESSOR) injection 5 mg  5 mg IntraVENous Q6H PRN    cinacalcet (SENSIPAR) tablet 30 mg  30 mg Oral Daily    ferric gluconate (FERRLECIT) 125 mg in sodium chloride 0.9 % 100 mL IVPB  125 mg IntraVENous Daily    sodium chloride flush 0.9 % injection 5-40 mL  5-40 mL IntraVENous 2 times per day    sodium chloride flush 0.9 % injection 5-40 mL  5-40 mL IntraVENous PRN    0.9 % sodium chloride infusion   IntraVENous PRN    ondansetron (ZOFRAN-ODT) disintegrating tablet 4 mg  4 mg Oral Q8H PRN    Or    ondansetron (ZOFRAN) injection 4 mg  4 mg IntraVENous Q6H PRN    polyethylene glycol (GLYCOLAX) packet 17 g  17 g Oral Daily PRN    acetaminophen (TYLENOL) tablet 650 mg  650 mg Oral Q6H PRN    Or    acetaminophen (TYLENOL) suppository 650 mg  650 mg Rectal Q6H PRN    insulin lispro

## 2025-03-05 NOTE — PLAN OF CARE
Problem: Chronic Conditions and Co-morbidities  Goal: Patient's chronic conditions and co-morbidity symptoms are monitored and maintained or improved  Outcome: Progressing  Flowsheets (Taken 3/4/2025 2019 by Suly Alejo RN)  Care Plan - Patient's Chronic Conditions and Co-Morbidity Symptoms are Monitored and Maintained or Improved: Monitor and assess patient's chronic conditions and comorbid symptoms for stability, deterioration, or improvement     Problem: Discharge Planning  Goal: Discharge to home or other facility with appropriate resources  Outcome: Progressing  Flowsheets (Taken 3/4/2025 2019 by Suly Alejo RN)  Discharge to home or other facility with appropriate resources: Identify barriers to discharge with patient and caregiver     Problem: Skin/Tissue Integrity  Goal: Skin integrity remains intact  Description: 1.  Monitor for areas of redness and/or skin breakdown  2.  Assess vascular access sites hourly  3.  Every 4-6 hours minimum:  Change oxygen saturation probe site  4.  Every 4-6 hours:  If on nasal continuous positive airway pressure, respiratory therapy assess nares and determine need for appliance change or resting period  Outcome: Progressing  Flowsheets (Taken 3/4/2025 2019 by Suly Alejo RN)  Skin Integrity Remains Intact: Monitor for areas of redness and/or skin breakdown     Problem: ABCDS Injury Assessment  Goal: Absence of physical injury  Outcome: Progressing     Problem: Safety - Adult  Goal: Free from fall injury  3/5/2025 0734 by Martin Heredia RN  Outcome: Progressing  3/5/2025 0008 by Suly Alejo RN  Outcome: Progressing     Problem: Coping  Goal: Pt/Family able to verbalize concerns and demonstrate effective coping strategies  Description: INTERVENTIONS:  1. Assist patient/family to identify coping skills, available support systems and cultural and spiritual values  2. Provide emotional support, including active listening and acknowledgement of concerns of

## 2025-03-05 NOTE — PROGRESS NOTES
4 Eyes Skin Assessment     NAME:  Antonella Albert  YOB: 1955  MEDICAL RECORD NUMBER:  [id number]    The patient is being assessed for  Other -per unit protocol    I agree that at least one RN has performed a thorough Head to Toe Skin Assessment on the patient. ALL assessment sites listed below have been assessed.      Areas assessed by both nurses:    Head, Face, Ears, Shoulders, Back, Chest, Arms, Elbows, Hands, Sacrum. Buttock, Coccyx, Ischium, Legs. Feet and Heels, and Under Medical Devices         Does the Patient have a Wound? No noted wound(s)       John Prevention initiated by RN: Yes  Wound Care Orders initiated by RN: No    Pressure Injury (Stage 3,4, Unstageable, DTI, NWPT, and Complex wounds) if present, place Wound referral order by RN under : No    New Ostomies, if present place, Ostomy referral order under : No     Nurse 1 eSignature: Electronically signed by Suly Alejo RN on 3/5/25 at 6:51 AM EST    **SHARE this note so that the co-signing nurse can place an eSignature**    Nurse 2 eSignature: Electronically signed by MAGDALENA COBIAN RN on 3/5/25 at 6:52 AM EST    Ready for

## 2025-03-05 NOTE — PROGRESS NOTES
PT eval order received and acknowledged. PT eval attempted at 1116 however pt minimal responsive to verbal and tactile stimulation, opens eyes but not verbalize nor follow commands at this time. Will continue to follow patient and attempt PT eval at a later time. Thank you.

## 2025-03-05 NOTE — PROGRESS NOTES
Hospitalist Progress Note               Daily Progress Note: 3/5/2025      Hospital Day: 4     Chief complaint: No chief complaint on file.       Hospital course to date:     This is a 70-year-old female who initially presented to [facility] ER from nursing home due to progressively worsening shortness of breath for the last 3 days. She was reportedly started on oxygen due to hypoxia on Thursday and her symptoms continue to worsen since then. Patient also reports decreased urine output for the last few days. She denied any chest pain or abdominal pain. Denied vomiting or diarrhea. She was able to tell me her name, date of birth and current year but was disoriented otherwise. Chest x-ray was concerning for bilateral pulmonary edema. Lasix was given prior to transfer to us but she did not make any urine. She was placed on BiPAP for increased work of breathing. She is currently weaned off of BiPAP and is on HFNC at the time of my exam. Labs are concerning for creatinine of 6.29, BUN 47, proBNP 7367, WBC count 15.8. She is also found to be hypertensive with systolic in the 180s. Transferred to floors from  3/3.    3/4 post hemodialysis patient was fast asleep, no response to voice and sternal rub, stat CT head ordered.  Spoke with family at bedside who stated that this is around patient's baseline specially when she is lethargic and tired.  Assessed with bedside RN.    3/5 patient's mentation significantly improved in afternoon assessed with bedside RN, asked for food, SLP to evaluate swallow and order diet.  Spoke with nephrologist Dr. Poole and stated that if patient's mental status does not significantly improve can consult neurology tomorrow.    Medications reviewed  Current Facility-Administered Medications   Medication Dose Route Frequency    [Held by provider] aspirin EC tablet 81 mg  81 mg Oral Daily    [Held by provider] bumetanide (BUMEX) injection 2 mg  2 mg IntraVENous Daily    [Held by  management of the case with:  icu attending  [x] Telemetry personally reviewed and interpreted as documented above    [x] Imaging personally reviewed and interpreted, includes:    [x] Data Review (any 3)  [x] All available Consultant notes from yesterday/today were reviewed  [x] All current labs were reviewed and interpreted for clinical significance   [] Appropriate follow-up labs were ordered  [] Collateral history obtained from:         Assessment & Plan:    Acute hypoxic respiratory failure 2/2 fluid overload 2/2 CHF exacerbation  HFpEF exacerbation  CXR pulmonary edema  was admitted the ICU for requiring BiPAP and then high flow nasal cannula now down to 4 L oxygen nasal cannula, SPO2 @ 97%  Echo 3/2/25 revealed an EF of 55 to 60%. Mild hypo-kinesis is of mid anterosptal. Normal wall thickness.Diastolic dysfunction present with increased LAP within normal LV EF  On Bumex 2 MG IV BID  strict intake and output, daily weights  continue to wean oxygen as tolerated  cardiology consult, appreciate recommendations  3/4 underwent HD today and 3/3    Leukocytosis likely reactive  does not meet sepsis criteria  received empiric Rocephin in the ED    Anemia of chronic disease 2/2 CKD  per nephrology no need to start on LAVONNE yet    Acute metabolic encephalopathy likely in setting of uremia  BRIANNE on CKD  creatinine 4.43<6.91  baseline creatinine unknown  Nephrology on board  3/3 initiated on HD for hemodialysis  3/4 CT head unremarkable for acute intracranial abnormality  Per nephrologist if mental status does not significantly improve by tomorrow can consider consulting neurology  Needs outpatient HD setup    Hypertension poorly controlled  continue amlodipine 10 MG  started on hydralazine 50 BID    Uncontrolled blood sugars  follow-up on A1c, insulin sliding scale, hypoglycemia treatment protocol    Remote history of breast cancer    Jacque Coats MD

## 2025-03-06 ENCOUNTER — APPOINTMENT (OUTPATIENT)
Facility: HOSPITAL | Age: 70
DRG: 280 | End: 2025-03-06
Attending: STUDENT IN AN ORGANIZED HEALTH CARE EDUCATION/TRAINING PROGRAM
Payer: MEDICARE

## 2025-03-06 LAB
ALBUMIN SERPL-MCNC: 2.6 G/DL (ref 3.5–5)
ANION GAP SERPL CALC-SCNC: 10 MMOL/L (ref 2–12)
ANION GAP SERPL CALC-SCNC: 10 MMOL/L (ref 2–12)
BASOPHILS # BLD: 0.1 K/UL (ref 0–0.1)
BASOPHILS NFR BLD: 0.8 % (ref 0–1)
BUN SERPL-MCNC: 51 MG/DL (ref 6–20)
BUN SERPL-MCNC: 52 MG/DL (ref 6–20)
BUN/CREAT SERPL: 10 (ref 12–20)
BUN/CREAT SERPL: 10 (ref 12–20)
CA-I BLD-MCNC: 9.4 MG/DL (ref 8.5–10.1)
CA-I BLD-MCNC: 9.5 MG/DL (ref 8.5–10.1)
CHLORIDE SERPL-SCNC: 104 MMOL/L (ref 97–108)
CHLORIDE SERPL-SCNC: 104 MMOL/L (ref 97–108)
CO2 SERPL-SCNC: 26 MMOL/L (ref 21–32)
CO2 SERPL-SCNC: 26 MMOL/L (ref 21–32)
CREAT SERPL-MCNC: 5.07 MG/DL (ref 0.55–1.02)
CREAT SERPL-MCNC: 5.11 MG/DL (ref 0.55–1.02)
DIFFERENTIAL METHOD BLD: ABNORMAL
EOSINOPHIL # BLD: 0.26 K/UL (ref 0–0.4)
EOSINOPHIL NFR BLD: 2 % (ref 0–7)
ERYTHROCYTE [DISTWIDTH] IN BLOOD BY AUTOMATED COUNT: 14.4 % (ref 11.5–14.5)
GLUCOSE BLD STRIP.AUTO-MCNC: 143 MG/DL (ref 65–100)
GLUCOSE BLD STRIP.AUTO-MCNC: 146 MG/DL (ref 65–100)
GLUCOSE SERPL-MCNC: 134 MG/DL (ref 65–100)
GLUCOSE SERPL-MCNC: 135 MG/DL (ref 65–100)
HBV CORE AB SERPL QL IA: NEGATIVE
HCT VFR BLD AUTO: 40.5 % (ref 35–47)
HGB BLD-MCNC: 12.5 G/DL (ref 11.5–16)
IMM GRANULOCYTES # BLD AUTO: 0.09 K/UL (ref 0–0.04)
IMM GRANULOCYTES NFR BLD AUTO: 0.7 % (ref 0–0.5)
LYMPHOCYTES # BLD: 1.13 K/UL (ref 0.8–3.5)
LYMPHOCYTES NFR BLD: 8.7 % (ref 12–49)
MAGNESIUM SERPL-MCNC: 2.6 MG/DL (ref 1.6–2.4)
MCH RBC QN AUTO: 25.6 PG (ref 26–34)
MCHC RBC AUTO-ENTMCNC: 30.9 G/DL (ref 30–36.5)
MCV RBC AUTO: 82.8 FL (ref 80–99)
MONOCYTES # BLD: 1.21 K/UL (ref 0–1)
MONOCYTES NFR BLD: 9.4 % (ref 5–13)
NEUTS SEG # BLD: 10.14 K/UL (ref 1.8–8)
NEUTS SEG NFR BLD: 78.4 % (ref 32–75)
NRBC # BLD: 0 K/UL (ref 0–0.01)
NRBC BLD-RTO: 0 PER 100 WBC
PERFORMED BY:: ABNORMAL
PERFORMED BY:: ABNORMAL
PHOSPHATE SERPL-MCNC: 7 MG/DL (ref 2.6–4.7)
PHOSPHATE SERPL-MCNC: 7 MG/DL (ref 2.6–4.7)
PLATELET # BLD AUTO: 552 K/UL (ref 150–400)
PMV BLD AUTO: 9.8 FL (ref 8.9–12.9)
POTASSIUM SERPL-SCNC: 4 MMOL/L (ref 3.5–5.1)
POTASSIUM SERPL-SCNC: 4.1 MMOL/L (ref 3.5–5.1)
RBC # BLD AUTO: 4.89 M/UL (ref 3.8–5.2)
SODIUM SERPL-SCNC: 140 MMOL/L (ref 136–145)
SODIUM SERPL-SCNC: 140 MMOL/L (ref 136–145)
WBC # BLD AUTO: 12.9 K/UL (ref 3.6–11)

## 2025-03-06 PROCEDURE — 80048 BASIC METABOLIC PNL TOTAL CA: CPT

## 2025-03-06 PROCEDURE — 70551 MRI BRAIN STEM W/O DYE: CPT

## 2025-03-06 PROCEDURE — 6370000000 HC RX 637 (ALT 250 FOR IP): Performed by: PHYSICIAN ASSISTANT

## 2025-03-06 PROCEDURE — 94761 N-INVAS EAR/PLS OXIMETRY MLT: CPT

## 2025-03-06 PROCEDURE — 82962 GLUCOSE BLOOD TEST: CPT

## 2025-03-06 PROCEDURE — 2500000003 HC RX 250 WO HCPCS

## 2025-03-06 PROCEDURE — 6360000002 HC RX W HCPCS: Performed by: INTERNAL MEDICINE

## 2025-03-06 PROCEDURE — 2500000003 HC RX 250 WO HCPCS: Performed by: INTERNAL MEDICINE

## 2025-03-06 PROCEDURE — 99221 1ST HOSP IP/OBS SF/LOW 40: CPT | Performed by: PSYCHIATRY & NEUROLOGY

## 2025-03-06 PROCEDURE — 36415 COLL VENOUS BLD VENIPUNCTURE: CPT

## 2025-03-06 PROCEDURE — 6360000002 HC RX W HCPCS

## 2025-03-06 PROCEDURE — 94640 AIRWAY INHALATION TREATMENT: CPT

## 2025-03-06 PROCEDURE — 2709999900 HC NON-CHARGEABLE SUPPLY

## 2025-03-06 PROCEDURE — 80069 RENAL FUNCTION PANEL: CPT

## 2025-03-06 PROCEDURE — 85025 COMPLETE CBC W/AUTO DIFF WBC: CPT

## 2025-03-06 PROCEDURE — 84100 ASSAY OF PHOSPHORUS: CPT

## 2025-03-06 PROCEDURE — 92610 EVALUATE SWALLOWING FUNCTION: CPT

## 2025-03-06 PROCEDURE — 90935 HEMODIALYSIS ONE EVALUATION: CPT

## 2025-03-06 PROCEDURE — 36556 INSERT NON-TUNNEL CV CATH: CPT

## 2025-03-06 PROCEDURE — 2500000003 HC RX 250 WO HCPCS: Performed by: NURSE PRACTITIONER

## 2025-03-06 PROCEDURE — 83735 ASSAY OF MAGNESIUM: CPT

## 2025-03-06 PROCEDURE — 1100000000 HC RM PRIVATE

## 2025-03-06 RX ORDER — DIPHENHYDRAMINE HYDROCHLORIDE 50 MG/ML
INJECTION, SOLUTION INTRAMUSCULAR; INTRAVENOUS
Status: DISPENSED
Start: 2025-03-06 | End: 2025-03-06

## 2025-03-06 RX ORDER — DIPHENHYDRAMINE HYDROCHLORIDE 50 MG/ML
25 INJECTION, SOLUTION INTRAMUSCULAR; INTRAVENOUS PRN
Status: DISCONTINUED | OUTPATIENT
Start: 2025-03-06 | End: 2025-03-11 | Stop reason: HOSPADM

## 2025-03-06 RX ADMIN — METOPROLOL TARTRATE 5 MG: 5 INJECTION INTRAVENOUS at 01:22

## 2025-03-06 RX ADMIN — DIPHENHYDRAMINE HYDROCHLORIDE 25 MG: 50 INJECTION INTRAMUSCULAR; INTRAVENOUS at 08:37

## 2025-03-06 RX ADMIN — HYDRALAZINE HYDROCHLORIDE 10 MG: 20 INJECTION INTRAMUSCULAR; INTRAVENOUS at 05:12

## 2025-03-06 RX ADMIN — HEPARIN SODIUM 5000 UNITS: 5000 INJECTION INTRAVENOUS; SUBCUTANEOUS at 21:38

## 2025-03-06 RX ADMIN — HYDRALAZINE HYDROCHLORIDE 10 MG: 20 INJECTION INTRAMUSCULAR; INTRAVENOUS at 20:14

## 2025-03-06 RX ADMIN — HEPARIN SODIUM 2200 UNITS: 1000 INJECTION INTRAVENOUS; SUBCUTANEOUS at 11:44

## 2025-03-06 RX ADMIN — HEPARIN SODIUM 5000 UNITS: 5000 INJECTION INTRAVENOUS; SUBCUTANEOUS at 05:12

## 2025-03-06 RX ADMIN — METOPROLOL TARTRATE 5 MG: 5 INJECTION INTRAVENOUS at 13:14

## 2025-03-06 RX ADMIN — SODIUM CHLORIDE, PRESERVATIVE FREE 10 ML: 5 INJECTION INTRAVENOUS at 20:15

## 2025-03-06 RX ADMIN — IPRATROPIUM BROMIDE AND ALBUTEROL SULFATE 1 DOSE: 2.5; .5 SOLUTION RESPIRATORY (INHALATION) at 20:36

## 2025-03-06 RX ADMIN — CEFTRIAXONE SODIUM 2000 MG: 2 INJECTION, POWDER, FOR SOLUTION INTRAMUSCULAR; INTRAVENOUS at 12:04

## 2025-03-06 RX ADMIN — HEPARIN SODIUM 5000 UNITS: 5000 INJECTION INTRAVENOUS; SUBCUTANEOUS at 13:14

## 2025-03-06 RX ADMIN — IPRATROPIUM BROMIDE AND ALBUTEROL SULFATE 1 DOSE: 2.5; .5 SOLUTION RESPIRATORY (INHALATION) at 14:04

## 2025-03-06 NOTE — PROGRESS NOTES
PT eval order received and acknowledged. PT eval attempted at 0830 however pt off the floor in HD. Will continue to follow patient and attempt PT eval at a later time. Thank you.

## 2025-03-06 NOTE — PLAN OF CARE
Problem: Chronic Conditions and Co-morbidities  Goal: Patient's chronic conditions and co-morbidity symptoms are monitored and maintained or improved  Outcome: Progressing     Problem: Discharge Planning  Goal: Discharge to home or other facility with appropriate resources  Outcome: Progressing     Problem: Skin/Tissue Integrity  Goal: Skin integrity remains intact  Description: 1.  Monitor for areas of redness and/or skin breakdown  2.  Assess vascular access sites hourly  3.  Every 4-6 hours minimum:  Change oxygen saturation probe site  4.  Every 4-6 hours:  If on nasal continuous positive airway pressure, respiratory therapy assess nares and determine need for appliance change or resting period  Outcome: Progressing     Problem: ABCDS Injury Assessment  Goal: Absence of physical injury  Outcome: Progressing     Problem: Safety - Adult  Goal: Free from fall injury  Outcome: Progressing     Problem: Coping  Goal: Pt/Family able to verbalize concerns and demonstrate effective coping strategies  Description: INTERVENTIONS:  1. Assist patient/family to identify coping skills, available support systems and cultural and spiritual values  2. Provide emotional support, including active listening and acknowledgement of concerns of patient and caregivers  3. Reduce environmental stimuli, as able  4. Instruct patient/family in relaxation techniques, as appropriate  5. Assess for spiritual pain/suffering and initiate Spiritual Care, Psychosocial Clinical Specialist consults as needed  Outcome: Progressing     Problem: Decision Making  Goal: Pt/Family able to effectively weigh alternatives and participate in decision making related to treatment and care  Description: INTERVENTIONS:  1. Determine when there are differences between patient's view, family's view, and healthcare provider's view of condition  2. Facilitate patient and family articulation of goals for care  3. Help patient and family identify pros/cons of

## 2025-03-06 NOTE — PROGRESS NOTES
Patient is a LTC resident at [facility] and rehab. CM has confirmed outpatient dialysis chair at [facility] for MWF at 11:45-4:00 pm. Patient can start either Friday 3/7/25 or Monday 3/10/25.     Awaiting PT/OT to see as facility states patient usually walks 100 feet with her walker. PT has not been able to work with patient due to lethargy.

## 2025-03-06 NOTE — PROGRESS NOTES
Consult received, chart reviewed and bedside sw evaluation attempted. Pt is s/p HD and has received Benadryl. Pt does not arouse to verbal or tactile stim.  Pt not appropriate to participate in bedside sw evaluation and remains NPO at this time. Nsg reports will cont SLP if pt's mentation improves. No further vomiting reported this date. Will cont to follow.

## 2025-03-06 NOTE — PLAN OF CARE
Speech LAnguage Pathology Dysphagia EVALUATION    Patient: Antonella Albert (70 y.o. female)  Date: 3/6/2025  Primary Diagnosis: Acute on chronic hypoxic respiratory failure (HCC) [J96.21]       Precautions: aspiration   Time In: 1636  Time Out: 1648    DIET RECOMMENDATIONS: Soft and bite sized and thin liquids    SWALLOW SAFETY PRECAUTIONS: 1:1 assistance with ALL PO intake, STRICT aspiration and GERD precautions, monitor pt closely for s/s aspiration, meds crushed if able in applesauce or pudding, FEED ONLY IF AWAKE AND ALERT.  ASSESSMENT :  Based on the objective data described below, the patient presents with mild oropharyngeal dysphagia, largely WFL.     Pt seen for assessment, drowsy but arousable. Pt does not speak during assessment aside from providing her name. Pt with 1:1 sitter present.     Pt admitted with acute on chronic hypoxic respiratory failure, SOB, AMS, leukocytosis, anemia, metabolic encephalopathy, HTN, uncontrolled blood glucose. Hx as noted below.   Pt denies any further n/v.     Oral phase WFL with sluggish mastication. Persistent mild pharyngeal delay, but WFL once initiated. No s/s aspiration. Pt's baseline diet is unknown at this time. Pt is from LTC facility.     MRI results noted this date: \"1. No findings of an acute intracranial abnormality.  2. Severe chronic microangiopathic disease.\"    Patient will benefit from skilled intervention to address the above impairments.    GOALS:  Problem: SLP Adult - Impaired Swallowing  Goal: By Discharge: Advance to least restrictive diet without signs or symptoms of aspiration for planned discharge setting.  See evaluation for individualized goals.  Description: Speech Therapy Swallow Goals    Initiated 3/6/2025    -Patient stated goal: pt is eager to eat    -Patient will tolerate Soft and bite sized and thin liquids diet without clinical indicators of aspiration given no cues within 7 day(s).        -Patient will tolerate PO trials without

## 2025-03-06 NOTE — PROGRESS NOTES
OT eval order received and acknowledged. OT eval attempted at 0837 am however pt off the floor at dialysis. Will continue to follow patient and attempt OT eval at a later time. Thank you.

## 2025-03-06 NOTE — CONSULTS
[facility] NEUROLOGY CONSULT   Sushma Manda, MD; Neurohospitalist   Date of Service 3/6/2025       REASON Acute on chronic hypoxic respiratory failure (HCC) [J96.21]    ASSESSMENT  Encephalopathy- hypoxic, uremic, hypertensive- improving  Subacute CVA is being ruled out- MRI brain is pending- suspicion is low    Dementia- stable  Schizoaffective d/o  Hx breast cancer  CKD Cr 6.29-HD  LDL 41  Hypertriglyceridemia   DM-II A1c 8.1, poorly controlled    CT HEAD WO CONTRAST 03/04/2025  No acute intracranial abnormality.    Above pertinent diagnostic studies-I personally reviewed imaging and concur with the reports    Miscellaneous studies-  2D echo  03/02/25    Left Ventricle: Normal left ventricular systolic function with a visually estimated EF of 55 - 60%. Left ventricle size is normal.  Mitral Valve: Porcine bioprosthetic valve.    Left Atrium: Left atrium is moderately dilated.     PLAN  Cont asa 81 mg qday  Recommend tricor  Risk factor modification  Goal Bp-normotension  Nephrology is following  OP neuro f/up- dementia    MRI brain- pending- if no acute findings, she is clear from neuro standpoint- will follow results peripherally  Dr. Feng is covering tomorrow    HISTORY OF PRESENT ILLNESS  69 y/o F was admitted from NH with SOB x 3 days PTA. She was placed on biPAP. CKD, on HD, Cr 6.29. She was lethargic post dialysis, BNP was 7367. Cr today 5.11. CT head-no acute findings. MRI brain is pending. Meanwhile her mental status improved today to her baseline dementia. No family at bedside.     But she is following commands, flat affect, delayed responses, disoriented to year, month, knows hospital and self. Face is symmetric. Fluent. Face was symmetric. No tremors/seizures.   Motor 5/5 b/l upper and lower extremities  Gait- deferred    PAST MEDICAL HISTORY  Past Medical History:   Diagnosis Date    Anxiety     Arthritis     spinal stenosis    Breast CA (HCC) 2017    Left Breast (chemo/radiation)

## 2025-03-06 NOTE — PROGRESS NOTES
Renal Progress Note    Patient: Antonella Albert MRN: [id number]  SSN: [id number]    YOB: 1955  Age: 70 y.o.  Sex: female      Admit Date: 3/2/2025    LOS: 4 days     Subjective:   Patient seen in dialysis today, tolerating treatment, hemodynamically stable.   Sitter at bedside.   On 2 L NC.  Electrolytes stable.  Currently with temp cath.    Current Facility-Administered Medications   Medication Dose Route Frequency    diphenhydrAMINE (BENADRYL) injection 25 mg  25 mg IntraVENous PRN    diphenhydrAMINE (BENADRYL) 50 MG/ML injection        [Held by provider] aspirin EC tablet 81 mg  81 mg Oral Daily    [Held by provider] bumetanide (BUMEX) injection 2 mg  2 mg IntraVENous Daily    [Held by provider] hydrALAZINE (APRESOLINE) tablet 50 mg  50 mg Oral 3 times per day    hydrALAZINE (APRESOLINE) injection 10 mg  10 mg IntraVENous Q8H    metoprolol (LOPRESSOR) injection 5 mg  5 mg IntraVENous Q12H    ipratropium 0.5 mg-albuterol 2.5 mg (DUONEB) nebulizer solution 1 Dose  1 Dose Inhalation Q6H WA RT    cefTRIAXone (ROCEPHIN) 2,000 mg in sterile water 20 mL IV syringe  2,000 mg IntraVENous Q24H    heparin (porcine) injection 2,200 Units  2,200 Units IntraCATHeter PRN    [Held by provider] metoprolol tartrate (LOPRESSOR) tablet 12.5 mg  12.5 mg Oral BID    metoprolol (LOPRESSOR) injection 5 mg  5 mg IntraVENous Q6H PRN    cinacalcet (SENSIPAR) tablet 30 mg  30 mg Oral Daily    ferric gluconate (FERRLECIT) 125 mg in sodium chloride 0.9 % 100 mL IVPB  125 mg IntraVENous Daily    sodium chloride flush 0.9 % injection 5-40 mL  5-40 mL IntraVENous 2 times per day    sodium chloride flush 0.9 % injection 5-40 mL  5-40 mL IntraVENous PRN    0.9 % sodium chloride infusion   IntraVENous PRN    ondansetron (ZOFRAN-ODT) disintegrating tablet 4 mg  4 mg Oral Q8H PRN    Or    ondansetron (ZOFRAN) injection 4 mg  4 mg IntraVENous Q6H PRN    polyethylene glycol (GLYCOLAX) packet 17 g  17 g Oral Daily PRN    acetaminophen  Absolute 0.26 0.00 - 0.40 K/UL    Basophils Absolute 0.10 0.00 - 0.10 K/UL    Immature Granulocytes Absolute 0.09 (H) 0.00 - 0.04 K/UL    Differential Type AUTOMATED     Basic Metabolic Panel    Collection Time: 03/06/25  2:44 AM   Result Value Ref Range    Sodium 140 136 - 145 mmol/L    Potassium 4.0 3.5 - 5.1 mmol/L    Chloride 104 97 - 108 mmol/L    CO2 26 21 - 32 mmol/L    Anion Gap 10 2 - 12 mmol/L    Glucose 135 (H) 65 - 100 mg/dL    BUN 52 (H) 6 - 20 mg/dL    Creatinine 5.11 (H) 0.55 - 1.02 mg/dL    BUN/Creatinine Ratio 10 (L) 12 - 20      Est, Glom Filt Rate 9 (L) >60 ml/min/1.73m2    Calcium 9.5 8.5 - 10.1 mg/dL   Renal Function Panel    Collection Time: 03/06/25  2:44 AM   Result Value Ref Range    Sodium 140 136 - 145 mmol/L    Potassium 4.1 3.5 - 5.1 mmol/L    Chloride 104 97 - 108 mmol/L    CO2 26 21 - 32 mmol/L    Anion Gap 10 2 - 12 mmol/L    Glucose 134 (H) 65 - 100 mg/dL    BUN 51 (H) 6 - 20 mg/dL    Creatinine 5.07 (H) 0.55 - 1.02 mg/dL    BUN/Creatinine Ratio 10 (L) 12 - 20      Est, Glom Filt Rate 9 (L) >60 ml/min/1.73m2    Calcium 9.4 8.5 - 10.1 mg/dL    Phosphorus 7.0 (H) 2.6 - 4.7 mg/dL    Albumin 2.6 (L) 3.5 - 5.0 g/dL   POCT Glucose    Collection Time: 03/06/25  7:21 AM   Result Value Ref Range    POC Glucose 146 (H) 65 - 100 mg/dL    Performed by: Brenda Domingo         Assessment and Plan:     #1 BRIANNE on CKD 4/5  -She presented with creatinine 6.2.  BUN 47.  Increase creat to 8.9  She was already referred for dialysis access in December 2024 to Dr. Hemphill in [facility]. Probably has stage 5 CKD  Follows with nephrology in [place]    Patient had fluid overload and was started on diuretics  Increasing creatinine noted, altered mental status with confusion    Recommended to initiate maintenance hemodialysis  Discussed with patient and patient's sister on telephone, both agreeable to proceed with hemodialysis catheter placement and initiation of hemodialysis    Consulted interventional radiology for

## 2025-03-06 NOTE — DIALYSIS
Report called to pt's primary nurse, Ciro.  Pt dialyzed for 3 hours.  Net fluid removal of 1.5 liters.  Pt en route to her room.

## 2025-03-07 LAB
ANION GAP SERPL CALC-SCNC: 11 MMOL/L (ref 2–12)
BACTERIA SPEC CULT: NORMAL
BASOPHILS # BLD: 0.1 K/UL (ref 0–0.1)
BASOPHILS NFR BLD: 0.7 % (ref 0–1)
BUN SERPL-MCNC: 48 MG/DL (ref 6–20)
BUN/CREAT SERPL: 10 (ref 12–20)
CA-I BLD-MCNC: 9.7 MG/DL (ref 8.5–10.1)
CHLORIDE SERPL-SCNC: 101 MMOL/L (ref 97–108)
CO2 SERPL-SCNC: 25 MMOL/L (ref 21–32)
CREAT SERPL-MCNC: 4.6 MG/DL (ref 0.55–1.02)
DIFFERENTIAL METHOD BLD: ABNORMAL
EKG ATRIAL RATE: 136 BPM
EKG ATRIAL RATE: 81 BPM
EKG DIAGNOSIS: NORMAL
EKG DIAGNOSIS: NORMAL
EKG P AXIS: 71 DEGREES
EKG P-R INTERVAL: 142 MS
EKG Q-T INTERVAL: 314 MS
EKG Q-T INTERVAL: 464 MS
EKG QRS DURATION: 152 MS
EKG QRS DURATION: 154 MS
EKG QTC CALCULATION (BAZETT): 487 MS
EKG QTC CALCULATION (BAZETT): 539 MS
EKG R AXIS: -27 DEGREES
EKG R AXIS: -55 DEGREES
EKG T AXIS: 146 DEGREES
EKG T AXIS: 98 DEGREES
EKG VENTRICULAR RATE: 145 BPM
EKG VENTRICULAR RATE: 81 BPM
EOSINOPHIL # BLD: 0.27 K/UL (ref 0–0.4)
EOSINOPHIL NFR BLD: 1.9 % (ref 0–7)
ERYTHROCYTE [DISTWIDTH] IN BLOOD BY AUTOMATED COUNT: 14.2 % (ref 11.5–14.5)
GLUCOSE BLD STRIP.AUTO-MCNC: 127 MG/DL (ref 65–100)
GLUCOSE BLD STRIP.AUTO-MCNC: 138 MG/DL (ref 65–100)
GLUCOSE BLD STRIP.AUTO-MCNC: 141 MG/DL (ref 65–100)
GLUCOSE BLD STRIP.AUTO-MCNC: 168 MG/DL (ref 65–100)
GLUCOSE SERPL-MCNC: 138 MG/DL (ref 65–100)
HCT VFR BLD AUTO: 40.6 % (ref 35–47)
HGB BLD-MCNC: 12.5 G/DL (ref 11.5–16)
IMM GRANULOCYTES # BLD AUTO: 0.13 K/UL (ref 0–0.04)
IMM GRANULOCYTES NFR BLD AUTO: 0.9 % (ref 0–0.5)
LYMPHOCYTES # BLD: 1.07 K/UL (ref 0.8–3.5)
LYMPHOCYTES NFR BLD: 7.4 % (ref 12–49)
Lab: NORMAL
MAGNESIUM SERPL-MCNC: 2.6 MG/DL (ref 1.6–2.4)
MCH RBC QN AUTO: 25.7 PG (ref 26–34)
MCHC RBC AUTO-ENTMCNC: 30.8 G/DL (ref 30–36.5)
MCV RBC AUTO: 83.5 FL (ref 80–99)
MONOCYTES # BLD: 1.23 K/UL (ref 0–1)
MONOCYTES NFR BLD: 8.5 % (ref 5–13)
NEUTS SEG # BLD: 11.68 K/UL (ref 1.8–8)
NEUTS SEG NFR BLD: 80.6 % (ref 32–75)
NRBC # BLD: 0 K/UL (ref 0–0.01)
NRBC BLD-RTO: 0 PER 100 WBC
PERFORMED BY:: ABNORMAL
PHOSPHATE SERPL-MCNC: 8.4 MG/DL (ref 2.6–4.7)
PLATELET # BLD AUTO: 568 K/UL (ref 150–400)
PMV BLD AUTO: 9.9 FL (ref 8.9–12.9)
POTASSIUM SERPL-SCNC: 4.1 MMOL/L (ref 3.5–5.1)
RBC # BLD AUTO: 4.86 M/UL (ref 3.8–5.2)
SODIUM SERPL-SCNC: 137 MMOL/L (ref 136–145)
TROPONIN I SERPL HS-MCNC: 50 NG/L (ref 0–51)
WBC # BLD AUTO: 14.5 K/UL (ref 3.6–11)

## 2025-03-07 PROCEDURE — 6370000000 HC RX 637 (ALT 250 FOR IP): Performed by: PHYSICIAN ASSISTANT

## 2025-03-07 PROCEDURE — 2500000003 HC RX 250 WO HCPCS

## 2025-03-07 PROCEDURE — 2700000000 HC OXYGEN THERAPY PER DAY

## 2025-03-07 PROCEDURE — 2060000000 HC ICU INTERMEDIATE R&B

## 2025-03-07 PROCEDURE — 6370000000 HC RX 637 (ALT 250 FOR IP): Performed by: INTERNAL MEDICINE

## 2025-03-07 PROCEDURE — 6360000002 HC RX W HCPCS: Performed by: INTERNAL MEDICINE

## 2025-03-07 PROCEDURE — 84100 ASSAY OF PHOSPHORUS: CPT

## 2025-03-07 PROCEDURE — 2500000003 HC RX 250 WO HCPCS: Performed by: NURSE PRACTITIONER

## 2025-03-07 PROCEDURE — 84484 ASSAY OF TROPONIN QUANT: CPT

## 2025-03-07 PROCEDURE — 85025 COMPLETE CBC W/AUTO DIFF WBC: CPT

## 2025-03-07 PROCEDURE — 94640 AIRWAY INHALATION TREATMENT: CPT

## 2025-03-07 PROCEDURE — 36415 COLL VENOUS BLD VENIPUNCTURE: CPT

## 2025-03-07 PROCEDURE — 6360000002 HC RX W HCPCS

## 2025-03-07 PROCEDURE — 94761 N-INVAS EAR/PLS OXIMETRY MLT: CPT

## 2025-03-07 PROCEDURE — 93005 ELECTROCARDIOGRAM TRACING: CPT

## 2025-03-07 PROCEDURE — 82962 GLUCOSE BLOOD TEST: CPT

## 2025-03-07 PROCEDURE — 80048 BASIC METABOLIC PNL TOTAL CA: CPT

## 2025-03-07 PROCEDURE — 93005 ELECTROCARDIOGRAM TRACING: CPT | Performed by: PHYSICIAN ASSISTANT

## 2025-03-07 PROCEDURE — 83735 ASSAY OF MAGNESIUM: CPT

## 2025-03-07 PROCEDURE — 2500000003 HC RX 250 WO HCPCS: Performed by: INTERNAL MEDICINE

## 2025-03-07 RX ORDER — SEVELAMER CARBONATE 800 MG/1
800 TABLET, FILM COATED ORAL
Status: DISCONTINUED | OUTPATIENT
Start: 2025-03-07 | End: 2025-03-09

## 2025-03-07 RX ORDER — METOPROLOL TARTRATE 1 MG/ML
5 INJECTION, SOLUTION INTRAVENOUS ONCE
Status: COMPLETED | OUTPATIENT
Start: 2025-03-07 | End: 2025-03-07

## 2025-03-07 RX ADMIN — APIXABAN 5 MG: 5 TABLET, FILM COATED ORAL at 10:31

## 2025-03-07 RX ADMIN — AMIODARONE HYDROCHLORIDE 150 MG: 1.5 INJECTION, SOLUTION INTRAVENOUS at 09:56

## 2025-03-07 RX ADMIN — IPRATROPIUM BROMIDE AND ALBUTEROL SULFATE 1 DOSE: 2.5; .5 SOLUTION RESPIRATORY (INHALATION) at 13:34

## 2025-03-07 RX ADMIN — IPRATROPIUM BROMIDE AND ALBUTEROL SULFATE 1 DOSE: 2.5; .5 SOLUTION RESPIRATORY (INHALATION) at 18:06

## 2025-03-07 RX ADMIN — SODIUM CHLORIDE, PRESERVATIVE FREE 10 ML: 5 INJECTION INTRAVENOUS at 10:44

## 2025-03-07 RX ADMIN — HYDRALAZINE HYDROCHLORIDE 10 MG: 20 INJECTION INTRAMUSCULAR; INTRAVENOUS at 04:56

## 2025-03-07 RX ADMIN — METOPROLOL TARTRATE 5 MG: 5 INJECTION INTRAVENOUS at 09:23

## 2025-03-07 RX ADMIN — HEPARIN SODIUM 5000 UNITS: 5000 INJECTION INTRAVENOUS; SUBCUTANEOUS at 06:09

## 2025-03-07 RX ADMIN — CEFTRIAXONE SODIUM 2000 MG: 2 INJECTION, POWDER, FOR SOLUTION INTRAMUSCULAR; INTRAVENOUS at 14:19

## 2025-03-07 RX ADMIN — METOPROLOL TARTRATE 5 MG: 5 INJECTION INTRAVENOUS at 01:57

## 2025-03-07 RX ADMIN — AMIODARONE HYDROCHLORIDE 1 MG/MIN: 1.8 INJECTION, SOLUTION INTRAVENOUS at 10:20

## 2025-03-07 RX ADMIN — SODIUM CHLORIDE, PRESERVATIVE FREE 10 ML: 5 INJECTION INTRAVENOUS at 21:01

## 2025-03-07 RX ADMIN — IPRATROPIUM BROMIDE AND ALBUTEROL SULFATE 1 DOSE: 2.5; .5 SOLUTION RESPIRATORY (INHALATION) at 09:01

## 2025-03-07 RX ADMIN — AMIODARONE HYDROCHLORIDE 0.5 MG/MIN: 1.8 INJECTION, SOLUTION INTRAVENOUS at 17:13

## 2025-03-07 RX ADMIN — APIXABAN 5 MG: 5 TABLET, FILM COATED ORAL at 21:00

## 2025-03-07 RX ADMIN — CINACALCET HYDROCHLORIDE 30 MG: 30 TABLET, FILM COATED ORAL at 10:31

## 2025-03-07 NOTE — PROGRESS NOTES
CM received call from [facility] to confirm dialysis chair times. Patient can start on Monday 3/10/25. Start time is 11:15 am at [facility].     Patient LTC at [facility] and rehab.

## 2025-03-07 NOTE — PROGRESS NOTES
Renal Progress Note    Patient: Antonella Albert MRN: [id number]  SSN: [id number]    YOB: 1955  Age: 70 y.o.  Sex: female      Admit Date: 3/2/2025    LOS: 5 days     Subjective:   Patient seen at bedside today, awake, alert, engaging in conversation, with confusion.   Sitter at bedside.   On 2 L NC.  Electrolytes stable.  Currently with temp cath.    Current Facility-Administered Medications   Medication Dose Route Frequency    amiodarone (NEXTERONE) 360 mg in dextrose 5% 200 ml  1 mg/min IntraVENous Continuous    Followed by    amiodarone (NEXTERONE) 360 mg in dextrose 5% 200 ml  0.5 mg/min IntraVENous Continuous    apixaban (ELIQUIS) tablet 5 mg  5 mg Oral BID    diphenhydrAMINE (BENADRYL) injection 25 mg  25 mg IntraVENous PRN    [Held by provider] aspirin EC tablet 81 mg  81 mg Oral Daily    [Held by provider] hydrALAZINE (APRESOLINE) tablet 50 mg  50 mg Oral 3 times per day    [Held by provider] hydrALAZINE (APRESOLINE) injection 10 mg  10 mg IntraVENous Q8H    metoprolol (LOPRESSOR) injection 5 mg  5 mg IntraVENous Q12H    ipratropium 0.5 mg-albuterol 2.5 mg (DUONEB) nebulizer solution 1 Dose  1 Dose Inhalation Q6H WA RT    cefTRIAXone (ROCEPHIN) 2,000 mg in sterile water 20 mL IV syringe  2,000 mg IntraVENous Q24H    heparin (porcine) injection 2,200 Units  2,200 Units IntraCATHeter PRN    [Held by provider] metoprolol tartrate (LOPRESSOR) tablet 12.5 mg  12.5 mg Oral BID    metoprolol (LOPRESSOR) injection 5 mg  5 mg IntraVENous Q6H PRN    cinacalcet (SENSIPAR) tablet 30 mg  30 mg Oral Daily    sodium chloride flush 0.9 % injection 5-40 mL  5-40 mL IntraVENous 2 times per day    sodium chloride flush 0.9 % injection 5-40 mL  5-40 mL IntraVENous PRN    0.9 % sodium chloride infusion   IntraVENous PRN    ondansetron (ZOFRAN-ODT) disintegrating tablet 4 mg  4 mg Oral Q8H PRN    Or    ondansetron (ZOFRAN) injection 4 mg  4 mg IntraVENous Q6H PRN    polyethylene glycol (GLYCOLAX) packet 17 g  17

## 2025-03-07 NOTE — PROGRESS NOTES
4 Eyes Skin Assessment     NAME:  Antonella Albert  YOB: 1955  MEDICAL RECORD NUMBER:  [id number]    The patient is being assessed for  Transfer to New Unit    I agree that at least one RN has performed a thorough Head to Toe Skin Assessment on the patient. ALL assessment sites listed below have been assessed.      Areas assessed by both nurses:    Head, Face, Ears, Shoulders, Back, Chest, Arms, Elbows, Hands, Sacrum. Buttock, Coccyx, Ischium, Legs. Feet and Heels, and Under Medical Devices         Does the Patient have a Wound? No noted wound(s)       John Prevention initiated by RN: No  Wound Care Orders initiated by RN: No    Pressure Injury (Stage 3,4, Unstageable, DTI, NWPT, and Complex wounds) if present, place Wound referral order by RN under : No    New Ostomies, if present place, Ostomy referral order under : No     Nurse 1 eSignature: Electronically signed by Rodriguez Rand RN on 3/7/25 at 1:36 PM EST    **SHARE this note so that the co-signing nurse can place an eSignature**    Nurse 2 eSignature: Electronically signed by Janelle Jenkins RN on 3/7/25 at 1:38 PM EST

## 2025-03-07 NOTE — PROGRESS NOTES
Progress Note      3/7/2025 9:37 AM  NAME: Antonella Albert   MRN:  [id number]   Admit Diagnosis: Acute on chronic hypoxic respiratory failure (HCC) [J96.21]          Assessment/Plan:   A-fib with RVR, starting amiodarone.  TSH 2.51 on this admission.  Normal liver function on this admission.  Will check troponin.  Patient with complaints of palpitation.  Will need anticoagulation, start Eliquis, discontinue subcu heparin.    Mild troponin leak, flat, likely secondary to chronic kidney disease.  Negative cardiac cath in 2017.  Wall motion abnormality on echo on this admission.  Patient denies any chest pain    History of COPD, diabetes, hypertension, dyslipidemia and chronic kidney disease, now on dialysis    Hemoglobin A1c 8.1 on this admission.  Check lipids.         []       High complexity decision making was performed in this patient at high risk for decompensation with multiple organ involvement.    Subjective:     Antonella Albert denies chest pain, dyspnea.  With complaints of palpitations  Discussed with RN events overnight.     Review of Systems:    Symptom Y/N Comments  Symptom Y/N Comments   Fever/Chills N   Chest Pain N    Poor Appetite N   Edema N    Cough N   Abdominal Pain N    Sputum N   Joint Pain N    SOB/WAY N   Pruritis/Rash N    Nausea/vomit N   Tolerating PT/OT Y    Diarrhea N   Tolerating Diet Y    Constipation N   Other       Could NOT obtain due to:      Objective:      Physical Exam:    Last 24hrs VS reviewed since prior progress note. Most recent are:    BP (!) 119/102   Pulse (!) 142   Temp 97.9 °F (36.6 °C)   Resp 18   Ht 1.676 m (5' 6\")   Wt 78 kg (171 lb 15.3 oz)   SpO2 100%   BMI 27.75 kg/m²     Intake/Output Summary (Last 24 hours) at 3/7/2025 0937  Last data filed at 3/6/2025 2015  Gross per 24 hour   Intake 612.2 ml   Output 2100 ml   Net -1487.8 ml        General Appearance: Well developed, well nourished, alert; no acute distress.  Ears/Nose/Mouth/Throat: Hearing grossly

## 2025-03-07 NOTE — PROGRESS NOTES
OT eval order received and acknowledged. OT eval attempted at 1555 however Ms Bety's BP trending upward this PM (152/72 at 1405). Will continue to follow patient and attempt OT eval at a later time. Thank you.

## 2025-03-07 NOTE — PROGRESS NOTES
Pt given Amiodarone bolus started for elevated HR and b/p  awaiting bed assignment   1017  Bolus completed   1020  Continuous amiodarone drip started bed assignment on 4 west 8 waiting on stat   1054  22 G inserted right forearm   1111  Pt resting comfortably sitter at bedside HR and b/p wnl will transport pt when bed available

## 2025-03-07 NOTE — PROGRESS NOTES
Hospitalist Progress Note               Daily Progress Note: 3/7/2025      Hospital Day: 6     Chief complaint: No chief complaint on file.       Hospital course to date:     This is a 70-year-old female who initially presented to [facility] ER from nursing home due to progressively worsening shortness of breath for the last 3 days. She was reportedly started on oxygen due to hypoxia on Thursday and her symptoms continue to worsen since then. Patient also reports decreased urine output for the last few days. She denied any chest pain or abdominal pain. Denied vomiting or diarrhea. She was able to tell me her name, date of birth and current year but was disoriented otherwise. Chest x-ray was concerning for bilateral pulmonary edema. Lasix was given prior to transfer to us but she did not make any urine. She was placed on BiPAP for increased work of breathing. She is currently weaned off of BiPAP and is on HFNC at the time of my exam. Labs are concerning for creatinine of 6.29, BUN 47, proBNP 7367, WBC count 15.8. She is also found to be hypertensive with systolic in the 180s. Transferred to floors from  3/3.    3/4 post hemodialysis patient was fast asleep, no response to voice and sternal rub, stat CT head ordered.  Spoke with family at bedside who stated that this is around patient's baseline specially when she is lethargic and tired.  Assessed with bedside RN.    3/5 patient's mentation significantly improved in afternoon assessed with bedside RN, asked for food, SLP to evaluate swallow and order diet.  Spoke with nephrologist Dr. Poole and stated that if patient's mental status does not significantly improve can consult neurology tomorrow.    3/6  Patient is able to tell me her name.  She is able to recognize her brother-in-law and sister at bedside.  She tends to respond to them more and smiles.  On talking to the family, they state that patient has been like this for the past 6 months since being  started on Rocephin in the ED which will be discontinued if improvement in leukocytosis  -Recommend peripheral smear and outpatient follow-up    Hypertension   continue amlodipine 10 MG  started on hydralazine 50 BID    DM2  A1C 8.1   insulin sliding scale, hypoglycemia treatment protocol  -Will continue to monitor closely due to decreased oral intake    Remote history of breast cancer    Pari Fraire MD

## 2025-03-07 NOTE — PROGRESS NOTES
mmol/L    CO2 26 21 - 32 mmol/L    Anion Gap 10 2 - 12 mmol/L    Glucose 135 (H) 65 - 100 mg/dL    BUN 52 (H) 6 - 20 mg/dL    Creatinine 5.11 (H) 0.55 - 1.02 mg/dL    BUN/Creatinine Ratio 10 (L) 12 - 20      Est, Glom Filt Rate 9 (L) >60 ml/min/1.73m2    Calcium 9.5 8.5 - 10.1 mg/dL   Renal Function Panel    Collection Time: 03/06/25  2:44 AM   Result Value Ref Range    Sodium 140 136 - 145 mmol/L    Potassium 4.1 3.5 - 5.1 mmol/L    Chloride 104 97 - 108 mmol/L    CO2 26 21 - 32 mmol/L    Anion Gap 10 2 - 12 mmol/L    Glucose 134 (H) 65 - 100 mg/dL    BUN 51 (H) 6 - 20 mg/dL    Creatinine 5.07 (H) 0.55 - 1.02 mg/dL    BUN/Creatinine Ratio 10 (L) 12 - 20      Est, Glom Filt Rate 9 (L) >60 ml/min/1.73m2    Calcium 9.4 8.5 - 10.1 mg/dL    Phosphorus 7.0 (H) 2.6 - 4.7 mg/dL    Albumin 2.6 (L) 3.5 - 5.0 g/dL   POCT Glucose    Collection Time: 03/06/25  7:21 AM   Result Value Ref Range    POC Glucose 146 (H) 65 - 100 mg/dL    Performed by: Brenda Domingo        MRI BRAIN WO CONTRAST   Final Result   1. No findings of an acute intracranial abnormality.   2. Severe chronic microangiopathic disease.      Electronically signed by Cash Galvin      CT HEAD WO CONTRAST   Final Result      No acute intracranial abnormality.         Electronically signed by Delonte Heard      XR CHEST PORTABLE   Final Result   Central venous catheter in appropriate position. No pneumothorax.      Electronically signed by Jaguar Magdaleno MD      IR NONTUNNELED VASCULAR CATHETER > 5 YEARS   Final Result   1.  Successful placement of right internal jugular temporary dialysis catheter.   The catheter will be ready for use following confirmation with chest radiograph.         Electronically signed by CODY PARR      US RETROPERITONEAL COMPLETE   Final Result      No hydronephrosis. 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         Electronically signed by Delonte Heard      IR ULTRASOUND GUIDANCE VASCULAR ACCESS    (Results Pending)     weights  -continue to wean oxygen as tolerated  -Cardiology consult, appreciate recommendations  -Receiving HD. Nephro following and appreciate recommendations    Leukocytosis likely reactive, improving   does not meet sepsis criteria  received empiric Rocephin in the ED      Acute metabolic encephalopathy likely in setting of uremia and underlying dementia   BRIANNE on CKD and now progressed to ESRD  creatinine 5.11  baseline creatinine unknown  Nephrology on board  3/3 initiated on HD for hemodialysis  3/4 CT head unremarkable for acute intracranial abnormality  MRI brain ordered. Per family, this is her baseline the past 4 months and tends to be somnolent at nursing home. She does respond to them and smile   Neurology consulted and appreciate recs   Needs outpatient HD setup    Hypertension   continue amlodipine 10 MG  started on hydralazine 50 BID    DM2  A1C 8.1   insulin sliding scale, hypoglycemia treatment protocol  -Will continue to monitor closely due to decreased oral intake    Remote history of breast cancer    Pari Fraire MD

## 2025-03-07 NOTE — PROGRESS NOTES
PT orders received.  PT attempted to see pt for PT eval, however pt with HR in 140s at rest and BP currently 119/102.  Pt currently not appropriate to work with therapy at this time.  Will attempt PT again at a later time.  Thank you!

## 2025-03-07 NOTE — PROGRESS NOTES
Telemetry called for a rapid response due to long episode of vtach. Patient vitals was stable, Rhythm went back to sinus rhythm. Stat EKG ordered.

## 2025-03-08 LAB
ANION GAP SERPL CALC-SCNC: 11 MMOL/L (ref 2–12)
BACTERIA SPEC CULT: NORMAL
BACTERIA SPEC CULT: NORMAL
BASOPHILS # BLD: 0.08 K/UL (ref 0–0.1)
BASOPHILS NFR BLD: 0.6 % (ref 0–1)
BUN SERPL-MCNC: 60 MG/DL (ref 6–20)
BUN/CREAT SERPL: 10 (ref 12–20)
CA-I BLD-MCNC: 8.4 MG/DL (ref 8.5–10.1)
CHLORIDE SERPL-SCNC: 97 MMOL/L (ref 97–108)
CO2 SERPL-SCNC: 27 MMOL/L (ref 21–32)
CREAT SERPL-MCNC: 5.93 MG/DL (ref 0.55–1.02)
DIFFERENTIAL METHOD BLD: ABNORMAL
EOSINOPHIL # BLD: 0.45 K/UL (ref 0–0.4)
EOSINOPHIL NFR BLD: 3.4 % (ref 0–7)
ERYTHROCYTE [DISTWIDTH] IN BLOOD BY AUTOMATED COUNT: 14.4 % (ref 11.5–14.5)
GLUCOSE BLD STRIP.AUTO-MCNC: 169 MG/DL (ref 65–100)
GLUCOSE BLD STRIP.AUTO-MCNC: 207 MG/DL (ref 65–100)
GLUCOSE BLD STRIP.AUTO-MCNC: 231 MG/DL (ref 65–100)
GLUCOSE SERPL-MCNC: 299 MG/DL (ref 65–100)
HCT VFR BLD AUTO: 38.3 % (ref 35–47)
HGB BLD-MCNC: 11.8 G/DL (ref 11.5–16)
IMM GRANULOCYTES # BLD AUTO: 0.13 K/UL (ref 0–0.04)
IMM GRANULOCYTES NFR BLD AUTO: 1 % (ref 0–0.5)
LYMPHOCYTES # BLD: 1.32 K/UL (ref 0.8–3.5)
LYMPHOCYTES NFR BLD: 9.8 % (ref 12–49)
Lab: NORMAL
Lab: NORMAL
MAGNESIUM SERPL-MCNC: 2.7 MG/DL (ref 1.6–2.4)
MCH RBC QN AUTO: 25.2 PG (ref 26–34)
MCHC RBC AUTO-ENTMCNC: 30.8 G/DL (ref 30–36.5)
MCV RBC AUTO: 81.7 FL (ref 80–99)
MONOCYTES # BLD: 1.34 K/UL (ref 0–1)
MONOCYTES NFR BLD: 10 % (ref 5–13)
NEUTS SEG # BLD: 10.1 K/UL (ref 1.8–8)
NEUTS SEG NFR BLD: 75.2 % (ref 32–75)
NRBC # BLD: 0 K/UL (ref 0–0.01)
NRBC BLD-RTO: 0 PER 100 WBC
PERFORMED BY:: ABNORMAL
PHOSPHATE SERPL-MCNC: 7 MG/DL (ref 2.6–4.7)
PLATELET # BLD AUTO: 548 K/UL (ref 150–400)
PMV BLD AUTO: 9.7 FL (ref 8.9–12.9)
POTASSIUM SERPL-SCNC: 3.6 MMOL/L (ref 3.5–5.1)
RBC # BLD AUTO: 4.69 M/UL (ref 3.8–5.2)
SODIUM SERPL-SCNC: 135 MMOL/L (ref 136–145)
WBC # BLD AUTO: 13.4 K/UL (ref 3.6–11)

## 2025-03-08 PROCEDURE — 83735 ASSAY OF MAGNESIUM: CPT

## 2025-03-08 PROCEDURE — 2500000003 HC RX 250 WO HCPCS

## 2025-03-08 PROCEDURE — 6370000000 HC RX 637 (ALT 250 FOR IP): Performed by: INTERNAL MEDICINE

## 2025-03-08 PROCEDURE — 84100 ASSAY OF PHOSPHORUS: CPT

## 2025-03-08 PROCEDURE — 2500000003 HC RX 250 WO HCPCS: Performed by: STUDENT IN AN ORGANIZED HEALTH CARE EDUCATION/TRAINING PROGRAM

## 2025-03-08 PROCEDURE — 6370000000 HC RX 637 (ALT 250 FOR IP)

## 2025-03-08 PROCEDURE — 2709999900 HC NON-CHARGEABLE SUPPLY

## 2025-03-08 PROCEDURE — 94761 N-INVAS EAR/PLS OXIMETRY MLT: CPT

## 2025-03-08 PROCEDURE — 36415 COLL VENOUS BLD VENIPUNCTURE: CPT

## 2025-03-08 PROCEDURE — 6360000002 HC RX W HCPCS: Performed by: INTERNAL MEDICINE

## 2025-03-08 PROCEDURE — 2500000003 HC RX 250 WO HCPCS: Performed by: NURSE PRACTITIONER

## 2025-03-08 PROCEDURE — 2060000000 HC ICU INTERMEDIATE R&B

## 2025-03-08 PROCEDURE — 82962 GLUCOSE BLOOD TEST: CPT

## 2025-03-08 PROCEDURE — 80048 BASIC METABOLIC PNL TOTAL CA: CPT

## 2025-03-08 PROCEDURE — 85025 COMPLETE CBC W/AUTO DIFF WBC: CPT

## 2025-03-08 PROCEDURE — 6370000000 HC RX 637 (ALT 250 FOR IP): Performed by: PHYSICIAN ASSISTANT

## 2025-03-08 PROCEDURE — 90935 HEMODIALYSIS ONE EVALUATION: CPT

## 2025-03-08 PROCEDURE — 94640 AIRWAY INHALATION TREATMENT: CPT

## 2025-03-08 PROCEDURE — 2700000000 HC OXYGEN THERAPY PER DAY

## 2025-03-08 RX ORDER — AMIODARONE HYDROCHLORIDE 200 MG/1
200 TABLET ORAL DAILY
Status: DISCONTINUED | OUTPATIENT
Start: 2025-03-08 | End: 2025-03-11 | Stop reason: HOSPADM

## 2025-03-08 RX ADMIN — IPRATROPIUM BROMIDE AND ALBUTEROL SULFATE 1 DOSE: 2.5; .5 SOLUTION RESPIRATORY (INHALATION) at 19:55

## 2025-03-08 RX ADMIN — SODIUM CHLORIDE, PRESERVATIVE FREE 10 ML: 5 INJECTION INTRAVENOUS at 20:28

## 2025-03-08 RX ADMIN — METOPROLOL TARTRATE 5 MG: 5 INJECTION INTRAVENOUS at 20:27

## 2025-03-08 RX ADMIN — APIXABAN 5 MG: 5 TABLET, FILM COATED ORAL at 09:35

## 2025-03-08 RX ADMIN — IPRATROPIUM BROMIDE AND ALBUTEROL SULFATE 1 DOSE: 2.5; .5 SOLUTION RESPIRATORY (INHALATION) at 08:20

## 2025-03-08 RX ADMIN — INSULIN LISPRO 2 UNITS: 100 INJECTION, SOLUTION INTRAVENOUS; SUBCUTANEOUS at 20:27

## 2025-03-08 RX ADMIN — SODIUM CHLORIDE, PRESERVATIVE FREE 10 ML: 5 INJECTION INTRAVENOUS at 09:35

## 2025-03-08 RX ADMIN — SEVELAMER CARBONATE 800 MG: 800 TABLET, FILM COATED ORAL at 09:35

## 2025-03-08 RX ADMIN — AMIODARONE HYDROCHLORIDE 0.5 MG/MIN: 1.8 INJECTION, SOLUTION INTRAVENOUS at 07:38

## 2025-03-08 RX ADMIN — INSULIN LISPRO 2 UNITS: 100 INJECTION, SOLUTION INTRAVENOUS; SUBCUTANEOUS at 16:09

## 2025-03-08 RX ADMIN — APIXABAN 5 MG: 5 TABLET, FILM COATED ORAL at 20:27

## 2025-03-08 RX ADMIN — AMIODARONE HYDROCHLORIDE 200 MG: 200 TABLET ORAL at 18:48

## 2025-03-08 RX ADMIN — AMIODARONE HYDROCHLORIDE 0.5 MG/MIN: 1.8 INJECTION, SOLUTION INTRAVENOUS at 16:10

## 2025-03-08 RX ADMIN — HEPARIN SODIUM 2200 UNITS: 1000 INJECTION INTRAVENOUS; SUBCUTANEOUS at 14:15

## 2025-03-08 NOTE — DIALYSIS
Pt tolerated 3 hrs of dialysis. Removed 1.5L of fluids. Report given to Chasity RODRIGUEZ in 4W. Notified Meg in telemetry that pt is en route back to room with box# [id number].

## 2025-03-08 NOTE — PROGRESS NOTES
Electronically signed by Delonte Heard      IR ULTRASOUND GUIDANCE VASCULAR ACCESS    (Results Pending)          Discussion/MDM:     [x] High (any 2)    A. Problems (any 1)  [x] Acute/Chronic Illness/injury posing threat to life or bodily function:    [] Severe exacerbation of chronic illness:    ---------------------------------------------------------------------  B. Risk of Treatment (any 1)   [x] Drugs/treatments that require intensive monitoring for toxicity include:    [] IV ABX requiring serial renal monitoring for nephrotoxicity:     [] IV Narcotic analgesia for adverse drug reaction  [] Aggressive IV diuresis requiring serial monitoring for renal impairment and electrolyte derangements  [] Critical electrolyte abnormalities requiring IV replacement and close serial monitoring  [x] SQ Insulin SS- monitoring serial FSBS for Hypoglycemic adverse drug reaction  [] Other -   [] Change in code status:    [] Decision to escalate care:    [] Major surgery/procedure with associated risk factors:    ----------------------------------------------------------------------  C. Data (any 2)  [] Discussed current management and discharge planning options with Case Management.  [x] Discussed management of the case with:  icu attending  [] Telemetry personally reviewed and interpreted as documented above    [x] Imaging personally reviewed and interpreted, includes:    [x] Data Review (any 3)  [x] All available Consultant notes from yesterday/today were reviewed  [x] All current labs were reviewed and interpreted for clinical significance   [] Appropriate follow-up labs were ordered  [] Collateral history obtained from:         Assessment & Plan:    Acute hypoxic respiratory failure 2/2 fluid overload 2/2 CHF exacerbation, resolved   HFpEF exacerbation  -CXR pulmonary edema  -admitted to  ICU for requiring BiPAP and now on NC  -Echo 3/2/25 revealed an EF of 55 to 60%. Mild hypo-kinesis is of mid anterosptal. Normal wall  thickness.Diastolic dysfunction present with increased LAP within normal LV EF  -strict intake and output, daily weights  -continue to wean oxygen as tolerated  -Cardiology consult, appreciate recommendations  -Receiving HD. Nephro following and appreciate recommendations    Atrial fibrillation with RVR, new diagnosis   -Originally, patient was unable to take oral medications and was started on Lopressor  -EKG showed A-fib with RVR as heart rates were up to 150s but now rate controlled  -Echo 3/2: EF 55 to 60%, mild hypokinesis, diastolic dysfunction, porcine bioprosthetic valve  -Amio drip switched to PO amiodarone   -Started on Eliquis by Cardiology and will monitor for signs of bleeding       BRIANNE on CKD and now progressed to ESRD  creatinine 5.93  baseline creatinine unknown  Nephrology on board  3/3 initiated on HD for hemodialysis  3/4 CT head unremarkable for acute intracranial abnormality  Will get permacath Monday and then go to dialysis     Acute metabolic encephalopathy likely in setting of uremia and underlying dementia , improving   -MRI brain: Severe chronic microangiopathic disease  - Per family, this is her baseline the past 4 months and tends to be somnolent at nursing home. She does respond to them and smile   -Neurology consulted and appreciate recs     Diarrhea/Vomiting   -2 eps and some vomiting later by nurse. No fevers or abdominal pain  -Will get KUB and monitor  -Aspiration precautions  -Will consider CT abdomen if any fever, abdominal pain, or worsening leukocytosis       Leukocytosis likely reactive, ongoing, chronic  does not meet sepsis criteria  -UA shows no sign infection  -CXR with no sign infection  -Empirically was started on Rocephin in the ED which will be discontinued if improvement in leukocytosis  -Recommend peripheral smear and outpatient follow-up    Hypertension   continue amlodipine 10 MG  started on hydralazine 50 BID    DM2  A1C 8.1   insulin sliding scale, hypoglycemia

## 2025-03-08 NOTE — PROGRESS NOTES
OT eval order received and acknowledged. OT eval attempted at 1148am however pt off floor at dialysis. Will continue to follow patient and attempt OT eval at a later time. Thank you.

## 2025-03-08 NOTE — PLAN OF CARE
Problem: Chronic Conditions and Co-morbidities  Goal: Patient's chronic conditions and co-morbidity symptoms are monitored and maintained or improved  Outcome: Progressing     Problem: Discharge Planning  Goal: Discharge to home or other facility with appropriate resources  Outcome: Progressing     Problem: Skin/Tissue Integrity  Goal: Skin integrity remains intact  Description: 1.  Monitor for areas of redness and/or skin breakdown  2.  Assess vascular access sites hourly  3.  Every 4-6 hours minimum:  Change oxygen saturation probe site  4.  Every 4-6 hours:  If on nasal continuous positive airway pressure, respiratory therapy assess nares and determine need for appliance change or resting period  Outcome: Progressing  Flowsheets (Taken 3/8/2025 0800)  Skin Integrity Remains Intact: Monitor for areas of redness and/or skin breakdown

## 2025-03-09 ENCOUNTER — APPOINTMENT (OUTPATIENT)
Facility: HOSPITAL | Age: 70
DRG: 280 | End: 2025-03-09
Attending: STUDENT IN AN ORGANIZED HEALTH CARE EDUCATION/TRAINING PROGRAM
Payer: MEDICARE

## 2025-03-09 LAB
ALBUMIN SERPL-MCNC: 2.8 G/DL (ref 3.5–5)
ANION GAP SERPL CALC-SCNC: 8 MMOL/L (ref 2–12)
BASOPHILS # BLD: 0.12 K/UL (ref 0–0.1)
BASOPHILS NFR BLD: 0.8 % (ref 0–1)
BUN SERPL-MCNC: 48 MG/DL (ref 6–20)
BUN/CREAT SERPL: 8 (ref 12–20)
CA-I BLD-MCNC: 9.7 MG/DL (ref 8.5–10.1)
CHLORIDE SERPL-SCNC: 99 MMOL/L (ref 97–108)
CO2 SERPL-SCNC: 26 MMOL/L (ref 21–32)
CREAT SERPL-MCNC: 6.06 MG/DL (ref 0.55–1.02)
DIFFERENTIAL METHOD BLD: ABNORMAL
EOSINOPHIL # BLD: 0.41 K/UL (ref 0–0.4)
EOSINOPHIL NFR BLD: 2.6 % (ref 0–7)
ERYTHROCYTE [DISTWIDTH] IN BLOOD BY AUTOMATED COUNT: 14.5 % (ref 11.5–14.5)
GLUCOSE BLD STRIP.AUTO-MCNC: 167 MG/DL (ref 65–100)
GLUCOSE BLD STRIP.AUTO-MCNC: 170 MG/DL (ref 65–100)
GLUCOSE BLD STRIP.AUTO-MCNC: 192 MG/DL (ref 65–100)
GLUCOSE BLD STRIP.AUTO-MCNC: 205 MG/DL (ref 65–100)
GLUCOSE SERPL-MCNC: 180 MG/DL (ref 65–100)
HCT VFR BLD AUTO: 42.9 % (ref 35–47)
HGB BLD-MCNC: 13.4 G/DL (ref 11.5–16)
IMM GRANULOCYTES # BLD AUTO: 0.22 K/UL (ref 0–0.04)
IMM GRANULOCYTES NFR BLD AUTO: 1.4 % (ref 0–0.5)
LYMPHOCYTES # BLD: 1.64 K/UL (ref 0.8–3.5)
LYMPHOCYTES NFR BLD: 10.3 % (ref 12–49)
MAGNESIUM SERPL-MCNC: 2.7 MG/DL (ref 1.6–2.4)
MCH RBC QN AUTO: 25.5 PG (ref 26–34)
MCHC RBC AUTO-ENTMCNC: 31.2 G/DL (ref 30–36.5)
MCV RBC AUTO: 81.6 FL (ref 80–99)
MONOCYTES # BLD: 1.7 K/UL (ref 0–1)
MONOCYTES NFR BLD: 10.7 % (ref 5–13)
NEUTS SEG # BLD: 11.8 K/UL (ref 1.8–8)
NEUTS SEG NFR BLD: 74.2 % (ref 32–75)
NRBC # BLD: 0 K/UL (ref 0–0.01)
NRBC BLD-RTO: 0 PER 100 WBC
PERFORMED BY:: ABNORMAL
PHOSPHATE SERPL-MCNC: 8 MG/DL (ref 2.6–4.7)
PLATELET # BLD AUTO: 513 K/UL (ref 150–400)
PMV BLD AUTO: 9.6 FL (ref 8.9–12.9)
POTASSIUM SERPL-SCNC: 4.3 MMOL/L (ref 3.5–5.1)
RBC # BLD AUTO: 5.26 M/UL (ref 3.8–5.2)
SODIUM SERPL-SCNC: 133 MMOL/L (ref 136–145)
WBC # BLD AUTO: 15.9 K/UL (ref 3.6–11)

## 2025-03-09 PROCEDURE — 2500000003 HC RX 250 WO HCPCS: Performed by: NURSE PRACTITIONER

## 2025-03-09 PROCEDURE — 6370000000 HC RX 637 (ALT 250 FOR IP): Performed by: INTERNAL MEDICINE

## 2025-03-09 PROCEDURE — 83735 ASSAY OF MAGNESIUM: CPT

## 2025-03-09 PROCEDURE — 2500000003 HC RX 250 WO HCPCS

## 2025-03-09 PROCEDURE — 94640 AIRWAY INHALATION TREATMENT: CPT

## 2025-03-09 PROCEDURE — 94660 CPAP INITIATION&MGMT: CPT

## 2025-03-09 PROCEDURE — 80069 RENAL FUNCTION PANEL: CPT

## 2025-03-09 PROCEDURE — 6360000002 HC RX W HCPCS: Performed by: NURSE PRACTITIONER

## 2025-03-09 PROCEDURE — 94761 N-INVAS EAR/PLS OXIMETRY MLT: CPT

## 2025-03-09 PROCEDURE — 2700000000 HC OXYGEN THERAPY PER DAY

## 2025-03-09 PROCEDURE — 6370000000 HC RX 637 (ALT 250 FOR IP)

## 2025-03-09 PROCEDURE — 36415 COLL VENOUS BLD VENIPUNCTURE: CPT

## 2025-03-09 PROCEDURE — 82962 GLUCOSE BLOOD TEST: CPT

## 2025-03-09 PROCEDURE — 6370000000 HC RX 637 (ALT 250 FOR IP): Performed by: PHYSICIAN ASSISTANT

## 2025-03-09 PROCEDURE — 74018 RADEX ABDOMEN 1 VIEW: CPT

## 2025-03-09 PROCEDURE — 85025 COMPLETE CBC W/AUTO DIFF WBC: CPT

## 2025-03-09 PROCEDURE — 2060000000 HC ICU INTERMEDIATE R&B

## 2025-03-09 RX ORDER — AMLODIPINE BESYLATE 5 MG/1
5 TABLET ORAL DAILY
Status: DISCONTINUED | OUTPATIENT
Start: 2025-03-09 | End: 2025-03-11 | Stop reason: HOSPADM

## 2025-03-09 RX ORDER — SEVELAMER CARBONATE 800 MG/1
1600 TABLET, FILM COATED ORAL
Status: DISCONTINUED | OUTPATIENT
Start: 2025-03-09 | End: 2025-03-11

## 2025-03-09 RX ADMIN — SEVELAMER CARBONATE 800 MG: 800 TABLET, FILM COATED ORAL at 09:21

## 2025-03-09 RX ADMIN — INSULIN LISPRO 2 UNITS: 100 INJECTION, SOLUTION INTRAVENOUS; SUBCUTANEOUS at 22:12

## 2025-03-09 RX ADMIN — AMIODARONE HYDROCHLORIDE 200 MG: 200 TABLET ORAL at 09:21

## 2025-03-09 RX ADMIN — METOPROLOL TARTRATE 5 MG: 5 INJECTION INTRAVENOUS at 00:19

## 2025-03-09 RX ADMIN — SODIUM CHLORIDE, PRESERVATIVE FREE 10 ML: 5 INJECTION INTRAVENOUS at 21:16

## 2025-03-09 RX ADMIN — SEVELAMER CARBONATE 800 MG: 800 TABLET, FILM COATED ORAL at 13:01

## 2025-03-09 RX ADMIN — IPRATROPIUM BROMIDE AND ALBUTEROL SULFATE 1 DOSE: 2.5; .5 SOLUTION RESPIRATORY (INHALATION) at 08:52

## 2025-03-09 RX ADMIN — APIXABAN 5 MG: 5 TABLET, FILM COATED ORAL at 21:15

## 2025-03-09 RX ADMIN — CINACALCET HYDROCHLORIDE 30 MG: 30 TABLET, FILM COATED ORAL at 09:21

## 2025-03-09 RX ADMIN — APIXABAN 5 MG: 5 TABLET, FILM COATED ORAL at 09:21

## 2025-03-09 RX ADMIN — INSULIN LISPRO 2 UNITS: 100 INJECTION, SOLUTION INTRAVENOUS; SUBCUTANEOUS at 13:02

## 2025-03-09 RX ADMIN — SODIUM CHLORIDE, PRESERVATIVE FREE 10 ML: 5 INJECTION INTRAVENOUS at 09:21

## 2025-03-09 RX ADMIN — ONDANSETRON 4 MG: 2 INJECTION, SOLUTION INTRAMUSCULAR; INTRAVENOUS at 16:18

## 2025-03-09 ASSESSMENT — PAIN SCALES - GENERAL
PAINLEVEL_OUTOF10: 0
PAINLEVEL_OUTOF10: 0

## 2025-03-09 NOTE — PLAN OF CARE
Problem: Chronic Conditions and Co-morbidities  Goal: Patient's chronic conditions and co-morbidity symptoms are monitored and maintained or improved  3/8/2025 2222 by Tavares Pisano RN  Outcome: Progressing  3/8/2025 1501 by Dang Ochoa RN  Outcome: Progressing     Problem: Discharge Planning  Goal: Discharge to home or other facility with appropriate resources  3/8/2025 2222 by Tavares Pisano RN  Outcome: Progressing  3/8/2025 1501 by Dang Ochoa RN  Outcome: Progressing     Problem: Skin/Tissue Integrity  Goal: Skin integrity remains intact  Description: 1.  Monitor for areas of redness and/or skin breakdown  2.  Assess vascular access sites hourly  3.  Every 4-6 hours minimum:  Change oxygen saturation probe site  4.  Every 4-6 hours:  If on nasal continuous positive airway pressure, respiratory therapy assess nares and determine need for appliance change or resting period  3/8/2025 2222 by Tavares Pisano RN  Outcome: Progressing  3/8/2025 1501 by Dang Ochoa RN  Outcome: Progressing  Flowsheets (Taken 3/8/2025 0800)  Skin Integrity Remains Intact: Monitor for areas of redness and/or skin breakdown     Problem: ABCDS Injury Assessment  Goal: Absence of physical injury  3/8/2025 2222 by Tavares Pisano RN  Outcome: Progressing  3/8/2025 1501 by Dang Ochoa RN  Outcome: Progressing     Problem: Safety - Adult  Goal: Free from fall injury  3/8/2025 2222 by Tavares Pisano RN  Outcome: Progressing  3/8/2025 1501 by Dang Ochoa RN  Outcome: Progressing     Problem: Coping  Goal: Pt/Family able to verbalize concerns and demonstrate effective coping strategies  Description: INTERVENTIONS:  1. Assist patient/family to identify coping skills, available support systems and cultural and spiritual values  2. Provide emotional support, including active listening and acknowledgement of concerns of patient and caregivers  3. Reduce environmental  stimuli, as able  4. Instruct patient/family in relaxation techniques, as appropriate  5. Assess for spiritual pain/suffering and initiate Spiritual Care, Psychosocial Clinical Specialist consults as needed  Outcome: Progressing     Problem: Decision Making  Goal: Pt/Family able to effectively weigh alternatives and participate in decision making related to treatment and care  Description: INTERVENTIONS:  1. Determine when there are differences between patient's view, family's view, and healthcare provider's view of condition  2. Facilitate patient and family articulation of goals for care  3. Help patient and family identify pros/cons of alternative solutions  4. Provide information as requested by patient/family  5. Respect patient/family right to receive or not to receive information  6. Serve as a liaison between patient and family and health care team  7. Initiate Consults from Ethics, Palliative Care or initiate Family Care Conference as is appropriate  Outcome: Progressing     Problem: Confusion  Goal: Confusion, delirium, dementia, or psychosis is improved or at baseline  Description: INTERVENTIONS:  1. Assess for possible contributors to thought disturbance, including medications, impaired vision or hearing, underlying metabolic abnormalities, dehydration, psychiatric diagnoses, and notify attending LIP  2. [place] high risk fall precautions, as indicated  3. Provide frequent short contacts to provide reality reorientation, refocusing and direction  4. Decrease environmental stimuli, including noise as appropriate  5. Monitor and intervene to maintain adequate nutrition, hydration, elimination, sleep and activity  6. If unable to ensure safety without constant attention obtain sitter and review sitter guidelines with assigned personnel  7. Initiate Psychosocial CNS and Spiritual Care consult, as indicated  Outcome: Progressing     Problem: Neurosensory - Adult  Goal: Achieves stable or improved

## 2025-03-09 NOTE — PROGRESS NOTES
PT eval order received and acknowledged. PT eval attempted at 1400; pt agreeable to complete social history and PLOF, however declined to attempt OOB mobility at this time d/t nausea. Pt educated on the importance of OOB mobility to promote increased overall functional strength, however to continued to decline despite education. Will continue to follow patient and attempt PT eval at a later time. Thank you.

## 2025-03-09 NOTE — PROGRESS NOTES
in the nursing home.  She tends to sleep most of the day.  She has had low appetite.  They state that she used to be able to walk around and do activities but this was changed to past 4 years.  She is not really responding to me resting much.  She is easily arousable and responds when called Karly per family.    3/7: Patient is able to tell me her name today.  She is answering some questions appropriately.  Mentation seems better.  Does report some palpitations.  Temperature called because heart rate was in the 150s to 160s.  Stat EKG ordered that showed A-fib with RVR.  Amiodarone placed    3/8: Appears to be doing better today.  Responding to questions.  Denies any pain.  Nurse at bedside with no concerns.    3/9: Patient seems better.  She knows she is in the hospital and able to tell me her name and date of birth.  Does not know year.  Denies any pain currently.  Nurse at bedside with no concerns.    However, later patient had episode of vomiting that was nonbilious nonbloody per sitter.  She also had an episode of diarrhea.  No fevers or abdominal pain.  Per nurse, resting comfortably.    Medications reviewed  Current Facility-Administered Medications   Medication Dose Route Frequency    sevelamer (RENVELA) tablet 1,600 mg  1,600 mg Oral TID WC    amLODIPine (NORVASC) tablet 5 mg  5 mg Oral Daily    amiodarone (CORDARONE) tablet 200 mg  200 mg Oral Daily    apixaban (ELIQUIS) tablet 5 mg  5 mg Oral BID    diphenhydrAMINE (BENADRYL) injection 25 mg  25 mg IntraVENous PRN    [Held by provider] aspirin EC tablet 81 mg  81 mg Oral Daily    ipratropium 0.5 mg-albuterol 2.5 mg (DUONEB) nebulizer solution 1 Dose  1 Dose Inhalation Q6H WA RT    heparin (porcine) injection 2,200 Units  2,200 Units IntraCATHeter PRN    [Held by provider] metoprolol tartrate (LOPRESSOR) tablet 12.5 mg  12.5 mg Oral BID    metoprolol (LOPRESSOR) injection 5 mg  5 mg IntraVENous Q6H PRN    cinacalcet (SENSIPAR) tablet 30 mg  30 mg Oral Daily     sodium chloride flush 0.9 % injection 5-40 mL  5-40 mL IntraVENous 2 times per day    sodium chloride flush 0.9 % injection 5-40 mL  5-40 mL IntraVENous PRN    0.9 % sodium chloride infusion   IntraVENous PRN    ondansetron (ZOFRAN-ODT) disintegrating tablet 4 mg  4 mg Oral Q8H PRN    Or    ondansetron (ZOFRAN) injection 4 mg  4 mg IntraVENous Q6H PRN    polyethylene glycol (GLYCOLAX) packet 17 g  17 g Oral Daily PRN    acetaminophen (TYLENOL) tablet 650 mg  650 mg Oral Q6H PRN    Or    acetaminophen (TYLENOL) suppository 650 mg  650 mg Rectal Q6H PRN    insulin lispro (HUMALOG,ADMELOG) injection vial 0-8 Units  0-8 Units SubCUTAneous 4x Daily AC & HS    glucose chewable tablet 16 g  4 tablet Oral PRN    dextrose bolus 10% 125 mL  125 mL IntraVENous PRN    Or    dextrose bolus 10% 250 mL  250 mL IntraVENous PRN    glucagon injection 1 mg  1 mg SubCUTAneous PRN    dextrose 10 % infusion   IntraVENous Continuous PRN    hydrALAZINE (APRESOLINE) injection 10 mg  10 mg IntraVENous Q6H PRN       Review of Systems:   Pertinent items are noted in HPI.    Objective:   Physical Exam:     BP (!) 144/82   Pulse 71   Temp 97.3 °F (36.3 °C) (Oral)   Resp 18   Ht 1.676 m (5' 6\")   Wt 78 kg (171 lb 15.3 oz)   SpO2 99%   BMI 27.75 kg/m²  O2 Flow Rate (L/min): 3 L/min O2 Device: Nasal cannula    Temp (24hrs), Av.7 °F (36.5 °C), Min:97.3 °F (36.3 °C), Max:98.1 °F (36.7 °C)    No intake/output data recorded.    1901 -  0700  In: 1027.2 [P.O.:260; I.V.:167.2]  Out: 2550 [Urine:450]    Mode Rate TV Press PEEP FiO2 PIP Min. Vent               28 %            General; awake, alert, able to tell me her name and date of birth  Cardiovascular: IRR. S1, S2 present. No murmurs noted.   Respiratory: Normal respiratory effort. Breath sounds CTA in all lung fields b/l. No retractions noted  Abdomen: BS+. Soft, nontender. No TTP in all quadrants. No guarding or rigidity   Lower Extremity: No edema   Neurological: CN III-XII

## 2025-03-09 NOTE — PROGRESS NOTES
Renal Progress Note    Patient: Antonella Albert MRN: [id number]  SSN: [id number]    YOB: 1955  Age: 70 y.o.  Sex: female      Admit Date: 3/2/2025    LOS: 6 days     Subjective:   Patient seen on HD, tolerating well  More awake, verbally inteactive and cheerful  Confusion+  Electrolytes stable.  Currently with temp cath.    Current Facility-Administered Medications   Medication Dose Route Frequency    amiodarone (CORDARONE) tablet 200 mg  200 mg Oral Daily    apixaban (ELIQUIS) tablet 5 mg  5 mg Oral BID    sevelamer (RENVELA) tablet 800 mg  800 mg Oral TID WC    diphenhydrAMINE (BENADRYL) injection 25 mg  25 mg IntraVENous PRN    [Held by provider] aspirin EC tablet 81 mg  81 mg Oral Daily    [Held by provider] hydrALAZINE (APRESOLINE) tablet 50 mg  50 mg Oral 3 times per day    [Held by provider] hydrALAZINE (APRESOLINE) injection 10 mg  10 mg IntraVENous Q8H    metoprolol (LOPRESSOR) injection 5 mg  5 mg IntraVENous Q12H    ipratropium 0.5 mg-albuterol 2.5 mg (DUONEB) nebulizer solution 1 Dose  1 Dose Inhalation Q6H WA RT    heparin (porcine) injection 2,200 Units  2,200 Units IntraCATHeter PRN    [Held by provider] metoprolol tartrate (LOPRESSOR) tablet 12.5 mg  12.5 mg Oral BID    metoprolol (LOPRESSOR) injection 5 mg  5 mg IntraVENous Q6H PRN    cinacalcet (SENSIPAR) tablet 30 mg  30 mg Oral Daily    sodium chloride flush 0.9 % injection 5-40 mL  5-40 mL IntraVENous 2 times per day    sodium chloride flush 0.9 % injection 5-40 mL  5-40 mL IntraVENous PRN    0.9 % sodium chloride infusion   IntraVENous PRN    ondansetron (ZOFRAN-ODT) disintegrating tablet 4 mg  4 mg Oral Q8H PRN    Or    ondansetron (ZOFRAN) injection 4 mg  4 mg IntraVENous Q6H PRN    polyethylene glycol (GLYCOLAX) packet 17 g  17 g Oral Daily PRN    acetaminophen (TYLENOL) tablet 650 mg  650 mg Oral Q6H PRN    Or    acetaminophen (TYLENOL) suppository 650 mg  650 mg Rectal Q6H PRN    insulin lispro (HUMALOG,ADMELOG) injection

## 2025-03-09 NOTE — PROGRESS NOTES
Renal Progress Note    Patient: Antonella Albert MRN: [id number]  SSN: [id number]    YOB: 1955  Age: 70 y.o.  Sex: female      Admit Date: 3/2/2025    LOS: 7 days     Subjective:   Patient seen at bedside  awake, Confusion with AMS  Electrolytes stable.      Current Facility-Administered Medications   Medication Dose Route Frequency    amiodarone (CORDARONE) tablet 200 mg  200 mg Oral Daily    apixaban (ELIQUIS) tablet 5 mg  5 mg Oral BID    sevelamer (RENVELA) tablet 800 mg  800 mg Oral TID WC    diphenhydrAMINE (BENADRYL) injection 25 mg  25 mg IntraVENous PRN    [Held by provider] aspirin EC tablet 81 mg  81 mg Oral Daily    [Held by provider] hydrALAZINE (APRESOLINE) tablet 50 mg  50 mg Oral 3 times per day    [Held by provider] hydrALAZINE (APRESOLINE) injection 10 mg  10 mg IntraVENous Q8H    metoprolol (LOPRESSOR) injection 5 mg  5 mg IntraVENous Q12H    ipratropium 0.5 mg-albuterol 2.5 mg (DUONEB) nebulizer solution 1 Dose  1 Dose Inhalation Q6H WA RT    heparin (porcine) injection 2,200 Units  2,200 Units IntraCATHeter PRN    [Held by provider] metoprolol tartrate (LOPRESSOR) tablet 12.5 mg  12.5 mg Oral BID    metoprolol (LOPRESSOR) injection 5 mg  5 mg IntraVENous Q6H PRN    cinacalcet (SENSIPAR) tablet 30 mg  30 mg Oral Daily    sodium chloride flush 0.9 % injection 5-40 mL  5-40 mL IntraVENous 2 times per day    sodium chloride flush 0.9 % injection 5-40 mL  5-40 mL IntraVENous PRN    0.9 % sodium chloride infusion   IntraVENous PRN    ondansetron (ZOFRAN-ODT) disintegrating tablet 4 mg  4 mg Oral Q8H PRN    Or    ondansetron (ZOFRAN) injection 4 mg  4 mg IntraVENous Q6H PRN    polyethylene glycol (GLYCOLAX) packet 17 g  17 g Oral Daily PRN    acetaminophen (TYLENOL) tablet 650 mg  650 mg Oral Q6H PRN    Or    acetaminophen (TYLENOL) suppository 650 mg  650 mg Rectal Q6H PRN    insulin lispro (HUMALOG,ADMELOG) injection vial 0-8 Units  0-8 Units SubCUTAneous 4x Daily AC & HS    glucose  2.6 - 4.7 mg/dL    Albumin 2.8 (L) 3.5 - 5.0 g/dL   POCT Glucose    Collection Time: 03/09/25  9:13 AM   Result Value Ref Range    POC Glucose 167 (H) 65 - 100 mg/dL    Performed by: Alphonso Rodriguez (Float [place])    POCT Glucose    Collection Time: 03/09/25 12:27 PM   Result Value Ref Range    POC Glucose 205 (H) 65 - 100 mg/dL    Performed by: Alphonso Rodriguez (Float Pool)         Assessment and Plan:     #1 BRIANNE on CKD 5:  Pt deemed ESRD now and intiatated on HD  -She presented with creatinine 6.2.  BUN 47.  Increase creat to 8.9    Patient had fluid overload and was started on diuretics  Increasing creatinine noted, altered mental status with confusion    Initiated on maintenance hemodialysis,   Seen on hemodialysis today , tolerating well  Consulted interventional radiology for permacath placement    case management made arrangements for outpatient hemodialysis placement in [place] as patient currently residing in a nursing home in [place]  Will continue maintenance dialysis on TTS schedule    #2  Stage V chronic kidney disease: Probably related to diabetic nephropathy, urine PCR is suggestive of significant nephrotic range proteinuria probably related to diabetic nephropathy    #3  Hypertension  Blood pressures stable  Start amlodipine at 5 mg po daily, monitor blood pressures    #4 Anemia  Stable hemoglobin severe iron deficiency noted with low iron saturation of 13%  Recommend to give IV Ferrlecit 125 mg IV daily for 4 days  Monitor H&H    #5  Renal osteodystrophy  Calcium is okay   Hyperphosphatemia: Phosphorus is 9.7--8.0, increased Renvela 1600 mg TID WM  Secondary hyperparathyroidism: on Sensipar 30 mg p.o. daily   Vitamin D level is high at 78, not on any vitamin D supplements now    #6  Shortness of breath: Probably related to   volume overload/COPD exacerbation  Received dexamethasone prior to transfer along with Lasix   Improved respiratory status with IV diuretics and

## 2025-03-10 LAB
ALBUMIN SERPL-MCNC: 2.9 G/DL (ref 3.5–5)
ANION GAP SERPL CALC-SCNC: 11 MMOL/L (ref 2–12)
BUN SERPL-MCNC: 69 MG/DL (ref 6–20)
BUN/CREAT SERPL: 8 (ref 12–20)
CA-I BLD-MCNC: 9.6 MG/DL (ref 8.5–10.1)
CHLORIDE SERPL-SCNC: 95 MMOL/L (ref 97–108)
CO2 SERPL-SCNC: 27 MMOL/L (ref 21–32)
CREAT SERPL-MCNC: 8.51 MG/DL (ref 0.55–1.02)
GLUCOSE BLD STRIP.AUTO-MCNC: 139 MG/DL (ref 65–100)
GLUCOSE BLD STRIP.AUTO-MCNC: 143 MG/DL (ref 65–100)
GLUCOSE BLD STRIP.AUTO-MCNC: 148 MG/DL (ref 65–100)
GLUCOSE BLD STRIP.AUTO-MCNC: 195 MG/DL (ref 65–100)
GLUCOSE SERPL-MCNC: 145 MG/DL (ref 65–100)
PERFORMED BY:: ABNORMAL
PHOSPHATE SERPL-MCNC: 9.4 MG/DL (ref 2.6–4.7)
PHOSPHATE SERPL-MCNC: 9.4 MG/DL (ref 2.6–4.7)
POTASSIUM SERPL-SCNC: 4.1 MMOL/L (ref 3.5–5.1)
SODIUM SERPL-SCNC: 133 MMOL/L (ref 136–145)

## 2025-03-10 PROCEDURE — 6370000000 HC RX 637 (ALT 250 FOR IP): Performed by: INTERNAL MEDICINE

## 2025-03-10 PROCEDURE — 6360000002 HC RX W HCPCS: Performed by: INTERNAL MEDICINE

## 2025-03-10 PROCEDURE — 94761 N-INVAS EAR/PLS OXIMETRY MLT: CPT

## 2025-03-10 PROCEDURE — 84100 ASSAY OF PHOSPHORUS: CPT

## 2025-03-10 PROCEDURE — 80069 RENAL FUNCTION PANEL: CPT

## 2025-03-10 PROCEDURE — 6370000000 HC RX 637 (ALT 250 FOR IP)

## 2025-03-10 PROCEDURE — 82962 GLUCOSE BLOOD TEST: CPT

## 2025-03-10 PROCEDURE — 94640 AIRWAY INHALATION TREATMENT: CPT

## 2025-03-10 PROCEDURE — 90935 HEMODIALYSIS ONE EVALUATION: CPT

## 2025-03-10 PROCEDURE — 2709999900 HC NON-CHARGEABLE SUPPLY

## 2025-03-10 PROCEDURE — 97161 PT EVAL LOW COMPLEX 20 MIN: CPT

## 2025-03-10 PROCEDURE — 2500000003 HC RX 250 WO HCPCS: Performed by: NURSE PRACTITIONER

## 2025-03-10 PROCEDURE — 36415 COLL VENOUS BLD VENIPUNCTURE: CPT

## 2025-03-10 PROCEDURE — 2060000000 HC ICU INTERMEDIATE R&B

## 2025-03-10 PROCEDURE — 2700000000 HC OXYGEN THERAPY PER DAY

## 2025-03-10 PROCEDURE — 97530 THERAPEUTIC ACTIVITIES: CPT

## 2025-03-10 PROCEDURE — 6370000000 HC RX 637 (ALT 250 FOR IP): Performed by: PHYSICIAN ASSISTANT

## 2025-03-10 RX ORDER — IPRATROPIUM BROMIDE AND ALBUTEROL SULFATE 2.5; .5 MG/3ML; MG/3ML
1 SOLUTION RESPIRATORY (INHALATION) EVERY 4 HOURS PRN
Status: DISCONTINUED | OUTPATIENT
Start: 2025-03-10 | End: 2025-03-11 | Stop reason: HOSPADM

## 2025-03-10 RX ORDER — METOPROLOL TARTRATE 1 MG/ML
5 INJECTION, SOLUTION INTRAVENOUS EVERY 6 HOURS PRN
Status: DISCONTINUED | OUTPATIENT
Start: 2025-03-10 | End: 2025-03-11 | Stop reason: HOSPADM

## 2025-03-10 RX ADMIN — HEPARIN SODIUM 2200 UNITS: 1000 INJECTION INTRAVENOUS; SUBCUTANEOUS at 16:36

## 2025-03-10 RX ADMIN — CINACALCET HYDROCHLORIDE 30 MG: 30 TABLET, FILM COATED ORAL at 10:13

## 2025-03-10 RX ADMIN — AMLODIPINE BESYLATE 5 MG: 5 TABLET ORAL at 10:11

## 2025-03-10 RX ADMIN — AMIODARONE HYDROCHLORIDE 200 MG: 200 TABLET ORAL at 10:11

## 2025-03-10 RX ADMIN — SODIUM CHLORIDE, PRESERVATIVE FREE 10 ML: 5 INJECTION INTRAVENOUS at 21:35

## 2025-03-10 RX ADMIN — IPRATROPIUM BROMIDE AND ALBUTEROL SULFATE 1 DOSE: 2.5; .5 SOLUTION RESPIRATORY (INHALATION) at 07:57

## 2025-03-10 RX ADMIN — SEVELAMER CARBONATE 1600 MG: 800 TABLET, FILM COATED ORAL at 10:11

## 2025-03-10 RX ADMIN — APIXABAN 5 MG: 5 TABLET, FILM COATED ORAL at 21:34

## 2025-03-10 RX ADMIN — SODIUM CHLORIDE, PRESERVATIVE FREE 10 ML: 5 INJECTION INTRAVENOUS at 10:21

## 2025-03-10 NOTE — PROGRESS NOTES
Handover report given to JENNIFER Stokes of [facility]. Patient went down for HD session, perm cath insertion will be done tomorrow. Patient's belongings is now at room 410, informed telemetry staff and HD staff about the room transfer. Updated patient's sister, Mari Sweet. Informed Divya to call virtual sitter once patient is back to her new room.    1737H patient came back to [facility] due to Afib heart rate running 130's per telemetry staff while patient is on HD session, Dr. Fraire informed. Patient's HR stable now, Dr. Fraire added PRN IV Metoprolol for high HR as ordered.

## 2025-03-10 NOTE — DIALYSIS
Patient had 3 hour dialysis and tolerated 1L fluid removal.    Received call from Buffy of telemetry, pt is on Afib with HR 130s-150s. Called report to Dr. Ortiz, ordered to terminate dialysis.    Buffy of telemetry made aware patient is en route to [facility] - Room 410.    1620 - JENNIFER Stokes of [facility], patient is not going to [facility] but going back to [facility] instead because of HR issues and Afib.    JENNIFER Ortega of [facility] is still waiting for Room 464 to be cleaned. Room not yet ready to receive patient hence, advised to send patient to [facility] since she already gave the report and will get the patient once Room 464 is ready.    1630 - called back JENNIFER Stokes she said she will go to [facility] and will give me a call back once she sorted which bed patient is going post dialysis.    1700 - patient had medium BM. Soft brown stool noted. BM care dprovided and bed linens changed. Received call from JENNIFER Stokes. Patient is going to [facility].

## 2025-03-10 NOTE — PLAN OF CARE
PHYSICAL THERAPY EVALUATION  Patient: Antonella Albert (70 y.o. female)  Date: 3/10/2025  Primary Diagnosis: Acute on chronic hypoxic respiratory failure (HCC) [J96.21]       Precautions: Fall Risk                      Recommendations for nursing mobility: Encourage HEP in prep for ADLs/mobility; see handout for details, Frequent repositioning to prevent skin breakdown, Use of bed/chair alarm for safety, LE elevation for management of edema, and AD and gt belt for bed to chair     In place during session: Peripheral IV, External Catheter, and EKG/telemetry     ASSESSMENT  Pt is a 70 y.o. female admitted on 3/2/2025 for SOB; pt currently being treated for acute on chronic . Pt semi supine upon PT arrival, agreeable to evaluation. Pt A&O x 4.  Patient is from LT facility.    Based on the objective data described below, the patient currently presents with decreased independence in ADLs, impaired ability to perform high-level IADLs, impaired strength, decreased activity tolerance, poor safety awareness, impaired balance, and impaired posture. (See below for objective details and assist levels).     Overall pt tolerated session fair/poor today with PT. Pt required min to mod assist for bed mobility and transfers. Pt amb 3 feet with RW, gt belt and min to mod assist; demonstrates decreased gen strength. Pt will benefit from continued skilled PT to address above deficits and return to PLOF. Current PT DC recommendation Moderate intensity short-term skilled physical therapy up to 5x/week once medically appropriate.     Start of Session End of Session   SPO2 (%) 100%@2 L 100%   Heart Rate (BPM) 75 80     GOALS:    Problem: Physical Therapy - Adult  Goal: By Discharge: Performs mobility at highest level of function for planned discharge setting.  See evaluation for individualized goals.  Description: FUNCTIONAL STATUS PRIOR TO ADMISSION: Patient from LT,reported she was able to amb.    HOME SUPPORT PRIOR TO ADMISSION:  Determination   History Examination Presentation Decision-Making   LOW Complexity : Zero comorbidities / personal factors that will impact the outcome / POC LOW Complexity : 1-2 Standardized tests and measures addressing body structure, function, activity limitation and / or participation in recreation  LOW Complexity : Stable, uncomplicated  Other outcome measures [facility] 6  LOW      Based on the above components, the patient evaluation is determined to be of the following complexity level: LOW    Pain Ratin/10   Pain Intervention(s):       Activity Tolerance:   Fair  and Poor    After treatment patient left in no apparent distress:   Bed locked and in lowest position Patient left in no apparent distress in bed, Call bell within reach, Bed/ chair alarm activated, Caregiver / family present, and Side rails x3 and nsg updated.    COMMUNICATION/EDUCATION:   The patient’s plan of care was discussed with: Registered nurse and Case management    Patient Education  Education Given To: Patient  Education Provided: Role of Therapy;Plan of Care;Transfer Training;Fall Prevention Strategies;Home Exercise Program;Family Education;Precautions;Energy Conservation  Education Method: Demonstration;Verbal;Teach Back  Education Outcome: Verbalized understanding;Continued education needed         Thank you for this referral.  Shelly Fraire, PT  Minutes: 20

## 2025-03-10 NOTE — PROGRESS NOTES
Hospitalist Progress Note               Daily Progress Note: 3/10/2025      Hospital Day: 9     Chief complaint: No chief complaint on file.       Hospital course to date:     This is a 70-year-old female who initially presented to [facility] ER from nursing home due to progressively worsening shortness of breath for the last 3 days. She was reportedly started on oxygen due to hypoxia on Thursday and her symptoms continue to worsen since then. Patient also reports decreased urine output for the last few days. She denied any chest pain or abdominal pain. Denied vomiting or diarrhea. She was able to tell me her name, date of birth and current year but was disoriented otherwise. Chest x-ray was concerning for bilateral pulmonary edema. Lasix was given prior to transfer to us but she did not make any urine. She was placed on BiPAP for increased work of breathing. She is currently weaned off of BiPAP and is on HFNC at the time of my exam. Labs are concerning for creatinine of 6.29, BUN 47, proBNP 7367, WBC count 15.8. She is also found to be hypertensive with systolic in the 180s. Transferred to floors from  3/3.    3/4 post hemodialysis patient was fast asleep, no response to voice and sternal rub, stat CT head ordered.  Spoke with family at bedside who stated that this is around patient's baseline specially when she is lethargic and tired.  Assessed with bedside RN.    3/5 patient's mentation significantly improved in afternoon assessed with bedside RN, asked for food, SLP to evaluate swallow and order diet.  Spoke with nephrologist Dr. Poole and stated that if patient's mental status does not significantly improve can consult neurology tomorrow.    3/6  Patient is able to tell me her name.  She is able to recognize her brother-in-law and sister at bedside.  She tends to respond to them more and smiles.  On talking to the family, they state that patient has been like this for the past 6 months since being  replacement and close serial monitoring  [x] SQ Insulin SS- monitoring serial FSBS for Hypoglycemic adverse drug reaction  [] Other -   [] Change in code status:    [] Decision to escalate care:    [] Major surgery/procedure with associated risk factors:    ----------------------------------------------------------------------  C. Data (any 2)  [] Discussed current management and discharge planning options with Case Management.  [x] Discussed management of the case with:  icu attending  [] Telemetry personally reviewed and interpreted as documented above    [x] Imaging personally reviewed and interpreted, includes:    [x] Data Review (any 3)  [x] All available Consultant notes from yesterday/today were reviewed  [x] All current labs were reviewed and interpreted for clinical significance   [] Appropriate follow-up labs were ordered  [] Collateral history obtained from:         Assessment & Plan:    Acute hypoxic respiratory failure 2/2 fluid overload 2/2 CHF exacerbation, resolved   HFpEF exacerbation  -CXR pulmonary edema  -admitted to  ICU for requiring BiPAP and now on NC  -Echo 3/2/25 revealed an EF of 55 to 60%. Mild hypo-kinesis is of mid anterosptal. Normal wall thickness.Diastolic dysfunction present with increased LAP within normal LV EF  -strict intake and output, daily weights  -continue to wean oxygen as tolerated  -Cardiology consult, appreciate recommendations  -Receiving HD. Nephro following and appreciate recommendations    Atrial fibrillation with RVR, new diagnosis   -Originally, patient was unable to take oral medications and was started on Lopressor  -EKG showed A-fib with RVR as heart rates were up to 150s but now rate controlled  -Echo 3/2: EF 55 to 60%, mild hypokinesis, diastolic dysfunction, porcine bioprosthetic valve  -Amio drip switched to PO amiodarone   -Started on Eliquis by Cardiology and will monitor for signs of bleeding       BRIANNE on CKD and now progressed to ESRD  creatinine

## 2025-03-10 NOTE — PROGRESS NOTES
Renal Progress Note    Patient: Antonella Albert MRN: [id number]  SSN: [id number]    YOB: 1955  Age: 70 y.o.  Sex: female      Admit Date: 3/2/2025    LOS: 8 days     Subjective:   Patient seen at bedside and in dialysis, tolerating treatment.     Temp cath in place, will need Permacath prior to discharge.     Current Facility-Administered Medications   Medication Dose Route Frequency    sevelamer (RENVELA) tablet 1,600 mg  1,600 mg Oral TID WC    amLODIPine (NORVASC) tablet 5 mg  5 mg Oral Daily    amiodarone (CORDARONE) tablet 200 mg  200 mg Oral Daily    apixaban (ELIQUIS) tablet 5 mg  5 mg Oral BID    diphenhydrAMINE (BENADRYL) injection 25 mg  25 mg IntraVENous PRN    [Held by provider] aspirin EC tablet 81 mg  81 mg Oral Daily    ipratropium 0.5 mg-albuterol 2.5 mg (DUONEB) nebulizer solution 1 Dose  1 Dose Inhalation Q6H WA RT    heparin (porcine) injection 2,200 Units  2,200 Units IntraCATHeter PRN    [Held by provider] metoprolol tartrate (LOPRESSOR) tablet 12.5 mg  12.5 mg Oral BID    metoprolol (LOPRESSOR) injection 5 mg  5 mg IntraVENous Q6H PRN    cinacalcet (SENSIPAR) tablet 30 mg  30 mg Oral Daily    sodium chloride flush 0.9 % injection 5-40 mL  5-40 mL IntraVENous 2 times per day    sodium chloride flush 0.9 % injection 5-40 mL  5-40 mL IntraVENous PRN    0.9 % sodium chloride infusion   IntraVENous PRN    ondansetron (ZOFRAN-ODT) disintegrating tablet 4 mg  4 mg Oral Q8H PRN    Or    ondansetron (ZOFRAN) injection 4 mg  4 mg IntraVENous Q6H PRN    polyethylene glycol (GLYCOLAX) packet 17 g  17 g Oral Daily PRN    acetaminophen (TYLENOL) tablet 650 mg  650 mg Oral Q6H PRN    Or    acetaminophen (TYLENOL) suppository 650 mg  650 mg Rectal Q6H PRN    insulin lispro (HUMALOG,ADMELOG) injection vial 0-8 Units  0-8 Units SubCUTAneous 4x Daily AC & HS    glucose chewable tablet 16 g  4 tablet Oral PRN    dextrose bolus 10% 125 mL  125 mL IntraVENous PRN    Or    dextrose bolus 10% 250 mL

## 2025-03-10 NOTE — CARE COORDINATION
Patient not able to d/c today due to 1:1, has now been removed, will d/c tomorrow, have notified LTC facility, [facility], and dialysis center, [facility].

## 2025-03-11 ENCOUNTER — APPOINTMENT (OUTPATIENT)
Facility: HOSPITAL | Age: 70
DRG: 280 | End: 2025-03-11
Attending: STUDENT IN AN ORGANIZED HEALTH CARE EDUCATION/TRAINING PROGRAM
Payer: MEDICARE

## 2025-03-11 VITALS
SYSTOLIC BLOOD PRESSURE: 130 MMHG | RESPIRATION RATE: 18 BRPM | DIASTOLIC BLOOD PRESSURE: 79 MMHG | OXYGEN SATURATION: 91 % | BODY MASS INDEX: 26.57 KG/M2 | HEIGHT: 66 IN | HEART RATE: 77 BPM | TEMPERATURE: 97.9 F | WEIGHT: 165.34 LBS

## 2025-03-11 LAB
ALBUMIN SERPL-MCNC: 2.8 G/DL (ref 3.5–5)
ANION GAP SERPL CALC-SCNC: 14 MMOL/L (ref 2–12)
BUN SERPL-MCNC: 48 MG/DL (ref 6–20)
BUN/CREAT SERPL: 7 (ref 12–20)
CA-I BLD-MCNC: 8.7 MG/DL (ref 8.5–10.1)
CHLORIDE SERPL-SCNC: 98 MMOL/L (ref 97–108)
CO2 SERPL-SCNC: 23 MMOL/L (ref 21–32)
CREAT SERPL-MCNC: 7.37 MG/DL (ref 0.55–1.02)
ERYTHROCYTE [DISTWIDTH] IN BLOOD BY AUTOMATED COUNT: 14.6 % (ref 11.5–14.5)
GLUCOSE BLD STRIP.AUTO-MCNC: 133 MG/DL (ref 65–100)
GLUCOSE BLD STRIP.AUTO-MCNC: 139 MG/DL (ref 65–100)
GLUCOSE SERPL-MCNC: 125 MG/DL (ref 65–100)
HCT VFR BLD AUTO: 41.9 % (ref 35–47)
HGB BLD-MCNC: 13 G/DL (ref 11.5–16)
MCH RBC QN AUTO: 25.3 PG (ref 26–34)
MCHC RBC AUTO-ENTMCNC: 31 G/DL (ref 30–36.5)
MCV RBC AUTO: 81.7 FL (ref 80–99)
NRBC # BLD: 0 K/UL (ref 0–0.01)
NRBC BLD-RTO: 0 PER 100 WBC
PERFORMED BY:: ABNORMAL
PERFORMED BY:: ABNORMAL
PHOSPHATE SERPL-MCNC: 8.5 MG/DL (ref 2.6–4.7)
PLATELET # BLD AUTO: 516 K/UL (ref 150–400)
PMV BLD AUTO: 10.5 FL (ref 8.9–12.9)
POTASSIUM SERPL-SCNC: 4.5 MMOL/L (ref 3.5–5.1)
RBC # BLD AUTO: 5.13 M/UL (ref 3.8–5.2)
SODIUM SERPL-SCNC: 135 MMOL/L (ref 136–145)
WBC # BLD AUTO: 14.9 K/UL (ref 3.6–11)

## 2025-03-11 PROCEDURE — 85027 COMPLETE CBC AUTOMATED: CPT

## 2025-03-11 PROCEDURE — C1750 CATH, HEMODIALYSIS,LONG-TERM: HCPCS

## 2025-03-11 PROCEDURE — 94761 N-INVAS EAR/PLS OXIMETRY MLT: CPT

## 2025-03-11 PROCEDURE — 80069 RENAL FUNCTION PANEL: CPT

## 2025-03-11 PROCEDURE — 02H633Z INSERTION OF INFUSION DEVICE INTO RIGHT ATRIUM, PERCUTANEOUS APPROACH: ICD-10-PCS

## 2025-03-11 PROCEDURE — 2500000003 HC RX 250 WO HCPCS: Performed by: NURSE PRACTITIONER

## 2025-03-11 PROCEDURE — 6360000002 HC RX W HCPCS: Performed by: NURSE PRACTITIONER

## 2025-03-11 PROCEDURE — 6370000000 HC RX 637 (ALT 250 FOR IP): Performed by: INTERNAL MEDICINE

## 2025-03-11 PROCEDURE — 36558 INSERT TUNNELED CV CATH: CPT

## 2025-03-11 PROCEDURE — 6370000000 HC RX 637 (ALT 250 FOR IP)

## 2025-03-11 PROCEDURE — 2700000000 HC OXYGEN THERAPY PER DAY

## 2025-03-11 PROCEDURE — 82962 GLUCOSE BLOOD TEST: CPT

## 2025-03-11 PROCEDURE — 6360000002 HC RX W HCPCS

## 2025-03-11 PROCEDURE — 2500000003 HC RX 250 WO HCPCS

## 2025-03-11 PROCEDURE — 0JH63XZ INSERTION OF TUNNELED VASCULAR ACCESS DEVICE INTO CHEST SUBCUTANEOUS TISSUE AND FASCIA, PERCUTANEOUS APPROACH: ICD-10-PCS

## 2025-03-11 PROCEDURE — 6360000002 HC RX W HCPCS: Performed by: RADIOLOGY

## 2025-03-11 PROCEDURE — 92526 ORAL FUNCTION THERAPY: CPT

## 2025-03-11 PROCEDURE — 36415 COLL VENOUS BLD VENIPUNCTURE: CPT

## 2025-03-11 RX ORDER — AMIODARONE HYDROCHLORIDE 200 MG/1
200 TABLET ORAL DAILY
Qty: 30 TABLET | Refills: 0 | Status: SHIPPED
Start: 2025-03-11

## 2025-03-11 RX ORDER — SEVELAMER CARBONATE 0.8 G/1
0.8 POWDER, FOR SUSPENSION ORAL
Qty: 270 EACH | Refills: 0 | Status: SHIPPED | OUTPATIENT
Start: 2025-03-11

## 2025-03-11 RX ORDER — PANTOPRAZOLE SODIUM 40 MG/1
40 TABLET, DELAYED RELEASE ORAL
Qty: 30 TABLET | Refills: 3 | Status: SHIPPED | OUTPATIENT
Start: 2025-03-11

## 2025-03-11 RX ORDER — PANTOPRAZOLE SODIUM 40 MG/1
40 TABLET, DELAYED RELEASE ORAL
Status: DISCONTINUED | OUTPATIENT
Start: 2025-03-11 | End: 2025-03-11 | Stop reason: HOSPADM

## 2025-03-11 RX ORDER — FENTANYL CITRATE 50 UG/ML
INJECTION, SOLUTION INTRAMUSCULAR; INTRAVENOUS PRN
Status: COMPLETED | OUTPATIENT
Start: 2025-03-11 | End: 2025-03-11

## 2025-03-11 RX ORDER — INSULIN GLARGINE 100 [IU]/ML
15 INJECTION, SOLUTION SUBCUTANEOUS NIGHTLY
Qty: 10 ML | Refills: 3 | Status: SHIPPED | OUTPATIENT
Start: 2025-03-11

## 2025-03-11 RX ORDER — CINACALCET 30 MG/1
30 TABLET, FILM COATED ORAL DAILY
Qty: 30 TABLET | Refills: 3 | Status: SHIPPED | OUTPATIENT
Start: 2025-03-12

## 2025-03-11 RX ORDER — PANTOPRAZOLE SODIUM 40 MG/1
40 TABLET, DELAYED RELEASE ORAL
Status: DISCONTINUED | OUTPATIENT
Start: 2025-03-12 | End: 2025-03-11

## 2025-03-11 RX ORDER — SEVELAMER CARBONATE 0.8 G/1
1.6 POWDER, FOR SUSPENSION ORAL
Status: DISCONTINUED | OUTPATIENT
Start: 2025-03-11 | End: 2025-03-11 | Stop reason: HOSPADM

## 2025-03-11 RX ORDER — SEVELAMER CARBONATE 800 MG/1
1600 TABLET, FILM COATED ORAL
Qty: 90 TABLET | Refills: 3 | Status: SHIPPED | OUTPATIENT
Start: 2025-03-11 | End: 2025-03-11 | Stop reason: HOSPADM

## 2025-03-11 RX ADMIN — AMIODARONE HYDROCHLORIDE 200 MG: 200 TABLET ORAL at 11:59

## 2025-03-11 RX ADMIN — CEFAZOLIN 2000 MG: 1 INJECTION, POWDER, FOR SOLUTION INTRAMUSCULAR; INTRAVENOUS at 08:56

## 2025-03-11 RX ADMIN — METOPROLOL TARTRATE 5 MG: 5 INJECTION INTRAVENOUS at 03:11

## 2025-03-11 RX ADMIN — CINACALCET HYDROCHLORIDE 30 MG: 30 TABLET, FILM COATED ORAL at 11:59

## 2025-03-11 RX ADMIN — ONDANSETRON 4 MG: 2 INJECTION, SOLUTION INTRAMUSCULAR; INTRAVENOUS at 10:12

## 2025-03-11 RX ADMIN — FENTANYL CITRATE 25 MCG: 50 INJECTION INTRAMUSCULAR; INTRAVENOUS at 09:03

## 2025-03-11 RX ADMIN — PANTOPRAZOLE SODIUM 40 MG: 40 TABLET, DELAYED RELEASE ORAL at 15:05

## 2025-03-11 RX ADMIN — SODIUM CHLORIDE, PRESERVATIVE FREE 10 ML: 5 INJECTION INTRAVENOUS at 10:12

## 2025-03-11 RX ADMIN — FENTANYL CITRATE 25 MCG: 50 INJECTION INTRAMUSCULAR; INTRAVENOUS at 09:08

## 2025-03-11 RX ADMIN — SEVELAMER CARBONATE 1600 MG: 800 TABLET, FILM COATED ORAL at 11:59

## 2025-03-11 NOTE — PROGRESS NOTES
GERD (gastroesophageal reflux disease)     High cholesterol     Hypertension     Ill-defined condition     patient states hemorrhage behind Left eye-following Dr. Gallego    MI (myocardial infarction) ([facility]) 06/2018    per patient    Neuropathy     Osteoarthritis     Restless leg     Schizoaffective disorder ([facility])     Spinal stenosis, lumbar region without neurogenic claudication      Past Surgical History:   Procedure Laterality Date    BACK SURGERY  08/30/2018    BREAST BIOPSY Left 02/03/2017    BREAST CANCER    BREAST LUMPECTOMY Left     CARDIAC CATHETERIZATION  07/2018    no stents    CATARACT REMOVAL Right     IR NONTUNNELED VASCULAR CATHETER > 5 YEARS  3/3/2025    IR NONTUNNELED VASCULAR CATHETER > 5 YEARS 3/3/2025 Reno Fraire MD [facility] ANGIO IR    TOTAL KNEE ARTHROPLASTY       Prior Level of Function/Home Situation:   Social/Functional History  Type of Home: Facility  Prior Level of Assist for ADLs: Needs assistance  Prior Level of Assist for Homemaking: Needs assistance  Prior Level of Assist for Transfers: Needs assistance    Cognitive and Communication Status:  Neurologic State: Alert  Orientation Level: Oriented to person and Oriented to place  Cognition: Follows commands, Decreased attention/concentration, Impaired decision making, and Poor safety awareness        Dysphagia:  Oral Assessment:     P.O. Trials:     Thin, regular solids    After Treatment:  Patient left in no apparent distress in bed, Call bell left within reach, Nursing notified, Caregiver present, Bed alarm activated, and Updated patient's board with:  diet recommendations and aspiration precautions     Pain:  VAS (numerical) 0    COMMUNICATION/EDUCATION:   Swallow safety precautions, Aspiration precautions, GERD precautions, Diet recommendations, Compensatory strategies, and Prognosis and SLP POC education provided to Patient, Family, and Nurse via explanation and teach back, all questions/concerns addressed. Patient requires

## 2025-03-11 NOTE — PROGRESS NOTES
1400H Family (at bedside) and patient at bedside informed about the discharge back to [facility]. Handover report given by NOD to GERALDO Bland who will be receiving the patient. Patient care given, CHG bath, gowns and linens change, diapers attached. Patient's IV and telemetry box removed. new Perm cath (at right SCV) dressing changed aseptically, applied quick clot dressing over the site since it was leaking with minimal amount of blood. Eliquis AM dose held per Dr. Jackson.    1510H transport came, Perm cath site dressing clean dry and intact. Report given to the transport staff, patient on 1Lpm oxygen support via nasal cannula. Patient is alert to self, fully awake and verbal. Discharged patient to [facility] via stretcher. Family at bedside took patient's personal belongings.

## 2025-03-11 NOTE — PLAN OF CARE
Problem: Chronic Conditions and Co-morbidities  Goal: Patient's chronic conditions and co-morbidity symptoms are monitored and maintained or improved  3/11/2025 1124 by Shannon Regalado, RN  Outcome: Progressing  3/11/2025 0013 by Facundo Busch RN  Outcome: Progressing     Problem: Discharge Planning  Goal: Discharge to home or other facility with appropriate resources  Outcome: Progressing     Problem: Skin/Tissue Integrity  Goal: Skin integrity remains intact  Description: 1.  Monitor for areas of redness and/or skin breakdown  2.  Assess vascular access sites hourly  3.  Every 4-6 hours minimum:  Change oxygen saturation probe site  4.  Every 4-6 hours:  If on nasal continuous positive airway pressure, respiratory therapy assess nares and determine need for appliance change or resting period  Outcome: Progressing     Problem: ABCDS Injury Assessment  Goal: Absence of physical injury  Outcome: Progressing     Problem: Safety - Adult  Goal: Free from fall injury  Outcome: Progressing     Problem: Coping  Goal: Pt/Family able to verbalize concerns and demonstrate effective coping strategies  Description: INTERVENTIONS:  1. Assist patient/family to identify coping skills, available support systems and cultural and spiritual values  2. Provide emotional support, including active listening and acknowledgement of concerns of patient and caregivers  3. Reduce environmental stimuli, as able  4. Instruct patient/family in relaxation techniques, as appropriate  5. Assess for spiritual pain/suffering and initiate Spiritual Care, Psychosocial Clinical Specialist consults as needed  Outcome: Progressing     Problem: Decision Making  Goal: Pt/Family able to effectively weigh alternatives and participate in decision making related to treatment and care  Description: INTERVENTIONS:  1. Determine when there are differences between patient's view, family's view, and healthcare provider's view of condition  2. Facilitate

## 2025-03-11 NOTE — DISCHARGE SUMMARY
Physician Discharge Summary     Patient ID:    Antonella Albert  [id number]  70 y.o.  1955    Admit date: 3/2/2025    Discharge date : 3/11/2025      Final Diagnoses:   Acute on chronic hypoxic respiratory failure (HCC) [J96.21]  Acute on chronic heart failure with preserved EF  BRIANNE on CKD, now progressed to ESRD and initiated on hemodialysis  Metabolic encephalopathy  Chronic leukocytosis  Essential hypertension  Diabetes mellitus type 2  History of breast cancer  Anemia of chronic kidney disease  Paroxysmal atrial fibrillation with RVR  Secondary hypercoagulable state    Reason for Hospitalization and Hospital course:  This is a 70-year-old female who initially presented to [facility] ER from nursing home due to progressively worsening shortness of breath for the last 3 days. She was reportedly started on oxygen due to hypoxia on Thursday and her symptoms continue to worsen since then. Patient also reports decreased urine output for the last few days. She denied any chest pain or abdominal pain. Denied vomiting or diarrhea. She was able to tell me her name, date of birth and current year but was disoriented otherwise. Chest x-ray was concerning for bilateral pulmonary edema. Lasix was given prior to transfer to us but she did not make any urine. She was placed on BiPAP for increased work of breathing.      Initially admitted to the ICU.  She was subsequently weaned off of BiPAP and is on HFNC.  Labs are concerning for creatinine of 6.29, BUN 47, proBNP 7367, WBC count 15.8. She is also found to be hypertensive with systolic in the 180s. Transferred to floors from ICU 3/3.     3/4 post hemodialysis patient was fast asleep, no response to voice and sternal rub, stat CT head ordered.  Spoke with family at bedside who stated that this is around patient's baseline specially when she is lethargic and tired.  Assessed with bedside RN.     3/5 patient's mentation significantly improved in

## 2025-03-11 NOTE — PROGRESS NOTES
Progress Note      3/11/2025 9:49 AM  NAME: Antonella Albert   MRN:  [id number]   Admit Diagnosis: Acute on chronic hypoxic respiratory failure (HCC) [J96.21]          Assessment/Plan:   A-fib with RVR, on amiodarone.  TSH 2.51 on this admission.  Normal liver function on this admission.  Maintaining sinus rhythm, continue Eliquis.    Mild troponin leak, flat, likely secondary to chronic kidney disease.  Negative cardiac cath in 2017.  Wall motion abnormality on echo on this admission.  Patient denied any chest pain.  Troponin have normalized with ? initiation of dialysis.    History of COPD, diabetes, hypertension, dyslipidemia and chronic kidney disease, now on dialysis    Hemoglobin A1c 8.1 on this admission.  Check lipids.         []       High complexity decision making was performed in this patient at high risk for decompensation with multiple organ involvement.    Subjective:     Antonella Albert with open eyes, does not answer questions today.  Discussed with RN events overnight.     Review of Systems:    Symptom Y/N Comments  Symptom Y/N Comments   Fever/Chills N   Chest Pain N    Poor Appetite N   Edema N    Cough N   Abdominal Pain N    Sputum N   Joint Pain N    SOB/WAY N   Pruritis/Rash N    Nausea/vomit N   Tolerating PT/OT Y    Diarrhea N   Tolerating Diet Y    Constipation N   Other       Could NOT obtain due to:      Objective:      Physical Exam:    Last 24hrs VS reviewed since prior progress note. Most recent are:    /84   Pulse (!) 123 Comment:   Temp 97.9 °F (36.6 °C) (Oral)   Resp 16   Ht 1.676 m (5' 6\")   Wt 75 kg (165 lb 5.5 oz)   SpO2 100%   BMI 26.69 kg/m²     Intake/Output Summary (Last 24 hours) at 3/11/2025 0949  Last data filed at 3/10/2025 1615  Gross per 24 hour   Intake 602.2 ml   Output 1690 ml   Net -1087.8 ml        General Appearance: Well developed, well nourished, alert; no acute distress.  Ears/Nose/Mouth/Throat: Not answering questions today.  Neck:

## 2025-03-11 NOTE — PROGRESS NOTES
CM noted discharge order, but patient transferred back to [facility] yesterday after dialysis due to converting to afib with a rate of 130-150. Patient is followed by cardiology.     Once stable, patient will return to [facility] and rehab where she is [facility] resident. She will also be getting dialysis at [facility]. Transportation arrangements will need to be made.     1320 pm  CM in to speak to patient. Her brother and BASIM in the room with her. Patient verbalizes she is ready to go back to [facility]. Several questions asked by brother and BASIM and answered by CM.     Patient will require transport back to facility.     Advance Care Planning     General Advance Care Planning (ACP) Conversation    Date of Conversation: 3/11/2025  Conducted with: Patient with Decision Making Capacity  Other persons present: Brother      Healthcare Decision Maker:   Primary Decision Maker: Lissette Foley - Brother/Sister - [phone]       Content/Action Overview:  Has ACP document(s) NOT on file - requested patient to provide  Reviewed DNR/DNI and patient elects Full Code (Attempt Resuscitation)        Length of Voluntary ACP Conversation in minutes:  <16 minutes (Non-Billable)    Paula Patel, RN

## 2025-03-11 NOTE — PROGRESS NOTES
Renal Progress Note    Patient: Antonella Albert MRN: [id number]  SSN: [id number]    YOB: 1955  Age: 70 y.o.  Sex: female      Admit Date: 3/2/2025    LOS: 9 days     Subjective:   Patient seen at bedside, resting with eyes closed, appears comfortable.   Permacath in place.  Scheduled for discharge today.    Current Facility-Administered Medications   Medication Dose Route Frequency    ipratropium 0.5 mg-albuterol 2.5 mg (DUONEB) nebulizer solution 1 Dose  1 Dose Inhalation Q4H PRN    metoprolol (LOPRESSOR) injection 5 mg  5 mg IntraVENous Q6H PRN    sevelamer (RENVELA) tablet 1,600 mg  1,600 mg Oral TID WC    [Held by provider] amLODIPine (NORVASC) tablet 5 mg  5 mg Oral Daily    amiodarone (CORDARONE) tablet 200 mg  200 mg Oral Daily    apixaban (ELIQUIS) tablet 5 mg  5 mg Oral BID    diphenhydrAMINE (BENADRYL) injection 25 mg  25 mg IntraVENous PRN    [Held by provider] aspirin EC tablet 81 mg  81 mg Oral Daily    heparin (porcine) injection 2,200 Units  2,200 Units IntraCATHeter PRN    [Held by provider] metoprolol tartrate (LOPRESSOR) tablet 12.5 mg  12.5 mg Oral BID    metoprolol (LOPRESSOR) injection 5 mg  5 mg IntraVENous Q6H PRN    cinacalcet (SENSIPAR) tablet 30 mg  30 mg Oral Daily    sodium chloride flush 0.9 % injection 5-40 mL  5-40 mL IntraVENous 2 times per day    sodium chloride flush 0.9 % injection 5-40 mL  5-40 mL IntraVENous PRN    0.9 % sodium chloride infusion   IntraVENous PRN    ondansetron (ZOFRAN-ODT) disintegrating tablet 4 mg  4 mg Oral Q8H PRN    Or    ondansetron (ZOFRAN) injection 4 mg  4 mg IntraVENous Q6H PRN    polyethylene glycol (GLYCOLAX) packet 17 g  17 g Oral Daily PRN    acetaminophen (TYLENOL) tablet 650 mg  650 mg Oral Q6H PRN    Or    acetaminophen (TYLENOL) suppository 650 mg  650 mg Rectal Q6H PRN    insulin lispro (HUMALOG,ADMELOG) injection vial 0-8 Units  0-8 Units SubCUTAneous 4x Daily AC & HS    glucose chewable tablet 16 g  4 tablet Oral PRN

## 2025-03-11 NOTE — PROGRESS NOTES
Hospitalist Progress Note               Daily Progress Note: 3/11/2025      Hospital Day: 10     Chief complaint: No chief complaint on file.       Hospital course to date:     This is a 70-year-old female who initially presented to [facility] ER from nursing home due to progressively worsening shortness of breath for the last 3 days. She was reportedly started on oxygen due to hypoxia on Thursday and her symptoms continue to worsen since then. Patient also reports decreased urine output for the last few days. She denied any chest pain or abdominal pain. Denied vomiting or diarrhea. She was able to tell me her name, date of birth and current year but was disoriented otherwise. Chest x-ray was concerning for bilateral pulmonary edema. Lasix was given prior to transfer to us but she did not make any urine. She was placed on BiPAP for increased work of breathing. She is currently weaned off of BiPAP and is on HFNC at the time of my exam. Labs are concerning for creatinine of 6.29, BUN 47, proBNP 7367, WBC count 15.8. She is also found to be hypertensive with systolic in the 180s. Transferred to floors from  3/3.    3/4 post hemodialysis patient was fast asleep, no response to voice and sternal rub, stat CT head ordered.  Spoke with family at bedside who stated that this is around patient's baseline specially when she is lethargic and tired.  Assessed with bedside RN.    3/5 patient's mentation significantly improved in afternoon assessed with bedside RN, asked for food, SLP to evaluate swallow and order diet.  Spoke with nephrologist Dr. Poole and stated that if patient's mental status does not significantly improve can consult neurology tomorrow.    3/6  Patient is able to tell me her name.  She is able to recognize her brother-in-law and sister at bedside.  She tends to respond to them more and smiles.  On talking to the family, they state that patient has been like this for the past 6 months since  internal jugular temporary dialysis catheter.   The catheter will be ready for use following confirmation with chest radiograph.         Electronically signed by CODY PARR      US RETROPERITONEAL COMPLETE   Final Result      No hydronephrosis. 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         Electronically signed by Delonte Heard      IR ULTRASOUND GUIDANCE VASCULAR ACCESS    (Results Pending)   IR FLUORO GUIDED CVA DEVICE PLACEMENT    (Results Pending)   IR TUNNELED CVC PLACE WO SQ PORT/PUMP > 5 YEARS    (Results Pending)          Discussion/MDM:     [x] High (any 2)    A. Problems (any 1)  [x] Acute/Chronic Illness/injury posing threat to life or bodily function:    [] Severe exacerbation of chronic illness:    ---------------------------------------------------------------------  B. Risk of Treatment (any 1)   [x] Drugs/treatments that require intensive monitoring for toxicity include:    [] IV ABX requiring serial renal monitoring for nephrotoxicity:     [] IV Narcotic analgesia for adverse drug reaction  [] Aggressive IV diuresis requiring serial monitoring for renal impairment and electrolyte derangements  [] Critical electrolyte abnormalities requiring IV replacement and close serial monitoring  [x] SQ Insulin SS- monitoring serial FSBS for Hypoglycemic adverse drug reaction  [] Other -   [] Change in code status:    [] Decision to escalate care:    [] Major surgery/procedure with associated risk factors:    ----------------------------------------------------------------------  C. Data (any 2)  [] Discussed current management and discharge planning options with Case Management.  [x] Discussed management of the case with:  icu attending  [] Telemetry personally reviewed and interpreted as documented above    [x] Imaging personally reviewed and interpreted, includes:    [x] Data Review (any 3)  [x] All available Consultant notes from yesterday/today were reviewed  [x] All current labs were

## 2025-03-11 NOTE — PROGRESS NOTES
Right IJ tunneled  permcath placed under live fluoro and ready for immediate use. Report called to primary nurse

## 2025-03-12 ENCOUNTER — APPOINTMENT (OUTPATIENT)
Facility: HOSPITAL | Age: 70
End: 2025-03-12
Payer: MEDICARE

## 2025-03-12 ENCOUNTER — HOSPITAL ENCOUNTER (EMERGENCY)
Facility: HOSPITAL | Age: 70
Discharge: ANOTHER HOSPITAL | End: 2025-03-13
Attending: EMERGENCY MEDICINE
Payer: MEDICARE

## 2025-03-12 VITALS
SYSTOLIC BLOOD PRESSURE: 126 MMHG | DIASTOLIC BLOOD PRESSURE: 56 MMHG | BODY MASS INDEX: 26.52 KG/M2 | RESPIRATION RATE: 14 BRPM | HEIGHT: 66 IN | OXYGEN SATURATION: 99 % | HEART RATE: 78 BPM | WEIGHT: 165 LBS | TEMPERATURE: 98.1 F

## 2025-03-12 DIAGNOSIS — R00.1 BRADYCARDIA: Primary | ICD-10-CM

## 2025-03-12 LAB
ALBUMIN SERPL-MCNC: 2.7 G/DL (ref 3.5–5)
ALBUMIN/GLOB SERPL: 0.7 (ref 1.1–2.2)
ALP SERPL-CCNC: 76 U/L (ref 45–117)
ALT SERPL-CCNC: 9 U/L (ref 12–78)
ANION GAP SERPL CALC-SCNC: 13 MMOL/L (ref 2–12)
AST SERPL W P-5'-P-CCNC: 11 U/L (ref 15–37)
BASOPHILS # BLD: 0.08 K/UL (ref 0–0.1)
BASOPHILS NFR BLD: 0.5 % (ref 0–1)
BILIRUB SERPL-MCNC: 0.3 MG/DL (ref 0.2–1)
BUN SERPL-MCNC: 81 MG/DL (ref 6–20)
BUN/CREAT SERPL: 8 (ref 12–20)
CA-I BLD-MCNC: 8.2 MG/DL (ref 8.5–10.1)
CHLORIDE SERPL-SCNC: 97 MMOL/L (ref 97–108)
CO2 SERPL-SCNC: 23 MMOL/L (ref 21–32)
CREAT SERPL-MCNC: 10.78 MG/DL (ref 0.55–1.02)
DIFFERENTIAL METHOD BLD: ABNORMAL
EOSINOPHIL # BLD: 0.09 K/UL (ref 0–0.4)
EOSINOPHIL NFR BLD: 0.6 % (ref 0–7)
ERYTHROCYTE [DISTWIDTH] IN BLOOD BY AUTOMATED COUNT: 14.6 % (ref 11.5–14.5)
GLOBULIN SER CALC-MCNC: 3.9 G/DL (ref 2–4)
GLUCOSE SERPL-MCNC: 197 MG/DL (ref 65–100)
HCT VFR BLD AUTO: 37.5 % (ref 35–47)
HGB BLD-MCNC: 12.2 G/DL (ref 11.5–16)
IMM GRANULOCYTES # BLD AUTO: 0.16 K/UL (ref 0–0.04)
IMM GRANULOCYTES NFR BLD AUTO: 1.1 % (ref 0–0.5)
LYMPHOCYTES # BLD: 1.38 K/UL (ref 0.8–3.5)
LYMPHOCYTES NFR BLD: 9.1 % (ref 12–49)
MAGNESIUM SERPL-MCNC: 2.9 MG/DL (ref 1.6–2.4)
MCH RBC QN AUTO: 26.2 PG (ref 26–34)
MCHC RBC AUTO-ENTMCNC: 32.5 G/DL (ref 30–36.5)
MCV RBC AUTO: 80.5 FL (ref 80–99)
MONOCYTES # BLD: 1.35 K/UL (ref 0–1)
MONOCYTES NFR BLD: 8.9 % (ref 5–13)
NEUTS SEG # BLD: 12.11 K/UL (ref 1.8–8)
NEUTS SEG NFR BLD: 79.8 % (ref 32–75)
NRBC # BLD: 0 K/UL (ref 0–0.01)
NRBC BLD-RTO: 0 PER 100 WBC
PLATELET # BLD AUTO: 475 K/UL (ref 150–400)
PMV BLD AUTO: 9.9 FL (ref 8.9–12.9)
POTASSIUM SERPL-SCNC: 4.9 MMOL/L (ref 3.5–5.1)
PROT SERPL-MCNC: 6.6 G/DL (ref 6.4–8.2)
RBC # BLD AUTO: 4.66 M/UL (ref 3.8–5.2)
SODIUM SERPL-SCNC: 133 MMOL/L (ref 136–145)
TROPONIN I SERPL HS-MCNC: 51 NG/L (ref 0–51)
TROPONIN I SERPL HS-MCNC: 58 NG/L (ref 0–51)
WBC # BLD AUTO: 15.2 K/UL (ref 3.6–11)

## 2025-03-12 PROCEDURE — 36415 COLL VENOUS BLD VENIPUNCTURE: CPT

## 2025-03-12 PROCEDURE — 99285 EMERGENCY DEPT VISIT HI MDM: CPT

## 2025-03-12 PROCEDURE — 84484 ASSAY OF TROPONIN QUANT: CPT

## 2025-03-12 PROCEDURE — 80053 COMPREHEN METABOLIC PANEL: CPT

## 2025-03-12 PROCEDURE — 85025 COMPLETE CBC W/AUTO DIFF WBC: CPT

## 2025-03-12 PROCEDURE — 93005 ELECTROCARDIOGRAM TRACING: CPT | Performed by: EMERGENCY MEDICINE

## 2025-03-12 PROCEDURE — 83735 ASSAY OF MAGNESIUM: CPT

## 2025-03-12 PROCEDURE — 71045 X-RAY EXAM CHEST 1 VIEW: CPT

## 2025-03-12 ASSESSMENT — LIFESTYLE VARIABLES
HOW OFTEN DO YOU HAVE A DRINK CONTAINING ALCOHOL: PATIENT UNABLE TO ANSWER
HOW MANY STANDARD DRINKS CONTAINING ALCOHOL DO YOU HAVE ON A TYPICAL DAY: PATIENT UNABLE TO ANSWER

## 2025-03-12 NOTE — ED PROVIDER NOTES
[facility] EMERGENCY DEPT  EMERGENCY DEPARTMENT HISTORY AND PHYSICAL EXAM      Date: 3/12/2025  Patient Name: Antonella Albert  MRN: [id number]  YOB: 1955  Date of evaluation: 3/12/2025  Provider: Sirena Coleman MD   Note Started: 12:15 PM EDT 3/12/25    HISTORY OF PRESENT ILLNESS     Chief Complaint   Patient presents with    Bradycardia       History Provided By: Patient    HPI: Antonella Albert is a 70 y.o. female     PAST MEDICAL HISTORY   Past Medical History:  Past Medical History:   Diagnosis Date    Anxiety     Arthritis     spinal stenosis    Breast CA (HCC) 2017    Left Breast (chemo/radiation)    Chronic kidney disease     stage 4    COPD (chronic obstructive pulmonary disease) ([facility])     Dementia ([facility])     Depression     Diabetes ([facility])     Type 2    Edema     legs and ankles    GERD (gastroesophageal reflux disease)     High cholesterol     Hypertension     Ill-defined condition     patient states hemorrhage behind Left eye-following Dr. Lashonda YU (myocardial infarction) ([facility]) 06/2018    per patient    Neuropathy     Osteoarthritis     Restless leg     Schizoaffective disorder ([facility])     Spinal stenosis, lumbar region without neurogenic claudication        Past Surgical History:  Past Surgical History:   Procedure Laterality Date    BACK SURGERY  08/30/2018    BREAST BIOPSY Left 02/03/2017    BREAST CANCER    BREAST LUMPECTOMY Left     CARDIAC CATHETERIZATION  07/2018    no stents    CATARACT REMOVAL Right     IR NONTUNNELED VASCULAR CATHETER > 5 YEARS  3/3/2025    IR NONTUNNELED VASCULAR CATHETER > 5 YEARS 3/3/2025 Reno Fraire MD [facility]    IR TUNNELED CVC PLACE WO SQ PORT/PUMP > 5 YEARS  3/11/2025    IR TUNNELED CVC PLACE WO SQ PORT/PUMP > 5 YEARS 3/11/2025 Moira Hayward PA-C [facility]    TOTAL KNEE ARTHROPLASTY         Family History:  Family History   Problem Relation Age of Onset    Heart Disease Sister     Heart Failure Father     Hypertension Mother

## 2025-03-12 NOTE — ED TRIAGE NOTES
Pt is a resident of SNF and was sent by dialysis. Today was pts first dialysis tx and she received none of her tx due to facility reporting pulse in 30s and pt was more altered to them. Pt presents with vitals WNL. Nonverbal but arousable. EMS not sure of pts baseline mentation. PT chronically on 2L via nc for hx of COPD

## 2025-03-12 NOTE — ED NOTES
Pt family states they would like pt to be transferred to [facility] so she may be admitted to [facility] nursing home. Advised family of transfer process and availability of services. Understanding shown. MD notified of families request.

## 2025-03-12 NOTE — ED NOTES
Pt family frustrated and tearful onto returning to patient room. Writer was present in room applying warm blanket and dimming lights for patient. Family updated on transfer status of [facility] pending bed assignment. Family states they do not want to drive to [place] because of traffic and are not willing to ride via ems transport to be with pt. Family states hospital needs to \"tighten up\" and she \"will take patient home if she has to\". Educated patient family on reasoning of transport. Family states they want her to be discharge to nursing facility and receive dialysis tomorrow. Pt family not receptive to education at this time. MD notified of patient family wishes.

## 2025-03-13 ENCOUNTER — HOSPITAL ENCOUNTER (INPATIENT)
Facility: HOSPITAL | Age: 70
LOS: 8 days | Discharge: NURSING, REHAB OR LONG-TERM CARE | DRG: 228 | End: 2025-03-21
Attending: EMERGENCY MEDICINE | Admitting: FAMILY MEDICINE
Payer: MEDICARE

## 2025-03-13 DIAGNOSIS — N18.6 ESRD ON DIALYSIS (HCC): ICD-10-CM

## 2025-03-13 DIAGNOSIS — R00.1 BRADYCARDIA: Primary | ICD-10-CM

## 2025-03-13 DIAGNOSIS — Z99.2 ESRD ON DIALYSIS (HCC): ICD-10-CM

## 2025-03-13 LAB
ALBUMIN SERPL-MCNC: 2.6 G/DL (ref 3.5–5)
ALBUMIN/GLOB SERPL: 0.6 (ref 1.1–2.2)
ALP SERPL-CCNC: 73 U/L (ref 45–117)
ALT SERPL-CCNC: 6 U/L (ref 12–78)
ANION GAP SERPL CALC-SCNC: 14 MMOL/L (ref 2–12)
ANION GAP SERPL CALC-SCNC: 17 MMOL/L (ref 2–12)
AST SERPL W P-5'-P-CCNC: 13 U/L (ref 15–37)
BILIRUB SERPL-MCNC: 0.3 MG/DL (ref 0.2–1)
BUN SERPL-MCNC: 83 MG/DL (ref 6–20)
BUN SERPL-MCNC: 86 MG/DL (ref 6–20)
BUN/CREAT SERPL: 7 (ref 12–20)
BUN/CREAT SERPL: 7 (ref 12–20)
CA-I BLD-MCNC: 8.5 MG/DL (ref 8.5–10.1)
CA-I BLD-MCNC: 8.7 MG/DL (ref 8.5–10.1)
CHLORIDE SERPL-SCNC: 99 MMOL/L (ref 97–108)
CHLORIDE SERPL-SCNC: 99 MMOL/L (ref 97–108)
CO2 SERPL-SCNC: 19 MMOL/L (ref 21–32)
CO2 SERPL-SCNC: 20 MMOL/L (ref 21–32)
CREAT SERPL-MCNC: 12.6 MG/DL (ref 0.55–1.02)
CREAT SERPL-MCNC: 12.8 MG/DL (ref 0.55–1.02)
EKG ATRIAL RATE: 114 BPM
EKG ATRIAL RATE: 138 BPM
EKG ATRIAL RATE: 38 BPM
EKG ATRIAL RATE: 80 BPM
EKG DIAGNOSIS: NORMAL
EKG P AXIS: 70 DEGREES
EKG P AXIS: 73 DEGREES
EKG P AXIS: 86 DEGREES
EKG P-R INTERVAL: 150 MS
EKG P-R INTERVAL: 154 MS
EKG P-R INTERVAL: 160 MS
EKG Q-T INTERVAL: 340 MS
EKG Q-T INTERVAL: 364 MS
EKG Q-T INTERVAL: 419 MS
EKG Q-T INTERVAL: 460 MS
EKG QRS DURATION: 156 MS
EKG QRS DURATION: 158 MS
EKG QRS DURATION: 158 MS
EKG QRS DURATION: 171 MS
EKG QTC CALCULATION (BAZETT): 496 MS
EKG QTC CALCULATION (BAZETT): 501 MS
EKG QTC CALCULATION (BAZETT): 519 MS
EKG QTC CALCULATION (BAZETT): 530 MS
EKG R AXIS: -37 DEGREES
EKG R AXIS: -55 DEGREES
EKG R AXIS: -63 DEGREES
EKG R AXIS: -73 DEGREES
EKG T AXIS: 106 DEGREES
EKG T AXIS: 107 DEGREES
EKG T AXIS: 159 DEGREES
EKG T AXIS: 87 DEGREES
EKG VENTRICULAR RATE: 114 BPM
EKG VENTRICULAR RATE: 140 BPM
EKG VENTRICULAR RATE: 80 BPM
EKG VENTRICULAR RATE: 84 BPM
EST. AVERAGE GLUCOSE BLD GHB EST-MCNC: 177 MG/DL
GLOBULIN SER CALC-MCNC: 4.1 G/DL (ref 2–4)
GLUCOSE BLD STRIP.AUTO-MCNC: 115 MG/DL (ref 65–100)
GLUCOSE BLD STRIP.AUTO-MCNC: 131 MG/DL (ref 65–100)
GLUCOSE BLD STRIP.AUTO-MCNC: 148 MG/DL (ref 65–100)
GLUCOSE BLD STRIP.AUTO-MCNC: 259 MG/DL (ref 65–100)
GLUCOSE SERPL-MCNC: 153 MG/DL (ref 65–100)
GLUCOSE SERPL-MCNC: 163 MG/DL (ref 65–100)
HBA1C MFR BLD: 7.8 % (ref 4–5.6)
PERFORMED BY:: ABNORMAL
POTASSIUM SERPL-SCNC: 5.3 MMOL/L (ref 3.5–5.1)
POTASSIUM SERPL-SCNC: 5.4 MMOL/L (ref 3.5–5.1)
POTASSIUM SERPL-SCNC: 5.8 MMOL/L (ref 3.5–5.1)
PROT SERPL-MCNC: 6.7 G/DL (ref 6.4–8.2)
SODIUM SERPL-SCNC: 133 MMOL/L (ref 136–145)
SODIUM SERPL-SCNC: 135 MMOL/L (ref 136–145)

## 2025-03-13 PROCEDURE — 82962 GLUCOSE BLOOD TEST: CPT

## 2025-03-13 PROCEDURE — G0257 UNSCHED DIALYSIS ESRD PT HOS: HCPCS

## 2025-03-13 PROCEDURE — 93005 ELECTROCARDIOGRAM TRACING: CPT | Performed by: FAMILY MEDICINE

## 2025-03-13 PROCEDURE — 83036 HEMOGLOBIN GLYCOSYLATED A1C: CPT

## 2025-03-13 PROCEDURE — 2500000003 HC RX 250 WO HCPCS: Performed by: FAMILY MEDICINE

## 2025-03-13 PROCEDURE — 93005 ELECTROCARDIOGRAM TRACING: CPT

## 2025-03-13 PROCEDURE — 84132 ASSAY OF SERUM POTASSIUM: CPT

## 2025-03-13 PROCEDURE — 36415 COLL VENOUS BLD VENIPUNCTURE: CPT

## 2025-03-13 PROCEDURE — 5A1D70Z PERFORMANCE OF URINARY FILTRATION, INTERMITTENT, LESS THAN 6 HOURS PER DAY: ICD-10-PCS

## 2025-03-13 PROCEDURE — 80053 COMPREHEN METABOLIC PANEL: CPT

## 2025-03-13 PROCEDURE — 2060000000 HC ICU INTERMEDIATE R&B

## 2025-03-13 PROCEDURE — 90935 HEMODIALYSIS ONE EVALUATION: CPT

## 2025-03-13 PROCEDURE — 93005 ELECTROCARDIOGRAM TRACING: CPT | Performed by: EMERGENCY MEDICINE

## 2025-03-13 PROCEDURE — 80048 BASIC METABOLIC PNL TOTAL CA: CPT

## 2025-03-13 PROCEDURE — 6360000002 HC RX W HCPCS: Performed by: INTERNAL MEDICINE

## 2025-03-13 PROCEDURE — 99285 EMERGENCY DEPT VISIT HI MDM: CPT

## 2025-03-13 PROCEDURE — P9047 ALBUMIN (HUMAN), 25%, 50ML: HCPCS

## 2025-03-13 PROCEDURE — 2709999900 HC NON-CHARGEABLE SUPPLY

## 2025-03-13 PROCEDURE — 2500000003 HC RX 250 WO HCPCS: Performed by: INTERNAL MEDICINE

## 2025-03-13 PROCEDURE — 6360000002 HC RX W HCPCS

## 2025-03-13 PROCEDURE — 6370000000 HC RX 637 (ALT 250 FOR IP): Performed by: FAMILY MEDICINE

## 2025-03-13 RX ORDER — SODIUM CHLORIDE 0.9 % (FLUSH) 0.9 %
5-40 SYRINGE (ML) INJECTION EVERY 12 HOURS SCHEDULED
Status: DISCONTINUED | OUTPATIENT
Start: 2025-03-13 | End: 2025-03-21 | Stop reason: HOSPADM

## 2025-03-13 RX ORDER — SODIUM CHLORIDE 9 MG/ML
INJECTION, SOLUTION INTRAVENOUS PRN
Status: DISCONTINUED | OUTPATIENT
Start: 2025-03-13 | End: 2025-03-21 | Stop reason: HOSPADM

## 2025-03-13 RX ORDER — DEXTROSE MONOHYDRATE 100 MG/ML
INJECTION, SOLUTION INTRAVENOUS CONTINUOUS PRN
Status: DISCONTINUED | OUTPATIENT
Start: 2025-03-13 | End: 2025-03-21 | Stop reason: HOSPADM

## 2025-03-13 RX ORDER — VENLAFAXINE HYDROCHLORIDE 75 MG/1
75 CAPSULE, EXTENDED RELEASE ORAL DAILY
Status: DISCONTINUED | OUTPATIENT
Start: 2025-03-13 | End: 2025-03-21 | Stop reason: HOSPADM

## 2025-03-13 RX ORDER — ONDANSETRON 4 MG/1
4 TABLET, ORALLY DISINTEGRATING ORAL EVERY 8 HOURS PRN
Status: DISCONTINUED | OUTPATIENT
Start: 2025-03-13 | End: 2025-03-21 | Stop reason: HOSPADM

## 2025-03-13 RX ORDER — ISOSORBIDE MONONITRATE 60 MG/1
60 TABLET, EXTENDED RELEASE ORAL DAILY
Status: DISCONTINUED | OUTPATIENT
Start: 2025-03-13 | End: 2025-03-21 | Stop reason: HOSPADM

## 2025-03-13 RX ORDER — SODIUM CHLORIDE 0.9 % (FLUSH) 0.9 %
5-40 SYRINGE (ML) INJECTION PRN
Status: DISCONTINUED | OUTPATIENT
Start: 2025-03-13 | End: 2025-03-21 | Stop reason: HOSPADM

## 2025-03-13 RX ORDER — SEVELAMER CARBONATE 0.8 G/1
0.8 POWDER, FOR SUSPENSION ORAL
Status: DISCONTINUED | OUTPATIENT
Start: 2025-03-13 | End: 2025-03-21 | Stop reason: HOSPADM

## 2025-03-13 RX ORDER — ROSUVASTATIN CALCIUM 20 MG/1
20 TABLET, COATED ORAL DAILY
Status: DISCONTINUED | OUTPATIENT
Start: 2025-03-13 | End: 2025-03-21 | Stop reason: HOSPADM

## 2025-03-13 RX ORDER — HEPARIN SODIUM 1000 [USP'U]/ML
3200 INJECTION, SOLUTION INTRAVENOUS; SUBCUTANEOUS PRN
Status: DISCONTINUED | OUTPATIENT
Start: 2025-03-13 | End: 2025-03-21 | Stop reason: HOSPADM

## 2025-03-13 RX ORDER — ACETAMINOPHEN 650 MG/1
650 SUPPOSITORY RECTAL EVERY 6 HOURS PRN
Status: DISCONTINUED | OUTPATIENT
Start: 2025-03-13 | End: 2025-03-21 | Stop reason: HOSPADM

## 2025-03-13 RX ORDER — FAMOTIDINE 20 MG/1
20 TABLET, FILM COATED ORAL DAILY
Status: DISCONTINUED | OUTPATIENT
Start: 2025-03-13 | End: 2025-03-21 | Stop reason: HOSPADM

## 2025-03-13 RX ORDER — ONDANSETRON 2 MG/ML
4 INJECTION INTRAMUSCULAR; INTRAVENOUS EVERY 6 HOURS PRN
Status: DISCONTINUED | OUTPATIENT
Start: 2025-03-13 | End: 2025-03-21 | Stop reason: HOSPADM

## 2025-03-13 RX ORDER — PANTOPRAZOLE SODIUM 40 MG/1
40 TABLET, DELAYED RELEASE ORAL
Status: DISCONTINUED | OUTPATIENT
Start: 2025-03-13 | End: 2025-03-21 | Stop reason: HOSPADM

## 2025-03-13 RX ORDER — CINACALCET 30 MG/1
30 TABLET, FILM COATED ORAL DAILY
Status: DISCONTINUED | OUTPATIENT
Start: 2025-03-13 | End: 2025-03-21 | Stop reason: HOSPADM

## 2025-03-13 RX ORDER — METOPROLOL TARTRATE 1 MG/ML
5 INJECTION, SOLUTION INTRAVENOUS EVERY 6 HOURS PRN
Status: DISCONTINUED | OUTPATIENT
Start: 2025-03-13 | End: 2025-03-15

## 2025-03-13 RX ORDER — AMLODIPINE BESYLATE 5 MG/1
10 TABLET ORAL DAILY
Status: DISCONTINUED | OUTPATIENT
Start: 2025-03-13 | End: 2025-03-14

## 2025-03-13 RX ORDER — ALBUMIN (HUMAN) 12.5 G/50ML
25 SOLUTION INTRAVENOUS ONCE
Status: COMPLETED | OUTPATIENT
Start: 2025-03-13 | End: 2025-03-13

## 2025-03-13 RX ORDER — GLUCAGON 1 MG/ML
1 KIT INJECTION PRN
Status: DISCONTINUED | OUTPATIENT
Start: 2025-03-13 | End: 2025-03-13 | Stop reason: SDUPTHER

## 2025-03-13 RX ORDER — GLUCAGON 1 MG/ML
1 KIT INJECTION PRN
Status: DISCONTINUED | OUTPATIENT
Start: 2025-03-13 | End: 2025-03-21 | Stop reason: HOSPADM

## 2025-03-13 RX ORDER — POLYETHYLENE GLYCOL 3350 17 G/17G
17 POWDER, FOR SOLUTION ORAL DAILY PRN
Status: DISCONTINUED | OUTPATIENT
Start: 2025-03-13 | End: 2025-03-21 | Stop reason: HOSPADM

## 2025-03-13 RX ORDER — INSULIN LISPRO 100 [IU]/ML
0-16 INJECTION, SOLUTION INTRAVENOUS; SUBCUTANEOUS
Status: DISCONTINUED | OUTPATIENT
Start: 2025-03-13 | End: 2025-03-21 | Stop reason: HOSPADM

## 2025-03-13 RX ORDER — ACETAMINOPHEN 325 MG/1
650 TABLET ORAL EVERY 6 HOURS PRN
Status: DISCONTINUED | OUTPATIENT
Start: 2025-03-13 | End: 2025-03-21 | Stop reason: HOSPADM

## 2025-03-13 RX ADMIN — APIXABAN 2.5 MG: 2.5 TABLET, FILM COATED ORAL at 13:32

## 2025-03-13 RX ADMIN — PANTOPRAZOLE SODIUM 40 MG: 40 TABLET, DELAYED RELEASE ORAL at 13:32

## 2025-03-13 RX ADMIN — APIXABAN 2.5 MG: 2.5 TABLET, FILM COATED ORAL at 20:17

## 2025-03-13 RX ADMIN — INSULIN LISPRO 8 UNITS: 100 INJECTION, SOLUTION INTRAVENOUS; SUBCUTANEOUS at 20:17

## 2025-03-13 RX ADMIN — HEPARIN SODIUM 3200 UNITS: 1000 INJECTION INTRAVENOUS; SUBCUTANEOUS at 12:30

## 2025-03-13 RX ADMIN — FAMOTIDINE 20 MG: 20 TABLET, FILM COATED ORAL at 13:32

## 2025-03-13 RX ADMIN — SEVELAMER CARBONATE 0.8 G: 800 POWDER, FOR SUSPENSION ORAL at 17:36

## 2025-03-13 RX ADMIN — ALBUMIN (HUMAN) 25 G: 0.25 INJECTION, SOLUTION INTRAVENOUS at 10:31

## 2025-03-13 RX ADMIN — SODIUM CHLORIDE, PRESERVATIVE FREE 10 ML: 5 INJECTION INTRAVENOUS at 20:17

## 2025-03-13 RX ADMIN — METOPROLOL TARTRATE 5 MG: 5 INJECTION INTRAVENOUS at 13:27

## 2025-03-13 ASSESSMENT — PAIN SCALES - GENERAL: PAINLEVEL_OUTOF10: 0

## 2025-03-13 NOTE — DIALYSIS
Dialyzed pt for 4hrs and removed 1L of fluids. Pt had an episode of hypotension in the middle of treatment and HR drops to 40's. NP Ondo made aware and ordered for albumin 25g to support BP. Dr. Hurst saw pt while on HD and informed about significant drop of pt's HR. Report given to Bebeto RODRIGUEZ in ED.

## 2025-03-13 NOTE — ED TRIAGE NOTES
Per EMS: pt here for a transfer from [facility]    Patient from a nursing home, sent to dialysis, found to be \"bradycardic\" and then sent to [facility]    Patient's SpO2 pulse reading low in the 30s-40s, but her ECG monitoring for all facilities have been a heart rate of 60s-70s

## 2025-03-13 NOTE — ED NOTES
Pt sister in law Herminia Rodriguez called for an update and to tell us that her heart rate fell to the 30s, she was also in a fib, she was altered, lethargic. Patient left here on Tuesday she was alert, and talking.

## 2025-03-13 NOTE — ED NOTES
TRANSFER - OUT REPORT:    Verbal report given to Cherrie Cole RN on Antonella Albert  being transferred to [facility] ED for routine progression of patient care       Report consisted of patient's Situation, Background, Assessment and   Recommendations(SBAR).     Information from the following report(s) ED Encounter Summary, ED SBAR, MAR, and Recent Results was reviewed with the receiving nurse.    [place] Fall Assessment:    Presents to emergency department  because of falls (Syncope, seizure, or loss of consciousness): No  Age > 70: Yes  Altered Mental Status, Intoxication with alcohol or substance confusion (Disorientation, impaired judgment, poor safety awaremess, or inability to follow instructions): Yes  Impaired Mobility: Ambulates or transfers with assistive devices or assistance; Unable to ambulate or transer.: Yes             Lines:   Peripheral IV 03/12/25 Left Antecubital (Active)        Opportunity for questions and clarification was provided.      Patient transported with:  O2 @ 2lpm

## 2025-03-13 NOTE — ED PROVIDER NOTES
given the following medications:  Medications - No data to display    CONSULTS: See ED Course/MDM for further details.  None     Social Determinants affecting Diagnosis/Treatment: None    Smoking Cessation: Not Applicable    PROCEDURES   Unless otherwise noted above, none  Procedures      CRITICAL CARE TIME   Patient does not meet Critical Care Time, 0 minutes    ED IMPRESSION     1. Bradycardia    2. ESRD on dialysis (HCC)          DISPOSITION/PLAN   DISPOSITION Decision To Admit 03/13/2025 12:59:09 AM   DISPOSITION CONDITION Stable        Admit Note: Pt is being admitted by Hospitalist . The results of their tests and reason(s) for their admission have been discussed with pt and/or available family. They convey agreement and understanding for the need to be admitted and for the admission diagnosis.          I am the Primary Clinician of Record. Minh Dodge MD (electronically signed)    (Please note that parts of this dictation were completed with voice recognition software. Quite often unanticipated grammatical, syntax, homophones, and other interpretive errors are inadvertently transcribed by the computer software. Please disregards these errors. Please excuse any errors that have escaped final proofreading.)     Minh Dodge MD  03/13/25 0103

## 2025-03-13 NOTE — ED NOTES
Called and spoke with transfer center in hopes of transferring patient to [facility] ED.

## 2025-03-13 NOTE — H&P
Hospitalist Admission Note    NAME: Antonella Albert   :  1955   MRN:  [id number]     Date/Time:  3/13/2025 1:21 AM    Patient PCP: Jose Eduardo Maier MD    ______________________________________________________________________  Given the patient's current clinical presentation, I have a high level of concern for decompensation if discharged from the emergency department.  Complex decision making was performed, which includes reviewing the patient's available past medical records, laboratory results, and x-ray films.       My assessment of this patient's clinical condition and my plan of care is as follows.    Assessment / Plan:    Bradycardia  Chronic respiratory failure  Chronic congestive heart failure with preserved ejection fraction  And stage renal disease on hemodialysis  Chronic leukocytosis  Hypertension  Type 2 diabetes  Chronic A-fib  Secondary hypercoagulable state      Patient was transferred from the other hospital floor because of bradycardia  While she is here heart rate is remained stable between 60 and 70  Patient resting in the bed no chest pain no shortness of breath  EKG shows bigeminy left bundle branch block    Will do cardiology consult/monitor heart rate  Will discontinue amiodarone  Continue other home medication  Consult nephrology for dialysis    Current Facility-Administered Medications:     amLODIPine (NORVASC) tablet 10 mg, 10 mg, Oral, Daily, Surjit Byrne MD    apixaban (ELIQUIS) tablet 2.5 mg, 2.5 mg, Oral, BID, Surjit Byrne MD    cinacalcet (SENSIPAR) tablet 30 mg, 30 mg, Oral, Daily, Surjit Byrne MD    famotidine (PEPCID) tablet 20 mg, 20 mg, Oral, BID, Surjit Byrne MD    isosorbide mononitrate (IMDUR) extended release tablet 60 mg, 60 mg, Oral, Daily, Surjit Byrne MD    pantoprazole (PROTONIX) tablet 40 mg, 40 mg, Oral, QA AC, Surjit Byrne MD    rosuvastatin (CRESTOR) tablet 20 mg, 20 mg, Oral, Daily, Surjit Byrne MD sevelamer

## 2025-03-13 NOTE — ED NOTES
Pt heart rate noted to be elevated 113 bpm. Dr. Byrne contacted at this time regarding change in heart rate.

## 2025-03-14 LAB
ALBUMIN SERPL-MCNC: 3.4 G/DL (ref 3.5–5)
ALBUMIN SERPL-MCNC: 3.4 G/DL (ref 3.5–5)
ALBUMIN/GLOB SERPL: 0.8 (ref 1.1–2.2)
ALP SERPL-CCNC: 73 U/L (ref 45–117)
ALT SERPL-CCNC: 7 U/L (ref 12–78)
ANION GAP SERPL CALC-SCNC: 11 MMOL/L (ref 2–12)
ANION GAP SERPL CALC-SCNC: 12 MMOL/L (ref 2–12)
AST SERPL W P-5'-P-CCNC: 14 U/L (ref 15–37)
BASOPHILS # BLD: 0.06 K/UL (ref 0–0.1)
BASOPHILS NFR BLD: 0.4 % (ref 0–1)
BILIRUB SERPL-MCNC: 0.4 MG/DL (ref 0.2–1)
BUN SERPL-MCNC: 34 MG/DL (ref 6–20)
BUN SERPL-MCNC: 34 MG/DL (ref 6–20)
BUN/CREAT SERPL: 5 (ref 12–20)
BUN/CREAT SERPL: 5 (ref 12–20)
CA-I BLD-MCNC: 9.2 MG/DL (ref 8.5–10.1)
CA-I BLD-MCNC: 9.3 MG/DL (ref 8.5–10.1)
CHLORIDE SERPL-SCNC: 97 MMOL/L (ref 97–108)
CHLORIDE SERPL-SCNC: 98 MMOL/L (ref 97–108)
CO2 SERPL-SCNC: 26 MMOL/L (ref 21–32)
CO2 SERPL-SCNC: 26 MMOL/L (ref 21–32)
CREAT SERPL-MCNC: 7.27 MG/DL (ref 0.55–1.02)
CREAT SERPL-MCNC: 7.28 MG/DL (ref 0.55–1.02)
DIFFERENTIAL METHOD BLD: ABNORMAL
EOSINOPHIL # BLD: 0.11 K/UL (ref 0–0.4)
EOSINOPHIL NFR BLD: 0.8 % (ref 0–7)
ERYTHROCYTE [DISTWIDTH] IN BLOOD BY AUTOMATED COUNT: 14.5 % (ref 11.5–14.5)
ERYTHROCYTE [DISTWIDTH] IN BLOOD BY AUTOMATED COUNT: 14.6 % (ref 11.5–14.5)
GLOBULIN SER CALC-MCNC: 4.1 G/DL (ref 2–4)
GLUCOSE BLD STRIP.AUTO-MCNC: 126 MG/DL (ref 65–100)
GLUCOSE BLD STRIP.AUTO-MCNC: 129 MG/DL (ref 65–100)
GLUCOSE BLD STRIP.AUTO-MCNC: 163 MG/DL (ref 65–100)
GLUCOSE BLD STRIP.AUTO-MCNC: 284 MG/DL (ref 65–100)
GLUCOSE SERPL-MCNC: 129 MG/DL (ref 65–100)
GLUCOSE SERPL-MCNC: 133 MG/DL (ref 65–100)
HCT VFR BLD AUTO: 36.9 % (ref 35–47)
HCT VFR BLD AUTO: 39.2 % (ref 35–47)
HGB BLD-MCNC: 11.7 G/DL (ref 11.5–16)
HGB BLD-MCNC: 12.5 G/DL (ref 11.5–16)
IMM GRANULOCYTES # BLD AUTO: 0.14 K/UL (ref 0–0.04)
IMM GRANULOCYTES NFR BLD AUTO: 1 % (ref 0–0.5)
LYMPHOCYTES # BLD: 1.11 K/UL (ref 0.8–3.5)
LYMPHOCYTES NFR BLD: 8.1 % (ref 12–49)
MCH RBC QN AUTO: 25.7 PG (ref 26–34)
MCH RBC QN AUTO: 25.9 PG (ref 26–34)
MCHC RBC AUTO-ENTMCNC: 31.7 G/DL (ref 30–36.5)
MCHC RBC AUTO-ENTMCNC: 31.9 G/DL (ref 30–36.5)
MCV RBC AUTO: 80.5 FL (ref 80–99)
MCV RBC AUTO: 81.6 FL (ref 80–99)
MONOCYTES # BLD: 1.06 K/UL (ref 0–1)
MONOCYTES NFR BLD: 7.7 % (ref 5–13)
NEUTS SEG # BLD: 11.22 K/UL (ref 1.8–8)
NEUTS SEG NFR BLD: 82 % (ref 32–75)
NRBC # BLD: 0 K/UL (ref 0–0.01)
NRBC # BLD: 0 K/UL (ref 0–0.01)
NRBC BLD-RTO: 0 PER 100 WBC
NRBC BLD-RTO: 0 PER 100 WBC
PERFORMED BY:: ABNORMAL
PHOSPHATE SERPL-MCNC: 6.5 MG/DL (ref 2.6–4.7)
PLATELET # BLD AUTO: 427 K/UL (ref 150–400)
PLATELET # BLD AUTO: 448 K/UL (ref 150–400)
PMV BLD AUTO: 10.3 FL (ref 8.9–12.9)
PMV BLD AUTO: 10.3 FL (ref 8.9–12.9)
POTASSIUM SERPL-SCNC: 4.1 MMOL/L (ref 3.5–5.1)
POTASSIUM SERPL-SCNC: 4.1 MMOL/L (ref 3.5–5.1)
PROT SERPL-MCNC: 7.5 G/DL (ref 6.4–8.2)
RBC # BLD AUTO: 4.52 M/UL (ref 3.8–5.2)
RBC # BLD AUTO: 4.87 M/UL (ref 3.8–5.2)
SODIUM SERPL-SCNC: 135 MMOL/L (ref 136–145)
SODIUM SERPL-SCNC: 135 MMOL/L (ref 136–145)
WBC # BLD AUTO: 12.8 K/UL (ref 3.6–11)
WBC # BLD AUTO: 13.7 K/UL (ref 3.6–11)

## 2025-03-14 PROCEDURE — 2700000000 HC OXYGEN THERAPY PER DAY

## 2025-03-14 PROCEDURE — 6370000000 HC RX 637 (ALT 250 FOR IP): Performed by: FAMILY MEDICINE

## 2025-03-14 PROCEDURE — P9047 ALBUMIN (HUMAN), 25%, 50ML: HCPCS

## 2025-03-14 PROCEDURE — 2060000000 HC ICU INTERMEDIATE R&B

## 2025-03-14 PROCEDURE — 82962 GLUCOSE BLOOD TEST: CPT

## 2025-03-14 PROCEDURE — 90935 HEMODIALYSIS ONE EVALUATION: CPT

## 2025-03-14 PROCEDURE — 94761 N-INVAS EAR/PLS OXIMETRY MLT: CPT

## 2025-03-14 PROCEDURE — 6370000000 HC RX 637 (ALT 250 FOR IP): Performed by: INTERNAL MEDICINE

## 2025-03-14 PROCEDURE — 80069 RENAL FUNCTION PANEL: CPT

## 2025-03-14 PROCEDURE — 85027 COMPLETE CBC AUTOMATED: CPT

## 2025-03-14 PROCEDURE — 6360000002 HC RX W HCPCS: Performed by: INTERNAL MEDICINE

## 2025-03-14 PROCEDURE — 6360000002 HC RX W HCPCS: Performed by: FAMILY MEDICINE

## 2025-03-14 PROCEDURE — 2500000003 HC RX 250 WO HCPCS: Performed by: FAMILY MEDICINE

## 2025-03-14 PROCEDURE — 85025 COMPLETE CBC W/AUTO DIFF WBC: CPT

## 2025-03-14 PROCEDURE — 6360000002 HC RX W HCPCS

## 2025-03-14 PROCEDURE — 80053 COMPREHEN METABOLIC PANEL: CPT

## 2025-03-14 PROCEDURE — 36415 COLL VENOUS BLD VENIPUNCTURE: CPT

## 2025-03-14 RX ORDER — ALBUMIN (HUMAN) 12.5 G/50ML
25 SOLUTION INTRAVENOUS ONCE
Status: COMPLETED | OUTPATIENT
Start: 2025-03-14 | End: 2025-03-14

## 2025-03-14 RX ORDER — ALBUMIN (HUMAN) 12.5 G/50ML
25 SOLUTION INTRAVENOUS PRN
Status: DISCONTINUED | OUTPATIENT
Start: 2025-03-14 | End: 2025-03-21 | Stop reason: HOSPADM

## 2025-03-14 RX ORDER — MIDODRINE HYDROCHLORIDE 5 MG/1
10 TABLET ORAL
Status: DISCONTINUED | OUTPATIENT
Start: 2025-03-14 | End: 2025-03-17

## 2025-03-14 RX ORDER — ALBUMIN (HUMAN) 12.5 G/50ML
SOLUTION INTRAVENOUS
Status: COMPLETED
Start: 2025-03-14 | End: 2025-03-14

## 2025-03-14 RX ORDER — ALBUMIN (HUMAN) 12.5 G/50ML
25 SOLUTION INTRAVENOUS PRN
Status: DISCONTINUED | OUTPATIENT
Start: 2025-03-14 | End: 2025-03-14

## 2025-03-14 RX ADMIN — APIXABAN 2.5 MG: 2.5 TABLET, FILM COATED ORAL at 08:24

## 2025-03-14 RX ADMIN — ROSUVASTATIN CALCIUM 20 MG: 20 TABLET, FILM COATED ORAL at 09:08

## 2025-03-14 RX ADMIN — SEVELAMER CARBONATE 0.8 G: 800 POWDER, FOR SUSPENSION ORAL at 17:13

## 2025-03-14 RX ADMIN — SEVELAMER CARBONATE 0.8 G: 800 POWDER, FOR SUSPENSION ORAL at 08:24

## 2025-03-14 RX ADMIN — ALBUMIN (HUMAN) 25 G: 12.5 SOLUTION INTRAVENOUS at 13:50

## 2025-03-14 RX ADMIN — ISOSORBIDE MONONITRATE 60 MG: 60 TABLET, EXTENDED RELEASE ORAL at 08:24

## 2025-03-14 RX ADMIN — VENLAFAXINE HYDROCHLORIDE 75 MG: 75 CAPSULE, EXTENDED RELEASE ORAL at 08:34

## 2025-03-14 RX ADMIN — INSULIN LISPRO 8 UNITS: 100 INJECTION, SOLUTION INTRAVENOUS; SUBCUTANEOUS at 10:31

## 2025-03-14 RX ADMIN — APIXABAN 2.5 MG: 2.5 TABLET, FILM COATED ORAL at 21:39

## 2025-03-14 RX ADMIN — SODIUM CHLORIDE, PRESERVATIVE FREE 10 ML: 5 INJECTION INTRAVENOUS at 21:39

## 2025-03-14 RX ADMIN — ONDANSETRON 4 MG: 2 INJECTION, SOLUTION INTRAMUSCULAR; INTRAVENOUS at 08:24

## 2025-03-14 RX ADMIN — CINACALCET HYDROCHLORIDE 30 MG: 30 TABLET, FILM COATED ORAL at 09:07

## 2025-03-14 RX ADMIN — ALBUMIN (HUMAN) 25 G: 0.25 INJECTION, SOLUTION INTRAVENOUS at 13:50

## 2025-03-14 RX ADMIN — FAMOTIDINE 20 MG: 20 TABLET, FILM COATED ORAL at 08:24

## 2025-03-14 RX ADMIN — MIDODRINE HYDROCHLORIDE 10 MG: 5 TABLET ORAL at 17:13

## 2025-03-14 RX ADMIN — PANTOPRAZOLE SODIUM 40 MG: 40 TABLET, DELAYED RELEASE ORAL at 08:24

## 2025-03-14 RX ADMIN — SODIUM CHLORIDE, PRESERVATIVE FREE 10 ML: 5 INJECTION INTRAVENOUS at 08:27

## 2025-03-14 RX ADMIN — HEPARIN SODIUM 3200 UNITS: 1000 INJECTION INTRAVENOUS; SUBCUTANEOUS at 14:27

## 2025-03-14 ASSESSMENT — PAIN SCALES - GENERAL
PAINLEVEL_OUTOF10: 0
PAINLEVEL_OUTOF10: 0

## 2025-03-14 NOTE — DIALYSIS
Report called to pt's primary nurse, Brianda.  Pt dialyzed for 3.5 hours.  Net fluid removal of 500 mls.  Pt received albumin intra dialysis.  Notified Deidra in tele that pt is en route to her room, 467.

## 2025-03-15 ENCOUNTER — APPOINTMENT (OUTPATIENT)
Facility: HOSPITAL | Age: 70
DRG: 228 | End: 2025-03-15
Payer: MEDICARE

## 2025-03-15 LAB
ALBUMIN SERPL-MCNC: 3.7 G/DL (ref 3.5–5)
ALBUMIN SERPL-MCNC: 3.7 G/DL (ref 3.5–5)
ALBUMIN/GLOB SERPL: 0.9 (ref 1.1–2.2)
ALBUMIN/GLOB SERPL: 1.2 (ref 1.1–2.2)
ALP SERPL-CCNC: 67 U/L (ref 45–117)
ALP SERPL-CCNC: 68 U/L (ref 45–117)
ALT SERPL-CCNC: 7 U/L (ref 12–78)
ALT SERPL-CCNC: 8 U/L (ref 12–78)
AMMONIA PLAS-SCNC: <10 UMOL/L
ANION GAP SERPL CALC-SCNC: 11 MMOL/L (ref 2–12)
ANION GAP SERPL CALC-SCNC: 8 MMOL/L (ref 2–12)
AST SERPL W P-5'-P-CCNC: 17 U/L (ref 15–37)
AST SERPL W P-5'-P-CCNC: 17 U/L (ref 15–37)
BASOPHILS # BLD: 0.12 K/UL (ref 0–0.1)
BASOPHILS NFR BLD: 0.7 % (ref 0–1)
BILIRUB SERPL-MCNC: 0.4 MG/DL (ref 0.2–1)
BILIRUB SERPL-MCNC: 0.4 MG/DL (ref 0.2–1)
BUN SERPL-MCNC: 25 MG/DL (ref 6–20)
BUN SERPL-MCNC: 32 MG/DL (ref 6–20)
BUN/CREAT SERPL: 4 (ref 12–20)
BUN/CREAT SERPL: 5 (ref 12–20)
CA-I BLD-MCNC: 8.7 MG/DL (ref 8.5–10.1)
CA-I BLD-MCNC: 8.8 MG/DL (ref 8.5–10.1)
CHLORIDE SERPL-SCNC: 95 MMOL/L (ref 97–108)
CHLORIDE SERPL-SCNC: 98 MMOL/L (ref 97–108)
CK SERPL-CCNC: 70 U/L (ref 26–192)
CO2 SERPL-SCNC: 25 MMOL/L (ref 21–32)
CO2 SERPL-SCNC: 27 MMOL/L (ref 21–32)
CREAT SERPL-MCNC: 5.85 MG/DL (ref 0.55–1.02)
CREAT SERPL-MCNC: 6.69 MG/DL (ref 0.55–1.02)
CRP SERPL-MCNC: 0.82 MG/DL (ref 0–0.3)
DIFFERENTIAL METHOD BLD: ABNORMAL
EOSINOPHIL # BLD: 0.43 K/UL (ref 0–0.4)
EOSINOPHIL NFR BLD: 2.7 % (ref 0–7)
ERYTHROCYTE [DISTWIDTH] IN BLOOD BY AUTOMATED COUNT: 14.6 % (ref 11.5–14.5)
ERYTHROCYTE [DISTWIDTH] IN BLOOD BY AUTOMATED COUNT: 14.6 % (ref 11.5–14.5)
ERYTHROCYTE [SEDIMENTATION RATE] IN BLOOD: 35 MM/HR (ref 0–30)
GLOBULIN SER CALC-MCNC: 3 G/DL (ref 2–4)
GLOBULIN SER CALC-MCNC: 3.9 G/DL (ref 2–4)
GLUCOSE BLD STRIP.AUTO-MCNC: 157 MG/DL (ref 65–100)
GLUCOSE BLD STRIP.AUTO-MCNC: 164 MG/DL (ref 65–100)
GLUCOSE BLD STRIP.AUTO-MCNC: 179 MG/DL (ref 65–100)
GLUCOSE BLD STRIP.AUTO-MCNC: 182 MG/DL (ref 65–100)
GLUCOSE BLD STRIP.AUTO-MCNC: 229 MG/DL (ref 65–100)
GLUCOSE SERPL-MCNC: 172 MG/DL (ref 65–100)
GLUCOSE SERPL-MCNC: 174 MG/DL (ref 65–100)
HCT VFR BLD AUTO: 35.6 % (ref 35–47)
HCT VFR BLD AUTO: 37.1 % (ref 35–47)
HGB BLD-MCNC: 11.3 G/DL (ref 11.5–16)
HGB BLD-MCNC: 11.8 G/DL (ref 11.5–16)
IMM GRANULOCYTES # BLD AUTO: 0.1 K/UL (ref 0–0.04)
IMM GRANULOCYTES NFR BLD AUTO: 0.6 % (ref 0–0.5)
LACTATE SERPL-SCNC: 1.2 MMOL/L (ref 0.4–2)
LYMPHOCYTES # BLD: 1.88 K/UL (ref 0.8–3.5)
LYMPHOCYTES NFR BLD: 11.7 % (ref 12–49)
MCH RBC QN AUTO: 25.9 PG (ref 26–34)
MCH RBC QN AUTO: 25.9 PG (ref 26–34)
MCHC RBC AUTO-ENTMCNC: 31.7 G/DL (ref 30–36.5)
MCHC RBC AUTO-ENTMCNC: 31.8 G/DL (ref 30–36.5)
MCV RBC AUTO: 81.5 FL (ref 80–99)
MCV RBC AUTO: 81.7 FL (ref 80–99)
MONOCYTES # BLD: 1.53 K/UL (ref 0–1)
MONOCYTES NFR BLD: 9.5 % (ref 5–13)
NEUTS SEG # BLD: 11.97 K/UL (ref 1.8–8)
NEUTS SEG NFR BLD: 74.8 % (ref 32–75)
NRBC # BLD: 0 K/UL (ref 0–0.01)
NRBC # BLD: 0 K/UL (ref 0–0.01)
NRBC BLD-RTO: 0 PER 100 WBC
NRBC BLD-RTO: 0 PER 100 WBC
PERFORMED BY:: ABNORMAL
PLATELET # BLD AUTO: 425 K/UL (ref 150–400)
PLATELET # BLD AUTO: 430 K/UL (ref 150–400)
PMV BLD AUTO: 10.4 FL (ref 8.9–12.9)
PMV BLD AUTO: 10.5 FL (ref 8.9–12.9)
POTASSIUM SERPL-SCNC: 4.2 MMOL/L (ref 3.5–5.1)
POTASSIUM SERPL-SCNC: 4.5 MMOL/L (ref 3.5–5.1)
PROCALCITONIN SERPL-MCNC: 0.14 NG/ML
PROCALCITONIN SERPL-MCNC: 0.16 NG/ML
PROT SERPL-MCNC: 6.7 G/DL (ref 6.4–8.2)
PROT SERPL-MCNC: 7.6 G/DL (ref 6.4–8.2)
RBC # BLD AUTO: 4.36 M/UL (ref 3.8–5.2)
RBC # BLD AUTO: 4.55 M/UL (ref 3.8–5.2)
SODIUM SERPL-SCNC: 130 MMOL/L (ref 136–145)
SODIUM SERPL-SCNC: 134 MMOL/L (ref 136–145)
T4 FREE SERPL-MCNC: 1.4 NG/DL (ref 0.8–1.5)
TSH SERPL DL<=0.05 MIU/L-ACNC: 1.82 UIU/ML (ref 0.36–3.74)
WBC # BLD AUTO: 14.4 K/UL (ref 3.6–11)
WBC # BLD AUTO: 16 K/UL (ref 3.6–11)

## 2025-03-15 PROCEDURE — 84439 ASSAY OF FREE THYROXINE: CPT

## 2025-03-15 PROCEDURE — 70450 CT HEAD/BRAIN W/O DYE: CPT

## 2025-03-15 PROCEDURE — 71045 X-RAY EXAM CHEST 1 VIEW: CPT

## 2025-03-15 PROCEDURE — 2500000003 HC RX 250 WO HCPCS: Performed by: FAMILY MEDICINE

## 2025-03-15 PROCEDURE — 87040 BLOOD CULTURE FOR BACTERIA: CPT

## 2025-03-15 PROCEDURE — 85027 COMPLETE CBC AUTOMATED: CPT

## 2025-03-15 PROCEDURE — 2060000000 HC ICU INTERMEDIATE R&B

## 2025-03-15 PROCEDURE — 82607 VITAMIN B-12: CPT

## 2025-03-15 PROCEDURE — 82140 ASSAY OF AMMONIA: CPT

## 2025-03-15 PROCEDURE — 82550 ASSAY OF CK (CPK): CPT

## 2025-03-15 PROCEDURE — 86140 C-REACTIVE PROTEIN: CPT

## 2025-03-15 PROCEDURE — 83605 ASSAY OF LACTIC ACID: CPT

## 2025-03-15 PROCEDURE — 85652 RBC SED RATE AUTOMATED: CPT

## 2025-03-15 PROCEDURE — 84443 ASSAY THYROID STIM HORMONE: CPT

## 2025-03-15 PROCEDURE — 85025 COMPLETE CBC W/AUTO DIFF WBC: CPT

## 2025-03-15 PROCEDURE — 82962 GLUCOSE BLOOD TEST: CPT

## 2025-03-15 PROCEDURE — 70498 CT ANGIOGRAPHY NECK: CPT

## 2025-03-15 PROCEDURE — 80053 COMPREHEN METABOLIC PANEL: CPT

## 2025-03-15 PROCEDURE — 2500000003 HC RX 250 WO HCPCS

## 2025-03-15 PROCEDURE — 6370000000 HC RX 637 (ALT 250 FOR IP): Performed by: INTERNAL MEDICINE

## 2025-03-15 PROCEDURE — 6370000000 HC RX 637 (ALT 250 FOR IP): Performed by: FAMILY MEDICINE

## 2025-03-15 PROCEDURE — 0042T CT BRAIN PERFUSION: CPT

## 2025-03-15 PROCEDURE — 83090 ASSAY OF HOMOCYSTEINE: CPT

## 2025-03-15 PROCEDURE — 82746 ASSAY OF FOLIC ACID SERUM: CPT

## 2025-03-15 PROCEDURE — 94761 N-INVAS EAR/PLS OXIMETRY MLT: CPT

## 2025-03-15 PROCEDURE — 84145 PROCALCITONIN (PCT): CPT

## 2025-03-15 PROCEDURE — 4A03X5D MEASUREMENT OF ARTERIAL FLOW, INTRACRANIAL, EXTERNAL APPROACH: ICD-10-PCS | Performed by: STUDENT IN AN ORGANIZED HEALTH CARE EDUCATION/TRAINING PROGRAM

## 2025-03-15 PROCEDURE — 6360000004 HC RX CONTRAST MEDICATION: Performed by: INTERNAL MEDICINE

## 2025-03-15 RX ORDER — IOPAMIDOL 755 MG/ML
100 INJECTION, SOLUTION INTRAVASCULAR
Status: COMPLETED | OUTPATIENT
Start: 2025-03-15 | End: 2025-03-15

## 2025-03-15 RX ORDER — METOPROLOL TARTRATE 1 MG/ML
5 INJECTION, SOLUTION INTRAVENOUS EVERY 6 HOURS PRN
Status: DISCONTINUED | OUTPATIENT
Start: 2025-03-15 | End: 2025-03-21 | Stop reason: HOSPADM

## 2025-03-15 RX ADMIN — SODIUM CHLORIDE, PRESERVATIVE FREE 10 ML: 5 INJECTION INTRAVENOUS at 21:20

## 2025-03-15 RX ADMIN — SODIUM CHLORIDE, PRESERVATIVE FREE 5 ML: 5 INJECTION INTRAVENOUS at 10:15

## 2025-03-15 RX ADMIN — METOPROLOL TARTRATE 5 MG: 5 INJECTION INTRAVENOUS at 02:44

## 2025-03-15 RX ADMIN — IOPAMIDOL 100 ML: 755 INJECTION, SOLUTION INTRAVENOUS at 09:43

## 2025-03-15 ASSESSMENT — PAIN SCALES - GENERAL
PAINLEVEL_OUTOF10: 0

## 2025-03-15 ASSESSMENT — PAIN SCALES - WONG BAKER: WONGBAKER_NUMERICALRESPONSE: NO HURT

## 2025-03-16 LAB
ALBUMIN SERPL-MCNC: 3.7 G/DL (ref 3.5–5)
ALBUMIN/GLOB SERPL: 1 (ref 1.1–2.2)
ALP SERPL-CCNC: 69 U/L (ref 45–117)
ALT SERPL-CCNC: 8 U/L (ref 12–78)
ANION GAP SERPL CALC-SCNC: 9 MMOL/L (ref 2–12)
AST SERPL W P-5'-P-CCNC: 13 U/L (ref 15–37)
BASOPHILS # BLD: 0.08 K/UL (ref 0–0.1)
BASOPHILS NFR BLD: 0.5 % (ref 0–1)
BILIRUB SERPL-MCNC: 0.4 MG/DL (ref 0.2–1)
BUN SERPL-MCNC: 43 MG/DL (ref 6–20)
BUN/CREAT SERPL: 5 (ref 12–20)
CA-I BLD-MCNC: 8.6 MG/DL (ref 8.5–10.1)
CHLORIDE SERPL-SCNC: 95 MMOL/L (ref 97–108)
CO2 SERPL-SCNC: 27 MMOL/L (ref 21–32)
CREAT SERPL-MCNC: 8.64 MG/DL (ref 0.55–1.02)
DIFFERENTIAL METHOD BLD: ABNORMAL
EOSINOPHIL # BLD: 0.41 K/UL (ref 0–0.4)
EOSINOPHIL NFR BLD: 2.5 % (ref 0–7)
ERYTHROCYTE [DISTWIDTH] IN BLOOD BY AUTOMATED COUNT: 14.5 % (ref 11.5–14.5)
ERYTHROCYTE [DISTWIDTH] IN BLOOD BY AUTOMATED COUNT: 14.8 % (ref 11.5–14.5)
FOLATE SERPL-MCNC: 14.7 NG/ML (ref 5–21)
GLOBULIN SER CALC-MCNC: 3.6 G/DL (ref 2–4)
GLUCOSE BLD STRIP.AUTO-MCNC: 138 MG/DL (ref 65–100)
GLUCOSE BLD STRIP.AUTO-MCNC: 145 MG/DL (ref 65–100)
GLUCOSE BLD STRIP.AUTO-MCNC: 211 MG/DL (ref 65–100)
GLUCOSE BLD STRIP.AUTO-MCNC: 310 MG/DL (ref 65–100)
GLUCOSE BLD STRIP.AUTO-MCNC: 326 MG/DL (ref 65–100)
GLUCOSE SERPL-MCNC: 170 MG/DL (ref 65–100)
HCT VFR BLD AUTO: 35.9 % (ref 35–47)
HCT VFR BLD AUTO: 39 % (ref 35–47)
HCYS SERPL-SCNC: 27 UMOL/L (ref 3.7–13.9)
HGB BLD-MCNC: 11.6 G/DL (ref 11.5–16)
HGB BLD-MCNC: 12.4 G/DL (ref 11.5–16)
IMM GRANULOCYTES # BLD AUTO: 0.15 K/UL (ref 0–0.04)
IMM GRANULOCYTES NFR BLD AUTO: 0.9 % (ref 0–0.5)
LYMPHOCYTES # BLD: 1.14 K/UL (ref 0.8–3.5)
LYMPHOCYTES NFR BLD: 7 % (ref 12–49)
MCH RBC QN AUTO: 25.9 PG (ref 26–34)
MCH RBC QN AUTO: 26 PG (ref 26–34)
MCHC RBC AUTO-ENTMCNC: 31.8 G/DL (ref 30–36.5)
MCHC RBC AUTO-ENTMCNC: 32.3 G/DL (ref 30–36.5)
MCV RBC AUTO: 80.1 FL (ref 80–99)
MCV RBC AUTO: 81.8 FL (ref 80–99)
MONOCYTES # BLD: 1.41 K/UL (ref 0–1)
MONOCYTES NFR BLD: 8.6 % (ref 5–13)
NEUTS SEG # BLD: 13.12 K/UL (ref 1.8–8)
NEUTS SEG NFR BLD: 80.5 % (ref 32–75)
NRBC # BLD: 0 K/UL (ref 0–0.01)
NRBC # BLD: 0 K/UL (ref 0–0.01)
NRBC BLD-RTO: 0 PER 100 WBC
NRBC BLD-RTO: 0 PER 100 WBC
PERFORMED BY:: ABNORMAL
PLATELET # BLD AUTO: 369 K/UL (ref 150–400)
PLATELET # BLD AUTO: 390 K/UL (ref 150–400)
PMV BLD AUTO: 10.4 FL (ref 8.9–12.9)
PMV BLD AUTO: 10.4 FL (ref 8.9–12.9)
POTASSIUM SERPL-SCNC: 4.7 MMOL/L (ref 3.5–5.1)
PROT SERPL-MCNC: 7.3 G/DL (ref 6.4–8.2)
RBC # BLD AUTO: 4.48 M/UL (ref 3.8–5.2)
RBC # BLD AUTO: 4.77 M/UL (ref 3.8–5.2)
SODIUM SERPL-SCNC: 131 MMOL/L (ref 136–145)
VIT B12 SERPL-MCNC: 450 PG/ML (ref 193–986)
WBC # BLD AUTO: 15.3 K/UL (ref 3.6–11)
WBC # BLD AUTO: 16.3 K/UL (ref 3.6–11)

## 2025-03-16 PROCEDURE — 6370000000 HC RX 637 (ALT 250 FOR IP): Performed by: INTERNAL MEDICINE

## 2025-03-16 PROCEDURE — 82962 GLUCOSE BLOOD TEST: CPT

## 2025-03-16 PROCEDURE — 85025 COMPLETE CBC W/AUTO DIFF WBC: CPT

## 2025-03-16 PROCEDURE — 2060000000 HC ICU INTERMEDIATE R&B

## 2025-03-16 PROCEDURE — 36415 COLL VENOUS BLD VENIPUNCTURE: CPT

## 2025-03-16 PROCEDURE — 6370000000 HC RX 637 (ALT 250 FOR IP): Performed by: FAMILY MEDICINE

## 2025-03-16 PROCEDURE — 85027 COMPLETE CBC AUTOMATED: CPT

## 2025-03-16 PROCEDURE — 2500000003 HC RX 250 WO HCPCS: Performed by: FAMILY MEDICINE

## 2025-03-16 PROCEDURE — 80053 COMPREHEN METABOLIC PANEL: CPT

## 2025-03-16 RX ADMIN — SODIUM CHLORIDE, PRESERVATIVE FREE 10 ML: 5 INJECTION INTRAVENOUS at 09:39

## 2025-03-16 RX ADMIN — INSULIN LISPRO 12 UNITS: 100 INJECTION, SOLUTION INTRAVENOUS; SUBCUTANEOUS at 09:34

## 2025-03-16 RX ADMIN — MIDODRINE HYDROCHLORIDE 10 MG: 5 TABLET ORAL at 17:08

## 2025-03-16 RX ADMIN — SEVELAMER CARBONATE 0.8 G: 800 POWDER, FOR SUSPENSION ORAL at 17:08

## 2025-03-16 RX ADMIN — SODIUM CHLORIDE, PRESERVATIVE FREE 10 ML: 5 INJECTION INTRAVENOUS at 21:59

## 2025-03-16 RX ADMIN — INSULIN LISPRO 12 UNITS: 100 INJECTION, SOLUTION INTRAVENOUS; SUBCUTANEOUS at 21:58

## 2025-03-16 RX ADMIN — APIXABAN 2.5 MG: 2.5 TABLET, FILM COATED ORAL at 00:47

## 2025-03-16 RX ADMIN — PANTOPRAZOLE SODIUM 40 MG: 40 TABLET, DELAYED RELEASE ORAL at 06:23

## 2025-03-16 ASSESSMENT — PAIN SCALES - WONG BAKER
WONGBAKER_NUMERICALRESPONSE: NO HURT
WONGBAKER_NUMERICALRESPONSE: NO HURT

## 2025-03-16 ASSESSMENT — PAIN SCALES - GENERAL
PAINLEVEL_OUTOF10: 0
PAINLEVEL_OUTOF10: 0

## 2025-03-17 LAB
ALBUMIN SERPL-MCNC: 3.2 G/DL (ref 3.5–5)
ALBUMIN/GLOB SERPL: 0.9 (ref 1.1–2.2)
ALP SERPL-CCNC: 70 U/L (ref 45–117)
ALT SERPL-CCNC: <6 U/L (ref 12–78)
ANION GAP SERPL CALC-SCNC: 12 MMOL/L (ref 2–12)
AST SERPL W P-5'-P-CCNC: 11 U/L (ref 15–37)
BASOPHILS # BLD: 0.13 K/UL (ref 0–0.1)
BASOPHILS NFR BLD: 0.8 % (ref 0–1)
BILIRUB SERPL-MCNC: 0.3 MG/DL (ref 0.2–1)
BUN SERPL-MCNC: 62 MG/DL (ref 6–20)
BUN/CREAT SERPL: 5 (ref 12–20)
CA-I BLD-MCNC: 8.3 MG/DL (ref 8.5–10.1)
CHLORIDE SERPL-SCNC: 97 MMOL/L (ref 97–108)
CO2 SERPL-SCNC: 23 MMOL/L (ref 21–32)
CREAT SERPL-MCNC: 11.5 MG/DL (ref 0.55–1.02)
DIFFERENTIAL METHOD BLD: ABNORMAL
EOSINOPHIL # BLD: 0.59 K/UL (ref 0–0.4)
EOSINOPHIL NFR BLD: 3.7 % (ref 0–7)
ERYTHROCYTE [DISTWIDTH] IN BLOOD BY AUTOMATED COUNT: 14.7 % (ref 11.5–14.5)
GLOBULIN SER CALC-MCNC: 3.7 G/DL (ref 2–4)
GLUCOSE BLD STRIP.AUTO-MCNC: 103 MG/DL (ref 65–100)
GLUCOSE BLD STRIP.AUTO-MCNC: 104 MG/DL (ref 65–100)
GLUCOSE BLD STRIP.AUTO-MCNC: 185 MG/DL (ref 65–100)
GLUCOSE BLD STRIP.AUTO-MCNC: 215 MG/DL (ref 65–100)
GLUCOSE BLD STRIP.AUTO-MCNC: 88 MG/DL (ref 65–100)
GLUCOSE BLD STRIP.AUTO-MCNC: 88 MG/DL (ref 65–100)
GLUCOSE SERPL-MCNC: 46 MG/DL (ref 65–100)
HCT VFR BLD AUTO: 36.4 % (ref 35–47)
HGB BLD-MCNC: 11.6 G/DL (ref 11.5–16)
IMM GRANULOCYTES # BLD AUTO: 0.18 K/UL (ref 0–0.04)
IMM GRANULOCYTES NFR BLD AUTO: 1.1 % (ref 0–0.5)
LYMPHOCYTES # BLD: 1.62 K/UL (ref 0.8–3.5)
LYMPHOCYTES NFR BLD: 10 % (ref 12–49)
MCH RBC QN AUTO: 26.2 PG (ref 26–34)
MCHC RBC AUTO-ENTMCNC: 31.9 G/DL (ref 30–36.5)
MCV RBC AUTO: 82.2 FL (ref 80–99)
MONOCYTES # BLD: 1.69 K/UL (ref 0–1)
MONOCYTES NFR BLD: 10.5 % (ref 5–13)
NEUTS SEG # BLD: 11.92 K/UL (ref 1.8–8)
NEUTS SEG NFR BLD: 73.9 % (ref 32–75)
NRBC # BLD: 0 K/UL (ref 0–0.01)
NRBC BLD-RTO: 0 PER 100 WBC
PERFORMED BY:: ABNORMAL
PERFORMED BY:: NORMAL
PERFORMED BY:: NORMAL
PLATELET # BLD AUTO: 371 K/UL (ref 150–400)
PMV BLD AUTO: 9.9 FL (ref 8.9–12.9)
POTASSIUM SERPL-SCNC: 4.4 MMOL/L (ref 3.5–5.1)
PROT SERPL-MCNC: 6.9 G/DL (ref 6.4–8.2)
RBC # BLD AUTO: 4.43 M/UL (ref 3.8–5.2)
SODIUM SERPL-SCNC: 132 MMOL/L (ref 136–145)
WBC # BLD AUTO: 16.1 K/UL (ref 3.6–11)

## 2025-03-17 PROCEDURE — 2709999900 HC NON-CHARGEABLE SUPPLY

## 2025-03-17 PROCEDURE — 80053 COMPREHEN METABOLIC PANEL: CPT

## 2025-03-17 PROCEDURE — 36415 COLL VENOUS BLD VENIPUNCTURE: CPT

## 2025-03-17 PROCEDURE — 2060000000 HC ICU INTERMEDIATE R&B

## 2025-03-17 PROCEDURE — 85025 COMPLETE CBC W/AUTO DIFF WBC: CPT

## 2025-03-17 PROCEDURE — 90935 HEMODIALYSIS ONE EVALUATION: CPT

## 2025-03-17 PROCEDURE — 6370000000 HC RX 637 (ALT 250 FOR IP): Performed by: FAMILY MEDICINE

## 2025-03-17 PROCEDURE — 6360000002 HC RX W HCPCS: Performed by: INTERNAL MEDICINE

## 2025-03-17 PROCEDURE — 95816 EEG AWAKE AND DROWSY: CPT

## 2025-03-17 PROCEDURE — 2500000003 HC RX 250 WO HCPCS: Performed by: FAMILY MEDICINE

## 2025-03-17 PROCEDURE — 6370000000 HC RX 637 (ALT 250 FOR IP): Performed by: INTERNAL MEDICINE

## 2025-03-17 PROCEDURE — 82962 GLUCOSE BLOOD TEST: CPT

## 2025-03-17 PROCEDURE — 6360000002 HC RX W HCPCS: Performed by: FAMILY MEDICINE

## 2025-03-17 PROCEDURE — 2500000003 HC RX 250 WO HCPCS

## 2025-03-17 RX ORDER — MIDODRINE HYDROCHLORIDE 5 MG/1
10 TABLET ORAL 3 TIMES DAILY PRN
Status: DISCONTINUED | OUTPATIENT
Start: 2025-03-17 | End: 2025-03-21 | Stop reason: HOSPADM

## 2025-03-17 RX ADMIN — INSULIN LISPRO 4 UNITS: 100 INJECTION, SOLUTION INTRAVENOUS; SUBCUTANEOUS at 16:43

## 2025-03-17 RX ADMIN — HEPARIN SODIUM 3200 UNITS: 1000 INJECTION INTRAVENOUS; SUBCUTANEOUS at 10:58

## 2025-03-17 RX ADMIN — POLYETHYLENE GLYCOL 3350 17 G: 17 POWDER, FOR SOLUTION ORAL at 17:00

## 2025-03-17 RX ADMIN — SEVELAMER CARBONATE 0.8 G: 800 POWDER, FOR SUSPENSION ORAL at 11:59

## 2025-03-17 RX ADMIN — METOPROLOL TARTRATE 5 MG: 5 INJECTION INTRAVENOUS at 16:46

## 2025-03-17 RX ADMIN — INSULIN LISPRO 4 UNITS: 100 INJECTION, SOLUTION INTRAVENOUS; SUBCUTANEOUS at 20:46

## 2025-03-17 RX ADMIN — SEVELAMER CARBONATE 0.8 G: 800 POWDER, FOR SUSPENSION ORAL at 16:43

## 2025-03-17 RX ADMIN — MIDODRINE HYDROCHLORIDE 10 MG: 5 TABLET ORAL at 11:59

## 2025-03-17 RX ADMIN — SODIUM CHLORIDE, PRESERVATIVE FREE 10 ML: 5 INJECTION INTRAVENOUS at 20:46

## 2025-03-17 RX ADMIN — ONDANSETRON 4 MG: 2 INJECTION, SOLUTION INTRAMUSCULAR; INTRAVENOUS at 06:43

## 2025-03-17 ASSESSMENT — PAIN SCALES - WONG BAKER: WONGBAKER_NUMERICALRESPONSE: NO HURT

## 2025-03-17 ASSESSMENT — PAIN SCALES - GENERAL: PAINLEVEL_OUTOF10: 0

## 2025-03-17 NOTE — DIALYSIS
Patient tolerated treatment. 500 ml of fluid was removed. Patient was alert to touch during treatment. 66.9 liters of blood was processed. Report was given to primary nurse Eladia.

## 2025-03-18 ENCOUNTER — APPOINTMENT (OUTPATIENT)
Facility: HOSPITAL | Age: 70
DRG: 228 | End: 2025-03-18
Payer: MEDICARE

## 2025-03-18 LAB
ALBUMIN SERPL-MCNC: 3.1 G/DL (ref 3.5–5)
ANION GAP SERPL CALC-SCNC: 7 MMOL/L (ref 2–12)
BASOPHILS # BLD: 0.11 K/UL (ref 0–0.1)
BASOPHILS NFR BLD: 0.9 % (ref 0–1)
BUN SERPL-MCNC: 30 MG/DL (ref 6–20)
BUN/CREAT SERPL: 4 (ref 12–20)
CA-I BLD-MCNC: 9.2 MG/DL (ref 8.5–10.1)
CHLORIDE SERPL-SCNC: 98 MMOL/L (ref 97–108)
CO2 SERPL-SCNC: 27 MMOL/L (ref 21–32)
CREAT SERPL-MCNC: 7.4 MG/DL (ref 0.55–1.02)
CRP SERPL-MCNC: 0.84 MG/DL (ref 0–0.3)
DIFFERENTIAL METHOD BLD: ABNORMAL
ECHO BSA: 1.88 M2
ECHO BSA: 1.88 M2
EKG ATRIAL RATE: 102 BPM
EKG DIAGNOSIS: NORMAL
EKG Q-T INTERVAL: 414 MS
EKG QRS DURATION: 164 MS
EKG QTC CALCULATION (BAZETT): 565 MS
EKG R AXIS: -59 DEGREES
EKG T AXIS: 107 DEGREES
EKG VENTRICULAR RATE: 112 BPM
EOSINOPHIL # BLD: 0.38 K/UL (ref 0–0.4)
EOSINOPHIL NFR BLD: 3 % (ref 0–7)
ERYTHROCYTE [DISTWIDTH] IN BLOOD BY AUTOMATED COUNT: 14.6 % (ref 11.5–14.5)
GLUCOSE BLD STRIP.AUTO-MCNC: 123 MG/DL (ref 65–100)
GLUCOSE BLD STRIP.AUTO-MCNC: 123 MG/DL (ref 65–100)
GLUCOSE BLD STRIP.AUTO-MCNC: 158 MG/DL (ref 65–100)
GLUCOSE BLD STRIP.AUTO-MCNC: 162 MG/DL (ref 65–100)
GLUCOSE SERPL-MCNC: 157 MG/DL (ref 65–100)
HCT VFR BLD AUTO: 35.7 % (ref 35–47)
HGB BLD-MCNC: 11.5 G/DL (ref 11.5–16)
IMM GRANULOCYTES # BLD AUTO: 0.13 K/UL (ref 0–0.04)
IMM GRANULOCYTES NFR BLD AUTO: 1 % (ref 0–0.5)
LYMPHOCYTES # BLD: 1.25 K/UL (ref 0.8–3.5)
LYMPHOCYTES NFR BLD: 9.8 % (ref 12–49)
MCH RBC QN AUTO: 26 PG (ref 26–34)
MCHC RBC AUTO-ENTMCNC: 32.2 G/DL (ref 30–36.5)
MCV RBC AUTO: 80.8 FL (ref 80–99)
MONOCYTES # BLD: 1.4 K/UL (ref 0–1)
MONOCYTES NFR BLD: 10.9 % (ref 5–13)
NEUTS SEG # BLD: 9.52 K/UL (ref 1.8–8)
NEUTS SEG NFR BLD: 74.4 % (ref 32–75)
NRBC # BLD: 0 K/UL (ref 0–0.01)
NRBC BLD-RTO: 0 PER 100 WBC
PERFORMED BY:: ABNORMAL
PHOSPHATE SERPL-MCNC: 5.3 MG/DL (ref 2.6–4.7)
PLATELET # BLD AUTO: 377 K/UL (ref 150–400)
PMV BLD AUTO: 10.5 FL (ref 8.9–12.9)
POTASSIUM SERPL-SCNC: 4.3 MMOL/L (ref 3.5–5.1)
PROCALCITONIN SERPL-MCNC: 0.21 NG/ML
RBC # BLD AUTO: 4.42 M/UL (ref 3.8–5.2)
SODIUM SERPL-SCNC: 132 MMOL/L (ref 136–145)
WBC # BLD AUTO: 12.8 K/UL (ref 3.6–11)

## 2025-03-18 PROCEDURE — C1769 GUIDE WIRE: HCPCS | Performed by: STUDENT IN AN ORGANIZED HEALTH CARE EDUCATION/TRAINING PROGRAM

## 2025-03-18 PROCEDURE — 76937 US GUIDE VASCULAR ACCESS: CPT | Performed by: STUDENT IN AN ORGANIZED HEALTH CARE EDUCATION/TRAINING PROGRAM

## 2025-03-18 PROCEDURE — C1786 PMKR, SINGLE, RATE-RESP: HCPCS | Performed by: STUDENT IN AN ORGANIZED HEALTH CARE EDUCATION/TRAINING PROGRAM

## 2025-03-18 PROCEDURE — 2709999900 HC NON-CHARGEABLE SUPPLY: Performed by: STUDENT IN AN ORGANIZED HEALTH CARE EDUCATION/TRAINING PROGRAM

## 2025-03-18 PROCEDURE — 84145 PROCALCITONIN (PCT): CPT

## 2025-03-18 PROCEDURE — X2HK3V9 INSERTION OF DUAL-CHAMBER INTRACARDIAC PACEMAKER INTO RIGHT VENTRICLE, PERCUTANEOUS APPROACH, NEW TECHNOLOGY GROUP 9: ICD-10-PCS | Performed by: STUDENT IN AN ORGANIZED HEALTH CARE EDUCATION/TRAINING PROGRAM

## 2025-03-18 PROCEDURE — 99152 MOD SED SAME PHYS/QHP 5/>YRS: CPT | Performed by: STUDENT IN AN ORGANIZED HEALTH CARE EDUCATION/TRAINING PROGRAM

## 2025-03-18 PROCEDURE — 6360000002 HC RX W HCPCS: Performed by: STUDENT IN AN ORGANIZED HEALTH CARE EDUCATION/TRAINING PROGRAM

## 2025-03-18 PROCEDURE — 7100000000 HC PACU RECOVERY - FIRST 15 MIN: Performed by: STUDENT IN AN ORGANIZED HEALTH CARE EDUCATION/TRAINING PROGRAM

## 2025-03-18 PROCEDURE — 2500000003 HC RX 250 WO HCPCS: Performed by: FAMILY MEDICINE

## 2025-03-18 PROCEDURE — 99153 MOD SED SAME PHYS/QHP EA: CPT | Performed by: STUDENT IN AN ORGANIZED HEALTH CARE EDUCATION/TRAINING PROGRAM

## 2025-03-18 PROCEDURE — 71045 X-RAY EXAM CHEST 1 VIEW: CPT

## 2025-03-18 PROCEDURE — 2500000003 HC RX 250 WO HCPCS

## 2025-03-18 PROCEDURE — 82962 GLUCOSE BLOOD TEST: CPT

## 2025-03-18 PROCEDURE — C1894 INTRO/SHEATH, NON-LASER: HCPCS | Performed by: STUDENT IN AN ORGANIZED HEALTH CARE EDUCATION/TRAINING PROGRAM

## 2025-03-18 PROCEDURE — 70551 MRI BRAIN STEM W/O DYE: CPT

## 2025-03-18 PROCEDURE — X2H63V9 INSERTION OF DUAL-CHAMBER INTRACARDIAC PACEMAKER INTO RIGHT ATRIUM, PERCUTANEOUS APPROACH, NEW TECHNOLOGY GROUP 9: ICD-10-PCS | Performed by: STUDENT IN AN ORGANIZED HEALTH CARE EDUCATION/TRAINING PROGRAM

## 2025-03-18 PROCEDURE — 2060000000 HC ICU INTERMEDIATE R&B

## 2025-03-18 PROCEDURE — 7100000001 HC PACU RECOVERY - ADDTL 15 MIN: Performed by: STUDENT IN AN ORGANIZED HEALTH CARE EDUCATION/TRAINING PROGRAM

## 2025-03-18 PROCEDURE — 85025 COMPLETE CBC W/AUTO DIFF WBC: CPT

## 2025-03-18 PROCEDURE — 80069 RENAL FUNCTION PANEL: CPT

## 2025-03-18 PROCEDURE — 2580000003 HC RX 258: Performed by: STUDENT IN AN ORGANIZED HEALTH CARE EDUCATION/TRAINING PROGRAM

## 2025-03-18 PROCEDURE — 36415 COLL VENOUS BLD VENIPUNCTURE: CPT

## 2025-03-18 PROCEDURE — 86140 C-REACTIVE PROTEIN: CPT

## 2025-03-18 PROCEDURE — 6360000004 HC RX CONTRAST MEDICATION: Performed by: STUDENT IN AN ORGANIZED HEALTH CARE EDUCATION/TRAINING PROGRAM

## 2025-03-18 PROCEDURE — C1760 CLOSURE DEV, VASC: HCPCS | Performed by: STUDENT IN AN ORGANIZED HEALTH CARE EDUCATION/TRAINING PROGRAM

## 2025-03-18 PROCEDURE — 93005 ELECTROCARDIOGRAM TRACING: CPT | Performed by: STUDENT IN AN ORGANIZED HEALTH CARE EDUCATION/TRAINING PROGRAM

## 2025-03-18 DEVICE — CABLE 5846AL ROHS GLB 12FT 5PK 10L: Type: IMPLANTABLE DEVICE | Status: FUNCTIONAL

## 2025-03-18 DEVICE — TPS MC2AVR1 MICRA AV2 US
Type: IMPLANTABLE DEVICE | Status: FUNCTIONAL
Brand: MICRA™ AV2

## 2025-03-18 RX ORDER — IOPAMIDOL 755 MG/ML
INJECTION, SOLUTION INTRAVASCULAR PRN
Status: DISCONTINUED | OUTPATIENT
Start: 2025-03-18 | End: 2025-03-18 | Stop reason: HOSPADM

## 2025-03-18 RX ORDER — 0.9 % SODIUM CHLORIDE 0.9 %
INTRAVENOUS SOLUTION INTRAVENOUS CONTINUOUS PRN
Status: COMPLETED | OUTPATIENT
Start: 2025-03-18 | End: 2025-03-18

## 2025-03-18 RX ORDER — HYDRALAZINE HYDROCHLORIDE 20 MG/ML
10 INJECTION INTRAMUSCULAR; INTRAVENOUS EVERY 6 HOURS PRN
Status: DISCONTINUED | OUTPATIENT
Start: 2025-03-18 | End: 2025-03-21 | Stop reason: HOSPADM

## 2025-03-18 RX ORDER — FENTANYL CITRATE 50 UG/ML
INJECTION, SOLUTION INTRAMUSCULAR; INTRAVENOUS PRN
Status: DISCONTINUED | OUTPATIENT
Start: 2025-03-18 | End: 2025-03-18 | Stop reason: HOSPADM

## 2025-03-18 RX ORDER — DIPHENHYDRAMINE HYDROCHLORIDE 50 MG/ML
INJECTION, SOLUTION INTRAMUSCULAR; INTRAVENOUS PRN
Status: DISCONTINUED | OUTPATIENT
Start: 2025-03-18 | End: 2025-03-18 | Stop reason: HOSPADM

## 2025-03-18 RX ORDER — LIDOCAINE HYDROCHLORIDE 10 MG/ML
INJECTION, SOLUTION INFILTRATION; PERINEURAL PRN
Status: DISCONTINUED | OUTPATIENT
Start: 2025-03-18 | End: 2025-03-18 | Stop reason: HOSPADM

## 2025-03-18 RX ORDER — HEPARIN SODIUM 1000 [USP'U]/ML
INJECTION, SOLUTION INTRAVENOUS; SUBCUTANEOUS PRN
Status: DISCONTINUED | OUTPATIENT
Start: 2025-03-18 | End: 2025-03-18 | Stop reason: HOSPADM

## 2025-03-18 RX ORDER — HEPARIN SODIUM 200 [USP'U]/100ML
INJECTION, SOLUTION INTRAVENOUS CONTINUOUS PRN
Status: COMPLETED | OUTPATIENT
Start: 2025-03-18 | End: 2025-03-18

## 2025-03-18 RX ORDER — MIDAZOLAM HYDROCHLORIDE 1 MG/ML
INJECTION, SOLUTION INTRAMUSCULAR; INTRAVENOUS PRN
Status: DISCONTINUED | OUTPATIENT
Start: 2025-03-18 | End: 2025-03-18 | Stop reason: HOSPADM

## 2025-03-18 RX ADMIN — METOPROLOL TARTRATE 5 MG: 5 INJECTION INTRAVENOUS at 01:14

## 2025-03-18 RX ADMIN — SODIUM CHLORIDE, PRESERVATIVE FREE 10 ML: 5 INJECTION INTRAVENOUS at 20:02

## 2025-03-18 ASSESSMENT — PAIN SCALES - WONG BAKER
WONGBAKER_NUMERICALRESPONSE: NO HURT

## 2025-03-18 ASSESSMENT — PAIN SCALES - GENERAL
PAINLEVEL_OUTOF10: 0

## 2025-03-18 NOTE — PROCEDURES
PROCEDURE NOTE  Date: 3/18/2025   Name: Antonella Albert  YOB: 1955    Procedures    [facility]   Cardiac Electrophysiology Service Procedure Report    Pacemaker Implantation     ELECTROPHYSIOLOGIST: Bárbara Craig MD    PREOPERATIVE DIAGNOSIS:  Complete heart block  POSTOPERATIVE DIAGNOSIS: Complete heart block s/p pacemaker implantation     PROCEDURES PERFORMED:     Implantation of leadless pacemaker - 22237   Moderate sedation - 35333   3.    Moderate sedation (additional time) - 19536   4.    Moderate sedation (additional time) - 63472    CONCLUSIONS:   Successful implantation of a leadless pacemaker  The cardiac device is magnetic resonance imaging conditional.    RECOMMENDATIONS:   Portable CXR later today   She is advised to limit activity for 2 days  She will follow up with us for device check in two weeks.     INDICATION FOR THE PROCEDURE:   Ms. Anotnella Albert is a 70 year old female with history of paroxysmal atrial fibrillation, sick sinus syndrome, and tachybrady syndrome that presents for pacemaker placement.     The risks, benefits, and alternatives to the procedure were discussed in detail with the patient. All questions were answered. All reasonably anticipated complications were discussed in detail. The patient verbalized understanding and wishes to proceed.      PROCEDURAL DETAILS: The patient was brought to the electrophysiology lab in a fasting state after obtaining informed consent. The patient was prepped and draped in normal sterile fashion. A time out was performed per hospital protocol. Pre-operative antibiotics were administered per hospital protocol and guidelines.      ANESTHESIA: Moderate sedation was administered with the nursing staff. I was present with the patient for the duration of moderate sedation and supervised staff who had no other duties and monitored the patient for the entire procedure.  Details of the sedation and monitoring are stored in

## 2025-03-19 LAB
ALBUMIN SERPL-MCNC: 3.1 G/DL (ref 3.5–5)
ALBUMIN SERPL-MCNC: 3.1 G/DL (ref 3.5–5)
ALBUMIN/GLOB SERPL: 0.8 (ref 1.1–2.2)
ALP SERPL-CCNC: 67 U/L (ref 45–117)
ALT SERPL-CCNC: 6 U/L (ref 12–78)
ANION GAP SERPL CALC-SCNC: 10 MMOL/L (ref 2–12)
ANION GAP SERPL CALC-SCNC: 10 MMOL/L (ref 2–12)
AST SERPL W P-5'-P-CCNC: 23 U/L (ref 15–37)
BASOPHILS # BLD: 0.1 K/UL (ref 0–0.1)
BASOPHILS NFR BLD: 0.8 % (ref 0–1)
BILIRUB SERPL-MCNC: 0.3 MG/DL (ref 0.2–1)
BUN SERPL-MCNC: 46 MG/DL (ref 6–20)
BUN SERPL-MCNC: 47 MG/DL (ref 6–20)
BUN/CREAT SERPL: 5 (ref 12–20)
BUN/CREAT SERPL: 5 (ref 12–20)
CA-I BLD-MCNC: 9.1 MG/DL (ref 8.5–10.1)
CA-I BLD-MCNC: 9.2 MG/DL (ref 8.5–10.1)
CHLORIDE SERPL-SCNC: 100 MMOL/L (ref 97–108)
CHLORIDE SERPL-SCNC: 101 MMOL/L (ref 97–108)
CO2 SERPL-SCNC: 24 MMOL/L (ref 21–32)
CO2 SERPL-SCNC: 24 MMOL/L (ref 21–32)
CREAT SERPL-MCNC: 9.63 MG/DL (ref 0.55–1.02)
CREAT SERPL-MCNC: 9.67 MG/DL (ref 0.55–1.02)
DIFFERENTIAL METHOD BLD: ABNORMAL
EKG ATRIAL RATE: 110 BPM
EKG DIAGNOSIS: NORMAL
EKG Q-T INTERVAL: 402 MS
EKG QRS DURATION: 156 MS
EKG QTC CALCULATION (BAZETT): 556 MS
EKG R AXIS: -57 DEGREES
EKG T AXIS: 106 DEGREES
EKG VENTRICULAR RATE: 115 BPM
EOSINOPHIL # BLD: 0.38 K/UL (ref 0–0.4)
EOSINOPHIL NFR BLD: 3 % (ref 0–7)
ERYTHROCYTE [DISTWIDTH] IN BLOOD BY AUTOMATED COUNT: 14.9 % (ref 11.5–14.5)
GLOBULIN SER CALC-MCNC: 3.7 G/DL (ref 2–4)
GLUCOSE BLD STRIP.AUTO-MCNC: 109 MG/DL (ref 65–100)
GLUCOSE BLD STRIP.AUTO-MCNC: 168 MG/DL (ref 65–100)
GLUCOSE BLD STRIP.AUTO-MCNC: 192 MG/DL (ref 65–100)
GLUCOSE BLD STRIP.AUTO-MCNC: 217 MG/DL (ref 65–100)
GLUCOSE BLD STRIP.AUTO-MCNC: 242 MG/DL (ref 65–100)
GLUCOSE BLD STRIP.AUTO-MCNC: 264 MG/DL (ref 65–100)
GLUCOSE SERPL-MCNC: 103 MG/DL (ref 65–100)
GLUCOSE SERPL-MCNC: 104 MG/DL (ref 65–100)
HCT VFR BLD AUTO: 37.2 % (ref 35–47)
HGB BLD-MCNC: 11.5 G/DL (ref 11.5–16)
IMM GRANULOCYTES # BLD AUTO: 0.08 K/UL (ref 0–0.04)
IMM GRANULOCYTES NFR BLD AUTO: 0.6 % (ref 0–0.5)
LYMPHOCYTES # BLD: 0.97 K/UL (ref 0.8–3.5)
LYMPHOCYTES NFR BLD: 7.8 % (ref 12–49)
MCH RBC QN AUTO: 25.4 PG (ref 26–34)
MCHC RBC AUTO-ENTMCNC: 30.9 G/DL (ref 30–36.5)
MCV RBC AUTO: 82.3 FL (ref 80–99)
MONOCYTES # BLD: 1.53 K/UL (ref 0–1)
MONOCYTES NFR BLD: 12.3 % (ref 5–13)
NEUTS SEG # BLD: 9.4 K/UL (ref 1.8–8)
NEUTS SEG NFR BLD: 75.5 % (ref 32–75)
NRBC # BLD: 0 K/UL (ref 0–0.01)
NRBC BLD-RTO: 0 PER 100 WBC
PERFORMED BY:: ABNORMAL
PHOSPHATE SERPL-MCNC: 8.7 MG/DL (ref 2.6–4.7)
PLATELET # BLD AUTO: 372 K/UL (ref 150–400)
PMV BLD AUTO: 10.3 FL (ref 8.9–12.9)
POTASSIUM SERPL-SCNC: 5 MMOL/L (ref 3.5–5.1)
POTASSIUM SERPL-SCNC: 5.1 MMOL/L (ref 3.5–5.1)
PROT SERPL-MCNC: 6.8 G/DL (ref 6.4–8.2)
RBC # BLD AUTO: 4.52 M/UL (ref 3.8–5.2)
SODIUM SERPL-SCNC: 134 MMOL/L (ref 136–145)
SODIUM SERPL-SCNC: 135 MMOL/L (ref 136–145)
WBC # BLD AUTO: 12.5 K/UL (ref 3.6–11)

## 2025-03-19 PROCEDURE — 90935 HEMODIALYSIS ONE EVALUATION: CPT

## 2025-03-19 PROCEDURE — 2709999900 HC NON-CHARGEABLE SUPPLY

## 2025-03-19 PROCEDURE — 82962 GLUCOSE BLOOD TEST: CPT

## 2025-03-19 PROCEDURE — 85025 COMPLETE CBC W/AUTO DIFF WBC: CPT

## 2025-03-19 PROCEDURE — 2060000000 HC ICU INTERMEDIATE R&B

## 2025-03-19 PROCEDURE — 6370000000 HC RX 637 (ALT 250 FOR IP): Performed by: FAMILY MEDICINE

## 2025-03-19 PROCEDURE — 80069 RENAL FUNCTION PANEL: CPT

## 2025-03-19 PROCEDURE — 36415 COLL VENOUS BLD VENIPUNCTURE: CPT

## 2025-03-19 PROCEDURE — 80053 COMPREHEN METABOLIC PANEL: CPT

## 2025-03-19 PROCEDURE — 6360000002 HC RX W HCPCS: Performed by: INTERNAL MEDICINE

## 2025-03-19 PROCEDURE — 93005 ELECTROCARDIOGRAM TRACING: CPT | Performed by: INTERNAL MEDICINE

## 2025-03-19 PROCEDURE — 2500000003 HC RX 250 WO HCPCS: Performed by: FAMILY MEDICINE

## 2025-03-19 PROCEDURE — 2500000003 HC RX 250 WO HCPCS: Performed by: INTERNAL MEDICINE

## 2025-03-19 PROCEDURE — 2500000003 HC RX 250 WO HCPCS

## 2025-03-19 PROCEDURE — 92610 EVALUATE SWALLOWING FUNCTION: CPT

## 2025-03-19 RX ADMIN — APIXABAN 2.5 MG: 2.5 TABLET, FILM COATED ORAL at 21:47

## 2025-03-19 RX ADMIN — HEPARIN SODIUM 3200 UNITS: 1000 INJECTION INTRAVENOUS; SUBCUTANEOUS at 08:11

## 2025-03-19 RX ADMIN — METOPROLOL TARTRATE 5 MG: 5 INJECTION INTRAVENOUS at 09:37

## 2025-03-19 RX ADMIN — AMIODARONE HYDROCHLORIDE 0.5 MG/MIN: 1.8 INJECTION, SOLUTION INTRAVENOUS at 15:52

## 2025-03-19 RX ADMIN — SODIUM CHLORIDE, PRESERVATIVE FREE 10 ML: 5 INJECTION INTRAVENOUS at 21:48

## 2025-03-19 RX ADMIN — AMIODARONE HYDROCHLORIDE 1 MG/MIN: 1.8 INJECTION, SOLUTION INTRAVENOUS at 10:11

## 2025-03-19 RX ADMIN — INSULIN LISPRO 8 UNITS: 100 INJECTION, SOLUTION INTRAVENOUS; SUBCUTANEOUS at 17:41

## 2025-03-19 RX ADMIN — POLYETHYLENE GLYCOL 3350 17 G: 17 POWDER, FOR SOLUTION ORAL at 22:04

## 2025-03-19 ASSESSMENT — PAIN SCALES - GENERAL
PAINLEVEL_OUTOF10: 0

## 2025-03-19 ASSESSMENT — PAIN SCALES - WONG BAKER
WONGBAKER_NUMERICALRESPONSE: NO HURT

## 2025-03-19 NOTE — CONSULTS
EEG results uploaded under tabs Chart Review, Media.  
TSH 2.51 03/02/2025 07:15 AM          Imaging & Acillary Studies     Last EKG    Encounter Date: 03/13/25   EKG 12 Lead   Result Value    Ventricular Rate 114    Atrial Rate 114    P-R Interval 160    QRS Duration 158    Q-T Interval 364    QTc Calculation (Bazett) 501    P Axis 73    R Axis -55    T Axis 107    Diagnosis      Sinus tachycardia  Left axis deviation  Left bundle branch block  Abnormal ECG  When compared with ECG of 13-MAR-2025 00:53, (Unconfirmed)  No significant change was found  Confirmed by VELASQUEZ HURST MD ([id number]) on 3/13/2025 11:29:52 AM          No orders to display       Cardiac cath:    No results found for this or any previous visit.       Echo:     03/02/25    ECHO (TTE) COMPLETE (PRN CONTRAST/BUBBLE/STRAIN/3D) 03/03/2025 12:03 PM (Final)    Interpretation Summary    Left Ventricle: Normal left ventricular systolic function with a visually estimated EF of 55 - 60%. Left ventricle size is normal. Normal wall thickness. Mild hypokinesis of the following segments: mid anteroseptal. Diastolic dysfunction present with increased LAP with normal LV EF.    Mitral Valve: Porcine bioprosthetic valve.    Left Atrium: Left atrium is moderately dilated. Left atrial volume index is moderately increased (42-48 mL/m2) mL/m2.    Right Atrium: Right atrium is mildly dilated.    Image quality is suboptimal. Contrast used: Lumason.    Signed by: Velasquez Hurst on 3/3/2025 12:03 PM        Total time spent:  60 minutes.        Dr. Velasquez Hurst MD.PhD. FACC              
automatic exposure control for dose modulation. This  study was analyzed by the [employer] algorithm.    FINDINGS:  CT Brain Perfusion:  Brain parenchymal perfusion study demonstrates no suggestion of acute infarct.  No significant regional area of elevated Tmax, decreased cerebral blood flow or  blood volume.  rCBF < 30% = 0 cc.  Tmax > 6 seconds = 0 cc.    CTA:  Intracranial: Patchy moderate to severe short segment stenosis of the  proximal/mid basilar artery junction (images [id number] series 3) indeterminate.  There is no evidence of large vessel occlusion in the anterior or posterior  circulation. Fetal takeoff of the right PCA and patent left P-comm with  diffusely hypoplastic basilar artery, developmental variant.  There is no evidence of aneurysm or vascular malformation. The dural venous  sinuses and deep cerebral venous system are patent.    Neck:  NASCET method was utilized for calculating stenosis.  Bovine arch aortic branching pattern, developmental variant. The common carotid  arteries demonstrate no significant stenosis. There is no evidence of  significant stenosis in the right and left cervical internal carotid arteries.  Carotid bulbs plaques  without significant luminal narrowing.  There is 0% stenosis of the right carotid artery.  There is 0% stenosis of the left carotid artery.    There is a right vertebral artery dominant vertebrobasilar arterial system. The  cervical vertebral arteries are normal in course, size and contour without  significant stenosis.  Partially imaged right jugular access tunneled central venous catheter descends  in the SVC.    OTHERS:  No evidence of intracranial mass or abnormal enhancement on delayed phase  images.  Left maxillary sinus mucous retention cyst versus polyp. 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 Dental amalgam  associated metallic artifact precludes optimal evaluation of the adjacent  structures, oral cavity and

## 2025-03-19 NOTE — DIALYSIS
Assumed care of pt, notified Meg in tele that pt is in Dialysis, bed space 6, box 89. Per Meg, pt HR noted to be in 130's to 140's since approx. 4:45 this AM.

## 2025-03-19 NOTE — DIALYSIS
Report called to pt's primary nurse, Tracy.  Per tele, pt's HR is 130's and 140's and has been elevated since 4:45 am. Notified Dr. Webster, verbal orders received for NS bolus 250ml x2 and to D/C treatment.  Pt dialyzed for 45 minutes. Received total of 500 ml NS bolus per MD verbal order.   Pt tolerated treatment well.  Notified Meg in tele that pt is en route to her room, 467.

## 2025-03-20 LAB
ALBUMIN SERPL-MCNC: 2.5 G/DL (ref 3.5–5)
ALBUMIN SERPL-MCNC: 2.6 G/DL (ref 3.5–5)
ALBUMIN/GLOB SERPL: 0.8 (ref 1.1–2.2)
ALP SERPL-CCNC: 66 U/L (ref 45–117)
ALT SERPL-CCNC: <6 U/L (ref 12–78)
ANION GAP SERPL CALC-SCNC: 10 MMOL/L (ref 2–12)
ANION GAP SERPL CALC-SCNC: 7 MMOL/L (ref 2–12)
AST SERPL W P-5'-P-CCNC: 21 U/L (ref 15–37)
BASOPHILS # BLD: 0.05 K/UL (ref 0–0.1)
BASOPHILS NFR BLD: 0.5 % (ref 0–1)
BILIRUB SERPL-MCNC: 0.2 MG/DL (ref 0.2–1)
BUN SERPL-MCNC: 48 MG/DL (ref 6–20)
BUN SERPL-MCNC: 48 MG/DL (ref 6–20)
BUN/CREAT SERPL: 5 (ref 12–20)
BUN/CREAT SERPL: 5 (ref 12–20)
CA-I BLD-MCNC: 8 MG/DL (ref 8.5–10.1)
CA-I BLD-MCNC: 8 MG/DL (ref 8.5–10.1)
CHLORIDE SERPL-SCNC: 98 MMOL/L (ref 97–108)
CHLORIDE SERPL-SCNC: 99 MMOL/L (ref 97–108)
CO2 SERPL-SCNC: 23 MMOL/L (ref 21–32)
CO2 SERPL-SCNC: 26 MMOL/L (ref 21–32)
CREAT SERPL-MCNC: 10.5 MG/DL (ref 0.55–1.02)
CREAT SERPL-MCNC: 10.5 MG/DL (ref 0.55–1.02)
DIFFERENTIAL METHOD BLD: ABNORMAL
EOSINOPHIL # BLD: 0.34 K/UL (ref 0–0.4)
EOSINOPHIL NFR BLD: 3.2 % (ref 0–7)
ERYTHROCYTE [DISTWIDTH] IN BLOOD BY AUTOMATED COUNT: 15.1 % (ref 11.5–14.5)
GLOBULIN SER CALC-MCNC: 3.4 G/DL (ref 2–4)
GLUCOSE BLD STRIP.AUTO-MCNC: 127 MG/DL (ref 65–100)
GLUCOSE BLD STRIP.AUTO-MCNC: 197 MG/DL (ref 65–100)
GLUCOSE BLD STRIP.AUTO-MCNC: 269 MG/DL (ref 65–100)
GLUCOSE SERPL-MCNC: 257 MG/DL (ref 65–100)
GLUCOSE SERPL-MCNC: 258 MG/DL (ref 65–100)
HCT VFR BLD AUTO: 30.6 % (ref 35–47)
HGB BLD-MCNC: 9.9 G/DL (ref 11.5–16)
IMM GRANULOCYTES # BLD AUTO: 0.06 K/UL (ref 0–0.04)
IMM GRANULOCYTES NFR BLD AUTO: 0.6 % (ref 0–0.5)
LYMPHOCYTES # BLD: 1.06 K/UL (ref 0.8–3.5)
LYMPHOCYTES NFR BLD: 9.9 % (ref 12–49)
MAGNESIUM SERPL-MCNC: 2.3 MG/DL (ref 1.6–2.4)
MCH RBC QN AUTO: 26.5 PG (ref 26–34)
MCHC RBC AUTO-ENTMCNC: 32.4 G/DL (ref 30–36.5)
MCV RBC AUTO: 82 FL (ref 80–99)
MONOCYTES # BLD: 1.23 K/UL (ref 0–1)
MONOCYTES NFR BLD: 11.5 % (ref 5–13)
NEUTS SEG # BLD: 7.92 K/UL (ref 1.8–8)
NEUTS SEG NFR BLD: 74.3 % (ref 32–75)
NRBC # BLD: 0 K/UL (ref 0–0.01)
NRBC BLD-RTO: 0 PER 100 WBC
PERFORMED BY:: ABNORMAL
PHOSPHATE SERPL-MCNC: 5.8 MG/DL (ref 2.6–4.7)
PLATELET # BLD AUTO: 299 K/UL (ref 150–400)
PMV BLD AUTO: 10.8 FL (ref 8.9–12.9)
POTASSIUM SERPL-SCNC: 4.8 MMOL/L (ref 3.5–5.1)
POTASSIUM SERPL-SCNC: 4.9 MMOL/L (ref 3.5–5.1)
PROT SERPL-MCNC: 6 G/DL (ref 6.4–8.2)
RBC # BLD AUTO: 3.73 M/UL (ref 3.8–5.2)
SODIUM SERPL-SCNC: 131 MMOL/L (ref 136–145)
SODIUM SERPL-SCNC: 132 MMOL/L (ref 136–145)
WBC # BLD AUTO: 10.7 K/UL (ref 3.6–11)

## 2025-03-20 PROCEDURE — 6370000000 HC RX 637 (ALT 250 FOR IP): Performed by: INTERNAL MEDICINE

## 2025-03-20 PROCEDURE — 6360000002 HC RX W HCPCS: Performed by: INTERNAL MEDICINE

## 2025-03-20 PROCEDURE — 36415 COLL VENOUS BLD VENIPUNCTURE: CPT

## 2025-03-20 PROCEDURE — 82962 GLUCOSE BLOOD TEST: CPT

## 2025-03-20 PROCEDURE — 80069 RENAL FUNCTION PANEL: CPT

## 2025-03-20 PROCEDURE — 2500000003 HC RX 250 WO HCPCS: Performed by: INTERNAL MEDICINE

## 2025-03-20 PROCEDURE — 85025 COMPLETE CBC W/AUTO DIFF WBC: CPT

## 2025-03-20 PROCEDURE — 2709999900 HC NON-CHARGEABLE SUPPLY

## 2025-03-20 PROCEDURE — 2060000000 HC ICU INTERMEDIATE R&B

## 2025-03-20 PROCEDURE — 6370000000 HC RX 637 (ALT 250 FOR IP): Performed by: FAMILY MEDICINE

## 2025-03-20 PROCEDURE — 90935 HEMODIALYSIS ONE EVALUATION: CPT

## 2025-03-20 PROCEDURE — 80053 COMPREHEN METABOLIC PANEL: CPT

## 2025-03-20 PROCEDURE — 83735 ASSAY OF MAGNESIUM: CPT

## 2025-03-20 RX ORDER — DILTIAZEM HYDROCHLORIDE 30 MG/1
30 TABLET, FILM COATED ORAL EVERY 6 HOURS SCHEDULED
Status: DISCONTINUED | OUTPATIENT
Start: 2025-03-20 | End: 2025-03-21

## 2025-03-20 RX ADMIN — DILTIAZEM HYDROCHLORIDE 30 MG: 30 TABLET, FILM COATED ORAL at 23:46

## 2025-03-20 RX ADMIN — INSULIN LISPRO 8 UNITS: 100 INJECTION, SOLUTION INTRAVENOUS; SUBCUTANEOUS at 17:33

## 2025-03-20 RX ADMIN — SEVELAMER CARBONATE 0.8 G: 800 POWDER, FOR SUSPENSION ORAL at 17:32

## 2025-03-20 RX ADMIN — INSULIN LISPRO 4 UNITS: 100 INJECTION, SOLUTION INTRAVENOUS; SUBCUTANEOUS at 20:14

## 2025-03-20 RX ADMIN — AMIODARONE HYDROCHLORIDE 0.5 MG/MIN: 1.8 INJECTION, SOLUTION INTRAVENOUS at 05:05

## 2025-03-20 RX ADMIN — APIXABAN 2.5 MG: 2.5 TABLET, FILM COATED ORAL at 20:12

## 2025-03-20 RX ADMIN — AMIODARONE HYDROCHLORIDE 0.5 MG/MIN: 1.8 INJECTION, SOLUTION INTRAVENOUS at 17:43

## 2025-03-20 RX ADMIN — DILTIAZEM HYDROCHLORIDE 30 MG: 30 TABLET, FILM COATED ORAL at 17:33

## 2025-03-20 RX ADMIN — HEPARIN SODIUM 3200 UNITS: 1000 INJECTION INTRAVENOUS; SUBCUTANEOUS at 10:21

## 2025-03-20 ASSESSMENT — PAIN SCALES - GENERAL
PAINLEVEL_OUTOF10: 0

## 2025-03-20 ASSESSMENT — PAIN SCALES - WONG BAKER
WONGBAKER_NUMERICALRESPONSE: NO HURT
WONGBAKER_NUMERICALRESPONSE: NO HURT

## 2025-03-20 NOTE — DIALYSIS
Completed 3 hour dialysis and tolerated 0 fluid removal.    Treatment is uneventful. Seen by Dr. Webster and VIOLA Canales while on dialysis.    Meg of telemetry informed patient is en route to [address] [place] - Room 467 with Box [address].    JENNIFER Molina received dialysis report.

## 2025-03-20 NOTE — CARE COORDINATION
Chart reviewed, DCP remains for patient to d/c from  back to LTC bed at [facility] once medically stable.    Patient will not require insurance auth prior to d/c.    CM continues to follow and monitor for needs.   
medications?: No  In our efforts to provide the best possible care to you and others like you, can you think of anything that we could have done to help you after you left the hospital the first time, so that you might not have needed to return so soon?: Other (Comment) (No suggestions.)      Advance Care Planning     General Advance Care Planning (ACP) Conversation    Date of Conversation: 3/13/2025  Conducted with: Patient with Decision Making Capacity and Legal next of kin  Other persons present: None    Healthcare Decision Maker:   Primary Decision Maker: Lissette Foley - Brother/Sister - [phone]    Secondary Decision Maker: Herminia Sweet ( sister in law) - Other - [phone]    Secondary Decision Maker: Marc Sweet Sr. - Brother/Sister - [phone]       Content/Action Overview:    Reviewed DNR/DNI and patient         Length of Voluntary ACP Conversation in minutes:      Jadyn Reece RN

## 2025-03-21 VITALS
HEART RATE: 69 BPM | DIASTOLIC BLOOD PRESSURE: 73 MMHG | BODY MASS INDEX: 26.26 KG/M2 | OXYGEN SATURATION: 92 % | SYSTOLIC BLOOD PRESSURE: 156 MMHG | RESPIRATION RATE: 18 BRPM | WEIGHT: 167.33 LBS | HEIGHT: 67 IN | TEMPERATURE: 97.9 F

## 2025-03-21 LAB
ALBUMIN SERPL-MCNC: 2.4 G/DL (ref 3.5–5)
ANION GAP SERPL CALC-SCNC: 5 MMOL/L (ref 2–12)
BACTERIA SPEC CULT: NORMAL
BACTERIA SPEC CULT: NORMAL
BASOPHILS # BLD: 0.07 K/UL (ref 0–0.1)
BASOPHILS NFR BLD: 0.8 % (ref 0–1)
BUN SERPL-MCNC: 27 MG/DL (ref 6–20)
BUN/CREAT SERPL: 4 (ref 12–20)
CA-I BLD-MCNC: 8.3 MG/DL (ref 8.5–10.1)
CHLORIDE SERPL-SCNC: 100 MMOL/L (ref 97–108)
CO2 SERPL-SCNC: 30 MMOL/L (ref 21–32)
CREAT SERPL-MCNC: 6.45 MG/DL (ref 0.55–1.02)
DIFFERENTIAL METHOD BLD: ABNORMAL
EOSINOPHIL # BLD: 0.49 K/UL (ref 0–0.4)
EOSINOPHIL NFR BLD: 5.7 % (ref 0–7)
ERYTHROCYTE [DISTWIDTH] IN BLOOD BY AUTOMATED COUNT: 15.1 % (ref 11.5–14.5)
GLUCOSE BLD STRIP.AUTO-MCNC: 101 MG/DL (ref 65–100)
GLUCOSE BLD STRIP.AUTO-MCNC: 138 MG/DL (ref 65–100)
GLUCOSE BLD STRIP.AUTO-MCNC: 150 MG/DL (ref 65–100)
GLUCOSE SERPL-MCNC: 117 MG/DL (ref 65–100)
HCT VFR BLD AUTO: 30 % (ref 35–47)
HGB BLD-MCNC: 9.3 G/DL (ref 11.5–16)
IMM GRANULOCYTES # BLD AUTO: 0.07 K/UL (ref 0–0.04)
IMM GRANULOCYTES NFR BLD AUTO: 0.8 % (ref 0–0.5)
LYMPHOCYTES # BLD: 1.23 K/UL (ref 0.8–3.5)
LYMPHOCYTES NFR BLD: 14.4 % (ref 12–49)
Lab: NORMAL
Lab: NORMAL
MCH RBC QN AUTO: 26.1 PG (ref 26–34)
MCHC RBC AUTO-ENTMCNC: 31 G/DL (ref 30–36.5)
MCV RBC AUTO: 84.3 FL (ref 80–99)
MONOCYTES # BLD: 1.14 K/UL (ref 0–1)
MONOCYTES NFR BLD: 13.4 % (ref 5–13)
NEUTS SEG # BLD: 5.53 K/UL (ref 1.8–8)
NEUTS SEG NFR BLD: 64.9 % (ref 32–75)
NRBC # BLD: 0 K/UL (ref 0–0.01)
NRBC BLD-RTO: 0 PER 100 WBC
PERFORMED BY:: ABNORMAL
PHOSPHATE SERPL-MCNC: 4.4 MG/DL (ref 2.6–4.7)
PLATELET # BLD AUTO: 296 K/UL (ref 150–400)
PMV BLD AUTO: 10.7 FL (ref 8.9–12.9)
POTASSIUM SERPL-SCNC: 4.5 MMOL/L (ref 3.5–5.1)
RBC # BLD AUTO: 3.56 M/UL (ref 3.8–5.2)
SODIUM SERPL-SCNC: 135 MMOL/L (ref 136–145)
WBC # BLD AUTO: 8.5 K/UL (ref 3.6–11)

## 2025-03-21 PROCEDURE — 82962 GLUCOSE BLOOD TEST: CPT

## 2025-03-21 PROCEDURE — 6360000002 HC RX W HCPCS: Performed by: INTERNAL MEDICINE

## 2025-03-21 PROCEDURE — 85025 COMPLETE CBC W/AUTO DIFF WBC: CPT

## 2025-03-21 PROCEDURE — 2709999900 HC NON-CHARGEABLE SUPPLY

## 2025-03-21 PROCEDURE — 36415 COLL VENOUS BLD VENIPUNCTURE: CPT

## 2025-03-21 PROCEDURE — 80069 RENAL FUNCTION PANEL: CPT

## 2025-03-21 PROCEDURE — 2500000003 HC RX 250 WO HCPCS: Performed by: INTERNAL MEDICINE

## 2025-03-21 PROCEDURE — 6370000000 HC RX 637 (ALT 250 FOR IP): Performed by: INTERNAL MEDICINE

## 2025-03-21 PROCEDURE — 36556 INSERT NON-TUNNEL CV CATH: CPT

## 2025-03-21 PROCEDURE — 90935 HEMODIALYSIS ONE EVALUATION: CPT

## 2025-03-21 PROCEDURE — 6370000000 HC RX 637 (ALT 250 FOR IP): Performed by: FAMILY MEDICINE

## 2025-03-21 RX ORDER — AMIODARONE HYDROCHLORIDE 200 MG/1
200 TABLET ORAL DAILY
Status: DISCONTINUED | OUTPATIENT
Start: 2025-03-21 | End: 2025-03-21 | Stop reason: HOSPADM

## 2025-03-21 RX ORDER — MIDODRINE HYDROCHLORIDE 10 MG/1
10 TABLET ORAL 3 TIMES DAILY PRN
Qty: 90 TABLET | Refills: 3 | Status: SHIPPED | OUTPATIENT
Start: 2025-03-21

## 2025-03-21 RX ORDER — DILTIAZEM HYDROCHLORIDE 120 MG/1
120 CAPSULE, COATED, EXTENDED RELEASE ORAL DAILY
Qty: 30 CAPSULE | Refills: 3 | Status: SHIPPED | OUTPATIENT
Start: 2025-03-22

## 2025-03-21 RX ORDER — DILTIAZEM HYDROCHLORIDE 120 MG/1
120 CAPSULE, COATED, EXTENDED RELEASE ORAL DAILY
Status: DISCONTINUED | OUTPATIENT
Start: 2025-03-22 | End: 2025-03-21 | Stop reason: HOSPADM

## 2025-03-21 RX ADMIN — VENLAFAXINE HYDROCHLORIDE 75 MG: 75 CAPSULE, EXTENDED RELEASE ORAL at 09:02

## 2025-03-21 RX ADMIN — CINACALCET HYDROCHLORIDE 30 MG: 30 TABLET, FILM COATED ORAL at 09:01

## 2025-03-21 RX ADMIN — ROSUVASTATIN CALCIUM 20 MG: 20 TABLET, FILM COATED ORAL at 09:01

## 2025-03-21 RX ADMIN — SEVELAMER CARBONATE 0.8 G: 800 POWDER, FOR SUSPENSION ORAL at 09:02

## 2025-03-21 RX ADMIN — FAMOTIDINE 20 MG: 20 TABLET, FILM COATED ORAL at 09:02

## 2025-03-21 RX ADMIN — APIXABAN 2.5 MG: 2.5 TABLET, FILM COATED ORAL at 09:02

## 2025-03-21 RX ADMIN — AMIODARONE HYDROCHLORIDE 0.5 MG/MIN: 1.8 INJECTION, SOLUTION INTRAVENOUS at 05:15

## 2025-03-21 RX ADMIN — ISOSORBIDE MONONITRATE 60 MG: 60 TABLET, EXTENDED RELEASE ORAL at 09:02

## 2025-03-21 RX ADMIN — PANTOPRAZOLE SODIUM 40 MG: 40 TABLET, DELAYED RELEASE ORAL at 06:13

## 2025-03-21 RX ADMIN — HEPARIN SODIUM 3200 UNITS: 1000 INJECTION INTRAVENOUS; SUBCUTANEOUS at 14:50

## 2025-03-21 RX ADMIN — DILTIAZEM HYDROCHLORIDE 30 MG: 30 TABLET, FILM COATED ORAL at 06:13

## 2025-03-21 ASSESSMENT — PAIN SCALES - GENERAL
PAINLEVEL_OUTOF10: 0

## 2025-03-21 ASSESSMENT — PAIN SCALES - WONG BAKER
WONGBAKER_NUMERICALRESPONSE: NO HURT
WONGBAKER_NUMERICALRESPONSE: NO HURT

## 2025-03-21 NOTE — PLAN OF CARE
Problem: Safety - Adult  Goal: Free from fall injury  3/14/2025 1103 by Eladia Jones RN  Outcome: Progressing  3/14/2025 0156 by Leanna Vivar RN  Outcome: Progressing     Problem: Chronic Conditions and Co-morbidities  Goal: Patient's chronic conditions and co-morbidity symptoms are monitored and maintained or improved  3/14/2025 1103 by Eladia Jones RN  Outcome: Progressing  Flowsheets (Taken 3/14/2025 0730)  Care Plan - Patient's Chronic Conditions and Co-Morbidity Symptoms are Monitored and Maintained or Improved:   Monitor and assess patient's chronic conditions and comorbid symptoms for stability, deterioration, or improvement   Collaborate with multidisciplinary team to address chronic and comorbid conditions and prevent exacerbation or deterioration   Update acute care plan with appropriate goals if chronic or comorbid symptoms are exacerbated and prevent overall improvement and discharge  3/14/2025 0156 by Leanna Vivar RN  Outcome: Progressing  Flowsheets  Taken 3/13/2025 1901 by Leanna Vivar RN  Care Plan - Patient's Chronic Conditions and Co-Morbidity Symptoms are Monitored and Maintained or Improved:   Collaborate with multidisciplinary team to address chronic and comorbid conditions and prevent exacerbation or deterioration   Monitor and assess patient's chronic conditions and comorbid symptoms for stability, deterioration, or improvement   Update acute care plan with appropriate goals if chronic or comorbid symptoms are exacerbated and prevent overall improvement and discharge  Taken 3/13/2025 1717 by Eladia Jones RN  Care Plan - Patient's Chronic Conditions and Co-Morbidity Symptoms are Monitored and Maintained or Improved:   Monitor and assess patient's chronic conditions and comorbid symptoms for stability, deterioration, or improvement   Collaborate with multidisciplinary team to address chronic and comorbid conditions and prevent exacerbation or deterioration   Update acute care 
  Problem: Safety - Adult  Goal: Free from fall injury  3/15/2025 2112 by Trupti Sequeira RN  Outcome: Progressing  3/15/2025 0743 by Ivan Lundy RN  Outcome: Progressing     Problem: Chronic Conditions and Co-morbidities  Goal: Patient's chronic conditions and co-morbidity symptoms are monitored and maintained or improved  3/15/2025 2112 by Trupti Sequeira RN  Outcome: Progressing  3/15/2025 0743 by Ivan Lundy RN  Outcome: Progressing  Flowsheets (Taken 3/14/2025 2033 by Trupti Sequeira RN)  Care Plan - Patient's Chronic Conditions and Co-Morbidity Symptoms are Monitored and Maintained or Improved: Monitor and assess patient's chronic conditions and comorbid symptoms for stability, deterioration, or improvement     Problem: Skin/Tissue Integrity  Goal: Skin integrity remains intact  Description: 1.  Monitor for areas of redness and/or skin breakdown  2.  Assess vascular access sites hourly  3.  Every 4-6 hours minimum:  Change oxygen saturation probe site  4.  Every 4-6 hours:  If on nasal continuous positive airway pressure, respiratory therapy assess nares and determine need for appliance change or resting period  3/15/2025 2112 by Trupti Sequeira RN  Outcome: Progressing  3/15/2025 0743 by Ivan Lundy RN  Outcome: Progressing  Flowsheets (Taken 3/14/2025 2033 by Trupti Sequeira RN)  Skin Integrity Remains Intact: Monitor for areas of redness and/or skin breakdown     Problem: ABCDS Injury Assessment  Goal: Absence of physical injury  3/15/2025 2112 by Trupti Sequeira RN  Outcome: Progressing  3/15/2025 0743 by Ivan Lundy RN  Outcome: Progressing     Problem: Discharge Planning  Goal: Discharge to home or other facility with appropriate resources  3/15/2025 2112 by Trupti Sequeira RN  Outcome: Not Progressing  3/15/2025 0743 by Ivan Lundy RN  Outcome: Progressing  Flowsheets (Taken 3/14/2025 2033 by Trupti Sequeira RN)  Discharge to home or other facility with 
  Problem: Safety - Adult  Goal: Free from fall injury  3/18/2025 2216 by Trupti Sequeira RN  Outcome: Progressing  3/18/2025 0821 by Etelvina Sosa  Outcome: Progressing     Problem: Chronic Conditions and Co-morbidities  Goal: Patient's chronic conditions and co-morbidity symptoms are monitored and maintained or improved  3/18/2025 2216 by Trupti Sequeira RN  Outcome: Progressing  3/18/2025 0821 by Etelvina Sosa  Outcome: Progressing     Problem: Discharge Planning  Goal: Discharge to home or other facility with appropriate resources  3/18/2025 2216 by Trupti Sequeira RN  Outcome: Progressing  3/18/2025 0821 by Etelvina Sosa  Outcome: Progressing     Problem: Skin/Tissue Integrity  Goal: Skin integrity remains intact  Description: 1.  Monitor for areas of redness and/or skin breakdown  2.  Assess vascular access sites hourly  3.  Every 4-6 hours minimum:  Change oxygen saturation probe site  4.  Every 4-6 hours:  If on nasal continuous positive airway pressure, respiratory therapy assess nares and determine need for appliance change or resting period  3/18/2025 2216 by Trupti Sequeira RN  Outcome: Progressing  3/18/2025 0821 by Etelvina Sosa  Outcome: Progressing     Problem: ABCDS Injury Assessment  Goal: Absence of physical injury  3/18/2025 2216 by Trupti Sequeira RN  Outcome: Progressing  3/18/2025 0821 by Etelvina Sosa  Outcome: Progressing     Problem: Pain  Goal: Verbalizes/displays adequate comfort level or baseline comfort level  3/18/2025 2216 by Trupti Sequeira RN  Outcome: Progressing  3/18/2025 0821 by Etelvina Sosa  Outcome: Progressing     
  Problem: Safety - Adult  Goal: Free from fall injury  3/20/2025 2021 by Michelle Moses RN  Outcome: Progressing  3/20/2025 0732 by Etelvina Sosa  Outcome: Progressing     Problem: Chronic Conditions and Co-morbidities  Goal: Patient's chronic conditions and co-morbidity symptoms are monitored and maintained or improved  3/20/2025 2021 by Michelle Moses RN  Outcome: Progressing  3/20/2025 0732 by Etelvina Sosa  Outcome: Progressing     Problem: Discharge Planning  Goal: Discharge to home or other facility with appropriate resources  3/20/2025 2021 by Michelle Moses RN  Outcome: Progressing  3/20/2025 0732 by Etelvina Sosa  Outcome: Progressing     Problem: Skin/Tissue Integrity  Goal: Skin integrity remains intact  Description: 1.  Monitor for areas of redness and/or skin breakdown  2.  Assess vascular access sites hourly  3.  Every 4-6 hours minimum:  Change oxygen saturation probe site  4.  Every 4-6 hours:  If on nasal continuous positive airway pressure, respiratory therapy assess nares and determine need for appliance change or resting period  3/20/2025 2021 by Michelle Moses RN  Outcome: Progressing  3/20/2025 0732 by Etelvina Sosa  Outcome: Progressing     Problem: ABCDS Injury Assessment  Goal: Absence of physical injury  3/20/2025 2021 by Michelle Moses RN  Outcome: Progressing  3/20/2025 0732 by Etelvina Sosa  Outcome: Progressing     Problem: Pain  Goal: Verbalizes/displays adequate comfort level or baseline comfort level  3/20/2025 2021 by Michelle Moses RN  Outcome: Progressing  3/20/2025 0732 by Etelvina Sosa  Outcome: Progressing     
  Problem: Safety - Adult  Goal: Free from fall injury  3/21/2025 0814 by Verena Morrow RN  Outcome: Progressing  3/20/2025 2021 by Michelle Moses RN  Outcome: Progressing     Problem: Chronic Conditions and Co-morbidities  Goal: Patient's chronic conditions and co-morbidity symptoms are monitored and maintained or improved  3/21/2025 0814 by Verena Morrow RN  Outcome: Progressing  3/20/2025 2021 by Michelle Moses RN  Outcome: Progressing  Flowsheets (Taken 3/20/2025 2019)  Care Plan - Patient's Chronic Conditions and Co-Morbidity Symptoms are Monitored and Maintained or Improved: Monitor and assess patient's chronic conditions and comorbid symptoms for stability, deterioration, or improvement     Problem: Discharge Planning  Goal: Discharge to home or other facility with appropriate resources  3/21/2025 0814 by Verena Morrow RN  Outcome: Progressing  3/20/2025 2021 by Michelle Moses RN  Outcome: Progressing  Flowsheets (Taken 3/20/2025 2019)  Discharge to home or other facility with appropriate resources: Identify barriers to discharge with patient and caregiver     Problem: Skin/Tissue Integrity  Goal: Skin integrity remains intact  Description: 1.  Monitor for areas of redness and/or skin breakdown  2.  Assess vascular access sites hourly  3.  Every 4-6 hours minimum:  Change oxygen saturation probe site  4.  Every 4-6 hours:  If on nasal continuous positive airway pressure, respiratory therapy assess nares and determine need for appliance change or resting period  3/21/2025 0814 by Verena Morrow RN  Outcome: Progressing  3/20/2025 2021 by Michelle Moses RN  Outcome: Progressing  Flowsheets (Taken 3/20/2025 2019)  Skin Integrity Remains Intact: Monitor for areas of redness and/or skin breakdown     Problem: ABCDS Injury Assessment  Goal: Absence of physical injury  3/21/2025 0814 by Verena Morrow RN  Outcome: Progressing  3/20/2025 2021 by Michelle Moses RN  Outcome: Progressing   
  Problem: Safety - Adult  Goal: Free from fall injury  Outcome: Progressing     Problem: Chronic Conditions and Co-morbidities  Goal: Patient's chronic conditions and co-morbidity symptoms are monitored and maintained or improved  Outcome: Progressing     Problem: Discharge Planning  Goal: Discharge to home or other facility with appropriate resources  Outcome: Progressing     Problem: Skin/Tissue Integrity  Goal: Skin integrity remains intact  Description: 1.  Monitor for areas of redness and/or skin breakdown  2.  Assess vascular access sites hourly  3.  Every 4-6 hours minimum:  Change oxygen saturation probe site  4.  Every 4-6 hours:  If on nasal continuous positive airway pressure, respiratory therapy assess nares and determine need for appliance change or resting period  Outcome: Progressing     Problem: ABCDS Injury Assessment  Goal: Absence of physical injury  Outcome: Progressing     Problem: Pain  Goal: Verbalizes/displays adequate comfort level or baseline comfort level  Outcome: Progressing          
  Problem: Safety - Adult  Goal: Free from fall injury  Outcome: Progressing     Problem: Chronic Conditions and Co-morbidities  Goal: Patient's chronic conditions and co-morbidity symptoms are monitored and maintained or improved  Outcome: Progressing  Flowsheets  Taken 3/13/2025 1901 by Leanna Vivar, RN  Care Plan - Patient's Chronic Conditions and Co-Morbidity Symptoms are Monitored and Maintained or Improved:   Collaborate with multidisciplinary team to address chronic and comorbid conditions and prevent exacerbation or deterioration   Monitor and assess patient's chronic conditions and comorbid symptoms for stability, deterioration, or improvement   Update acute care plan with appropriate goals if chronic or comorbid symptoms are exacerbated and prevent overall improvement and discharge  Taken 3/13/2025 1717 by Eladia Jones RN  Care Plan - Patient's Chronic Conditions and Co-Morbidity Symptoms are Monitored and Maintained or Improved:   Monitor and assess patient's chronic conditions and comorbid symptoms for stability, deterioration, or improvement   Collaborate with multidisciplinary team to address chronic and comorbid conditions and prevent exacerbation or deterioration   Update acute care plan with appropriate goals if chronic or comorbid symptoms are exacerbated and prevent overall improvement and discharge     Problem: Discharge Planning  Goal: Discharge to home or other facility with appropriate resources  Outcome: Progressing  Flowsheets  Taken 3/13/2025 1901 by Leanna Vivar RN  Discharge to home or other facility with appropriate resources:   Arrange for needed discharge resources and transportation as appropriate   Identify barriers to discharge with patient and caregiver   Identify discharge learning needs (meds, wound care, etc)   Arrange for interpreters to assist at discharge as needed   Refer to discharge planning if patient needs post-hospital services based on physician order or 
  Problem: Safety - Adult  Goal: Free from fall injury  Outcome: Progressing     Problem: Chronic Conditions and Co-morbidities  Goal: Patient's chronic conditions and co-morbidity symptoms are monitored and maintained or improved  Outcome: Progressing  Flowsheets (Taken 3/17/2025 1200)  Care Plan - Patient's Chronic Conditions and Co-Morbidity Symptoms are Monitored and Maintained or Improved:   Monitor and assess patient's chronic conditions and comorbid symptoms for stability, deterioration, or improvement   Collaborate with multidisciplinary team to address chronic and comorbid conditions and prevent exacerbation or deterioration   Update acute care plan with appropriate goals if chronic or comorbid symptoms are exacerbated and prevent overall improvement and discharge     Problem: Discharge Planning  Goal: Discharge to home or other facility with appropriate resources  Outcome: Progressing  Flowsheets (Taken 3/17/2025 1200)  Discharge to home or other facility with appropriate resources:   Identify barriers to discharge with patient and caregiver   Arrange for needed discharge resources and transportation as appropriate   Identify discharge learning needs (meds, wound care, etc)   Refer to discharge planning if patient needs post-hospital services based on physician order or complex needs related to functional status, cognitive ability or social support system     Problem: Skin/Tissue Integrity  Goal: Skin integrity remains intact  Description: 1.  Monitor for areas of redness and/or skin breakdown  2.  Assess vascular access sites hourly  3.  Every 4-6 hours minimum:  Change oxygen saturation probe site  4.  Every 4-6 hours:  If on nasal continuous positive airway pressure, respiratory therapy assess nares and determine need for appliance change or resting period  Outcome: Progressing  Flowsheets (Taken 3/17/2025 1200)  Skin Integrity Remains Intact:   Monitor for areas of redness and/or skin breakdown   
4-6 hours:  If on nasal continuous positive airway pressure, respiratory therapy assess nares and determine need for appliance change or resting period  3/14/2025 2012 by Trupti Sequeira RN  Outcome: Progressing  3/14/2025 1103 by Eladia Jones RN  Outcome: Progressing  Flowsheets (Taken 3/14/2025 0730)  Skin Integrity Remains Intact:   Monitor for areas of redness and/or skin breakdown   Assess vascular access sites hourly     Problem: ABCDS Injury Assessment  Goal: Absence of physical injury  3/14/2025 2012 by Trupti Sequeira, RN  Outcome: Progressing  3/14/2025 1103 by Eladia Jones RN  Outcome: Progressing     Problem: Pain  Goal: Verbalizes/displays adequate comfort level or baseline comfort level  3/14/2025 2012 by Trupti Sequeira, RN  Outcome: Progressing  3/14/2025 1103 by Eladia Jones, RN  Outcome: Progressing     
Progressing  3/14/2025 2012 by Trupti Sequeira, RN  Outcome: Progressing     Problem: Pain  Goal: Verbalizes/displays adequate comfort level or baseline comfort level  3/15/2025 0743 by Ivan Lundy RN  Outcome: Progressing  Flowsheets (Taken 3/14/2025 2033 by Trupti Sequeira, RN)  Verbalizes/displays adequate comfort level or baseline comfort level: Encourage patient to monitor pain and request assistance  3/14/2025 2012 by Trupti Sequeira, RN  Outcome: Progressing     
assistance  Toileting: Needs assistance  Prior Level of Assist for Homemaking: Needs assistance  Homemaking Responsibilities: Yes  Prior Level of Assist for Transfers: Needs assistance  Active : No  Occupation: Retired    Cognitive and Communication Status:  Neurologic State: Alert  Orientation Level: Unable to verbalize  Cognition: Unable to assess     Dysphagia:  Oral Assessment:  Oral Motor   Labial: Decreased seal  Dentition: Limited  Oral Hygiene Comments: poor  Lingual: No impairment  Velum: Unable to visualize  Mandible: No impairment  P.O. Trials:  Consistencies Administered: Easy to chew;Thin - cup;Thin - straw;Pureed  PO Trials  Assessment Method(s): Cervical auscultation;Observation;Palpation  Vocal Quality: Other (comment) (nonverbal at this time)  Consistency Presented: Thin;Pureed;Easy to chew  How Presented: SLP-fed/Presented;Self-fed/presented;Cup/sip;Straw  Bolus Acceptance: No impairment  Bolus Formation/Control: Impaired  Type of Impairment: Anterior;Lip closure;Spillage  Propulsion: Delayed (# of seconds)  Oral Residue: None  Initiation of Swallow: Delayed (# of seconds)  Laryngeal Elevation: Functional  Aspiration Signs/Symptoms: Delayed cough/throat clear  Pharyngeal Phase Characteristics: No impairment, issues, or problems  Effective Modifications: Small sips and bites  Oral Phase  Oral Phase - Comment: mild        Voice:  Vocal Quality: Other (comment) (nonverbal at this time)       After Treatment:  Patient left in no apparent distress in bed, Call bell left within reach, and Nursing notified     Pain:  VAS (numerical) 0    COMMUNICATION/EDUCATION:   Swallow safety precautions, Aspiration precautions, Diet recommendations, and Compensatory strategies education provided to Patient and Nurse via explanation, all questions/concerns addressed. Patient requires reinforcement.    The patient's plan of care including recommendations, planned interventions, and recommended diet changes were

## 2025-03-21 NOTE — PROGRESS NOTES
[facility] at [address] [address]  Phone: [phone]      Progress Note      3/20/2025 12:49 PM  NAME: Antonella Albert   MRN:  [id number]   Admit Diagnosis: Bradycardia [R00.1]  ESRD on dialysis (HCC) [N18.6, Z99.2]          Assessment/Plan:     Paroxysmal atrial fibrillation with RVR.  Patient now with leadless Micra pacemaker in place was noted for atrial fibrillation with RVR.  Despite IV amiodarone, resting heart rate is still 110 to 120 BPM.I will add diltiazem to further optimize rate control.  Patient remains pleasantly confused without any apparent distress.   Sick sinus syndrome with bouts of symptomatic bradycardia with hypotension precluding completion of dialysis treatment.  Patient is status post pacemaker insertion.  3.   ESRD, on hemodialysis.  4.   COPD  5.   Dementia with chronic confusion and altered mental status.             []       High complexity decision making was performed in this patient at high risk for decompensation with multiple organ involvement.    Subjective:     Antonella Albert today  poorly responsive and appeared confused.  Discussed with RN events overnight.     Review of Systems:     [x]         Unable to obtain  ROS due to AMS   []         Total of 12 systems reviewed as follows:     Constitutional: negative fever, negative chills, negative weight loss  Eyes:               negative visual changes  ENT:                negative sore throat, tongue or lip swelling  Respiratory:     negative cough, negative dyspnea  Cards:             As per HPI  GI:                   negative for nausea, vomiting, diarrhea, and abdominal pain  Genitourinary: negative for frequency, dysuria  Integument:     negative for rash   Hematologic:   negative for easy bruising and gum/nose bleeding  Musculoskel:   negative for myalgias,  back pain  Neurological:   negative for headaches, dizziness, vertigo, weakness  Behavl/Psych: negative for feelings 
        [facility] at [address] [address]  Phone: [phone]      Progress Note      3/19/2025 11:27 AM  NAME: Antonella Albert   MRN:  [id number]   Admit Diagnosis: Bradycardia [R00.1]  ESRD on dialysis (HCC) [N18.6, Z99.2]          Assessment/Plan:     Paroxysmal atrial fibrillation with RVR.  Patient now with leadless Micra pacemaker in place was noted for atrial fibrillation with RVR with a heart rate in 120s to 130s.  Hemodynamically stable.  IV amiodarone was started to maintain adequate rate control.  Will check EKG.  Patient remains pleasantly confused without any apparent distress.   Sick sinus syndrome with bouts of symptomatic bradycardia with hypotension precluding completion of dialysis treatment.  Patient is status post pacemaker insertion..  Hyportension, patient on midodrine.  ESRD, on hemodialysis.  COPD  Altered mental status.         []       High complexity decision making was performed in this patient at high risk for decompensation with multiple organ involvement.    Subjective:     Antonella Albert today during dialysis was poorly responsive and appeared confused.  Discussed with RN events overnight.     Review of Systems:     [x]         Unable to obtain  ROS due to AMS   []         Total of 12 systems reviewed as follows:     Constitutional: negative fever, negative chills, negative weight loss  Eyes:               negative visual changes  ENT:                negative sore throat, tongue or lip swelling  Respiratory:     negative cough, negative dyspnea  Cards:             As per HPI  GI:                   negative for nausea, vomiting, diarrhea, and abdominal pain  Genitourinary: negative for frequency, dysuria  Integument:     negative for rash   Hematologic:   negative for easy bruising and gum/nose bleeding  Musculoskel:   negative for myalgias,  back pain  Neurological:   negative for headaches, dizziness, vertigo, weakness  Behavl/Psych: 
        [facility] at [address] [address]  Phone: [phone]      Progress Note      3/17/2025 11:55 AM  NAME: Antonella Albert   MRN:  [id number]   Admit Diagnosis: Bradycardia [R00.1]  ESRD on dialysis (HCC) [N18.6, Z99.2]          Assessment/Plan:     Sick sinus syndrome with bouts of symptomatic bradycardia with hypotension precluding completion of dialysis treatment.  Patient's leadless Micra pacemaker is scheduled for tomorrow by EP.  Paroxysmal atrial fibrillation.  Amiodarone on hold due to patient's underlying sick sinus syndrome with episodic bradycardia.  Hypertension, patient on midodrine.  ESRD, on hemodialysis.  COPD  Altered mental status.         []       High complexity decision making was performed in this patient at high risk for decompensation with multiple organ involvement.    Subjective:     Antonella Albert today during dialysis was poorly responsive and appeared confused.  Discussed with RN events overnight.     Review of Systems:     []         Unable to obtain  ROS due to ---   [x]         Total of 12 systems reviewed as follows:     Constitutional: negative fever, negative chills, negative weight loss  Eyes:               negative visual changes  ENT:                negative sore throat, tongue or lip swelling  Respiratory:     negative cough, negative dyspnea  Cards:             As per HPI  GI:                   negative for nausea, vomiting, diarrhea, and abdominal pain  Genitourinary: negative for frequency, dysuria  Integument:     negative for rash   Hematologic:   negative for easy bruising and gum/nose bleeding  Musculoskel:   negative for myalgias,  back pain  Neurological:   negative for headaches, dizziness, vertigo, weakness  Behavl/Psych: negative for feelings of anxiety, depression     Objective:      Physical Exam:    Last 24hrs VS reviewed since prior progress note. Most recent are:    /68   Pulse 91   Temp 99 °F (37.2 
      Hospitalist Progress Note    NAME:   Antonella Albert   : 1955   MRN: [id number]     Date/Time: 3/20/2025 11:47 AM  Patient PCP: Jose Eduardo Maier MD    Estimated discharge date: 24 to 48 hours  Barriers: Wean off of IV amiodarone drip, cardiology clearance  Hospital course:  Patient is a 70-year-old female with a past medical history of chronic hypoxic respiratory failure on 2 L, HFpEF, ESRD on HD, A-fib on coagulation, hypertension, type II DM, breast cancer and chronic leukocytosis who was admitted on 3/13/2025 from [facility] with symptomatic bradycardia.  Cardiology evaluate patient and recommended pacemaker placement for probable sick sinus syndrome, which was complete on 3/18/2025.  However, on 3/15/2025 rapid response was called as patient experiencing right-sided facial droop and upper extremity weakness.  Patient does have history of right-sided facial droop, but typically bilateral upper extremities strength and sensation intact. CT of the head without contrast revealed no acute abnormality, within limitation of suboptimal positioning. CTA of the head and neck with brain perfusion revealed no large vessel occlusion. Nonspecific focal high-grade stenosis of the basilar artery may suggest noncalcified plaque versus vasospasm. No suspicious mass or enhancing lesion. No blood flow or volume deficit to suggest acute ischemia or tissue at ischemic risk.  Teleneurology evaluated patient and recommended MRI of the brain without contrast and spot EEG if available.  EEG complete revealed diffuse slowing to suggest moderate encephalopathy of nonspecific etiology.  However, MRI of the brain without contrast complete revealing a small acute infarct in the right occipital lobe cortex.  Chronic microvascular ischemic disease and several small chronic infarcts. Teleneurology reevaluated patient and agrees positive MRI likely incidental and do not suspect contributing to clinical picture.  
      Hospitalist Progress Note    NAME:   Antonella Albert   : 1955   MRN: [id number]     Date/Time: 3/17/2025 4:18 PM  Patient PCP: Jose Eduardo Maier MD    Estimated discharge date: 48 hours  Barriers: Pacemaker placement on 3/18/2025, MRI of the brain with and without contrast, will need dialysis after contrast provided given ESRD on HD    Assessment / Plan:  Bradycardia  HFpEF  Sick sinus syndrome  Hyperlipidemia  -Cardiology planning for leadless Micra pacemaker in the morning  -Continue Crestor  -Telemetry    Right facial droop  Right upper extremity weakness  AMS, history of waxing and waning mentation  Schizoaffective disorder  -CT of the head without contrast revealed no acute abnormality, within limitation of suboptimal positioning  -CTA of the head and neck with brain perfusion revealed no large vessel occlusion.  Nonspecific focal high-grade stenosis of the basilar artery may suggest noncalcified plaque versus vasospasm.  No suspicious mass or enhancing lesion.  No blood flow or volume deficit to suggest acute ischemia or tissue at ischemic risk.  -Teleneurology about the patient recommended MRI of the brain with and without contrast and spot EEG if available  -Metabolic workup thus far negative, blood cultures no growth to date.  TSH, free T4, ammonia, CK and lactic all within normal range.  Mildly elevated ESR and CRP.  Pending UA collection  -Patient long-term resident at [facility] and rehab  -Continue Effexor for schizoaffective disorder    Leukocytosis with unknown etiology  -WBC 16.1  -Patient remains afebrile and asymptomatic  -CXR with no acute process  -Unable to collect UA as patient is anuric   -Recheck Pro-Andrew and CRP in the morning  -Will hold off on antibiotic at this time as there is no source of infection and patient is asymptomatic.  However, if elevation in Pro-Andrew and CRP in the morning will consider initiation of empiric antibiotics and further infectious 
      Hospitalist Progress Note    NAME: Antonella Albert   : 1955   MRN: [id number]     Date/Time: 3/13/2025 6:36 PM  Patient PCP: Jose Eduardo Maier MD    Estimated discharge date: >48  Barriers: Cardiac clearance, nephrology clearance    Hospital Course:  Antonella Albert is a 70 y.o.  female with PMHx significant for history of chronic respiratory failure on 2 L, CHFpEF, ESRD on hemodialysis, chronic A-fib on Eliquis, hypertension, type 2 diabetes, history of breast cancer, secondary hypercoagulable state blood, and chronic leukocytosis. Patient lives at the nursing home facility was sent for dialysis and dialysis center.  Patient was bradycardic at 30 bpm.  Patient poor historian and determining symptoms.  Patient arrived in ED on 2L NC then transferred from [facility] ER because of bradycardia.  While in the hospital patient HR range to 60-70.  Patient was found resting in bed, no acute distress, no fever or chills.    Assessment / Plan:  Bradycardia  Hx HFpEF  Momentary tachycardia in 110s, patient appeared to be asymptomatic  Tachycardia resolved, BP stable 60 to 70s  EKG -bigeminy, LBBB  Amiodarone discontinued, avoid roma drugs  Cardio following, concern for SSS, plan to discuss with EP for considerations of leadless Micra pacemaker, patient is suitable candidate for conventional RV pacemaker due to poor peripheral venous access  Continue telemetry    ESRD  Last hemodialysis 3/11, continued HD 3/12   HD session complicated by hypotension and bradycardia HR 40s  -1L fluid removed, given albumin 25 g  -Maintenance MWF HD schedule  Continue Cinacalcet  Nephrology following, electrolyte repletion during dialysis    Hypertension  Amlodipine held in event of acute hypotension during HD  BP currently at goal, continue to monitor and restart antihypertensive when warranted    Chronic respiratory failure  SpO2 > 98%, on 2L NC  Continue to monitor, no current respiratory complaints  CXR clear for any 
0233H Patient went in and out of sinus rhythm and afib -140s, asymptomatic. Metoprolol 5mg PRN given and converted the patient back to SR for a little more than an hr and she converted back to afib -130s. In constant communication with Jamal. Metoprolol held for now as patient's BP soft. Patient asymptomatic.   
4 Eyes Skin Assessment     NAME:  Antonella Albert  YOB: 1955  MEDICAL RECORD NUMBER:  [id number]    The patient is being assessed for  Other weekly assessment    I agree that at least one RN has performed a thorough Head to Toe Skin Assessment on the patient. ALL assessment sites listed below have been assessed.      Areas assessed by both nurses:    Head, Face, Ears, Shoulders, Back, Chest, Arms, Elbows, Hands, Sacrum. Buttock, Coccyx, Ischium, Legs. Feet and Heels, and Under Medical Devices         Does the Patient have a Wound? No noted wound(s). R groin incision s/p pacemaker placement       John Prevention initiated by RN: Yes  Wound Care Orders initiated by RN: No    Pressure Injury (Stage 3,4, Unstageable, DTI, NWPT, and Complex wounds) if present, place Wound referral order by RN under : No    New Ostomies, if present place, Ostomy referral order under : No     Nurse 1 eSignature: Electronically signed by Trupti Sequeira RN on 3/19/25 at 3:53 AM EDT    **SHARE this note so that the co-signing nurse can place an eSignature**    Nurse 2 eSignature: Electronically signed by Jane Hawkins RN on 3/19/25 at 4:00 AM EDT   
CM reviewed chart and noted patient is LTC at [facility] and rehab and will return.     Patient is fairly new dialysis patient at [facility] in [place].     Per IDRs patient is likely another 48 hours. She is scheduled for a pacemaker today and will have an MRI and dialysis tomorrow.     Updated clinicals sent to [facility] via [employer].   
CM reviewed chart and noted patient is from [facility] and rehab LTC and was recently readmitted to [facility] after she became SOB. Patient started on hemodialysis last admission as well and was going to [facility].     Patient is followed by cardiology and per cardio, patient will need PPM before discharge. Patient also scheduled for an MRI on Wednesday to dialyze after.     All updated clinicals sent to [facility] via [employer].   
CM reviewed chart and noted patient remains on amio gtt. Followed by cardio and nephrology.     Patient will return to [facility] at discharge.     1105 am  Cardiology up to see patient and has cleared patient for discharge. Awaiting hospitalist decision to return to facility.     1410 pm  CM notified discharge summary and order are in. Patient will go to room 151B and will continue dialysis at [facility] on MWF at 2 pm.     Nursing report can be called to [facility]. At [phone].       Transition of Care Plan:    RUR: 26%  Prior Level of Functioning: dependent with all ADLs.  Disposition: SNF  TAVON: today  If SNF or IPR: Date FOC offered: 3/13/25  Date FOC received: 3/13/25  Accepting facility: [facility]   Date authorization started with reference number: n/a  Date authorization received and expires: n/a  Follow up appointments: n/a  DME needed: n/a  Transportation at discharge: ambulance  IM/IMM Medicare/ letter given: yes   If yes, was  transfer form completed and VA notified?   Caregiver Contact: yes  Discharge Caregiver contacted prior to discharge? Yes, sister Lissette  Care Conference needed? N/a  Barriers to discharge: n/a    
Cm reviewed chart and noted patient is a LTC resident at [facility] and rehab and will return when she is medically cleared.    Patient received Ppm yesterday and is still followed by cardiology.     Patient went to dialysis this am but treatment stopped after HR up to 130-150 and stayed there. Patient normally dialyzed at [facility] in [place].       
MRI Brain with and without ordered. Patient has dialysis on M,W,F. MRI staff called to dialysis to verify when the patient is scheduled. Advised patient was currently being dialyzed. Exam is ordered with contrast therefore can not have MRI until Wednesday, 3/19/25 prior to dialysis. MRI will plan to have patient down first thing for scan and then take over to dialysis. Attending provider for today notified via [employer].   
Patient agreed to take the meds this morning, but had emesis in small amount right after. Patient refuses to talk with primary nurse, even though the PA saw her talking with her brother yesterday.    1762 - Raegan, sister-in-law, called for updates. Primary nurse gave the updates. Raegan stated that the patient just talk when she wants, even with the family she is not a talk person.    
Patient did not eat breakfast and was not talking this morning but with my assistance patient ate their entire lunch. I asked if they would like diet pepsi or water and they responded verbally, \"diet pepsi\", additionally telling me if they were still hungry or full, with a head nod, upon being asked.   
Patient has discharge orders for today, will return to [facility], where she previously lives. Patient's family aware of discharge. Patient is stable, alert and mentation is at baseline. Patient is discharging with hemodialysis central access in Right Subclavian, changed dressing during last night shift, other than that she is being discharge without tele or gomes.     Discharge plan of care/case management plan validated with provider discharge order.       1517 - Called report to JENNIFER Martinez, at [facility] ([phone]). Transport should be here at 1600.  
Patient readmitted to [facility] after only being at LTC facility [facility] and rehab for 3 days. Patient is a recently new dialysis patient and gets treatments on MWF at [facility].     Patient followed by cardiology and will have EP consult.     CM will continue to follow for discharge needs but as for now patient has been accepted back to [facility] at discharge.         
Patients oxygen saturation was reading at 91 % on the pulse oximeter. Due to this finding being below 95%, I reported this to the RN.    
Progress Note      3/15/2025 11:01 AM  NAME: Antonella Albert   MRN:  [id number]   Admit Diagnosis: Bradycardia [R00.1]  ESRD on dialysis (HCC) [N18.6, Z99.2]          Assessment/Plan:   Symptomatic bradycardia/sick sinus syndrome, with heart rate in the 30s and 40s with hypotension and unable to dialyze due to hypotension.  Continues to get paroxysmal bouts of A-fib with RVR.  Unable to use AV roma agents with hypotension requiring midodrine.  Will restart amiodarone post pacemaker.  Power of , sister-in-law, brother in room and discussed with them need for pacemaker.    History of paroxysmal A-fib, amiodarone on hold    Hypotension, requires midodrine    Chronic kidney disease, on dialysis    COPD, diabetes, hypertension, dyslipidemia         []       High complexity decision making was performed in this patient at high risk for decompensation with multiple organ involvement.    Subjective:     Antonella Albert is with closed eyes today and not responding.  Discussed with RN events overnight.     Review of Systems:    Symptom Y/N Comments  Symptom Y/N Comments   Fever/Chills N   Chest Pain N    Poor Appetite N   Edema N    Cough N   Abdominal Pain N    Sputum N   Joint Pain N    SOB/WAY N   Pruritis/Rash N    Nausea/vomit N   Tolerating PT/OT Y    Diarrhea N   Tolerating Diet Y    Constipation N   Other       Could NOT obtain due to:      Objective:      Physical Exam:    Last 24hrs VS reviewed since prior progress note. Most recent are:    /76   Pulse (!) 121   Temp 97.5 °F (36.4 °C) (Oral)   Resp 18   Ht 1.702 m (5' 7\")   Wt 74.8 kg (164 lb 14.5 oz)   SpO2 92%   BMI 25.83 kg/m²     Intake/Output Summary (Last 24 hours) at 3/15/2025 1101  Last data filed at 3/14/2025 2256  Gross per 24 hour   Intake 1147.2 ml   Output 1200 ml   Net -52.8 ml        General Appearance: Well developed, well nourished, alert; no acute distress.  Ears/Nose/Mouth/Throat: Hearing grossly normal; moist mucous 
Received Order for Telemetry     Antonella Nunes Bety   1955   [id number]   Bradycardia [R00.1]  ESRD on dialysis (HCC) [N18.6, Z99.2]   Jacque Coats MD     Tele Box # [id number] placed on patient at  1644 pm  ER Room # 7  Admitting to Room 467  Transferring Nurse hiwot  Verified with Primary Nurse alysa at  1806 pm   
Renal Dosing / Monitoring  Estimated CrCl: 5 mL/min  Current Regimen Rx Intervention Plan  Per [facility] P&T Committee Protocol with respect to renal function   Famotidine 20mg PO twice daily Famotidine 20mg PO once daily for CrCl below 50     
Renal Progress Note    Patient: Antonella Albert MRN: [id number]  SSN: [id number]    YOB: 1955  Age: 70 y.o.  Sex: female      Admit Date: 3/13/2025    LOS: 0 days     Subjective:   Patient was discharged on 3/11. She presented to [facility] yesterday after being sent from dialysis center for bradycardia. Patient did not receive dialysis. She was transferred to this facility for further medical management.  Patient was seen in dialysis today. She is awake, alert, but was not responding verbally to NP at time of visit. She did, however, engage verbally with the MD during her visit. Mental status appears to be unchanged from time of discharge. Patient appears comfortable. Treatment complicated by hypotension associated with bradycardia, hypotension responding to albumin. UF decreased to 1 L.   Labs today consistent with ESRD, potassium 5.4.     Current Facility-Administered Medications   Medication Dose Route Frequency    [Held by provider] amLODIPine (NORVASC) tablet 10 mg  10 mg Oral Daily    apixaban (ELIQUIS) tablet 2.5 mg  2.5 mg Oral BID    cinacalcet (SENSIPAR) tablet 30 mg  30 mg Oral Daily    famotidine (PEPCID) tablet 20 mg  20 mg Oral Daily    isosorbide mononitrate (IMDUR) extended release tablet 60 mg  60 mg Oral Daily    pantoprazole (PROTONIX) tablet 40 mg  40 mg Oral QAM AC    rosuvastatin (CRESTOR) tablet 20 mg  20 mg Oral Daily    sevelamer (RENVELA) packet 0.8 g  0.8 g Oral TID WC    venlafaxine (EFFEXOR XR) extended release capsule 75 mg  75 mg Oral Daily    insulin lispro (HUMALOG,ADMELOG) injection vial 0-16 Units  0-16 Units SubCUTAneous 4x Daily AC & HS    glucagon injection 1 mg  1 mg SubCUTAneous PRN    dextrose 10 % infusion   IntraVENous Continuous PRN    glucose chewable tablet 16 g  4 tablet Oral PRN    dextrose bolus 10% 125 mL  125 mL IntraVENous PRN    Or    dextrose bolus 10% 250 mL  250 mL IntraVENous PRN    dextrose 10 % infusion   IntraVENous Continuous PRN    sodium 
Renal Progress Note    Patient: Antonella Albert MRN: [id number]  SSN: [id number]    YOB: 1955  Age: 70 y.o.  Sex: female      Admit Date: 3/13/2025    LOS: 8 days     Subjective:   Patient was seen at bedside and in dialysis, resting with eyes closed, appears comfortable. Tolerating treatment. On RA, no swelling in extremities.    Current Facility-Administered Medications   Medication Dose Route Frequency    amiodarone (CORDARONE) tablet 200 mg  200 mg Oral Daily    [START ON 3/22/2025] dilTIAZem (CARDIZEM CD) extended release capsule 120 mg  120 mg Oral Daily    hydrALAZINE (APRESOLINE) injection 10 mg  10 mg IntraVENous Q6H PRN    midodrine (PROAMATINE) tablet 10 mg  10 mg Oral TID PRN    metoprolol (LOPRESSOR) injection 5 mg  5 mg IntraVENous Q6H PRN    albumin human 25% IV solution 25 g  25 g IntraVENous PRN    apixaban (ELIQUIS) tablet 2.5 mg  2.5 mg Oral BID    cinacalcet (SENSIPAR) tablet 30 mg  30 mg Oral Daily    famotidine (PEPCID) tablet 20 mg  20 mg Oral Daily    isosorbide mononitrate (IMDUR) extended release tablet 60 mg  60 mg Oral Daily    pantoprazole (PROTONIX) tablet 40 mg  40 mg Oral QAM AC    rosuvastatin (CRESTOR) tablet 20 mg  20 mg Oral Daily    sevelamer (RENVELA) packet 0.8 g  0.8 g Oral TID WC    venlafaxine (EFFEXOR XR) extended release capsule 75 mg  75 mg Oral Daily    insulin lispro (HUMALOG,ADMELOG) injection vial 0-16 Units  0-16 Units SubCUTAneous 4x Daily AC & HS    glucagon injection 1 mg  1 mg SubCUTAneous PRN    dextrose 10 % infusion   IntraVENous Continuous PRN    glucose chewable tablet 16 g  4 tablet Oral PRN    dextrose bolus 10% 125 mL  125 mL IntraVENous PRN    Or    dextrose bolus 10% 250 mL  250 mL IntraVENous PRN    dextrose 10 % infusion   IntraVENous Continuous PRN    sodium chloride flush 0.9 % injection 5-40 mL  5-40 mL IntraVENous 2 times per day    sodium chloride flush 0.9 % injection 5-40 mL  5-40 mL IntraVENous PRN    0.9 % sodium chloride 
Renal Progress Note    Patient: Antonella Albert MRN: [id number]  SSN: [id number]    YOB: 1955  Age: 70 y.o.  Sex: female      Admit Date: 3/13/2025    LOS: 4 days     Subjective:   Patient was seen in dialysis today, tolerating treatment, hemodynamically stable. Drowsy but awakens easily. Not verbally interactive at time of visit. Appears comfortable. On RA, no swelling in extremities.      Current Facility-Administered Medications   Medication Dose Route Frequency    metoprolol (LOPRESSOR) injection 5 mg  5 mg IntraVENous Q6H PRN    midodrine (PROAMATINE) tablet 10 mg  10 mg Oral TID WC    albumin human 25% IV solution 25 g  25 g IntraVENous PRN    apixaban (ELIQUIS) tablet 2.5 mg  2.5 mg Oral BID    cinacalcet (SENSIPAR) tablet 30 mg  30 mg Oral Daily    famotidine (PEPCID) tablet 20 mg  20 mg Oral Daily    isosorbide mononitrate (IMDUR) extended release tablet 60 mg  60 mg Oral Daily    pantoprazole (PROTONIX) tablet 40 mg  40 mg Oral QAM AC    rosuvastatin (CRESTOR) tablet 20 mg  20 mg Oral Daily    sevelamer (RENVELA) packet 0.8 g  0.8 g Oral TID WC    venlafaxine (EFFEXOR XR) extended release capsule 75 mg  75 mg Oral Daily    insulin lispro (HUMALOG,ADMELOG) injection vial 0-16 Units  0-16 Units SubCUTAneous 4x Daily AC & HS    glucagon injection 1 mg  1 mg SubCUTAneous PRN    dextrose 10 % infusion   IntraVENous Continuous PRN    glucose chewable tablet 16 g  4 tablet Oral PRN    dextrose bolus 10% 125 mL  125 mL IntraVENous PRN    Or    dextrose bolus 10% 250 mL  250 mL IntraVENous PRN    dextrose 10 % infusion   IntraVENous Continuous PRN    sodium chloride flush 0.9 % injection 5-40 mL  5-40 mL IntraVENous 2 times per day    sodium chloride flush 0.9 % injection 5-40 mL  5-40 mL IntraVENous PRN    0.9 % sodium chloride infusion   IntraVENous PRN    ondansetron (ZOFRAN-ODT) disintegrating tablet 4 mg  4 mg Oral Q8H PRN    Or    ondansetron (ZOFRAN) injection 4 mg  4 mg IntraVENous Q6H PRN 
Renal Progress Note    Patient: Antonella Albert MRN: [id number]  SSN: [id number]    YOB: 1955  Age: 70 y.o.  Sex: female      Admit Date: 3/13/2025    LOS: 7 days     Subjective:   Patient was seen in dialysis, resting with eyes closed, appears comfortable. Tolerating treatment. On RA, no swelling in extremities.    Current Facility-Administered Medications   Medication Dose Route Frequency    amiodarone (NEXTERONE) 360 mg in dextrose 5% 200 ml  0.5 mg/min IntraVENous Continuous    hydrALAZINE (APRESOLINE) injection 10 mg  10 mg IntraVENous Q6H PRN    midodrine (PROAMATINE) tablet 10 mg  10 mg Oral TID PRN    metoprolol (LOPRESSOR) injection 5 mg  5 mg IntraVENous Q6H PRN    albumin human 25% IV solution 25 g  25 g IntraVENous PRN    apixaban (ELIQUIS) tablet 2.5 mg  2.5 mg Oral BID    cinacalcet (SENSIPAR) tablet 30 mg  30 mg Oral Daily    famotidine (PEPCID) tablet 20 mg  20 mg Oral Daily    isosorbide mononitrate (IMDUR) extended release tablet 60 mg  60 mg Oral Daily    pantoprazole (PROTONIX) tablet 40 mg  40 mg Oral QAM AC    rosuvastatin (CRESTOR) tablet 20 mg  20 mg Oral Daily    sevelamer (RENVELA) packet 0.8 g  0.8 g Oral TID WC    venlafaxine (EFFEXOR XR) extended release capsule 75 mg  75 mg Oral Daily    insulin lispro (HUMALOG,ADMELOG) injection vial 0-16 Units  0-16 Units SubCUTAneous 4x Daily AC & HS    glucagon injection 1 mg  1 mg SubCUTAneous PRN    dextrose 10 % infusion   IntraVENous Continuous PRN    glucose chewable tablet 16 g  4 tablet Oral PRN    dextrose bolus 10% 125 mL  125 mL IntraVENous PRN    Or    dextrose bolus 10% 250 mL  250 mL IntraVENous PRN    dextrose 10 % infusion   IntraVENous Continuous PRN    sodium chloride flush 0.9 % injection 5-40 mL  5-40 mL IntraVENous 2 times per day    sodium chloride flush 0.9 % injection 5-40 mL  5-40 mL IntraVENous PRN    0.9 % sodium chloride infusion   IntraVENous PRN    ondansetron (ZOFRAN-ODT) disintegrating tablet 4 mg  4 
Nausea Only    Metformin Other (See Comments)     GI DISTRESS    Cephalexin Nausea And Vomiting    Sulfamethoxazole-Trimethoprim Rash     History:   Past Medical History:   Diagnosis Date    Anxiety     Arthritis     spinal stenosis    Breast CA (HCC) 2017    Left Breast (chemo/radiation)    Chronic kidney disease     stage 4    COPD (chronic obstructive pulmonary disease) (HCC)     Dementia (HCC)     Depression     Diabetes ([facility])     Type 2    Edema     legs and ankles    GERD (gastroesophageal reflux disease)     High cholesterol     Hypertension     Ill-defined condition     patient states hemorrhage behind Left eye-following Dr. Gallego    MI (myocardial infarction) ([facility]) 06/2018    per patient    Neuropathy     Osteoarthritis     Restless leg     Schizoaffective disorder ([facility])     Spinal stenosis, lumbar region without neurogenic claudication        Assessment  Vitals:    Level of Consciousness: Alert (0)   Vitals:    03/13/25 1300 03/13/25 1330 03/13/25 1400 03/13/25 1430   BP: (!) 121/100 125/78 112/71 118/79   Pulse:  (!) 124 (!) 114 (!) 112   Resp:  16 12 14   Temp:       TempSrc:       SpO2:  99% 100% 99%     Deterioration Index (DI): Deterioration Index: (!) 60.96  Deterioration Index (DI) Interventions Performed: Deterioration Index RN Interventions Performed : Provider Notified of Clinical Concerns  O2 Flow Rate: O2 Flow Rate (L/min): 2 L/min  O2 Device: O2 Device: Nasal cannula  Cardiac Rhythm:    Critical Lab Results: [unfilled]  Cultures: Cultures:None  NIH Score: NIH     Active LDA's:   Peripheral IV 03/12/25 Left Antecubital (Active)     Active Central Lines:                          Active Wounds:    Active Solano's:    Active Feeding Tubes:      Administered Medications:   Medications   amLODIPine (NORVASC) tablet 10 mg ( Oral Automatically Held 3/16/25 0900)   apixaban (ELIQUIS) tablet 2.5 mg (2.5 mg Oral Given 3/13/25 1332)   cinacalcet (SENSIPAR) tablet 30 mg (has no administration in time 
chronically has waxing and waning mental status with similar event occurring during last hospitalization in early March where stroke alert was called and workup negative.    Assessment / Plan:  Bradycardia  Hx HFpEF  -Momentary tachycardia in 110s, patient appeared to be asymptomatic  -Tachycardia resolved, BP stable 60 to 70s  -EKG shows bigeminy, LBBB  -Amiodarone discontinued, avoid roma drugs.  Plan to restart amiodarone post pacemaker.  -Cardio following, concern for SSS, planning for pacemaker placement after EP evaluation  -Continue telemetry    Right facial droop  Right upper extremity weakness  Altered mental status, history of lack and waning mental status  Concern for CVA  Schizoaffective disorder  -Right facial droop and RUE weakness noted on 3/15  -Stat head CT, CT brain perfusion and CTA head and neck negative for intracranial abnormality, LVO or stenosis  -Teleneuro consulted, recommend workup for metabolic encephalopathy  -MRI brain and EEG pending  -Metabolic encephalopathy workup negative thus far: Blood cultures with no growth. TSH/T4, ammonia, CK, lactic WNL  -ESR and CRP mildly increased  -UA still pending    ESRD on MWF HD  -HD session complicated by hypotension and bradycardia HR 40s  -1L fluid removed, given albumin 25 g  -Maintenance MWF HD schedule  -Continue Cinacalcet  -Nephrology following, electrolyte repletion during dialysis    Intradialytic hypotension  History of hypertension  -Amlodipine held in event of acute hypotension during HD  -BP currently at goal, continue to monitor and restart antihypertensive when warranted  -Midodrine 3 times daily    Chronic respiratory failure on 2 L home O2  -Patient stable on room air currently, can use baseline 2 L NC  -Continue to monitor, no current respiratory complaints  -CXR negative for any acute process    Chronic A-fib  A-fib with RVR  Secondary hypercoagulable state, Hx breast cancer  -Continue Eliquis    Chronic leukocytosis  -WBC 
   pantoprazole (PROTONIX) tablet 40 mg  40 mg Oral QAM AC    rosuvastatin (CRESTOR) tablet 20 mg  20 mg Oral Daily    sevelamer (RENVELA) packet 0.8 g  0.8 g Oral TID WC    venlafaxine (EFFEXOR XR) extended release capsule 75 mg  75 mg Oral Daily    insulin lispro (HUMALOG,ADMELOG) injection vial 0-16 Units  0-16 Units SubCUTAneous 4x Daily AC & HS    glucagon injection 1 mg  1 mg SubCUTAneous PRN    dextrose 10 % infusion   IntraVENous Continuous PRN    glucose chewable tablet 16 g  4 tablet Oral PRN    dextrose bolus 10% 125 mL  125 mL IntraVENous PRN    Or    dextrose bolus 10% 250 mL  250 mL IntraVENous PRN    dextrose 10 % infusion   IntraVENous Continuous PRN    sodium chloride flush 0.9 % injection 5-40 mL  5-40 mL IntraVENous 2 times per day    sodium chloride flush 0.9 % injection 5-40 mL  5-40 mL IntraVENous PRN    0.9 % sodium chloride infusion   IntraVENous PRN    ondansetron (ZOFRAN-ODT) disintegrating tablet 4 mg  4 mg Oral Q8H PRN    Or    ondansetron (ZOFRAN) injection 4 mg  4 mg IntraVENous Q6H PRN    polyethylene glycol (GLYCOLAX) packet 17 g  17 g Oral Daily PRN    acetaminophen (TYLENOL) tablet 650 mg  650 mg Oral Q6H PRN    Or    acetaminophen (TYLENOL) suppository 650 mg  650 mg Rectal Q6H PRN    heparin (porcine) injection 3,200 Units  3,200 Units IntraCATHeter PRN         Nael Allen MD  
Daily    sevelamer (RENVELA) packet 0.8 g  0.8 g Oral TID WC    venlafaxine (EFFEXOR XR) extended release capsule 75 mg  75 mg Oral Daily    insulin lispro (HUMALOG,ADMELOG) injection vial 0-16 Units  0-16 Units SubCUTAneous 4x Daily AC & HS    glucagon injection 1 mg  1 mg SubCUTAneous PRN    dextrose 10 % infusion   IntraVENous Continuous PRN    glucose chewable tablet 16 g  4 tablet Oral PRN    dextrose bolus 10% 125 mL  125 mL IntraVENous PRN    Or    dextrose bolus 10% 250 mL  250 mL IntraVENous PRN    dextrose 10 % infusion   IntraVENous Continuous PRN    sodium chloride flush 0.9 % injection 5-40 mL  5-40 mL IntraVENous 2 times per day    sodium chloride flush 0.9 % injection 5-40 mL  5-40 mL IntraVENous PRN    0.9 % sodium chloride infusion   IntraVENous PRN    ondansetron (ZOFRAN-ODT) disintegrating tablet 4 mg  4 mg Oral Q8H PRN    Or    ondansetron (ZOFRAN) injection 4 mg  4 mg IntraVENous Q6H PRN    polyethylene glycol (GLYCOLAX) packet 17 g  17 g Oral Daily PRN    acetaminophen (TYLENOL) tablet 650 mg  650 mg Oral Q6H PRN    Or    acetaminophen (TYLENOL) suppository 650 mg  650 mg Rectal Q6H PRN    heparin (porcine) injection 3,200 Units  3,200 Units IntraCATHeter PRN         Nael Allen MD  
[employer]     
tablet 20 mg  20 mg Oral Daily    sevelamer (RENVELA) packet 0.8 g  0.8 g Oral TID     venlafaxine (EFFEXOR XR) extended release capsule 75 mg  75 mg Oral Daily    insulin lispro (HUMALOG,ADMELOG) injection vial 0-16 Units  0-16 Units SubCUTAneous 4x Daily AC & HS    glucagon injection 1 mg  1 mg SubCUTAneous PRN    dextrose 10 % infusion   IntraVENous Continuous PRN    glucose chewable tablet 16 g  4 tablet Oral PRN    dextrose bolus 10% 125 mL  125 mL IntraVENous PRN    Or    dextrose bolus 10% 250 mL  250 mL IntraVENous PRN    dextrose 10 % infusion   IntraVENous Continuous PRN    sodium chloride flush 0.9 % injection 5-40 mL  5-40 mL IntraVENous 2 times per day    sodium chloride flush 0.9 % injection 5-40 mL  5-40 mL IntraVENous PRN    0.9 % sodium chloride infusion   IntraVENous PRN    ondansetron (ZOFRAN-ODT) disintegrating tablet 4 mg  4 mg Oral Q8H PRN    Or    ondansetron (ZOFRAN) injection 4 mg  4 mg IntraVENous Q6H PRN    polyethylene glycol (GLYCOLAX) packet 17 g  17 g Oral Daily PRN    acetaminophen (TYLENOL) tablet 650 mg  650 mg Oral Q6H PRN    Or    acetaminophen (TYLENOL) suppository 650 mg  650 mg Rectal Q6H PRN    heparin (porcine) injection 3,200 Units  3,200 Units IntraCATHeter PRN    metoprolol (LOPRESSOR) injection 5 mg  5 mg IntraVENous Q6H PRN         Nael Allen MD  
Heart sounds: Normal heart sounds.   Pulmonary:      Effort: Pulmonary effort is normal. No respiratory distress.   Abdominal:      General: Abdomen is flat. Bowel sounds are normal. There is no distension.      Palpations: Abdomen is soft.      Tenderness: There is no abdominal tenderness.   Musculoskeletal:      Right lower leg: No edema.      Left lower leg: No edema.   Skin:     General: Skin is warm and dry.   Neurological:      Mental Status: She is lethargic.      Cranial Nerves: Facial asymmetry present.      Motor: Weakness present.      Comments: Right sided facial droop and right upper extremity weakness noted by RN during morning assessment   Psychiatric:         Speech: She is noncommunicative.          LABS:  I reviewed today's most current labs and imaging studies.  Pertinent labs include:  Recent Labs     03/14/25  0304 03/14/25  1526 03/15/25  0322   WBC 12.8* 13.7* 14.4*   HGB 12.5 11.7 11.8   HCT 39.2 36.9 37.1   * 427* 430*     Recent Labs     03/12/25  1145 03/13/25  0153 03/13/25  0528 03/13/25  0629 03/14/25  0304 03/15/25  0322   * 135* 133*  --  135*  135* 134*   K 4.9 5.3* 5.8* 5.4* 4.1  4.1 4.2   CL 97 99 99  --  98  97 98   CO2 23 19* 20*  --  26  26 25   BUN 81* 83* 86*  --  34*  34* 25*   MG 2.9*  --   --   --   --   --    PHOS  --   --   --   --  6.5*  --    ALT 9* 6*  --   --  7* 7*       Care Plan discussed with:    Comments   Patient x    Family      RN x    Care Manager     Consultant                       x Multidiciplinary team rounds were held today with , nursing, pharmacist and clinical coordinator.  Patient's plan of care was discussed; medications were reviewed and discharge planning was addressed.       Medical Decision Making   [x] High (any 2)  A. Problems (any 1)  [x] Acute/Chronic Illness/injury posing threat to life or bodily function:    [x] Severe exacerbation of chronic illness:  
g/dL    Albumin 3.4 (L) 3.5 - 5.0 g/dL    Globulin 4.1 (H) 2.0 - 4.0 g/dL    Albumin/Globulin Ratio 0.8 (L) 1.1 - 2.2     POCT Glucose    Collection Time: 03/14/25  8:30 AM   Result Value Ref Range    POC Glucose 163 (H) 65 - 100 mg/dL    Performed by: MONICA BEST    POCT Glucose    Collection Time: 03/14/25 10:27 AM   Result Value Ref Range    POC Glucose 284 (H) 65 - 100 mg/dL    Performed by: Robert Montilla         Assessment and Plan:     #1 ESRD on HD:  -Recently started on maintenance dialysis for increasing creatinine, volume overload, and symptoms of uremia  -Hyperkalemia on admission resolved with dialysis  -Scheduled to start dialysis at [facility] on MWF  -Seen in dialysis, complicated by hypotension, albumin given, UF decreased  -Will continue maintenance dialysis on MWF schedule    #2  Hypotension  -Not on any blood pressure medications, midodrine 10 mg TID started  -Albumin as needed during dialysis for intradialytic hypotension  -Not on any blood pressure medications  -Continue to monitor blood pressures    #3  Renal osteodystrophy  -Corrected calcium is 9.7  -Hyperphosphatemia: Phosphorus is 6.5, continue Renvela 800 mg TID WM  -Secondary hyperparathyroidism: on Sensipar 30 mg p.o. daily   -Vitamin D level is high at 78, not on any vitamin D supplements now    #4  Leukocytosis:   -Chronic  -Afebrile, CXR negative    #5 Bradycardia:  -Cardiology following  -Plan for EP evaluation for concern of SSS     #6 Remote history of breast cancer    #7 AMS:  mental status   -??around baseline   -Monitor clinically    Signed By: IVELISSE Yun - CNP     March 14, 2025    Addendum:    Rounds made along with Karla Londono NP  Patient seen and examined at bedside,   Labs and treatments reviewed  Plan discussed with patient and NP  Reviewed NP's  notes, amended and agree with assessment and plan     Rachel Ortiz M.D  
output data in the 24 hours ending 03/18/25 1441       I had a face to face encounter and independently examined this patient on 3/18/2025, as outlined below:    Review of Systems   Unable to perform ROS: Psychiatric disorder      PHYSICAL EXAM:  Physical Exam  Vitals reviewed.   Constitutional:       General: She is not in acute distress.     Appearance: Normal appearance. She is not ill-appearing.   HENT:      Head: Normocephalic and atraumatic.   Eyes:      Extraocular Movements: Extraocular movements intact.   Cardiovascular:      Rate and Rhythm: Normal rate and regular rhythm.      Heart sounds: Normal heart sounds.   Pulmonary:      Effort: Pulmonary effort is normal. No respiratory distress.      Breath sounds: Normal breath sounds. No wheezing.   Abdominal:      General: Abdomen is flat. There is no distension.      Palpations: Abdomen is soft.      Tenderness: There is no abdominal tenderness.   Musculoskeletal:         General: No tenderness. Normal range of motion.      Cervical back: Normal range of motion and neck supple. No tenderness.      Right lower leg: No edema.      Left lower leg: No edema.   Neurological:      Mental Status: She is alert.      Comments: Patient does not verbally communicate, but will nod to yes or no questions when asked.  Unable to complete full neurologic exam given difficulty with communication, but did not appreciate any weakness       Reviewed most current lab test results and cultures  YES  Reviewed most current radiology test results   YES  Review and summation of old records today    NO  Reviewed patient's current orders and MAR    YES  PMH/SH reviewed - no change compared to H&P  ________________________________________________________________________  Care Plan discussed with:    Comments   Patient x    Family      RN x    Care Manager     Consultant                        Multidiciplinary team rounds were held today with , nursing, pharmacist and 
       Assessment and Plan:     #1 ESRD on HD:  -Recently started on maintenance dialysis for increasing creatinine, volume overload, and symptoms of uremia  -Hyperkalemia on admission resolved with dialysis  -Scheduled to start dialysis at [facility] on MWF  -Dialysis terminated early due to patient becoming tachycardic, received 500 cc bolus  -Will reschedule HD treatment for tomorrow  -Will continue maintenance dialysis on MWF schedule    #2  Hypotension  -Blood pressures fluctuating  -Permissive hypertension  -Not on any blood pressure medications, on midodrine 10 mg TID PRN  -Albumin as needed during dialysis for intradialytic hypotension  -Continue to monitor blood pressures    #3  Renal osteodystrophy  -Corrected calcium is 9.9  -Hyperphosphatemia: Phosphorus is 8.7, patient refusing Renvela 800 mg TID WM  -Secondary hyperparathyroidism: on Sensipar 30 mg p.o. daily   -Vitamin D level is high at 78, not on any vitamin D supplements now    #4  Leukocytosis:   -Chronic  -Afebrile, CXR negative    #5 Bradycardia:  -Cardiology following  -Pacemaker placed on 3/18     #6 Remote history of breast cancer    #7 AMS:  mental status   -CT of the head with IV contrast was done to rule out acute ischemic event which is negative  -Mental status fluctuates, awake and not verbally interactive today  -MRI shows small acute infarction of right occipital lobe, chronic microvascular ischemic disease and several small chronic infarctions     Signed By: IVELISSE Yun - CNP     March 19, 2025        Addendum:  Rounds made along with Karla Londono NP  Patient seen and examined at bedside,    Labs and treatments reviewed   Plan discussed with NP   Reviewed NP's  notes, amended and agree with assessment and plan    Dr. Velasquez Webster    
    Signed By: Kalra Londono, APRN - CNP     March 18, 2025        Addendum:  Rounds made along with Karla Londono, NP  Patient seen and examined at bedside,    Labs and treatments reviewed   Plan discussed with NP   Reviewed NP's  notes, amended and agree with assessment and plan    Dr. Velasquez Webster    
80.0 - 99.0 FL    MCH 26.0 26.0 - 34.0 PG    MCHC 31.8 30.0 - 36.5 g/dL    RDW 14.8 (H) 11.5 - 14.5 %    Platelets 390 150 - 400 K/uL    MPV 10.4 8.9 - 12.9 FL    Nucleated RBCs 0.0 0.0  WBC    nRBC 0.00 0.00 - 0.01 K/uL   Comprehensive Metabolic Panel    Collection Time: 03/16/25  1:45 AM   Result Value Ref Range    Sodium 131 (L) 136 - 145 mmol/L    Potassium 4.7 3.5 - 5.1 mmol/L    Chloride 95 (L) 97 - 108 mmol/L    CO2 27 21 - 32 mmol/L    Anion Gap 9 2 - 12 mmol/L    Glucose 170 (H) 65 - 100 mg/dL    BUN 43 (H) 6 - 20 mg/dL    Creatinine 8.64 (H) 0.55 - 1.02 mg/dL    BUN/Creatinine Ratio 5 (L) 12 - 20      Est, Glom Filt Rate 5 (L) >60 ml/min/1.73m2    Calcium 8.6 8.5 - 10.1 mg/dL    Total Bilirubin 0.4 0.2 - 1.0 mg/dL    AST 13 (L) 15 - 37 U/L    ALT 8 (L) 12 - 78 U/L    Alk Phosphatase 69 45 - 117 U/L    Total Protein 7.3 6.4 - 8.2 g/dL    Albumin 3.7 3.5 - 5.0 g/dL    Globulin 3.6 2.0 - 4.0 g/dL    Albumin/Globulin Ratio 1.0 (L) 1.1 - 2.2     POCT Glucose    Collection Time: 03/16/25  7:51 AM   Result Value Ref Range    POC Glucose 326 (H) 65 - 100 mg/dL    Performed by: Robert Montilla    POCT Glucose    Collection Time: 03/16/25 10:32 AM   Result Value Ref Range    POC Glucose 211 (H) 65 - 100 mg/dL    Performed by: Robert Montilla         Assessment and Plan:     #1 ESRD on HD:  -Recently started on maintenance dialysis for increasing creatinine, volume overload, and symptoms of uremia  -Hyperkalemia on admission resolved with dialysis  -Scheduled to start dialysis at [facility] on MWF  -She was dialyzed last on 3/14.  Had intradialytic hypotension which resolved with albumin and midodrine  -Will continue maintenance dialysis on MWF schedule    #2  Hypotension  -Not on any blood pressure medications, midodrine 10 mg TID started  -Albumin as needed during dialysis for intradialytic hypotension  -Not on any blood pressure medications  -Continue to monitor blood pressures    #3  Renal 
during dialysis for intradialytic hypotension  -Not on any blood pressure medications  -Continue to monitor blood pressures    #3  Renal osteodystrophy  -Corrected calcium is 9.7  -Hyperphosphatemia: Phosphorus is 6.5, continue Renvela 800 mg TID WM  -Secondary hyperparathyroidism: on Sensipar 30 mg p.o. daily   -Vitamin D level is high at 78, not on any vitamin D supplements now    #4  Leukocytosis:   -Chronic  -Afebrile, CXR negative    #5 Bradycardia:  -Cardiology following  -Plan for EP evaluation for concern of SSS     #6 Remote history of breast cancer    #7 AMS:  mental status   -Patient is lethargic today.  Not responding to verbal command  -CT of the head with IV contrast was done to rule out acute ischemic event which is negative  -Teleneurology has been consulted    Signed By: Rachel Ortiz MD     March 15, 2025      
from:      Procedures: see electronic medical records for all procedures/Xrays and details which were not copied into this note but were reviewed prior to creation of Plan.    Reviewed most current lab test results and cultures  YES  Reviewed most current radiology test results   YES  Review and summation of old records today    NO  Reviewed patient's current orders and MAR    YES  PMH/SH reviewed - no change compared to H&P  >50% of visit spent in counseling and coordination of care  ________________________________________________________________________  Total NON critical care TIME:  35  Minutes    Signed: Chayo Mcnulty PA-C    
(APRESOLINE) injection 10 mg  10 mg IntraVENous Q6H PRN    midodrine (PROAMATINE) tablet 10 mg  10 mg Oral TID PRN    metoprolol (LOPRESSOR) injection 5 mg  5 mg IntraVENous Q6H PRN    albumin human 25% IV solution 25 g  25 g IntraVENous PRN    apixaban (ELIQUIS) tablet 2.5 mg  2.5 mg Oral BID    cinacalcet (SENSIPAR) tablet 30 mg  30 mg Oral Daily    famotidine (PEPCID) tablet 20 mg  20 mg Oral Daily    isosorbide mononitrate (IMDUR) extended release tablet 60 mg  60 mg Oral Daily    pantoprazole (PROTONIX) tablet 40 mg  40 mg Oral QAM AC    rosuvastatin (CRESTOR) tablet 20 mg  20 mg Oral Daily    sevelamer (RENVELA) packet 0.8 g  0.8 g Oral TID WC    venlafaxine (EFFEXOR XR) extended release capsule 75 mg  75 mg Oral Daily    insulin lispro (HUMALOG,ADMELOG) injection vial 0-16 Units  0-16 Units SubCUTAneous 4x Daily AC & HS    glucagon injection 1 mg  1 mg SubCUTAneous PRN    dextrose 10 % infusion   IntraVENous Continuous PRN    glucose chewable tablet 16 g  4 tablet Oral PRN    dextrose bolus 10% 125 mL  125 mL IntraVENous PRN    Or    dextrose bolus 10% 250 mL  250 mL IntraVENous PRN    dextrose 10 % infusion   IntraVENous Continuous PRN    sodium chloride flush 0.9 % injection 5-40 mL  5-40 mL IntraVENous 2 times per day    sodium chloride flush 0.9 % injection 5-40 mL  5-40 mL IntraVENous PRN    0.9 % sodium chloride infusion   IntraVENous PRN    ondansetron (ZOFRAN-ODT) disintegrating tablet 4 mg  4 mg Oral Q8H PRN    Or    ondansetron (ZOFRAN) injection 4 mg  4 mg IntraVENous Q6H PRN    polyethylene glycol (GLYCOLAX) packet 17 g  17 g Oral Daily PRN    acetaminophen (TYLENOL) tablet 650 mg  650 mg Oral Q6H PRN    Or    acetaminophen (TYLENOL) suppository 650 mg  650 mg Rectal Q6H PRN    heparin (porcine) injection 3,200 Units  3,200 Units IntraCATHeter PRN         Prior to Admission medications    Medication Sig Start Date End Date Taking? Authorizing Provider   amiodarone (CORDARONE) 200 MG tablet Take 1 
back: Normal range of motion and neck supple. No tenderness.      Right lower leg: No edema.      Left lower leg: No edema.   Neurological:      Mental Status: She is alert.      Comments: Patient does not verbally communicate, but will nod to yes or no questions when asked.  Unable to complete full neurologic exam given difficulty with communication, but did not appreciate any weakness       Reviewed most current lab test results and cultures  YES  Reviewed most current radiology test results   YES  Review and summation of old records today    NO  Reviewed patient's current orders and MAR    YES  PMH/SH reviewed - no change compared to H&P  ________________________________________________________________________  Care Plan discussed with:    Comments   Patient x    Family      RN x    Care Manager     Consultant                        Multidiciplinary team rounds were held today with , nursing, pharmacist and clinical coordinator.  Patient's plan of care was discussed; medications were reviewed and discharge planning was addressed.     ________________________________________________________________________  Total NON critical care TIME:  35  Minutes    Total CRITICAL CARE TIME Spent:   Minutes non procedure based      Comments   >50% of visit spent in counseling and coordination of care     ________________________________________________________________________  Tello Zapata PA-C     Procedures: see electronic medical records for all procedures/Xrays and details which were not copied into this note but were reviewed prior to creation of Plan.      LABS:  I reviewed today's most current labs and imaging studies.  Pertinent labs include:  Recent Labs     03/17/25  0216 03/18/25  0311 03/19/25  0310   WBC 16.1* 12.8* 12.5*   HGB 11.6 11.5 11.5   HCT 36.4 35.7 37.2    377 372     Recent Labs     03/17/25  0216 03/18/25 0311 03/19/25 0310   * 132* 134*  135*   K 4.4 4.3 5.0  5.1   CL 97

## 2025-03-21 NOTE — DISCHARGE SUMMARY
[facility]    Rachel Ortiz MD  [address]  [address]  [facility] [address]  [phone]    Follow up  Continue routine follow-up for continued monitoring and management of HD    Oziel Brandon MD  [address] [facility]  [address]  [facility] [address] [phone]    Follow up  Follow-up with the staff/neurologist or provider above in 2 weeks for continued monitoring and management of acute CVA during admission      Total time in minutes spent coordinating this discharge (includes going over instructions, follow-up, prescriptions, and preparing report for sign off to her PCP) :  35 minutes

## 2025-03-21 NOTE — DIALYSIS
Completed and tolerated 3.5 hour dialysis with 0 fluid removal.    Seen by VIOLA Canales while on dialysis. Treatment was uneventful.    Kinjal of telemetry made aware patient is enroute to [facility]-Room 467 with [phone].    Attempted to give dialysis report to Primary Nurse at # [id number]. Un available at the moment. HD RN writing this report for reference.

## (undated) DEVICE — APPLIER CLP AUTO MED 9.75 IN TI SURGCLP SUPER INTLOK 20 DISP

## (undated) DEVICE — GLOVE ORANGE PI 7 1/2   MSG9075

## (undated) DEVICE — UNDERGLOVE SURG SZ 7.5 BLU LTX FREE SYN POLYISOPRENE

## (undated) DEVICE — NEEDLE HYPO 22GA L1.5IN BLK S STL HUB POLYPR SHLD REG BVL

## (undated) DEVICE — SOLUTION IV 1000ML 0.9% SOD CHL

## (undated) DEVICE — TUBING, SUCTION, 9/32" X 10', STRAIGHT: Brand: MEDLINE

## (undated) DEVICE — SUTURE STRATAFIX SYMMETRIC PDS + SZ 2-0 L18IN ABSRB VLT SXPP1A403

## (undated) DEVICE — DRAPE,CHEST,FENES,15X10,STERIL: Brand: MEDLINE

## (undated) DEVICE — SPONGE LAP SOFT 18X18 IN X RAY DETECTABLE

## (undated) DEVICE — INTENDED FOR TISSUE SEPARATION, AND OTHER PROCEDURES THAT REQUIRE A SHARP SURGICAL BLADE TO PUNCTURE OR CUT.: Brand: BARD-PARKER SAFETY BLADES SIZE 10, STERILE

## (undated) DEVICE — SUT PROL 4-0 36IN V4 DA BLU --

## (undated) DEVICE — ATTUNE SOLO PINNING SYSTEM

## (undated) DEVICE — TOTAL KNEE PACK: Brand: MEDLINE INDUSTRIES, INC.

## (undated) DEVICE — (D)PREP SKN CHLRAPRP APPL 26ML -- CONVERT TO ITEM 371833

## (undated) DEVICE — CATHETER PACE 5FR L110CM 6FR INTRO D10CM 1MM SPACE POLYUR

## (undated) DEVICE — TUNNELER SURG PLAS FOR HCKMN BRVC LNRD CATH

## (undated) DEVICE — GOWN,REINFORCED,POLY,AURORA,XLARGE,STRL: Brand: MEDLINE

## (undated) DEVICE — STERILE POLYISOPRENE POWDER-FREE SURGICAL GLOVES: Brand: PROTEXIS

## (undated) DEVICE — COVER,TABLE,HEAVY DUTY,77"X90",STRL: Brand: MEDLINE

## (undated) DEVICE — STRYKER PERFORMANCE SERIES SAGITTAL BLADE: Brand: STRYKER PERFORMANCE SERIES

## (undated) DEVICE — STERILE POLYISOPRENE POWDER-FREE SURGICAL GLOVES WITH EMOLLIENT COATING: Brand: PROTEXIS

## (undated) DEVICE — SUTURE VCRL SZ 0 L27IN ABSRB UD L36MM CT-1 1/2 CIR J260H

## (undated) DEVICE — 3.0MM PRECISION NEURO (MATCH HEAD)

## (undated) DEVICE — STOCKING COMPR L L29-31IN REG 19MMHG ANK 9-10IN CALF

## (undated) DEVICE — ELECTRODE EMG NDL M5 MOD MEP DISP NVM5

## (undated) DEVICE — AMPLATZ ULTRA STIFF WIRE GUIDE: Brand: AMPLATZ

## (undated) DEVICE — HYPODERMIC SAFETY NEEDLE: Brand: MAGELLAN

## (undated) DEVICE — 3.0MM NEURO (MATCH HEAD)

## (undated) DEVICE — BOWL BNE CEM MIX SPAT CURET SMARTMIX CTS

## (undated) DEVICE — SYRINGE MED 50ML LUERLOCK TIP

## (undated) DEVICE — TRAY CATH OD16FR SIL URIN M STATLOK STBL DEV SURSTP

## (undated) DEVICE — APPLIER LIG CLP 115IN AUTO W SUP INTLOK 30 M TI CLP PREM

## (undated) DEVICE — SCREW EXT FIX L14MM FOR DISTRCTN

## (undated) DEVICE — Device

## (undated) DEVICE — SYRINGE 20ML LL S/C 50

## (undated) DEVICE — GLOVE SURG SZ 65 THK91MIL LTX FREE SYN POLYISOPRENE

## (undated) DEVICE — HEX-LOCKING BLADE ELECTRODE: Brand: EDGE

## (undated) DEVICE — DRESSING SURG 4X14 IN POSTOP SIL BORD MEPILEX

## (undated) DEVICE — T5 HOOD WITH PEEL AWAY FACE SHIELD

## (undated) DEVICE — SPONGE DISSECT PNUT SM 3/8IN -- 5/PK

## (undated) DEVICE — SUTURE VCRL SZ 1 L18IN ABSRB VLT CTX L48MM 1/2 CIR J765D

## (undated) DEVICE — BNDG,ELSTC,MATRIX,STRL,6"X5YD,LF,HOOK&LP: Brand: MEDLINE

## (undated) DEVICE — KIT RETRCT SHIM DISP FOR MINIMAL ACCS SYS MAXCESS 4

## (undated) DEVICE — SUTURE VCRL SZ 2-0 L27IN ABSRB UD L26MM CT-2 1/2 CIR J269H

## (undated) DEVICE — SUTURE VCRL SZ 2-0 L18IN ABSRB VLT CT-1 L36MM 1/2 CIR J739D

## (undated) DEVICE — PINNACLE INTRODUCER SHEATH: Brand: PINNACLE

## (undated) DEVICE — PAD,PREPPING,CUFFED,24X48,7",NONSTERILE: Brand: MEDLINE

## (undated) DEVICE — SPONGE LAP 18X18IN STRL -- 5/PK

## (undated) DEVICE — SKIN CLOS DERMABND PRINEO 60CM -- DERMABOUND PRINEO

## (undated) DEVICE — DRAIN SURG W7MMXL20CM SIL FULL PERF HUBLESS FLAT RADPQ STRP

## (undated) DEVICE — 3M™ DURAPORE™ SURGICAL TAPE 1538-3, 3 INCH X 10 YARD (7,5CM X 9,1M), 4 ROLLS/BOX: Brand: 3M™ DURAPORE™

## (undated) DEVICE — INTRODUCER SHTH W51XH73XL557MM D13MM DIA7.8MM HYDRPHLC

## (undated) DEVICE — GLOVE SURG SZ 7 L11.33IN FNGR THK9.8MIL STRW LTX POLYMER

## (undated) DEVICE — MANIFOLD CART ULT HI FLOW W 3 PRT FOR SUCT

## (undated) DEVICE — KIT POS W/ FOAM ARM CRADL SHEARGUARD CHST PD CVR FOR SPNL

## (undated) DEVICE — BOWL MED M 16OZ PLAS CAP GRAD

## (undated) DEVICE — 3M™ IOBAN™ 2 ANTIMICROBIAL INCISE DRAPE 6650EZ: Brand: IOBAN™ 2

## (undated) DEVICE — APPLIER CLP L L13IN TI MULT RNG HNDL 20 CLP STR LIGACLP

## (undated) DEVICE — GENERAL PURPOSE TRAY STERILE: Brand: MEDLINE INDUSTRIES, INC.

## (undated) DEVICE — KIT DIL PRB K WIRE DISP FOR EXTRM LUM INTBDY FUS

## (undated) DEVICE — SHEET, T, LAPAROTOMY, STERILE: Brand: MEDLINE

## (undated) DEVICE — GOWN,AURORA,FABRIC-REINFORCED,X-LARGE: Brand: MEDLINE

## (undated) DEVICE — PERCLOSE PROGLIDE™ SUTURE-MEDIATED CLOSURE SYSTEM: Brand: PERCLOSE PROGLIDE™

## (undated) DEVICE — APPLICATOR BNDG 1MM ADH PREMIERPRO EXOFIN

## (undated) DEVICE — SHEET,DRAPE,70X100,STERILE: Brand: MEDLINE

## (undated) DEVICE — ESOPHAGEAL BALLOON DILATATION CATHETER: Brand: CRE FIXED WIRE

## (undated) DEVICE — GAUZE SPONGES,16 PLY: Brand: CURITY

## (undated) DEVICE — JELLY,LUBE,STERILE,FLIP TOP,TUBE,4-OZ: Brand: MEDLINE

## (undated) DEVICE — KENDALL SCD EXPRESS SLEEVES, KNEE LENGTH, MEDIUM: Brand: KENDALL SCD

## (undated) DEVICE — ZIMMER® STERILE DISPOSABLE TOURNIQUET CUFF WITH PLC, DUAL PORT, SINGLE BLADDER, 34 IN. (86 CM)

## (undated) DEVICE — DILATOR COONS TAPER: Brand: COONS

## (undated) DEVICE — TRAY SURG CUST CRD CATH 3 PRT SSR

## (undated) DEVICE — WASH CLOTH INCONT LO LINT PREM 12X13 IN LF DISP

## (undated) DEVICE — DRAPE C-ARMOUR C-ARM KIT --

## (undated) DEVICE — GLOVE SURG 7 BIOGEL PI ULTRATOUCH G

## (undated) DEVICE — SUT VCRL 1 18IN CTBX DETACH UD --

## (undated) DEVICE — BINDER ABD H12IN FOR 62-74IN WAIST UNIV 4 PNL PREM DSGN E

## (undated) DEVICE — 3M™ TEGADERM™ TRANSPARENT FILM DRESSING FRAME STYLE, 1629, 8 IN X 12 IN (20 CM X 30 CM), 10/CT 8CT/CASE: Brand: 3M™ TEGADERM™

## (undated) DEVICE — BLADE ELECTRODE: Brand: VALLEYLAB

## (undated) DEVICE — FLEX ADVANTAGE 3000CC: Brand: FLEX ADVANTAGE

## (undated) DEVICE — GUIDEWIRE VASC L150CM DIA0035IN PTFE STIFF FIX COR 3MM J TIP

## (undated) DEVICE — DEVICE INFL 60ML 15ATM DISPOSABLE STERIFLATE CRE

## (undated) DEVICE — 3M™ COBAN™ NL STERILE NON-LATEX SELF-ADHERENT WRAP, 2084S, 4 IN X 5 YD (10 CM X 4,5 M), 18 ROLLS/CASE: Brand: 3M™ COBAN™

## (undated) DEVICE — ARGYLE FRAZIER SURGICAL SUCTION INSTRUMENT 10 FR/CH (3.3 MM): Brand: ARGYLE

## (undated) DEVICE — DRAPE,TOP,102X53,STERILE: Brand: MEDLINE

## (undated) DEVICE — SUTURE VCRL SZ 1 L27IN ABSRB UD CT-1 L36MM 1/2 CIR J261H

## (undated) DEVICE — STERILE LATEX POWDER-FREE SURGICAL GLOVESWITH NITRILE COATING: Brand: PROTEXIS

## (undated) DEVICE — REM POLYHESIVE ADULT PATIENT RETURN ELECTRODE: Brand: VALLEYLAB

## (undated) DEVICE — GOWN,SIRUS,FABRNF,XL,20/CS: Brand: MEDLINE

## (undated) DEVICE — HOOD, PEEL-AWAY: Brand: FLYTE

## (undated) DEVICE — K WIRE FIX NIT BVL BLNT TIP PRECEPT
Type: IMPLANTABLE DEVICE | Site: SPINE LUMBAR | Status: NON-FUNCTIONAL
Removed: 2018-08-30

## (undated) DEVICE — (D)HANDPIECE IRR W/HI FLO TIP -- DUPLICATE USE ITEM 121586

## (undated) DEVICE — SUTURE NRLN SZ 1 L18IN NONABSORBABLE BLK L36MM CT-1 1/2 CIR C520D

## (undated) DEVICE — KIT CLN UP BON SECOURS MARYV

## (undated) DEVICE — RECIPROCATING BLADE HEAVY DUTY LONG, OFFSET  (77.6 X 0.77 X 11.2MM)

## (undated) DEVICE — SLIM BODY SKIN STAPLER: Brand: APPOSE ULC

## (undated) DEVICE — SUTURE MCRYL SZ 3-0 L27IN ABSRB UD L24MM PS-1 3/8 CIR PRIM Y936H

## (undated) DEVICE — SPONGE GZ W4XL4IN COT 12 PLY TYP VII WVN C FLD DSGN

## (undated) DEVICE — SUTURE MCRYL SZ 3-0 L27IN ABSRB UD L19MM PS-2 3/8 CIR PRIM Y427H

## (undated) DEVICE — TOWEL,OR,DSP,ST,BLUE,STD,4/PK,20PK/CS: Brand: MEDLINE

## (undated) DEVICE — DRAPE XR C ARM 41X74IN LF --

## (undated) DEVICE — SUTURE VCRL SZ 3-0 L18IN ABSRB VLT L26MM SH 1/2 CIR J774D

## (undated) DEVICE — FCPS RAD JAW 4 SC 240CM W/NDL --

## (undated) DEVICE — 1200CC GUARDIAN II: Brand: GUARDIAN

## (undated) DEVICE — THREE-QUARTER SHEET: Brand: CONVERTORS

## (undated) DEVICE — 3M™ IOBAN™ 2 ANTIMICROBIAL INCISE DRAPE 6651EZ: Brand: IOBAN™ 2

## (undated) DEVICE — NEEDLE NRV STIM BVL TIP INSUL PEDCL ACCS SYS FOR EMG MON

## (undated) DEVICE — SOLUTION IRRIG 1000ML STRL H2O USP PLAS POUR BTL

## (undated) DEVICE — 1010 S-DRAPE TOWEL DRAPE 10/BX: Brand: STERI-DRAPE™

## (undated) DEVICE — CATH URET 12FRX115CM CONE TIP --

## (undated) DEVICE — COVER LT HNDL FLX

## (undated) DEVICE — GELFOAM SZ 100 SPNG

## (undated) DEVICE — SYR 10ML LUER LOK 1/5ML GRAD --

## (undated) DEVICE — SLEEVE COMPR UNIV WOMEN REG FT GARMENTS

## (undated) DEVICE — SUT PROL 0 18IN BV1757 MP BLU --

## (undated) DEVICE — WAX SURG 2.5GM HEMSTAT BNE BEESWAX PARAFFIN ISO PALMITATE

## (undated) DEVICE — 3M™ BAIR PAWS FLEX™ WARMING GOWN, STANDARD, 20 PER CASE 81003: Brand: BAIR PAWS™